# Patient Record
Sex: MALE | Race: BLACK OR AFRICAN AMERICAN | Employment: OTHER | URBAN - METROPOLITAN AREA
[De-identification: names, ages, dates, MRNs, and addresses within clinical notes are randomized per-mention and may not be internally consistent; named-entity substitution may affect disease eponyms.]

---

## 2018-11-08 ENCOUNTER — HOSPITAL ENCOUNTER (EMERGENCY)
Age: 48
Discharge: HOME | End: 2018-11-09
Attending: EMERGENCY MEDICINE

## 2018-11-08 DIAGNOSIS — R05.9 COUGH: Primary | ICD-10-CM

## 2018-11-08 DIAGNOSIS — R52 BODY ACHES: ICD-10-CM

## 2018-11-08 PROCEDURE — 99284 EMERGENCY DEPT VISIT MOD MDM: CPT

## 2018-11-08 ASSESSMENT — PAIN DESCRIPTION - PAIN TYPE: TYPE: ACUTE PAIN

## 2018-11-08 ASSESSMENT — PAIN SCALES - GENERAL: PAINLEVEL_OUTOF10: 10

## 2018-11-08 ASSESSMENT — PAIN DESCRIPTION - ORIENTATION: ORIENTATION: LOWER

## 2018-11-08 ASSESSMENT — PAIN DESCRIPTION - LOCATION: LOCATION: BACK

## 2018-11-09 ENCOUNTER — APPOINTMENT (OUTPATIENT)
Dept: GENERAL RADIOLOGY | Age: 48
End: 2018-11-09

## 2018-11-09 VITALS
HEIGHT: 73 IN | BODY MASS INDEX: 27.83 KG/M2 | WEIGHT: 210 LBS | RESPIRATION RATE: 18 BRPM | TEMPERATURE: 99.8 F | OXYGEN SATURATION: 99 % | SYSTOLIC BLOOD PRESSURE: 118 MMHG | HEART RATE: 79 BPM | DIASTOLIC BLOOD PRESSURE: 97 MMHG

## 2018-11-09 LAB
ALBUMIN SERPL-MCNC: 3.5 GM/DL (ref 3.4–5)
ALP BLD-CCNC: 128 IU/L (ref 40–129)
ALT SERPL-CCNC: 73 U/L (ref 10–40)
ANION GAP SERPL CALCULATED.3IONS-SCNC: 8 MMOL/L (ref 4–16)
AST SERPL-CCNC: 115 IU/L (ref 15–37)
BACTERIA: NEGATIVE /HPF
BASOPHILS ABSOLUTE: 0.1 K/CU MM
BASOPHILS RELATIVE PERCENT: 0.9 % (ref 0–1)
BILIRUB SERPL-MCNC: 0.2 MG/DL (ref 0–1)
BILIRUBIN URINE: NEGATIVE MG/DL
BLOOD, URINE: NEGATIVE
BUN BLDV-MCNC: 13 MG/DL (ref 6–23)
CALCIUM SERPL-MCNC: 8.5 MG/DL (ref 8.3–10.6)
CHLORIDE BLD-SCNC: 106 MMOL/L (ref 99–110)
CLARITY: CLEAR
CO2: 25 MMOL/L (ref 21–32)
COLOR: ABNORMAL
CREAT SERPL-MCNC: 1.3 MG/DL (ref 0.9–1.3)
DIFFERENTIAL TYPE: ABNORMAL
EOSINOPHILS ABSOLUTE: 0.3 K/CU MM
EOSINOPHILS RELATIVE PERCENT: 5.9 % (ref 0–3)
GFR AFRICAN AMERICAN: >60 ML/MIN/1.73M2
GFR NON-AFRICAN AMERICAN: 59 ML/MIN/1.73M2
GLUCOSE BLD-MCNC: 110 MG/DL (ref 70–99)
GLUCOSE, URINE: NEGATIVE MG/DL
HCT VFR BLD CALC: 42.4 % (ref 42–52)
HEMOGLOBIN: 14.4 GM/DL (ref 13.5–18)
IMMATURE NEUTROPHIL %: 0.2 % (ref 0–0.43)
KETONES, URINE: NEGATIVE MG/DL
LEUKOCYTE ESTERASE, URINE: NEGATIVE
LYMPHOCYTES ABSOLUTE: 1.1 K/CU MM
LYMPHOCYTES RELATIVE PERCENT: 21.4 % (ref 24–44)
MCH RBC QN AUTO: 30.3 PG (ref 27–31)
MCHC RBC AUTO-ENTMCNC: 34 % (ref 32–36)
MCV RBC AUTO: 89.3 FL (ref 78–100)
MONOCYTES ABSOLUTE: 0.5 K/CU MM
MONOCYTES RELATIVE PERCENT: 10 % (ref 0–4)
NITRITE URINE, QUANTITATIVE: NEGATIVE
NUCLEATED RBC %: 0 %
PDW BLD-RTO: 14.2 % (ref 11.7–14.9)
PH, URINE: 6 (ref 5–8)
PLATELET # BLD: 170 K/CU MM (ref 140–440)
PMV BLD AUTO: 9.6 FL (ref 7.5–11.1)
POTASSIUM SERPL-SCNC: 4 MMOL/L (ref 3.5–5.1)
PROTEIN UA: NEGATIVE MG/DL
RAPID INFLUENZA  B AGN: NEGATIVE
RAPID INFLUENZA A AGN: NEGATIVE
RBC # BLD: 4.75 M/CU MM (ref 4.6–6.2)
RBC URINE: ABNORMAL /HPF (ref 0–3)
SEGMENTED NEUTROPHILS ABSOLUTE COUNT: 3.3 K/CU MM
SEGMENTED NEUTROPHILS RELATIVE PERCENT: 61.6 % (ref 36–66)
SODIUM BLD-SCNC: 139 MMOL/L (ref 135–145)
SPECIFIC GRAVITY UA: 1.01 (ref 1–1.03)
SQUAMOUS EPITHELIAL: <1 /HPF
TOTAL CK: 92 IU/L (ref 38–174)
TOTAL IMMATURE NEUTOROPHIL: 0.01 K/CU MM
TOTAL NUCLEATED RBC: 0 K/CU MM
TOTAL PROTEIN: 5.9 GM/DL (ref 6.4–8.2)
TRICHOMONAS: ABNORMAL /HPF
UROBILINOGEN, URINE: NORMAL MG/DL (ref 0.2–1)
WBC # BLD: 5.3 K/CU MM (ref 4–10.5)
WBC UA: <1 /HPF (ref 0–2)

## 2018-11-09 PROCEDURE — 80053 COMPREHEN METABOLIC PANEL: CPT

## 2018-11-09 PROCEDURE — 85025 COMPLETE CBC W/AUTO DIFF WBC: CPT

## 2018-11-09 PROCEDURE — 87804 INFLUENZA ASSAY W/OPTIC: CPT

## 2018-11-09 PROCEDURE — 71046 X-RAY EXAM CHEST 2 VIEWS: CPT

## 2018-11-09 PROCEDURE — 6370000000 HC RX 637 (ALT 250 FOR IP): Performed by: EMERGENCY MEDICINE

## 2018-11-09 PROCEDURE — 82550 ASSAY OF CK (CPK): CPT

## 2018-11-09 PROCEDURE — 81001 URINALYSIS AUTO W/SCOPE: CPT

## 2018-11-09 RX ORDER — LIDOCAINE 4 G/G
1 PATCH TOPICAL DAILY
Status: DISCONTINUED | OUTPATIENT
Start: 2018-11-09 | End: 2018-11-09 | Stop reason: HOSPADM

## 2018-11-09 RX ORDER — GUAIFENESIN 100 MG/5ML
200 SOLUTION ORAL ONCE
Status: COMPLETED | OUTPATIENT
Start: 2018-11-09 | End: 2018-11-09

## 2018-11-09 RX ORDER — NAPROXEN 250 MG/1
500 TABLET ORAL ONCE
Status: COMPLETED | OUTPATIENT
Start: 2018-11-09 | End: 2018-11-09

## 2018-11-09 RX ORDER — LIDOCAINE 4 G/G
1 PATCH TOPICAL DAILY
Status: DISCONTINUED | OUTPATIENT
Start: 2018-11-09 | End: 2018-11-09

## 2018-11-09 RX ORDER — NAPROXEN 500 MG/1
500 TABLET ORAL 2 TIMES DAILY PRN
Qty: 20 TABLET | Refills: 0 | Status: SHIPPED | OUTPATIENT
Start: 2018-11-09 | End: 2019-07-03

## 2018-11-09 RX ORDER — GUAIFENESIN/DEXTROMETHORPHAN 100-10MG/5
5 SYRUP ORAL 3 TIMES DAILY PRN
Qty: 120 ML | Refills: 0 | Status: SHIPPED | OUTPATIENT
Start: 2018-11-09 | End: 2018-11-19

## 2018-11-09 RX ADMIN — GUAIFENESIN 200 MG: 200 SOLUTION ORAL at 00:49

## 2018-11-09 RX ADMIN — NAPROXEN 500 MG: 250 TABLET ORAL at 00:49

## 2018-11-09 ASSESSMENT — PAIN SCALES - GENERAL: PAINLEVEL_OUTOF10: 10

## 2018-11-09 NOTE — ED PROVIDER NOTES
Dose    lidocaine 4 % external patch 1 patch  1 patch Transdermal Daily Jossue Pollock MD         Current Outpatient Prescriptions   Medication Sig Dispense Refill    naproxen (NAPROSYN) 500 MG tablet Take 1 tablet by mouth 2 times daily as needed for Pain 20 tablet 0    guaiFENesin-dextromethorphan (ROBITUSSIN DM) 100-10 MG/5ML syrup Take 5 mLs by mouth 3 times daily as needed for Cough 120 mL 0     Allergies   Allergen Reactions    Tramadol Other (See Comments)     shaking    Vicodin [Hydrocodone-Acetaminophen] Hives     Nursing Notes Reviewed    ROS:  At least 10 systems reviewed and otherwise negative except as in the [place]. Physical Exam:  ED Triage Vitals [11/08/18 8440]   Enc Vitals Group      BP (!) 136/90      Pulse 79      Resp 18      Temp 99.8 °F (37.7 °C)      Temp Source Oral      SpO2 100 %      Weight 210 lb (95.3 kg)      Height 6' 1\" (1.854 m)      Head Circumference       Peak Flow       Pain Score       Pain Loc       Pain Edu? Excl. in [address]? My pulse oximetry interpretation is which is within the normal range    GENERAL APPEARANCE: Awake and alert. Cooperative. No acute distress. HEAD: Normocephalic. Atraumatic. EYES: EOM's grossly intact. Sclera anicteric. ENT: Mucous membranes are moist. Tolerates saliva. No trismus. NECK: Supple. No meningismus. Trachea midline. HEART: RRR. Radial pulses 2+. LUNGS: Respirations unlabored. CTAB. Full sentences. No respiratory distress. ABDOMEN: Soft. Non-tender. No guarding or rebound. BACK: no midline back pain, no CVA tenderness. Mild paralumbar muscle tenderness bilaterally. No bruising. EXTREMITIES: No acute deformities. No pitting edema. SKIN: Warm and dry. NEUROLOGICAL: No gross facial drooping. Moves all 4 extremities spontaneously. Able to plantarflex and dorsiflex both feet and great toes. Strong pulses. Normal reflexes. PSYCHIATRIC: Normal mood.     I have reviewed and interpreted all of the currently available Specific Gravity, UA 1.010 1.001 - 1.035    Blood, Urine NEGATIVE NEG    pH, Urine 6.0 5.0 - 8.0    Protein, UA NEGATIVE NEG MG/DL    Urobilinogen, Urine NORMAL 0.2 - 1.0 MG/DL    Nitrite Urine, Quantitative NEGATIVE NEG    Leukocyte Esterase, Urine NEGATIVE NEG    RBC, UA NONE SEEN 0 - 3 /HPF    WBC, UA <1 0 - 2 /HPF    Bacteria, UA NEGATIVE NEG /HPF    Squam Epithel, UA <1 /HPF    Trichomonas, UA NONE SEEN NOSEE /HPF        Radiographs:  [] Radiologist's Wet Read Report Reviewed:     XR CHEST STANDARD (2 VW) (Final result)   Result time 11/09/18 00:39:04   Final result by Mauri Lynne MD (11/09/18 00:39:04)                Impression:    No acute process. Narrative:    EXAMINATION:  TWO VIEWS OF THE CHEST    11/9/2018 12:24 am    COMPARISON:  02/05/2018    HISTORY:  ORDERING SYSTEM PROVIDED HISTORY: SOB  TECHNOLOGIST PROVIDED HISTORY:  Reason for exam:->SOB  Ordering Physician Provided Reason for Exam: SOB  Acuity: Acute  Type of Exam: Initial  Mechanism of Injury: SOB  Relevant Medical/Surgical History: SOB    FINDINGS:  The lungs and pleural spaces are without acute focal process. The cardiomediastinal silhouette is without acute process. There is no evidence of pneumothorax. The osseous structures are without acute process. [] Discussed with Radiologist:     [] The following radiograph was interpreted by myself in the absence of a radiologist:     EKG: (All EKG's are interpreted by myself in the absence of a cardiologist)      MDM:  Patient's vital signs are stable. Given guaifenesin, Naprosyn, Lidoderm patch. Patient's CBC, CMP, flu, urine, chest x-ray all negative. Do feel patient likely has an upper respiratory, cough, body aches, viral syndrome. Did discuss results with patient and will discharge him with Naprosyn, cough syrup, follow up PCP. Clinical Impression:  1. Cough    2.  Body aches        Disposition Vitals:  [unfilled], [unfilled], [unfilled],

## 2018-11-13 ENCOUNTER — APPOINTMENT (OUTPATIENT)
Dept: ULTRASOUND IMAGING | Age: 48
End: 2018-11-13

## 2018-11-13 ENCOUNTER — APPOINTMENT (OUTPATIENT)
Dept: GENERAL RADIOLOGY | Age: 48
End: 2018-11-13

## 2018-11-13 ENCOUNTER — APPOINTMENT (OUTPATIENT)
Dept: CT IMAGING | Age: 48
End: 2018-11-13

## 2018-11-13 ENCOUNTER — HOSPITAL ENCOUNTER (EMERGENCY)
Age: 48
Discharge: HOME | End: 2018-11-13
Attending: EMERGENCY MEDICINE

## 2018-11-13 VITALS
BODY MASS INDEX: 27.83 KG/M2 | DIASTOLIC BLOOD PRESSURE: 84 MMHG | HEART RATE: 75 BPM | WEIGHT: 210 LBS | RESPIRATION RATE: 16 BRPM | SYSTOLIC BLOOD PRESSURE: 122 MMHG | TEMPERATURE: 98.2 F | HEIGHT: 73 IN | OXYGEN SATURATION: 100 %

## 2018-11-13 DIAGNOSIS — B15.9 VIRAL HEPATITIS A WITHOUT HEPATIC COMA: Primary | ICD-10-CM

## 2018-11-13 LAB
ACETAMINOPHEN LEVEL: <5 UG/ML (ref 15–30)
ALBUMIN SERPL-MCNC: 3.4 GM/DL (ref 3.4–5)
ALP BLD-CCNC: 349 IU/L (ref 40–129)
ALT SERPL-CCNC: 3897 U/L (ref 10–40)
ANION GAP SERPL CALCULATED.3IONS-SCNC: 8 MMOL/L (ref 4–16)
APTT: 31.9 SECONDS (ref 21.2–33)
AST SERPL-CCNC: 4365 IU/L (ref 15–37)
ATYPICAL LYMPHOCYTE ABSOLUTE COUNT: ABNORMAL
BACTERIA: ABNORMAL /HPF
BANDED NEUTROPHILS ABSOLUTE COUNT: 0.66 K/CU MM
BANDED NEUTROPHILS RELATIVE PERCENT: 9 % (ref 5–11)
BASOPHILS ABSOLUTE: 0.1 K/CU MM
BASOPHILS RELATIVE PERCENT: 1 % (ref 0–1)
BILIRUB SERPL-MCNC: 3.6 MG/DL (ref 0–1)
BILIRUBIN URINE: ABNORMAL MG/DL
BLOOD, URINE: ABNORMAL
BUN BLDV-MCNC: 8 MG/DL (ref 6–23)
CALCIUM SERPL-MCNC: 8.6 MG/DL (ref 8.3–10.6)
CHLORIDE BLD-SCNC: 97 MMOL/L (ref 99–110)
CLARITY: CLEAR
CO2: 28 MMOL/L (ref 21–32)
COLOR: ABNORMAL
CREAT SERPL-MCNC: 1.2 MG/DL (ref 0.9–1.3)
DIFFERENTIAL TYPE: ABNORMAL
EOSINOPHILS ABSOLUTE: 0.2 K/CU MM
EOSINOPHILS RELATIVE PERCENT: 3 % (ref 0–3)
GFR AFRICAN AMERICAN: >60 ML/MIN/1.73M2
GFR NON-AFRICAN AMERICAN: >60 ML/MIN/1.73M2
GLUCOSE BLD-MCNC: 120 MG/DL (ref 70–99)
GLUCOSE, URINE: NEGATIVE MG/DL
HAV IGM SER IA-ACNC: ABNORMAL
HCT VFR BLD CALC: 46 % (ref 42–52)
HEMOGLOBIN: 15.9 GM/DL (ref 13.5–18)
HEPATITIS B CORE IGM ANTIBODY: NON REACTIVE
HEPATITIS B SURFACE ANTIGEN: NON REACTIVE
HEPATITIS C ANTIBODY: NON REACTIVE
HYALINE CASTS: 2 /LPF
ICTOTEST: POSITIVE
INR BLD: 1.26 INDEX
KETONES, URINE: NEGATIVE MG/DL
LEUKOCYTE ESTERASE, URINE: ABNORMAL
LYMPHOCYTES ABSOLUTE: 3.4 K/CU MM
LYMPHOCYTES RELATIVE PERCENT: 47 % (ref 24–44)
MCH RBC QN AUTO: 30.1 PG (ref 27–31)
MCHC RBC AUTO-ENTMCNC: 34.6 % (ref 32–36)
MCV RBC AUTO: 87.1 FL (ref 78–100)
MONOCYTES ABSOLUTE: 0.1 K/CU MM
MONOCYTES RELATIVE PERCENT: 2 % (ref 0–4)
MUCUS: ABNORMAL HPF
NITRITE URINE, QUANTITATIVE: NEGATIVE
PDW BLD-RTO: 13.8 % (ref 11.7–14.9)
PH, URINE: 5 (ref 5–8)
PLATELET # BLD: 158 K/CU MM (ref 140–440)
PLT MORPHOLOGY: ABNORMAL
PMV BLD AUTO: 10.2 FL (ref 7.5–11.1)
POLYCHROMASIA: ABNORMAL
POTASSIUM SERPL-SCNC: 4 MMOL/L (ref 3.5–5.1)
PROTEIN UA: 30 MG/DL
PROTHROMBIN TIME: 14.3 SECONDS (ref 9.12–12.5)
RBC # BLD: 5.28 M/CU MM (ref 4.6–6.2)
RBC URINE: <1 /HPF (ref 0–3)
SEGMENTED NEUTROPHILS ABSOLUTE COUNT: 2.8 K/CU MM
SEGMENTED NEUTROPHILS RELATIVE PERCENT: 38 % (ref 36–66)
SODIUM BLD-SCNC: 133 MMOL/L (ref 135–145)
SPECIFIC GRAVITY UA: 1.01 (ref 1–1.03)
SQUAMOUS EPITHELIAL: 1 /HPF
TOTAL CK: 115 IU/L (ref 38–174)
TOTAL PROTEIN: 7 GM/DL (ref 6.4–8.2)
TRICHOMONAS: ABNORMAL /HPF
UROBILINOGEN, URINE: 1 MG/DL (ref 0.2–1)
WBC # BLD: 7.3 K/CU MM (ref 4–10.5)
WBC UA: 7 /HPF (ref 0–2)

## 2018-11-13 PROCEDURE — 85027 COMPLETE CBC AUTOMATED: CPT

## 2018-11-13 PROCEDURE — 71046 X-RAY EXAM CHEST 2 VIEWS: CPT

## 2018-11-13 PROCEDURE — 2580000003 HC RX 258: Performed by: EMERGENCY MEDICINE

## 2018-11-13 PROCEDURE — 6360000004 HC RX CONTRAST MEDICATION: Performed by: EMERGENCY MEDICINE

## 2018-11-13 PROCEDURE — 96361 HYDRATE IV INFUSION ADD-ON: CPT

## 2018-11-13 PROCEDURE — G0480 DRUG TEST DEF 1-7 CLASSES: HCPCS

## 2018-11-13 PROCEDURE — 87086 URINE CULTURE/COLONY COUNT: CPT

## 2018-11-13 PROCEDURE — 80074 ACUTE HEPATITIS PANEL: CPT

## 2018-11-13 PROCEDURE — 82550 ASSAY OF CK (CPK): CPT

## 2018-11-13 PROCEDURE — 80053 COMPREHEN METABOLIC PANEL: CPT

## 2018-11-13 PROCEDURE — 6370000000 HC RX 637 (ALT 250 FOR IP): Performed by: EMERGENCY MEDICINE

## 2018-11-13 PROCEDURE — 96360 HYDRATION IV INFUSION INIT: CPT

## 2018-11-13 PROCEDURE — 85007 BL SMEAR W/DIFF WBC COUNT: CPT

## 2018-11-13 PROCEDURE — 85610 PROTHROMBIN TIME: CPT

## 2018-11-13 PROCEDURE — 36415 COLL VENOUS BLD VENIPUNCTURE: CPT

## 2018-11-13 PROCEDURE — 81001 URINALYSIS AUTO W/SCOPE: CPT

## 2018-11-13 PROCEDURE — 74177 CT ABD & PELVIS W/CONTRAST: CPT

## 2018-11-13 PROCEDURE — 85730 THROMBOPLASTIN TIME PARTIAL: CPT

## 2018-11-13 PROCEDURE — 99284 EMERGENCY DEPT VISIT MOD MDM: CPT

## 2018-11-13 PROCEDURE — 76705 ECHO EXAM OF ABDOMEN: CPT

## 2018-11-13 RX ORDER — SODIUM CHLORIDE 0.9 % (FLUSH) 0.9 %
10 SYRINGE (ML) INJECTION 2 TIMES DAILY
Status: DISCONTINUED | OUTPATIENT
Start: 2018-11-13 | End: 2018-11-14 | Stop reason: HOSPADM

## 2018-11-13 RX ORDER — IBUPROFEN 600 MG/1
600 TABLET ORAL ONCE
Status: COMPLETED | OUTPATIENT
Start: 2018-11-13 | End: 2018-11-13

## 2018-11-13 RX ORDER — 0.9 % SODIUM CHLORIDE 0.9 %
1000 INTRAVENOUS SOLUTION INTRAVENOUS ONCE
Status: COMPLETED | OUTPATIENT
Start: 2018-11-13 | End: 2018-11-13

## 2018-11-13 RX ADMIN — IBUPROFEN 600 MG: 600 TABLET ORAL at 18:02

## 2018-11-13 RX ADMIN — SODIUM CHLORIDE 1000 ML: 9 INJECTION, SOLUTION INTRAVENOUS at 18:02

## 2018-11-13 RX ADMIN — IOPAMIDOL 80 ML: 755 INJECTION, SOLUTION INTRAVENOUS at 18:14

## 2018-11-13 RX ADMIN — SODIUM CHLORIDE, PRESERVATIVE FREE 10 ML: 5 INJECTION INTRAVENOUS at 18:15

## 2018-11-13 ASSESSMENT — PAIN SCALES - GENERAL
PAINLEVEL_OUTOF10: 8
PAINLEVEL_OUTOF10: 8

## 2018-11-13 ASSESSMENT — PAIN DESCRIPTION - PAIN TYPE: TYPE: ACUTE PAIN

## 2018-11-13 ASSESSMENT — PAIN DESCRIPTION - LOCATION: LOCATION: BACK

## 2018-11-13 ASSESSMENT — PAIN DESCRIPTION - ORIENTATION: ORIENTATION: LOWER

## 2018-11-15 LAB
CULTURE: NORMAL
Lab: NORMAL
REPORT STATUS: NORMAL
SPECIMEN: NORMAL

## 2018-11-20 NOTE — ED PROVIDER NOTES
After Visit Summary   11/20/2018    Supriya Dennis    MRN: [id number]           Patient Information     Date Of Birth          1975        Visit Information        Provider Department      11/20/2018 1:00 PM Corie Guardado APRN [facility]     Encounter for initial prescription of contraceptive pills    -  1      Care Instructions    Birth control pills mimic a regular 28-day monthly cycle. For the first 21 days, you take  pills containing hormones. For the last seven days, you take pills without hormones. While you're taking the hormone free pills, you bleed vaginally, as if you were having a regular menstrual period.   Take your oral contraceptive at the same time every day.  Oral contraceptives will work only as long as they are taken regularly. Continue to take a pill every day even if you are spotting or bleeding, have an upset stomach, or do not think that you are likely to become pregnant. Do not stop taking oral contraceptives without talking to a health care provider.  Side Effects of Oral Contraceptives:   Some side effects can be serious. The following symptoms are uncommon, but if you experience any of them, call the clinic immediately or go to the Emergency Room  severe headache  speech problems  dizziness or faintness  weakness or numbness of an arm or leg  crushing chest pain or chest heaviness  coughing up blood  shortness of breath  pain, warmth, or heaviness in the back of the lower leg  partial or complete loss of vision  double vision  severe stomach pain  yellowing of the skin or eyes  dark-colored urine  light-colored stool  swelling in one foot or lower leg with a warm red tender area  menstrual bleeding that is unusually heavy or that lasts for longer than 7 days in a row  Other side effects that are not serious include: weight gain (up to 5 lbs), breast tenderness, skin changes, mild nausea, changes in libido and mood  Alcohol use 7.2 oz/week     12 Cans of beer per week      Comment: 12 pack in a week     Drug use: Yes     Types: Marijuana, Cocaine      Comment: cocaine yesterday     Sexual activity: Yes     Partners: Female     Other Topics Concern    Not on file     Social History Narrative    No narrative on file     Current Facility-Administered Medications   Medication Dose Route Frequency Provider Last Rate Last Dose    sodium chloride flush 0.9 % injection 10 mL  10 mL Intravenous BID Radha Carter MD   10 mL at 11/13/18 1815     Current Outpatient Prescriptions   Medication Sig Dispense Refill    naproxen (NAPROSYN) 500 MG tablet Take 1 tablet by mouth 2 times daily as needed for Pain 20 tablet 0    guaiFENesin-dextromethorphan (ROBITUSSIN DM) 100-10 MG/5ML syrup Take 5 mLs by mouth 3 times daily as needed for Cough 120 mL 0     Allergies   Allergen Reactions    Tramadol Other (See Comments)     shaking    Vicodin [Hydrocodone-Acetaminophen] Hives       Nursing Notes Reviewed    Physical Exam:  ED Triage Vitals   Enc Vitals Group      BP 11/13/18 1643 122/84      Pulse 11/13/18 1641 75      Resp 11/13/18 1641 16      Temp 11/13/18 1641 98.2 °F (36.8 °C)      Temp Source 11/13/18 1641 Oral      SpO2 11/13/18 1641 100 %      Weight 11/13/18 1641 210 lb (95.3 kg)      Height 11/13/18 1641 6' 1\" (1.854 m)      Head Circumference --       Peak Flow --       Pain Score --       Pain Loc --       Pain Edu? --       Excl. in [address]? --        My pulse ox interpretation is - normal    General appearance:  No acute distress. Skin:  Warm. Dry. Eye:  Extraocular movements intact. Ears, nose, mouth and throat:  Oral mucosa moist   Neck:  Trachea midline. Extremity:  No swelling. Normal ROM     Heart:  Regular rate and rhythm, normal S1 & S2, no extra heart sounds. Perfusion:  intact  Respiratory:  Lungs clear to auscultation bilaterally. Respirations nonlabored. Abdominal:  Normal bowel sounds. Soft. changes.  In the first three months of pill use you may not always bleed when you are taking the hormone free pills.  This is normal unless it continues into the 4th month.  If you are still bleeding in the 4th month while taking the pills containing hormones please call to talk to your provider.  You may need to use a backup method of birth control such as condoms if you vomit or have diarrhea while you are taking an oral contraceptive.              Follow-ups after your visit        Who to contact     If you have questions or need follow up information about today's clinic visit or your schedule please contact [facility] directly at [phone].  Normal or non-critical lab and imaging results will be communicated to you by MyChart, letter or phone within 4 business days after the clinic has received the results. If you do not hear from us within 7 days, please contact the clinic through [facility] or phone. If you have a critical or abnormal lab result, we will notify you by phone as soon as possible.  Submit refill requests through [facility] or call your pharmacy and they will forward the refill request to us. Please allow 3 business days for your refill to be completed.          Additional Information About Your Visit        [facility] Information     [employer] gives you secure access to your electronic health record. If you see a primary care provider, you can also send messages to your care team and make appointments. If you have questions, please call your primary care clinic.  If you do not have a primary care provider, please call [phone] and they will assist you.        Care EveryWhere ID     This is your Care EveryWhere ID. This could be used by other organizations to access your [facility] medical records  [id number]        Your Vitals Were     Pulse BMI (Body Mass Index)                88 31.19 kg/m2           Blood Pressure from Last 3 Encounters:   11/20/18 140/88   02/22/18 110/70    11/13/17 120/78    Weight from Last 3 Encounters:   11/20/18 193 lb 4 oz (87.7 kg)   02/22/18 189 lb (85.7 kg)   11/13/17 192 lb (87.1 kg)              Today, you had the following     No orders found for display         Today's Medication Changes          These changes are accurate as of 11/20/18  1:11 PM.  If you have any questions, ask your nurse or doctor.               Start taking these medicines.        Dose/Directions    norethindrone-ethinyl estradiol 1-20 MG-MCG per tablet   Commonly known as:  JUNEL FE 1/20   Used for:  Encounter for initial prescription of contraceptive pills   Started by:  Corie Guardado APRN CNM        Dose:  1 tablet   Take 1 tablet by mouth daily   Quantity:  84 tablet   Refills:  3            Where to get your medicines      These medications were sent to [address] [address]     Phone:  [phone]     norethindrone-ethinyl estradiol 1-20 MG-MCG per tablet                Primary Care Provider Office Phone # Fax #    Margaret Boss [phone]       [address] [place] [id number]        Equal Access to Services     HARRISON CURRY AH: Ifeoma Gagnon, waaxda luoxana, qaybta kaalmada adejosephda, frida galvan. So [facility] [phone].    ATENCIÓN: Si habla español, tiene a pizarro disposición servicios gratuitos de asistencia lingüística. Llame al [phone].    We comply with applicable federal civil rights laws and [place] laws. We do not discriminate on the basis of race, color, national origin, age, disability, sex, sexual orientation, or gender identity.            Thank you!     Thank you for choosing [facility]  for your care. Our goal is always to provide you with excellent care. Hearing back from our patients is one way we can continue to improve our services. Please take a few minutes to complete the written survey that  you may receive in the mail after your visit with us. Thank you!             Your Updated Medication List - Protect others around you: Learn how to safely use, store and throw away your medicines at www.disposemymeds.org.          This list is accurate as of 11/20/18  1:11 PM.  Always use your most recent med list.                   Brand Name Dispense Instructions for use Diagnosis    docusate sodium 50 MG capsule    COLACE    60 capsule    Take 1 capsule (50 mg) by mouth 2 times daily as needed for constipation    Constipation, unspecified constipation type       FLUoxetine 40 MG capsule    PROzac    90 capsule    TAKE 1 CAPSULE (40 MG) BY MOUTH DAILY    Depressive disorder, Anxiety       GLUCOSAMINE CHONDR 500 COMPLEX PO      Take 1 Cap by mouth daily.        levonorgestrel 20 MCG/24HR IUD    MIRENA     by Intrauterine route.        LORazepam 0.5 MG tablet    ATIVAN    20 tablet    Take 1 tablet (0.5 mg) by mouth every 8 hours as needed for anxiety    Anxiety       metoprolol succinate 50 MG 24 hr tablet    TOPROL-XL     TAKE 1 (ONE) TABLET BY MOUTH ONCE DAILY        norethindrone-ethinyl estradiol 1-20 MG-MCG per tablet    JUNEL FE 1/20    84 tablet    Take 1 tablet by mouth daily    Encounter for initial prescription of contraceptive pills

## 2018-12-14 ENCOUNTER — APPOINTMENT (OUTPATIENT)
Dept: ULTRASOUND IMAGING | Age: 48
End: 2018-12-14

## 2018-12-14 ENCOUNTER — APPOINTMENT (OUTPATIENT)
Dept: CT IMAGING | Age: 48
End: 2018-12-14

## 2018-12-14 ENCOUNTER — HOSPITAL ENCOUNTER (EMERGENCY)
Age: 48
Discharge: HOME | End: 2018-12-14
Attending: EMERGENCY MEDICINE

## 2018-12-14 ENCOUNTER — APPOINTMENT (OUTPATIENT)
Dept: GENERAL RADIOLOGY | Age: 48
End: 2018-12-14

## 2018-12-14 VITALS
SYSTOLIC BLOOD PRESSURE: 113 MMHG | DIASTOLIC BLOOD PRESSURE: 73 MMHG | TEMPERATURE: 98.2 F | HEIGHT: 73 IN | RESPIRATION RATE: 18 BRPM | HEART RATE: 69 BPM | BODY MASS INDEX: 27.83 KG/M2 | OXYGEN SATURATION: 98 % | WEIGHT: 210 LBS

## 2018-12-14 DIAGNOSIS — F14.90 COCAINE USE: ICD-10-CM

## 2018-12-14 DIAGNOSIS — F12.90 MARIJUANA USE: ICD-10-CM

## 2018-12-14 DIAGNOSIS — B15.9 VIRAL HEPATITIS A WITHOUT HEPATIC COMA: ICD-10-CM

## 2018-12-14 DIAGNOSIS — R07.9 CHEST PAIN, UNSPECIFIED TYPE: Primary | ICD-10-CM

## 2018-12-14 LAB
ACETAMINOPHEN LEVEL: <5 UG/ML (ref 15–30)
ALBUMIN SERPL-MCNC: 4.1 GM/DL (ref 3.4–5)
ALP BLD-CCNC: 178 IU/L (ref 40–129)
ALT SERPL-CCNC: 104 U/L (ref 10–40)
AMPHETAMINES: NEGATIVE
ANION GAP SERPL CALCULATED.3IONS-SCNC: 13 MMOL/L (ref 4–16)
AST SERPL-CCNC: 74 IU/L (ref 15–37)
BACTERIA: NEGATIVE /HPF
BARBITURATE SCREEN URINE: NEGATIVE
BASE EXCESS MIXED: 3.2 (ref 0–1.2)
BASOPHILS ABSOLUTE: 0.1 K/CU MM
BASOPHILS RELATIVE PERCENT: 0.6 % (ref 0–1)
BENZODIAZEPINE SCREEN, URINE: NEGATIVE
BILIRUB SERPL-MCNC: 1.1 MG/DL (ref 0–1)
BILIRUBIN URINE: NEGATIVE MG/DL
BLOOD, URINE: NEGATIVE
BUN BLDV-MCNC: 12 MG/DL (ref 6–23)
CALCIUM SERPL-MCNC: 8.9 MG/DL (ref 8.3–10.6)
CANNABINOID SCREEN URINE: ABNORMAL
CHLORIDE BLD-SCNC: 101 MMOL/L (ref 99–110)
CLARITY: CLEAR
CO2: 26 MMOL/L (ref 21–32)
COCAINE METABOLITE: ABNORMAL
COLOR: YELLOW
CREAT SERPL-MCNC: 1.2 MG/DL (ref 0.9–1.3)
DIFFERENTIAL TYPE: ABNORMAL
EOSINOPHILS ABSOLUTE: 0.4 K/CU MM
EOSINOPHILS RELATIVE PERCENT: 3.9 % (ref 0–3)
GFR AFRICAN AMERICAN: >60 ML/MIN/1.73M2
GFR NON-AFRICAN AMERICAN: >60 ML/MIN/1.73M2
GLUCOSE BLD-MCNC: 155 MG/DL (ref 70–99)
GLUCOSE, URINE: NEGATIVE MG/DL
HAV IGM SER IA-ACNC: ABNORMAL
HCO3 VENOUS: 27.9 MMOL/L (ref 19–25)
HCT VFR BLD CALC: 43.2 % (ref 42–52)
HEMOGLOBIN: 14.1 GM/DL (ref 13.5–18)
HEPATITIS B CORE IGM ANTIBODY: NON REACTIVE
HEPATITIS B SURFACE ANTIGEN: NON REACTIVE
HEPATITIS C ANTIBODY: NON REACTIVE
HYALINE CASTS: 2 /LPF
IMMATURE NEUTROPHIL %: 0.3 % (ref 0–0.43)
KETONES, URINE: NEGATIVE MG/DL
LEUKOCYTE ESTERASE, URINE: ABNORMAL
LIPASE: 41 IU/L (ref 13–60)
LYMPHOCYTES ABSOLUTE: 3.4 K/CU MM
LYMPHOCYTES RELATIVE PERCENT: 35.9 % (ref 24–44)
MCH RBC QN AUTO: 29.2 PG (ref 27–31)
MCHC RBC AUTO-ENTMCNC: 32.6 % (ref 32–36)
MCV RBC AUTO: 89.4 FL (ref 78–100)
MONOCYTES ABSOLUTE: 0.9 K/CU MM
MONOCYTES RELATIVE PERCENT: 9.2 % (ref 0–4)
MUCUS: ABNORMAL HPF
NITRITE URINE, QUANTITATIVE: NEGATIVE
NUCLEATED RBC %: 0 %
O2 SAT, VEN: 92.5 % (ref 50–70)
OPIATES, URINE: NEGATIVE
OXYCODONE: NEGATIVE
PCO2, VEN: 42 MMHG (ref 38–52)
PDW BLD-RTO: 15.9 % (ref 11.7–14.9)
PH VENOUS: 7.43 (ref 7.32–7.42)
PH, URINE: 5 (ref 5–8)
PHENCYCLIDINE, URINE: NEGATIVE
PLATELET # BLD: 264 K/CU MM (ref 140–440)
PMV BLD AUTO: 9.3 FL (ref 7.5–11.1)
PO2, VEN: 218 MMHG (ref 28–48)
POTASSIUM SERPL-SCNC: 3.7 MMOL/L (ref 3.5–5.1)
PRO-BNP: 38.56 PG/ML
PROTEIN UA: NEGATIVE MG/DL
RBC # BLD: 4.83 M/CU MM (ref 4.6–6.2)
RBC URINE: ABNORMAL /HPF (ref 0–3)
SEGMENTED NEUTROPHILS ABSOLUTE COUNT: 4.8 K/CU MM
SEGMENTED NEUTROPHILS RELATIVE PERCENT: 50.1 % (ref 36–66)
SODIUM BLD-SCNC: 140 MMOL/L (ref 135–145)
SPECIFIC GRAVITY UA: 1.01 (ref 1–1.03)
SQUAMOUS EPITHELIAL: 1 /HPF
TOTAL CK: 224 IU/L (ref 38–174)
TOTAL IMMATURE NEUTOROPHIL: 0.03 K/CU MM
TOTAL NUCLEATED RBC: 0 K/CU MM
TOTAL PROTEIN: 7.4 GM/DL (ref 6.4–8.2)
TRICHOMONAS: ABNORMAL /HPF
TROPONIN T: <0.01 NG/ML
TROPONIN T: <0.01 NG/ML
UROBILINOGEN, URINE: NORMAL MG/DL (ref 0.2–1)
WBC # BLD: 9.5 K/CU MM (ref 4–10.5)
WBC UA: 3 /HPF (ref 0–2)

## 2018-12-14 PROCEDURE — 93005 ELECTROCARDIOGRAM TRACING: CPT | Performed by: EMERGENCY MEDICINE

## 2018-12-14 PROCEDURE — 85025 COMPLETE CBC W/AUTO DIFF WBC: CPT

## 2018-12-14 PROCEDURE — 81001 URINALYSIS AUTO W/SCOPE: CPT

## 2018-12-14 PROCEDURE — 83690 ASSAY OF LIPASE: CPT

## 2018-12-14 PROCEDURE — 82805 BLOOD GASES W/O2 SATURATION: CPT

## 2018-12-14 PROCEDURE — 99285 EMERGENCY DEPT VISIT HI MDM: CPT

## 2018-12-14 PROCEDURE — 80074 ACUTE HEPATITIS PANEL: CPT

## 2018-12-14 PROCEDURE — 71045 X-RAY EXAM CHEST 1 VIEW: CPT

## 2018-12-14 PROCEDURE — 2500000003 HC RX 250 WO HCPCS: Performed by: EMERGENCY MEDICINE

## 2018-12-14 PROCEDURE — 70450 CT HEAD/BRAIN W/O DYE: CPT

## 2018-12-14 PROCEDURE — S0028 INJECTION, FAMOTIDINE, 20 MG: HCPCS | Performed by: EMERGENCY MEDICINE

## 2018-12-14 PROCEDURE — 93010 ELECTROCARDIOGRAM REPORT: CPT | Performed by: INTERNAL MEDICINE

## 2018-12-14 PROCEDURE — 96361 HYDRATE IV INFUSION ADD-ON: CPT

## 2018-12-14 PROCEDURE — 80053 COMPREHEN METABOLIC PANEL: CPT

## 2018-12-14 PROCEDURE — 2580000003 HC RX 258: Performed by: EMERGENCY MEDICINE

## 2018-12-14 PROCEDURE — 80307 DRUG TEST PRSMV CHEM ANLYZR: CPT

## 2018-12-14 PROCEDURE — 76705 ECHO EXAM OF ABDOMEN: CPT

## 2018-12-14 PROCEDURE — 83880 ASSAY OF NATRIURETIC PEPTIDE: CPT

## 2018-12-14 PROCEDURE — 96374 THER/PROPH/DIAG INJ IV PUSH: CPT

## 2018-12-14 PROCEDURE — 84484 ASSAY OF TROPONIN QUANT: CPT

## 2018-12-14 PROCEDURE — 6370000000 HC RX 637 (ALT 250 FOR IP): Performed by: EMERGENCY MEDICINE

## 2018-12-14 PROCEDURE — G0480 DRUG TEST DEF 1-7 CLASSES: HCPCS

## 2018-12-14 PROCEDURE — 82550 ASSAY OF CK (CPK): CPT

## 2018-12-14 PROCEDURE — 36415 COLL VENOUS BLD VENIPUNCTURE: CPT

## 2018-12-14 RX ORDER — ASPIRIN 81 MG/1
324 TABLET, CHEWABLE ORAL ONCE
Status: COMPLETED | OUTPATIENT
Start: 2018-12-14 | End: 2018-12-14

## 2018-12-14 RX ORDER — 0.9 % SODIUM CHLORIDE 0.9 %
1000 INTRAVENOUS SOLUTION INTRAVENOUS ONCE
Status: COMPLETED | OUTPATIENT
Start: 2018-12-14 | End: 2018-12-14

## 2018-12-14 RX ADMIN — SODIUM CHLORIDE 1000 ML: 9 INJECTION, SOLUTION INTRAVENOUS at 15:52

## 2018-12-14 RX ADMIN — ASPIRIN 81 MG 324 MG: 81 TABLET ORAL at 15:51

## 2018-12-14 RX ADMIN — FAMOTIDINE 20 MG: 10 INJECTION INTRAVENOUS at 15:52

## 2018-12-14 ASSESSMENT — ENCOUNTER SYMPTOMS
EYES NEGATIVE: 1
SHORTNESS OF BREATH: 1
GASTROINTESTINAL NEGATIVE: 1
ALLERGIC/IMMUNOLOGIC NEGATIVE: 1

## 2018-12-14 ASSESSMENT — PAIN DESCRIPTION - PAIN TYPE: TYPE: ACUTE PAIN

## 2018-12-14 ASSESSMENT — PAIN DESCRIPTION - ORIENTATION: ORIENTATION: MID

## 2018-12-14 ASSESSMENT — PAIN DESCRIPTION - LOCATION: LOCATION: CHEST

## 2018-12-14 ASSESSMENT — PAIN SCALES - GENERAL: PAINLEVEL_OUTOF10: 8

## 2018-12-14 NOTE — ED PROVIDER NOTES
[address] [facility]      TRIAGE CHIEF COMPLAINT:   Chest Pain (for 2 months)      [place]:  Althea Rutledge is a 51 y.o. male that presents complaining of chest pain shortness of breath. Patient's had chest pain for 2 months. He did admit to using cocaine recently about a day or so ago. Patient states he's been out for 2 days he is currently sleeping in no distress he has no other complaints. Denies any fevers nausea vomiting shortness of breath cough headache abdominal pain history of DVT or PE swelling. No other questions or concerns. REVIEW OF SYSTEMS:  At least 10 systems reviewed and otherwise acutely negative except as in the [address] Review of Systems   Constitutional: Negative. HENT: Negative. Eyes: Negative. Respiratory: Positive for shortness of breath. Cardiovascular: Positive for chest pain. Gastrointestinal: Negative. Endocrine: Negative. Genitourinary: Negative. Musculoskeletal: Negative. Skin: Negative. Allergic/Immunologic: Negative. Neurological: Negative. Hematological: Negative. Psychiatric/Behavioral: Negative. All other systems reviewed and are negative. Past Medical History:   Diagnosis Date    CAD (coronary artery disease) 12/23/2013    History of TMJ disorder     Hypertension     Trigeminal neuralgia      Past Surgical History:   Procedure Laterality Date    ABDOMEN SURGERY      CARDIAC CATHETERIZATION  08/03/2016     Family History   Problem Relation Age of Onset    High Blood Pressure Mother     High Blood Pressure Sister     Cancer Sister      Social History     Social History    Marital status:      Spouse name: N/A    Number of children: 11    Years of education: N/A     Occupational History    Not on file.      Social History Main Topics    Smoking status: Current Every Day Smoker     Packs/day: 1.00     Types: Cigarettes    Smokeless tobacco: Never Used    Alcohol use 7.2 oz/week     12 Cans of beer per week      Comment: 12 pack in a week     Drug use: Yes     Types: Marijuana, Cocaine      Comment: cocaine yesterday     Sexual activity: Yes     Partners: Female     Other Topics Concern    Not on file     Social History Narrative    No narrative on file     Current Facility-Administered Medications   Medication Dose Route Frequency Provider Last Rate Last Dose    0.9 % sodium chloride bolus  1,000 mL Intravenous Once [place], DO 1,000 mL/hr at 12/14/18 1552 1,000 mL at 12/14/18 1552     Current Outpatient Prescriptions   Medication Sig Dispense Refill    naproxen (NAPROSYN) 500 MG tablet Take 1 tablet by mouth 2 times daily as needed for Pain 20 tablet 0      Allergies   Allergen Reactions    Tramadol Other (See Comments)     shaking    Vicodin [Hydrocodone-Acetaminophen] Hives     Current Facility-Administered Medications   Medication Dose Route Frequency Provider Last Rate Last Dose    0.9 % sodium chloride bolus  1,000 mL Intravenous Once [place], DO 1,000 mL/hr at 12/14/18 1552 1,000 mL at 12/14/18 1552     Current Outpatient Prescriptions   Medication Sig Dispense Refill    naproxen (NAPROSYN) 500 MG tablet Take 1 tablet by mouth 2 times daily as needed for Pain 20 tablet 0       Nursing Notes Reviewed    VITAL SIGNS:  ED Triage Vitals [12/14/18 1336]   Enc Vitals Group      /63      Pulse 90      Resp 16      Temp 98.2 °F (36.8 °C)      Temp Source Oral      SpO2 96 %      Weight 210 lb (95.3 kg)      Height 6' 1\" (1.854 m)      Head Circumference       Peak Flow       Pain Score       Pain Loc       Pain Edu? Excl. in [address]? PHYSICAL EXAM:  Physical Exam   Constitutional: He is oriented to person, place, and time. He appears well-developed and well-nourished. He is cooperative. Non-toxic appearance. He does not have a sickly appearance. He does not appear ill. No distress. HENT:   Head: Normocephalic and atraumatic.    Right Ear: External ear normal.   Left Value Ref Range    Hepatitis B Surface Ag NON REACTIVE NR    Hep A IgM Pending NR    Hep B Core Ab, IgM NON REACTIVE NR    Hepatitis C Ab NON REACTIVE NR   Lipase   Result Value Ref Range    Lipase 41 13 - 60 IU/L   EKG 12 Lead   Result Value Ref Range    Ventricular Rate 75 BPM    Atrial Rate 75 BPM    P-R Interval 170 ms    QRS Duration 100 ms    Q-T Interval 398 ms    QTc Calculation (Bazett) 444 ms    P Axis 73 degrees    R Axis 50 degrees    T Axis 29 degrees    Diagnosis       Normal sinus rhythm  Incomplete right bundle branch block  Nonspecific T wave abnormality  Abnormal ECG  When compared with ECG of 05-FEB-2018 05:45,  Criteria for Septal infarct are no longer present          Radiographs (if obtained):  [] The following radiograph was interpreted by myself in the absence of a radiologist:  [x] Radiologist's Report Reviewed:    CXR, RUQ US    EKG (if obtained): (All EKG's are interpreted by myself in the absence of a cardiologist)    12 lead EKG per my interpretation:  Normal Sinus Rhythm 75  Axis is   Normal  QTc is  444  There is no specific T wave changes appreciated. There is no specific ST wave changes appreciated. Prior EKG to compare with was available and similar to previous      MDM:    Patient here chest pain for 2 months. History of cocaine abuse. Patient states he's been out for 2 days likely from cocaine binge. On arrival patient is sleeping is in no distress and do not see signs of trauma he is alert and oriented ×3 his vital signs are stable. Patient has a complaint currently EKG similar to previous and negative. Initial troponin is negative he has no DVT or PE risk factors. Chest x-rays negative. I did order a second troponin. His LT is her elevated they're chronically elevated at over a ultrasound of his gallbladder. I will send the patient to Dr. Peg Jerome will follow up on labs and imaging of his delta troponin is negative I think he go home.     CLINICAL IMPRESSION:  Final

## 2018-12-17 LAB
EKG ATRIAL RATE: 75 BPM
EKG DIAGNOSIS: NORMAL
EKG P AXIS: 73 DEGREES
EKG P-R INTERVAL: 170 MS
EKG Q-T INTERVAL: 398 MS
EKG QRS DURATION: 100 MS
EKG QTC CALCULATION (BAZETT): 444 MS
EKG R AXIS: 50 DEGREES
EKG T AXIS: 29 DEGREES
EKG VENTRICULAR RATE: 75 BPM

## 2019-06-29 ENCOUNTER — APPOINTMENT (OUTPATIENT)
Dept: GENERAL RADIOLOGY | Age: 49
End: 2019-06-29
Payer: MEDICARE

## 2019-06-29 ENCOUNTER — HOSPITAL ENCOUNTER (EMERGENCY)
Age: 49
Discharge: HOME | End: 2019-06-29
Payer: MEDICARE

## 2019-06-29 VITALS
HEART RATE: 79 BPM | TEMPERATURE: 98 F | HEIGHT: 73 IN | RESPIRATION RATE: 18 BRPM | BODY MASS INDEX: 31.81 KG/M2 | DIASTOLIC BLOOD PRESSURE: 95 MMHG | SYSTOLIC BLOOD PRESSURE: 133 MMHG | OXYGEN SATURATION: 98 % | WEIGHT: 240 LBS

## 2019-06-29 DIAGNOSIS — S62.647A CLOSED NONDISPLACED FRACTURE OF PROXIMAL PHALANX OF LEFT LITTLE FINGER, INITIAL ENCOUNTER: Primary | ICD-10-CM

## 2019-06-29 PROCEDURE — 73130 X-RAY EXAM OF HAND: CPT

## 2019-06-29 PROCEDURE — 99283 EMERGENCY DEPT VISIT LOW MDM: CPT

## 2019-06-29 RX ORDER — OXYCODONE HYDROCHLORIDE AND ACETAMINOPHEN 5; 325 MG/1; MG/1
1 TABLET ORAL EVERY 6 HOURS PRN
Qty: 10 TABLET | Refills: 0 | Status: SHIPPED | OUTPATIENT
Start: 2019-06-29 | End: 2019-07-04

## 2019-06-29 ASSESSMENT — PAIN SCALES - GENERAL: PAINLEVEL_OUTOF10: 10

## 2019-06-29 ASSESSMENT — PAIN DESCRIPTION - ORIENTATION: ORIENTATION: LEFT

## 2019-06-29 ASSESSMENT — PAIN DESCRIPTION - LOCATION: LOCATION: HAND

## 2019-06-30 NOTE — ED PROVIDER NOTES
and finger splint applied. Patient has Vicodin allergy which he develops hives, states he has had Percocet without reaction. Patient is provided a short course of Percocet for pain. Recommend over-the-counter ibuprofen. I recommend rest, ice, elevation. Recommend follow-up with orthopedist by calling to schedule appointment for evaluation. Clinical  IMPRESSION    1. Closed nondisplaced fracture of proximal phalanx of left little finger, initial encounter        Diagnosis and plan discussed in detail with patient who understands and agrees. Patient agrees to return emergency department if symptoms worsen or any new symptoms develop. Comment: Please note this report has been produced using speech recognition software and may contain errors related to that system including errors in grammar, punctuation, and spelling, as well as words and phrases that may be inappropriate. If there are any questions or concerns please feel free to contact the dictating provider for clarification.         Jeffrey Oliveira PA-C  06/29/19 0351

## 2019-07-03 ENCOUNTER — HOSPITAL ENCOUNTER (EMERGENCY)
Age: 49
Discharge: HOME | End: 2019-07-03
Payer: MEDICARE

## 2019-07-03 VITALS
SYSTOLIC BLOOD PRESSURE: 162 MMHG | RESPIRATION RATE: 16 BRPM | HEIGHT: 73 IN | HEART RATE: 66 BPM | WEIGHT: 234 LBS | OXYGEN SATURATION: 99 % | TEMPERATURE: 97.8 F | DIASTOLIC BLOOD PRESSURE: 101 MMHG | BODY MASS INDEX: 31.01 KG/M2

## 2019-07-03 DIAGNOSIS — S62.647D CLOSED NONDISPLACED FRACTURE OF PROXIMAL PHALANX OF LEFT LITTLE FINGER WITH ROUTINE HEALING, SUBSEQUENT ENCOUNTER: Primary | ICD-10-CM

## 2019-07-03 PROCEDURE — 99282 EMERGENCY DEPT VISIT SF MDM: CPT

## 2019-07-03 RX ORDER — NAPROXEN 500 MG/1
500 TABLET ORAL 2 TIMES DAILY
Qty: 20 TABLET | Refills: 0 | Status: SHIPPED | OUTPATIENT
Start: 2019-07-03 | End: 2020-10-12 | Stop reason: SDUPTHER

## 2019-07-03 ASSESSMENT — PAIN DESCRIPTION - PAIN TYPE: TYPE: ACUTE PAIN

## 2019-07-03 ASSESSMENT — PAIN DESCRIPTION - ORIENTATION: ORIENTATION: LEFT

## 2019-07-03 ASSESSMENT — PAIN DESCRIPTION - LOCATION: LOCATION: FINGER (COMMENT WHICH ONE)

## 2019-07-03 ASSESSMENT — PAIN DESCRIPTION - DESCRIPTORS: DESCRIPTORS: ACHING;DISCOMFORT

## 2019-07-03 ASSESSMENT — PAIN SCALES - GENERAL: PAINLEVEL_OUTOF10: 5

## 2019-07-03 NOTE — ED PROVIDER NOTES
History     Socioeconomic History    Marital status:      Spouse name: None    Number of children: 5    Years of education: None    Highest education level: None   Occupational History    None   Social Needs    Financial resource strain: None    Food insecurity:     Worry: None     Inability: None    Transportation needs:     Medical: None     Non-medical: None   Tobacco Use    Smoking status: Current Every Day Smoker     Packs/day: 1.00     Types: Cigarettes    Smokeless tobacco: Never Used   Substance and Sexual Activity    Alcohol use: Yes     Alcohol/week: 7.2 oz     Types: 12 Cans of beer per week     Comment: 12 pack in a week     Drug use: Yes     Types: Marijuana, Cocaine     Comment: cocaine yesterday     Sexual activity: Yes     Partners: Female   Lifestyle    Physical activity:     Days per week: None     Minutes per session: None    Stress: None   Relationships    Social connections:     Talks on phone: None     Gets together: None     Attends Taoist service: None     Active member of club or organization: None     Attends meetings of clubs or organizations: None     Relationship status: None    Intimate partner violence:     Fear of current or ex partner: None     Emotionally abused: None     Physically abused: None     Forced sexual activity: None   Other Topics Concern    None   Social History Narrative    None     Family History   Problem Relation Age of Onset    High Blood Pressure Mother     High Blood Pressure Sister     Cancer Sister            PHYSICAL EXAM    VITAL SIGNS: BP (!) 162/101   Pulse 66   Temp 97.8 °F (36.6 °C) (Oral)   Resp 16   Ht 6' 1\" (1.854 m)   Wt 234 lb (106.1 kg)   SpO2 99%   BMI 30.87 kg/m²   Constitutional:  Well developed, well nourished, no acute distress, non-toxic appearance   HENT:  Atraumatic    Musculoskeletal:    Left  Hand:  Mild to moderate swelling to the proximal portion of the left fifth finger.   This region is tender to

## 2019-07-10 NOTE — PROGRESS NOTES
ORTHOPEDIC CONSULT      2019    Patient name: Lyle Conner  : 1970    CHIEF COMPLAINT  Chief Complaint   Patient presents with    Fracture     5th proximal phalangeal       HPI  The patient was seen and examined. Lyle Conner is a 47 y.o. male who presents as a new patient with complaints of left hand pain after an injury on 19. He has a proximal phalanx 5th digit left hand fracture. Injured while playing basketball. Pain 8/10, throbbing. PAST MEDICAL HISTORY  Past Medical History:   Diagnosis Date    CAD (coronary artery disease) 2013    History of TMJ disorder     Hypertension     Trigeminal neuralgia        CURRENT MEDICATIONS  Prior to Admission medications    Medication Sig Start Date End Date Taking? Authorizing Provider   naproxen (NAPROSYN) 500 MG tablet Take 1 tablet by mouth 2 times daily 7/3/19   Sergio Prakash PA-C       ALLERGIES  Allergies   Allergen Reactions    Tramadol Other (See Comments)     shaking    Vicodin [Hydrocodone-Acetaminophen] Hives       SURGICAL HISTORY  Past Surgical History:   Procedure Laterality Date    ABDOMEN SURGERY      CARDIAC CATHETERIZATION  2016       FAMILY HISTORY  Family History   Problem Relation Age of Onset    High Blood Pressure Mother     High Blood Pressure Sister     Cancer Sister        SOCIAL HISTORY  Social History     Socioeconomic History    Marital status:      Spouse name: None    Number of children: 5    Years of education: None    Highest education level: None   Occupational History    None   Social Needs    Financial resource strain: None    Food insecurity:     Worry: None     Inability: None    Transportation needs:     Medical: None     Non-medical: None   Tobacco Use    Smoking status: Current Every Day Smoker     Packs/day: 1.00     Types: Cigarettes    Smokeless tobacco: Never Used   Substance and Sexual Activity    Alcohol use:  Yes     Alcohol/week: 7.2 oz     Types: 12 Cans of beer per week     Comment: 12 pack in a week     Drug use: Yes     Types: Marijuana, Cocaine     Comment: cocaine yesterday     Sexual activity: Yes     Partners: Female   Lifestyle    Physical activity:     Days per week: None     Minutes per session: None    Stress: None   Relationships    Social connections:     Talks on phone: None     Gets together: None     Attends Cheondoism service: None     Active member of club or organization: None     Attends meetings of clubs or organizations: None     Relationship status: None    Intimate partner violence:     Fear of current or ex partner: None     Emotionally abused: None     Physically abused: None     Forced sexual activity: None   Other Topics Concern    None   Social History Narrative    None       REVIEW OF SYSTEMS   Review of Systems - History obtained from chart review  General ROS: negative  Psychological ROS: negative  Ophthalmic ROS: negative  ENT ROS: negative  Allergy and Immunology ROS: negative  Hematological and Lymphatic ROS: negative  Endocrine ROS: negative  Respiratory ROS: negative  Cardiovascular ROS: negative  Gastrointestinal ROS: negative  Genito-Urinary ROS: negative  Musculoskeletal ROS: left hand pain  Neurological ROS: negative  Dermatological ROS: negative    PHYSICAL EXAM  VITAL SIGNS: Pulse 78   SpO2 99%   General Appearance Alert, well appearing, No acute distress   Eyes clear   Ears, Nose, Throat clear    Neck Supple, non tender   Respiratory Lungs: clear breath sounds bilateral   Cardiovascular Heart regular rate and rythm   Gastrointestinal Abdomen: soft, non-tender, non-distended   Lymphatics No adenopathy   Musculoskeletal LUE- pain and swelling 5th proximal phalanx; no open wounds; NV intact; able to make fist and extend all fingers/thumb; no crossover of fingers when making fist.    Skin Normal. No rash or lesions   Neurological Awake, alert and oriented. No focal deficits.  Motor and Sensory intact   Psychiatric

## 2019-07-11 ENCOUNTER — OFFICE VISIT (OUTPATIENT)
Dept: ORTHOPEDIC SURGERY | Age: 49
End: 2019-07-11
Payer: MEDICARE

## 2019-07-11 VITALS — OXYGEN SATURATION: 99 % | HEART RATE: 78 BPM

## 2019-07-11 DIAGNOSIS — S62.647D CLOSED NONDISPLACED FRACTURE OF PROXIMAL PHALANX OF LEFT LITTLE FINGER WITH ROUTINE HEALING, SUBSEQUENT ENCOUNTER: Primary | ICD-10-CM

## 2019-07-11 PROCEDURE — 26750 TREAT FINGER FRACTURE EACH: CPT | Performed by: ORTHOPAEDIC SURGERY

## 2019-07-11 PROCEDURE — 99203 OFFICE O/P NEW LOW 30 MIN: CPT | Performed by: ORTHOPAEDIC SURGERY

## 2019-08-21 ENCOUNTER — HOSPITAL ENCOUNTER (EMERGENCY)
Age: 49
Discharge: HOME | End: 2019-08-21
Payer: MEDICARE

## 2019-08-21 ENCOUNTER — APPOINTMENT (OUTPATIENT)
Dept: GENERAL RADIOLOGY | Age: 49
End: 2019-08-21
Payer: MEDICARE

## 2019-08-21 VITALS
TEMPERATURE: 98.2 F | SYSTOLIC BLOOD PRESSURE: 156 MMHG | HEIGHT: 73 IN | HEART RATE: 72 BPM | WEIGHT: 225 LBS | BODY MASS INDEX: 29.82 KG/M2 | DIASTOLIC BLOOD PRESSURE: 86 MMHG | OXYGEN SATURATION: 100 % | RESPIRATION RATE: 16 BRPM

## 2019-08-21 DIAGNOSIS — S61.210A LACERATION OF RIGHT INDEX FINGER WITHOUT FOREIGN BODY WITHOUT DAMAGE TO NAIL, INITIAL ENCOUNTER: Primary | ICD-10-CM

## 2019-08-21 PROCEDURE — 99283 EMERGENCY DEPT VISIT LOW MDM: CPT

## 2019-08-21 PROCEDURE — 73130 X-RAY EXAM OF HAND: CPT

## 2019-08-21 ASSESSMENT — PAIN DESCRIPTION - PAIN TYPE: TYPE: ACUTE PAIN

## 2019-08-21 ASSESSMENT — PAIN SCALES - GENERAL: PAINLEVEL_OUTOF10: 8

## 2019-08-21 ASSESSMENT — PAIN DESCRIPTION - LOCATION: LOCATION: FINGER (COMMENT WHICH ONE)

## 2019-08-21 ASSESSMENT — PAIN DESCRIPTION - ORIENTATION: ORIENTATION: RIGHT

## 2019-08-21 NOTE — ED PROVIDER NOTES
[place]      PCP: Corby Hartman MD    [address] [place] [address]    Chief Complaint   Patient presents with    Hand Injury     Pt cut his R index finger saturday night, reports he put a dressing on it, and now states that his R thumb, and index finger are numb. Pt concerned that he might have a piece of metal in his index finger         This patient was not evaluated by the attending physician. I have independently evaluated this patient . HPI    Deangelo Sharpe is a 47 y.o. male who presents with concern for foreign body in right index finger. Onset 5 days. Context is patient cut index finger, middle phalanx 5 days ago and notes pain and discomfort since. He feels numbness to the middle of the index finger. No redness or swelling. Discomfort is achy and does not radiate. No known aggravating alleviating factors. No distal numbness, tingling, weakness, or functional deficit. No other pain. REVIEW OF SYSTEMS    General: Denies constitutional symptoms. Cardiac: Denies chest wall injury or chest pain  Pulmonary: Denies chest wall injury or shortness of breath  GI: Denies abdomen injury or abdominal pain  Musculoskeletal:  Denies any other musculoskeletal pain or injuries, including chest wall and back. Skin:  See hpi. Lymphatics:  No nodules or streaks.       See HPI and nursing notes for additional information     PAST MEDICAL & SURGICAL HISTORY    Past Medical History:   Diagnosis Date    CAD (coronary artery disease) 12/23/2013    History of TMJ disorder     Hypertension     Trigeminal neuralgia      Past Surgical History:   Procedure Laterality Date    ABDOMEN SURGERY      CARDIAC CATHETERIZATION  08/03/2016       CURRENT MEDICATIONS    Current Outpatient Rx   Medication Sig Dispense Refill    naproxen (NAPROSYN) 500 MG tablet Take 1 tablet by mouth 2 times daily 20 tablet 0       ALLERGIES    Allergies   Allergen Reactions    Tramadol Other (See Comments)

## 2019-12-21 ENCOUNTER — APPOINTMENT (OUTPATIENT)
Dept: GENERAL RADIOLOGY | Age: 49
End: 2019-12-21
Payer: MEDICARE

## 2019-12-21 ENCOUNTER — HOSPITAL ENCOUNTER (EMERGENCY)
Age: 49
Discharge: HOME | End: 2019-12-21
Payer: MEDICARE

## 2019-12-21 VITALS
SYSTOLIC BLOOD PRESSURE: 130 MMHG | BODY MASS INDEX: 29.82 KG/M2 | TEMPERATURE: 102 F | RESPIRATION RATE: 18 BRPM | WEIGHT: 225 LBS | OXYGEN SATURATION: 95 % | HEART RATE: 83 BPM | DIASTOLIC BLOOD PRESSURE: 81 MMHG | HEIGHT: 73 IN

## 2019-12-21 DIAGNOSIS — J10.1 INFLUENZA A: Primary | ICD-10-CM

## 2019-12-21 LAB
RAPID INFLUENZA  B AGN: NEGATIVE
RAPID INFLUENZA A AGN: POSITIVE

## 2019-12-21 PROCEDURE — 99284 EMERGENCY DEPT VISIT MOD MDM: CPT

## 2019-12-21 PROCEDURE — 6370000000 HC RX 637 (ALT 250 FOR IP): Performed by: PHYSICIAN ASSISTANT

## 2019-12-21 PROCEDURE — 71045 X-RAY EXAM CHEST 1 VIEW: CPT

## 2019-12-21 PROCEDURE — 87804 INFLUENZA ASSAY W/OPTIC: CPT

## 2019-12-21 RX ORDER — IBUPROFEN 400 MG/1
400 TABLET ORAL EVERY 6 HOURS PRN
Qty: 30 TABLET | Refills: 0 | Status: SHIPPED | OUTPATIENT
Start: 2019-12-21 | End: 2020-10-12

## 2019-12-21 RX ORDER — ACETAMINOPHEN 500 MG
500 TABLET ORAL ONCE
Status: COMPLETED | OUTPATIENT
Start: 2019-12-21 | End: 2019-12-21

## 2019-12-21 RX ORDER — GUAIFENESIN/DEXTROMETHORPHAN 100-10MG/5
5 SYRUP ORAL 3 TIMES DAILY PRN
Qty: 120 ML | Refills: 0 | Status: SHIPPED | OUTPATIENT
Start: 2019-12-21 | End: 2019-12-31

## 2019-12-21 RX ORDER — OSELTAMIVIR PHOSPHATE 75 MG/1
75 CAPSULE ORAL 2 TIMES DAILY
Qty: 10 CAPSULE | Refills: 0 | Status: SHIPPED | OUTPATIENT
Start: 2019-12-21 | End: 2019-12-26

## 2019-12-21 RX ORDER — IBUPROFEN 800 MG/1
800 TABLET ORAL ONCE
Status: COMPLETED | OUTPATIENT
Start: 2019-12-21 | End: 2019-12-21

## 2019-12-21 RX ADMIN — ACETAMINOPHEN 500 MG: 500 TABLET ORAL at 15:21

## 2019-12-21 RX ADMIN — IBUPROFEN 800 MG: 800 TABLET, FILM COATED ORAL at 15:21

## 2019-12-21 ASSESSMENT — PAIN DESCRIPTION - LOCATION: LOCATION: HEAD

## 2019-12-21 ASSESSMENT — PAIN SCALES - GENERAL
PAINLEVEL_OUTOF10: 10
PAINLEVEL_OUTOF10: 10

## 2019-12-21 ASSESSMENT — PAIN DESCRIPTION - FREQUENCY: FREQUENCY: CONTINUOUS

## 2019-12-21 ASSESSMENT — PAIN DESCRIPTION - DESCRIPTORS: DESCRIPTORS: ACHING

## 2019-12-21 ASSESSMENT — PAIN DESCRIPTION - PAIN TYPE: TYPE: ACUTE PAIN

## 2020-04-01 ENCOUNTER — HOSPITAL ENCOUNTER (EMERGENCY)
Age: 50
Discharge: HOME | End: 2020-04-01
Attending: EMERGENCY MEDICINE
Payer: MEDICARE

## 2020-04-01 VITALS
OXYGEN SATURATION: 98 % | HEIGHT: 73 IN | WEIGHT: 225 LBS | SYSTOLIC BLOOD PRESSURE: 123 MMHG | BODY MASS INDEX: 29.82 KG/M2 | DIASTOLIC BLOOD PRESSURE: 92 MMHG | RESPIRATION RATE: 16 BRPM | TEMPERATURE: 98.1 F | HEART RATE: 67 BPM

## 2020-04-01 PROCEDURE — 99282 EMERGENCY DEPT VISIT SF MDM: CPT

## 2020-04-01 RX ORDER — ERYTHROMYCIN 5 MG/G
OINTMENT OPHTHALMIC
Qty: 1 G | Refills: 0 | Status: SHIPPED | OUTPATIENT
Start: 2020-04-01 | End: 2020-04-11

## 2020-04-01 ASSESSMENT — PAIN SCALES - GENERAL: PAINLEVEL_OUTOF10: 10

## 2020-04-01 ASSESSMENT — PAIN DESCRIPTION - ORIENTATION: ORIENTATION: LEFT

## 2020-04-01 ASSESSMENT — PAIN DESCRIPTION - PAIN TYPE: TYPE: ACUTE PAIN

## 2020-04-01 ASSESSMENT — PAIN DESCRIPTION - LOCATION: LOCATION: EYE

## 2020-04-01 NOTE — ED PROVIDER NOTES
Triage Chief Complaint:   No chief complaint on file. [place]:  Haylee Veronica is a 45 y.o. male that presents to the emergency department with left red eye. No trauma. No foreign body sensation. No blurry vision or double vision. States his eyelashes got stuck in the morning from drainage. Mild burning when he rubs his eye but no sharp shooting pains. Past Medical History:   Diagnosis Date    CAD (coronary artery disease) 12/23/2013    cath negative per pt    History of TMJ disorder     Hypertension     Trigeminal neuralgia      Past Surgical History:   Procedure Laterality Date    ABDOMEN SURGERY      CARDIAC CATHETERIZATION  08/03/2016     Family History   Problem Relation Age of Onset    High Blood Pressure Mother     High Blood Pressure Sister     Cancer Sister      Social History     Socioeconomic History    Marital status:      Spouse name: Not on file    Number of children: 11    Years of education: Not on file    Highest education level: Not on file   Occupational History    Not on file   Social Needs    Financial resource strain: Not on file    Food insecurity     Worry: Not on file     Inability: Not on file   [employer] needs     Medical: Not on file     Non-medical: Not on file   Tobacco Use    Smoking status: Current Every Day Smoker     Packs/day: 1.00     Types: Cigarettes    Smokeless tobacco: Never Used   Substance and Sexual Activity    Alcohol use:  Yes     Alcohol/week: 12.0 standard drinks     Types: 12 Cans of beer per week     Comment: 12 pack in a week     Drug use: Not Currently     Types: Marijuana, Cocaine    Sexual activity: Yes     Partners: Female   Lifestyle    Physical activity     Days per week: Not on file     Minutes per session: Not on file    Stress: Not on file   Relationships    Social connections     Talks on phone: Not on file     Gets together: Not on file     Attends Holiness service: Not on file     Active member of club or organization: Not on file     Attends meetings of clubs or organizations: Not on file     Relationship status: Not on file    Intimate partner violence     Fear of current or ex partner: Not on file     Emotionally abused: Not on file     Physically abused: Not on file     Forced sexual activity: Not on file   Other Topics Concern    Not on file   Social History Narrative    Not on file     No current facility-administered medications for this encounter. Current Outpatient Medications   Medication Sig Dispense Refill    erythromycin (ROMYCIN) 5 MG/GM ophthalmic ointment TID to left eye 1 g 0    ibuprofen (ADVIL;MOTRIN) 400 MG tablet Take 1 tablet by mouth every 6 hours as needed for Pain or Fever 30 tablet 0    naproxen (NAPROSYN) 500 MG tablet Take 1 tablet by mouth 2 times daily 20 tablet 0     Allergies   Allergen Reactions    Tramadol Other (See Comments)     shaking    Vicodin [Hydrocodone-Acetaminophen] Hives     Nursing Notes Reviewed    ROS:  At least 10 systems reviewed and otherwise negative except as in the [place]. Physical Exam:  ED Triage Vitals   Enc Vitals Group      BP       Pulse       Resp       Temp       Temp src       SpO2       Weight       Height       Head Circumference       Peak Flow       Pain Score       Pain Loc       Pain Edu? Excl. in [address]? My pulse oximetry interpretation is which is within the normal range    GENERAL APPEARANCE: Awake and alert. Cooperative. No acute distress. HEAD: Normocephalic. Atraumatic. EYES: EOM's grossly intact. Sclera anicteric. Left conjunctive a injected. Slight amount of drainage. Pupils equal round reactive to light. Not painful. Visual fields intact. ENT: Mucous membranes are moist. Tolerates saliva. No trismus. NECK: Supple. No meningismus. Trachea midline. HEART: RRR. Radial pulses 2+. LUNGS: Respirations unlabored. CTAB  ABDOMEN: Soft. Non-tender. No guarding or rebound. EXTREMITIES: No acute deformities.   SKIN:

## 2020-09-22 ENCOUNTER — APPOINTMENT (OUTPATIENT)
Dept: CT IMAGING | Age: 50
End: 2020-09-22
Payer: MEDICARE

## 2020-09-22 ENCOUNTER — HOSPITAL ENCOUNTER (EMERGENCY)
Age: 50
Discharge: ANOTHER HOSPITAL | End: 2020-09-22
Attending: FAMILY MEDICINE
Payer: MEDICARE

## 2020-09-22 VITALS
OXYGEN SATURATION: 96 % | DIASTOLIC BLOOD PRESSURE: 89 MMHG | BODY MASS INDEX: 29.82 KG/M2 | HEART RATE: 77 BPM | TEMPERATURE: 97.6 F | HEIGHT: 73 IN | RESPIRATION RATE: 28 BRPM | SYSTOLIC BLOOD PRESSURE: 135 MMHG | WEIGHT: 225 LBS

## 2020-09-22 LAB
ALBUMIN SERPL-MCNC: 4 GM/DL (ref 3.4–5)
ALCOHOL SCREEN SERUM: 0.02 %WT/VOL
ALP BLD-CCNC: 148 IU/L (ref 40–128)
ALT SERPL-CCNC: 14 U/L (ref 10–40)
AMPHETAMINES: NEGATIVE
ANION GAP SERPL CALCULATED.3IONS-SCNC: 12 MMOL/L (ref 4–16)
AST SERPL-CCNC: 27 IU/L (ref 15–37)
BACTERIA: NEGATIVE /HPF
BARBITURATE SCREEN URINE: NEGATIVE
BASOPHILS ABSOLUTE: 0.1 K/CU MM
BASOPHILS RELATIVE PERCENT: 0.6 % (ref 0–1)
BENZODIAZEPINE SCREEN, URINE: NEGATIVE
BILIRUB SERPL-MCNC: 0.4 MG/DL (ref 0–1)
BILIRUBIN URINE: NEGATIVE MG/DL
BLOOD, URINE: ABNORMAL
BUN BLDV-MCNC: 9 MG/DL (ref 6–23)
CALCIUM SERPL-MCNC: 8.8 MG/DL (ref 8.3–10.6)
CANNABINOID SCREEN URINE: ABNORMAL
CHLORIDE BLD-SCNC: 101 MMOL/L (ref 99–110)
CLARITY: CLEAR
CO2: 25 MMOL/L (ref 21–32)
COCAINE METABOLITE: ABNORMAL
COLOR: ABNORMAL
CREAT SERPL-MCNC: 1.2 MG/DL (ref 0.9–1.3)
DIFFERENTIAL TYPE: ABNORMAL
EOSINOPHILS ABSOLUTE: 0.3 K/CU MM
EOSINOPHILS RELATIVE PERCENT: 2.2 % (ref 0–3)
GFR AFRICAN AMERICAN: >60 ML/MIN/1.73M2
GFR NON-AFRICAN AMERICAN: >60 ML/MIN/1.73M2
GLUCOSE BLD-MCNC: 124 MG/DL (ref 70–99)
GLUCOSE BLD-MCNC: 127 MG/DL (ref 70–99)
GLUCOSE, URINE: NEGATIVE MG/DL
HCT VFR BLD CALC: 41.8 % (ref 42–52)
HEMOGLOBIN: 14.4 GM/DL (ref 13.5–18)
IMMATURE NEUTROPHIL %: 0.7 % (ref 0–0.43)
KETONES, URINE: NEGATIVE MG/DL
LEUKOCYTE ESTERASE, URINE: NEGATIVE
LIPASE: 29 IU/L (ref 13–60)
LYMPHOCYTES ABSOLUTE: 3.4 K/CU MM
LYMPHOCYTES RELATIVE PERCENT: 26.6 % (ref 24–44)
MCH RBC QN AUTO: 30.2 PG (ref 27–31)
MCHC RBC AUTO-ENTMCNC: 34.4 % (ref 32–36)
MCV RBC AUTO: 87.6 FL (ref 78–100)
MONOCYTES ABSOLUTE: 0.8 K/CU MM
MONOCYTES RELATIVE PERCENT: 6.3 % (ref 0–4)
NITRITE URINE, QUANTITATIVE: NEGATIVE
NUCLEATED RBC %: 0 %
OPIATES, URINE: NEGATIVE
OXYCODONE: NEGATIVE
PDW BLD-RTO: 14.1 % (ref 11.7–14.9)
PH, URINE: 6 (ref 5–8)
PHENCYCLIDINE, URINE: NEGATIVE
PLATELET # BLD: 238 K/CU MM (ref 140–440)
PMV BLD AUTO: 9.1 FL (ref 7.5–11.1)
POTASSIUM SERPL-SCNC: 3.1 MMOL/L (ref 3.5–5.1)
PROTEIN UA: NEGATIVE MG/DL
RBC # BLD: 4.77 M/CU MM (ref 4.6–6.2)
RBC URINE: 2 /HPF (ref 0–3)
SEGMENTED NEUTROPHILS ABSOLUTE COUNT: 8.2 K/CU MM
SEGMENTED NEUTROPHILS RELATIVE PERCENT: 63.6 % (ref 36–66)
SODIUM BLD-SCNC: 138 MMOL/L (ref 135–145)
SPECIFIC GRAVITY UA: 1.03 (ref 1–1.03)
TOTAL IMMATURE NEUTOROPHIL: 0.09 K/CU MM
TOTAL NUCLEATED RBC: 0 K/CU MM
TOTAL PROTEIN: 7.4 GM/DL (ref 6.4–8.2)
TRICHOMONAS: ABNORMAL /HPF
UROBILINOGEN, URINE: NORMAL MG/DL (ref 0.2–1)
WBC # BLD: 12.9 K/CU MM (ref 4–10.5)
WBC UA: <1 /HPF (ref 0–2)

## 2020-09-22 PROCEDURE — 93005 ELECTROCARDIOGRAM TRACING: CPT | Performed by: FAMILY MEDICINE

## 2020-09-22 PROCEDURE — G0480 DRUG TEST DEF 1-7 CLASSES: HCPCS

## 2020-09-22 PROCEDURE — 80307 DRUG TEST PRSMV CHEM ANLYZR: CPT

## 2020-09-22 PROCEDURE — 70486 CT MAXILLOFACIAL W/O DYE: CPT

## 2020-09-22 PROCEDURE — 72125 CT NECK SPINE W/O DYE: CPT

## 2020-09-22 PROCEDURE — 85025 COMPLETE CBC W/AUTO DIFF WBC: CPT

## 2020-09-22 PROCEDURE — 81001 URINALYSIS AUTO W/SCOPE: CPT

## 2020-09-22 PROCEDURE — 4500000027

## 2020-09-22 PROCEDURE — 70450 CT HEAD/BRAIN W/O DYE: CPT

## 2020-09-22 PROCEDURE — 6360000004 HC RX CONTRAST MEDICATION: Performed by: FAMILY MEDICINE

## 2020-09-22 PROCEDURE — 71260 CT THORAX DX C+: CPT

## 2020-09-22 PROCEDURE — 83690 ASSAY OF LIPASE: CPT

## 2020-09-22 PROCEDURE — 96374 THER/PROPH/DIAG INJ IV PUSH: CPT

## 2020-09-22 PROCEDURE — 80053 COMPREHEN METABOLIC PANEL: CPT

## 2020-09-22 PROCEDURE — 82962 GLUCOSE BLOOD TEST: CPT

## 2020-09-22 PROCEDURE — 99291 CRITICAL CARE FIRST HOUR: CPT

## 2020-09-22 PROCEDURE — 74177 CT ABD & PELVIS W/CONTRAST: CPT

## 2020-09-22 PROCEDURE — 6360000002 HC RX W HCPCS: Performed by: FAMILY MEDICINE

## 2020-09-22 RX ORDER — NALOXONE HYDROCHLORIDE 1 MG/ML
6 INJECTION INTRAMUSCULAR; INTRAVENOUS; SUBCUTANEOUS ONCE
Status: COMPLETED | OUTPATIENT
Start: 2020-09-22 | End: 2020-09-22

## 2020-09-22 RX ORDER — NALOXONE HYDROCHLORIDE 0.4 MG/ML
0.4 INJECTION, SOLUTION INTRAMUSCULAR; INTRAVENOUS; SUBCUTANEOUS ONCE
Status: DISCONTINUED | OUTPATIENT
Start: 2020-09-22 | End: 2020-09-22 | Stop reason: HOSPADM

## 2020-09-22 RX ADMIN — NALOXONE HYDROCHLORIDE 6 MG: 1 INJECTION PARENTERAL at 02:15

## 2020-09-22 RX ADMIN — IOPAMIDOL 80 ML: 755 INJECTION, SOLUTION INTRAVENOUS at 02:35

## 2020-09-22 NOTE — ED NOTES
from [facility] called  back at 2:32 AM     Lina Contreras  09/22/20 0230

## 2020-09-22 NOTE — ED NOTES
30 etomidate and 70 jose given and pt intubated by [facility] nurses, 25 at the [address], RN  09/22/20 [address], [place]  09/22/20 [id number]

## 2020-09-22 NOTE — ED NOTES
Dr. Jl Ortiz is accepting doctor at [facility] ORTH[facility]IC [place]  09/22/20 [address] [place] [address]

## 2020-09-22 NOTE — ED PROVIDER NOTES
Triage Chief Complaint:   Drug Overdose    [place]:  Mark Gonzalez is a 45 y.o. male that presents unresponsive by EMS. Patient apparently found outside in the backyard area of a known drug house. 2 mg Narcan given with no change. Patient arrives responsive to sternal rub and will localize both with right arm and left arm to sternal rub not otherwise responsive. He is protecting his airway. No additional history is available. Past Medical History:   Diagnosis Date    CAD (coronary artery disease) 12/23/2013    cath negative per pt    History of TMJ disorder     Hypertension     Trigeminal neuralgia      Past Surgical History:   Procedure Laterality Date    ABDOMEN SURGERY      CARDIAC CATHETERIZATION  08/03/2016     Family History   Problem Relation Age of Onset    High Blood Pressure Mother     High Blood Pressure Sister     Cancer Sister      Social History     Socioeconomic History    Marital status:      Spouse name: Not on file    Number of children: 11    Years of education: Not on file    Highest education level: Not on file   Occupational History    Not on file   Social Needs    Financial resource strain: Not on file    Food insecurity     Worry: Not on file     Inability: Not on file   [employer] needs     Medical: Not on file     Non-medical: Not on file   Tobacco Use    Smoking status: Current Every Day Smoker     Packs/day: 1.00     Types: Cigarettes    Smokeless tobacco: Never Used   Substance and Sexual Activity    Alcohol use:  Yes     Alcohol/week: 12.0 standard drinks     Types: 12 Cans of beer per week     Comment: 12 pack in a week     Drug use: Not Currently     Types: Marijuana, Cocaine    Sexual activity: Yes     Partners: Female   Lifestyle    Physical activity     Days per week: Not on file     Minutes per session: Not on file    Stress: Not on file   Relationships    Social connections     Talks on phone: Not on file     Gets together: Not on file Attends Zoroastrianism service: Not on file     Active member of club or organization: Not on file     Attends meetings of clubs or organizations: Not on file     Relationship status: Not on file    Intimate partner violence     Fear of current or ex partner: Not on file     Emotionally abused: Not on file     Physically abused: Not on file     Forced sexual activity: Not on file   Other Topics Concern    Not on file   Social History Narrative    Not on file     Current Facility-Administered Medications   Medication Dose Route Frequency Provider Last Rate Last Dose    naloxone (NARCAN) injection 0.4 mg  0.4 mg Intravenous Once Stewart Chavira MD         Current Outpatient Medications   Medication Sig Dispense Refill    ibuprofen (ADVIL;MOTRIN) 400 MG tablet Take 1 tablet by mouth every 6 hours as needed for Pain or Fever 30 tablet 0    naproxen (NAPROSYN) 500 MG tablet Take 1 tablet by mouth 2 times daily 20 tablet 0     Allergies   Allergen Reactions    Tramadol Other (See Comments)     shaking    Vicodin [Hydrocodone-Acetaminophen] Hives       Nursing Notes Reviewed    Physical Exam:  ED Triage Vitals   Enc Vitals Group      BP 09/22/20 0155 134/85      Pulse 09/22/20 0155 83      Resp 09/22/20 0158 17      Temp --       Temp src --       SpO2 09/22/20 0155 90 %      Weight --       Height --       Head Circumference --       Peak Flow --       Pain Score --       Pain Loc --       Pain Edu? --       Excl. in [address]? --         My pulse ox interpretation is - normal    General appearance: Obtunded  Head: Normocephalic, swelling of left-sided head around left eye  Face: No bony deformity  Skin:  Warm. Dry. Eye: Pinpoint pupils bilaterally  Ears, nose, mouth and throat:  Oral mucosa moist, no [place] sign or raccoon sign. Tympanic membranes bilaterally normal with no blood behind them or drainage. Nares with blood bilaterally  Neck:  Trachea midline. Extremity:  No swelling.   Moves all 4 to pain  Back: No step-off or bruising  Heart:  Regular rate and rhythm  Perfusion:  intact  Respiratory:  Lungs clear to auscultation bilaterally. Respirations nonlabored. Abdominal:  Normal bowel sounds. Soft. Nontender. Non distended. Neurological: As above.  GCS 8      I have reviewed and interpreted all of the currently available lab results from this visit (if applicable):  Results for orders placed or performed during the hospital encounter of 09/22/20   CBC Auto Differential   Result Value Ref Range    WBC 12.9 (H) 4.0 - 10.5 K/CU MM    RBC 4.77 4.6 - 6.2 M/CU MM    Hemoglobin 14.4 13.5 - 18.0 GM/DL    Hematocrit 41.8 (L) 42 - 52 %    MCV 87.6 78 - 100 FL    MCH 30.2 27 - 31 PG    MCHC 34.4 32.0 - 36.0 %    RDW 14.1 11.7 - 14.9 %    Platelets 000 194 - 964 K/CU MM    MPV 9.1 7.5 - 11.1 FL    Differential Type AUTOMATED DIFFERENTIAL     Segs Relative 63.6 36 - 66 %    Lymphocytes % 26.6 24 - 44 %    Monocytes % 6.3 (H) 0 - 4 %    Eosinophils % 2.2 0 - 3 %    Basophils % 0.6 0 - 1 %    Segs Absolute 8.2 K/CU MM    Lymphocytes Absolute 3.4 K/CU MM    Monocytes Absolute 0.8 K/CU MM    Eosinophils Absolute 0.3 K/CU MM    Basophils Absolute 0.1 K/CU MM    Nucleated RBC % 0.0 %    Total Nucleated RBC 0.0 K/CU MM    Total Immature Neutrophil 0.09 K/CU MM    Immature Neutrophil % 0.7 (H) 0 - 0.43 %   Comprehensive Metabolic Panel   Result Value Ref Range    Sodium 138 135 - 145 MMOL/L    Potassium 3.1 (L) 3.5 - 5.1 MMOL/L    Chloride 101 99 - 110 mMol/L    CO2 25 21 - 32 MMOL/L    BUN 9 6 - 23 MG/DL    CREATININE 1.2 0.9 - 1.3 MG/DL    Glucose 127 (H) 70 - 99 MG/DL    Calcium 8.8 8.3 - 10.6 MG/DL    Alb 4.0 3.4 - 5.0 GM/DL    Total Protein 7.4 6.4 - 8.2 GM/DL    Total Bilirubin 0.4 0.0 - 1.0 MG/DL    ALT 14 10 - 40 U/L    AST 27 15 - 37 IU/L    Alkaline Phosphatase 148 (H) 40 - 128 IU/L    GFR Non-African American >60 >60 mL/min/1.73m2    GFR African American >60 >60 mL/min/1.73m2    Anion Gap 12 4 - 16   Lipase   Result be   less likely. 3. Circumferential thickening of the distal esophagus may be related to   reflux esophagitis. This can be correlated with outpatient endoscopy as   clinically appropriate. 4. Circumferential bladder wall thickening. This may be related to   nondistention or cystitis. Suggest correlation with urinalysis. CT CERVICAL SPINE WO CONTRAST   Final Result   No acute abnormality of the cervical spine. CT FACIAL BONES WO CONTRAST   Final Result   No acute intracranial abnormality. Left maxilla fracture anterior and lateral walls. Left orbital fracture inferior orbital rim, floor and lateral wall. CT HEAD WO CONTRAST   Final Result   No acute intracranial abnormality. Left maxilla fracture anterior and lateral walls. Left orbital fracture inferior orbital rim, floor and lateral wall. EKG (if obtained): (All EKG's are interpreted by myself in the absence of a cardiologist) normal sinus rhythm with a rate of 73. Normal axis. . . QTc 471. Normal R wave progression. No ST elevation or depression. Q life. No evidence of STEMI or A. fib or arrhythmia. Chart review shows recent radiographs:  No results found. MDM:  70-year-old male with history and physical as above. Patient seen immediately upon arrival.  Placed on the monitor and in a cervical collar. Blood sugar normal.  Patient given an additional 6 of IV Narcan with no change in mental status. As he is protecting his airway and maintaining oxygen saturations on 2 to 4 L I will hold on intubation pending CT. Trauma labs sent. Concern for head injury versus other trauma versus polysubstance abuse. Patient may need intubation for airway protection as the night goes on. 0230: Blood sugar normal.  Alcohol only 0.02. Discussion with the wife and she requests transfer to trauma center and this will be facilitated at request of family. Patient currently in scanner. We will continue to carefully monitor. Concern for closed head injury increases with alcohol 0.02.    0240: Patient accepted at [facility].  We will transfer the patient by air secondary to GCS hovering between 8 and 9. Will discuss with air crew need for intubation prior to transfer. 0320: Critical care team here. After discussion with them they would prefer the patient is intubated and are going to proceed with that. Patient's care handed off to the critical care team at this time. Critical care time of 35 minutes with continual care at the bedside, discussion of patient status with wife, arrangement for transfer with [facility] transfer center. Systems at risk include HEENT, CNS, pulmonary, cardiac. Toxicology. This is exclusionary of any billable procedures. Clinical Impression:  1. Closed head injury, initial encounter    2. Obtunded      Disposition referral (if applicable):  No follow-up provider specified. Disposition medications (if applicable):  New Prescriptions    No medications on file       Comment: Please note this report has been produced using speech recognition software and may contain errors related to that system including errors in grammar, punctuation, and spelling, as well as words and phrases that may be inappropriate. If there are any questions or concerns please feel free to contact the dictating provider for clarification.       Elver Enriquez MD  09/22/20 Jesus Meek [address] Beryl Kilpatrick MD  09/22/20 [id number]

## 2020-09-23 PROCEDURE — 93010 ELECTROCARDIOGRAM REPORT: CPT | Performed by: INTERNAL MEDICINE

## 2020-09-29 ENCOUNTER — OFFICE VISIT (OUTPATIENT)
Dept: FAMILY MEDICINE CLINIC | Age: 50
End: 2020-09-29
Payer: MEDICARE

## 2020-09-29 VITALS
DIASTOLIC BLOOD PRESSURE: 70 MMHG | WEIGHT: 216.4 LBS | SYSTOLIC BLOOD PRESSURE: 112 MMHG | HEIGHT: 73 IN | BODY MASS INDEX: 28.68 KG/M2 | RESPIRATION RATE: 16 BRPM | HEART RATE: 72 BPM

## 2020-09-29 PROBLEM — Z87.81 H/O: FACIAL FRACTURES: Status: ACTIVE | Noted: 2020-09-29

## 2020-09-29 PROBLEM — F17.210 CIGARETTE NICOTINE DEPENDENCE WITHOUT COMPLICATION: Status: ACTIVE | Noted: 2020-09-29

## 2020-09-29 PROCEDURE — G8427 DOCREV CUR MEDS BY ELIG CLIN: HCPCS | Performed by: NURSE PRACTITIONER

## 2020-09-29 PROCEDURE — 99203 OFFICE O/P NEW LOW 30 MIN: CPT | Performed by: NURSE PRACTITIONER

## 2020-09-29 PROCEDURE — G8419 CALC BMI OUT NRM PARAM NOF/U: HCPCS | Performed by: NURSE PRACTITIONER

## 2020-09-29 PROCEDURE — 3017F COLORECTAL CA SCREEN DOC REV: CPT | Performed by: NURSE PRACTITIONER

## 2020-09-29 PROCEDURE — 4004F PT TOBACCO SCREEN RCVD TLK: CPT | Performed by: NURSE PRACTITIONER

## 2020-09-29 RX ORDER — LIDOCAINE 4 G/G
1 PATCH TOPICAL DAILY
Qty: 30 PATCH | Refills: 0 | Status: SHIPPED | OUTPATIENT
Start: 2020-09-29 | End: 2020-10-12

## 2020-09-29 RX ORDER — CYCLOBENZAPRINE HCL 10 MG
10 TABLET ORAL 3 TIMES DAILY PRN
Qty: 30 TABLET | Refills: 0 | Status: SHIPPED | OUTPATIENT
Start: 2020-09-29 | End: 2021-07-21

## 2020-09-29 ASSESSMENT — ENCOUNTER SYMPTOMS
TROUBLE SWALLOWING: 0
SINUS PRESSURE: 0
SORE THROAT: 0
WHEEZING: 0
VOMITING: 0
CONSTIPATION: 0
DIARRHEA: 0
ABDOMINAL DISTENTION: 0
FACIAL SWELLING: 1
ABDOMINAL PAIN: 1
BLOOD IN STOOL: 0
RHINORRHEA: 0
CHEST TIGHTNESS: 0
COUGH: 0
SINUS PAIN: 0
ANAL BLEEDING: 0
SHORTNESS OF BREATH: 0
EYES NEGATIVE: 1
NAUSEA: 0

## 2020-09-29 ASSESSMENT — PATIENT HEALTH QUESTIONNAIRE - PHQ9
1. LITTLE INTEREST OR PLEASURE IN DOING THINGS: 0
SUM OF ALL RESPONSES TO PHQ QUESTIONS 1-9: 0
SUM OF ALL RESPONSES TO PHQ9 QUESTIONS 1 & 2: 0
SUM OF ALL RESPONSES TO PHQ QUESTIONS 1-9: 0
2. FEELING DOWN, DEPRESSED OR HOPELESS: 0
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
## 2020-09-29 NOTE — PATIENT INSTRUCTIONS
Fluids, rest  Take prescribed medication as directed  Apply lidocaine patch to left side once a day  Referred to Dr. Sherryle Penton for pain management  Return in 6 weeks - will schedule lab work at that time

## 2020-10-01 LAB
EKG ATRIAL RATE: 73 BPM
EKG DIAGNOSIS: NORMAL
EKG P AXIS: 65 DEGREES
EKG P-R INTERVAL: 154 MS
EKG Q-T INTERVAL: 428 MS
EKG QRS DURATION: 106 MS
EKG QTC CALCULATION (BAZETT): 471 MS
EKG R AXIS: 51 DEGREES
EKG T AXIS: 54 DEGREES
EKG VENTRICULAR RATE: 73 BPM

## 2020-10-12 ENCOUNTER — APPOINTMENT (OUTPATIENT)
Dept: GENERAL RADIOLOGY | Age: 50
End: 2020-10-12
Payer: COMMERCIAL

## 2020-10-12 ENCOUNTER — HOSPITAL ENCOUNTER (EMERGENCY)
Age: 50
Discharge: HOME | End: 2020-10-12
Payer: COMMERCIAL

## 2020-10-12 VITALS
BODY MASS INDEX: 28.89 KG/M2 | HEART RATE: 69 BPM | HEIGHT: 73 IN | DIASTOLIC BLOOD PRESSURE: 90 MMHG | OXYGEN SATURATION: 97 % | RESPIRATION RATE: 16 BRPM | SYSTOLIC BLOOD PRESSURE: 138 MMHG | TEMPERATURE: 97.9 F | WEIGHT: 218 LBS

## 2020-10-12 PROCEDURE — 6370000000 HC RX 637 (ALT 250 FOR IP): Performed by: PHYSICIAN ASSISTANT

## 2020-10-12 PROCEDURE — 71045 X-RAY EXAM CHEST 1 VIEW: CPT

## 2020-10-12 PROCEDURE — 6360000002 HC RX W HCPCS: Performed by: PHYSICIAN ASSISTANT

## 2020-10-12 PROCEDURE — 96372 THER/PROPH/DIAG INJ SC/IM: CPT

## 2020-10-12 PROCEDURE — 99283 EMERGENCY DEPT VISIT LOW MDM: CPT

## 2020-10-12 RX ORDER — KETOROLAC TROMETHAMINE 30 MG/ML
30 INJECTION, SOLUTION INTRAMUSCULAR; INTRAVENOUS ONCE
Status: COMPLETED | OUTPATIENT
Start: 2020-10-12 | End: 2020-10-12

## 2020-10-12 RX ORDER — LIDOCAINE 4 G/G
1 PATCH TOPICAL ONCE
Status: DISCONTINUED | OUTPATIENT
Start: 2020-10-12 | End: 2020-10-12 | Stop reason: HOSPADM

## 2020-10-12 RX ORDER — NAPROXEN 500 MG/1
500 TABLET ORAL 2 TIMES DAILY
Qty: 20 TABLET | Refills: 0 | Status: SHIPPED | OUTPATIENT
Start: 2020-10-12 | End: 2021-05-04 | Stop reason: SDUPTHER

## 2020-10-12 RX ORDER — LIDOCAINE 4 G/G
1 PATCH TOPICAL DAILY PRN
Qty: 15 PATCH | Refills: 0 | Status: SHIPPED | OUTPATIENT
Start: 2020-10-12 | End: 2020-11-11

## 2020-10-12 RX ADMIN — KETOROLAC TROMETHAMINE 30 MG: 30 INJECTION, SOLUTION INTRAMUSCULAR; INTRAVENOUS at 13:56

## 2020-10-12 ASSESSMENT — PAIN SCALES - GENERAL: PAINLEVEL_OUTOF10: 6
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
## 2020-11-28 ENCOUNTER — HOSPITAL ENCOUNTER (EMERGENCY)
Age: 50
Discharge: HOME | End: 2020-11-28
Attending: EMERGENCY MEDICINE
Payer: COMMERCIAL

## 2020-11-28 ENCOUNTER — APPOINTMENT (OUTPATIENT)
Dept: CT IMAGING | Age: 50
End: 2020-11-28
Payer: COMMERCIAL

## 2020-11-28 VITALS
WEIGHT: 220 LBS | HEART RATE: 84 BPM | BODY MASS INDEX: 29.16 KG/M2 | OXYGEN SATURATION: 96 % | RESPIRATION RATE: 17 BRPM | SYSTOLIC BLOOD PRESSURE: 127 MMHG | HEIGHT: 73 IN | DIASTOLIC BLOOD PRESSURE: 82 MMHG | TEMPERATURE: 98.6 F

## 2020-11-28 LAB
ANION GAP SERPL CALCULATED.3IONS-SCNC: 10 MMOL/L (ref 4–16)
BASOPHILS ABSOLUTE: 0.1 K/CU MM
BASOPHILS RELATIVE PERCENT: 0.7 % (ref 0–1)
BUN BLDV-MCNC: 10 MG/DL (ref 6–23)
CALCIUM SERPL-MCNC: 9.2 MG/DL (ref 8.3–10.6)
CHLORIDE BLD-SCNC: 104 MMOL/L (ref 99–110)
CO2: 24 MMOL/L (ref 21–32)
CREAT SERPL-MCNC: 1.1 MG/DL (ref 0.9–1.3)
DIFFERENTIAL TYPE: ABNORMAL
EOSINOPHILS ABSOLUTE: 0.3 K/CU MM
EOSINOPHILS RELATIVE PERCENT: 3.2 % (ref 0–3)
GFR AFRICAN AMERICAN: >60 ML/MIN/1.73M2
GFR NON-AFRICAN AMERICAN: >60 ML/MIN/1.73M2
GLUCOSE BLD-MCNC: 98 MG/DL (ref 70–99)
HCT VFR BLD CALC: 43.1 % (ref 42–52)
HEMOGLOBIN: 14.3 GM/DL (ref 13.5–18)
IMMATURE NEUTROPHIL %: 0.2 % (ref 0–0.43)
LYMPHOCYTES ABSOLUTE: 2.8 K/CU MM
LYMPHOCYTES RELATIVE PERCENT: 31.5 % (ref 24–44)
MCH RBC QN AUTO: 28.5 PG (ref 27–31)
MCHC RBC AUTO-ENTMCNC: 33.2 % (ref 32–36)
MCV RBC AUTO: 85.9 FL (ref 78–100)
MONOCYTES ABSOLUTE: 0.7 K/CU MM
MONOCYTES RELATIVE PERCENT: 7.5 % (ref 0–4)
NUCLEATED RBC %: 0 %
PDW BLD-RTO: 14.3 % (ref 11.7–14.9)
PLATELET # BLD: 277 K/CU MM (ref 140–440)
PMV BLD AUTO: 8.8 FL (ref 7.5–11.1)
POTASSIUM SERPL-SCNC: 3.6 MMOL/L (ref 3.5–5.1)
RBC # BLD: 5.02 M/CU MM (ref 4.6–6.2)
SEGMENTED NEUTROPHILS ABSOLUTE COUNT: 5.1 K/CU MM
SEGMENTED NEUTROPHILS RELATIVE PERCENT: 56.9 % (ref 36–66)
SODIUM BLD-SCNC: 138 MMOL/L (ref 135–145)
TOTAL IMMATURE NEUTOROPHIL: 0.02 K/CU MM
TOTAL NUCLEATED RBC: 0 K/CU MM
WBC # BLD: 8.9 K/CU MM (ref 4–10.5)

## 2020-11-28 PROCEDURE — 99285 EMERGENCY DEPT VISIT HI MDM: CPT

## 2020-11-28 PROCEDURE — 74176 CT ABD & PELVIS W/O CONTRAST: CPT

## 2020-11-28 PROCEDURE — 80048 BASIC METABOLIC PNL TOTAL CA: CPT

## 2020-11-28 PROCEDURE — 85025 COMPLETE CBC W/AUTO DIFF WBC: CPT

## 2020-11-28 PROCEDURE — 6370000000 HC RX 637 (ALT 250 FOR IP): Performed by: EMERGENCY MEDICINE

## 2020-11-28 RX ORDER — METHOCARBAMOL 500 MG/1
500 TABLET, FILM COATED ORAL 3 TIMES DAILY
Qty: 21 TABLET | Refills: 0 | Status: SHIPPED | OUTPATIENT
Start: 2020-11-28 | End: 2020-12-05

## 2020-11-28 RX ORDER — ACETAMINOPHEN 500 MG
1000 TABLET ORAL ONCE
Status: COMPLETED | OUTPATIENT
Start: 2020-11-28 | End: 2020-11-28

## 2020-11-28 RX ORDER — LIDOCAINE 4 G/G
1 PATCH TOPICAL ONCE
Status: DISCONTINUED | OUTPATIENT
Start: 2020-11-28 | End: 2020-11-28 | Stop reason: HOSPADM

## 2020-11-28 RX ORDER — NAPROXEN 500 MG/1
500 TABLET ORAL 2 TIMES DAILY PRN
Qty: 14 TABLET | Refills: 0 | Status: SHIPPED | OUTPATIENT
Start: 2020-11-28 | End: 2021-05-04 | Stop reason: ALTCHOICE

## 2020-11-28 RX ORDER — METHOCARBAMOL 500 MG/1
1000 TABLET, FILM COATED ORAL ONCE
Status: COMPLETED | OUTPATIENT
Start: 2020-11-28 | End: 2020-11-28

## 2020-11-28 RX ORDER — IBUPROFEN 600 MG/1
600 TABLET ORAL ONCE
Status: COMPLETED | OUTPATIENT
Start: 2020-11-28 | End: 2020-11-28

## 2020-11-28 RX ADMIN — IBUPROFEN 600 MG: 600 TABLET, FILM COATED ORAL at 03:57

## 2020-11-28 RX ADMIN — METHOCARBAMOL TABLETS 1000 MG: 500 TABLET, COATED ORAL at 03:57

## 2020-11-28 RX ADMIN — ACETAMINOPHEN 1000 MG: 500 TABLET ORAL at 03:57

## 2020-11-28 ASSESSMENT — PAIN DESCRIPTION - PAIN TYPE: TYPE: ACUTE PAIN

## 2020-11-28 ASSESSMENT — PAIN SCALES - GENERAL
PAINLEVEL_OUTOF10: 4
PAINLEVEL_OUTOF10: 8
PAINLEVEL_OUTOF10: 8

## 2020-11-28 ASSESSMENT — PAIN DESCRIPTION - ORIENTATION: ORIENTATION: LOWER

## 2020-11-28 ASSESSMENT — PAIN DESCRIPTION - DESCRIPTORS: DESCRIPTORS: OTHER (COMMENT)

## 2020-11-28 ASSESSMENT — PAIN DESCRIPTION - LOCATION: LOCATION: BACK
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
## 2020-11-28 NOTE — ED PROVIDER NOTES
As physician-in-triage, I performed a virtual medical screening history and physical exam on this patient. HISTORY OF PRESENT ILLNESS  Jaclyn Fischer is a 45 y.o. male with complaints of left low back pain that started this evening. Pain described as sharp and stabbing pain that does not radiate. Pain is constant exacerbated with certain movements and positions. Has not using medication over-the-counter for the pain itself. No new injury that he can recall. Denies loss of bowel control, loss of bladder control, saddle anesthesia, abdominal pain, nausea, or vomiting     PHYSICAL EXAM  /82   Pulse 84   Temp 98.6 °F (37 °C) (Oral)   Resp 17   Ht 6' 1\" (1.854 m)   Wt 220 lb (99.8 kg)   SpO2 96%   BMI 29.03 kg/m²     On exam, the patient appears in no acute distress. Breathing is unlabored.   Moves all extremities    Orders: motrin, tylenol, robaxin, lidoderm patch        Willy Anthony DO  11/28/20 5434

## 2020-11-28 NOTE — ED NOTES
Security sent in room to remove patient. Patient was not leaving after 3 attempts to ask patient to leave. Patient escorted out of ED with security.       Landry Mcclure RN  11/28/20 6770

## 2021-01-03 PROCEDURE — 77427 RADIATION TX MANAGEMENT X5: CPT | Performed by: RADIOLOGY

## 2021-01-18 ENCOUNTER — HOSPITAL ENCOUNTER (EMERGENCY)
Age: 51
Discharge: HOME | End: 2021-01-18
Attending: EMERGENCY MEDICINE
Payer: COMMERCIAL

## 2021-01-18 ENCOUNTER — APPOINTMENT (OUTPATIENT)
Dept: GENERAL RADIOLOGY | Age: 51
End: 2021-01-18
Payer: COMMERCIAL

## 2021-01-18 ENCOUNTER — APPOINTMENT (OUTPATIENT)
Dept: CT IMAGING | Age: 51
End: 2021-01-18
Payer: COMMERCIAL

## 2021-01-18 VITALS
RESPIRATION RATE: 21 BRPM | WEIGHT: 220 LBS | HEIGHT: 73 IN | DIASTOLIC BLOOD PRESSURE: 97 MMHG | BODY MASS INDEX: 29.16 KG/M2 | OXYGEN SATURATION: 99 % | TEMPERATURE: 98.6 F | SYSTOLIC BLOOD PRESSURE: 143 MMHG | HEART RATE: 80 BPM

## 2021-01-18 DIAGNOSIS — S22.32XA CLOSED FRACTURE OF ONE RIB OF LEFT SIDE, INITIAL ENCOUNTER: ICD-10-CM

## 2021-01-18 DIAGNOSIS — R06.89 DYSPNEA AND RESPIRATORY ABNORMALITIES: ICD-10-CM

## 2021-01-18 DIAGNOSIS — R68.89 FLU-LIKE SYMPTOMS: ICD-10-CM

## 2021-01-18 DIAGNOSIS — J40 BRONCHITIS: Primary | ICD-10-CM

## 2021-01-18 DIAGNOSIS — J06.9 UPPER RESPIRATORY TRACT INFECTION, UNSPECIFIED TYPE: ICD-10-CM

## 2021-01-18 DIAGNOSIS — J02.9 SORE THROAT: ICD-10-CM

## 2021-01-18 DIAGNOSIS — R09.81 NASAL CONGESTION: ICD-10-CM

## 2021-01-18 DIAGNOSIS — R06.00 DYSPNEA AND RESPIRATORY ABNORMALITIES: ICD-10-CM

## 2021-01-18 DIAGNOSIS — R07.89 CHEST WALL PAIN: ICD-10-CM

## 2021-01-18 DIAGNOSIS — R07.9 CHEST PAIN, UNSPECIFIED TYPE: ICD-10-CM

## 2021-01-18 LAB
ALBUMIN SERPL-MCNC: 4 GM/DL (ref 3.4–5)
ALP BLD-CCNC: 171 IU/L (ref 40–128)
ALT SERPL-CCNC: 8 U/L (ref 10–40)
ANION GAP SERPL CALCULATED.3IONS-SCNC: 11 MMOL/L (ref 4–16)
AST SERPL-CCNC: 19 IU/L (ref 15–37)
BASOPHILS ABSOLUTE: 0.1 K/CU MM
BASOPHILS RELATIVE PERCENT: 0.6 % (ref 0–1)
BILIRUB SERPL-MCNC: 0.3 MG/DL (ref 0–1)
BUN BLDV-MCNC: 10 MG/DL (ref 6–23)
CALCIUM SERPL-MCNC: 9.2 MG/DL (ref 8.3–10.6)
CHLORIDE BLD-SCNC: 100 MMOL/L (ref 99–110)
CO2: 20 MMOL/L (ref 21–32)
CREAT SERPL-MCNC: 1.1 MG/DL (ref 0.9–1.3)
DIFFERENTIAL TYPE: ABNORMAL
EOSINOPHILS ABSOLUTE: 0.3 K/CU MM
EOSINOPHILS RELATIVE PERCENT: 2.8 % (ref 0–3)
GFR AFRICAN AMERICAN: >60 ML/MIN/1.73M2
GFR NON-AFRICAN AMERICAN: >60 ML/MIN/1.73M2
GLUCOSE BLD-MCNC: 88 MG/DL (ref 70–99)
HCT VFR BLD CALC: 48.6 % (ref 42–52)
HEMOGLOBIN: 15.3 GM/DL (ref 13.5–18)
HETEROPHILE ANTIBODIES: NEGATIVE
IMMATURE NEUTROPHIL %: 0.3 % (ref 0–0.43)
LIPASE: 43 IU/L (ref 13–60)
LYMPHOCYTES ABSOLUTE: 2.4 K/CU MM
LYMPHOCYTES RELATIVE PERCENT: 22.2 % (ref 24–44)
MCH RBC QN AUTO: 28.8 PG (ref 27–31)
MCHC RBC AUTO-ENTMCNC: 31.5 % (ref 32–36)
MCV RBC AUTO: 91.5 FL (ref 78–100)
MONOCYTES ABSOLUTE: 0.9 K/CU MM
MONOCYTES RELATIVE PERCENT: 7.9 % (ref 0–4)
NUCLEATED RBC %: 0 %
PDW BLD-RTO: 14.2 % (ref 11.7–14.9)
PLATELET # BLD: 142 K/CU MM (ref 140–440)
PMV BLD AUTO: 10.6 FL (ref 7.5–11.1)
POTASSIUM SERPL-SCNC: 3.9 MMOL/L (ref 3.5–5.1)
PRO-BNP: 25.67 PG/ML
RAPID INFLUENZA  B AGN: NEGATIVE
RAPID INFLUENZA A AGN: NEGATIVE
RBC # BLD: 5.31 M/CU MM (ref 4.6–6.2)
SARS-COV-2, NAAT: NOT DETECTED
SEGMENTED NEUTROPHILS ABSOLUTE COUNT: 7.2 K/CU MM
SEGMENTED NEUTROPHILS RELATIVE PERCENT: 66.2 % (ref 36–66)
SODIUM BLD-SCNC: 131 MMOL/L (ref 135–145)
SOURCE: NORMAL
TOTAL IMMATURE NEUTOROPHIL: 0.03 K/CU MM
TOTAL NUCLEATED RBC: 0 K/CU MM
TOTAL PROTEIN: 8.2 GM/DL (ref 6.4–8.2)
TROPONIN T: <0.01 NG/ML
WBC # BLD: 10.9 K/CU MM (ref 4–10.5)

## 2021-01-18 PROCEDURE — 96374 THER/PROPH/DIAG INJ IV PUSH: CPT

## 2021-01-18 PROCEDURE — 71045 X-RAY EXAM CHEST 1 VIEW: CPT

## 2021-01-18 PROCEDURE — 96375 TX/PRO/DX INJ NEW DRUG ADDON: CPT

## 2021-01-18 PROCEDURE — 80053 COMPREHEN METABOLIC PANEL: CPT

## 2021-01-18 PROCEDURE — 36415 COLL VENOUS BLD VENIPUNCTURE: CPT

## 2021-01-18 PROCEDURE — 87430 STREP A AG IA: CPT

## 2021-01-18 PROCEDURE — 6370000000 HC RX 637 (ALT 250 FOR IP): Performed by: EMERGENCY MEDICINE

## 2021-01-18 PROCEDURE — 6360000002 HC RX W HCPCS: Performed by: EMERGENCY MEDICINE

## 2021-01-18 PROCEDURE — 84484 ASSAY OF TROPONIN QUANT: CPT

## 2021-01-18 PROCEDURE — 86318 IA INFECTIOUS AGENT ANTIBODY: CPT

## 2021-01-18 PROCEDURE — 93005 ELECTROCARDIOGRAM TRACING: CPT | Performed by: EMERGENCY MEDICINE

## 2021-01-18 PROCEDURE — 83880 ASSAY OF NATRIURETIC PEPTIDE: CPT

## 2021-01-18 PROCEDURE — 87081 CULTURE SCREEN ONLY: CPT

## 2021-01-18 PROCEDURE — 71275 CT ANGIOGRAPHY CHEST: CPT

## 2021-01-18 PROCEDURE — 2580000003 HC RX 258: Performed by: EMERGENCY MEDICINE

## 2021-01-18 PROCEDURE — 99284 EMERGENCY DEPT VISIT MOD MDM: CPT

## 2021-01-18 PROCEDURE — 94640 AIRWAY INHALATION TREATMENT: CPT

## 2021-01-18 PROCEDURE — 87804 INFLUENZA ASSAY W/OPTIC: CPT

## 2021-01-18 PROCEDURE — 6360000004 HC RX CONTRAST MEDICATION: Performed by: EMERGENCY MEDICINE

## 2021-01-18 PROCEDURE — 85025 COMPLETE CBC W/AUTO DIFF WBC: CPT

## 2021-01-18 PROCEDURE — U0002 COVID-19 LAB TEST NON-CDC: HCPCS

## 2021-01-18 PROCEDURE — 83690 ASSAY OF LIPASE: CPT

## 2021-01-18 RX ORDER — GUAIFENESIN/DEXTROMETHORPHAN 100-10MG/5
5 SYRUP ORAL 4 TIMES DAILY PRN
Qty: 120 ML | Refills: 0 | Status: SHIPPED | OUTPATIENT
Start: 2021-01-18 | End: 2021-01-28

## 2021-01-18 RX ORDER — LIDOCAINE 50 MG/G
1 PATCH TOPICAL DAILY
Qty: 30 PATCH | Refills: 0 | Status: SHIPPED | OUTPATIENT
Start: 2021-01-18 | End: 2021-07-21

## 2021-01-18 RX ORDER — NAPROXEN 500 MG/1
500 TABLET ORAL 2 TIMES DAILY
Qty: 60 TABLET | Refills: 0 | Status: SHIPPED | OUTPATIENT
Start: 2021-01-18 | End: 2021-05-04 | Stop reason: ALTCHOICE

## 2021-01-18 RX ORDER — GUAIFENESIN 100 MG/5ML
200 SOLUTION ORAL ONCE
Status: COMPLETED | OUTPATIENT
Start: 2021-01-18 | End: 2021-01-18

## 2021-01-18 RX ORDER — SODIUM CHLORIDE 0.9 % (FLUSH) 0.9 %
10 SYRINGE (ML) INJECTION 2 TIMES DAILY
Status: DISCONTINUED | OUTPATIENT
Start: 2021-01-18 | End: 2021-01-18 | Stop reason: HOSPADM

## 2021-01-18 RX ORDER — ALBUTEROL SULFATE 90 UG/1
2 AEROSOL, METERED RESPIRATORY (INHALATION) EVERY 4 HOURS PRN
Qty: 1 INHALER | Refills: 1 | Status: SHIPPED | OUTPATIENT
Start: 2021-01-18 | End: 2021-07-21

## 2021-01-18 RX ORDER — BENZONATATE 100 MG/1
100 CAPSULE ORAL ONCE
Status: COMPLETED | OUTPATIENT
Start: 2021-01-18 | End: 2021-01-18

## 2021-01-18 RX ORDER — ONDANSETRON 2 MG/ML
4 INJECTION INTRAMUSCULAR; INTRAVENOUS EVERY 30 MIN PRN
Status: DISCONTINUED | OUTPATIENT
Start: 2021-01-18 | End: 2021-01-18 | Stop reason: HOSPADM

## 2021-01-18 RX ORDER — ALBUTEROL SULFATE 90 UG/1
2 AEROSOL, METERED RESPIRATORY (INHALATION) ONCE
Status: COMPLETED | OUTPATIENT
Start: 2021-01-18 | End: 2021-01-18

## 2021-01-18 RX ORDER — LIDOCAINE 4 G/G
1 PATCH TOPICAL DAILY
Status: DISCONTINUED | OUTPATIENT
Start: 2021-01-18 | End: 2021-01-18 | Stop reason: HOSPADM

## 2021-01-18 RX ORDER — AZITHROMYCIN 250 MG/1
TABLET, FILM COATED ORAL
Qty: 1 PACKET | Refills: 0 | Status: SHIPPED | OUTPATIENT
Start: 2021-01-18 | End: 2021-01-28

## 2021-01-18 RX ORDER — KETOROLAC TROMETHAMINE 30 MG/ML
30 INJECTION, SOLUTION INTRAMUSCULAR; INTRAVENOUS ONCE
Status: COMPLETED | OUTPATIENT
Start: 2021-01-18 | End: 2021-01-18

## 2021-01-18 RX ORDER — BENZONATATE 100 MG/1
100 CAPSULE ORAL 3 TIMES DAILY PRN
Qty: 10 CAPSULE | Refills: 0 | Status: SHIPPED | OUTPATIENT
Start: 2021-01-18 | End: 2021-01-25

## 2021-01-18 RX ADMIN — BENZONATATE 100 MG: 100 CAPSULE ORAL at 20:15

## 2021-01-18 RX ADMIN — ALBUTEROL SULFATE 2 PUFF: 90 AEROSOL, METERED RESPIRATORY (INHALATION) at 19:19

## 2021-01-18 RX ADMIN — KETOROLAC TROMETHAMINE 30 MG: 30 INJECTION, SOLUTION INTRAMUSCULAR; INTRAVENOUS at 20:14

## 2021-01-18 RX ADMIN — IOPAMIDOL 90 ML: 755 INJECTION, SOLUTION INTRAVENOUS at 21:09

## 2021-01-18 RX ADMIN — GUAIFENESIN 200 MG: 200 SOLUTION ORAL at 20:15

## 2021-01-18 RX ADMIN — SODIUM CHLORIDE, PRESERVATIVE FREE 10 ML: 5 INJECTION INTRAVENOUS at 21:09

## 2021-01-18 RX ADMIN — ONDANSETRON 4 MG: 2 INJECTION INTRAMUSCULAR; INTRAVENOUS at 20:14

## 2021-01-18 ASSESSMENT — ENCOUNTER SYMPTOMS
SORE THROAT: 1
EYES NEGATIVE: 1
GASTROINTESTINAL NEGATIVE: 1
COUGH: 1
ALLERGIC/IMMUNOLOGIC NEGATIVE: 1
RHINORRHEA: 1
SHORTNESS OF BREATH: 1

## 2021-01-18 ASSESSMENT — PAIN SCALES - GENERAL: PAINLEVEL_OUTOF10: 10

## 2021-01-19 PROCEDURE — 93010 ELECTROCARDIOGRAM REPORT: CPT | Performed by: INTERNAL MEDICINE
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
## 2021-01-20 LAB
CULTURE: NORMAL
Lab: NORMAL
SPECIMEN: NORMAL
STREP A DIRECT SCREEN: NEGATIVE

## 2021-01-26 LAB
EKG ATRIAL RATE: 93 BPM
EKG DIAGNOSIS: NORMAL
EKG P AXIS: 77 DEGREES
EKG P-R INTERVAL: 154 MS
EKG Q-T INTERVAL: 356 MS
EKG QRS DURATION: 96 MS
EKG QTC CALCULATION (BAZETT): 442 MS
EKG R AXIS: 52 DEGREES
EKG T AXIS: 40 DEGREES
EKG VENTRICULAR RATE: 93 BPM

## 2021-05-04 ENCOUNTER — HOSPITAL ENCOUNTER (EMERGENCY)
Age: 51
Discharge: HOME | End: 2021-05-04
Attending: EMERGENCY MEDICINE
Payer: COMMERCIAL

## 2021-05-04 VITALS
TEMPERATURE: 98 F | BODY MASS INDEX: 30.8 KG/M2 | DIASTOLIC BLOOD PRESSURE: 62 MMHG | HEIGHT: 74 IN | RESPIRATION RATE: 16 BRPM | SYSTOLIC BLOOD PRESSURE: 133 MMHG | HEART RATE: 77 BPM | OXYGEN SATURATION: 97 % | WEIGHT: 240 LBS

## 2021-05-04 DIAGNOSIS — M79.671 BILATERAL FOOT PAIN: ICD-10-CM

## 2021-05-04 DIAGNOSIS — M79.672 BILATERAL FOOT PAIN: ICD-10-CM

## 2021-05-04 DIAGNOSIS — G89.29 CHRONIC RIGHT SHOULDER PAIN: Primary | ICD-10-CM

## 2021-05-04 DIAGNOSIS — M25.511 CHRONIC RIGHT SHOULDER PAIN: Primary | ICD-10-CM

## 2021-05-04 PROCEDURE — 6370000000 HC RX 637 (ALT 250 FOR IP): Performed by: EMERGENCY MEDICINE

## 2021-05-04 PROCEDURE — 99284 EMERGENCY DEPT VISIT MOD MDM: CPT

## 2021-05-04 RX ORDER — NAPROXEN 250 MG/1
500 TABLET ORAL ONCE
Status: COMPLETED | OUTPATIENT
Start: 2021-05-04 | End: 2021-05-04

## 2021-05-04 RX ORDER — NAPROXEN 500 MG/1
500 TABLET ORAL 2 TIMES DAILY PRN
Qty: 20 TABLET | Refills: 0 | Status: SHIPPED | OUTPATIENT
Start: 2021-05-04 | End: 2021-07-21

## 2021-05-04 RX ADMIN — NAPROXEN 500 MG: 250 TABLET ORAL at 04:08

## 2021-05-04 ASSESSMENT — PAIN DESCRIPTION - DESCRIPTORS: DESCRIPTORS: ACHING

## 2021-05-04 ASSESSMENT — PAIN DESCRIPTION - LOCATION: LOCATION: LEG

## 2021-05-04 ASSESSMENT — PAIN SCALES - GENERAL: PAINLEVEL_OUTOF10: 10

## 2021-05-04 NOTE — ED PROVIDER NOTES
Financial resource strain: Not on file    Food insecurity     Worry: Not on file     Inability: Not on file    Transportation needs     Medical: Not on file     Non-medical: Not on file   Tobacco Use    Smoking status: Current Every Day Smoker     Packs/day: 1.00     Types: Cigarettes    Smokeless tobacco: Never Used   Substance and Sexual Activity    Alcohol use: Yes     Alcohol/week: 2.0 standard drinks     Types: 2 Cans of beer per week     Comment: per day     Drug use: Yes     Types: Cocaine, Marijuana    Sexual activity: Yes     Partners: Female   Lifestyle    Physical activity     Days per week: Not on file     Minutes per session: Not on file    Stress: Not on file   Relationships    Social connections     Talks on phone: Not on file     Gets together: Not on file     Attends Christianity service: Not on file     Active member of club or organization: Not on file     Attends meetings of clubs or organizations: Not on file     Relationship status: Not on file    Intimate partner violence     Fear of current or ex partner: Not on file     Emotionally abused: Not on file     Physically abused: Not on file     Forced sexual activity: Not on file   Other Topics Concern    Not on file   Social History Narrative    Not on file     No current facility-administered medications for this encounter. Current Outpatient Medications   Medication Sig Dispense Refill    naproxen (NAPROSYN) 500 MG tablet Take 1 tablet by mouth 2 times daily as needed for Pain 20 tablet 0    lidocaine (LIDODERM) 5 % Place 1 patch onto the skin daily 12 hours on, 12 hours off. 30 patch 0    albuterol sulfate HFA (PROVENTIL HFA) 108 (90 Base) MCG/ACT inhaler Inhale 2 puffs into the lungs every 4 hours as needed for Wheezing or Shortness of Breath With spacer (and mask if indicated). Thanks.  1 Inhaler 1    cyclobenzaprine (FLEXERIL) 10 MG tablet Take 1 tablet by mouth 3 times daily as needed for Muscle spasms 30 tablet 0 occlusion, phlegmasia, septic arthritis, soft tissue infection, or sepsis among other considerations. Presentation may be in part due to alcohol consumption, but patient has voiced no suicidal homicidal ideation. There does not appear to be indication for emergent laboratory testing either. Naproxen 500 mg is offered to the emergency department. The patient understands that at this time there is no evidence for a more significant underlying process, and that early in a process of such an injury and initial workup can be falsely negative. Patient's symptoms will be treated symptomatically, prescriptions will be provided, they will be discharged to follow-up as an outpatient, they understand and agree with the plan, return warnings given. Clinical Impression:  1. Chronic right shoulder pain    2. Bilateral foot pain      Disposition referral (if applicable):  GERDA Wolf CNP 7342  [phone]    Schedule an appointment as soon as possible for a visit       [facility] Emergency Department  [address] [id number] [phone]  Go to   As needed, If symptoms worsen    Disposition medications (if applicable):  Current Discharge Medication List        ED Provider Disposition Time  DISPOSITION Decision To Discharge 05/04/2021 04:11:24 AM      Comment: Please note this report has been produced using speech recognition software and may contain errors related to that system including errors in grammar, punctuation, and spelling, as well as words and phrases that may be inappropriate. If there are any questions or concerns please feel free to contact the dictating provider for clarification.         Lizette Rahman MD  05/04/21 9717

## 2021-07-20 PROCEDURE — 99284 EMERGENCY DEPT VISIT MOD MDM: CPT

## 2021-07-20 PROCEDURE — 96375 TX/PRO/DX INJ NEW DRUG ADDON: CPT

## 2021-07-20 PROCEDURE — 96365 THER/PROPH/DIAG IV INF INIT: CPT

## 2021-07-20 ASSESSMENT — PAIN SCALES - GENERAL: PAINLEVEL_OUTOF10: 8

## 2021-07-21 ENCOUNTER — APPOINTMENT (OUTPATIENT)
Dept: CT IMAGING | Age: 51
DRG: 463 | End: 2021-07-21
Payer: COMMERCIAL

## 2021-07-21 ENCOUNTER — APPOINTMENT (OUTPATIENT)
Dept: GENERAL RADIOLOGY | Age: 51
DRG: 463 | End: 2021-07-21
Payer: COMMERCIAL

## 2021-07-21 ENCOUNTER — HOSPITAL ENCOUNTER (INPATIENT)
Age: 51
LOS: 2 days | Discharge: HOME | DRG: 463 | End: 2021-07-23
Attending: EMERGENCY MEDICINE | Admitting: INTERNAL MEDICINE
Payer: COMMERCIAL

## 2021-07-21 DIAGNOSIS — R10.9 FLANK PAIN: ICD-10-CM

## 2021-07-21 DIAGNOSIS — G95.9 SPINAL CORD LESION (HCC): ICD-10-CM

## 2021-07-21 DIAGNOSIS — R91.8 MASS OF LEFT LUNG: ICD-10-CM

## 2021-07-21 DIAGNOSIS — R30.0 DYSURIA: ICD-10-CM

## 2021-07-21 DIAGNOSIS — R59.1 LYMPHADENOPATHY: ICD-10-CM

## 2021-07-21 DIAGNOSIS — N12 PYELONEPHRITIS: Primary | ICD-10-CM

## 2021-07-21 DIAGNOSIS — N42.9 DISEASE OF PROSTATE: ICD-10-CM

## 2021-07-21 PROBLEM — N39.0 UTI (URINARY TRACT INFECTION): Status: ACTIVE | Noted: 2021-07-21

## 2021-07-21 LAB
ALBUMIN SERPL-MCNC: 3.9 GM/DL (ref 3.4–5)
ALP BLD-CCNC: 176 IU/L (ref 40–129)
ALT SERPL-CCNC: 11 U/L (ref 10–40)
AMORPHOUS: ABNORMAL /HPF
ANION GAP SERPL CALCULATED.3IONS-SCNC: 9 MMOL/L (ref 4–16)
AST SERPL-CCNC: 24 IU/L (ref 15–37)
BACTERIA: ABNORMAL /HPF
BASOPHILS ABSOLUTE: 0.1 K/CU MM
BASOPHILS RELATIVE PERCENT: 0.5 % (ref 0–1)
BILIRUB SERPL-MCNC: 0.2 MG/DL (ref 0–1)
BILIRUBIN URINE: NEGATIVE MG/DL
BLOOD, URINE: NEGATIVE
BUN BLDV-MCNC: 8 MG/DL (ref 6–23)
CALCIUM SERPL-MCNC: 8.8 MG/DL (ref 8.3–10.6)
CHLORIDE BLD-SCNC: 100 MMOL/L (ref 99–110)
CLARITY: ABNORMAL
CO2: 28 MMOL/L (ref 21–32)
COLOR: YELLOW
CREAT SERPL-MCNC: 1 MG/DL (ref 0.9–1.3)
DIFFERENTIAL TYPE: ABNORMAL
EOSINOPHILS ABSOLUTE: 0.4 K/CU MM
EOSINOPHILS RELATIVE PERCENT: 3.5 % (ref 0–3)
GFR AFRICAN AMERICAN: >60 ML/MIN/1.73M2
GFR NON-AFRICAN AMERICAN: >60 ML/MIN/1.73M2
GLUCOSE BLD-MCNC: 94 MG/DL (ref 70–99)
GLUCOSE, URINE: NEGATIVE MG/DL
HCT VFR BLD CALC: 42.8 % (ref 42–52)
HEMOGLOBIN: 14 GM/DL (ref 13.5–18)
IMMATURE NEUTROPHIL %: 0.2 % (ref 0–0.43)
KETONES, URINE: NEGATIVE MG/DL
LEUKOCYTE ESTERASE, URINE: ABNORMAL
LYMPHOCYTES ABSOLUTE: 3.3 K/CU MM
LYMPHOCYTES RELATIVE PERCENT: 32.7 % (ref 24–44)
MCH RBC QN AUTO: 28.1 PG (ref 27–31)
MCHC RBC AUTO-ENTMCNC: 32.7 % (ref 32–36)
MCV RBC AUTO: 85.9 FL (ref 78–100)
MONOCYTES ABSOLUTE: 0.6 K/CU MM
MONOCYTES RELATIVE PERCENT: 5.9 % (ref 0–4)
MUCUS: ABNORMAL HPF
NITRITE URINE, QUANTITATIVE: NEGATIVE
NUCLEATED RBC %: 0 %
PDW BLD-RTO: 14.3 % (ref 11.7–14.9)
PH, URINE: 6 (ref 5–8)
PLATELET # BLD: 270 K/CU MM (ref 140–440)
PMV BLD AUTO: 9.3 FL (ref 7.5–11.1)
POTASSIUM SERPL-SCNC: 3.9 MMOL/L (ref 3.5–5.1)
PROSTATE SPECIFIC ANTIGEN: 147.3 NG/ML (ref 0–4)
PROTEIN UA: NEGATIVE MG/DL
RBC # BLD: 4.98 M/CU MM (ref 4.6–6.2)
RBC URINE: 2 /HPF (ref 0–3)
RENAL EPITHELIAL, UA: <1 /HPF
SEGMENTED NEUTROPHILS ABSOLUTE COUNT: 5.7 K/CU MM
SEGMENTED NEUTROPHILS RELATIVE PERCENT: 57.2 % (ref 36–66)
SODIUM BLD-SCNC: 137 MMOL/L (ref 135–145)
SPECIFIC GRAVITY UA: 1.01 (ref 1–1.03)
SQUAMOUS EPITHELIAL: <1 /HPF
TOTAL IMMATURE NEUTOROPHIL: 0.02 K/CU MM
TOTAL NUCLEATED RBC: 0 K/CU MM
TOTAL PROTEIN: 7 GM/DL (ref 6.4–8.2)
TRANSITIONAL EPITHELIAL: <1 /HPF
TRICHOMONAS: ABNORMAL /HPF
UROBILINOGEN, URINE: NEGATIVE MG/DL (ref 0.2–1)
WBC # BLD: 10 K/CU MM (ref 4–10.5)
WBC CLUMP: ABNORMAL /HPF
WBC UA: 44 /HPF (ref 0–2)

## 2021-07-21 PROCEDURE — 6360000002 HC RX W HCPCS: Performed by: EMERGENCY MEDICINE

## 2021-07-21 PROCEDURE — 6370000000 HC RX 637 (ALT 250 FOR IP): Performed by: NURSE PRACTITIONER

## 2021-07-21 PROCEDURE — G0103 PSA SCREENING: HCPCS

## 2021-07-21 PROCEDURE — 94640 AIRWAY INHALATION TREATMENT: CPT

## 2021-07-21 PROCEDURE — 6360000002 HC RX W HCPCS: Performed by: INTERNAL MEDICINE

## 2021-07-21 PROCEDURE — 2580000003 HC RX 258: Performed by: EMERGENCY MEDICINE

## 2021-07-21 PROCEDURE — 74176 CT ABD & PELVIS W/O CONTRAST: CPT

## 2021-07-21 PROCEDURE — 6360000004 HC RX CONTRAST MEDICATION: Performed by: INTERNAL MEDICINE

## 2021-07-21 PROCEDURE — 81001 URINALYSIS AUTO W/SCOPE: CPT

## 2021-07-21 PROCEDURE — 71045 X-RAY EXAM CHEST 1 VIEW: CPT

## 2021-07-21 PROCEDURE — 85025 COMPLETE CBC W/AUTO DIFF WBC: CPT

## 2021-07-21 PROCEDURE — 1200000000 HC SEMI PRIVATE

## 2021-07-21 PROCEDURE — 71260 CT THORAX DX C+: CPT

## 2021-07-21 PROCEDURE — 87086 URINE CULTURE/COLONY COUNT: CPT

## 2021-07-21 PROCEDURE — 80053 COMPREHEN METABOLIC PANEL: CPT

## 2021-07-21 PROCEDURE — 6370000000 HC RX 637 (ALT 250 FOR IP): Performed by: INTERNAL MEDICINE

## 2021-07-21 RX ORDER — TAMSULOSIN HYDROCHLORIDE 0.4 MG/1
0.4 CAPSULE ORAL DAILY
Status: DISCONTINUED | OUTPATIENT
Start: 2021-07-21 | End: 2021-07-23 | Stop reason: HOSPADM

## 2021-07-21 RX ORDER — OXYCODONE HYDROCHLORIDE AND ACETAMINOPHEN 5; 325 MG/1; MG/1
1 TABLET ORAL EVERY 4 HOURS PRN
Status: DISCONTINUED | OUTPATIENT
Start: 2021-07-21 | End: 2021-07-23 | Stop reason: HOSPADM

## 2021-07-21 RX ORDER — ALBUTEROL SULFATE 90 UG/1
2 AEROSOL, METERED RESPIRATORY (INHALATION) EVERY 6 HOURS PRN
Status: DISCONTINUED | OUTPATIENT
Start: 2021-07-21 | End: 2021-07-23 | Stop reason: HOSPADM

## 2021-07-21 RX ORDER — ONDANSETRON 2 MG/ML
4 INJECTION INTRAMUSCULAR; INTRAVENOUS EVERY 6 HOURS PRN
Status: DISCONTINUED | OUTPATIENT
Start: 2021-07-21 | End: 2021-07-23 | Stop reason: HOSPADM

## 2021-07-21 RX ORDER — IPRATROPIUM BROMIDE AND ALBUTEROL SULFATE 2.5; .5 MG/3ML; MG/3ML
1 SOLUTION RESPIRATORY (INHALATION)
Status: DISCONTINUED | OUTPATIENT
Start: 2021-07-21 | End: 2021-07-22

## 2021-07-21 RX ORDER — SODIUM CHLORIDE 9 MG/ML
25 INJECTION, SOLUTION INTRAVENOUS PRN
Status: DISCONTINUED | OUTPATIENT
Start: 2021-07-21 | End: 2021-07-23 | Stop reason: HOSPADM

## 2021-07-21 RX ORDER — SODIUM CHLORIDE 0.9 % (FLUSH) 0.9 %
10 SYRINGE (ML) INJECTION EVERY 12 HOURS SCHEDULED
Status: DISCONTINUED | OUTPATIENT
Start: 2021-07-21 | End: 2021-07-23 | Stop reason: HOSPADM

## 2021-07-21 RX ORDER — NICOTINE 21 MG/24HR
1 PATCH, TRANSDERMAL 24 HOURS TRANSDERMAL DAILY
Status: DISCONTINUED | OUTPATIENT
Start: 2021-07-21 | End: 2021-07-23 | Stop reason: HOSPADM

## 2021-07-21 RX ORDER — SODIUM CHLORIDE 0.9 % (FLUSH) 0.9 %
5-40 SYRINGE (ML) INJECTION 2 TIMES DAILY
Status: DISCONTINUED | OUTPATIENT
Start: 2021-07-21 | End: 2021-07-23 | Stop reason: HOSPADM

## 2021-07-21 RX ORDER — ONDANSETRON 4 MG/1
4 TABLET, ORALLY DISINTEGRATING ORAL EVERY 8 HOURS PRN
Status: DISCONTINUED | OUTPATIENT
Start: 2021-07-21 | End: 2021-07-23 | Stop reason: HOSPADM

## 2021-07-21 RX ORDER — LANOLIN ALCOHOL/MO/W.PET/CERES
9 CREAM (GRAM) TOPICAL NIGHTLY PRN
Status: DISCONTINUED | OUTPATIENT
Start: 2021-07-21 | End: 2021-07-23 | Stop reason: HOSPADM

## 2021-07-21 RX ORDER — ONDANSETRON 2 MG/ML
4 INJECTION INTRAMUSCULAR; INTRAVENOUS ONCE
Status: COMPLETED | OUTPATIENT
Start: 2021-07-21 | End: 2021-07-21

## 2021-07-21 RX ORDER — SODIUM CHLORIDE 0.9 % (FLUSH) 0.9 %
10 SYRINGE (ML) INJECTION PRN
Status: DISCONTINUED | OUTPATIENT
Start: 2021-07-21 | End: 2021-07-23 | Stop reason: HOSPADM

## 2021-07-21 RX ORDER — 0.9 % SODIUM CHLORIDE 0.9 %
1000 INTRAVENOUS SOLUTION INTRAVENOUS ONCE
Status: COMPLETED | OUTPATIENT
Start: 2021-07-21 | End: 2021-07-21

## 2021-07-21 RX ORDER — KETOROLAC TROMETHAMINE 30 MG/ML
30 INJECTION, SOLUTION INTRAMUSCULAR; INTRAVENOUS ONCE
Status: COMPLETED | OUTPATIENT
Start: 2021-07-21 | End: 2021-07-21

## 2021-07-21 RX ORDER — ACETAMINOPHEN 650 MG/1
650 SUPPOSITORY RECTAL EVERY 6 HOURS PRN
Status: DISCONTINUED | OUTPATIENT
Start: 2021-07-21 | End: 2021-07-23 | Stop reason: HOSPADM

## 2021-07-21 RX ORDER — ACETAMINOPHEN 325 MG/1
650 TABLET ORAL EVERY 6 HOURS PRN
Status: DISCONTINUED | OUTPATIENT
Start: 2021-07-21 | End: 2021-07-23 | Stop reason: HOSPADM

## 2021-07-21 RX ADMIN — ONDANSETRON 4 MG: 2 INJECTION INTRAMUSCULAR; INTRAVENOUS at 05:41

## 2021-07-21 RX ADMIN — CEFTRIAXONE SODIUM 1000 MG: 1 INJECTION, POWDER, FOR SOLUTION INTRAMUSCULAR; INTRAVENOUS at 06:13

## 2021-07-21 RX ADMIN — SODIUM CHLORIDE 1000 ML: 9 INJECTION, SOLUTION INTRAVENOUS at 05:40

## 2021-07-21 RX ADMIN — Medication 2 PUFF: at 20:44

## 2021-07-21 RX ADMIN — ALBUTEROL SULFATE 2 PUFF: 90 AEROSOL, METERED RESPIRATORY (INHALATION) at 20:44

## 2021-07-21 RX ADMIN — TAMSULOSIN HYDROCHLORIDE 0.4 MG: 0.4 CAPSULE ORAL at 10:52

## 2021-07-21 RX ADMIN — Medication 9 MG: at 23:19

## 2021-07-21 RX ADMIN — IOPAMIDOL 75 ML: 755 INJECTION, SOLUTION INTRAVENOUS at 20:08

## 2021-07-21 RX ADMIN — OXYCODONE HYDROCHLORIDE AND ACETAMINOPHEN 1 TABLET: 5; 325 TABLET ORAL at 16:56

## 2021-07-21 RX ADMIN — ENOXAPARIN SODIUM 40 MG: 40 INJECTION SUBCUTANEOUS at 10:51

## 2021-07-21 RX ADMIN — KETOROLAC TROMETHAMINE 30 MG: 30 INJECTION, SOLUTION INTRAMUSCULAR; INTRAVENOUS at 05:41

## 2021-07-21 ASSESSMENT — PAIN SCALES - GENERAL
PAINLEVEL_OUTOF10: 2
PAINLEVEL_OUTOF10: 8
PAINLEVEL_OUTOF10: 0

## 2021-07-21 NOTE — ED NOTES
1011 notified Dr Dayami Graff on in patient consult. [address] [place] with Dr Dayami Graff with pt info.  Added to treatment team.      Pili Escobar  07/21/21 1013

## 2021-07-21 NOTE — ED PROVIDER NOTES
CHIEF COMPLAINT  Chief Complaint   Patient presents with    Dysuria    Flank Pain    Rash     lower legs. NIKKI Coyle is a 45 y.o. male with history of CAD, hypertension who presents with 3 to 4 weeks of left flank pain that is continuous, aching, throbbing and persistent until time of evaluation here please also had a week of intermittent difficulty with urination, dysuria and frequency. Overnight he started having increased difficulty urinating and as the main reason he presented here. Denies any hematuria, he wonders if he has renal calculi. Denies any fevers but has had nausea and chills. Symptoms are moderate and persistent. No interventions prior to arrival for improvement. He also feels like he has some dry skin and skin rashes bilateral lower extremities which he relates to starting after receiving his Covid vaccine several months ago.       REVIEW OF SYSTEMS  Review of Systems   History obtained from chart review and the patient  General ROS: negative for - fever  Ophthalmic ROS: negative for - decreased vision or double vision  ENT ROS: negative for - headaches  Hematological and Lymphatic ROS: negative for - weight loss  Endocrine ROS: negative for - unexpected weight changes  Respiratory ROS: no cough, shortness of breath, or wheezing  Cardiovascular ROS: no chest pain or dyspnea on exertion  Gastrointestinal ROS: no abdominal pain, change in bowel habits, or black or bloody stools  Genito-Urinary ROS: positive for -difficulty with urination  Musculoskeletal ROS: negative for - joint stiffness or joint swelling  Neurological ROS: no TIA or stroke symptoms      PAST MEDICAL HISTORY  Past Medical History:   Diagnosis Date    CAD (coronary artery disease) 12/23/2013    cath negative per pt    History of TMJ disorder     Hypertension     Trigeminal neuralgia        FAMILY HISTORY  Family History   Problem Relation Age of Onset    High Blood Pressure Mother     High Blood Pressure Sister     Cancer Sister        SOCIAL HISTORY  Social History     Socioeconomic History    Marital status:      Spouse name: Not on file    Number of children: 11    Years of education: Not on file    Highest education level: Not on file   Occupational History    Not on file   Tobacco Use    Smoking status: Current Every Day Smoker     Packs/day: 1.00     Types: Cigarettes    Smokeless tobacco: Never Used   Substance and Sexual Activity    Alcohol use: Yes     Alcohol/week: 2.0 standard drinks     Types: 2 Cans of beer per week     Comment: per day     Drug use: Yes     Types: Cocaine, Marijuana    Sexual activity: Yes     Partners: Female   Other Topics Concern    Not on file   Social History Narrative    Not on file     Social Determinants of Health     Financial Resource Strain:     Difficulty of Paying Living Expenses:    Food Insecurity:     Worried About Running Out of Food in the Last Year:     [address] in the Last Year:    Transportation Needs:     Lack of Transportation (Medical):  Lack of Transportation (Non-Medical):    Physical Activity:     Days of Exercise per Week:     Minutes of Exercise per Session:    Stress:     Feeling of Stress :    Social Connections:     Frequency of Communication with Friends and Family:     Frequency of Social Gatherings with Friends and Family:     Attends Spiritism Services:     Active Member of Clubs or Organizations:     Attends Club or Organization Meetings:     Marital Status:    Intimate Partner Violence:     Fear of Current or Ex-Partner:     Emotionally Abused:     Physically Abused:     Sexually Abused:        SURGICAL HISTORY  Past Surgical History:   Procedure Laterality Date    [address]  08/03/2016       CURRENT MEDICATIONS  No current facility-administered medications on file prior to encounter.      Current Outpatient Medications on File Prior to Encounter   Medication Sig Circumferential thickening of the bladder wall is noted which could be   related to cystitis. Correlate with urinalysis. 2. There is left retroperitoneal lymphadenopathy. This could be reactive or   neoplastic. This can be further evaluated with PET-CT. 3. There is minimal stranding within the retroperitoneal on the left. This   is uncertain etiology however could be related to acute pyelonephritis. 4. Interval development of multiple sclerotic lesions within the spine and   pelvis, concerning for metastatic osseous disease. 5. State gland is enlarged. Correlate with PSA. Imaging reviewed by myself, discussed with patient, he was made aware of possible prostate cancer with metastasis, discussed with his wife on the phone    Labs Reviewed   CBC WITH AUTO DIFFERENTIAL - Abnormal; Notable for the following components:       Result Value    Monocytes % 5.9 (*)     Eosinophils % 3.5 (*)     All other components within normal limits   COMPREHENSIVE METABOLIC PANEL   URINALYSIS WITH MICROSCOPIC         Medications   0.9 % sodium chloride bolus (1,000 mLs Intravenous New Bag 7/21/21 0540)   cefTRIAXone (ROCEPHIN) 1000 mg IVPB in 50 mL D5W minibag (has no administration in time range)   ketorolac (TORADOL) injection 30 mg (30 mg Intravenous Given 7/21/21 0541)   ondansetron (ZOFRAN) injection 4 mg (4 mg Intravenous Given 7/21/21 0541)       COURSE & MEDICAL DECISION MAKING  Pertinent Labs & Imaging studies reviewed. (See chart for details)    48-year-old male presents with left flank pain, dysuria and frequency. It is concerning that he has possible cystitis versus urinary hesitancy or bladder outlet obstruction secondary to prostatomegaly. He may have prostate cancer with metastatic disease as there is abnormalities found in the spine as well as lymphadenopathy on the left flank.   The lymphadenopathy may be causing the pain in the left flank but he will be started on Rocephin for treatment of

## 2021-07-21 NOTE — CONSULTS
[facility] [address]   Consult Note  [facility] 1 2 3 4 5    Date: 2021   Patient: Kimberlyn Paulino   : 1970   DOA: 2021   MRN: [id number]   ROOM#: [id number]     Reason for Consult: Complicated UTI  Requesting Physician:  Dr. Erma Pinto  Collaborating Urologist on Call at time of admission: Dr. Severa Halo: Left flank pain, dysuria    History Obtained From:  patient, electronic medical record    HISTORY OF PRESENT ILLNESS:                The patient is a 45 y.o. male with significant past medical history of CAD and HTN who presented with left flank pain and dysuria x3-4 weeks. Pt states he has had increased difficulty voiding, which is the main reason he presented to the ED. Work-up in the ED revealed likely left pyelonephritis as well as multiple sclerotic bone lesions with an enlarged, irregular prostate. PSA of 147.30. Pt denies personal/family h/o renal/bladder/prostate cancer. Pt was admitted for further evaluation. ED Provider's HPI 21: Kimberlyn Paulino is a 45 y.o. male with history of CAD, hypertension who presents with 3 to 4 weeks of left flank pain that is continuous, aching, throbbing and persistent until time of evaluation here please also had a week of intermittent difficulty with urination, dysuria and frequency. Overnight he started having increased difficulty urinating and as the main reason he presented here. Denies any hematuria, he wonders if he has renal calculi. Denies any fevers but has had nausea and chills. Symptoms are moderate and persistent. No interventions prior to arrival for improvement. He also feels like he has some dry skin and skin rashes bilateral lower extremities which he relates to starting after receiving his Covid vaccine several months ago.     Past Medical History:        Diagnosis Date    CAD (coronary artery disease) 2013    cath negative per pt    History of TMJ disorder     Hypertension     Trigeminal neuralgia      Past Surgical History:        Procedure Laterality Date    ABDOMEN SURGERY      CARDIAC CATHETERIZATION  08/03/2016     Current Medications:   Current Facility-Administered Medications: sodium chloride flush 0.9 % injection 10 mL, 10 mL, Intravenous, 2 times per day  sodium chloride flush 0.9 % injection 10 mL, 10 mL, Intravenous, PRN  0.9 % sodium chloride infusion, 25 mL, Intravenous, PRN  ondansetron (ZOFRAN-ODT) disintegrating tablet 4 mg, 4 mg, Oral, Q8H PRN **OR** ondansetron (ZOFRAN) injection 4 mg, 4 mg, Intravenous, Q6H PRN  bisacodyl (DULCOLAX) EC tablet 5 mg, 5 mg, Oral, Daily PRN  acetaminophen (TYLENOL) tablet 650 mg, 650 mg, Oral, Q6H PRN **OR** acetaminophen (TYLENOL) suppository 650 mg, 650 mg, Rectal, Q6H PRN  enoxaparin (LOVENOX) injection 40 mg, 40 mg, Subcutaneous, Daily  [START ON 7/22/2021] cefTRIAXone (ROCEPHIN) 1000 mg IVPB in 50 mL D5W minibag, 1,000 mg, Intravenous, Q24H  hydrALAZINE (APRESOLINE) 10 mg in sodium chloride 0.9 % 50 mL ivpb, 10 mg, Intravenous, Q6H PRN  tamsulosin (FLOMAX) capsule 0.4 mg, 0.4 mg, Oral, Daily    Allergies:  Tramadol and Vicodin [hydrocodone-acetaminophen]    Social History:   TOBACCO:   reports that he has been smoking cigarettes. He has been smoking about 1.00 pack per day. He has never used smokeless tobacco.  ETOH:   reports current alcohol use of about 2.0 standard drinks of alcohol per week. DRUGS:   reports current drug use. Drugs: Cocaine and Marijuana.     Family History:       Problem Relation Age of Onset    High Blood Pressure Mother     High Blood Pressure Sister     Cancer Sister        REVIEW OF SYSTEMS:     CONSTITUTIONAL:  negative  RESPIRATORY:  negative  CARDIOVASCULAR:  negative  GASTROINTESTINAL:  negative  GENITOURINARY:  positive for frequency, dysuria and decreased stream    PHYSICAL EXAM:      VITALS:  BP (!) 145/88   Pulse 59   Temp 98 °F (36.7 °C) (Oral)   Resp 16   Ht 6' 1\" (1.854 m)   Wt 222 lb (100.7 kg) SpO2 99%   BMI 29.29 kg/m²      TEMPERATURE:  Current - Temp: 98 °F (36.7 °C); Max - Temp  Av.2 °F (36.8 °C)  Min: 98 °F (36.7 °C)  Max: 98.4 °F (36.9 °C)  24HR BLOOD PRESSURE RANGE:  Systolic (07JIQ), ILF:075 , Min:123 , KGD:303   ; Diastolic (81JNX), TXO:54, Min:88, Max:117    General appearance: alert, appears stated age, cooperative, no distress and mildly obese  Head: Normocephalic, without obvious abnormality, atraumatic  Back: Left CVA tenderness  Abdomen: Soft, non-tender, non-distended   Rectal: Normal tone; enlarged, firm prostate. No TTP or nodules noted. DATA:    WBC:    Lab Results   Component Value Date    WBC 10.0 2021     Hemoglobin/Hematocrit:    Lab Results   Component Value Date    HGB 14.0 2021    HCT 42.8 2021     BMP:    Lab Results   Component Value Date     2021    K 3.9 2021     2021    CO2 28 2021    BUN 8 2021    LABALBU 3.9 2021    CREATININE 1.0 2021    CALCIUM 8.8 2021    GFRAA >60 2021    LABGLOM >60 2021     PT/INR:    Lab Results   Component Value Date    PROTIME 14.3 2018    INR 1.26 2018     Urine Culture: pending    Imaging:  CT ABDOMEN PELVIS WO CONTRAST Additional Contrast? None    Result Date: 2021  EXAMINATION: CT OF THE ABDOMEN AND PELVIS WITHOUT CONTRAST 2021 3:22 am TECHNIQUE: CT of the abdomen and pelvis was performed without the administration of intravenous contrast. Multiplanar reformatted images are provided for review. Dose modulation, iterative reconstruction, and/or weight based adjustment of the mA/kV was utilized to reduce the radiation dose to as low as reasonably achievable. COMPARISON: None.  HISTORY: ORDERING SYSTEM PROVIDED HISTORY: left flank pain TECHNOLOGIST PROVIDED HISTORY: Reason for exam:->left flank pain Additional Contrast?->None Decision Support Exception - unselect if not a suspected or confirmed emergency medical condition->Emergency Medical Condition (MA) Reason for Exam: Dysuria; Flank Pain; Rash Acuity: Acute Type of Exam: Initial FINDINGS: Lower Chest: The visualized lungs are clear. Organs: The liver, gallbladder, spleen, adrenal glands, pancreas, and kidneys are grossly unremarkable. GI/Bowel: There is no evidence of bowel obstruction. The appendix is normal. Pelvis: Mild circumferential thickening of the bladder wall is noted. There is no free fluid. The prostate gland is enlarged. Peritoneum/Retroperitoneum: Retroperitoneal lymphadenopathy is noted. There is a left retroperitoneal lymph node which measures 1.5 cm. There is stranding along the left psoas muscle extending into the pelvis. Bones/Soft Tissues: There is a sclerotic appearance of the left hemipelvis. Multiple sclerotic lesions are seen within the visualized osseous structures. These are new since the prior exam dated November 28, 2020. Findings are concerning for metastatic osseous disease. 1. Circumferential thickening of the bladder wall is noted which could be related to cystitis. Correlate with urinalysis. 2. There is left retroperitoneal lymphadenopathy. This could be reactive or neoplastic. This can be further evaluated with PET-CT. 3. There is minimal stranding within the retroperitoneal on the left. This is uncertain etiology however could be related to acute pyelonephritis. 4. Interval development of multiple sclerotic lesions within the spine and pelvis, concerning for metastatic osseous disease. 5. Prostate gland is enlarged. Correlate with PSA. XR CHEST PORTABLE    Result Date: 7/21/2021  EXAMINATION: ONE XRAY VIEW OF THE CHEST 7/21/2021 8:23 am COMPARISON: 01/18/2021 radiograph HISTORY: ORDERING SYSTEM PROVIDED HISTORY: SOB TECHNOLOGIST PROVIDED HISTORY: Reason for exam:->SOB Reason for Exam: SOB Acuity: Acute Type of Exam: Initial FINDINGS: The heart is normal.  Asymmetric enlargement of the left pulmonary hilum.   In comparison to a prior CT on 01/18/2021, this has been described as lymphadenopathy. Streaky ground-glass opacities extend from the hilum in the upper and lower lobes. The right lung is clear. There are no significant skeletal findings. Asymmetric enlargement of the left pulmonary hilum has increased from prior imaging. This likely represents underlying lymphadenopathy based upon results of prior CT. Recommend a follow-up CT of the chest with contrast for direct comparison. Associated ground-glass opacities extending from the hilum may represent atelectasis or underlying pneumonitis. Assessment & Plan:      Kenny Oquendo is a 45y.o. year old male admitted 7/21/2021 for     1) Sclerotic Bone Lesions with Elevated PSA: highly concerning for metastatic prostate cancer   CT a/p wo 7/21/21: Interval development of multiple sclerotic lesions within the spine and pelvis, concerning for metastatic osseous disease. Prostate gland is enlarged. There is left retroperitoneal lymphadenopathy. .30 7/21   CARL today w enlarged, firm prostate   Consult interventional radiology for possible lymph node biopsy. Pt may require outpatient prostate biopsy; we will arrange this if necessary. CT chest w contrast pending   Consult Hem/onc  2) Pyelonephritis   CT a/p wo 7/21/21:  Circumferential thickening of the bladder wall is noted which could be related to cystitis. There is minimal stranding within the retroperitoneal on the left. UA w large leuks, rare bacteria; urine cx pending   WBC 10.0, afebrile   PVR 267cc this am. Repeat PVR. On IV Rocephin    Patient seen and examined, chart reviewed.      Electronically signed by John Munoz PA-C on 7/21/2021 at 4:06 PM

## 2021-07-21 NOTE — PROGRESS NOTES
Medication History  [facility]    Patient Name: Angel Carrero 1970     Medication history has been completed by: Nadia Gambino CPhT    Source(s) of information: patient      Primary Care Physician: GERDA Wang - CNP     Pharmacy: [facility]    Allergies as of 07/20/2021 - Fully Reviewed 07/20/2021   Allergen Reaction Noted    Tramadol Other (See Comments) 07/11/2014    Vicodin [hydrocodone-acetaminophen] Hives 07/11/2014        Prior to Admission medications    Medication Sig Start Date End Date Taking?  Authorizing Provider     Medications removed from list (include reason, ex. noncompliance, medication cost, therapy complete etc.):   Albuterol inhaler no longer using  Cyclobenzaprine no longer taking  Lidocaine not using  Naproxen not taking    Comments:  Patient reports he takes no medications    To my knowledge the above medication history is accurate as of 7/21/2021 8:37 AM.   Nadia Gambino CPhT   7/21/2021 8:37 AM

## 2021-07-21 NOTE — H&P
History and Physical      Name:  Shannon Hagen /Age/Sex: 1970  (45 y.o. male)   MRN & CSN:  [id number] & [id number] Admission Date/Time: 2021  2:22 AM   Location:  ED31/ED-31 PCP: Virginia Pop, [address] [facility] Day: 1        Admitting Physician: Dr. Amirah Jeffrey. [facility]      Assessment and Plan:   Shannon Hagen is a 45 y.o. male who presents with Dysuria, Flank Pain, and Rash (lower legs. )    Acute Pyelonephritis   Admit to Med/Surg   Pending cultures   IV Rocephin     Enlarged prostate- concern for prostate cancer with spine mets. CT: Left retroperitoneal lymphadenopathy, multiple sclerotic lesions within the spine and pelvis, concerning for metastatic osseous disease. Urology evaluation     Hypertension- uncontrolled trends in ED. No current medications but per chart review previously on Lisinopril    Monitor trends for now    Added PRN      Patient case discussed with ED provider    Diet No diet orders on file   DVT Prophylaxis [] Lovenox, []  Heparin, [] SCDs, [] Ambulation  [] Long term AC   GI Prophylaxis [x] PPI,  [] H2 Blocker,  [] Carafate,  [] Diet/Tube Feeds   Code Status Full     Disposition Admit to inpatient. Patient plans to return home upon discharge   MDM [] Low, [] Moderate,[]  High     -Patient assessment and plan discussed and reviewed with admitting physician:  Dr Aaron Motta . History of Present Illness:     Chief Complaint: Dysuria, Flank Pain, and Rash (lower legs. )    Shannon Hagen is a 45 y.o. male who presents with concerns of dysuria left flank pain and lower back pain. Reports onset of symptoms approximate 3 weeks ago. Is increased lower back pain prompted ED evaluation. States constantly has to urinate with some incontinence. States he is to strain to initiate a urinary stream with increased dysuria. States pain is improved with pain medication provided emergency room. He does report some associated shortness of breath with a nonproductive cough.   Reports worse with exertion but may occur at rest too     Reports her sister (now ) had unknown cancer and was diagnosed at age 27    Ten point ROS: reviewed negative, unless as noted in above HPI. Objective:   No intake or output data in the 24 hours ending 21 0712     Vitals:   Vitals:    21 2242 21 0601 21 0602   BP: (!) 152/93  (!) 156/117   Pulse: 70 86 75   Resp: 16 30 20   Temp: 98.4 °F (36.9 °C)     TempSrc: Oral     SpO2: 100% 96%        Physical Exam: 21     GEN -Awake  appearing male, sitting upright in bed , NAD. normal body habitus. Appears given age. EYES -No scleral erythema, discharge, or conjunctivitis. HENT -MM are moist. Oral pharynx without exudates, no evidence of thrush. NECK -Supple, no apparent thyromegaly or masses. RESP -CTA, no wheezes, rales or rhonchi. Symmetric chest movement while on RA.   C/V -S1/S2 auscultated. RRR without appreciable M/R/G. No JVD or carotid bruits. Peripheral pulses equal bilaterally and palpable. Cap refill <3 sec. No peripheral edema. GI -Abdomen is soft non distended and without significant TTP. + BS. No masses or guarding. Rectal exam deferred. No HSM   -No CVA/ flank tenderness. Doherty catheter is not present. LYMPH-No palpable cervical lymphadenopathy and no hepatosplenomegaly. No petechiae or ecchymoses. MS -No gross joint deformities. SKIN -Normal coloration, warm, dry. NEURO-Cranial nerves appear grossly intact, normal speech, no lateralizing weakness. PSYC-Awake, alert, oriented x 4- person, place, time, situation,  Appropriate affect. Past Medical History:      Past Medical History:   Diagnosis Date    CAD (coronary artery disease) 2013    cath negative per pt    History of TMJ disorder     Hypertension     Trigeminal neuralgia        Past Surgical  History:    has a past surgical history that includes Abdomen surgery and Cardiac catheterization (2016).     Social History:    FAM HX: Reviewed  family history includes Cancer in his sister; High Blood Pressure in his mother and sister. Soc HX:   Social History     Socioeconomic History    Marital status:      Spouse name: Not on file    Number of children: 11    Years of education: Not on file    Highest education level: Not on file   Occupational History    Not on file   Tobacco Use    Smoking status: Current Every Day Smoker     Packs/day: 1.00     Types: Cigarettes    Smokeless tobacco: Never Used   Substance and Sexual Activity    Alcohol use: Yes     Alcohol/week: 2.0 standard drinks     Types: 2 Cans of beer per week     Comment: per day     Drug use: Yes     Types: Cocaine, Marijuana    Sexual activity: Yes     Partners: Female   Other Topics Concern    Not on file   Social History Narrative    Not on file     Social Determinants of Health     Financial Resource Strain:     Difficulty of Paying Living Expenses:    Food Insecurity:     Worried About Running Out of Food in the Last Year:     [address] in the Last Year:    Transportation Needs:     Lack of Transportation (Medical):  Lack of Transportation (Non-Medical):    Physical Activity:     Days of Exercise per Week:     Minutes of Exercise per Session:    Stress:     Feeling of Stress :    Social Connections:     Frequency of Communication with Friends and Family:     Frequency of Social Gatherings with Friends and Family:     Attends Jainism Services:     Active Member of Clubs or Organizations:     Attends Club or Organization Meetings:     Marital Status:    Intimate Partner Violence:     Fear of Current or Ex-Partner:     Emotionally Abused:     Physically Abused:     Sexually Abused:      TOBACCO:   reports that he has been smoking cigarettes. He has been smoking about 1.00 pack per day. He has never used smokeless tobacco.  ETOH:   reports current alcohol use of about 2.0 standard drinks of alcohol per week.   Drugs:  reports current drug use. Drugs: Cocaine and Marijuana. Allergies: Allergies   Allergen Reactions    Tramadol Other (See Comments)     shaking    Vicodin [Hydrocodone-Acetaminophen] Hives       Home Medications:     Prior to Admission medications    Medication Sig Start Date End Date Taking? Authorizing Provider   naproxen (NAPROSYN) 500 MG tablet Take 1 tablet by mouth 2 times daily as needed for Pain 5/4/21   Aliya Artis MD   lidocaine (LIDODERM) 5 % Place 1 patch onto the skin daily 12 hours on, 12 hours off. 1/18/21   Elfida Webster, DO   albuterol sulfate HFA (PROVENTIL HFA) 108 (90 Base) MCG/ACT inhaler Inhale 2 puffs into the lungs every 4 hours as needed for Wheezing or Shortness of Breath With spacer (and mask if indicated). Thanks. 1/18/21 2/17/21  Elfiduy PerryWebster, DO   cyclobenzaprine (FLEXERIL) 10 MG tablet Take 1 tablet by mouth 3 times daily as needed for Muscle spasms 9/29/20   GERDA Adam - CNP         Medications:   Medications:    [START ON 7/22/2021] cefTRIAXone (ROCEPHIN) IV  1,000 mg Intravenous Q24H      Infusions:   PRN Meds:      Data:     Laboratory this visit:  Reviewed  Recent Labs     07/21/21  0536   WBC 10.0   HGB 14.0   HCT 42.8         Recent Labs     07/21/21  0536      K 3.9      CO2 28   BUN 8   CREATININE 1.0     Recent Labs     07/21/21  0536   AST 24   ALT 11   BILITOT 0.2   ALKPHOS 176*     No results for input(s): INR in the last 72 hours. Radiology this visit:  Reviewed. CT ABDOMEN PELVIS WO CONTRAST Additional Contrast? None    Result Date: 7/21/2021  EXAMINATION: CT OF THE ABDOMEN AND PELVIS WITHOUT CONTRAST 7/21/2021 3:22 am TECHNIQUE: CT of the abdomen and pelvis was performed without the administration of intravenous contrast. Multiplanar reformatted images are provided for review. Dose modulation, iterative reconstruction, and/or weight based adjustment of the mA/kV was utilized to reduce the radiation dose to as low as reasonably achievable. COMPARISON: None. HISTORY: ORDERING SYSTEM PROVIDED HISTORY: left flank pain TECHNOLOGIST PROVIDED HISTORY: Reason for exam:->left flank pain Additional Contrast?->None Decision Support Exception - unselect if not a suspected or confirmed emergency medical condition->Emergency Medical Condition (MA) Reason for Exam: Dysuria; Flank Pain; Rash Acuity: Acute Type of Exam: Initial FINDINGS: Lower Chest: The visualized lungs are clear. Organs: The liver, gallbladder, spleen, adrenal glands, pancreas, and kidneys are grossly unremarkable. GI/Bowel: There is no evidence of bowel obstruction. The appendix is normal. Pelvis: Mild circumferential thickening of the bladder wall is noted. There is no free fluid. The prostate gland is enlarged. Peritoneum/Retroperitoneum: Retroperitoneal lymphadenopathy is noted. There is a left retroperitoneal lymph node which measures 1.5 cm. There is stranding along the left psoas muscle extending into the pelvis. Bones/Soft Tissues: There is a sclerotic appearance of the left hemipelvis. Multiple sclerotic lesions are seen within the visualized osseous structures. These are new since the prior exam dated November 28, 2020. Findings are concerning for metastatic osseous disease. 1. Circumferential thickening of the bladder wall is noted which could be related to cystitis. Correlate with urinalysis. 2. There is left retroperitoneal lymphadenopathy. This could be reactive or neoplastic. This can be further evaluated with PET-CT. 3. There is minimal stranding within the retroperitoneal on the left. This is uncertain etiology however could be related to acute pyelonephritis. 4. Interval development of multiple sclerotic lesions within the spine and pelvis, concerning for metastatic osseous disease. 5. Prostate gland is enlarged. Correlate with PSA.        EKG this visit:  Reviewed         Electronically signed by GERDA Medina CNP on 7/21/2021 at 7:12 AM

## 2021-07-21 NOTE — ED PROVIDER NOTES
07/21/21ceferino Mendez was checked out to me by Dr. Emilia Dietz. Please see his/her initial documentation for details of the patient's ED presentation, physical exam and completed studies.     In brief, Barbara Mendez is a 45 y.o. male that presents with complaint of flank pain abdominal pain dysuria awaiting labs imaging and likely needs admission treated for UTI    I have reviewed and interpreted all of the currently available lab results from this visit (if applicable):  Results for orders placed or performed during the hospital encounter of 07/21/21   CBC with Auto Diff   Result Value Ref Range    WBC 10.0 4.0 - 10.5 K/CU MM    RBC 4.98 4.6 - 6.2 M/CU MM    Hemoglobin 14.0 13.5 - 18.0 GM/DL    Hematocrit 42.8 42 - 52 %    MCV 85.9 78 - 100 FL    MCH 28.1 27 - 31 PG    MCHC 32.7 32.0 - 36.0 %    RDW 14.3 11.7 - 14.9 %    Platelets 059 712 - 642 K/CU MM    MPV 9.3 7.5 - 11.1 FL    Differential Type AUTOMATED DIFFERENTIAL     Segs Relative 57.2 36 - 66 %    Lymphocytes % 32.7 24 - 44 %    Monocytes % 5.9 (H) 0 - 4 %    Eosinophils % 3.5 (H) 0 - 3 %    Basophils % 0.5 0 - 1 %    Segs Absolute 5.7 K/CU MM    Lymphocytes Absolute 3.3 K/CU MM    Monocytes Absolute 0.6 K/CU MM    Eosinophils Absolute 0.4 K/CU MM    Basophils Absolute 0.1 K/CU MM    Nucleated RBC % 0.0 %    Total Nucleated RBC 0.0 K/CU MM    Total Immature Neutrophil 0.02 K/CU MM    Immature Neutrophil % 0.2 0 - 0.43 %   CMP   Result Value Ref Range    Sodium 137 135 - 145 MMOL/L    Potassium 3.9 3.5 - 5.1 MMOL/L    Chloride 100 99 - 110 mMol/L    CO2 28 21 - 32 MMOL/L    BUN 8 6 - 23 MG/DL    CREATININE 1.0 0.9 - 1.3 MG/DL    Glucose 94 70 - 99 MG/DL    Calcium 8.8 8.3 - 10.6 MG/DL    Albumin 3.9 3.4 - 5.0 GM/DL    Total Protein 7.0 6.4 - 8.2 GM/DL    Total Bilirubin 0.2 0.0 - 1.0 MG/DL    ALT 11 10 - 40 U/L    AST 24 15 - 37 IU/L    Alkaline Phosphatase 176 (H) 40 - 129 IU/L    GFR Non-African American >60 >60 mL/min/1.73m2    GFR African American >60 >60 mL/min/1.73m2    Anion Gap 9 4 - 16   Urinalysis with microscopic   Result Value Ref Range    Color, UA YELLOW YELLOW    Clarity, UA HAZY (A) CLEAR    Glucose, Urine NEGATIVE NEGATIVE MG/DL    Bilirubin Urine NEGATIVE NEGATIVE MG/DL    Ketones, Urine NEGATIVE NEGATIVE MG/DL    Specific Gravity, UA 1.012 1.001 - 1.035    Blood, Urine NEGATIVE NEGATIVE    pH, Urine 6.0 5.0 - 8.0    Protein, UA NEGATIVE NEGATIVE MG/DL    Urobilinogen, Urine NEGATIVE 0.2 - 1.0 MG/DL    Nitrite Urine, Quantitative NEGATIVE NEGATIVE    Leukocyte Esterase, Urine LARGE (A) NEGATIVE    RBC, UA 2 0 - 3 /HPF    WBC, UA 44 (H) 0 - 2 /HPF    Bacteria, UA RARE (A) NEGATIVE /HPF    WBC Clumps, UA FEW /HPF    Squam Epithel, UA <1 /HPF    Trans Epithel, UA <1 /HPF    Renal Epithelial, UA <1 /HPF    Mucus, UA RARE (A) NEGATIVE HPF    Trichomonas, UA NONE SEEN NONE SEEN /HPF    Amorphous, UA RARE /HPF       MDM:  Patient with flank pain dysuria found to have UTI, pyelonephritis awaiting labs imaging will need admission for possible metastatic cancer work-up got Rocephin. Patient again has pyelonephritis does have lymphadenopathy with mets to the spine will admit to hospital medicine who I talked to patient is admission for metastatic work-up, treatment of pyelonephritis otherwise stable admitted. Final Impression:  1. Pyelonephritis    2. Flank pain    3. Dysuria    4. Lymphadenopathy    5.  Spinal cord lesion [facility])        (Please note that portions of this note may have been completed with a voice recognition program. Efforts were made to edit the dictations but occasionally words are mis-transcribed.)    Seth Hanson 113, DO  07/21/21 0755

## 2021-07-22 LAB
ANION GAP SERPL CALCULATED.3IONS-SCNC: 9 MMOL/L (ref 4–16)
BASOPHILS ABSOLUTE: 0 K/CU MM
BASOPHILS RELATIVE PERCENT: 0.4 % (ref 0–1)
BUN BLDV-MCNC: 13 MG/DL (ref 6–23)
CALCIUM SERPL-MCNC: 8.8 MG/DL (ref 8.3–10.6)
CHLORIDE BLD-SCNC: 104 MMOL/L (ref 99–110)
CO2: 27 MMOL/L (ref 21–32)
CREAT SERPL-MCNC: 1.1 MG/DL (ref 0.9–1.3)
CULTURE: NORMAL
DIFFERENTIAL TYPE: ABNORMAL
EOSINOPHILS ABSOLUTE: 0.3 K/CU MM
EOSINOPHILS RELATIVE PERCENT: 3.2 % (ref 0–3)
GFR AFRICAN AMERICAN: >60 ML/MIN/1.73M2
GFR NON-AFRICAN AMERICAN: >60 ML/MIN/1.73M2
GLUCOSE BLD-MCNC: 127 MG/DL (ref 70–99)
HCT VFR BLD CALC: 40.3 % (ref 42–52)
HEMOGLOBIN: 13.4 GM/DL (ref 13.5–18)
IMMATURE NEUTROPHIL %: 0.4 % (ref 0–0.43)
LYMPHOCYTES ABSOLUTE: 3.1 K/CU MM
LYMPHOCYTES RELATIVE PERCENT: 34.4 % (ref 24–44)
Lab: NORMAL
MCH RBC QN AUTO: 28.2 PG (ref 27–31)
MCHC RBC AUTO-ENTMCNC: 33.3 % (ref 32–36)
MCV RBC AUTO: 84.7 FL (ref 78–100)
MONOCYTES ABSOLUTE: 0.6 K/CU MM
MONOCYTES RELATIVE PERCENT: 6.5 % (ref 0–4)
NUCLEATED RBC %: 0 %
PDW BLD-RTO: 13.7 % (ref 11.7–14.9)
PLATELET # BLD: 242 K/CU MM (ref 140–440)
PMV BLD AUTO: 9.5 FL (ref 7.5–11.1)
POTASSIUM SERPL-SCNC: 3.9 MMOL/L (ref 3.5–5.1)
RBC # BLD: 4.76 M/CU MM (ref 4.6–6.2)
SEGMENTED NEUTROPHILS ABSOLUTE COUNT: 4.9 K/CU MM
SEGMENTED NEUTROPHILS RELATIVE PERCENT: 55.1 % (ref 36–66)
SODIUM BLD-SCNC: 140 MMOL/L (ref 135–145)
SPECIMEN: NORMAL
TOTAL IMMATURE NEUTOROPHIL: 0.04 K/CU MM
TOTAL NUCLEATED RBC: 0 K/CU MM
WBC # BLD: 9 K/CU MM (ref 4–10.5)

## 2021-07-22 PROCEDURE — 85025 COMPLETE CBC W/AUTO DIFF WBC: CPT

## 2021-07-22 PROCEDURE — 6370000000 HC RX 637 (ALT 250 FOR IP): Performed by: NURSE PRACTITIONER

## 2021-07-22 PROCEDURE — 6370000000 HC RX 637 (ALT 250 FOR IP): Performed by: INTERNAL MEDICINE

## 2021-07-22 PROCEDURE — 36415 COLL VENOUS BLD VENIPUNCTURE: CPT

## 2021-07-22 PROCEDURE — 1200000000 HC SEMI PRIVATE

## 2021-07-22 PROCEDURE — 2580000003 HC RX 258: Performed by: INTERNAL MEDICINE

## 2021-07-22 PROCEDURE — 6360000002 HC RX W HCPCS: Performed by: INTERNAL MEDICINE

## 2021-07-22 PROCEDURE — 94761 N-INVAS EAR/PLS OXIMETRY MLT: CPT

## 2021-07-22 PROCEDURE — 80048 BASIC METABOLIC PNL TOTAL CA: CPT

## 2021-07-22 RX ORDER — AMMONIUM LACTATE 12 G/100G
LOTION TOPICAL DAILY
Status: DISCONTINUED | OUTPATIENT
Start: 2021-07-22 | End: 2021-07-23 | Stop reason: HOSPADM

## 2021-07-22 RX ORDER — IPRATROPIUM BROMIDE AND ALBUTEROL SULFATE 2.5; .5 MG/3ML; MG/3ML
1 SOLUTION RESPIRATORY (INHALATION) EVERY 4 HOURS PRN
Status: DISCONTINUED | OUTPATIENT
Start: 2021-07-22 | End: 2021-07-23 | Stop reason: HOSPADM

## 2021-07-22 RX ORDER — LISINOPRIL 10 MG/1
10 TABLET ORAL DAILY
Status: DISCONTINUED | OUTPATIENT
Start: 2021-07-22 | End: 2021-07-23 | Stop reason: HOSPADM

## 2021-07-22 RX ADMIN — CEFTRIAXONE SODIUM 1000 MG: 1 INJECTION, POWDER, FOR SOLUTION INTRAMUSCULAR; INTRAVENOUS at 06:19

## 2021-07-22 RX ADMIN — LISINOPRIL 10 MG: 10 TABLET ORAL at 15:58

## 2021-07-22 RX ADMIN — TAMSULOSIN HYDROCHLORIDE 0.4 MG: 0.4 CAPSULE ORAL at 09:19

## 2021-07-22 RX ADMIN — ENOXAPARIN SODIUM 40 MG: 40 INJECTION SUBCUTANEOUS at 09:20

## 2021-07-22 RX ADMIN — Medication 10 ML: at 09:21

## 2021-07-22 RX ADMIN — Medication 10 ML: at 09:20

## 2021-07-22 RX ADMIN — OXYCODONE HYDROCHLORIDE AND ACETAMINOPHEN 1 TABLET: 5; 325 TABLET ORAL at 12:33

## 2021-07-22 RX ADMIN — Medication: at 17:29

## 2021-07-22 RX ADMIN — OXYCODONE HYDROCHLORIDE AND ACETAMINOPHEN 1 TABLET: 5; 325 TABLET ORAL at 19:55

## 2021-07-22 ASSESSMENT — PAIN SCALES - GENERAL
PAINLEVEL_OUTOF10: 7
PAINLEVEL_OUTOF10: 0
PAINLEVEL_OUTOF10: 7
PAINLEVEL_OUTOF10: 8
PAINLEVEL_OUTOF10: 0

## 2021-07-22 ASSESSMENT — PAIN DESCRIPTION - PAIN TYPE: TYPE: ACUTE PAIN

## 2021-07-22 ASSESSMENT — PAIN DESCRIPTION - ORIENTATION: ORIENTATION: LOWER

## 2021-07-22 ASSESSMENT — PAIN DESCRIPTION - LOCATION: LOCATION: BACK

## 2021-07-22 ASSESSMENT — PAIN DESCRIPTION - DESCRIPTORS: DESCRIPTORS: ACHING;CONSTANT

## 2021-07-22 NOTE — CARE COORDINATION
Reviewed chart, pt admitted from home, lives with children, has pcp/ active insurance/ coverage for meds. Pt up walking the unit ind'ly, doing well. No needs id'd at this time however CM will cont to follow as pt progresses for possible needs.

## 2021-07-22 NOTE — PROGRESS NOTES
Progress Note      Subjective:   Chief complaint: Dysuria and flank pain    Interval History:   Patient states is significantly improved, walking up and down the hallways. Review of systems:      Past medical history, surgical history, family history and social history reviewed and unchanged compared to H&P earlier this admission. Medications:   Scheduled Meds:   ammonium lactate   Topical Daily    sodium chloride flush  10 mL Intravenous 2 times per day    enoxaparin  40 mg Subcutaneous Daily    cefTRIAXone (ROCEPHIN) IV  1,000 mg Intravenous Q24H    tamsulosin  0.4 mg Oral Daily    sodium chloride flush  5-40 mL Intravenous BID    nicotine  1 patch Transdermal Daily     Continuous Infusions:   sodium chloride         Objective:     Vital Signs  Temp: 97.9 °F (36.6 °C)  Pulse: 81  Resp: 18  BP: (!) 132/91  SpO2: 98 %  O2 Device: None (Room air)       Vital signs reviewed in electronic charts. Physical exam     Constitutional:  Well developed, well nourished, no acute distress. Eyes:  PERRL, conjunctiva normal, EOMI. HENT:  Atraumatic, external ears normal, external nose/nares normal, oropharynx moist, no pharyngeal exudates. Neck:  Supple. No JVD or thyromegaly. Respiratory:  No respiratory distress, normal breath sounds, no rales, no wheezing. Cardiovascular:  Normal rate, normal rhythm, no murmurs, no gallops, no rubs. GI:  Soft, nondistended, normal bowel sounds, nontender, no organomegaly, no mass. :  No costovertebral angle tenderness. Musculoskeletal:  No edema, no tenderness, no obvious deformities. Patient is moving all extremities. Integument:  Well hydrated, no rash. Lymphatic:  No cervical or axillary lymphadenopathy noted. Neurologic:  Alert & oriented x 3,  no focal deficits noted. Strength is equal throughout. Psychiatric:  Speech and behavior appropriate.     Results:     Lab Results   Component Value Date    WBC 9.0 07/22/2021    HGB 13.4 (L) 07/22/2021    HCT 40.3

## 2021-07-22 NOTE — PROGRESS NOTES
[facility], [address]   Progress Note  [address] 0 1 2      Date: 2021   Patient: Sigrid Jeffries   : 1970   DOA: 2021   MRN: [id number]   ROOM#: [id number]     Admit Date: 2021     Collaborating Urologist on Call at time of admission: Dr. Barbosa Grandchild   CC: Left flank pain, dysuria  Reason for Consult: Complicated UTI    Subjective:     Pain: mild, no nausea and no vomiting,   Bowel Movement/Flatus:   Yes  Voiding:  Easily    Pt resting in bed, states his flank pain is improving and voiding more easily. Objective:    Vitals:    BP (!) 140/95   Pulse 70   Temp 97.9 °F (36.6 °C) (Oral)   Resp 16   Ht 6' 1\" (1.854 m)   Wt 222 lb (100.7 kg)   SpO2 99%   BMI 29.29 kg/m²    Temp  Av °F (36.7 °C)  Min: 97.9 °F (36.6 °C)  Max: 98.1 °F (36.7 °C)       Intake/Output Summary (Last 24 hours) at 2021 7805  Last data filed at 2021 1804  Gross per 24 hour   Intake 360 ml   Output --   Net 360 ml       Physical Exam:   General appearance: alert, appears stated age, cooperative, no distress and mildly obese  Head: Normocephalic, without obvious abnormality, atraumatic  Back: Left CVA tenderness  Abdomen: Soft, non-tender, non-distended   Rectal: Normal tone; enlarged, firm prostate. No TTP or nodules noted.     Labs:   WBC:    Lab Results   Component Value Date    WBC 9.0 2021      Hemoglobin/Hematocrit:    Lab Results   Component Value Date    HGB 13.4 2021    HCT 40.3 2021      BMP:   Lab Results   Component Value Date     2021    K 3.9 2021     2021    CO2 27 2021    BUN 13 2021    LABALBU 3.9 2021    CREATININE 1.1 2021    CALCIUM 8.8 2021    GFRAA >60 2021    LABGLOM >60 2021      PT/INR:    Lab Results   Component Value Date    PROTIME 14.3 2018    INR 1.26 2018      PTT:    Lab Results   Component Value Date    APTT 31.9 2018     Urine Culture: pending    Imaging:  CT ABDOMEN PELVIS WO CONTRAST Additional Contrast? None    Result Date: 7/21/2021  EXAMINATION: CT OF THE ABDOMEN AND PELVIS WITHOUT CONTRAST 7/21/2021 3:22 am TECHNIQUE: CT of the abdomen and pelvis was performed without the administration of intravenous contrast. Multiplanar reformatted images are provided for review. Dose modulation, iterative reconstruction, and/or weight based adjustment of the mA/kV was utilized to reduce the radiation dose to as low as reasonably achievable. COMPARISON: None. HISTORY: ORDERING SYSTEM PROVIDED HISTORY: left flank pain TECHNOLOGIST PROVIDED HISTORY: Reason for exam:->left flank pain Additional Contrast?->None Decision Support Exception - unselect if not a suspected or confirmed emergency medical condition->Emergency Medical Condition (MA) Reason for Exam: Dysuria; Flank Pain; Rash Acuity: Acute Type of Exam: Initial Left renal colic FINDINGS: Lower Chest: The visualized lungs are clear. Organs: The liver, gallbladder, spleen, adrenal glands, pancreas, and kidneys are grossly unremarkable. GI/Bowel: There is no evidence of bowel obstruction. The appendix is normal. Pelvis: Mild circumferential thickening of the bladder wall is noted. There is no free fluid. The prostate gland is enlarged. Peritoneum/Retroperitoneum: Retroperitoneal lymphadenopathy is noted. There is a left retroperitoneal lymph node which measures 1.5 cm. There is stranding along the left psoas muscle extending into the pelvis. Bones/Soft Tissues: There is a sclerotic appearance of the left hemipelvis. Multiple sclerotic lesions are seen within the visualized osseous structures. These are new since the prior exam dated November 28, 2020. Findings are concerning for metastatic osseous disease. 1. Circumferential thickening of the bladder wall is noted which could be related to cystitis. Correlate with urinalysis. 2. There is left retroperitoneal lymphadenopathy.   This could be with probable minimal adjacent infiltrates versus lymphangitic spread of tumor. PET-CT/biopsy is recommended. 2. Mediastinal lymphadenopathy. 3. Prominent left supraclavicular lymph nodes. 4. No osseous metastatic disease. XR CHEST PORTABLE    Result Date: 7/21/2021  EXAMINATION: ONE XRAY VIEW OF THE CHEST 7/21/2021 8:23 am COMPARISON: 01/18/2021 radiograph HISTORY: ORDERING SYSTEM PROVIDED HISTORY: SOB TECHNOLOGIST PROVIDED HISTORY: Reason for exam:->SOB Reason for Exam: SOB Acuity: Acute Type of Exam: Initial FINDINGS: The heart is normal.  Asymmetric enlargement of the left pulmonary hilum. In comparison to a prior CT on 01/18/2021, this has been described as lymphadenopathy. Streaky ground-glass opacities extend from the hilum in the upper and lower lobes. The right lung is clear. There are no significant skeletal findings. Asymmetric enlargement of the left pulmonary hilum has increased from prior imaging. This likely represents underlying lymphadenopathy based upon results of prior CT. Recommend a follow-up CT of the chest with contrast for direct comparison. Associated ground-glass opacities extending from the hilum may represent atelectasis or underlying pneumonitis. Assessment & Plan:      Kim Doherty is a 45y.o. year old male admitted 7/21/2021 for UTI and sclerotic bone lesions. 1) Sclerotic Bone Lesions with Elevated PSA: highly concerning for metastatic prostate cancer              CT chest w contrast 7/21/21: Left hilar neoplasm extending into the left upper lobe measuring 3.2 x 5.9 x 5.3 cm obstructing the left upper lobe bronchus with probable minimal adjacent infiltrates versus lymphangitic spread of tumor. Mediastinal lymphadenopathy. Prominent left supraclavicular lymph nodes. CT a/p wo 7/21/21: Interval development of multiple sclerotic lesions within the spine and pelvis, concerning for metastatic osseous disease. Prostate gland is enlarged.  There is left retroperitoneal lymphadenopathy. .30 7/21              CARL today w enlarged, firm prostate   Recommend PET-CT scan and lymph node biopsy with interventional radiology. Pt may require outpatient prostate biopsy; we will arrange this if necessary. Consult Hem/onc  2) Pyelonephritis              CT a/p wo 7/21/21:  Circumferential thickening of the bladder wall is noted which could be related to cystitis. There is minimal stranding within the retroperitoneal on the left. UA w large leuks, rare bacteria; urine cx pending              WBC 9.0, afebrile              PVR 267cc yesterday. Repeat PVR. On IV Rocephin    Patient seen and examined, chart reviewed.      Electronically signed by Wenceslao Darnell PA-C on 7/22/2021 at 9:22 AM

## 2021-07-23 VITALS
DIASTOLIC BLOOD PRESSURE: 74 MMHG | SYSTOLIC BLOOD PRESSURE: 102 MMHG | RESPIRATION RATE: 16 BRPM | BODY MASS INDEX: 28.24 KG/M2 | HEIGHT: 73 IN | TEMPERATURE: 97.8 F | WEIGHT: 213.1 LBS | OXYGEN SATURATION: 97 % | HEART RATE: 66 BPM

## 2021-07-23 DIAGNOSIS — R91.1 LUNG NODULE: Primary | ICD-10-CM

## 2021-07-23 DIAGNOSIS — R91.8 MASS OF LEFT LUNG: ICD-10-CM

## 2021-07-23 DIAGNOSIS — N42.9 DISEASE OF PROSTATE: ICD-10-CM

## 2021-07-23 DIAGNOSIS — F17.210 CIGARETTE NICOTINE DEPENDENCE WITHOUT COMPLICATION: ICD-10-CM

## 2021-07-23 LAB
ANION GAP SERPL CALCULATED.3IONS-SCNC: 6 MMOL/L (ref 4–16)
BASOPHILS ABSOLUTE: 0.1 K/CU MM
BASOPHILS RELATIVE PERCENT: 0.5 % (ref 0–1)
BUN BLDV-MCNC: 10 MG/DL (ref 6–23)
CALCIUM SERPL-MCNC: 9.5 MG/DL (ref 8.3–10.6)
CEA: 7.7 NG/ML
CHLORIDE BLD-SCNC: 101 MMOL/L (ref 99–110)
CO2: 31 MMOL/L (ref 21–32)
CREAT SERPL-MCNC: 1 MG/DL (ref 0.9–1.3)
DIFFERENTIAL TYPE: ABNORMAL
EOSINOPHILS ABSOLUTE: 0.3 K/CU MM
EOSINOPHILS RELATIVE PERCENT: 3.4 % (ref 0–3)
GFR AFRICAN AMERICAN: >60 ML/MIN/1.73M2
GFR NON-AFRICAN AMERICAN: >60 ML/MIN/1.73M2
GLUCOSE BLD-MCNC: 93 MG/DL (ref 70–99)
HCT VFR BLD CALC: 41.3 % (ref 42–52)
HEMOGLOBIN: 13.8 GM/DL (ref 13.5–18)
IMMATURE NEUTROPHIL %: 0.3 % (ref 0–0.43)
LYMPHOCYTES ABSOLUTE: 3 K/CU MM
LYMPHOCYTES RELATIVE PERCENT: 31.1 % (ref 24–44)
MCH RBC QN AUTO: 28.4 PG (ref 27–31)
MCHC RBC AUTO-ENTMCNC: 33.4 % (ref 32–36)
MCV RBC AUTO: 85 FL (ref 78–100)
MONOCYTES ABSOLUTE: 0.7 K/CU MM
MONOCYTES RELATIVE PERCENT: 6.9 % (ref 0–4)
NUCLEATED RBC %: 0 %
PDW BLD-RTO: 13.8 % (ref 11.7–14.9)
PLATELET # BLD: 263 K/CU MM (ref 140–440)
PMV BLD AUTO: 9.3 FL (ref 7.5–11.1)
POTASSIUM SERPL-SCNC: 3.8 MMOL/L (ref 3.5–5.1)
RBC # BLD: 4.86 M/CU MM (ref 4.6–6.2)
SEGMENTED NEUTROPHILS ABSOLUTE COUNT: 5.6 K/CU MM
SEGMENTED NEUTROPHILS RELATIVE PERCENT: 57.8 % (ref 36–66)
SODIUM BLD-SCNC: 138 MMOL/L (ref 135–145)
TOTAL IMMATURE NEUTOROPHIL: 0.03 K/CU MM
TOTAL NUCLEATED RBC: 0 K/CU MM
WBC # BLD: 9.7 K/CU MM (ref 4–10.5)

## 2021-07-23 PROCEDURE — 85025 COMPLETE CBC W/AUTO DIFF WBC: CPT

## 2021-07-23 PROCEDURE — 82378 CARCINOEMBRYONIC ANTIGEN: CPT

## 2021-07-23 PROCEDURE — 94761 N-INVAS EAR/PLS OXIMETRY MLT: CPT

## 2021-07-23 PROCEDURE — 99253 IP/OBS CNSLTJ NEW/EST LOW 45: CPT | Performed by: SURGERY

## 2021-07-23 PROCEDURE — 6370000000 HC RX 637 (ALT 250 FOR IP): Performed by: NURSE PRACTITIONER

## 2021-07-23 PROCEDURE — 2580000003 HC RX 258: Performed by: INTERNAL MEDICINE

## 2021-07-23 PROCEDURE — 6360000002 HC RX W HCPCS: Performed by: INTERNAL MEDICINE

## 2021-07-23 PROCEDURE — 99254 IP/OBS CNSLTJ NEW/EST MOD 60: CPT | Performed by: INTERNAL MEDICINE

## 2021-07-23 PROCEDURE — 80048 BASIC METABOLIC PNL TOTAL CA: CPT

## 2021-07-23 PROCEDURE — 36415 COLL VENOUS BLD VENIPUNCTURE: CPT

## 2021-07-23 RX ORDER — TAMSULOSIN HYDROCHLORIDE 0.4 MG/1
0.4 CAPSULE ORAL DAILY
Qty: 30 CAPSULE | Refills: 3 | Status: SHIPPED | OUTPATIENT
Start: 2021-07-23 | End: 2021-10-06

## 2021-07-23 RX ORDER — AMMONIUM LACTATE 12 G/100G
LOTION TOPICAL
Qty: 1 BOTTLE | Refills: 0 | Status: SHIPPED | OUTPATIENT
Start: 2021-07-23 | End: 2022-04-18

## 2021-07-23 RX ORDER — LISINOPRIL 10 MG/1
10 TABLET ORAL DAILY
Qty: 30 TABLET | Refills: 3 | Status: ON HOLD | OUTPATIENT
Start: 2021-07-23 | End: 2022-04-11

## 2021-07-23 RX ORDER — NICOTINE 21 MG/24HR
1 PATCH, TRANSDERMAL 24 HOURS TRANSDERMAL DAILY
Qty: 30 PATCH | Refills: 3 | Status: SHIPPED | OUTPATIENT
Start: 2021-07-24 | End: 2022-04-01 | Stop reason: SDUPTHER

## 2021-07-23 RX ORDER — OXYCODONE HYDROCHLORIDE AND ACETAMINOPHEN 5; 325 MG/1; MG/1
1 TABLET ORAL EVERY 6 HOURS PRN
Qty: 12 TABLET | Refills: 0 | Status: SHIPPED | OUTPATIENT
Start: 2021-07-23 | End: 2021-07-26

## 2021-07-23 RX ORDER — CEFDINIR 300 MG/1
300 CAPSULE ORAL 2 TIMES DAILY
Qty: 14 CAPSULE | Refills: 0 | Status: SHIPPED | OUTPATIENT
Start: 2021-07-23 | End: 2021-07-30

## 2021-07-23 RX ADMIN — OXYCODONE HYDROCHLORIDE AND ACETAMINOPHEN 1 TABLET: 5; 325 TABLET ORAL at 09:37

## 2021-07-23 RX ADMIN — CEFTRIAXONE SODIUM 1000 MG: 1 INJECTION, POWDER, FOR SOLUTION INTRAMUSCULAR; INTRAVENOUS at 06:01

## 2021-07-23 ASSESSMENT — PAIN SCALES - GENERAL
PAINLEVEL_OUTOF10: 0
PAINLEVEL_OUTOF10: 2

## 2021-07-23 NOTE — CONSULTS
 ondansetron (ZOFRAN-ODT) disintegrating tablet 4 mg  4 mg Oral Q8H PRN Joo Sen MD        Or    ondansetron (ZOFRAN) injection 4 mg  4 mg Intravenous Q6H PRN Joo Sen MD        bisacodyl (DULCOLAX) EC tablet 5 mg  5 mg Oral Daily PRN Joo Sen MD        acetaminophen (TYLENOL) tablet 650 mg  650 mg Oral Q6H PRN Joo Sen MD        Or    acetaminophen (TYLENOL) suppository 650 mg  650 mg Rectal Q6H PRN oJo Sen MD        enoxaparin (LOVENOX) injection 40 mg  40 mg Subcutaneous Daily Joo Sen MD   40 mg at 07/22/21 0920    cefTRIAXone (ROCEPHIN) 1000 mg IVPB in 50 mL D5W minibag  1,000 mg Intravenous Q24H Joo Sen MD   Stopped at 07/23/21 0631    hydrALAZINE (APRESOLINE) 10 mg in sodium chloride 0.9 % 50 mL ivpb  10 mg Intravenous Q6H PRN Hollace Guy, APRN - CNP        tamsulosin (FLOMAX) capsule 0.4 mg  0.4 mg Oral Daily Amanda Guy APRN - CNP   0.4 mg at 07/22/21 0919    oxyCODONE-acetaminophen (PERCOCET) 5-325 MG per tablet 1 tablet  1 tablet Oral Q4H PRN Amanda Guy, APRN - CNP   1 tablet at 07/23/21 2501    sodium chloride flush 0.9 % injection 5-40 mL  5-40 mL Intravenous BID Joo Sen MD   10 mL at 07/22/21 0921    albuterol sulfate  (90 Base) MCG/ACT inhaler 2 puff  2 puff Inhalation Q6H PRN Joo Sen MD   2 puff at 07/21/21 2044    ipratropium (ATROVENT HFA) 17 MCG/ACT inhaler 2 puff  2 puff Inhalation Q6H PRN Joo Sen MD   2 puff at 07/21/21 2044    nicotine (NICODERM CQ) 21 MG/24HR 1 patch  1 patch Transdermal Daily GERDA Lopez NP   1 patch at 07/23/21 9595    melatonin tablet 9 mg  9 mg Oral Nightly PRN GERDA Loepz NP   9 mg at 07/21/21 6045       Allergies:  Tramadol and Vicodin [hydrocodone-acetaminophen]    Social History:   Social History     Socioeconomic History    Marital status:      Spouse name: Not on file    Number of children: 5    Years of education: Not on file   Bozena See Highest education level: Not on file   Occupational History    Not on file   Tobacco Use    Smoking status: Current Every Day Smoker     Packs/day: 1.00     Types: Cigarettes    Smokeless tobacco: Never Used   Substance and Sexual Activity    Alcohol use: Yes     Alcohol/week: 2.0 standard drinks     Types: 2 Cans of beer per week     Comment: per day     Drug use: Yes     Types: Cocaine, Marijuana    Sexual activity: Yes     Partners: Female   Other Topics Concern    Not on file   Social History Narrative    Not on file     Social Determinants of Health     Financial Resource Strain:     Difficulty of Paying Living Expenses:    Food Insecurity:     Worried About Running Out of Food in the Last Year:     [address] in the Last Year:    Transportation Needs:     Lack of Transportation (Medical):  Lack of Transportation (Non-Medical):    Physical Activity:     Days of Exercise per Week:     Minutes of Exercise per Session:    Stress:     Feeling of Stress :    Social Connections:     Frequency of Communication with Friends and Family:     Frequency of Social Gatherings with Friends and Family:     Attends Congregation Services:     Active Member of Clubs or Organizations:     Attends Club or Organization Meetings:     Marital Status:    Intimate Partner Violence:     Fear of Current or Ex-Partner:     Emotionally Abused:     Physically Abused:     Sexually Abused:        Family History:   Family History   Problem Relation Age of Onset    High Blood Pressure Mother     High Blood Pressure Sister     Cancer Sister        REVIEW OFSYSTEMS:    Review of Systems   Constitutional: Negative for chills. Negative for fever. HENT: Negative for congestion. Negative for rhinorrhea. Respiratory: cough, mild SOB  Cardiovascular: Negative for chest pain. Gastrointestinal: Negative for constipation. Negative for diarrhea. Negative for nausea and vomiting.   Genitourinary: as per HPI  Neurological: Negative for dizziness, syncope and numbness. Hematological: Does not bruise/bleed easily. PHYSICAL EXAM:  Vitals:    07/22/21 0728 07/22/21 1410 07/22/21 2000 07/23/21 0408   BP: (!) 140/95 (!) 132/91 (!) 141/99 102/74   Pulse: 70 81 75 66   Resp: 16 18 20 16   Temp: 97.9 °F (36.6 °C) 97.9 °F (36.6 °C) 97.8 °F (36.6 °C) 97.8 °F (36.6 °C)   TempSrc: Oral Oral Oral Oral   SpO2: 99% 98% 97% 97%   Weight:    213 lb 1.6 oz (96.7 kg)   Height:           Physical Exam  General: awake, alert, in no acute distress  HEENT: mucous membranes moist, no palpable cervical or supraclavicular lymph nodes. Respiratory: normal effort  CV: appears well perfused, regular rate and rhythm  Abdomen: Soft, non-tender, non-distended. No guarding or rebound tenderness. Skin: warm and dry  Extremities: negative for axillary or inguinal lymphadenopathy  Neuro: no focal deficits noted  Psych: mood normal        DATA:    Lab Results   Component Value Date    WBC 9.7 07/23/2021    HGB 13.8 07/23/2021    HCT 41.3 (L) 07/23/2021    MCV 85.0 07/23/2021     07/23/2021     Lab Results   Component Value Date     07/23/2021    K 3.8 07/23/2021     07/23/2021    CO2 31 07/23/2021    BUN 10 07/23/2021    CREATININE 1.0 07/23/2021    GLUCOSE 93 07/23/2021    CALCIUM 9.5 07/23/2021        IMPRESSION:    45 y.o. male with suspected metastatic cancer. Left supraclavicular node enlargement on CT but I cannot palpate these nodes on exam.  On CT they look rather deep at the base of the neck.     Patient Active Problem List:     Trigeminal neuralgia     History of cocaine use     Chest pain     Cocaine abuse (HCC)     Cardiomyopathy ([address]     CAD (coronary artery disease)     LVH (left ventricular hypertrophy)     Burn of left hand including fingers     Cigarette nicotine dependence without complication     H/O: facial fractures     UTI (urinary tract infection)     Mass of left lung     Disease of prostate        PLAN:  - I feel IR biopsy would likely be a safer option for obtaining a tissue diagnosis  - happy to re-evaluate if surgery can be of further assistance        Electronically signed by Kyle Sales MD on 7/23/2021 at 1:15 PM

## 2021-07-23 NOTE — DISCHARGE INSTR - COC
Continuity of Care Form    Patient Name: Kimberlyn Paulino   :  1970  MRN:  [id number]    Admit date:  2021  Discharge date:  ***    Code Status Order: Full Code   Advance Directives:     Admitting Physician:  Gama Yeager MD  PCP: GERDA Oquendo CNP    Discharging Nurse: [facility] Unit/Room#: 4116/4116-A  Discharging Unit Phone Number: ***    Emergency Contact:   Extended Emergency Contact Information  Primary Emergency Contact: Indy Colon  Address: [address] [place] [address] Phone: [phone]  Mobile Phone: [phone]  Relation: Child    Past Surgical History:  Past Surgical History:   Procedure Laterality Date    ABDOMEN SURGERY      CARDIAC CATHETERIZATION  2016       Immunization History: There is no immunization history on file for this patient.     Active Problems:  Patient Active Problem List   Diagnosis Code    Trigeminal neuralgia G50.0    History of cocaine use Z87.898    Chest pain R07.9    Cocaine abuse ([address] F14.10    Cardiomyopathy ([address] I42.9    CAD (coronary artery disease) I25.10    LVH (left ventricular hypertrophy) I51.7    Burn of left hand including fingers T23.002A, T23.032A    Cigarette nicotine dependence without complication R77.364    H/O: facial fractures Z87.81    UTI (urinary tract infection) N39.0    Mass of left lung R91.8    Disease of prostate N42.9       Isolation/Infection:   Isolation          No Isolation        Patient Infection Status     Infection Onset Added Last Indicated Last Indicated By Review Planned Expiration Resolved Resolved By    None active    Resolved    COVID-19 Rule Out 21 COVID-19 (Ordered)   21 Rule-Out Test Resulted    INFLUENZA 19 Rapid Flu Swab   20           Nurse Assessment:  Last Vital Signs: /74   Pulse 66   Temp 97.8 °F (36.6 °C) (Oral)   Resp 16   Ht 6' 1\" (1.854 m)   Wt 213 lb 1.6 oz (96.7 kg)   SpO2 97%   BMI 28.12 kg/m²     Last documented pain score (0-10 scale): Pain Level: 2  Last Weight:   Wt Readings from Last 1 Encounters:   21 213 lb 1.6 oz (96.7 kg)     Mental Status:  {IP PT MENTAL STATUS:03701}    IV Access:  { LULU IV ACCESS:933899157}    Nursing Mobility/ADLs:  Walking   {CHP DME RLKE:739938247}  Transfer  {CHP DME EXEA:425614065}  Bathing  {CHP DME LURB:022438653}  Dressing  {CHP DME YHFT:843846313}  Toileting  {CHP DME DPOM:586388719}  Feeding  {CHP DME TEJK:021875843}  Med Admin  {CHP DME QZLR:300501761}  Med Delivery   { LULU MED Delivery:088146605}    Wound Care Documentation and Therapy:        Elimination:  Continence:   · Bowel: {YES / CN:74226}  · Bladder: {YES / UX:52150}  Urinary Catheter: {Urinary Catheter:159444412}   Colostomy/Ileostomy/Ileal Conduit: {YES / QF:42366}       Date of Last BM: ***    Intake/Output Summary (Last 24 hours) at 2021 1250  Last data filed at 2021 2350  Gross per 24 hour   Intake 360 ml   Output --   Net 360 ml     I/O last 3 completed shifts:   In: 360 [P.O.:360]  Out: -     Safety Concerns:     [address] Concerns:966433322}    Impairments/Disabilities:      [employer] Impairments/Disabilities:514794357}    Nutrition Therapy:  Current Nutrition Therapy:   [employer] Diet List:830309126}    Routes of Feeding: {P DME Other Feedings:034942529}  Liquids: {Slp liquid thickness:79689}  Daily Fluid Restriction: {CHP DME Yes amt example:897307703}  Last Modified Barium Swallow with Video (Video Swallowing Test): {Done Not Done QFFM:784975560}    Treatments at the Time of Hospital Discharge:   Respiratory Treatments: ***  Oxygen Therapy:  {Therapy; copd oxygen:23264}  Ventilator:    { CC Vent LKQH:798082368}    Rehab Therapies: {THERAPEUTIC INTERVENTION:8002961292}  Weight Bearing Status/Restrictions: [address] Weight Bearin}  Other Medical Equipment (for information only, NOT a DME order): {EQUIPMENT:038595791}  Other Treatments: ***    Patient's personal belongings (please select all that are sent with patient):  {CHP DME Belongings:024581067}    RN SIGNATURE:  {Esignature:770126120}    CASE MANAGEMENT/SOCIAL WORK SECTION    Inpatient Status Date: ***    Readmission Risk Assessment Score:  Readmission Risk              Risk of Unplanned Readmission:  12           Discharging to Facility/ Agency   · Name:   · Address:  · Phone:  · Fax:    Dialysis Facility (if applicable)   · Name:  · Address:  · Dialysis Schedule:  · Phone:  · Fax:    / signature: {Esignature:142393061}    PHYSICIAN SECTION    Prognosis: {Prognosis:9425809083}    Condition at Discharge: [address] [place] [address] Patient Condition:549261225}    Rehab Potential (if transferring to Rehab): {Prognosis:4989541707}    Recommended Labs or Other Treatments After Discharge: ***    Physician Certification: I certify the above information and transfer of Randolph Bianchi  is necessary for the continuing treatment of the diagnosis listed and that he requires {Admit to Appropriate Level of Care:80683} for {GREATER/LESS:305897574} 30 days.      Update Admission H&P: {CHP DME Changes in DBZSR:970929825}    PHYSICIAN SIGNATURE:  {Esignature:189524662}

## 2021-07-23 NOTE — DISCHARGE SUMMARY
Discharge Summary    Name:  Luzmaria Daley /Age/Sex: 1970  (45 y.o. male)   MRN & CSN:  [id number] & [id number] Admission Date/Time: 2021  2:22 AM   Attending:  No att. providers found Discharging Physician: Allison Interiano DO     [facility] Course:   Luzmaria Daley is a 45 y.o.  male  who presents with UTI (urinary tract infection)     Amarilis Covarrubias is a 45 y. o. male who presents with Dysuria, Flank Pain, and Rash (lower legs. )     Acute Pyelonephritis  -Patient improved with Rocephin, urine cultures negative however will have patient continue antibiotics.  -Follow-up with urology     Likely metastatic prostate cancer  -PSA is 147  -CT abdomen pelvis shows \"retroperitoneal lymphadenopathy, multiple sclerotic lesions within the spine pelvis which are concerning for metastatic disease. Prostate is also enlarged. -CT chest shows left hilar neoplasm extending into the left upper lobe bronchus with some obstruction of the bronchus.  -Prominent left supraclavicular lymph nodes  -Patient will likely need prostate biopsy  -Continue Flomax  -Follow-up with urology    Lung mass  -Patient will have CT-guided biopsy of left lung next week      The patient expressed appropriate understanding of and agreement with the discharge recommendations, medications, and plan.      Consults this admission:  IP CONSULT TO HOSPITALIST  IP CONSULT TO UROLOGY  IP CONSULT TO ONCOLOGY  IP CONSULT TO ONCOLOGY  IP CONSULT TO GENERAL SURGERY    Discharge Instruction:   Follow up appointments: Primary care provider, urology, oncology  Primary care physician:  within 2 weeks    Diet:  regular diet   Activity: activity as tolerated  Disposition: Discharged to:   [x]Home, [][facility], []SNF, []Acute Rehab, []Hospice    Condition on discharge: Stable    Discharge Medications:      [place] Smoker, [address] [facility] [address] Medication Instructions [facility]:[id number]    Printed on:21 6561   Medication Information                      ammonium lactate (LAC-HYDRIN) 12 % lotion  Apply topically as needed. cefdinir (OMNICEF) 300 MG capsule  Take 1 capsule by mouth 2 times daily for 7 days             lisinopril (PRINIVIL;ZESTRIL) 10 MG tablet  Take 1 tablet by mouth daily             nicotine (NICODERM CQ) 21 MG/24HR  Place 1 patch onto the skin daily             oxyCODONE-acetaminophen (ENDOCET) 5-325 MG per tablet  Take 1 tablet by mouth every 6 hours as needed for Pain for up to 3 days. Intended supply: 3 days. Take lowest dose possible to manage pain             tamsulosin (FLOMAX) 0.4 MG capsule  Take 1 capsule by mouth daily                 Objective Findings at Discharge:   /74   Pulse 66   Temp 97.8 °F (36.6 °C) (Oral)   Resp 16   Ht 6' 1\" (1.854 m)   Wt 213 lb 1.6 oz (96.7 kg)   SpO2 97%   BMI 28.12 kg/m²            PHYSICAL EXAM    GEN Awake male, sitting upright in bed in no apparent distress. Appears given age. EYES Pupils are equally round. No scleral erythema, discharge, or conjunctivitis. HENT Mucous membranes are moist. Oral pharynx without exudates, no evidence of thrush. NECK Supple, no apparent thyromegaly or masses. RESP Clear to auscultation, no wheezes, rales or rhonchi. Symmetric chest movement while on room air. CARDIO/VASC S1/S2 auscultated. Regular rate without appreciable murmurs, rubs, or gallops. No JVD or carotid bruits. Peripheral pulses equal bilaterally and palpable. No peripheral edema. GI Abdomen is soft without significant tenderness, masses, or guarding. Bowel sounds are normoactive. Rectal exam deferred.  No costovertebral angle tenderness. Normal appearing external genitalia. Doherty catheter is not present. HEME/LYMPH No palpable cervical lymphadenopathy and no hepatosplenomegaly. No petechiae or ecchymoses. MSK No gross joint deformities. SKIN Normal coloration, warm, dry. NEURO Cranial nerves appear grossly intact, normal speech, no lateralizing weakness. PSYCH Awake, alert, oriented x 4.   Affect appropriate.     BMP/CBC  Recent Labs     07/21/21  0536 07/22/21  0134 07/23/21  0640    140 138   K 3.9 3.9 3.8    104 101   CO2 28 27 31   BUN 8 13 10   CREATININE 1.0 1.1 1.0   WBC 10.0 9.0 9.7   HCT 42.8 40.3* 41.3*    242 263       IMAGING:       Discharge Time of 35  minutes    Electronically signed by Galilea Oneil DO on 7/23/2021 at 4:44 PM

## 2021-07-23 NOTE — CONSULTS
ONCOLOGY HEMATOLOGY CARE ([facility])  CONSULTATION REPORT    REASON FOR CONSULT    ? Prostate cancer/lung mass    CHIEF COMPLAINT    Chief Complaint   Patient presents with    Dysuria    Flank Pain    Rash     lower legs. HISTORY OF PRESENT ILLNESS   Shukri La is a 45 y.o. male who presents to [facility] with report of dysuria and flank pain. Reports these symptoms started a few weeks ago. He ultimately came to the ED due to worsening back pain. He reports ongoing issues with frequent urge to urinate and notes some incontinence. He denies hematuria. He was noted to have acute pyelonephritis and was admitted and started on IV Rocephin.     21 CT chest    Impression   1. Left hilar neoplasm extending into the left upper lobe measuring 3.2 x 5.9   x 5.3 cm obstructing the left upper lobe bronchus with probable minimal   adjacent infiltrates versus lymphangitic spread of tumor. PET-CT/biopsy is   recommended. 2. Mediastinal lymphadenopathy. 3. Prominent left supraclavicular lymph nodes. 4. No osseous metastatic disease. 21  Impression   1. Circumferential thickening of the bladder wall is noted which could be   related to cystitis. Correlate with urinalysis. 2. There is left retroperitoneal lymphadenopathy. This could be reactive or   neoplastic. This can be further evaluated with PET-CT. 3. There is minimal stranding within the retroperitoneal on the left. This   is uncertain etiology however could be related to acute pyelonephritis. 4. Interval development of multiple sclerotic lesions within the spine and   pelvis, concerning for metastatic osseous disease. 5. Prostate gland is enlarged. Correlate with PSA. .30     He denies past history of cancer. He smokes 2-3 ppd and drinks 10-12 beers daily. He reports unknown malignancy in his sister who  at 32. No other known family history of cancer.      Due to lung mass and concern for metastatic prostate cancer we were called to evaluate. PAST MEDICAL HISTORY    Past Medical History:   Diagnosis Date    CAD (coronary artery disease) 12/23/2013    cath negative per pt    History of TMJ disorder     Hypertension     Trigeminal neuralgia        SURGICAL HISTORY    Past Surgical History:   Procedure Laterality Date    ABDOMEN SURGERY      CARDIAC CATHETERIZATION  08/03/2016       FAMILY HISTORY    Family History   Problem Relation Age of Onset    High Blood Pressure Mother     High Blood Pressure Sister     Cancer Sister        SOCIAL HISTORY    Social History     Socioeconomic History    Marital status:      Spouse name: Not on file    Number of children: 11    Years of education: Not on file    Highest education level: Not on file   Occupational History    Not on file   Tobacco Use    Smoking status: Current Every Day Smoker     Packs/day: 1.00     Types: Cigarettes    Smokeless tobacco: Never Used   Substance and Sexual Activity    Alcohol use: Yes     Alcohol/week: 2.0 standard drinks     Types: 2 Cans of beer per week     Comment: per day     Drug use: Yes     Types: Cocaine, Marijuana    Sexual activity: Yes     Partners: Female   Other Topics Concern    Not on file   Social History Narrative    Not on file     Social Determinants of Health     Financial Resource Strain:     Difficulty of Paying Living Expenses:    Food Insecurity:     Worried About Running Out of Food in the Last Year:     [address] in the Last Year:    Transportation Needs:     Lack of Transportation (Medical):      Lack of Transportation (Non-Medical):    Physical Activity:     Days of Exercise per Week:     Minutes of Exercise per Session:    Stress:     Feeling of Stress :    Social Connections:     Frequency of Communication with Friends and Family:     Frequency of Social Gatherings with Friends and Family:     Attends [employer] Services:     Active Member of Clubs or Organizations:     Attends [employer] or Organization Meetings:     Marital Status:    Intimate Partner Violence:     Fear of Current or Ex-Partner:     Emotionally Abused:     Physically Abused:     Sexually Abused:        REVIEW OF SYSTEMS    Denies fever, chills, night sweats  Denies chest pain, shortness of breath  Has left flank pain  Denies hematuria  Denies back pain    PHYSICAL EXAM    Vitals: /74   Pulse 66   Temp 97.8 °F (36.6 °C) (Oral)   Resp 16   Ht 6' 1\" (1.854 m)   Wt 213 lb 1.6 oz (96.7 kg)   SpO2 97%   BMI 28.12 kg/m²   CONSTITUTIONAL: awake, alert, cooperative, no apparent distress   EYES: sclera clear and conjunctiva normal  ENT: Normocephalic, without obvious abnormality, atraumatic  NECK: supple, symmetrical   HEMATOLOGIC/LYMPHATIC: no cervical, supraclavicular or axillary lymphadenopathy   LUNGS:  no increased work of breathing and clear to auscultation   CARDIOVASCULAR: regular rate and rhythm, normal S1 and S2, no murmur noted  ABDOMEN: normal bowel sounds x 4, soft, non-distended, non-tender, no masses palpated, no hepatosplenomgaly   MUSCULOSKELETAL: full range of motion noted, tone is normal  NEUROLOGIC: awake, alert, oriented to name, place and time. Motor skills grossly intact. SKIN: Normal skin color, texture, turgor and no jaundice. Skin appears intact   EXTREMITIES: no LE edema  LABORATORY RESULTS  CBC:   Recent Labs     07/21/21  0536 07/22/21  0134   WBC 10.0 9.0   HGB 14.0 13.4*    242     BMP:    Recent Labs     07/21/21  0536 07/22/21  0134    140   K 3.9 3.9    104   CO2 28 27   BUN 8 13   CREATININE 1.0 1.1   GLUCOSE 94 127*     Hepatic:   Recent Labs     07/21/21  0536   AST 24   ALT 11   BILITOT 0.2   ALKPHOS 176*     ASSESSMENT/RECOMMENDATION    Lung mass- concerning for lung primary.  Left hilar neoplasm extending into the left upper lobe measuring 3.2 x 5.9 x 5.3 cm obstructing the left upper lobe bronchus with probable minimal adjacent infiltrates versus lymphangitic spread of tumor. Mediastinal lymphadenopathy. Prominent left supraclavicular lymph nodes. We recommend for lung biopsy. Possible prostate cancer- CT A/P with multiple sclerotic lesions within spine/pelvis. .30. Recommend prostate biopsy. Recommend PET CT scan as an outpatient. We will like to follow up with this patient on August 3, 2021. We will follow the patient. Thank you for allowing me to participate in the care of this very pleasant patient. I have independently evaluated and examined this patient today. I have reviewed radiologic and biochemical tests on this patient. Management Plan is developed mutually with Fili Gonzales, JESSIE. I have reviewed above note and agree with assessment and plan  Lung biopsy, pet as OP. Possible prostate biopsy as OP, note elevated psa  Further plan pending above.   [address]

## 2021-07-23 NOTE — DISCHARGE INSTR - DIET

## 2021-07-23 NOTE — PROGRESS NOTES
[facility] [address]   Progress Note  [facility] 0 1 2      Date: 2021   Patient: Cyndi Otero   : 1970   DOA: 2021   MRN: [id number]   ROOM#: [id number]     Admit Date: 2021     Collaborating Urologist on Call at time of admission: Dr. Lisa Dan  CC: Left flank pain, dysuria  Reason for Consult: Complicated UTI    Subjective:     Pain: mild, no nausea and no vomiting,   Bowel Movement/Flatus:   Yes  Voiding:  Easily    Pt resting in bed, states his flank pain is improving and voiding more easily. Objective:    Vitals:    /74   Pulse 66   Temp 97.8 °F (36.6 °C) (Oral)   Resp 16   Ht 6' 1\" (1.854 m)   Wt 213 lb 1.6 oz (96.7 kg)   SpO2 97%   BMI 28.12 kg/m²    Temp  Av.8 °F (36.6 °C)  Min: 97.8 °F (36.6 °C)  Max: 97.9 °F (36.6 °C)       Intake/Output Summary (Last 24 hours) at 2021 0850  Last data filed at 2021 2350  Gross per 24 hour   Intake 360 ml   Output --   Net 360 ml       Physical Exam:   General appearance: alert, appears stated age, cooperative, no distress and mildly obese  Head: Normocephalic, without obvious abnormality, atraumatic  Back: Left CVA tenderness  Abdomen: Soft, non-tender, non-distended   Rectal: Normal tone; enlarged, firm prostate. No TTP or nodules noted. Labs:   WBC:    Lab Results   Component Value Date    WBC 9.7 2021      Hemoglobin/Hematocrit:    Lab Results   Component Value Date    HGB 13.8 2021    HCT 41.3 2021      BMP:   Lab Results   Component Value Date     2021    K 3.8 2021     2021    CO2 31 2021    BUN 10 2021    LABALBU 3.9 2021    CREATININE 1.0 2021    CALCIUM 9.5 2021    GFRAA >60 2021    LABGLOM >60 2021      PT/INR:    Lab Results   Component Value Date    PROTIME 14.3 2018    INR 1.26 2018      PTT:    Lab Results   Component Value Date    APTT 31.9 2018     Urine Culture:  No This could be reactive or neoplastic. This can be further evaluated with PET-CT. 3. There is minimal stranding within the retroperitoneal on the left. This is uncertain etiology however could be related to acute pyelonephritis. 4. Interval development of multiple sclerotic lesions within the spine and pelvis, concerning for metastatic osseous disease. 5. Prostate gland is enlarged. Correlate with PSA. CT CHEST W CONTRAST    Result Date: 7/22/2021  EXAMINATION: CT OF THE CHEST WITH CONTRAST 7/21/2021 8:08 pm TECHNIQUE: CT of the chest was performed with the administration of intravenous contrast. Multiplanar reformatted images are provided for review. Dose modulation, iterative reconstruction, and/or weight based adjustment of the mA/kV was utilized to reduce the radiation dose to as low as reasonably achievable. COMPARISON: CT of the chest dated September 22, 2020. HISTORY: ORDERING SYSTEM PROVIDED HISTORY: abnormal chest x ray TECHNOLOGIST PROVIDED HISTORY: Reason for exam:->abnormal chest x ray Reason for Exam: abnormal cxr FINDINGS: Mediastinum: The heart is not enlarged. There is no pericardial effusion. There is a left hilar mass which measures 3.2 x 5.9 x 5.3 cm, concerning for neoplasm. This mass obstructs the left upper lobe bronchus and significantly narrows the lingular bronchus. There is an enlarged prevascular lymph node which measures 1.5 cm. Lungs/pleura: There is minimal consolidation adjacent to the left hilar mass which could represent lymphangitic spread of tumor or infiltrates. There is no evidence of a pleural effusion or pneumothorax. Upper Abdomen: The visualized upper abdomen is unremarkable. Soft Tissues/Bones: There is no evidence of axillary lymphadenopathy. There is a 1.2 cm right supraclavicular lymph node. There is no evidence of metastatic osseous disease.      1. Left hilar neoplasm extending into the left upper lobe measuring 3.2 x 5.9 x 5.3 cm obstructing the left upper lobe bronchus with probable minimal adjacent infiltrates versus lymphangitic spread of tumor. PET-CT/biopsy is recommended. 2. Mediastinal lymphadenopathy. 3. Prominent left supraclavicular lymph nodes. 4. No osseous metastatic disease. XR CHEST PORTABLE    Result Date: 7/21/2021  EXAMINATION: ONE XRAY VIEW OF THE CHEST 7/21/2021 8:23 am COMPARISON: 01/18/2021 radiograph HISTORY: ORDERING SYSTEM PROVIDED HISTORY: SOB TECHNOLOGIST PROVIDED HISTORY: Reason for exam:->SOB Reason for Exam: SOB Acuity: Acute Type of Exam: Initial FINDINGS: The heart is normal.  Asymmetric enlargement of the left pulmonary hilum. In comparison to a prior CT on 01/18/2021, this has been described as lymphadenopathy. Streaky ground-glass opacities extend from the hilum in the upper and lower lobes. The right lung is clear. There are no significant skeletal findings. Asymmetric enlargement of the left pulmonary hilum has increased from prior imaging. This likely represents underlying lymphadenopathy based upon results of prior CT. Recommend a follow-up CT of the chest with contrast for direct comparison. Associated ground-glass opacities extending from the hilum may represent atelectasis or underlying pneumonitis. Assessment & Plan:      Connie Villasenor is a 45y.o. year old male admitted 7/21/2021 for UTI and sclerotic bone lesions. 1) Sclerotic Bone Lesions with Elevated PSA: highly concerning for metastatic prostate cancer              CT chest w contrast 7/21/21: Left hilar neoplasm extending into the left upper lobe measuring 3.2 x 5.9 x 5.3 cm obstructing the left upper lobe bronchus with probable minimal adjacent infiltrates versus lymphangitic spread of tumor. Mediastinal lymphadenopathy. Prominent left supraclavicular lymph nodes. CT a/p wo 7/21/21: Interval development of multiple sclerotic lesions within the spine and pelvis, concerning for metastatic osseous disease. Prostate gland is enlarged.  There is

## 2021-07-27 RX ORDER — SODIUM CHLORIDE 0.9 % (FLUSH) 0.9 %
10 SYRINGE (ML) INJECTION PRN
Status: CANCELLED | OUTPATIENT
Start: 2021-07-27

## 2021-07-28 ENCOUNTER — HOSPITAL ENCOUNTER (OUTPATIENT)
Dept: GENERAL RADIOLOGY | Age: 51
Discharge: HOME | End: 2021-07-28
Payer: COMMERCIAL

## 2021-07-28 ENCOUNTER — HOSPITAL ENCOUNTER (OUTPATIENT)
Dept: CT IMAGING | Age: 51
Discharge: HOME | End: 2021-07-28
Payer: COMMERCIAL

## 2021-07-28 VITALS
SYSTOLIC BLOOD PRESSURE: 125 MMHG | TEMPERATURE: 97.3 F | RESPIRATION RATE: 18 BRPM | DIASTOLIC BLOOD PRESSURE: 73 MMHG | OXYGEN SATURATION: 99 % | HEART RATE: 69 BPM

## 2021-07-28 DIAGNOSIS — R91.8 LUNG MASS: ICD-10-CM

## 2021-07-28 LAB
APTT: 25.8 SECONDS (ref 25.1–37.1)
HCT VFR BLD CALC: 43 % (ref 42–52)
HEMOGLOBIN: 14.5 GM/DL (ref 13.5–18)
INR BLD: 0.78 INDEX
MCH RBC QN AUTO: 29.4 PG (ref 27–31)
MCHC RBC AUTO-ENTMCNC: 33.7 % (ref 32–36)
MCV RBC AUTO: 87.2 FL (ref 78–100)
PDW BLD-RTO: 14.7 % (ref 11.7–14.9)
PLATELET # BLD: 256 K/CU MM (ref 140–440)
PMV BLD AUTO: 9.4 FL (ref 7.5–11.1)
PROTHROMBIN TIME: 10 SECONDS (ref 11.7–14.5)
RBC # BLD: 4.93 M/CU MM (ref 4.6–6.2)
WBC # BLD: 10.8 K/CU MM (ref 4–10.5)

## 2021-07-28 PROCEDURE — 85730 THROMBOPLASTIN TIME PARTIAL: CPT

## 2021-07-28 PROCEDURE — 71045 X-RAY EXAM CHEST 1 VIEW: CPT

## 2021-07-28 PROCEDURE — 85027 COMPLETE CBC AUTOMATED: CPT

## 2021-07-28 PROCEDURE — 88342 IMHCHEM/IMCYTCHM 1ST ANTB: CPT

## 2021-07-28 PROCEDURE — 7100000011 HC PHASE II RECOVERY - ADDTL 15 MIN

## 2021-07-28 PROCEDURE — 2709999900 HC NON-CHARGEABLE SUPPLY

## 2021-07-28 PROCEDURE — 7100000010 HC PHASE II RECOVERY - FIRST 15 MIN

## 2021-07-28 PROCEDURE — 6360000002 HC RX W HCPCS: Performed by: RADIOLOGY

## 2021-07-28 PROCEDURE — 88305 TISSUE EXAM BY PATHOLOGIST: CPT

## 2021-07-28 PROCEDURE — 88333 PATH CONSLTJ SURG CYTO XM 1: CPT

## 2021-07-28 PROCEDURE — 2709999900 CT NEEDLE BIOPSY LUNG PERCUTANEOUS W IMAGING GUIDANCE

## 2021-07-28 PROCEDURE — 85610 PROTHROMBIN TIME: CPT

## 2021-07-28 PROCEDURE — 88334 PATH CONSLTJ SURG CYTO XM EA: CPT

## 2021-07-28 PROCEDURE — 2580000003 HC RX 258: Performed by: RADIOLOGY

## 2021-07-28 RX ORDER — SODIUM CHLORIDE 0.9 % (FLUSH) 0.9 %
10 SYRINGE (ML) INJECTION PRN
Status: DISCONTINUED | OUTPATIENT
Start: 2021-07-28 | End: 2021-07-29 | Stop reason: HOSPADM

## 2021-07-28 RX ORDER — 0.9 % SODIUM CHLORIDE 0.9 %
500 INTRAVENOUS SOLUTION INTRAVENOUS ONCE
Status: COMPLETED | OUTPATIENT
Start: 2021-07-28 | End: 2021-07-28

## 2021-07-28 RX ORDER — FENTANYL CITRATE 50 UG/ML
50 INJECTION, SOLUTION INTRAMUSCULAR; INTRAVENOUS ONCE
Status: COMPLETED | OUTPATIENT
Start: 2021-07-28 | End: 2021-07-28

## 2021-07-28 RX ORDER — MIDAZOLAM HYDROCHLORIDE 2 MG/2ML
1 INJECTION, SOLUTION INTRAMUSCULAR; INTRAVENOUS ONCE
Status: COMPLETED | OUTPATIENT
Start: 2021-07-28 | End: 2021-07-28

## 2021-07-28 RX ADMIN — Medication 10 ML: at 09:40

## 2021-07-28 RX ADMIN — FENTANYL CITRATE 50 MCG: 50 INJECTION, SOLUTION INTRAMUSCULAR; INTRAVENOUS at 10:58

## 2021-07-28 RX ADMIN — SODIUM CHLORIDE 500 ML: 9 INJECTION, SOLUTION INTRAVENOUS at 10:45

## 2021-07-28 RX ADMIN — MIDAZOLAM HYDROCHLORIDE 1 MG: 1 INJECTION, SOLUTION INTRAMUSCULAR; INTRAVENOUS at 10:57

## 2021-07-28 ASSESSMENT — PAIN SCALES - GENERAL
PAINLEVEL_OUTOF10: 0

## 2021-07-28 ASSESSMENT — PAIN - FUNCTIONAL ASSESSMENT
PAIN_FUNCTIONAL_ASSESSMENT: 0-10
PAIN_FUNCTIONAL_ASSESSMENT: 0-10

## 2021-07-28 NOTE — H&P
Date:2021  Name:Hima Steinbergland   :1970   SP#:[id number]    SEX:male   Referring Physician:  Audrie Bernheim  Primary Physician:  Татьяна Trammell  Chief Complaint:  Left lung upper lobe hilar mass  History of Present Illness:   Left lung upper lobe hilar mass    HISTORY AND PHYSICAL  No admission diagnoses are documented for this encounter. Past Medical History:  Past Medical History:   Diagnosis Date    CAD (coronary artery disease) 2013    cath negative per pt    History of TMJ disorder     Hypertension     Trigeminal neuralgia        Past Surgical History:  Past Surgical History:   Procedure Laterality Date    ABDOMEN SURGERY      CARDIAC CATHETERIZATION  2016       Social History:  Social History     Socioeconomic History    Marital status:      Spouse name: Not on file    Number of children: 11    Years of education: Not on file    Highest education level: Not on file   Occupational History    Not on file   Tobacco Use    Smoking status: Current Every Day Smoker     Packs/day: 1.50     Types: Cigarettes    Smokeless tobacco: Never Used   Substance and Sexual Activity    Alcohol use: Yes     Alcohol/week: 6.0 standard drinks     Types: 6 Cans of beer per week     Comment: per week (24 oz beers)     Drug use: Yes     Types: Marijuana    Sexual activity: Yes     Partners: Female   Other Topics Concern    Not on file   Social History Narrative    Not on file     Social Determinants of Health     Financial Resource Strain:     Difficulty of Paying Living Expenses:    Food Insecurity:     Worried About Running Out of Food in the Last Year:     Ran Out of Food in the Last Year:    Transportation Needs:     Lack of Transportation (Medical):      Lack of Transportation (Non-Medical):    Physical Activity:     Days of Exercise per Week:     Minutes of Exercise per Session:    Stress:     Feeling of Stress :    Social Connections:     Frequency of Communication with Friends and Family:     Frequency of Social Gatherings with Friends and Family:     Attends Confucianism Services:     Active Member of Clubs or Organizations:     Attends Club or Organization Meetings:     Marital Status:    Intimate Partner Violence:     Fear of Current or Ex-Partner:     Emotionally Abused:     Physically Abused:     Sexually Abused:        Family History:  Family History   Problem Relation Age of Onset    High Blood Pressure Mother     High Blood Pressure Sister     Cancer Sister        Allergies: Allergies   Allergen Reactions    Tramadol Other (See Comments)     shaking    Vicodin [Hydrocodone-Acetaminophen] Hives       Medications:  Current Outpatient Medications on File Prior to Encounter   Medication Sig Dispense Refill    lisinopril (PRINIVIL;ZESTRIL) 10 MG tablet Take 1 tablet by mouth daily 30 tablet 3    ammonium lactate (LAC-HYDRIN) 12 % lotion Apply topically as needed. 1 Bottle 0    tamsulosin (FLOMAX) 0.4 MG capsule Take 1 capsule by mouth daily 30 capsule 3    cefdinir (OMNICEF) 300 MG capsule Take 1 capsule by mouth 2 times daily for 7 days 14 capsule 0    nicotine (NICODERM CQ) 21 MG/24HR Place 1 patch onto the skin daily 30 patch 3     Current Facility-Administered Medications on File Prior to Encounter   Medication Dose Route Frequency Provider Last Rate Last Admin    sodium chloride flush 0.9 % injection 10 mL  10 mL Intravenous PRN Eliezer Rush MD   10 mL at 07/28/21 0940       ROS: No fevers or chills    Vital Signs: There is no height or weight on file to calculate BMI. Laboratory:  Recent Labs     07/28/21  0938   WBC 10.8*   INR 0.78     INR @LABR24(INR)@    Physical Exam:  GENERAL:Well developed, well nourished in NAD  NECK: Neck exam - No JVD,HJR or carotid bruit, no thyromegaly   RESPIRATORY:Clear to auscultation  HEART:RRR,no murmer, gallop or friction rub  ABDOMEN: Bowel sounds present, no tenderness to palpation.  No organomegaly  EXTREMITIES: no edema or cyanosis  VASCULAR:  no ischemic changes  SKIN: no erythema, rubor or lesions noted  NEURO/PSYCH:Alert and oriented    EKG:    Imaging:    Chest: CTA    Heart: S1, S1    Impression:  Active Problems:    * No active hospital problems. *  Resolved Problems:    * No resolved hospital problems.  *      Left lung upper lobe hilar mass    Mallampati Score 2  ASA class 2    PLAN OF CARE/PLANNED PROCEDURE    CT lung biopsy    XR CHEST PORTABLE [[id number]]

## 2021-07-28 NOTE — PROGRESS NOTES
1311- BX site C/D/I no c/o pain at this time. 1333 bx site C/D/I no c/o pain patient upset that his tray has not arrived multiple calls to Dietary made. 355 [facility] site C/D/I patients food has arrived no c/o pain. 1420 Discharge instructions given to patient and his son both verbalized understanding. 1426 patient escorted to car via w/c where son will transport home.

## 2021-07-28 NOTE — PROGRESS NOTES
1147 pt. Arrived back to unit from IR. Report called via phone from [facility]. Vitals obtained, WNL. Biopsy site is C/D/I. Pt. Requested a tray, this nurse ordered pt. Tray. Wife at bedside. Education provided on use of call light, call light In reach. 1245 Pt. Provided with cranberry juice, biopsy site remains C/D/I. Vitals obtained, WNL. Call light in reach.

## 2021-07-28 NOTE — PROGRESS NOTES
PROCEDURE PERFORMED: Left Lung biopsy    PRIMARY INDICATION FOR PROCEDURE:  Lung Mass    PT TRANSPORTED FROM: [facility]14                             TO THE IR ROOM: CT room #2       PT IN THE ROOM AT WHAT TIME: 1030                            INFORMED CONSENT:  PT A&O signed consent with Dr. Papa Brandon. Consent placed in chart. PHOEBE SCORE PRE PROCEDURE: 10    NURSING ASSESSMENT: Pt A&O, verbalizes understanding of the procedure. Pt ambulates independently. TIME OUT COMPLETE: 1057    BARRIER PRECAUTIONS & STERILE TECHNIQUE  Pt transferred to the table and positioned for comfort. Warm blankets offered. Pt placed on Cardiac Monitor. Pt in the supine position. Chlorhexadine and draped in a sterile fashion. PAIN/LOCAL ANESTHESIA/SEDATION MANAGEMENT:  Lidocaine 1% without Epinephrine, Fentanyl, Versed    1058 Sedation medication given by Josr Eubanks RN    INTRAOPERATIVE:    ACCESS TIME: 1100   US/FLUORO: Dr Jaclyn Barnes needle slowly with CT guidance. Multiple CT scans taken.    WIRE USED:  SHEATH USED:   CATHETER USED:  FINAL IMAGE TAKEN TO CONFIRM PLACEMENT OF:   CONTRAST/CC:   FLUID RETURN:    M-Biopsy semi automatic biopsy set 20 g x 16 cm   9 Core tissue samples collected    STERILE DRESSINGS:  bandaid    SPECIMENS: 9 cores collected and sent with cytology    EBL: Less than 1 ml    FOLLOW- UP X-RAY: ordered immediately post procedure, 1 after 1 hour, last xray in 1 hour after second chest xray    COMPLICATIONS: None    STAFF PRESENT DURING PROCEDURE:  Dr Citlalli Caldwell CT tech, Josr Eubanks RN, Victor M Ware RN, Ernestina DODSON    PHOEBE SCORE POST PROCEDURE: 10    REPORT CALLED TO: Arnulfo Nation RN [facility]    PT LEFT ROOM AT WHAT TIME: 0500    Pt transported back to [facility]

## 2021-07-29 ENCOUNTER — HOSPITAL ENCOUNTER (EMERGENCY)
Age: 51
Discharge: HOME | End: 2021-07-30
Attending: EMERGENCY MEDICINE
Payer: COMMERCIAL

## 2021-07-29 DIAGNOSIS — N30.00 ACUTE CYSTITIS WITHOUT HEMATURIA: ICD-10-CM

## 2021-07-29 DIAGNOSIS — J18.9 PNEUMONIA DUE TO INFECTIOUS ORGANISM, UNSPECIFIED LATERALITY, UNSPECIFIED PART OF LUNG: ICD-10-CM

## 2021-07-29 DIAGNOSIS — R10.9 FLANK PAIN: Primary | ICD-10-CM

## 2021-07-29 LAB
ANION GAP SERPL CALCULATED.3IONS-SCNC: 10 MMOL/L (ref 4–16)
BASOPHILS ABSOLUTE: 0.1 K/CU MM
BASOPHILS RELATIVE PERCENT: 0.6 % (ref 0–1)
BUN BLDV-MCNC: 11 MG/DL (ref 6–23)
CALCIUM SERPL-MCNC: 9 MG/DL (ref 8.3–10.6)
CHLORIDE BLD-SCNC: 104 MMOL/L (ref 99–110)
CO2: 25 MMOL/L (ref 21–32)
CREAT SERPL-MCNC: 1 MG/DL (ref 0.9–1.3)
DIFFERENTIAL TYPE: ABNORMAL
EOSINOPHILS ABSOLUTE: 0.4 K/CU MM
EOSINOPHILS RELATIVE PERCENT: 3.8 % (ref 0–3)
GFR AFRICAN AMERICAN: >60 ML/MIN/1.73M2
GFR NON-AFRICAN AMERICAN: >60 ML/MIN/1.73M2
GLUCOSE BLD-MCNC: 149 MG/DL (ref 70–99)
HCT VFR BLD CALC: 42.5 % (ref 42–52)
HEMOGLOBIN: 14.2 GM/DL (ref 13.5–18)
IMMATURE NEUTROPHIL %: 0.3 % (ref 0–0.43)
LYMPHOCYTES ABSOLUTE: 3.2 K/CU MM
LYMPHOCYTES RELATIVE PERCENT: 30.6 % (ref 24–44)
MAGNESIUM: 1.6 MG/DL (ref 1.8–2.4)
MCH RBC QN AUTO: 28.9 PG (ref 27–31)
MCHC RBC AUTO-ENTMCNC: 33.4 % (ref 32–36)
MCV RBC AUTO: 86.6 FL (ref 78–100)
MONOCYTES ABSOLUTE: 0.6 K/CU MM
MONOCYTES RELATIVE PERCENT: 5.4 % (ref 0–4)
NUCLEATED RBC %: 0 %
PDW BLD-RTO: 14.2 % (ref 11.7–14.9)
PLATELET # BLD: 276 K/CU MM (ref 140–440)
PMV BLD AUTO: 9.7 FL (ref 7.5–11.1)
POTASSIUM SERPL-SCNC: 3.4 MMOL/L (ref 3.5–5.1)
RBC # BLD: 4.91 M/CU MM (ref 4.6–6.2)
SEGMENTED NEUTROPHILS ABSOLUTE COUNT: 6.2 K/CU MM
SEGMENTED NEUTROPHILS RELATIVE PERCENT: 59.3 % (ref 36–66)
SODIUM BLD-SCNC: 139 MMOL/L (ref 135–145)
TOTAL IMMATURE NEUTOROPHIL: 0.03 K/CU MM
TOTAL NUCLEATED RBC: 0 K/CU MM
TROPONIN T: <0.01 NG/ML
WBC # BLD: 10.4 K/CU MM (ref 4–10.5)

## 2021-07-29 PROCEDURE — 6370000000 HC RX 637 (ALT 250 FOR IP): Performed by: EMERGENCY MEDICINE

## 2021-07-29 PROCEDURE — 83735 ASSAY OF MAGNESIUM: CPT

## 2021-07-29 PROCEDURE — 84484 ASSAY OF TROPONIN QUANT: CPT

## 2021-07-29 PROCEDURE — 85025 COMPLETE CBC W/AUTO DIFF WBC: CPT

## 2021-07-29 PROCEDURE — 80048 BASIC METABOLIC PNL TOTAL CA: CPT

## 2021-07-29 PROCEDURE — 99284 EMERGENCY DEPT VISIT MOD MDM: CPT

## 2021-07-29 RX ORDER — OXYCODONE HYDROCHLORIDE AND ACETAMINOPHEN 5; 325 MG/1; MG/1
2 TABLET ORAL ONCE
Status: COMPLETED | OUTPATIENT
Start: 2021-07-29 | End: 2021-07-29

## 2021-07-29 RX ADMIN — OXYCODONE HYDROCHLORIDE AND ACETAMINOPHEN 2 TABLET: 5; 325 TABLET ORAL at 23:57

## 2021-07-29 ASSESSMENT — PAIN SCALES - GENERAL
PAINLEVEL_OUTOF10: 9
PAINLEVEL_OUTOF10: 9

## 2021-07-29 ASSESSMENT — PAIN DESCRIPTION - PAIN TYPE: TYPE: ACUTE PAIN

## 2021-07-29 ASSESSMENT — PAIN DESCRIPTION - ORIENTATION: ORIENTATION: LEFT

## 2021-07-29 ASSESSMENT — PAIN DESCRIPTION - LOCATION: LOCATION: FLANK

## 2021-07-30 ENCOUNTER — APPOINTMENT (OUTPATIENT)
Dept: CT IMAGING | Age: 51
End: 2021-07-30
Payer: COMMERCIAL

## 2021-07-30 VITALS
DIASTOLIC BLOOD PRESSURE: 81 MMHG | BODY MASS INDEX: 28.49 KG/M2 | WEIGHT: 215 LBS | SYSTOLIC BLOOD PRESSURE: 118 MMHG | OXYGEN SATURATION: 100 % | TEMPERATURE: 98.4 F | HEIGHT: 73 IN | RESPIRATION RATE: 18 BRPM | HEART RATE: 62 BPM

## 2021-07-30 LAB
AMPHETAMINES: NEGATIVE
BACTERIA: ABNORMAL /HPF
BARBITURATE SCREEN URINE: NEGATIVE
BENZODIAZEPINE SCREEN, URINE: NEGATIVE
BILIRUBIN URINE: NEGATIVE MG/DL
BLOOD, URINE: NEGATIVE
CANNABINOID SCREEN URINE: NEGATIVE
CLARITY: CLEAR
COCAINE METABOLITE: ABNORMAL
COLOR: YELLOW
GLUCOSE, URINE: NEGATIVE MG/DL
KETONES, URINE: NEGATIVE MG/DL
LEUKOCYTE ESTERASE, URINE: ABNORMAL
MUCUS: ABNORMAL HPF
NITRITE URINE, QUANTITATIVE: NEGATIVE
OPIATES, URINE: NEGATIVE
OXYCODONE: NEGATIVE
PH, URINE: 5 (ref 5–8)
PHENCYCLIDINE, URINE: NEGATIVE
PROTEIN UA: NEGATIVE MG/DL
RBC URINE: 2 /HPF (ref 0–3)
SPECIFIC GRAVITY UA: 1.01 (ref 1–1.03)
SQUAMOUS EPITHELIAL: <1 /HPF
TRICHOMONAS: ABNORMAL /HPF
UROBILINOGEN, URINE: NEGATIVE MG/DL (ref 0.2–1)
WBC CLUMP: ABNORMAL /HPF
WBC UA: 41 /HPF (ref 0–2)

## 2021-07-30 PROCEDURE — 74176 CT ABD & PELVIS W/O CONTRAST: CPT

## 2021-07-30 PROCEDURE — 80307 DRUG TEST PRSMV CHEM ANLYZR: CPT

## 2021-07-30 PROCEDURE — 71275 CT ANGIOGRAPHY CHEST: CPT

## 2021-07-30 PROCEDURE — 81001 URINALYSIS AUTO W/SCOPE: CPT

## 2021-07-30 PROCEDURE — 87086 URINE CULTURE/COLONY COUNT: CPT

## 2021-07-30 PROCEDURE — 6360000004 HC RX CONTRAST MEDICATION: Performed by: EMERGENCY MEDICINE

## 2021-07-30 RX ORDER — SODIUM CHLORIDE 0.9 % (FLUSH) 0.9 %
5-40 SYRINGE (ML) INJECTION 2 TIMES DAILY
Status: DISCONTINUED | OUTPATIENT
Start: 2021-07-30 | End: 2021-07-30 | Stop reason: HOSPADM

## 2021-07-30 RX ORDER — LEVOFLOXACIN 500 MG/1
500 TABLET, FILM COATED ORAL DAILY
Qty: 10 TABLET | Refills: 0 | Status: SHIPPED | OUTPATIENT
Start: 2021-07-30 | End: 2021-08-09

## 2021-07-30 RX ORDER — OXYCODONE HYDROCHLORIDE AND ACETAMINOPHEN 5; 325 MG/1; MG/1
1 TABLET ORAL EVERY 6 HOURS PRN
Qty: 12 TABLET | Refills: 0 | Status: SHIPPED | OUTPATIENT
Start: 2021-07-30 | End: 2021-08-02

## 2021-07-30 RX ORDER — ALBUTEROL SULFATE 90 UG/1
2 AEROSOL, METERED RESPIRATORY (INHALATION) EVERY 4 HOURS PRN
Qty: 1 INHALER | Refills: 1 | Status: SHIPPED | OUTPATIENT
Start: 2021-07-30 | End: 2022-11-04

## 2021-07-30 RX ADMIN — IOPAMIDOL 75 ML: 755 INJECTION, SOLUTION INTRAVENOUS at 00:54

## 2021-07-30 ASSESSMENT — ENCOUNTER SYMPTOMS
GASTROINTESTINAL NEGATIVE: 1
SINUS PRESSURE: 0
DIARRHEA: 0
RHINORRHEA: 0
NAUSEA: 0
CHEST TIGHTNESS: 1
SHORTNESS OF BREATH: 1
VOMITING: 0
SORE THROAT: 0
WHEEZING: 0
COUGH: 0
SINUS PAIN: 0
ABDOMINAL PAIN: 0
FACIAL SWELLING: 0
CONSTIPATION: 0

## 2021-07-30 NOTE — ED NOTES
This nurse attempted to get a urine sample. Pt states he is unable to go and is unwilling to try to provide urine sample. Pt educated on need for urine sample r/t flank pain. Pt wife on phone and states pt has known UTI and was seen at urologist. Pt and wife yelling at this nurse regarding urine sample. Pt refusing to try. Pt refusing catheter. Will inform physician.       Maegan Red RN  07/29/21 6340 Wenceslao Joseph RN  07/29/21 8555
ambulatory
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
## 2021-07-30 NOTE — PRE SEDATION
Sedation Pre-Procedure Note    Patient Name: Luzmaria Daley   YOB: 1970  Room/Bed: Room/bed info not found  Medical Record Number: [id number]  Date: 7/28/21   Time: 2:04 PM       Indication:  CT lung biopsy    Consent: I have discussed with the patient and/or the patient representative the indication, alternatives, and the possible risks and/or complications of the planned procedure and the anesthesia methods. The patient and/or patient representative appear to understand and agree to proceed. Vital Signs: There were no vitals filed for this visit. Past Medical History:   has a past medical history of CAD (coronary artery disease), History of TMJ disorder, Hypertension, and Trigeminal neuralgia. Past Surgical History:   has a past surgical history that includes Abdomen surgery; Cardiac catheterization (08/03/2016); and CT NEEDLE BIOPSY LUNG PERCUTANEOUS (7/28/2021). Medications:   Scheduled Meds:   Continuous Infusions:   PRN Meds:   Home Meds:   Prior to Admission medications    Medication Sig Start Date End Date Taking? Authorizing Provider   levoFLOXacin (LEVAQUIN) 500 MG tablet Take 1 tablet by mouth daily for 10 days 7/30/21 8/9/21  Bandar Fisher DO   oxyCODONE-acetaminophen (PERCOCET) 5-325 MG per tablet Take 1 tablet by mouth every 6 hours as needed for Pain for up to 3 days. Intended supply: 3 days. Take lowest dose possible to manage pain 7/30/21 8/2/21  Bandar Fisher DO   albuterol sulfate HFA (PROVENTIL HFA) 108 (90 Base) MCG/ACT inhaler Inhale 2 puffs into the lungs every 4 hours as needed for Wheezing or Shortness of Breath With spacer (and mask if indicated). Thanks. 7/30/21 8/29/21  Bandar Fisher DO   lisinopril (PRINIVIL;ZESTRIL) 10 MG tablet Take 1 tablet by mouth daily 7/23/21   Josep Barger,    ammonium lactate (LAC-HYDRIN) 12 % lotion Apply topically as needed.  7/23/21   Josep Barger DO   tamsulosin (FLOMAX) 0.4 MG capsule Take 1 capsule by mouth daily 7/23/21 11/20/21  Bilal Charbel, DO   cefdinir (OMNICEF) 300 MG capsule Take 1 capsule by mouth 2 times daily for 7 days 7/23/21 7/30/21  Bilcory Mckayy, DO   nicotine (NICODERM CQ) 21 MG/24HR Place 1 patch onto the skin daily 7/24/21   Bilal Alaamritay, DO     Coumadin Use Last 7 Days:  no  Antiplatelet drug therapy use last 7 days: no  Other anticoagulant use last 7 days: no  Additional Medication Information:  none      Pre-Sedation Documentation and Exam:   I have personally completed a history, physical exam & review of systems for this patient (see notes). Vital signs have been reviewed (see flow sheet for vitals).     Mallampati Airway Assessment:  Mallampati Class II - (soft palate, fauces & uvula are visible)    Prior History of Anesthesia Complications:   none    ASA Classification:  Class 3 - A patient with severe systemic disease that limits activity but is not incapacitating    Sedation/ Anesthesia Plan:   intravenous sedation    Medications Planned:   midazolam (Versed)  intravenously and fentanyl intravenously    Patient is an appropriate candidate for plan of sedation: yes    Electronically signed by Ishan Perla MD on 7/30/2021 at 2:04 PM

## 2021-07-31 LAB
CULTURE: NORMAL
Lab: NORMAL
SPECIMEN: NORMAL

## 2021-08-05 ENCOUNTER — TELEPHONE (OUTPATIENT)
Dept: ONCOLOGY | Age: 51
End: 2021-08-05

## 2021-08-05 NOTE — TELEPHONE ENCOUNTER
Patient called about his PET appt today that was missed. He got a phone call from someone yesterday regarding the appt but wasn't sure who called, wanted to speak with Dr. Marj Arellano nurse. Transferred to [facility].

## 2021-08-06 ENCOUNTER — HOSPITAL ENCOUNTER (OUTPATIENT)
Dept: INFUSION THERAPY | Age: 51
Discharge: HOME | End: 2021-08-06
Payer: COMMERCIAL

## 2021-08-06 ENCOUNTER — OFFICE VISIT (OUTPATIENT)
Dept: ONCOLOGY | Age: 51
End: 2021-08-06
Payer: COMMERCIAL

## 2021-08-06 ENCOUNTER — TELEPHONE (OUTPATIENT)
Dept: MRI IMAGING | Age: 51
End: 2021-08-06

## 2021-08-06 ENCOUNTER — TELEPHONE (OUTPATIENT)
Dept: SURGERY | Age: 51
End: 2021-08-06

## 2021-08-06 VITALS
WEIGHT: 218 LBS | BODY MASS INDEX: 28.89 KG/M2 | OXYGEN SATURATION: 94 % | SYSTOLIC BLOOD PRESSURE: 141 MMHG | DIASTOLIC BLOOD PRESSURE: 91 MMHG | TEMPERATURE: 96.1 F | HEIGHT: 73 IN | HEART RATE: 80 BPM

## 2021-08-06 DIAGNOSIS — C34.82 MALIGNANT NEOPLASM OF OVERLAPPING SITES OF LEFT LUNG (HCC): ICD-10-CM

## 2021-08-06 PROCEDURE — 4004F PT TOBACCO SCREEN RCVD TLK: CPT | Performed by: INTERNAL MEDICINE

## 2021-08-06 PROCEDURE — 99202 OFFICE O/P NEW SF 15 MIN: CPT

## 2021-08-06 PROCEDURE — G8427 DOCREV CUR MEDS BY ELIG CLIN: HCPCS | Performed by: INTERNAL MEDICINE

## 2021-08-06 PROCEDURE — 1111F DSCHRG MED/CURRENT MED MERGE: CPT | Performed by: INTERNAL MEDICINE

## 2021-08-06 PROCEDURE — 99211 OFF/OP EST MAY X REQ PHY/QHP: CPT

## 2021-08-06 PROCEDURE — 3017F COLORECTAL CA SCREEN DOC REV: CPT | Performed by: INTERNAL MEDICINE

## 2021-08-06 PROCEDURE — 99205 OFFICE O/P NEW HI 60 MIN: CPT | Performed by: INTERNAL MEDICINE

## 2021-08-06 PROCEDURE — G8419 CALC BMI OUT NRM PARAM NOF/U: HCPCS | Performed by: INTERNAL MEDICINE

## 2021-08-06 RX ORDER — OXYCODONE HYDROCHLORIDE AND ACETAMINOPHEN 5; 325 MG/1; MG/1
1 TABLET ORAL EVERY 8 HOURS PRN
Qty: 90 TABLET | Refills: 0 | Status: SHIPPED | OUTPATIENT
Start: 2021-08-06 | End: 2021-09-05

## 2021-08-06 RX ORDER — OXYCODONE HCL 10 MG/1
10 TABLET, FILM COATED, EXTENDED RELEASE ORAL 2 TIMES DAILY
Qty: 60 TABLET | Refills: 0 | Status: SHIPPED | OUTPATIENT
Start: 2021-08-06 | End: 2021-09-05

## 2021-08-06 RX ORDER — CEFDINIR 300 MG/1
300 CAPSULE ORAL 2 TIMES DAILY
COMMUNITY
End: 2021-10-06

## 2021-08-06 RX ORDER — OXYCODONE HYDROCHLORIDE AND ACETAMINOPHEN 5; 325 MG/1; MG/1
1 TABLET ORAL EVERY 6 HOURS PRN
COMMUNITY
End: 2021-08-10

## 2021-08-06 ASSESSMENT — PATIENT HEALTH QUESTIONNAIRE - PHQ9
SUM OF ALL RESPONSES TO PHQ QUESTIONS 1-9: 1
SUM OF ALL RESPONSES TO PHQ QUESTIONS 1-9: 1
SUM OF ALL RESPONSES TO PHQ9 QUESTIONS 1 & 2: 1
2. FEELING DOWN, DEPRESSED OR HOPELESS: 1
SUM OF ALL RESPONSES TO PHQ QUESTIONS 1-9: 1
1. LITTLE INTEREST OR PLEASURE IN DOING THINGS: 0

## 2021-08-06 NOTE — PROGRESS NOTES
Patient Name:  Rhianna Bartholomew  Patient :  1970  Patient MRN:  [id number]     Primary Oncologist: Sommer Saleem MD  Referring Provider: Nick Gan MD     Date of Service: 2021      Reason for Consult:  Lung mass, lad and bone lesions     Chief Complaint:    Chief Complaint   Patient presents with   Jenelle [place] Patient       Encounter Diagnosis   Name Primary?  Malignant neoplasm of overlapping sites of left lung [facility])         HPI:   21: Initial [facility] visit:Hima Parikh is a 45 y.o. male who presents to [facility] with report of dysuria and flank pain. Reports these symptoms started a few weeks ago. He ultimately came to the ED due to worsening back pain.      He reports ongoing issues with frequent urge to urinate and notes some incontinence. He denies hematuria. He was noted to have acute pyelonephritis and was admitted and started on IV Rocephin.      21 CT chest     Impression   1. Left hilar neoplasm extending into the left upper lobe measuring 3.2 x 5.9   x 5.3 cm obstructing the left upper lobe bronchus with probable minimal   adjacent infiltrates versus lymphangitic spread of tumor.  PET-CT/biopsy is   recommended. 2. Mediastinal lymphadenopathy. 3. Prominent left supraclavicular lymph nodes. 4. No osseous metastatic disease.      21 Ct abdomen and pelvis  Impression   1. Circumferential thickening of the bladder wall is noted which could be   related to cystitis.  Correlate with urinalysis. 2. There is left retroperitoneal lymphadenopathy.  This could be reactive or   neoplastic.  This can be further evaluated with PET-CT. 3. There is minimal stranding within the retroperitoneal on the left.  This   is uncertain etiology however could be related to acute pyelonephritis. 4. Interval development of multiple sclerotic lesions within the spine and   pelvis, concerning for metastatic osseous disease.    5. Prostate gland is enlarged.  Correlate with PSA.      .30      He denies past history of cancer. He smokes 2-3 ppd and drinks 10-12 beers daily. He reports unknown malignancy in his sister who  at 39. No other known family history of cancer.      Due to lung mass and concern for metastatic prostate cancer we were called to evaluate. 21:  Final Pathologic Diagnosis:   Needle biopsy of lung, clinically left lung mass:        SQUAMOUS CELL CARCINOMA IN SITU. Past Medical History:     BPH, HTN, COPD                                                            Past Surgery History:    None per his wife                                                                        Social History:   Lives with wife. Cut down smoking to 2-4 cigarettes per day prior to which he was smoking 2 to 3 packs/day for the past 40 years. Also reported drinking alcohol 10-12 beers per day. Denied any other illicit drug abuse. Family History:    He reported that his sister  at the age of 39 with  metastatic cancer, he was not sure what the primary was                                                                                              Allergies   Allergen Reactions    Tramadol Other (See Comments)     seizures    Vicodin [Hydrocodone-Acetaminophen] Hives       Current Outpatient Medications on File Prior to Visit   Medication Sig Dispense Refill    oxyCODONE-acetaminophen (PERCOCET) 5-325 MG per tablet Take 1 tablet by mouth every 6 hours as needed for Pain.  cefdinir (OMNICEF) 300 MG capsule Take 300 mg by mouth 2 times daily      levoFLOXacin (LEVAQUIN) 500 MG tablet Take 1 tablet by mouth daily for 10 days 10 tablet 0    albuterol sulfate HFA (PROVENTIL HFA) 108 (90 Base) MCG/ACT inhaler Inhale 2 puffs into the lungs every 4 hours as needed for Wheezing or Shortness of Breath With spacer (and mask if indicated). Thanks.  1 Inhaler 1    lisinopril (PRINIVIL;ZESTRIL) 10 MG tablet Take 1 tablet by mouth daily 30 tablet 3    ammonium lactate (LAC-HYDRIN) 12 % lotion Apply topically as needed. 1 Bottle 0    tamsulosin (FLOMAX) 0.4 MG capsule Take 1 capsule by mouth daily 30 capsule 3    nicotine (NICODERM CQ) 21 MG/24HR Place 1 patch onto the skin daily 30 patch 3     No current facility-administered medications on file prior to visit. Interval History: 8/6/21: He arrived with his wife to the clinic today. Reported abdominal fullness and firmness. Denied any nausea vomiting. Continues to have pain in the pelvic area and to the shoulder. Denied any chest pain. Reported intermittent cough productive clear sputum. Denied any fever. Appetite is preserved. Denied any weight loss. Denied any  symptoms.      Review of Systems:    As per the interval history, rest of the review of system negative     Vital Signs: BP (!) 141/91 (Site: Left Upper Arm, Position: Sitting, Cuff Size: Medium Adult)   Pulse 80   Temp 96.1 °F (35.6 °C) (Temporal)   Ht 6' 1\" (1.854 m)   Wt 218 lb (98.9 kg)   SpO2 94%   BMI 28.76 kg/m²      CONSTITUTIONAL: awake, alert, cooperative, no apparent distress   EYES: RAISSA, No pallor or any icterus  ENT: ATNC  NECK: No JVD  HEMATOLOGIC/LYMPHATIC: no cervical, supraclavicular or axillary lymphadenopathy   LUNGS: CTAB  CARDIOVASCULAR: s1s2 rrr no murmurs  ABDOMEN: soft ntnd bs pos  MUSCULOSKELETAL: full range of motion noted, tone is normal  NEUROLOGIC: GI  SKIN: No rash  EXTREMITIES: no LE edema bilaterally     Labs:  Hematology:  Lab Results   Component Value Date    WBC 10.4 07/29/2021    RBC 4.91 07/29/2021    HGB 14.2 07/29/2021    HCT 42.5 07/29/2021    MCV 86.6 07/29/2021    MCH 28.9 07/29/2021    MCHC 33.4 07/29/2021    RDW 14.2 07/29/2021     07/29/2021    MPV 9.7 07/29/2021    BANDSPCT 9 11/13/2018    SEGSPCT 59.3 07/29/2021    EOSRELPCT 3.8 (H) 07/29/2021    BASOPCT 0.6 07/29/2021    LYMPHOPCT 30.6 07/29/2021 MONOPCT 5.4 (H) 07/29/2021    BANDABS 0.66 11/13/2018    SEGSABS 6.2 07/29/2021    EOSABS 0.4 07/29/2021    BASOSABS 0.1 07/29/2021    LYMPHSABS 3.2 07/29/2021    MONOSABS 0.6 07/29/2021    DIFFTYPE AUTOMATED DIFFERENTIAL 07/29/2021    POLYCHROM 1+ 11/13/2018    PLTM FEW 11/13/2018     No results found for: ESR  Chemistry:  Lab Results   Component Value Date     07/29/2021    K 3.4 (L) 07/29/2021     07/29/2021    CO2 25 07/29/2021    BUN 11 07/29/2021    CREATININE 1.0 07/29/2021    GLUCOSE 149 (H) 07/29/2021    CALCIUM 9.0 07/29/2021    PROT 7.0 07/21/2021    LABALBU 3.9 07/21/2021    BILITOT 0.2 07/21/2021    ALKPHOS 176 (H) 07/21/2021    AST 24 07/21/2021    ALT 11 07/21/2021    LABGLOM >60 07/29/2021    GFRAA >60 07/29/2021    MG 1.6 (L) 07/29/2021    POCGLU 124 (H) 09/22/2020     No results found for: MMA, LDH, HOMOCYSTEINE  No components found for: LD  Lab Results   Component Value Date    TSHHS 2.162 07/27/2013     Immunology:  Lab Results   Component Value Date    PROT 7.0 07/21/2021     No results found for: Floyce Milder, KLFLCR  No results found for: B2M  Coagulation Panel:  Lab Results   Component Value Date    PROTIME 10.0 (L) 07/28/2021    INR 0.78 07/28/2021    APTT 25.8 07/28/2021    DDIMER <200 12/19/2013     Anemia Panel:  No results found for: Jose Lagos  Tumor Markers:  Lab Results   Component Value Date    CEA 7.7 07/23/2021    .30 (H) 07/21/2021        Observations:  ECOG:  PHQ-9 Total Score: 1 (8/6/2021 11:38 AM)       Assessment & Plan:                                                          Left hilar mass with mediastinal hilar lymphadenopathy. Note biopsy consistent with squamous cell carcinoma, most probably lung primary. Recommend PET scan and MRI of the brain for further evaluation. Sclerotic bone lesions possibly secondary to metastatic lung cancer. May consider biopsy of the prostate/bladder and bone for further staging pending PET results. Discussed the findings, diagnosis, possible staging, poor prognosis and discussed further systemic treatment with them carbo/taxol and pembrolizumab. Discussed adverse effects. OCM and port requested. Also requested Caris studies. Discussed regarding genetic testing and counseling. Will contact urology for possible cystoscopy and biopsy. Adequate analgesic and bowel regimen. Continue other medical care. Thank you for letting us be part of the care and will follow along. Discussed above findings and plan with him and he voiced understanding. Answered all his questions. Discussed healthy lifestyle including healthy diet, regular exercise as tolerated. ,  Smoking and alcohol cessation    Recommend follow-up with primary care physician and other specialists. Please do not hesitate to contact us if you need further information. Return to clinic after PET or earlier if new Sx    I have recommended that the patient follow CDC guidelines for prevention of COVID-19 infection.   Received Covid vaccine    TOMI    Electronically signed by Elisabeth Britt MD on 8/6/2021 at 12:23 PM

## 2021-08-06 NOTE — PROGRESS NOTES
MA Rooming Questions  Patient: Austen Mary  MRN: [id number]    Date: 8/6/2021      NEW PATIENT   HAD LUNG BIOPSY IN HOSPITAL 07/30  5. Did the patient have a depression screening completed today?  Yes    PHQ-9 Total Score: 1 (8/6/2021 11:38 AM)       PHQ-9 Given to (if applicable):               PHQ-9 Score (if applicable):                     [] Positive     []  Negative              Does question #9 need addressed (if applicable)                     [] Yes    []  No               [place] CMA

## 2021-08-06 NOTE — TELEPHONE ENCOUNTER
Spoke with patient's wife regarding his MRI, PET and OV. PET is [phone] at [address]. MRI is 08.20.2021 at 0830. Prep instruction given along with OV 08.20.2021 at  1530.

## 2021-08-09 ENCOUNTER — TELEPHONE (OUTPATIENT)
Dept: ONCOLOGY | Age: 51
End: 2021-08-09

## 2021-08-09 ENCOUNTER — CLINICAL DOCUMENTATION (OUTPATIENT)
Dept: ONCOLOGY | Age: 51
End: 2021-08-09

## 2021-08-09 ENCOUNTER — HOSPITAL ENCOUNTER (OUTPATIENT)
Age: 51
Setting detail: SPECIMEN
Discharge: HOME | End: 2021-08-09
Payer: COMMERCIAL

## 2021-08-09 ENCOUNTER — OFFICE VISIT (OUTPATIENT)
Dept: SURGERY | Age: 51
End: 2021-08-09
Payer: COMMERCIAL

## 2021-08-09 DIAGNOSIS — Z01.818 PRE-OP TESTING: Primary | ICD-10-CM

## 2021-08-09 LAB
SARS-COV-2: NOT DETECTED
SOURCE: NORMAL

## 2021-08-09 PROCEDURE — U0005 INFEC AGEN DETEC AMPLI PROBE: HCPCS

## 2021-08-09 PROCEDURE — 99211 OFF/OP EST MAY X REQ PHY/QHP: CPT | Performed by: SURGERY

## 2021-08-09 PROCEDURE — U0003 INFECTIOUS AGENT DETECTION BY NUCLEIC ACID (DNA OR RNA); SEVERE ACUTE RESPIRATORY SYNDROME CORONAVIRUS 2 (SARS-COV-2) (CORONAVIRUS DISEASE [COVID-19]), AMPLIFIED PROBE TECHNIQUE, MAKING USE OF HIGH THROUGHPUT TECHNOLOGIES AS DESCRIBED BY CMS-2020-01-R: HCPCS

## 2021-08-09 PROCEDURE — G8419 CALC BMI OUT NRM PARAM NOF/U: HCPCS | Performed by: SURGERY

## 2021-08-09 PROCEDURE — G8427 DOCREV CUR MEDS BY ELIG CLIN: HCPCS | Performed by: SURGERY

## 2021-08-09 NOTE — PATIENT INSTRUCTIONS
Pre-Procedure COVID-19 Self Testing  Quarantine Instructions  Day of Surgery Instructions         What to do before my surgery:    All patients scheduled for elective surgery must test for COVID19 72-96 hours prior to the surgery date.  Pre-Procedure COVID-19 Self-Test will be scheduled for you by your provider.  You can receive your Pre-Procedure COVID-19 Self-Test at:  [facility]. [address] [place] [address] [place]  [phone]   If you do not have the COVID-19 test we will cancel or reschedule your procedure   Once you test you must quarantine at home until after your procedure with only your immediate family members or whoever lives with you.  If you must work during your quarantine period, we ask that you continue to practice social distancing, wear a mask that covers your mouth and nose and perform all hand hygiene as recommended by the CDC.  If you must go to the grocery, etc. and cannot get someone to do this for you please wear a mask that covers your mouth and nose and perform all hand hygiene as recommended by the CDC.  Your surgeon's office will notify you with any concerns about your test result. What can I expect on the day of surgery?  Arrive at the time the office or hospital staff tell you on the day of your procedure.  Wear a mask when entering the hospital.     A member of the hospital staff will take your temperature and ask you a few questions as you enter the building.  In abundance of caution for the safety of all our patients and staff, please follow all hospital visitor guidelines in place at the time of your procedure. The staff caring for you will stay in close communication with your loved one and keep them updated on progress.  Please provide a phone number for us to use when communicating with your family or ride home.    When you are ready to discharge, we will notify your family/person with you to bring the car to the front entrance. We will take you to them after you receive all of your discharge instructions.

## 2021-08-09 NOTE — PROGRESS NOTES
SPOKE TO  [address] () SCHEDULED @ [facility].  NOTIFIED OF DATES, TIMES AND LOCATION    PHONE ASSESSMENT /PAT -  COVID - 8/9/21  SURGERY - 8/13/21  @ 2:00pm  P/O - 8/23/21 @ 11:15am    NPO AFTER MIDNIGHT    HOLD BLOOD THINNERS - NA   SENT

## 2021-08-09 NOTE — PROGRESS NOTES
Patient collected pre-op COVID-19 screening instruction and collection supplies given to patient accordingly. Patient denies fever/cough/sob or recent travels. Patient voiced understanding of collection/quarantine instructions. COVID screening lab ordered, collection completed without difficulty, identifiers placed on specimen. AVS given at discharge. Results will be given via mychart or telephone call [facility] Dept will manage any positive test results, the procedure will be rescheduled at a later date). None

## 2021-08-10 ENCOUNTER — CLINICAL DOCUMENTATION (OUTPATIENT)
Dept: ONCOLOGY | Age: 51
End: 2021-08-10

## 2021-08-10 NOTE — PROGRESS NOTES
Surgery 8/13/21    you will be called   8/12/21   with times               1. Do not eat or drink anything after midnight - unless instructed by your doctor prior to surgery. This includes                   no water, chewing gum or mints. 2. Follow your directions as prescribed by the doctor for your procedure and medications. 3. Check with your Doctor regarding stopping Plavix, Coumadin, Lovenox,Effient,Pradaxa,Xarelto, Fragmin or other blood thinners and                   follow their instructions. Pt. May take albuterol inhaler morning of procedure if needed. 4. Do not smoke, and do not drink any alcoholic beverages 24 hours prior to surgery. This includes NA Beer. 5. You may brush your teeth and gargle the morning of surgery. DO NOT SWALLOW WATER   6. You MUST make arrangements for a responsible adult to take you home after your surgery and be able to check on you every couple                   hours for the day. You will not be allowed to leave alone or drive yourself home. It is strongly suggested someone stay with you the first 24                   hrs. Your surgery will be cancelled if you do not have a ride home. 7. Please wear simple, loose fitting clothing to the hospital.  Alex Ko not bring valuables (money, credit cards, checkbooks, etc.) Do not wear any                   makeup (including no eye makeup) or nail polish on your fingers or toes. 8. DO NOT wear any jewelry or piercings on day of surgery. All body piercing jewelry must be removed. 9. If you have dentures, they will be removed before going to the OR; we will provide you a container. If you wear contact lenses or glasses,                  they will be removed; please bring a case for them. 10. If you  have a Living Will and Durable Power of  for Healthcare, please bring in a copy.            11. Please bring picture ID,  insurance card, paperwork from the doctors office    (H & P, Consent, & card for implantable devices). 12. Take a shower the night before or morning of your procedure, do not apply any lotion, oil or powder. 13. Wear a mask covering your nose & mouth when entering the hospital. Have your covid-19 test performed within 10 days of your                  surgery. Quarantine yourself after the test until after your surgery. Pt. Had covid test 8/9/21 results are negative.

## 2021-08-12 ENCOUNTER — ANESTHESIA EVENT (OUTPATIENT)
Dept: OPERATING ROOM | Age: 51
End: 2021-08-12
Payer: COMMERCIAL

## 2021-08-12 ASSESSMENT — ENCOUNTER SYMPTOMS
COUGH: 1
BACK PAIN: 0
RECTAL PAIN: 0
CHOKING: 0
COLOR CHANGE: 0
PHOTOPHOBIA: 0
APNEA: 0
STRIDOR: 0
EYE REDNESS: 0
ANAL BLEEDING: 0
CONSTIPATION: 0
EYE ITCHING: 0
SORE THROAT: 0

## 2021-08-12 ASSESSMENT — LIFESTYLE VARIABLES: SMOKING_STATUS: 1
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
## 2021-08-13 ENCOUNTER — APPOINTMENT (OUTPATIENT)
Dept: GENERAL RADIOLOGY | Age: 51
End: 2021-08-13
Attending: SURGERY
Payer: COMMERCIAL

## 2021-08-13 ENCOUNTER — HOSPITAL ENCOUNTER (OUTPATIENT)
Age: 51
Setting detail: OUTPATIENT SURGERY
Discharge: HOME | End: 2021-08-13
Attending: SURGERY | Admitting: SURGERY
Payer: COMMERCIAL

## 2021-08-13 ENCOUNTER — ANESTHESIA (OUTPATIENT)
Dept: OPERATING ROOM | Age: 51
End: 2021-08-13
Payer: COMMERCIAL

## 2021-08-13 ENCOUNTER — TELEPHONE (OUTPATIENT)
Dept: ONCOLOGY | Age: 51
End: 2021-08-13

## 2021-08-13 VITALS
RESPIRATION RATE: 16 BRPM | SYSTOLIC BLOOD PRESSURE: 106 MMHG | OXYGEN SATURATION: 93 % | DIASTOLIC BLOOD PRESSURE: 68 MMHG | TEMPERATURE: 98.6 F

## 2021-08-13 VITALS
HEIGHT: 73 IN | BODY MASS INDEX: 28.89 KG/M2 | OXYGEN SATURATION: 94 % | DIASTOLIC BLOOD PRESSURE: 84 MMHG | SYSTOLIC BLOOD PRESSURE: 122 MMHG | TEMPERATURE: 97 F | RESPIRATION RATE: 16 BRPM | WEIGHT: 218 LBS | HEART RATE: 82 BPM

## 2021-08-13 DIAGNOSIS — F14.10 COCAINE ABUSE (HCC): Primary | ICD-10-CM

## 2021-08-13 LAB
ANION GAP SERPL CALCULATED.3IONS-SCNC: 13 MMOL/L (ref 4–16)
BUN BLDV-MCNC: 10 MG/DL (ref 6–23)
CALCIUM SERPL-MCNC: 9.2 MG/DL (ref 8.3–10.6)
CHLORIDE BLD-SCNC: 105 MMOL/L (ref 99–110)
CO2: 23 MMOL/L (ref 21–32)
CREAT SERPL-MCNC: 1.1 MG/DL (ref 0.9–1.3)
GFR AFRICAN AMERICAN: >60 ML/MIN/1.73M2
GFR NON-AFRICAN AMERICAN: >60 ML/MIN/1.73M2
GLUCOSE BLD-MCNC: 94 MG/DL (ref 70–99)
POTASSIUM SERPL-SCNC: 3.5 MMOL/L (ref 3.5–5.1)
SODIUM BLD-SCNC: 141 MMOL/L (ref 135–145)

## 2021-08-13 PROCEDURE — 6360000002 HC RX W HCPCS: Performed by: NURSE ANESTHETIST, CERTIFIED REGISTERED

## 2021-08-13 PROCEDURE — 6360000002 HC RX W HCPCS: Performed by: PHYSICIAN ASSISTANT

## 2021-08-13 PROCEDURE — APPNB45 APP NON BILLABLE 31-45 MINUTES: Performed by: PHYSICIAN ASSISTANT

## 2021-08-13 PROCEDURE — 7100000011 HC PHASE II RECOVERY - ADDTL 15 MIN: Performed by: SURGERY

## 2021-08-13 PROCEDURE — 2709999900 HC NON-CHARGEABLE SUPPLY: Performed by: SURGERY

## 2021-08-13 PROCEDURE — 2580000003 HC RX 258: Performed by: ANESTHESIOLOGY

## 2021-08-13 PROCEDURE — C1788 PORT, INDWELLING, IMP: HCPCS | Performed by: SURGERY

## 2021-08-13 PROCEDURE — 3700000001 HC ADD 15 MINUTES (ANESTHESIA): Performed by: SURGERY

## 2021-08-13 PROCEDURE — 3700000000 HC ANESTHESIA ATTENDED CARE: Performed by: SURGERY

## 2021-08-13 PROCEDURE — 80048 BASIC METABOLIC PNL TOTAL CA: CPT

## 2021-08-13 PROCEDURE — 3600000013 HC SURGERY LEVEL 3 ADDTL 15MIN: Performed by: SURGERY

## 2021-08-13 PROCEDURE — 2500000003 HC RX 250 WO HCPCS: Performed by: SURGERY

## 2021-08-13 PROCEDURE — 36561 INSERT TUNNELED CV CATH: CPT | Performed by: PHYSICIAN ASSISTANT

## 2021-08-13 PROCEDURE — 36561 INSERT TUNNELED CV CATH: CPT | Performed by: SURGERY

## 2021-08-13 PROCEDURE — 76000 FLUOROSCOPY <1 HR PHYS/QHP: CPT

## 2021-08-13 PROCEDURE — 77001 FLUOROGUIDE FOR VEIN DEVICE: CPT | Performed by: SURGERY

## 2021-08-13 PROCEDURE — 7100000010 HC PHASE II RECOVERY - FIRST 15 MIN: Performed by: SURGERY

## 2021-08-13 PROCEDURE — 3600000003 HC SURGERY LEVEL 3 BASE: Performed by: SURGERY

## 2021-08-13 DEVICE — PORT INFUS L55CM 0.016ML 0.4ML CATH OD2.2MM ID1.4MM INTRO: Type: IMPLANTABLE DEVICE | Site: SUBCLAVIAN | Status: FUNCTIONAL

## 2021-08-13 RX ORDER — FENTANYL CITRATE 50 UG/ML
INJECTION, SOLUTION INTRAMUSCULAR; INTRAVENOUS PRN
Status: DISCONTINUED | OUTPATIENT
Start: 2021-08-13 | End: 2021-08-13 | Stop reason: SDUPTHER

## 2021-08-13 RX ORDER — SODIUM CHLORIDE 0.9 % (FLUSH) 0.9 %
5-40 SYRINGE (ML) INJECTION PRN
Status: DISCONTINUED | OUTPATIENT
Start: 2021-08-13 | End: 2021-08-13 | Stop reason: HOSPADM

## 2021-08-13 RX ORDER — LIDOCAINE HYDROCHLORIDE 10 MG/ML
INJECTION, SOLUTION INFILTRATION; PERINEURAL
Status: COMPLETED | OUTPATIENT
Start: 2021-08-13 | End: 2021-08-13

## 2021-08-13 RX ORDER — DEXAMETHASONE SODIUM PHOSPHATE 4 MG/ML
INJECTION, SOLUTION INTRA-ARTICULAR; INTRALESIONAL; INTRAMUSCULAR; INTRAVENOUS; SOFT TISSUE PRN
Status: DISCONTINUED | OUTPATIENT
Start: 2021-08-13 | End: 2021-08-13 | Stop reason: SDUPTHER

## 2021-08-13 RX ORDER — CEFAZOLIN SODIUM 2 G/100ML
2000 INJECTION, SOLUTION INTRAVENOUS
Status: COMPLETED | OUTPATIENT
Start: 2021-08-13 | End: 2021-08-13

## 2021-08-13 RX ORDER — SODIUM CHLORIDE, SODIUM LACTATE, POTASSIUM CHLORIDE, CALCIUM CHLORIDE 600; 310; 30; 20 MG/100ML; MG/100ML; MG/100ML; MG/100ML
INJECTION, SOLUTION INTRAVENOUS CONTINUOUS
Status: DISCONTINUED | OUTPATIENT
Start: 2021-08-13 | End: 2021-08-13 | Stop reason: HOSPADM

## 2021-08-13 RX ORDER — ONDANSETRON 2 MG/ML
INJECTION INTRAMUSCULAR; INTRAVENOUS PRN
Status: DISCONTINUED | OUTPATIENT
Start: 2021-08-13 | End: 2021-08-13 | Stop reason: SDUPTHER

## 2021-08-13 RX ORDER — PROPOFOL 10 MG/ML
INJECTION, EMULSION INTRAVENOUS PRN
Status: DISCONTINUED | OUTPATIENT
Start: 2021-08-13 | End: 2021-08-13 | Stop reason: SDUPTHER

## 2021-08-13 RX ORDER — MIDAZOLAM HYDROCHLORIDE 1 MG/ML
INJECTION INTRAMUSCULAR; INTRAVENOUS PRN
Status: DISCONTINUED | OUTPATIENT
Start: 2021-08-13 | End: 2021-08-13 | Stop reason: SDUPTHER

## 2021-08-13 RX ORDER — HEPARIN SODIUM 5000 [USP'U]/ML
INJECTION, SOLUTION INTRAVENOUS; SUBCUTANEOUS
Status: DISCONTINUED | OUTPATIENT
Start: 2021-08-13 | End: 2021-08-13 | Stop reason: ALTCHOICE

## 2021-08-13 RX ORDER — SODIUM CHLORIDE 9 MG/ML
25 INJECTION, SOLUTION INTRAVENOUS PRN
Status: DISCONTINUED | OUTPATIENT
Start: 2021-08-13 | End: 2021-08-13 | Stop reason: HOSPADM

## 2021-08-13 RX ORDER — SODIUM CHLORIDE 0.9 % (FLUSH) 0.9 %
5-40 SYRINGE (ML) INJECTION EVERY 12 HOURS SCHEDULED
Status: DISCONTINUED | OUTPATIENT
Start: 2021-08-13 | End: 2021-08-13 | Stop reason: HOSPADM

## 2021-08-13 RX ADMIN — ONDANSETRON 4 MG: 2 INJECTION INTRAMUSCULAR; INTRAVENOUS at 12:36

## 2021-08-13 RX ADMIN — MIDAZOLAM 2 MG: 1 INJECTION INTRAMUSCULAR; INTRAVENOUS at 12:04

## 2021-08-13 RX ADMIN — PROPOFOL 200 MG: 10 INJECTION, EMULSION INTRAVENOUS at 12:14

## 2021-08-13 RX ADMIN — SODIUM CHLORIDE, POTASSIUM CHLORIDE, SODIUM LACTATE AND CALCIUM CHLORIDE: 600; 310; 30; 20 INJECTION, SOLUTION INTRAVENOUS at 12:57

## 2021-08-13 RX ADMIN — CEFAZOLIN SODIUM 2000 MG: 2 INJECTION, SOLUTION INTRAVENOUS at 12:20

## 2021-08-13 RX ADMIN — FENTANYL CITRATE 50 MCG: 50 INJECTION, SOLUTION INTRAMUSCULAR; INTRAVENOUS at 12:18

## 2021-08-13 RX ADMIN — FENTANYL CITRATE 50 MCG: 50 INJECTION, SOLUTION INTRAMUSCULAR; INTRAVENOUS at 12:12

## 2021-08-13 RX ADMIN — SODIUM CHLORIDE, POTASSIUM CHLORIDE, SODIUM LACTATE AND CALCIUM CHLORIDE: 600; 310; 30; 20 INJECTION, SOLUTION INTRAVENOUS at 10:02

## 2021-08-13 RX ADMIN — DEXAMETHASONE SODIUM PHOSPHATE 8 MG: 4 INJECTION, SOLUTION INTRAMUSCULAR; INTRAVENOUS at 12:16

## 2021-08-13 RX ADMIN — PROPOFOL 80 MG: 10 INJECTION, EMULSION INTRAVENOUS at 12:12

## 2021-08-13 ASSESSMENT — PULMONARY FUNCTION TESTS
PIF_VALUE: 0
PIF_VALUE: 1
PIF_VALUE: 0
PIF_VALUE: 0
PIF_VALUE: 1
PIF_VALUE: 1
PIF_VALUE: 0
PIF_VALUE: 1
PIF_VALUE: 1
PIF_VALUE: 0
PIF_VALUE: 1
PIF_VALUE: 0

## 2021-08-13 ASSESSMENT — PAIN SCALES - GENERAL: PAINLEVEL_OUTOF10: 0

## 2021-08-13 ASSESSMENT — PAIN - FUNCTIONAL ASSESSMENT: PAIN_FUNCTIONAL_ASSESSMENT: 0-10

## 2021-08-13 NOTE — ANESTHESIA POSTPROCEDURE EVALUATION
Department of Anesthesiology  Postprocedure Note    Patient: Kim Winkler  MRN: [id number]  YOB: 1970  Date of evaluation: 8/13/2021  Time:  1:04 PM     Procedure Summary     Date: 08/13/21 Room / Location: [address]    Anesthesia Start: 1205 Anesthesia Stop: 5430    Procedure: MEDIPORT INSERTION (Right Chest) Diagnosis:       Malignant neoplasm of overlapping sites of left lung ([address]      (Malignant neoplasm of overlapping sites of left lung ([address] [C34.82])    Surgeons: Guille Villalobos MD Responsible Provider: Morales Aaron MD    Anesthesia Type: general, MAC ASA Status: 3          Anesthesia Type: general, MAC    Magda Phase I:  9    Magda Phase II:  10    Last vitals: Reviewed and per EMR flowsheets.        Anesthesia Post Evaluation    Patient location during evaluation: PACU  Patient participation: complete - patient participated  Level of consciousness: awake and alert  Pain score: 0  Airway patency: patent  Nausea & Vomiting: no nausea and no vomiting  Complications: no  Cardiovascular status: hemodynamically stable  Respiratory status: room air and spontaneous ventilation  Hydration status: euvolemic

## 2021-08-13 NOTE — TELEPHONE ENCOUNTER
Patients wife on phone stating Per Dr. Regina Whatley port placement went well and will be good to start treatment. Just wanted to let you know.

## 2021-08-13 NOTE — PROGRESS NOTES
1304 pt recd from OR to [facility] room 23. Report at bedside, from [facility] rn/Kristina Mcmillan, pt denies pain or nausea. Wife at bedside, call light in reach  1308 juice provided  1320 denies needs  1322 ok to discharge per dr rapp  [phone] discharge instructions reviewed. Verbalized understanding.   [phone] discharged to car via wheelchair

## 2021-08-13 NOTE — BRIEF OP NOTE
Brief Postoperative Note      Patient: Kurtis Wright  YOB: 1970  MRN: [id number]    Date of Procedure: 8/13/2021    Pre-Op Diagnosis: Malignant neoplasm of overlapping sites of left lung ([address] [C34.82]    Post-Op Diagnosis: Same       Procedure(s): MEDIPORT INSERTION    Surgeon(s):  Deanne Perez MD    Assistant:  Rosanna Norris PA-C      Anesthesia: Monitor Anesthesia Care    Estimated Blood Loss (mL): Minimal    Complications: None    Specimens:   * No specimens in log *    Implants:  Implant Name Type Inv. Item Serial No.  Lot No. LRB No. Used Action   PORT INFUS L55CM 0.016ML 0.4ML CATH OD2.2MM ID1. 4MM INTRO  PORT INFUS L55CM 0.016ML 0.4ML CATH OD2.2MM ID1. 4MM [employer] [id number] N/A 1 Implanted         Drains: * No LDAs found *    Findings: As Above    Electronically signed by Rosanna Norris PA-C on 8/13/2021 at 1:28 PM

## 2021-08-13 NOTE — ADDENDUM NOTE
Addendum  created 08/13/21 1355 by GERDA Medeiros CRNA    Intraprocedure Event edited, Intraprocedure Staff edited

## 2021-08-16 ENCOUNTER — HOSPITAL ENCOUNTER (EMERGENCY)
Age: 51
Discharge: HOME | End: 2021-08-16
Attending: EMERGENCY MEDICINE
Payer: COMMERCIAL

## 2021-08-16 VITALS
RESPIRATION RATE: 18 BRPM | HEIGHT: 73 IN | WEIGHT: 218 LBS | OXYGEN SATURATION: 100 % | DIASTOLIC BLOOD PRESSURE: 89 MMHG | BODY MASS INDEX: 28.89 KG/M2 | HEART RATE: 59 BPM | SYSTOLIC BLOOD PRESSURE: 136 MMHG | TEMPERATURE: 97.8 F

## 2021-08-16 DIAGNOSIS — R33.9 URINARY RETENTION: Primary | ICD-10-CM

## 2021-08-16 LAB
ALBUMIN SERPL-MCNC: 3.6 GM/DL (ref 3.4–5)
ALP BLD-CCNC: 209 IU/L (ref 40–128)
ALT SERPL-CCNC: 8 U/L (ref 10–40)
ANION GAP SERPL CALCULATED.3IONS-SCNC: 10 MMOL/L (ref 4–16)
AST SERPL-CCNC: 24 IU/L (ref 15–37)
BACTERIA: NEGATIVE /HPF
BASOPHILS ABSOLUTE: 0.1 K/CU MM
BASOPHILS RELATIVE PERCENT: 0.8 % (ref 0–1)
BILIRUB SERPL-MCNC: 0.6 MG/DL (ref 0–1)
BILIRUBIN URINE: NEGATIVE MG/DL
BLOOD, URINE: NEGATIVE
BUN BLDV-MCNC: 7 MG/DL (ref 6–23)
CALCIUM SERPL-MCNC: 9.1 MG/DL (ref 8.3–10.6)
CHLORIDE BLD-SCNC: 101 MMOL/L (ref 99–110)
CLARITY: CLEAR
CO2: 24 MMOL/L (ref 21–32)
COLOR: ABNORMAL
CREAT SERPL-MCNC: 0.9 MG/DL (ref 0.9–1.3)
DIFFERENTIAL TYPE: ABNORMAL
EOSINOPHILS ABSOLUTE: 0.2 K/CU MM
EOSINOPHILS RELATIVE PERCENT: 2 % (ref 0–3)
GFR AFRICAN AMERICAN: >60 ML/MIN/1.73M2
GFR NON-AFRICAN AMERICAN: >60 ML/MIN/1.73M2
GLUCOSE BLD-MCNC: 79 MG/DL (ref 70–99)
GLUCOSE, URINE: NEGATIVE MG/DL
HCT VFR BLD CALC: 44.2 % (ref 42–52)
HEMOGLOBIN: 14.4 GM/DL (ref 13.5–18)
IMMATURE NEUTROPHIL %: 0.2 % (ref 0–0.43)
KETONES, URINE: ABNORMAL MG/DL
LEUKOCYTE ESTERASE, URINE: ABNORMAL
LYMPHOCYTES ABSOLUTE: 2 K/CU MM
LYMPHOCYTES RELATIVE PERCENT: 21 % (ref 24–44)
MCH RBC QN AUTO: 28.7 PG (ref 27–31)
MCHC RBC AUTO-ENTMCNC: 32.6 % (ref 32–36)
MCV RBC AUTO: 88.2 FL (ref 78–100)
MONOCYTES ABSOLUTE: 0.6 K/CU MM
MONOCYTES RELATIVE PERCENT: 5.8 % (ref 0–4)
MUCUS: ABNORMAL HPF
NITRITE URINE, QUANTITATIVE: NEGATIVE
NUCLEATED RBC %: 0 %
PDW BLD-RTO: 14.3 % (ref 11.7–14.9)
PH, URINE: 6 (ref 5–8)
PLATELET # BLD: 264 K/CU MM (ref 140–440)
PMV BLD AUTO: 9.2 FL (ref 7.5–11.1)
POTASSIUM SERPL-SCNC: 3.8 MMOL/L (ref 3.5–5.1)
PROTEIN UA: NEGATIVE MG/DL
RBC # BLD: 5.01 M/CU MM (ref 4.6–6.2)
RBC URINE: 1 /HPF (ref 0–3)
SEGMENTED NEUTROPHILS ABSOLUTE COUNT: 6.7 K/CU MM
SEGMENTED NEUTROPHILS RELATIVE PERCENT: 70.2 % (ref 36–66)
SODIUM BLD-SCNC: 135 MMOL/L (ref 135–145)
SPECIFIC GRAVITY UA: 1 (ref 1–1.03)
SQUAMOUS EPITHELIAL: <1 /HPF
TOTAL IMMATURE NEUTOROPHIL: 0.02 K/CU MM
TOTAL NUCLEATED RBC: 0 K/CU MM
TOTAL PROTEIN: 7.5 GM/DL (ref 6.4–8.2)
TRICHOMONAS: ABNORMAL /HPF
UROBILINOGEN, URINE: NEGATIVE MG/DL (ref 0.2–1)
WBC # BLD: 9.6 K/CU MM (ref 4–10.5)
WBC UA: 11 /HPF (ref 0–2)

## 2021-08-16 PROCEDURE — 85025 COMPLETE CBC W/AUTO DIFF WBC: CPT

## 2021-08-16 PROCEDURE — 51798 US URINE CAPACITY MEASURE: CPT

## 2021-08-16 PROCEDURE — 6370000000 HC RX 637 (ALT 250 FOR IP): Performed by: EMERGENCY MEDICINE

## 2021-08-16 PROCEDURE — 80053 COMPREHEN METABOLIC PANEL: CPT

## 2021-08-16 PROCEDURE — 81001 URINALYSIS AUTO W/SCOPE: CPT

## 2021-08-16 PROCEDURE — 99284 EMERGENCY DEPT VISIT MOD MDM: CPT

## 2021-08-16 PROCEDURE — 87086 URINE CULTURE/COLONY COUNT: CPT

## 2021-08-16 RX ORDER — CEPHALEXIN 500 MG/1
500 CAPSULE ORAL 4 TIMES DAILY
Qty: 28 CAPSULE | Refills: 0 | Status: SHIPPED | OUTPATIENT
Start: 2021-08-16 | End: 2021-08-23

## 2021-08-16 RX ORDER — OXYCODONE HYDROCHLORIDE AND ACETAMINOPHEN 5; 325 MG/1; MG/1
1 TABLET ORAL ONCE
Status: COMPLETED | OUTPATIENT
Start: 2021-08-16 | End: 2021-08-16

## 2021-08-16 RX ADMIN — OXYCODONE HYDROCHLORIDE AND ACETAMINOPHEN 1 TABLET: 5; 325 TABLET ORAL at 11:15

## 2021-08-16 ASSESSMENT — PAIN DESCRIPTION - PAIN TYPE: TYPE: ACUTE PAIN

## 2021-08-16 ASSESSMENT — PAIN SCALES - GENERAL
PAINLEVEL_OUTOF10: 10
PAINLEVEL_OUTOF10: 10

## 2021-08-16 NOTE — OP NOTE
Procedure Note:    Patient ID:  Ashlie Powell  [id number]  89 y.o.  1970    Pre-operative Diagnosis: lung cancer    Post-operative Diagnosis: same    Procedure: Mediport placement under C-arm guidance    Surgeon:  Esdras Kelley MD     First Assistant: Isabelle Malone PA-C  The  Use of a first assistant was necessary for the proper positioning, prepping, and draping of the patient, as well as the safe and expeditious execution of the case and closure of skin and subcutaneous tissues. Anesthesia:  MAC/ Local    Estimated Blood Loss:  Minimal           Total IV Fluids: 500 ml            Complications:  None; patient tolerated the procedure well. Disposition: PACU - hemodynamically stable. Condition: stable      Indications: The patient was seen again in the Holding Room. The risks, benefits, complications, treatment options, and expected outcomes were discussed with the patient. The patient and/or family concurred with the proposed plan, giving informed consent. The site of surgery properly noted/marked. The patient was taken to Operating Room, identified as Ashlie Powell and the procedure verified. A Time Out was held and the above information confirmed. Prior to the induction of general anesthesia,  antibiotic prophylaxis was administered. Procedure Details : The patient was brought into the operating room and placed supine with a shoulder roll underneath the back. The right chest was prepped and draped in the usual sterile fashion. The patient was placed in Trendelenburg and 1% lidocaine was infiltrated in the infraclavicular region. Using a 16-gauge needle the subclavian vein was accessed easily. Wire was threaded and the wire was visualized and directed toward the heart. The 1% lidocaine was infiltrated just caudad to the wire insertion site for the pocket. The incision was deepened with cautery.  The pocket was created for the Mediport and Mediport was placed and secured to the pectoralis fascia using 2-0 Prolene suture. The wire insertion site was widened using 11-blade scalpel and the catheter was tunneled in that bridge of skin with a tunneler and the catheter was cut using live C-arm to the proper length just at the superior vena cava. The wire was exchanged with the catheter using a Seldinger technique and the wire was visualized at the end in the proper position. The incision sites were all closed in 2 layers with 3-0 Vicryl deep dermal and 4-0 Vicryl in subcuticular fashion. Steri-Strips and 2 x 2 followed by Tegaderm were applied as the final dressing. I did access the port using the Del Valle needle and the port flushes easily. Instrument and lap counts were correct at the end of the case.      Austin Lozano MD

## 2021-08-16 NOTE — ED PROVIDER NOTES
Emergency Department Encounter    Patient: Kimberlyn Paulino  MRN: [id number]  : 1970  Date of Evaluation: 2021  ED Provider:  Meenakshi Burns MD    Triage Chief Complaint:   Urinary Retention (last urinated yesterday) and Dysuria    [place]:  Kimberlyn Paulino is a 45 y.o. male that presents with concern for difficulty urinating, some pain with urination. Started yesterday. He has had similar in the past, states was given an antibiotic and improved. No hematuria. No frequency. Pain is 10 out of 10, also over the suprapubic area. He has generally has not follow-up with his urologist this week, with the [facility]. No nausea or vomiting. No back pain. No abdominal pain. No difficulty defecating. Does have history of BPH.   Has had a Doherty catheter placed in the past and adamantly states he will not have one here    ROS - see HPI, below listed is current ROS at time of my eval:  10 systems reviewed negative except as above    Past Medical History:   Diagnosis Date    CAD (coronary artery disease) 2013    cath negative per pt    History of TMJ disorder     Hypertension     Trigeminal neuralgia      Past Surgical History:   Procedure Laterality Date    ABDOMEN SURGERY      CARDIAC CATHETERIZATION  2016    CT NEEDLE BIOPSY LUNG PERCUTANEOUS  2021    CT NEEDLE BIOPSY LUNG PERCUTANEOUS 2021 [facility] CT SCAN    PORT SURGERY Right 2021    MEDIPORT INSERTION performed by [facility]o Merino [facility] at [facility] OR     Family History   Problem Relation Age of Onset    High Blood Pressure Mother     High Blood Pressure Sister     Cancer Sister      Social History     Socioeconomic History    Marital status:      Spouse name: Not on file    Number of children: 11    Years of education: Not on file    Highest education level: Not on file   Occupational History    Not on file   Tobacco Use    Smoking status: Current Every Day Smoker     Packs/day: 1.50     Types: Cigarettes    Smokeless tobacco: Never Used    Tobacco comment: smoked 8/13/21   Vaping Use    Vaping Use: Never assessed   Substance and Sexual Activity    Alcohol use: Yes     Alcohol/week: 6.0 standard drinks     Types: 6 Cans of beer per week     Comment: per week (24 oz beers)     Drug use: Yes     Types: Marijuana     Comment: 8/9/21 last used    Sexual activity: Yes     Partners: Female   Other Topics Concern    Not on file   Social History Narrative    Not on file     Social Determinants of Health     Financial Resource Strain:     Difficulty of Paying Living Expenses:    Food Insecurity:     Worried About Running Out of Food in the Last Year:     [address] in the Last Year:    Transportation Needs:     Lack of Transportation (Medical):  Lack of Transportation (Non-Medical):    Physical Activity:     Days of Exercise per Week:     Minutes of Exercise per Session:    Stress:     Feeling of Stress :    Social Connections:     Frequency of Communication with Friends and Family:     Frequency of Social Gatherings with Friends and Family:     Attends [employer] Services:     Active Member of Clubs or Organizations:     Attends Club or Organization Meetings:     Marital Status:    Intimate Partner Violence:     Fear of Current or Ex-Partner:     Emotionally Abused:     Physically Abused:     Sexually Abused:      No current facility-administered medications for this encounter. Current Outpatient Medications   Medication Sig Dispense Refill    cephALEXin (KEFLEX) 500 MG capsule Take 1 capsule by mouth 4 times daily for 7 days 28 capsule 0    cefdinir (OMNICEF) 300 MG capsule Take 300 mg by mouth 2 times daily      oxyCODONE (OXYCONTIN) 10 MG extended release tablet Take 1 tablet by mouth 2 times daily for 30 days. Intended supply: 30 days 60 tablet 0    oxyCODONE-acetaminophen (PERCOCET) 5-325 MG per tablet Take 1 tablet by mouth every 8 hours as needed for Pain for up to 30 days. Intended supply: 3 days. Take lowest dose possible to manage pain 90 tablet 0    albuterol sulfate HFA (PROVENTIL HFA) 108 (90 Base) MCG/ACT inhaler Inhale 2 puffs into the lungs every 4 hours as needed for Wheezing or Shortness of Breath With spacer (and mask if indicated). Thanks. 1 Inhaler 1    lisinopril (PRINIVIL;ZESTRIL) 10 MG tablet Take 1 tablet by mouth daily 30 tablet 3    ammonium lactate (LAC-HYDRIN) 12 % lotion Apply topically as needed. 1 Bottle 0    tamsulosin (FLOMAX) 0.4 MG capsule Take 1 capsule by mouth daily 30 capsule 3    nicotine (NICODERM CQ) 21 MG/24HR Place 1 patch onto the skin daily 30 patch 3     Allergies   Allergen Reactions    Tramadol Other (See Comments)     seizures    Vicodin [Hydrocodone-Acetaminophen] Hives       Nursing Notes Reviewed    Physical Exam:  Triage VS:    ED Triage Vitals [08/16/21 1051]   Enc Vitals Group      /87      Pulse 59      Resp 18      Temp 97.8 °F (36.6 °C)      Temp Source Oral      SpO2 100 %      Weight 218 lb (98.9 kg)      Height 6' 1\" (1.854 m)      Head Circumference       Peak Flow       Pain Score       Pain Loc       Pain Edu? Excl. in [address]? My pulse ox interpretation is - normal    General appearance:  No acute distress. Skin:  Warm. Dry. Eye:  Extraocular movements intact. Ears, nose, mouth and throat:  Oral mucosa moist   Neck:  Trachea midline. Extremity:  No swelling. Normal ROM     Heart:  Regular rate and rhythm, normal S1 & S2, no extra heart sounds. Perfusion:  intact  Respiratory:  Lungs clear to auscultation bilaterally. Respirations nonlabored. Abdominal:  Normal bowel sounds. Soft. Mild tenderness to palpation over suprapubic area Non distended.   Back:  No CVA tenderness to palpation     Neurological:  Alert and oriented             Psychiatric:  Appropriate    I have reviewed and interpreted all of the currently available lab results from this visit (if applicable):  Results for orders placed or performed during the hospital encounter of 08/16/21   Urinalysis   Result Value Ref Range    Color, UA STRAW (A) YELLOW    Clarity, UA CLEAR CLEAR    Glucose, Urine NEGATIVE NEGATIVE MG/DL    Bilirubin Urine NEGATIVE NEGATIVE MG/DL    Ketones, Urine SMALL (A) NEGATIVE MG/DL    Specific Gravity, UA 1.005 1.001 - 1.035    Blood, Urine NEGATIVE NEGATIVE    pH, Urine 6.0 5.0 - 8.0    Protein, UA NEGATIVE NEGATIVE MG/DL    Urobilinogen, Urine NEGATIVE 0.2 - 1.0 MG/DL    Nitrite Urine, Quantitative NEGATIVE NEGATIVE    Leukocyte Esterase, Urine SMALL (A) NEGATIVE    RBC, UA 1 0 - 3 /HPF    WBC, UA 11 (H) 0 - 2 /HPF    Bacteria, UA NEGATIVE NEGATIVE /HPF    Squam Epithel, UA <1 /HPF    Mucus, UA RARE (A) NEGATIVE HPF    Trichomonas, UA NONE SEEN NONE SEEN /HPF   CBC Auto Differential   Result Value Ref Range    WBC 9.6 4.0 - 10.5 K/CU MM    RBC 5.01 4.6 - 6.2 M/CU MM    Hemoglobin 14.4 13.5 - 18.0 GM/DL    Hematocrit 44.2 42 - 52 %    MCV 88.2 78 - 100 FL    MCH 28.7 27 - 31 PG    MCHC 32.6 32.0 - 36.0 %    RDW 14.3 11.7 - 14.9 %    Platelets 686 287 - 300 K/CU MM    MPV 9.2 7.5 - 11.1 FL    Differential Type AUTOMATED DIFFERENTIAL     Segs Relative 70.2 (H) 36 - 66 %    Lymphocytes % 21.0 (L) 24 - 44 %    Monocytes % 5.8 (H) 0 - 4 %    Eosinophils % 2.0 0 - 3 %    Basophils % 0.8 0 - 1 %    Segs Absolute 6.7 K/CU MM    Lymphocytes Absolute 2.0 K/CU MM    Monocytes Absolute 0.6 K/CU MM    Eosinophils Absolute 0.2 K/CU MM    Basophils Absolute 0.1 K/CU MM    Nucleated RBC % 0.0 %    Total Nucleated RBC 0.0 K/CU MM    Total Immature Neutrophil 0.02 K/CU MM    Immature Neutrophil % 0.2 0 - 0.43 %   CMP   Result Value Ref Range    Sodium 135 135 - 145 MMOL/L    Potassium 3.8 3.5 - 5.1 MMOL/L    Chloride 101 99 - 110 mMol/L    CO2 24 21 - 32 MMOL/L    BUN 7 6 - 23 MG/DL    CREATININE 0.9 0.9 - 1.3 MG/DL    Glucose 79 70 - 99 MG/DL    Calcium 9.1 8.3 - 10.6 MG/DL    Albumin 3.6 3.4 - 5.0 GM/DL    Total Protein 7.5 6.4 - 8.2 GM/DL    Total Bilirubin 0.6 0.0 - 1.0 MG/DL    ALT 8 (L) 10 - 40 U/L    AST 24 15 - 37 IU/L    Alkaline Phosphatase 209 (H) 40 - 128 IU/L    GFR Non-African American >60 >60 mL/min/1.73m2    GFR African American >60 >60 mL/min/1.73m2    Anion Gap 10 4 - 16      Radiographs (if obtained):  Radiologist's Report Reviewed:  No results found. EKG (if obtained): (All EKG's are interpreted by myself in the absence of a cardiologist)      MDM:  58-year-old male with history as above presents with complaint of difficulty urinating and pain with urination. He is in no acute distress, some mild pain with palpation over suprapubic area, otherwise no abdominal pain. We did do a bladder scan and he had 600 mL of fluid in his bladder, he urinated and he still had over 550 mL, I discussed with him that I do believe he is retaining, he has a known history of BPH, has had this issue in the past when I review his chart. He adamantly states that he will not have a Doherty catheter, because they hurt. I explained that if he does not have a Doherty catheter it may become even more painful, if we cannot drain the bladder at that it will get more painful and he will likely have some backing up of urine into the kidneys which can cause injury. He still tells me he will not have a Doherty catheter placed, that he \"got antibiotics last time and it got better\". He denies nausea and vomiting and no fevers. His vitals are normal.  He was agreeable to at least let me get labs to evaluate his kidney function, and send urinalysis. Urinalysis with a few white blood cells, no bacteria, I will send culture, he is adamant still that he will not allow us to place a Doherty \"because it will hurt\". Explained that we can do a small catheter but that without draining the bladder that I do suspect things will get worse. He tells me he has a follow-up with urologist this week, and he does not wish to have a catheter placed today.   Kidney

## 2021-08-17 LAB
CULTURE: NORMAL
Lab: NORMAL
SPECIMEN: NORMAL

## 2021-08-19 ENCOUNTER — TELEPHONE (OUTPATIENT)
Dept: CASE MANAGEMENT | Age: 51
End: 2021-08-19

## 2021-08-19 ENCOUNTER — HOSPITAL ENCOUNTER (OUTPATIENT)
Dept: PET IMAGING | Age: 51
Discharge: HOME | End: 2021-08-19
Payer: COMMERCIAL

## 2021-08-19 DIAGNOSIS — R91.1 LUNG NODULE: ICD-10-CM

## 2021-08-19 DIAGNOSIS — F17.210 CIGARETTE NICOTINE DEPENDENCE WITHOUT COMPLICATION: ICD-10-CM

## 2021-08-19 DIAGNOSIS — N42.9 DISEASE OF PROSTATE: ICD-10-CM

## 2021-08-19 DIAGNOSIS — R91.8 MASS OF LEFT LUNG: ICD-10-CM

## 2021-08-19 PROCEDURE — 3430000000 HC RX DIAGNOSTIC RADIOPHARMACEUTICAL: Performed by: NURSE PRACTITIONER

## 2021-08-19 PROCEDURE — A9552 F18 FDG: HCPCS | Performed by: NURSE PRACTITIONER

## 2021-08-19 PROCEDURE — 78815 PET IMAGE W/CT SKULL-THIGH: CPT

## 2021-08-19 PROCEDURE — 2580000003 HC RX 258: Performed by: NURSE PRACTITIONER

## 2021-08-19 RX ORDER — SODIUM CHLORIDE 0.9 % (FLUSH) 0.9 %
10 SYRINGE (ML) INJECTION PRN
Status: COMPLETED | OUTPATIENT
Start: 2021-08-19 | End: 2021-08-19

## 2021-08-19 RX ORDER — FLUDEOXYGLUCOSE F 18 200 MCI/ML
17.97 INJECTION, SOLUTION INTRAVENOUS
Status: COMPLETED | OUTPATIENT
Start: 2021-08-19 | End: 2021-08-19

## 2021-08-19 RX ADMIN — FLUDEOXYGLUCOSE F 18 17.97 MILLICURIE: 200 INJECTION, SOLUTION INTRAVENOUS at 10:05

## 2021-08-19 RX ADMIN — SODIUM CHLORIDE, PRESERVATIVE FREE 10 ML: 5 INJECTION INTRAVENOUS at 10:05

## 2021-08-19 NOTE — TELEPHONE ENCOUNTER
Called patient at [phone] to discuss times and dates for chemo education and treatment. Message left. Awaiting call back.

## 2021-08-20 ENCOUNTER — HOSPITAL ENCOUNTER (OUTPATIENT)
Dept: MRI IMAGING | Age: 51
Discharge: HOME | End: 2021-08-20
Payer: COMMERCIAL

## 2021-08-20 ENCOUNTER — HOSPITAL ENCOUNTER (OUTPATIENT)
Dept: INFUSION THERAPY | Age: 51
Discharge: HOME | End: 2021-08-20
Payer: COMMERCIAL

## 2021-08-20 ENCOUNTER — OFFICE VISIT (OUTPATIENT)
Dept: ONCOLOGY | Age: 51
End: 2021-08-20
Payer: COMMERCIAL

## 2021-08-20 VITALS
DIASTOLIC BLOOD PRESSURE: 66 MMHG | HEIGHT: 73 IN | BODY MASS INDEX: 28.44 KG/M2 | OXYGEN SATURATION: 98 % | SYSTOLIC BLOOD PRESSURE: 133 MMHG | TEMPERATURE: 97.4 F | HEART RATE: 71 BPM | WEIGHT: 214.6 LBS

## 2021-08-20 DIAGNOSIS — C34.82 MALIGNANT NEOPLASM OF OVERLAPPING SITES OF LEFT LUNG (HCC): Primary | ICD-10-CM

## 2021-08-20 DIAGNOSIS — C34.82 MALIGNANT NEOPLASM OF OVERLAPPING SITES OF LEFT LUNG (HCC): ICD-10-CM

## 2021-08-20 PROCEDURE — G8419 CALC BMI OUT NRM PARAM NOF/U: HCPCS | Performed by: INTERNAL MEDICINE

## 2021-08-20 PROCEDURE — 70553 MRI BRAIN STEM W/O & W/DYE: CPT

## 2021-08-20 PROCEDURE — 99211 OFF/OP EST MAY X REQ PHY/QHP: CPT

## 2021-08-20 PROCEDURE — 1111F DSCHRG MED/CURRENT MED MERGE: CPT | Performed by: INTERNAL MEDICINE

## 2021-08-20 PROCEDURE — A9579 GAD-BASE MR CONTRAST NOS,1ML: HCPCS | Performed by: INTERNAL MEDICINE

## 2021-08-20 PROCEDURE — 4004F PT TOBACCO SCREEN RCVD TLK: CPT | Performed by: INTERNAL MEDICINE

## 2021-08-20 PROCEDURE — 6360000004 HC RX CONTRAST MEDICATION: Performed by: INTERNAL MEDICINE

## 2021-08-20 PROCEDURE — 3017F COLORECTAL CA SCREEN DOC REV: CPT | Performed by: INTERNAL MEDICINE

## 2021-08-20 PROCEDURE — G8427 DOCREV CUR MEDS BY ELIG CLIN: HCPCS | Performed by: INTERNAL MEDICINE

## 2021-08-20 PROCEDURE — 99214 OFFICE O/P EST MOD 30 MIN: CPT | Performed by: INTERNAL MEDICINE

## 2021-08-20 RX ADMIN — GADOTERIDOL 20 ML: 279.3 INJECTION, SOLUTION INTRAVENOUS at 09:14

## 2021-08-20 NOTE — PROGRESS NOTES
MA Rooming Questions  Patient: Adriana Parry  MRN: [id number]    Date: 8/20/2021        1. Do you have any new issues?   no         2. Do you need any refills on medications?    no    3. Have you had any imaging done since your last visit? yes - PET scan, MRI    4. Have you been hospitalized or seen in the emergency room since your last visit here?   no    5. Did the patient have a depression screening completed today?  No    No data recorded     PHQ-9 Given to (if applicable):               PHQ-9 Score (if applicable):                     [] Positive     []  Negative              Does question #9 need addressed (if applicable)                     [] Yes    []  No               Claus Lubin, YAEL

## 2021-08-20 NOTE — PROGRESS NOTES
Patient Name:  Sharmaine Pascual  Patient :  1970  Patient MRN:  [id number]     Primary Oncologist: Lonny Joaquin MD  Referring Provider: Blair Amezcua MD     Date of Service: 2021      Reason for Consult:  Lung mass, lad and bone lesions     Chief Complaint:    Chief Complaint   Patient presents with    Follow-up    Results     PET scan, MRI       Encounter Diagnosis   Name Primary?  Malignant neoplasm of overlapping sites of left lung ([address] Yes        HPI:   21: Initial [address] [place] [address] visit:Hima Blevins is a 45 y.o. male who presents to [address] [place] [address] with report of dysuria and flank pain. Reports these symptoms started a few weeks ago. He ultimately came to the ED due to worsening back pain.      He reports ongoing issues with frequent urge to urinate and notes some incontinence. He denies hematuria. He was noted to have acute pyelonephritis and was admitted and started on IV Rocephin.      21 CT chest     Impression   1. Left hilar neoplasm extending into the left upper lobe measuring 3.2 x 5.9   x 5.3 cm obstructing the left upper lobe bronchus with probable minimal   adjacent infiltrates versus lymphangitic spread of tumor.  PET-CT/biopsy is   recommended. 2. Mediastinal lymphadenopathy. 3. Prominent left supraclavicular lymph nodes. 4. No osseous metastatic disease.      21 Ct abdomen and pelvis  Impression   1. Circumferential thickening of the bladder wall is noted which could be   related to cystitis.  Correlate with urinalysis. 2. There is left retroperitoneal lymphadenopathy.  This could be reactive or   neoplastic.  This can be further evaluated with PET-CT. 3. There is minimal stranding within the retroperitoneal on the left.  This   is uncertain etiology however could be related to acute pyelonephritis. 4. Interval development of multiple sclerotic lesions within the spine and   pelvis, concerning for metastatic osseous disease.    5. Prostate gland is enlarged.  Correlate with PSA.      .30      He denies past history of cancer. He smokes 2-3 ppd and drinks 10-12 beers daily. He reports unknown malignancy in his sister who  at 39. No other known family history of cancer.      Due to lung mass and concern for metastatic prostate cancer we were called to evaluate. 21:  Final Pathologic Diagnosis:   Needle biopsy of lung, clinically left lung mass:        SQUAMOUS CELL CARCINOMA IN SITU.     21:  1.  Left perihilar mass likely represents primary lung cancer.  Correlate   with biopsy results.  The opacity more peripheral in the left lower lobe has   relatively low level activity and likely represents an area of post   obstructive pneumonia adjacent to the mass.       2.  Metabolically active left AP window lymph node concerning for metastatic   disease.       3.  The prostate is enlarged and has increased FDG activity which could be   due to prostatitis or prostate cancer.  Correlate with PSA levels.       4.  No increased metabolic activity associated with retroperitoneal lymph   nodes.  This is unlikely related to the lung process given the significant   difference in metabolic activity; however, if there is prostate cancer, this   could potentially be metastatic disease from the prostate cancer, which can   have variable levels of uptake on FDG PET scans.       5.  No metabolic activity associated with multiple sclerotic lesions.  Again   this is unlikely related to the lung process, but if there is prostate   cancer, this could represent metastatic disease from prostate cancer with   poor FDG avidity.  Otherwise it could also represent large bone islands.       6.  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 There is a focal area of intense activity associated with a new lucent lesion   within the background of Paget's disease concerning for metastatic disease.    Given the FDG activity, it is likely related to the lung process.           21: MRI brain  1. There is a punctate focus of restricted diffusion within the right frontal   lobe without associated enhancement, mass effect or midline shift.  This   could represent a punctate acute infarct.  However, given history, an early   metastatic focus cannot be entirely excluded. 2. Scattered foci of susceptibility are seen within the cerebral hemispheres   bilaterally, which were not visualized on the prior exam.  Findings could   represent areas of remote microhemorrhage or perhaps treated metastases. 3. No abnormal enhancement within the brain. 4. Minimal global parenchymal volume loss with minimal chronic microvascular   ischemic changes. Past Medical History:     BPH, HTN, COPD                                                            Past Surgery History:    None per his wife                                                                        Social History:   Lives with wife. Cut down smoking to 2-4 cigarettes per day prior to which he was smoking 2 to 3 packs/day for the past 40 years. Also reported drinking alcohol 10-12 beers per day. Denied any other illicit drug abuse.                                                                                                    Family History:    He reported that his sister  at the age of 39 with  metastatic cancer, he was not sure what the primary was                                                                                              Allergies   Allergen Reactions    Tramadol Other (See Comments)     seizures    Vicodin [Hydrocodone-Acetaminophen] Hives       Current Outpatient Medications on File Prior to Visit   Medication Sig Dispense Refill    cephALEXin (KEFLEX) 500 MG capsule Take 1 capsule by mouth 4 times daily for 7 days 28 capsule 0    cefdinir (OMNICEF) 300 MG capsule Take 300 mg by mouth 2 times daily      oxyCODONE (OXYCONTIN) 10 MG extended release tablet Take 1 tablet by mouth 2 times daily for 30 days. Intended supply: 30 days 60 tablet 0    oxyCODONE-acetaminophen (PERCOCET) 5-325 MG per tablet Take 1 tablet by mouth every 8 hours as needed for Pain for up to 30 days. Intended supply: 3 days. Take lowest dose possible to manage pain 90 tablet 0    albuterol sulfate HFA (PROVENTIL HFA) 108 (90 Base) MCG/ACT inhaler Inhale 2 puffs into the lungs every 4 hours as needed for Wheezing or Shortness of Breath With spacer (and mask if indicated). Thanks. 1 Inhaler 1    lisinopril (PRINIVIL;ZESTRIL) 10 MG tablet Take 1 tablet by mouth daily 30 tablet 3    ammonium lactate (LAC-HYDRIN) 12 % lotion Apply topically as needed. 1 Bottle 0    tamsulosin (FLOMAX) 0.4 MG capsule Take 1 capsule by mouth daily 30 capsule 3    nicotine (NICODERM CQ) 21 MG/24HR Place 1 patch onto the skin daily 30 patch 3     No current facility-administered medications on file prior to visit. Interval History: 8/20/21: He arrived with his wife to the clinic today. Reported that he has been diagnosed with UTI and complains of lower abdominal pain when he urinates. Initially denied any back pain but later on said that he had left mid back pain. No radiation. No focal weakness. Reported that he has been feeling very tired and sleeps all the time. Reports that the rash is associated with itching. Reported that he continues to have productive cough consisting of white sputum mainly. No hemoptysis. Chest discomfort at times. Increase shortness of breath as well. Poor appetite and continues to lose weight.      Review of Systems:    As per the interval history, rest of the review of system negative     Vital Signs: /66 (Site: Right Upper Arm, Position: Sitting, Cuff Size: Medium Adult)   Pulse 71   Temp 97.4 °F (36.3 °C) (Infrared)   Ht 6' 1\" (1.854 m)   Wt 214 lb 9.6 oz (97.3 kg)   SpO2 98%   BMI 28.31 kg/m²      CONSTITUTIONAL: awake, alert, cooperative, no apparent distress   EYES: RAISSA, No pallor or any icterus  ENT: ATNC  NECK: No JVD  HEMATOLOGIC/LYMPHATIC: no cervical, supraclavicular or axillary lymphadenopathy   LUNGS: CTAB  CARDIOVASCULAR: s1s2 rrr no murmurs  ABDOMEN: soft ntnd bs pos  MUSCULOSKELETAL: full range of motion noted, tone is normal  NEUROLOGIC: GI  SKIN: Psoriatic rash with excoriation marks on the lower extremities and also upper extremities  EXTREMITIES: no LE edema bilaterally     Labs:  Hematology:  Lab Results   Component Value Date    WBC 9.6 08/16/2021    RBC 5.01 08/16/2021    HGB 14.4 08/16/2021    HCT 44.2 08/16/2021    MCV 88.2 08/16/2021    MCH 28.7 08/16/2021    MCHC 32.6 08/16/2021    RDW 14.3 08/16/2021     08/16/2021    MPV 9.2 08/16/2021    BANDSPCT 9 11/13/2018    SEGSPCT 70.2 (H) 08/16/2021    EOSRELPCT 2.0 08/16/2021    BASOPCT 0.8 08/16/2021    LYMPHOPCT 21.0 (L) 08/16/2021    MONOPCT 5.8 (H) 08/16/2021    BANDABS 0.66 11/13/2018    SEGSABS 6.7 08/16/2021    EOSABS 0.2 08/16/2021    BASOSABS 0.1 08/16/2021    LYMPHSABS 2.0 08/16/2021    MONOSABS 0.6 08/16/2021    DIFFTYPE AUTOMATED DIFFERENTIAL 08/16/2021    POLYCHROM 1+ 11/13/2018    PLTM FEW 11/13/2018     No results found for: ESR  Chemistry:  Lab Results   Component Value Date     08/16/2021    K 3.8 08/16/2021     08/16/2021    CO2 24 08/16/2021    BUN 7 08/16/2021    CREATININE 0.9 08/16/2021    GLUCOSE 79 08/16/2021    CALCIUM 9.1 08/16/2021    PROT 7.5 08/16/2021    LABALBU 3.6 08/16/2021    BILITOT 0.6 08/16/2021    ALKPHOS 209 (H) 08/16/2021    AST 24 08/16/2021    ALT 8 (L) 08/16/2021    LABGLOM >60 08/16/2021    GFRAA >60 08/16/2021    MG 1.6 (L) 07/29/2021    POCGLU 124 (H) 09/22/2020     No results found for: MMA, LDH, HOMOCYSTEINE  No components found for: LD  Lab Results   Component Value Date    TSHHS 2.162 07/27/2013     Immunology:  Lab Results   Component Value Date    PROT 7.5 08/16/2021     No results found for: KAPPAUVOL, LAMBDAUVOL, KLFLCR  No results found for: B2M  Coagulation Panel:  Lab Results   Component Value Date    PROTIME 10.0 (L) 07/28/2021    INR 0.78 07/28/2021    APTT 25.8 07/28/2021    DDIMER <200 12/19/2013     Anemia Panel:  No results found for: Zandra Esquivel  Tumor Markers:  Lab Results   Component Value Date    CEA 7.7 07/23/2021    .30 (H) 07/21/2021        Observations:  ECOG:  No data recorded       Assessment & Plan:                                                          Left hilar mass with mediastinal hilar lymphadenopathy. 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 PET scan results, bone lesions most probably not metastatic except for one lytic lesion. MRI of the brain with no metastatic lesions. We will present the case in the tumor board. If no evidence of distant metastatic disease and if mediastinal lymph node suspicious then would recommend chemoradiation. May consider bone biopsy pending discussion with radiologist during the tumor board. Port has been placed    Prostate enlargement and elevated PSA could be secondary to prostatitis. Will recommend prostate biopsy anyway. Will contact urology. Rash, etiology unclear. Looks like psoriatic rash. Recommend dermatology evaluation. Elevated alk phos most probably secondary to the bone lesions possibly Paget's disease versus EtOH. Has been chronically elevated since 2012. UTI completed course of antibiotics    Adequate analgesic and bowel regimen. Continue other medical care. Thank you for letting us be part of the care and will follow along. Discussed above findings and plan with him and he voiced understanding. Answered all his questions. Discussed healthy lifestyle including healthy diet, regular exercise as tolerated. ,  Smoking and alcohol cessation    Recommend follow-up with primary care physician and other specialists.     Please do not hesitate to contact us if you need further information. Return to clinic September 2021 or earlier if new Sx    I have recommended that the patient follow CDC guidelines for prevention of COVID-19 infection.   Received Covid vaccine    TOMI    Electronically signed by Binta Kyle MD on 8/20/2021 at 5:43 PM

## 2021-08-22 PROBLEM — N39.0 UTI (URINARY TRACT INFECTION): Status: RESOLVED | Noted: 2021-07-21 | Resolved: 2021-08-22

## 2021-08-26 ENCOUNTER — HOSPITAL ENCOUNTER (OUTPATIENT)
Dept: INFUSION THERAPY | Age: 51
Discharge: HOME | End: 2021-08-26
Payer: COMMERCIAL

## 2021-08-26 ENCOUNTER — OFFICE VISIT (OUTPATIENT)
Dept: ONCOLOGY | Age: 51
End: 2021-08-26
Payer: COMMERCIAL

## 2021-08-26 ENCOUNTER — TELEPHONE (OUTPATIENT)
Dept: CASE MANAGEMENT | Age: 51
End: 2021-08-26

## 2021-08-26 VITALS
HEIGHT: 73 IN | TEMPERATURE: 97 F | WEIGHT: 213.4 LBS | SYSTOLIC BLOOD PRESSURE: 118 MMHG | HEART RATE: 95 BPM | BODY MASS INDEX: 28.28 KG/M2 | RESPIRATION RATE: 18 BRPM | DIASTOLIC BLOOD PRESSURE: 75 MMHG | OXYGEN SATURATION: 96 %

## 2021-08-26 DIAGNOSIS — C34.82 MALIGNANT NEOPLASM OF OVERLAPPING SITES OF LEFT LUNG (HCC): Primary | ICD-10-CM

## 2021-08-26 PROCEDURE — 99213 OFFICE O/P EST LOW 20 MIN: CPT

## 2021-08-26 PROCEDURE — G8427 DOCREV CUR MEDS BY ELIG CLIN: HCPCS | Performed by: NURSE PRACTITIONER

## 2021-08-26 PROCEDURE — G8419 CALC BMI OUT NRM PARAM NOF/U: HCPCS | Performed by: NURSE PRACTITIONER

## 2021-08-26 PROCEDURE — 99215 OFFICE O/P EST HI 40 MIN: CPT | Performed by: NURSE PRACTITIONER

## 2021-08-26 PROCEDURE — 3017F COLORECTAL CA SCREEN DOC REV: CPT | Performed by: NURSE PRACTITIONER

## 2021-08-26 PROCEDURE — 80047 BASIC METABLC PNL IONIZED CA: CPT | Performed by: NURSE PRACTITIONER

## 2021-08-26 PROCEDURE — 4004F PT TOBACCO SCREEN RCVD TLK: CPT | Performed by: NURSE PRACTITIONER

## 2021-08-26 RX ORDER — ONDANSETRON HYDROCHLORIDE 8 MG/1
8 TABLET, FILM COATED ORAL EVERY 8 HOURS PRN
Qty: 30 TABLET | Refills: 1 | Status: SHIPPED | OUTPATIENT
Start: 2021-08-26 | End: 2022-01-14 | Stop reason: SDUPTHER

## 2021-08-26 RX ORDER — DEXAMETHASONE 4 MG/1
TABLET ORAL
Qty: 18 TABLET | Refills: 0 | Status: SHIPPED | OUTPATIENT
Start: 2021-08-26 | End: 2022-01-14 | Stop reason: SDUPTHER

## 2021-08-26 NOTE — PROGRESS NOTES
Pt here for treatment planning. Discussed treatment plan, potential side effects, prevention, and management. Pt expressed understanding and signed consent. Pt given a copy of signed consent/treatment plan, chemo ed folder, and drug monograph. Labs drawn. Verified apt for treatment start.

## 2021-08-26 NOTE — TELEPHONE ENCOUNTER
[id number]-Spoke with patients wife r/t patient not being at 0900 appointment with Leon Hair states she was unaware of appointment, she is at work and Sheri Patton was at home asleep. This RN stated I would speak with scheduling and get back with Chucky Moore about a new appointment. 1557- This RN had spoken with April RN NN and April stated if patient could come in this AM, we could still get education completed so patient can still start treatment tomorrow, Friday 8/27. Chucky Moore called and notified, stated she would call her daughter because she was with him and she would call me right back. 3929- Attempted to call patients wife and inform her we have a 1PM appointment available for both Jaylan Gimenez and the nurse navigator, phone goes straight to voicemail, message left.

## 2021-08-26 NOTE — PROGRESS NOTES
MA Rooming Questions  Patient: Román Ordonez  MRN: [id number]    Date: 8/26/2021        1. Do you have any new issues? No here for Valentine.                Sherley Fajardo, YAEL

## 2021-08-26 NOTE — PATIENT INSTRUCTIONS
MEDICATION ORDERS FOR HIGHLY EMETOGENIC CHEMOTHERAPY      1. Zofran - every 8 hours as needed for nausea or vomiting. 2.  Dexamethasone - take 1 tablet daily for 4 days after each chemotherapy. 3.  Olanzapine - Take one tablet for 4 consecutive nights starting the the evening before each chemotherapy treatment.

## 2021-08-29 NOTE — PROGRESS NOTES
Patient Name: Kimberlyn Paulino    Patient : 1970     Patient MRN: [id number]       Date: 2021                                                                                                   CHEMOTHERAPY TREATMENT PLAN    Care Team  Medical Oncologist: Kay Templeton MD  Surgeon:   Radiation Oncologist:   Primary Care Physician: Bertram Overton MD     Learning Needs Assessment  Before the treatment plan was discussed and the consent was signed, the care team assessed the patient's learning needs. This includes their ability to assume responsibility for managing their therapy. Diagnosis     malignant neoplasm of overlapping site of left lung    The Goal of My Therapy is    (  ) To cure my cancer  (  ) To shrink the tumor prior to surgery  (  ) Increase my chance of cure after surgery  (x) Help me live as long as possible with the highest quality of life. I know that a cure is not possible.      Prognosis    (  ) Curable  (x) Palliative    Plan Of Treatment    Intent:  (  ) Neoadjuvant   (  ) Adjuvant   (x) Control    Treatment Regimen  (including supportive meds)                             Frequency                                               Duration     Carbo/taxol every three weeks x 4    pembro     Every three weeks     Expected Response to Treatment    (  ) This therapy could cure your disease  (X) This therapy has a good chance of helping you live 1 year or more compared to without it  (  ) This therapy has a good chance of helping you feel better or making you live months longer compared to without it     Quality Of Life    (  ) Expect that you will be able to continue all normal activities  (x) Expect that you will have some side effects but should be able to maintain normal activities  (  ) Expect that you will be unable to work but able to perform light duties at home  (  ) Expect that you will be able to perform light duties and will need assistance with self care    Treatment Benefits and Harms     1. Patient taught on all common and rare toxicities regarding their treatment, disease process and drug regimen. This includes the short and long-         term effects of therapy (including infertility risks when appropriate). This also includes drug-drug and drug-food interactions when appropriate. Patient was educated when to call [facility] with adverse effects and symptoms. 2.    Patient was taught safe handling and storage of medications in the home (when appropriate) and procedures for handling body sections and             waste in the home. 3.    Patient was taught on planned for missed doses and follow-up plans during treatment, including rubina of provider visits and labs. 4.    Patient signed consent on treatment and drug information sheets were given. Alternative To Recommended Treatment    (  ) Palliative or Hospice  (  ) Second Opinion  (x) Other    Patient Care Management     Advance Care Planning completed: paperwork provided   (x) Yes   (  ) No   Pain Care Plan entered (as applicable): n/a   (  ) Yes   (  ) No   Comments:  PHQ-9 completed (Follow-up plan as applicable):   (X) Yes   (  ) No   Comments:  Distress needs addressed with the patient:    (x) Yes   (  ) No   Distress areas needing addressed further:    Patient was educated on the expectations and their responsibility to schedule their follow-up appointments. The patient was also educated that if they refuse to show-up to their appointment or refuse to schedule a follow-up appointment that it is their responsibility to call [facility] in a timely manner to schedule their next appointment. The patient was educated on [facility] policy and that the patient is ultimately responsible for monitoring their appointments and adhering to the schedule. (   ) Yes   (   ) No    Estimated Out of Pocket Costs discussed per the patient per financial navigator. Patient Consented and taught per Nurse Navigator.      .  Today, time spent with the patient discussing the intent and schedule of their treatment. The patient was given a copy of their treatment summary. Questions and concerns were addressed with the patient. Time spent with the patient face to face today was 60 minutes, counseling and coordination of care dominated more than 50% of this patient encounter.

## 2021-08-30 DIAGNOSIS — C34.82 MALIGNANT NEOPLASM OF OVERLAPPING SITES OF LEFT LUNG (HCC): Primary | ICD-10-CM

## 2021-09-02 ENCOUNTER — TELEPHONE (OUTPATIENT)
Dept: CASE MANAGEMENT | Age: 51
End: 2021-09-02

## 2021-09-02 NOTE — TELEPHONE ENCOUNTER
This RN Called and spoke withJosh, patients wife to give her information on upcoming appointments. Julia Thornton states she is driving and will call this RN back \"in 10 minutes\",  I told Julia Thornton I could text her the appointment information and she said to do that. Julia Thornton given the following information via text to [phone] from my work cell phone of [phone]. The following information sent via text  to Julia Thornton-  Dr. Torres Stands appointment Wednesday 9/8/21 at 2:15 PM, the office will mail paperwork that will need filled out and brought back to appointment. Dr. Jose Roberto Lobo (urologist) Thursday 9/16/21 at 8:00 AM.   I asked Julia Thornton to let me know she received the message.

## 2021-09-08 DIAGNOSIS — C34.82 MALIGNANT NEOPLASM OF OVERLAPPING SITES OF LEFT LUNG (HCC): Primary | ICD-10-CM

## 2021-09-08 RX ORDER — OXYCODONE HYDROCHLORIDE AND ACETAMINOPHEN 5; 325 MG/1; MG/1
1 TABLET ORAL EVERY 8 HOURS PRN
Qty: 90 TABLET | Refills: 0 | Status: SHIPPED | OUTPATIENT
Start: 2021-09-08 | End: 2021-10-06 | Stop reason: SDUPTHER

## 2021-09-08 RX ORDER — OXYCODONE HCL 10 MG/1
10 TABLET, FILM COATED, EXTENDED RELEASE ORAL 2 TIMES DAILY
Qty: 60 TABLET | Refills: 0 | Status: SHIPPED | OUTPATIENT
Start: 2021-09-08 | End: 2021-10-06 | Stop reason: SDUPTHER

## 2021-09-10 ENCOUNTER — HOSPITAL ENCOUNTER (OUTPATIENT)
Dept: INFUSION THERAPY | Age: 51
Discharge: HOME | End: 2021-09-10
Payer: COMMERCIAL

## 2021-09-10 ENCOUNTER — OFFICE VISIT (OUTPATIENT)
Dept: ONCOLOGY | Age: 51
End: 2021-09-10
Payer: COMMERCIAL

## 2021-09-10 VITALS
HEIGHT: 73 IN | DIASTOLIC BLOOD PRESSURE: 84 MMHG | OXYGEN SATURATION: 98 % | SYSTOLIC BLOOD PRESSURE: 134 MMHG | HEART RATE: 78 BPM | BODY MASS INDEX: 27.25 KG/M2 | WEIGHT: 205.6 LBS | TEMPERATURE: 97.7 F

## 2021-09-10 DIAGNOSIS — C34.82 MALIGNANT NEOPLASM OF OVERLAPPING SITES OF LEFT LUNG (HCC): Primary | ICD-10-CM

## 2021-09-10 DIAGNOSIS — R97.20 ELEVATED PSA: ICD-10-CM

## 2021-09-10 DIAGNOSIS — M89.9 BONE LESION: ICD-10-CM

## 2021-09-10 DIAGNOSIS — N40.0 ENLARGED PROSTATE: ICD-10-CM

## 2021-09-10 PROCEDURE — 99211 OFF/OP EST MAY X REQ PHY/QHP: CPT

## 2021-09-10 PROCEDURE — 3017F COLORECTAL CA SCREEN DOC REV: CPT | Performed by: INTERNAL MEDICINE

## 2021-09-10 PROCEDURE — G8427 DOCREV CUR MEDS BY ELIG CLIN: HCPCS | Performed by: INTERNAL MEDICINE

## 2021-09-10 PROCEDURE — 4004F PT TOBACCO SCREEN RCVD TLK: CPT | Performed by: INTERNAL MEDICINE

## 2021-09-10 PROCEDURE — 99214 OFFICE O/P EST MOD 30 MIN: CPT | Performed by: INTERNAL MEDICINE

## 2021-09-10 PROCEDURE — G8419 CALC BMI OUT NRM PARAM NOF/U: HCPCS | Performed by: INTERNAL MEDICINE

## 2021-09-10 NOTE — PROGRESS NOTES
MA Rooming Questions  Patient: Román Ordonez  MRN: [id number]    Date: 9/10/2021        1. Do you have any new issues?   no         2. Do you need any refills on medications? yes - Oxycontin, Percocet    3. Have you had any imaging done since your last visit?   no    4. Have you been hospitalized or seen in the emergency room since your last visit here?   no    5. Did the patient have a depression screening completed today?  No    No data recorded     PHQ-9 Given to (if applicable):               PHQ-9 Score (if applicable):                     [] Positive     []  Negative              Does question #9 need addressed (if applicable)                     [] Yes    []  No               Soraida Nelson CMA

## 2021-09-10 NOTE — PROGRESS NOTES
Patient Name:  Kenny Bettencourt  Patient :  1970  Patient MRN:  [id number]     Primary Oncologist: Sarah Doshi MD  Referring Provider: Ana Araiza MD     Date of Service: 9/10/2021      Reason for Consult:  Lung mass, lad and bone lesions     Chief Complaint:    Chief Complaint   Patient presents with    Follow-up       Encounter Diagnoses   Name Primary?  Malignant neoplasm of overlapping sites of left lung (HCC) Yes    Elevated PSA     Enlarged prostate     Bone lesion         HPI:   21: Initial [facility] visit:Hima Beach is a 45 y.o. male who presents to [facility] with report of dysuria and flank pain. Reports these symptoms started a few weeks ago. He ultimately came to the ED due to worsening back pain.      He reports ongoing issues with frequent urge to urinate and notes some incontinence. He denies hematuria. He was noted to have acute pyelonephritis and was admitted and started on IV Rocephin.      21 CT chest     Impression   1. Left hilar neoplasm extending into the left upper lobe measuring 3.2 x 5.9   x 5.3 cm obstructing the left upper lobe bronchus with probable minimal   adjacent infiltrates versus lymphangitic spread of tumor.  PET-CT/biopsy is   recommended. 2. Mediastinal lymphadenopathy. 3. Prominent left supraclavicular lymph nodes. 4. No osseous metastatic disease.      21 Ct abdomen and pelvis  Impression   1. Circumferential thickening of the bladder wall is noted which could be   related to cystitis.  Correlate with urinalysis. 2. There is left retroperitoneal lymphadenopathy.  This could be reactive or   neoplastic.  This can be further evaluated with PET-CT. 3. There is minimal stranding within the retroperitoneal on the left.  This   is uncertain etiology however could be related to acute pyelonephritis. 4. Interval development of multiple sclerotic lesions within the spine and   pelvis, concerning for metastatic osseous disease.    5. Prostate gland is enlarged.  Correlate with PSA.      .30      He denies past history of cancer. He smokes 2-3 ppd and drinks 10-12 beers daily. He reports unknown malignancy in his sister who  at 39. No other known family history of cancer.      Due to lung mass and concern for metastatic prostate cancer we were called to evaluate. 21:  Final Pathologic Diagnosis:   Needle biopsy of lung, clinically left lung mass:        SQUAMOUS CELL CARCINOMA IN SITU.     21:  1.  Left perihilar mass likely represents primary lung cancer.  Correlate   with biopsy results.  The opacity more peripheral in the left lower lobe has   relatively low level activity and likely represents an area of post   obstructive pneumonia adjacent to the mass.       2.  Metabolically active left AP window lymph node concerning for metastatic   disease.       3.  The prostate is enlarged and has increased FDG activity which could be   due to prostatitis or prostate cancer.  Correlate with PSA levels.       4.  No increased metabolic activity associated with retroperitoneal lymph   nodes.  This is unlikely related to the lung process given the significant   difference in metabolic activity; however, if there is prostate cancer, this   could potentially be metastatic disease from the prostate cancer, which can   have variable levels of uptake on FDG PET scans.       5.  No metabolic activity associated with multiple sclerotic lesions.  Again   this is unlikely related to the lung process, but if there is prostate   cancer, this could represent metastatic disease from prostate cancer with   poor FDG avidity.  Otherwise it could also represent large bone islands.       6.  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 There is a focal area of intense activity associated with a new lucent lesion   within the background of Paget's disease concerning for metastatic disease.    Given the FDG activity, it is likely related to the lung process.           21: MRI brain  1. There is a punctate focus of restricted diffusion within the right frontal   lobe without associated enhancement, mass effect or midline shift.  This   could represent a punctate acute infarct.  However, given history, an early   metastatic focus cannot be entirely excluded. 2. Scattered foci of susceptibility are seen within the cerebral hemispheres   bilaterally, which were not visualized on the prior exam.  Findings could   represent areas of remote microhemorrhage or perhaps treated metastases. 3. No abnormal enhancement within the brain. 4. Minimal global parenchymal volume loss with minimal chronic microvascular   ischemic changes. Past Medical History:     BPH, HTN, COPD                                                            Past Surgery History:    None per his wife                                                                        Social History:   Lives with wife. Cut down smoking to 2-4 cigarettes per day prior to which he was smoking 2 to 3 packs/day for the past 40 years. Also reported drinking alcohol 10-12 beers per day. Denied any other illicit drug abuse. Family History:    He reported that his sister  at the age of 39 with  metastatic cancer, he was not sure what the primary was                                                                                              Allergies   Allergen Reactions    Tramadol Other (See Comments)     seizures    Vicodin [Hydrocodone-Acetaminophen] Hives       Current Outpatient Medications on File Prior to Visit   Medication Sig Dispense Refill    oxyCODONE (OXYCONTIN) 10 MG extended release tablet Take 1 tablet by mouth 2 times daily for 30 days.  Intended supply: 30 days 60 tablet 0    oxyCODONE-acetaminophen (PERCOCET) 5-325 MG per tablet Take 1 tablet by mouth every 8 hours as needed for Pain for up to 30 days. Intended supply: 3 days. Take lowest dose possible to manage pain 90 tablet 0    dexamethasone (DECADRON) 4 MG tablet Two tablets the day before treatment, one table daily for four days starting the day after chemotherapy 18 tablet 0    ondansetron (ZOFRAN) 8 MG tablet Take 1 tablet by mouth every 8 hours as needed for Nausea or Vomiting 30 tablet 1    cefdinir (OMNICEF) 300 MG capsule Take 300 mg by mouth 2 times daily      lisinopril (PRINIVIL;ZESTRIL) 10 MG tablet Take 1 tablet by mouth daily 30 tablet 3    ammonium lactate (LAC-HYDRIN) 12 % lotion Apply topically as needed. 1 Bottle 0    tamsulosin (FLOMAX) 0.4 MG capsule Take 1 capsule by mouth daily 30 capsule 3    nicotine (NICODERM CQ) 21 MG/24HR Place 1 patch onto the skin daily 30 patch 3    albuterol sulfate HFA (PROVENTIL HFA) 108 (90 Base) MCG/ACT inhaler Inhale 2 puffs into the lungs every 4 hours as needed for Wheezing or Shortness of Breath With spacer (and mask if indicated). Thanks. 1 Inhaler 1     No current facility-administered medications on file prior to visit. Interval History: 9/14/21: He arrived with his wife to the clinic today. Reported that he continues to feel very tired and has been sleeping a lot. Reported left chest and left hip pain. Shortness with this the same. Cough clear sputum but no hemoptysis. Poor appetite and lost 9 pounds. Denied any bleeding. Denied any abdominal pain,  symptoms, diarrhea or constipation.       Review of Systems:    As per the interval history, rest of the review of system negative     Vital Signs: /84 (Site: Right Upper Arm, Position: Sitting, Cuff Size: Medium Adult)   Pulse 78   Temp 97.7 °F (36.5 °C) (Infrared)   Ht 6' 1\" (1.854 m)   Wt 205 lb 9.6 oz (93.3 kg)   SpO2 98%   BMI 27.13 kg/m²      CONSTITUTIONAL: awake, alert, cooperative, no apparent distress   EYES: RAISSA, No pallor or any icterus  ENT: ATNC  NECK: No JVD  HEMATOLOGIC/LYMPHATIC: no cervical, supraclavicular or axillary lymphadenopathy   LUNGS: CTAB  CARDIOVASCULAR: s1s2 rrr no murmurs  ABDOMEN: soft ntnd bs pos  MUSCULOSKELETAL: full range of motion noted, tone is normal  NEUROLOGIC: GI  SKIN: Psoriatic rash with excoriation marks on the lower extremities and also upper extremities  EXTREMITIES: no LE edema bilaterally     Labs:  Hematology:  Lab Results   Component Value Date    WBC 9.6 08/16/2021    RBC 5.01 08/16/2021    HGB 14.4 08/16/2021    HCT 44.2 08/16/2021    MCV 88.2 08/16/2021    MCH 28.7 08/16/2021    MCHC 32.6 08/16/2021    RDW 14.3 08/16/2021     08/16/2021    MPV 9.2 08/16/2021    BANDSPCT 9 11/13/2018    SEGSPCT 70.2 (H) 08/16/2021    EOSRELPCT 2.0 08/16/2021    BASOPCT 0.8 08/16/2021    LYMPHOPCT 21.0 (L) 08/16/2021    MONOPCT 5.8 (H) 08/16/2021    BANDABS 0.66 11/13/2018    SEGSABS 6.7 08/16/2021    EOSABS 0.2 08/16/2021    BASOSABS 0.1 08/16/2021    LYMPHSABS 2.0 08/16/2021    MONOSABS 0.6 08/16/2021    DIFFTYPE AUTOMATED DIFFERENTIAL 08/16/2021    POLYCHROM 1+ 11/13/2018    PLTM FEW 11/13/2018     No results found for: ESR  Chemistry:  Lab Results   Component Value Date     08/16/2021    K 3.8 08/16/2021     08/16/2021    CO2 24 08/16/2021    BUN 7 08/16/2021    CREATININE 0.9 08/16/2021    GLUCOSE 79 08/16/2021    CALCIUM 9.1 08/16/2021    PROT 7.5 08/16/2021    LABALBU 3.6 08/16/2021    BILITOT 0.6 08/16/2021    ALKPHOS 209 (H) 08/16/2021    AST 24 08/16/2021    ALT 8 (L) 08/16/2021    LABGLOM >60 08/16/2021    GFRAA >60 08/16/2021    MG 1.6 (L) 07/29/2021    POCGLU 124 (H) 09/22/2020     No results found for: MMA, LDH, HOMOCYSTEINE  No components found for: LD  Lab Results   Component Value Date    TSHHS 2.162 07/27/2013     Immunology:  Lab Results   Component Value Date    PROT 7.5 08/16/2021     No results found for: KAPPAUVOL, LAMBDAUVOL, KLFLCR  No results found for: B2M  Coagulation Panel:  Lab Results   Component Value Date    PROTIME 10.0 (L) 07/28/2021    INR 0.78 07/28/2021    APTT 25.8 07/28/2021    DDIMER <200 12/19/2013     Anemia Panel:  No results found for: Natalya Sands  Tumor Markers:  Lab Results   Component Value Date    CEA 7.7 07/23/2021    .30 (H) 07/21/2021        Observations:  ECOG:  No data recorded       Assessment & Plan:                                                          Left hilar mass with mediastinal hilar lymphadenopathy. 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 PET scan results, bone lesions most probably not metastatic except for one lytic lesion. MRI of the brain with no metastatic lesions. Present the case in the tumor board. Decision made to proceed with a prostate biopsy and treat with ADT and in the meantime proceed with staging mediastinoscopy for further treatment plan. Prostate enlargement and elevated PSA could be secondary to prostatitis. As mentioned above recommend prostate biopsy. Rash, etiology unclear. Looks like psoriatic rash. Has been applying some topical antibiotic cream with some relief and healing    Elevated alk phos most probably secondary to the bone lesions possibly Paget's disease versus EtOH. Has been chronically elevated since 2012. Discussed alcohol and smoking cessation. UTI completed course of antibiotics    Adequate analgesic and bowel regimen. Continue other medical care. Thank you for letting us be part of the care and will follow along. Discussed above findings and plan with him and he voiced understanding. Answered all his questions. Discussed healthy lifestyle including healthy diet, regular exercise as tolerated. ,  Smoking and alcohol cessation    Recommend follow-up with primary care physician and other specialists. Note he has he has been very noncompliant with appointments. Please do not hesitate to contact us if you need further information.     Return to clinic September 29 2021 or earlier if new Sx    I have recommended that the patient follow CDC guidelines for prevention of COVID-19 infection.   Received Covid vaccine    TOMI

## 2021-09-14 PROBLEM — R97.20 ELEVATED PSA: Status: ACTIVE | Noted: 2021-09-14

## 2021-09-17 ENCOUNTER — TELEPHONE (OUTPATIENT)
Dept: CASE MANAGEMENT | Age: 51
End: 2021-09-17

## 2021-09-28 ENCOUNTER — HOSPITAL ENCOUNTER (OUTPATIENT)
Dept: INFUSION THERAPY | Age: 51
Discharge: HOME | End: 2021-09-28
Payer: COMMERCIAL

## 2021-09-28 ENCOUNTER — OFFICE VISIT (OUTPATIENT)
Dept: ONCOLOGY | Age: 51
End: 2021-09-28
Payer: COMMERCIAL

## 2021-09-28 VITALS
OXYGEN SATURATION: 94 % | HEART RATE: 93 BPM | WEIGHT: 204 LBS | DIASTOLIC BLOOD PRESSURE: 81 MMHG | TEMPERATURE: 96.6 F | HEIGHT: 73 IN | BODY MASS INDEX: 27.04 KG/M2 | SYSTOLIC BLOOD PRESSURE: 127 MMHG

## 2021-09-28 DIAGNOSIS — C34.82 MALIGNANT NEOPLASM OF OVERLAPPING SITES OF LEFT LUNG (HCC): Primary | ICD-10-CM

## 2021-09-28 DIAGNOSIS — N40.0 ENLARGED PROSTATE: ICD-10-CM

## 2021-09-28 DIAGNOSIS — R91.1 LUNG NODULE: ICD-10-CM

## 2021-09-28 DIAGNOSIS — R97.20 ELEVATED PSA: ICD-10-CM

## 2021-09-28 DIAGNOSIS — R91.8 MASS OF LEFT LUNG: ICD-10-CM

## 2021-09-28 DIAGNOSIS — F17.210 CIGARETTE NICOTINE DEPENDENCE WITHOUT COMPLICATION: ICD-10-CM

## 2021-09-28 DIAGNOSIS — N42.9 DISEASE OF PROSTATE: ICD-10-CM

## 2021-09-28 PROCEDURE — G8419 CALC BMI OUT NRM PARAM NOF/U: HCPCS | Performed by: NURSE PRACTITIONER

## 2021-09-28 PROCEDURE — 99214 OFFICE O/P EST MOD 30 MIN: CPT | Performed by: NURSE PRACTITIONER

## 2021-09-28 PROCEDURE — G8427 DOCREV CUR MEDS BY ELIG CLIN: HCPCS | Performed by: NURSE PRACTITIONER

## 2021-09-28 PROCEDURE — 99211 OFF/OP EST MAY X REQ PHY/QHP: CPT

## 2021-09-28 PROCEDURE — 3017F COLORECTAL CA SCREEN DOC REV: CPT | Performed by: NURSE PRACTITIONER

## 2021-09-28 PROCEDURE — 4004F PT TOBACCO SCREEN RCVD TLK: CPT | Performed by: NURSE PRACTITIONER

## 2021-09-28 RX ORDER — NALOXONE HYDROCHLORIDE 4 MG/.1ML
SPRAY NASAL
Status: ON HOLD | COMMUNITY
Start: 2021-09-08 | End: 2022-10-13 | Stop reason: HOSPADM

## 2021-09-28 NOTE — PROGRESS NOTES
Patient Name:  Christy Cottrell  Patient :  1970  Patient MRN:  [id number]     Primary Oncologist: Tawanna Mast MD  Referring Provider: Lucy Mendez MD     Date of Service: 2021      Reason for Consult:  Lung mass, lad and bone lesions     Chief Complaint:    Chief Complaint   Patient presents with    Follow-up       No diagnosis found. HPI:   21: Initial [facility] visit:Hima Gordillo is a 45 y.o. male who presents to [facility] with report of dysuria and flank pain. Reports these symptoms started a few weeks ago. He ultimately came to the ED due to worsening back pain.      He reports ongoing issues with frequent urge to urinate and notes some incontinence. He denies hematuria. He was noted to have acute pyelonephritis and was admitted and started on IV Rocephin.      21 CT chest     Impression   1. Left hilar neoplasm extending into the left upper lobe measuring 3.2 x 5.9   x 5.3 cm obstructing the left upper lobe bronchus with probable minimal   adjacent infiltrates versus lymphangitic spread of tumor.  PET-CT/biopsy is   recommended. 2. Mediastinal lymphadenopathy. 3. Prominent left supraclavicular lymph nodes. 4. No osseous metastatic disease.      21 Ct abdomen and pelvis  Impression   1. Circumferential thickening of the bladder wall is noted which could be   related to cystitis.  Correlate with urinalysis. 2. There is left retroperitoneal lymphadenopathy.  This could be reactive or   neoplastic.  This can be further evaluated with PET-CT. 3. There is minimal stranding within the retroperitoneal on the left.  This   is uncertain etiology however could be related to acute pyelonephritis. 4. Interval development of multiple sclerotic lesions within the spine and   pelvis, concerning for metastatic osseous disease. 5. Prostate gland is enlarged.  Correlate with PSA.      .30      He denies past history of cancer. He smokes 2-3 ppd and drinks 10-12 beers daily. He reports unknown malignancy in his sister who  at 39. No other known family history of cancer.      Due to lung mass and concern for metastatic prostate cancer we were called to evaluate. 21:  Final Pathologic Diagnosis:   Needle biopsy of lung, clinically left lung mass:        SQUAMOUS CELL CARCINOMA IN SITU.     21:  1.  Left perihilar mass likely represents primary lung cancer.  Correlate   with biopsy results.  The opacity more peripheral in the left lower lobe has   relatively low level activity and likely represents an area of post   obstructive pneumonia adjacent to the mass.       2.  Metabolically active left AP window lymph node concerning for metastatic   disease.       3.  The prostate is enlarged and has increased FDG activity which could be   due to prostatitis or prostate cancer.  Correlate with PSA levels.       4.  No increased metabolic activity associated with retroperitoneal lymph   nodes.  This is unlikely related to the lung process given the significant   difference in metabolic activity; however, if there is prostate cancer, this   could potentially be metastatic disease from the prostate cancer, which can   have variable levels of uptake on FDG PET scans.       5.  No metabolic activity associated with multiple sclerotic lesions.  Again   this is unlikely related to the lung process, but if there is prostate   cancer, this could represent metastatic disease from prostate cancer with   poor FDG avidity.  Otherwise it could also represent large bone islands.       6.  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 There is a focal area of intense activity associated with a new lucent lesion   within the background of Paget's disease concerning for metastatic disease. Given the FDG activity, it is likely related to the lung process.           21: MRI brain  1.  There is a punctate focus of restricted diffusion within the right frontal   lobe without associated enhancement, mass effect or midline shift.  This   could represent a punctate acute infarct.  However, given history, an early   metastatic focus cannot be entirely excluded. 2. Scattered foci of susceptibility are seen within the cerebral hemispheres   bilaterally, which were not visualized on the prior exam.  Findings could   represent areas of remote microhemorrhage or perhaps treated metastases. 3. No abnormal enhancement within the brain. 4. Minimal global parenchymal volume loss with minimal chronic microvascular   ischemic changes. Past Medical History:     BPH, HTN, COPD                                                            Past Surgery History:    None per his wife                                                                        Social History:   Lives with wife. Cut down smoking to 2-4 cigarettes per day prior to which he was smoking 2 to 3 packs/day for the past 40 years. Also reported drinking alcohol 10-12 beers per day. Denied any other illicit drug abuse. Family History:    He reported that his sister  at the age of 39 with  metastatic cancer, he was not sure what the primary was                                                                                              Allergies   Allergen Reactions    Tramadol Other (See Comments)     seizures    Vicodin [Hydrocodone-Acetaminophen] Hives       Current Outpatient Medications on File Prior to Visit   Medication Sig Dispense Refill    NARCAN 4 MG/0.1ML LIQD nasal spray DISPENSED PER STANDING ORDER USE AS DIRECTED PATIENT IS TRAINED OPIOID OVERDOSE RESPONDER      oxyCODONE (OXYCONTIN) 10 MG extended release tablet Take 1 tablet by mouth 2 times daily for 30 days.  Intended supply: 30 days 60 tablet 0    oxyCODONE-acetaminophen (PERCOCET) 5-325 MG per tablet Take 1 tablet by mouth every 8 hours as needed for Pain for up to 30 days. Intended supply: 3 days. Take lowest dose possible to manage pain 90 tablet 0    dexamethasone (DECADRON) 4 MG tablet Two tablets the day before treatment, one table daily for four days starting the day after chemotherapy 18 tablet 0    ondansetron (ZOFRAN) 8 MG tablet Take 1 tablet by mouth every 8 hours as needed for Nausea or Vomiting 30 tablet 1    cefdinir (OMNICEF) 300 MG capsule Take 300 mg by mouth 2 times daily      albuterol sulfate HFA (PROVENTIL HFA) 108 (90 Base) MCG/ACT inhaler Inhale 2 puffs into the lungs every 4 hours as needed for Wheezing or Shortness of Breath With spacer (and mask if indicated). Thanks. 1 Inhaler 1    lisinopril (PRINIVIL;ZESTRIL) 10 MG tablet Take 1 tablet by mouth daily 30 tablet 3    ammonium lactate (LAC-HYDRIN) 12 % lotion Apply topically as needed. 1 Bottle 0    tamsulosin (FLOMAX) 0.4 MG capsule Take 1 capsule by mouth daily 30 capsule 3    nicotine (NICODERM CQ) 21 MG/24HR Place 1 patch onto the skin daily 30 patch 3     No current facility-administered medications on file prior to visit. Interval History: 9/28/21: Arrived alone to clinic today. Reports he has a lot of family issues happening. Reports his wife has been cancelling appointments or not conveying information to him in a timely fashion. He expressed that he wishes to pursue all evaluations/biopsies for treatment planning. He has asked that we directly communicate with him instead of his wife. He has requested her number be removed from the chart. He is otherwise reporting pain to left flank. He denies urinary changes, no dysuria, hematuria. He denies new bone pain. One pound weight loss since last visit. Denies confusion, headaches, dizziness, visual changes, no hemoptysis, no bleeding, bruising, no abdominal pain, no changes to bowels. No lower extremity edema or calf pain.    Review of Systems:    As per the interval history, rest of the review of system negative     Vital Signs: /81 (Site: Right Upper Arm, Position: Sitting, Cuff Size: Medium Adult)   Pulse 93   Temp 96.6 °F (35.9 °C) (Temporal)   Ht 6' 1\" (1.854 m)   Wt 204 lb (92.5 kg)   SpO2 94%   BMI 26.91 kg/m²      CONSTITUTIONAL: awake, alert, cooperative, no apparent distress   EYES: RAISSA, No pallor or any icterus  ENT: ATNC  NECK: No JVD  HEMATOLOGIC/LYMPHATIC: no cervical, supraclavicular or axillary lymphadenopathy   LUNGS: CTAB  CARDIOVASCULAR: s1s2 rrr no murmurs  ABDOMEN: soft ntnd bs pos  MUSCULOSKELETAL: full range of motion noted, tone is normal  NEUROLOGIC: GI  SKIN: Psoriatic rash  EXTREMITIES: no LE edema bilaterally     Labs:  Hematology:  Lab Results   Component Value Date    WBC 9.6 08/16/2021    RBC 5.01 08/16/2021    HGB 14.4 08/16/2021    HCT 44.2 08/16/2021    MCV 88.2 08/16/2021    MCH 28.7 08/16/2021    MCHC 32.6 08/16/2021    RDW 14.3 08/16/2021     08/16/2021    MPV 9.2 08/16/2021    BANDSPCT 9 11/13/2018    SEGSPCT 70.2 (H) 08/16/2021    EOSRELPCT 2.0 08/16/2021    BASOPCT 0.8 08/16/2021    LYMPHOPCT 21.0 (L) 08/16/2021    MONOPCT 5.8 (H) 08/16/2021    BANDABS 0.66 11/13/2018    SEGSABS 6.7 08/16/2021    EOSABS 0.2 08/16/2021    BASOSABS 0.1 08/16/2021    LYMPHSABS 2.0 08/16/2021    MONOSABS 0.6 08/16/2021    DIFFTYPE AUTOMATED DIFFERENTIAL 08/16/2021    POLYCHROM 1+ 11/13/2018    PLTM FEW 11/13/2018     No results found for: ESR  Chemistry:  Lab Results   Component Value Date     08/16/2021    K 3.8 08/16/2021     08/16/2021    CO2 24 08/16/2021    BUN 7 08/16/2021    CREATININE 0.9 08/16/2021    GLUCOSE 79 08/16/2021    CALCIUM 9.1 08/16/2021    PROT 7.5 08/16/2021    LABALBU 3.6 08/16/2021    BILITOT 0.6 08/16/2021    ALKPHOS 209 (H) 08/16/2021    AST 24 08/16/2021    ALT 8 (L) 08/16/2021    LABGLOM >60 08/16/2021    GFRAA >60 08/16/2021    MG 1.6 (L) 07/29/2021    POCGLU 124 (H) 09/22/2020     No results found for: MMA, LDH, HOMOCYSTEINE  No components found for: LD  Lab Results   Component Value Date    TSHHS 2.162 07/27/2013     Immunology:  Lab Results   Component Value Date    PROT 7.5 08/16/2021     No results found for: Audrey Steinberg, KLFLCR  No results found for: B2M  Coagulation Panel:  Lab Results   Component Value Date    PROTIME 10.0 (L) 07/28/2021    INR 0.78 07/28/2021    APTT 25.8 07/28/2021    DDIMER <200 12/19/2013     Anemia Panel:  No results found for: Sumaya Cha  Tumor Markers:  Lab Results   Component Value Date    CEA 7.7 07/23/2021    .30 (H) 07/21/2021        Observations:  ECOG:  No data recorded       Assessment & Plan:                                                          Left hilar mass with mediastinal hilar lymphadenopathy. 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 PET scan results, bone lesions most probably not metastatic except for one lytic lesion. MRI of the brain with no metastatic lesions. Present the case in the tumor board. Decision made to proceed with a prostate biopsy and treat with ADT and in the meantime proceed with staging mediastinoscopy for further treatment plan. Scheduled appointment with Dr. Brian Medina 9/29/21. Prostate enlargement and elevated PSA could be secondary to prostatitis. As mentioned above recommend prostate biopsy. Has scheduled appointment with urology 10/5/21    Rash, etiology unclear. Looks like psoriatic rash. Has been applying some topical antibiotic cream with some relief and healing    Elevated alk phos most probably secondary to the bone lesions possibly Paget's disease versus EtOH. Has been chronically elevated since 2012. Discussed alcohol and smoking cessation. UTI completed course of antibiotics    Adequate analgesic and bowel regimen. Encouraged to stop smoking    Continue other medical care. Thank you for letting us be part of the care and will follow along.     Discussed above findings and plan with him and he voiced understanding. Answered all his questions. Discussed healthy lifestyle including healthy diet, regular exercise as tolerated. ,  Smoking and alcohol cessation    Recommend follow-up with primary care physician and other specialists. Note he has he has been very noncompliant with appointments. Please do not hesitate to contact us if you need further information. Return to clinic one week or earlier if new Sx    I have recommended that the patient follow CDC guidelines for prevention of COVID-19 infection.   Received Covid vaccine    Greater than 30 minutes spent with patient with greater than 50%

## 2021-09-28 NOTE — PROGRESS NOTES
MA Rooming Questions  Patient: Maribel Castillo  MRN: [id number]    Date: 9/28/2021        1. Do you have any new issues?   no         2. Do you need any refills on medications?    no    3. Have you had any imaging done since your last visit?   no    4. Have you been hospitalized or seen in the emergency room since your last visit here?   no    5. Did the patient have a depression screening completed today?  No    No data recorded     PHQ-9 Given to (if applicable):               PHQ-9 Score (if applicable):                     [] Positive     []  Negative              Does question #9 need addressed (if applicable)                     [] Yes    []  No               Samantha Valentin CMA

## 2021-09-29 ENCOUNTER — TELEPHONE (OUTPATIENT)
Dept: CASE MANAGEMENT | Age: 51
End: 2021-09-29

## 2021-09-29 NOTE — TELEPHONE ENCOUNTER
Ashley from Dr. Anirudh Pritchard office informed this RN patient did not show for his appointment with Dr. Brett Sandoval. This RN called patients daughter, patients daughter stated she had not seen him since yesterday but would \"drive around to places I know he stays\" Patients daughter informed Dr. Brett Sandoval would still see patient if he could get to the office by 3:30 PM. Patients daughter voices understanding. Dr. Soraida Angulo notified of above.

## 2021-10-05 ENCOUNTER — HOSPITAL ENCOUNTER (EMERGENCY)
Age: 51
Discharge: HOME | End: 2021-10-05
Payer: COMMERCIAL

## 2021-10-05 VITALS
HEIGHT: 73 IN | BODY MASS INDEX: 28.49 KG/M2 | OXYGEN SATURATION: 97 % | TEMPERATURE: 98.5 F | SYSTOLIC BLOOD PRESSURE: 123 MMHG | WEIGHT: 215 LBS | HEART RATE: 78 BPM | RESPIRATION RATE: 16 BRPM | DIASTOLIC BLOOD PRESSURE: 89 MMHG

## 2021-10-05 DIAGNOSIS — M54.50 ACUTE LEFT-SIDED LOW BACK PAIN WITHOUT SCIATICA: Primary | ICD-10-CM

## 2021-10-05 DIAGNOSIS — R19.7 DIARRHEA, UNSPECIFIED TYPE: ICD-10-CM

## 2021-10-05 DIAGNOSIS — C34.92 MALIGNANT NEOPLASM OF LEFT LUNG, UNSPECIFIED PART OF LUNG (HCC): ICD-10-CM

## 2021-10-05 LAB
ALBUMIN SERPL-MCNC: 4.2 GM/DL (ref 3.4–5)
ALP BLD-CCNC: 212 IU/L (ref 40–129)
ALT SERPL-CCNC: 8 U/L (ref 10–40)
ANION GAP SERPL CALCULATED.3IONS-SCNC: 12 MMOL/L (ref 4–16)
AST SERPL-CCNC: 16 IU/L (ref 15–37)
BACTERIA: NEGATIVE /HPF
BASOPHILS ABSOLUTE: 0.1 K/CU MM
BASOPHILS RELATIVE PERCENT: 0.6 % (ref 0–1)
BILIRUB SERPL-MCNC: 0.3 MG/DL (ref 0–1)
BILIRUBIN URINE: NEGATIVE MG/DL
BLOOD, URINE: NEGATIVE
BUN BLDV-MCNC: 10 MG/DL (ref 6–23)
CALCIUM SERPL-MCNC: 9.4 MG/DL (ref 8.3–10.6)
CHLORIDE BLD-SCNC: 102 MMOL/L (ref 99–110)
CLARITY: CLEAR
CO2: 22 MMOL/L (ref 21–32)
COLOR: YELLOW
CREAT SERPL-MCNC: 0.7 MG/DL (ref 0.9–1.3)
DIFFERENTIAL TYPE: ABNORMAL
EOSINOPHILS ABSOLUTE: 0.4 K/CU MM
EOSINOPHILS RELATIVE PERCENT: 3.4 % (ref 0–3)
GFR AFRICAN AMERICAN: >60 ML/MIN/1.73M2
GFR NON-AFRICAN AMERICAN: >60 ML/MIN/1.73M2
GLUCOSE BLD-MCNC: 105 MG/DL (ref 70–99)
GLUCOSE, URINE: NEGATIVE MG/DL
HCT VFR BLD CALC: 42.9 % (ref 42–52)
HEMOGLOBIN: 14.3 GM/DL (ref 13.5–18)
IMMATURE NEUTROPHIL %: 0.3 % (ref 0–0.43)
KETONES, URINE: NEGATIVE MG/DL
LEUKOCYTE ESTERASE, URINE: NEGATIVE
LYMPHOCYTES ABSOLUTE: 2.7 K/CU MM
LYMPHOCYTES RELATIVE PERCENT: 23.2 % (ref 24–44)
MCH RBC QN AUTO: 28.8 PG (ref 27–31)
MCHC RBC AUTO-ENTMCNC: 33.3 % (ref 32–36)
MCV RBC AUTO: 86.5 FL (ref 78–100)
MONOCYTES ABSOLUTE: 0.8 K/CU MM
MONOCYTES RELATIVE PERCENT: 6.5 % (ref 0–4)
MUCUS: ABNORMAL HPF
NITRITE URINE, QUANTITATIVE: NEGATIVE
NUCLEATED RBC %: 0 %
PDW BLD-RTO: 14.6 % (ref 11.7–14.9)
PH, URINE: 5 (ref 5–8)
PLATELET # BLD: 302 K/CU MM (ref 140–440)
PMV BLD AUTO: 9 FL (ref 7.5–11.1)
POTASSIUM SERPL-SCNC: 3.5 MMOL/L (ref 3.5–5.1)
PROTEIN UA: NEGATIVE MG/DL
RBC # BLD: 4.96 M/CU MM (ref 4.6–6.2)
RBC URINE: <1 /HPF (ref 0–3)
SEGMENTED NEUTROPHILS ABSOLUTE COUNT: 7.6 K/CU MM
SEGMENTED NEUTROPHILS RELATIVE PERCENT: 66 % (ref 36–66)
SODIUM BLD-SCNC: 136 MMOL/L (ref 135–145)
SPECIFIC GRAVITY UA: 1.01 (ref 1–1.03)
SPERM: ABNORMAL /HFP
TOTAL IMMATURE NEUTOROPHIL: 0.04 K/CU MM
TOTAL NUCLEATED RBC: 0 K/CU MM
TOTAL PROTEIN: 8.2 GM/DL (ref 6.4–8.2)
TRICHOMONAS: ABNORMAL /HPF
UROBILINOGEN, URINE: NEGATIVE MG/DL (ref 0.2–1)
WBC # BLD: 11.5 K/CU MM (ref 4–10.5)
WBC UA: 1 /HPF (ref 0–2)

## 2021-10-05 PROCEDURE — 36415 COLL VENOUS BLD VENIPUNCTURE: CPT

## 2021-10-05 PROCEDURE — 85025 COMPLETE CBC W/AUTO DIFF WBC: CPT

## 2021-10-05 PROCEDURE — 81001 URINALYSIS AUTO W/SCOPE: CPT

## 2021-10-05 PROCEDURE — 99283 EMERGENCY DEPT VISIT LOW MDM: CPT

## 2021-10-05 PROCEDURE — 80053 COMPREHEN METABOLIC PANEL: CPT

## 2021-10-05 RX ORDER — LOPERAMIDE HYDROCHLORIDE 2 MG/1
2 CAPSULE ORAL 4 TIMES DAILY PRN
Qty: 20 CAPSULE | Refills: 0 | Status: SHIPPED | OUTPATIENT
Start: 2021-10-05 | End: 2021-10-15

## 2021-10-05 ASSESSMENT — PAIN SCALES - GENERAL: PAINLEVEL_OUTOF10: 10

## 2021-10-05 NOTE — ED PROVIDER NOTES
Patient Identification  Rik Sheppard is a 45 y.o. male    Chief Complaint  Back Pain (tightness in left lower back, lung cancer hx) and Diarrhea      HPI  (History provided by patient)  This is a 45 y.o. male who was brought in by self for chief complaint of back pain, diarrhea. Back pain is been ongoing for several months. States has been steadily worsening during that time. Located in the left lower back. Notes that he was diagnosed with lung cancer and possibly prostate cancer. Missed recent appointment with cardiothoracic surgery because he did not have a ride. Has follow-up appointment with oncology today. States for last month he is also been having loose stools. Denies blood in stool. Thinks it may be related to marijuana and alcohol use. Denies any vomiting or abdominal pain. No hemoptysis, chest pain, shortness of breath. No fevers. Currently on oxycodone for pain. Patient reports primary reason he came in today so he can be admitted to continue his cancer work-up, has been having difficulty with ride to appointments. REVIEW OF SYSTEMS    Constitutional:  Denies fever, chills  HENT:  Denies sore throat or ear pain   Eyes: Denies vision changes, eye pain  Cardiovascular:  Denies chest pain, syncope  Respiratory:  Denies shortness of breath, cough   GI:  Denies abdominal pain, nausea, vomiting  :  Denies dysuria, discharge  Musculoskeletal:  Denies joint pain.  + back pain  Skin:  Denies rash, pruritis  Neurologic:  Denies headache, focal weakness, or sensory changes     See HPI and nursing notes for additional information     I have reviewed the following nursing documentation:  Allergies:    Allergies   Allergen Reactions    Tramadol Other (See Comments)     seizures    Vicodin [Hydrocodone-Acetaminophen] Hives       Past medical history:  has a past medical history of CAD (coronary artery disease) (12/23/2013), History of TMJ disorder, Hypertension, and Trigeminal neuralgia. Past surgical history:  has a past surgical history that includes Abdomen surgery; Cardiac catheterization (08/03/2016); CT NEEDLE BIOPSY LUNG PERCUTANEOUS (7/28/2021); and Port Surgery (Right, 8/13/2021). Home medications:   Prior to Admission medications    Medication Sig Start Date End Date Taking? Authorizing Provider   loperamide (RA ANTI-DIARRHEAL) 2 MG capsule Take 1 capsule by mouth 4 times daily as needed for Diarrhea 10/5/21 10/15/21 Yes Deshaun Coffman PA-C   NARCAN 4 MG/0.1ML LIQD nasal spray DISPENSED PER STANDING ORDER USE AS DIRECTED PATIENT IS TRAINED OPIOID OVERDOSE RESPONDER 9/8/21   Historical Provider, MD   oxyCODONE (OXYCONTIN) 10 MG extended release tablet Take 1 tablet by mouth 2 times daily for 30 days. Intended supply: 30 days 9/8/21 10/8/21  Azalia Campbell MD   oxyCODONE-acetaminophen (PERCOCET) 5-325 MG per tablet Take 1 tablet by mouth every 8 hours as needed for Pain for up to 30 days. Intended supply: 3 days. Take lowest dose possible to manage pain 9/8/21 10/8/21  Azalia Campbell MD   dexamethasone (DECADRON) 4 MG tablet Two tablets the day before treatment, one table daily for four days starting the day after chemotherapy 8/26/21   GERDA Aly CNP   ondansetron [facility]) 8 MG tablet Take 1 tablet by mouth every 8 hours as needed for Nausea or Vomiting 8/26/21   GERDA Aly CNP   cefdinir (OMNICEF) 300 MG capsule Take 300 mg by mouth 2 times daily    Historical Provider, MD   albuterol sulfate HFA (PROVENTIL HFA) 108 (90 Base) MCG/ACT inhaler Inhale 2 puffs into the lungs every 4 hours as needed for Wheezing or Shortness of Breath With spacer (and mask if indicated). Thanks. 7/30/21 8/29/21  Levi Severino DO   lisinopril (PRINIVIL;ZESTRIL) 10 MG tablet Take 1 tablet by mouth daily 7/23/21   Josep Barger DO   ammonium lactate (LAC-HYDRIN) 12 % lotion Apply topically as needed.  7/23/21   Josep Barger DO   tamsulosin (FLOMAX) 0.4 MG capsule Take 1 capsule by mouth daily 7/23/21 11/20/21  Josep Barger DO   nicotine (NICODERM CQ) 21 MG/24HR Place 1 patch onto the skin daily 7/24/21   Josep Barger DO       Social history:  reports that he has been smoking cigarettes. He has been smoking about 1.50 packs per day. He has never used smokeless tobacco. He reports current alcohol use of about 6.0 standard drinks of alcohol per week. He reports current drug use. Drug: Marijuana. Family history:    Family History   Problem Relation Age of Onset    High Blood Pressure Mother     High Blood Pressure Sister     Cancer Sister          Exam  /89   Pulse 78   Temp 98.5 °F (36.9 °C) (Oral)   Resp 16   Ht 6' 1\" (1.854 m)   Wt 215 lb (97.5 kg)   SpO2 97%   BMI 28.37 kg/m²   Nursing note and vitals reviewed. Constitutional: Well developed, well nourished. No acute distress. HENT:      Head: Normocephalic and atraumatic. Ears: External ears normal.      Nose: Nose normal.     Mouth: Membrane mucosa moist and pink. No posterior oropharynx erythema or tonsillar edema  Eyes: Anicteric sclera. No discharge, PERRL  Neck: Supple. Trachea midline. Cardiovascular: RRR, no murmurs, rubs, or gallops, radial pulses 2+ bilaterally. Pulmonary/Chest: Effort normal. No respiratory distress. CTAB. No stridor. No wheezes. No rales. Abdominal: Soft. Nontender to palpation. No distension. No guarding, rebound tenderness, or evidence of ascites. : No CVA tenderness. Musculoskeletal: Moves all extremities. No gross deformity. No tenderness palpation of the cervical, thoracic or lumbar spine. Mild tenderness over the left lumbar paraspinal muscles. Neurological: Alert and oriented to person, place, and time. Normal muscle tone.       -  High Sensitivity Neuro Exam Lumbar Spine   L1-L2 - Inner thigh sensation - Intact Bilaterally   L2 - Adduct Thigh - 5/5 Bilaterally   L3 - Extend knee - 5/5 Bilaterally   L4 - Dorsiflex ankle - 5/5 Bilaterally   L5 - Dorsiflex great toe at 1st MCP - 5/5 Bilaterally   L2-L4 - Patellar reflex - 2+ bilaterally.  S1 - Knee flexion - 5/5 Bilaterally   S1 - Achilles reflex - 2+ bilaterally.  S2 - Plantar flexion of toes - 5/5 Bilaterally   S3-S5 - groin, perineal sensation (per patient) - Intact Bilaterally  Skin: Warm and dry. No rash. Psychiatric: Normal mood and affect.  Behavior is normal.      Radiographs (if obtained):  [] The following radiograph was interpreted by myself in the absence of a radiologist:   [x] Radiologist's Report Reviewed:  No orders to display          Labs  Results for orders placed or performed during the hospital encounter of 10/05/21   CBC auto diff   Result Value Ref Range    WBC 11.5 (H) 4.0 - 10.5 K/CU MM    RBC 4.96 4.6 - 6.2 M/CU MM    Hemoglobin 14.3 13.5 - 18.0 GM/DL    Hematocrit 42.9 42 - 52 %    MCV 86.5 78 - 100 FL    MCH 28.8 27 - 31 PG    MCHC 33.3 32.0 - 36.0 %    RDW 14.6 11.7 - 14.9 %    Platelets 105 763 - 415 K/CU MM    MPV 9.0 7.5 - 11.1 FL    Differential Type AUTOMATED DIFFERENTIAL     Segs Relative 66.0 36 - 66 %    Lymphocytes % 23.2 (L) 24 - 44 %    Monocytes % 6.5 (H) 0 - 4 %    Eosinophils % 3.4 (H) 0 - 3 %    Basophils % 0.6 0 - 1 %    Segs Absolute 7.6 K/CU MM    Lymphocytes Absolute 2.7 K/CU MM    Monocytes Absolute 0.8 K/CU MM    Eosinophils Absolute 0.4 K/CU MM    Basophils Absolute 0.1 K/CU MM    Nucleated RBC % 0.0 %    Total Nucleated RBC 0.0 K/CU MM    Total Immature Neutrophil 0.04 K/CU MM    Immature Neutrophil % 0.3 0 - 0.43 %   CMP   Result Value Ref Range    Sodium 136 135 - 145 MMOL/L    Potassium 3.5 3.5 - 5.1 MMOL/L    Chloride 102 99 - 110 mMol/L    CO2 22 21 - 32 MMOL/L    BUN 10 6 - 23 MG/DL    CREATININE 0.7 (L) 0.9 - 1.3 MG/DL    Glucose 105 (H) 70 - 99 MG/DL    Calcium 9.4 8.3 - 10.6 MG/DL    Albumin 4.2 3.4 - 5.0 GM/DL    Total Protein 8.2 6.4 - 8.2 GM/DL    Total Bilirubin 0.3 0.0 - 1.0 MG/DL    ALT 8 (L) 10 - 40 U/L    AST 16 15 - 37 IU/L Alkaline Phosphatase 212 (H) 40 - 129 IU/L    GFR Non-African American >60 >60 mL/min/1.73m2    GFR African American >60 >60 mL/min/1.73m2    Anion Gap 12 4 - 16   Urinalysis   Result Value Ref Range    Color, UA YELLOW YELLOW    Clarity, UA CLEAR CLEAR    Glucose, Urine NEGATIVE NEGATIVE MG/DL    Bilirubin Urine NEGATIVE NEGATIVE MG/DL    Ketones, Urine NEGATIVE NEGATIVE MG/DL    Specific Gravity, UA 1.012 1.001 - 1.035    Blood, Urine NEGATIVE NEGATIVE    pH, Urine 5.0 5.0 - 8.0    Protein, UA NEGATIVE NEGATIVE MG/DL    Urobilinogen, Urine NEGATIVE 0.2 - 1.0 MG/DL    Nitrite Urine, Quantitative NEGATIVE NEGATIVE    Leukocyte Esterase, Urine NEGATIVE NEGATIVE    RBC, UA <1 0 - 3 /HPF    WBC, UA 1 0 - 2 /HPF    Bacteria, UA NEGATIVE NEGATIVE /HPF    Mucus, UA RARE (A) NEGATIVE HPF    Sperm, UA RARE /HFP    Trichomonas, UA NONE SEEN NONE SEEN /HPF         MDM  Patient presents for back pain that has been ongoing for months as well as diarrhea for over a month. He is currently being worked up for lung cancer and possible prostate cancer. Review of records shows that he had a left perihilar mass as well as enlarged prostate and lymphadenopathy in the retroperitoneal region. Laboratory work appears unremarkable. Patient reports he has been missing appointments secondary to difficulty with a ride. He reports having an appointment today with oncology. Will start on Imodium for diarrhea. Encourage discontinuing drug and alcohol use. Stressed the importance of follow-up with both cardiothoracic surgery and oncology as his cancer is still not fully identified and he has not yet started chemotherapy or radiation despite masses first being identified in July. Discussed this with patient who is in agreement. Back pain likely secondary to these retroperitoneal lymphadenopathy and sclerotic lesions of the spine and pelvis. He is currently on oxycodone for this.   Discussed with him that he is able to call his insurance to obtain rides to his appointments. I estimate there is LOW risk for ABDOMINAL AORTIC ANEURYSM, CAUDA EQUINA SYNDROME, SPINAL STENOSIS, OR HERNIATED DISK CAUSING SEVERE STENOSIS, thus I consider the discharge disposition reasonable. Hans Coronel and I have discussed the diagnosis and risks, and we agree with discharging home to follow-up with their primary doctor. We also discussed returning to the Emergency Department immediately if new or worsening symptoms occur. We have discussed the symptoms which are most concerning (e.g., saddle anesthesia, urinary or bowel incontinence or retention, changing or worsening pain, weakness) that necessitate immediate return. I have independently evaluated this patient. Final Impression  1. Acute left-sided low back pain without sciatica    2. Diarrhea, unspecified type    3. Malignant neoplasm of left lung, unspecified part of lung (HCC)        Blood pressure 123/89, pulse 78, temperature 98.5 °F (36.9 °C), temperature source Oral, resp. rate 16, height 6' 1\" (1.854 m), weight 215 lb (97.5 kg), SpO2 97 %. Disposition:  Discharge to home in stable condition. Patient was given scripts for the following medications. I counseled patient how to take these medications. Discharge Medication List as of 10/5/2021  9:14 AM      START taking these medications    Details   loperamide (RA ANTI-DIARRHEAL) 2 MG capsule Take 1 capsule by mouth 4 times daily as needed for Diarrhea, Disp-20 capsule, R-0Normal             This chart was generated using the Dragon dictation system. I created this record but it may contain dictation errors given the limitations of this technology.        Claire Mejia PA-C  10/05/21 1118       Claire Mejia PA-C  10/05/21 1120

## 2021-10-05 NOTE — ED TRIAGE NOTES
Pt presents to the ED c/o back lower left pain and tightness. Pt has a hx of lung cancer. Pt is alert and oriented, rates pain 10/10. States he was smoking marijuana and drinking and that caused him to have watery diarrhea.

## 2021-10-06 ENCOUNTER — OFFICE VISIT (OUTPATIENT)
Dept: ONCOLOGY | Age: 51
End: 2021-10-06
Payer: COMMERCIAL

## 2021-10-06 ENCOUNTER — HOSPITAL ENCOUNTER (OUTPATIENT)
Dept: INFUSION THERAPY | Age: 51
Discharge: HOME | End: 2021-10-06
Payer: COMMERCIAL

## 2021-10-06 VITALS
HEIGHT: 73 IN | RESPIRATION RATE: 16 BRPM | WEIGHT: 205 LBS | BODY MASS INDEX: 27.17 KG/M2 | HEART RATE: 110 BPM | SYSTOLIC BLOOD PRESSURE: 128 MMHG | TEMPERATURE: 96.7 F | OXYGEN SATURATION: 98 % | DIASTOLIC BLOOD PRESSURE: 79 MMHG

## 2021-10-06 DIAGNOSIS — C34.82 MALIGNANT NEOPLASM OF OVERLAPPING SITES OF LEFT LUNG (HCC): ICD-10-CM

## 2021-10-06 DIAGNOSIS — R19.09 LEFT ILIAC FOSSA MASS: ICD-10-CM

## 2021-10-06 DIAGNOSIS — R19.09 LEFT ILIAC FOSSA MASS: Primary | ICD-10-CM

## 2021-10-06 LAB
ALBUMIN SERPL-MCNC: 4.2 GM/DL (ref 3.4–5)
ALP BLD-CCNC: 206 IU/L (ref 40–129)
ALT SERPL-CCNC: 9 U/L (ref 10–40)
ANION GAP SERPL CALCULATED.3IONS-SCNC: 14 MMOL/L (ref 4–16)
AST SERPL-CCNC: 15 IU/L (ref 15–37)
BASOPHILS ABSOLUTE: 0.1 K/CU MM
BASOPHILS RELATIVE PERCENT: 0.5 % (ref 0–1)
BILIRUB SERPL-MCNC: 0.1 MG/DL (ref 0–1)
BUN BLDV-MCNC: 9 MG/DL (ref 6–23)
CALCIUM SERPL-MCNC: 10 MG/DL (ref 8.3–10.6)
CHLORIDE BLD-SCNC: 103 MMOL/L (ref 99–110)
CO2: 20 MMOL/L (ref 21–32)
CREAT SERPL-MCNC: 0.7 MG/DL (ref 0.9–1.3)
DIFFERENTIAL TYPE: ABNORMAL
EOSINOPHILS ABSOLUTE: 0.4 K/CU MM
EOSINOPHILS RELATIVE PERCENT: 4.6 % (ref 0–3)
GFR AFRICAN AMERICAN: >60 ML/MIN/1.73M2
GFR NON-AFRICAN AMERICAN: >60 ML/MIN/1.73M2
GLUCOSE BLD-MCNC: 84 MG/DL (ref 70–99)
HCT VFR BLD CALC: 40.5 % (ref 42–52)
HEMOGLOBIN: 14.4 GM/DL (ref 13.5–18)
LYMPHOCYTES ABSOLUTE: 2.3 K/CU MM
LYMPHOCYTES RELATIVE PERCENT: 24.1 % (ref 24–44)
MCH RBC QN AUTO: 29.1 PG (ref 27–31)
MCHC RBC AUTO-ENTMCNC: 35.6 % (ref 32–36)
MCV RBC AUTO: 81.8 FL (ref 78–100)
MONOCYTES ABSOLUTE: 0.6 K/CU MM
MONOCYTES RELATIVE PERCENT: 6 % (ref 0–4)
PDW BLD-RTO: 14.7 % (ref 11.7–14.9)
PLATELET # BLD: 271 K/CU MM (ref 140–440)
PMV BLD AUTO: 9.1 FL (ref 7.5–11.1)
POTASSIUM SERPL-SCNC: 3.7 MMOL/L (ref 3.5–5.1)
RBC # BLD: 4.95 M/CU MM (ref 4.6–6.2)
SEGMENTED NEUTROPHILS ABSOLUTE COUNT: 6.1 K/CU MM
SEGMENTED NEUTROPHILS RELATIVE PERCENT: 64.8 % (ref 36–66)
SODIUM BLD-SCNC: 137 MMOL/L (ref 135–145)
TOTAL PROTEIN: 7.6 GM/DL (ref 6.4–8.2)
WBC # BLD: 9.4 K/CU MM (ref 4–10.5)

## 2021-10-06 PROCEDURE — 85025 COMPLETE CBC W/AUTO DIFF WBC: CPT

## 2021-10-06 PROCEDURE — 99211 OFF/OP EST MAY X REQ PHY/QHP: CPT

## 2021-10-06 PROCEDURE — 80053 COMPREHEN METABOLIC PANEL: CPT

## 2021-10-06 PROCEDURE — 36415 COLL VENOUS BLD VENIPUNCTURE: CPT

## 2021-10-06 PROCEDURE — G8419 CALC BMI OUT NRM PARAM NOF/U: HCPCS | Performed by: INTERNAL MEDICINE

## 2021-10-06 PROCEDURE — G8427 DOCREV CUR MEDS BY ELIG CLIN: HCPCS | Performed by: INTERNAL MEDICINE

## 2021-10-06 PROCEDURE — G8484 FLU IMMUNIZE NO ADMIN: HCPCS | Performed by: INTERNAL MEDICINE

## 2021-10-06 PROCEDURE — 84154 ASSAY OF PSA FREE: CPT

## 2021-10-06 PROCEDURE — 84153 ASSAY OF PSA TOTAL: CPT

## 2021-10-06 PROCEDURE — 3017F COLORECTAL CA SCREEN DOC REV: CPT | Performed by: INTERNAL MEDICINE

## 2021-10-06 PROCEDURE — 4004F PT TOBACCO SCREEN RCVD TLK: CPT | Performed by: INTERNAL MEDICINE

## 2021-10-06 PROCEDURE — 99214 OFFICE O/P EST MOD 30 MIN: CPT | Performed by: INTERNAL MEDICINE

## 2021-10-06 RX ORDER — OXYCODONE HYDROCHLORIDE AND ACETAMINOPHEN 5; 325 MG/1; MG/1
1 TABLET ORAL EVERY 8 HOURS PRN
Qty: 90 TABLET | Refills: 0 | Status: SHIPPED | OUTPATIENT
Start: 2021-10-06 | End: 2021-11-05

## 2021-10-06 RX ORDER — OXYCODONE HCL 10 MG/1
10 TABLET, FILM COATED, EXTENDED RELEASE ORAL 2 TIMES DAILY
Qty: 60 TABLET | Refills: 0 | Status: SHIPPED | OUTPATIENT
Start: 2021-10-06 | End: 2021-11-05

## 2021-10-06 NOTE — PROGRESS NOTES
Patient Name:  Licha Dyson  Patient :  1970  Patient MRN:  [id number]     Primary Oncologist: Irina Guillen MD  Referring Provider: Zaid Hernandez MD     Date of Service: 10/6/2021        Chief Complaint:    Chief Complaint   Patient presents with    Follow-up    Pain     Pt c/o left side lower back pain. States it feels \"tight\"    Other     Pt states that for the last couple of weeks every time he urinates he also has clear liquid come from his rectum       Encounter Diagnosis   Name Primary?  Malignant neoplasm of overlapping sites of left lung [facility])         HPI:   21: Initial [facility] visit:Hima Linn is a 45 y.o. male who presents to [facility] with report of dysuria and flank pain. Reports these symptoms started a few weeks ago. He ultimately came to the ED due to worsening back pain.      He reports ongoing issues with frequent urge to urinate and notes some incontinence. He denies hematuria. He was noted to have acute pyelonephritis and was admitted and started on IV Rocephin.      21 CT chest     Impression   1. Left hilar neoplasm extending into the left upper lobe measuring 3.2 x 5.9   x 5.3 cm obstructing the left upper lobe bronchus with probable minimal   adjacent infiltrates versus lymphangitic spread of tumor.  PET-CT/biopsy is   recommended. 2. Mediastinal lymphadenopathy. 3. Prominent left supraclavicular lymph nodes. 4. No osseous metastatic disease.      21 Ct abdomen and pelvis  Impression   1. Circumferential thickening of the bladder wall is noted which could be   related to cystitis.  Correlate with urinalysis. 2. There is left retroperitoneal lymphadenopathy.  This could be reactive or   neoplastic.  This can be further evaluated with PET-CT. 3. There is minimal stranding within the retroperitoneal on the left.  This   is uncertain etiology however could be related to acute pyelonephritis.    4. Interval development of multiple sclerotic lesions within the spine and   pelvis, concerning for metastatic osseous disease. 5. Prostate gland is enlarged.  Correlate with PSA.      .30      He denies past history of cancer. He smokes 2-3 ppd and drinks 10-12 beers daily. He reports unknown malignancy in his sister who  at 39. No other known family history of cancer.      Due to lung mass and concern for metastatic prostate cancer we were called to evaluate. 21:  Final Pathologic Diagnosis:   Needle biopsy of lung, clinically left lung mass:        SQUAMOUS CELL CARCINOMA IN SITU.     21:  1.  Left perihilar mass likely represents primary lung cancer.  Correlate   with biopsy results.  The opacity more peripheral in the left lower lobe has   relatively low level activity and likely represents an area of post   obstructive pneumonia adjacent to the mass.       2.  Metabolically active left AP window lymph node concerning for metastatic   disease.       3.  The prostate is enlarged and has increased FDG activity which could be   due to prostatitis or prostate cancer.  Correlate with PSA levels.       4.  No increased metabolic activity associated with retroperitoneal lymph   nodes.  This is unlikely related to the lung process given the significant   difference in metabolic activity; however, if there is prostate cancer, this   could potentially be metastatic disease from the prostate cancer, which can   have variable levels of uptake on FDG PET scans.       5.  No metabolic activity associated with multiple sclerotic lesions.  Again   this is unlikely related to the lung process, but if there is prostate   cancer, this could represent metastatic disease from prostate cancer with   poor FDG avidity.  Otherwise it could also represent large bone islands.       6.  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    There is a focal area of intense activity associated with a new lucent lesion   within the background of Paget's disease concerning for metastatic disease. Given the FDG activity, it is likely related to the lung process.           21: MRI brain  1. There is a punctate focus of restricted diffusion within the right frontal   lobe without associated enhancement, mass effect or midline shift.  This   could represent a punctate acute infarct.  However, given history, an early   metastatic focus cannot be entirely excluded. 2. Scattered foci of susceptibility are seen within the cerebral hemispheres   bilaterally, which were not visualized on the prior exam.  Findings could   represent areas of remote microhemorrhage or perhaps treated metastases. 3. No abnormal enhancement within the brain. 4. Minimal global parenchymal volume loss with minimal chronic microvascular   ischemic changes. Past Medical History:     BPH, HTN, COPD                                                            Past Surgery History:    None per his wife                                                                        Social History:   Lives with wife. Cut down smoking to 2-4 cigarettes per day prior to which he was smoking 2 to 3 packs/day for the past 40 years. Also reported drinking alcohol 10-12 beers per day. Denied any other illicit drug abuse.                                                                                                    Family History:    He reported that his sister  at the age of 39 with  metastatic cancer, he was not sure what the primary was                                                                                              Allergies   Allergen Reactions    Tramadol Other (See Comments)     seizures    Vicodin [Hydrocodone-Acetaminophen] Hives       Current Outpatient Medications on File Prior to Visit   Medication Sig Dispense Refill    loperamide (RA ANTI-DIARRHEAL) 2 MG capsule Take 1 capsule by mouth 4 times daily as needed for Diarrhea 20 capsule 0    NARCAN 4 MG/0.1ML LIQD nasal spray DISPENSED PER STANDING ORDER USE AS DIRECTED PATIENT IS TRAINED OPIOID OVERDOSE RESPONDER      oxyCODONE (OXYCONTIN) 10 MG extended release tablet Take 1 tablet by mouth 2 times daily for 30 days. Intended supply: 30 days 60 tablet 0    oxyCODONE-acetaminophen (PERCOCET) 5-325 MG per tablet Take 1 tablet by mouth every 8 hours as needed for Pain for up to 30 days. Intended supply: 3 days. Take lowest dose possible to manage pain 90 tablet 0    ondansetron (ZOFRAN) 8 MG tablet Take 1 tablet by mouth every 8 hours as needed for Nausea or Vomiting 30 tablet 1    albuterol sulfate HFA (PROVENTIL HFA) 108 (90 Base) MCG/ACT inhaler Inhale 2 puffs into the lungs every 4 hours as needed for Wheezing or Shortness of Breath With spacer (and mask if indicated). Thanks. 1 Inhaler 1    lisinopril (PRINIVIL;ZESTRIL) 10 MG tablet Take 1 tablet by mouth daily 30 tablet 3    ammonium lactate (LAC-HYDRIN) 12 % lotion Apply topically as needed. 1 Bottle 0    nicotine (NICODERM CQ) 21 MG/24HR Place 1 patch onto the skin daily 30 patch 3    dexamethasone (DECADRON) 4 MG tablet Two tablets the day before treatment, one table daily for four days starting the day after chemotherapy (Patient not taking: Reported on 10/6/2021) 18 tablet 0     No current facility-administered medications on file prior to visit. Interval History: 10/6/21: He arrived alone to the clinic today. Pain in the left iliac area and also left gluteal area. Weakness in the LLE at times, feels like its going to sleep. No chest pain,. Increased sob on exertion. Wheezing. Cough, productive of clear sputum. No hemoptysis. When he urinates, he also has bowel movements. WAs in skilled nursing sec to legal issues and hence could not go to Dr Celestine Dominique office. Continues to feel tired. Appetite is ok and no weight loss.        Review of Systems:    As per the interval history, rest of the review of system negative     Vital Signs: /79 (Site: Right Upper Arm, Position: Sitting, Cuff Size: Medium Adult)   Pulse 110   Temp 96.7 °F (35.9 °C) (Infrared)   Resp 16   Ht 6' 1\" (1.854 m)   Wt 205 lb (93 kg)   SpO2 98%   BMI 27.05 kg/m²      CONSTITUTIONAL: awake, alert, cooperative, no apparent distress   EYES: RAISSA, No pallor or any icterus  ENT: ATNC  NECK: No JVD  HEMATOLOGIC/LYMPHATIC: no cervical, supraclavicular or axillary lymphadenopathy   LUNGS: CTAB  CARDIOVASCULAR: s1s2 rrr no murmurs  ABDOMEN: soft ntnd bs pos  NEUROLOGIC: GI  SKIN: Psoriatic rash with excoriation marks on the lower extremities and also upper extremities  EXTREMITIES: no LE edema bilaterally     Labs:  Hematology:  Lab Results   Component Value Date    WBC 11.5 (H) 10/05/2021    RBC 4.96 10/05/2021    HGB 14.3 10/05/2021    HCT 42.9 10/05/2021    MCV 86.5 10/05/2021    MCH 28.8 10/05/2021    MCHC 33.3 10/05/2021    RDW 14.6 10/05/2021     10/05/2021    MPV 9.0 10/05/2021    BANDSPCT 9 11/13/2018    SEGSPCT 66.0 10/05/2021    EOSRELPCT 3.4 (H) 10/05/2021    BASOPCT 0.6 10/05/2021    LYMPHOPCT 23.2 (L) 10/05/2021    MONOPCT 6.5 (H) 10/05/2021    BANDABS 0.66 11/13/2018    SEGSABS 7.6 10/05/2021    EOSABS 0.4 10/05/2021    BASOSABS 0.1 10/05/2021    LYMPHSABS 2.7 10/05/2021    MONOSABS 0.8 10/05/2021    DIFFTYPE AUTOMATED DIFFERENTIAL 10/05/2021    POLYCHROM 1+ 11/13/2018    PLTM FEW 11/13/2018     No results found for: ESR  Chemistry:  Lab Results   Component Value Date     10/05/2021    K 3.5 10/05/2021     10/05/2021    CO2 22 10/05/2021    BUN 10 10/05/2021    CREATININE 0.7 (L) 10/05/2021    GLUCOSE 105 (H) 10/05/2021    CALCIUM 9.4 10/05/2021    PROT 8.2 10/05/2021    LABALBU 4.2 10/05/2021    BILITOT 0.3 10/05/2021    ALKPHOS 212 (H) 10/05/2021    AST 16 10/05/2021    ALT 8 (L) 10/05/2021    LABGLOM >60 10/05/2021    GFRAA >60 10/05/2021    MG 1.6 (L) 07/29/2021    POCGLU 124 (H) 09/22/2020     No results found for: MMA, LDH, HOMOCYSTEINE  No components found for: LD  Lab Results   Component Value Date    TSHHS 2.162 07/27/2013     Immunology:  Lab Results   Component Value Date    PROT 8.2 10/05/2021     No results found for: Moustapha Marie, KLFLCR  No results found for: B2M  Coagulation Panel:  Lab Results   Component Value Date    PROTIME 10.0 (L) 07/28/2021    INR 0.78 07/28/2021    APTT 25.8 07/28/2021    DDIMER <200 12/19/2013     Anemia Panel:  No results found for: Reed Brewton  Tumor Markers:  Lab Results   Component Value Date    CEA 7.7 07/23/2021    .30 (H) 07/21/2021        Observations:  ECOG:  No data recorded       Assessment & Plan:                                                          Left hilar mass with mediastinal hilar lymphadenopathy. 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 PET scan results, bone lesions most probably not metastatic except for one lytic lesion. MRI of the brain with no convincing metastatic lesions. Presented  the case in the tumor board. Decision made to proceed with a prostate biopsy and treat with ADT if malignancy  and in the meantime proceed with staging mediastinoscopy for further treatment plan. But as pt very very very noncompliant, did not follow up with urology or surgeon. Has another appt with Dr Yann Esquivel on October 13 2021  May have to consider CXRT if he does not follow with CTS again    Prostate enlargement and elevated  could be secondary to prostatitis vs prostate cancer. As mentioned above recommend prostate biopsy, but pt very noncompliant and did not follow through  Bones lesions most probably pagets but one lytic area concerning for bone mets, discussed bone biopsy and he agreed but not sure he will follow through. Repeat PSA pending    Rash, etiology unclear. Looks like psoriatic rash. Has been applying some topical antibiotic cream with some relief and healing.  Recommend derm evaluation    Elevated alk phos most probably

## 2021-10-06 NOTE — PROGRESS NOTES
MA Rooming Questions  Patient: Shane Macedo  MRN: [id number]    Date: 10/6/2021        1. Do you have any new issues? yes - Pt c/o left side lower back pain. States it feels \"tight\"     Pt states that for the last couple of weeks every time he urinates he also has clear liquid come from his rectum       2. Do you need any refills on medications? yes - Oxycodone and Percocet    3. Have you had any imaging done since your last visit?   no    4. Have you been hospitalized or seen in the emergency room since your last visit here?   yes - Left side low back pain at [facility]    5. Did the patient have a depression screening completed today?  No    No data recorded     PHQ-9 Given to (if applicable):               PHQ-9 Score (if applicable):                     [] Positive     []  Negative              Does question #9 need addressed (if applicable)                     [] Yes    []  No               Pepito Newsome MA

## 2021-10-08 LAB
PROSTATE SPECIFIC ANTIGEN FREE: 28.6 NG/ML
PROSTATE SPECIFIC ANTIGEN PERCENT FREE: 16 %
PROSTATE SPECIFIC ANTIGEN: 181.8 NG/ML (ref 0–4)

## 2021-10-14 ENCOUNTER — TELEPHONE (OUTPATIENT)
Dept: ONCOLOGY | Age: 51
End: 2021-10-14

## 2021-10-14 ENCOUNTER — TELEPHONE (OUTPATIENT)
Dept: CASE MANAGEMENT | Age: 51
End: 2021-10-14

## 2021-10-14 DIAGNOSIS — C34.82 MALIGNANT NEOPLASM OF OVERLAPPING SITES OF LEFT LUNG (HCC): Primary | ICD-10-CM

## 2021-10-14 NOTE — TELEPHONE ENCOUNTER
Called patient's wife regarding his BX patient. Wife stated that [facility] gave her with a call with all of these appt. I stated understanding.

## 2021-10-18 RX ORDER — SODIUM CHLORIDE 0.9 % (FLUSH) 0.9 %
10 SYRINGE (ML) INJECTION PRN
Status: CANCELLED | OUTPATIENT
Start: 2021-10-18

## 2021-10-20 ENCOUNTER — CLINICAL DOCUMENTATION (OUTPATIENT)
Dept: ONCOLOGY | Age: 51
End: 2021-10-20

## 2021-10-20 NOTE — PROGRESS NOTES
Please only call preferred number in patients chart not his wife, who is the [facility].

## 2021-10-28 RX ORDER — SODIUM CHLORIDE 0.9 % (FLUSH) 0.9 %
10 SYRINGE (ML) INJECTION PRN
Status: CANCELLED | OUTPATIENT
Start: 2021-10-28

## 2021-11-16 ENCOUNTER — HOSPITAL ENCOUNTER (OUTPATIENT)
Dept: INFUSION THERAPY | Age: 51
Discharge: HOME | End: 2021-11-16
Payer: COMMERCIAL

## 2021-11-16 ENCOUNTER — OFFICE VISIT (OUTPATIENT)
Dept: ONCOLOGY | Age: 51
End: 2021-11-16
Payer: COMMERCIAL

## 2021-11-16 VITALS
OXYGEN SATURATION: 99 % | HEIGHT: 73 IN | RESPIRATION RATE: 16 BRPM | WEIGHT: 192.6 LBS | TEMPERATURE: 96.5 F | HEART RATE: 104 BPM | SYSTOLIC BLOOD PRESSURE: 116 MMHG | BODY MASS INDEX: 25.53 KG/M2 | DIASTOLIC BLOOD PRESSURE: 79 MMHG

## 2021-11-16 DIAGNOSIS — M89.9 BONE LESION: ICD-10-CM

## 2021-11-16 DIAGNOSIS — C34.82 MALIGNANT NEOPLASM OF OVERLAPPING SITES OF LEFT LUNG (HCC): Primary | ICD-10-CM

## 2021-11-16 DIAGNOSIS — R97.20 ELEVATED PSA: ICD-10-CM

## 2021-11-16 DIAGNOSIS — C34.82 MALIGNANT NEOPLASM OF OVERLAPPING SITES OF LEFT LUNG (HCC): ICD-10-CM

## 2021-11-16 LAB
BASOPHILS ABSOLUTE: 0 K/CU MM
BASOPHILS RELATIVE PERCENT: 0.2 % (ref 0–1)
DIFFERENTIAL TYPE: ABNORMAL
EOSINOPHILS ABSOLUTE: 0.2 K/CU MM
EOSINOPHILS RELATIVE PERCENT: 1.9 % (ref 0–3)
HCT VFR BLD CALC: 44.9 % (ref 42–52)
HEMOGLOBIN: 15.9 GM/DL (ref 13.5–18)
LYMPHOCYTES ABSOLUTE: 2.9 K/CU MM
LYMPHOCYTES RELATIVE PERCENT: 22.1 % (ref 24–44)
MCH RBC QN AUTO: 28 PG (ref 27–31)
MCHC RBC AUTO-ENTMCNC: 35.4 % (ref 32–36)
MCV RBC AUTO: 79.2 FL (ref 78–100)
MONOCYTES ABSOLUTE: 0.7 K/CU MM
MONOCYTES RELATIVE PERCENT: 5.7 % (ref 0–4)
PDW BLD-RTO: 15.5 % (ref 11.7–14.9)
PLATELET # BLD: 269 K/CU MM (ref 140–440)
PMV BLD AUTO: 9.6 FL (ref 7.5–11.1)
RBC # BLD: 5.67 M/CU MM (ref 4.6–6.2)
REASON FOR REJECTION: NORMAL
REJECTED TEST: NORMAL
SEGMENTED NEUTROPHILS ABSOLUTE COUNT: 9.1 K/CU MM
SEGMENTED NEUTROPHILS RELATIVE PERCENT: 70.1 % (ref 36–66)
WBC # BLD: 12.9 K/CU MM (ref 4–10.5)

## 2021-11-16 PROCEDURE — 84153 ASSAY OF PSA TOTAL: CPT

## 2021-11-16 PROCEDURE — 85025 COMPLETE CBC W/AUTO DIFF WBC: CPT

## 2021-11-16 PROCEDURE — G8427 DOCREV CUR MEDS BY ELIG CLIN: HCPCS | Performed by: INTERNAL MEDICINE

## 2021-11-16 PROCEDURE — 3017F COLORECTAL CA SCREEN DOC REV: CPT | Performed by: INTERNAL MEDICINE

## 2021-11-16 PROCEDURE — 80053 COMPREHEN METABOLIC PANEL: CPT

## 2021-11-16 PROCEDURE — 4004F PT TOBACCO SCREEN RCVD TLK: CPT | Performed by: INTERNAL MEDICINE

## 2021-11-16 PROCEDURE — G8484 FLU IMMUNIZE NO ADMIN: HCPCS | Performed by: INTERNAL MEDICINE

## 2021-11-16 PROCEDURE — 99211 OFF/OP EST MAY X REQ PHY/QHP: CPT

## 2021-11-16 PROCEDURE — 36415 COLL VENOUS BLD VENIPUNCTURE: CPT

## 2021-11-16 PROCEDURE — 84154 ASSAY OF PSA FREE: CPT

## 2021-11-16 PROCEDURE — 99214 OFFICE O/P EST MOD 30 MIN: CPT | Performed by: INTERNAL MEDICINE

## 2021-11-16 PROCEDURE — G8419 CALC BMI OUT NRM PARAM NOF/U: HCPCS | Performed by: INTERNAL MEDICINE

## 2021-11-16 RX ORDER — OXYCODONE HYDROCHLORIDE AND ACETAMINOPHEN 5; 325 MG/1; MG/1
1 TABLET ORAL EVERY 8 HOURS PRN
Qty: 45 TABLET | Refills: 0 | Status: SHIPPED | OUTPATIENT
Start: 2021-11-16 | End: 2021-12-01

## 2021-11-16 ASSESSMENT — PATIENT HEALTH QUESTIONNAIRE - PHQ9
SUM OF ALL RESPONSES TO PHQ QUESTIONS 1-9: 0
1. LITTLE INTEREST OR PLEASURE IN DOING THINGS: 0
2. FEELING DOWN, DEPRESSED OR HOPELESS: 0
SUM OF ALL RESPONSES TO PHQ QUESTIONS 1-9: 0
SUM OF ALL RESPONSES TO PHQ QUESTIONS 1-9: 0
SUM OF ALL RESPONSES TO PHQ9 QUESTIONS 1 & 2: 0

## 2021-11-16 NOTE — PROGRESS NOTES
MA Rooming Questions  Patient: Rik Sheppard  MRN: [id number]    Date: 11/16/2021        1. Do you have any new issues? yes - Lower back pain, painful urination         2. Do you need any refills on medications? yes - Pain medication refill    3. Have you had any imaging done since your last visit?   no    4. Have you been hospitalized or seen in the emergency room since your last visit here?   no    5. Did the patient have a depression screening completed today?  Yes    PHQ-9 Total Score: 0 (11/16/2021 11:59 AM)       PHQ-9 Given to (if applicable):               PHQ-9 Score (if applicable):                     [] Positive     []  Negative              Does question #9 need addressed (if applicable)                     [] Yes    []  No               Maria Esther Bruno CMA

## 2021-11-16 NOTE — PROGRESS NOTES
Patient Name:  Samuel Marie  Patient :  1970  Patient MRN:  [id number]     Primary Oncologist: Jonathan Schwartz MD  Referring Provider: Charlene Gonzalez MD     Date of Service: 2021        Chief Complaint:    Chief Complaint   Patient presents with   [address]       Encounter Diagnoses   Name Primary?  Malignant neoplasm of overlapping sites of left lung ([address] Yes    Bone lesion         HPI:   21: Initial [facility] visit:Hima Naranjo is a 45 y.o. male who presents to [facility] with report of dysuria and flank pain. Reports these symptoms started a few weeks ago. He ultimately came to the ED due to worsening back pain.      He reports ongoing issues with frequent urge to urinate and notes some incontinence. He denies hematuria. He was noted to have acute pyelonephritis and was admitted and started on IV Rocephin.      21 CT chest     Impression   1. Left hilar neoplasm extending into the left upper lobe measuring 3.2 x 5.9   x 5.3 cm obstructing the left upper lobe bronchus with probable minimal   adjacent infiltrates versus lymphangitic spread of tumor.  PET-CT/biopsy is   recommended. 2. Mediastinal lymphadenopathy. 3. Prominent left supraclavicular lymph nodes. 4. No osseous metastatic disease.      21 Ct abdomen and pelvis  Impression   1. Circumferential thickening of the bladder wall is noted which could be   related to cystitis.  Correlate with urinalysis. 2. There is left retroperitoneal lymphadenopathy.  This could be reactive or   neoplastic.  This can be further evaluated with PET-CT. 3. There is minimal stranding within the retroperitoneal on the left.  This   is uncertain etiology however could be related to acute pyelonephritis. 4. Interval development of multiple sclerotic lesions within the spine and   pelvis, concerning for metastatic osseous disease.    5. Prostate gland is enlarged.  Correlate with PSA.      .30      He denies past history of cancer. He smokes 2-3 ppd and drinks 10-12 beers daily. He reports unknown malignancy in his sister who  at 39. No other known family history of cancer.      Due to lung mass and concern for metastatic prostate cancer we were called to evaluate. 21:  Final Pathologic Diagnosis:   Needle biopsy of lung, clinically left lung mass:        SQUAMOUS CELL CARCINOMA IN SITU.     21:PET  1.  Left perihilar mass likely represents primary lung cancer.  Correlate   with biopsy results.  The opacity more peripheral in the left lower lobe has   relatively low level activity and likely represents an area of post   obstructive pneumonia adjacent to the mass.       2.  Metabolically active left AP window lymph node concerning for metastatic   disease.       3.  The prostate is enlarged and has increased FDG activity which could be   due to prostatitis or prostate cancer.  Correlate with PSA levels.       4.  No increased metabolic activity associated with retroperitoneal lymph   nodes.  This is unlikely related to the lung process given the significant   difference in metabolic activity; however, if there is prostate cancer, this   could potentially be metastatic disease from the prostate cancer, which can   have variable levels of uptake on FDG PET scans.       5.  No metabolic activity associated with multiple sclerotic lesions.  Again   this is unlikely related to the lung process, but if there is prostate   cancer, this could represent metastatic disease from prostate cancer with   poor FDG avidity.  Otherwise it could also represent large bone islands.       6.  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 There is a focal area of intense activity associated with a new lucent lesion   within the background of Paget's disease concerning for metastatic disease. Given the FDG activity, it is likely related to the lung process.           21: MRI brain  1.  There is a punctate focus of restricted diffusion within the right frontal   lobe without associated enhancement, mass effect or midline shift.  This   could represent a punctate acute infarct.  However, given history, an early   metastatic focus cannot be entirely excluded. 2. Scattered foci of susceptibility are seen within the cerebral hemispheres   bilaterally, which were not visualized on the prior exam.  Findings could   represent areas of remote microhemorrhage or perhaps treated metastases. 3. No abnormal enhancement within the brain. 4. Minimal global parenchymal volume loss with minimal chronic microvascular   ischemic changes. Past Medical History:     BPH, HTN, COPD                                                            Past Surgery History:    None per his wife                                                                        Social History:   Lives with wife. Cut down smoking to 2-4 cigarettes per day prior to which he was smoking 2 to 3 packs/day for the past 40 years. Also reported drinking alcohol 10-12 beers per day. Denied any other illicit drug abuse.                                                                                                    Family History:    He reported that his sister  at the age of 39 with  metastatic cancer, he was not sure what the primary was                                                                                              Allergies   Allergen Reactions    Tramadol Other (See Comments)     seizures    Vicodin [Hydrocodone-Acetaminophen] Hives       Current Outpatient Medications on File Prior to Visit   Medication Sig Dispense Refill    NARCAN 4 MG/0.1ML LIQD nasal spray DISPENSED PER STANDING ORDER USE AS DIRECTED PATIENT IS TRAINED OPIOID OVERDOSE RESPONDER      dexamethasone (DECADRON) 4 MG tablet Two tablets the day before treatment, one table daily for four days starting the day after chemotherapy 18 tablet 0    ondansetron (ZOFRAN) 8 MG tablet Take 1 tablet by mouth every 8 hours as needed for Nausea or Vomiting 30 tablet 1    lisinopril (PRINIVIL;ZESTRIL) 10 MG tablet Take 1 tablet by mouth daily 30 tablet 3    ammonium lactate (LAC-HYDRIN) 12 % lotion Apply topically as needed. 1 Bottle 0    nicotine (NICODERM CQ) 21 MG/24HR Place 1 patch onto the skin daily 30 patch 3    albuterol sulfate HFA (PROVENTIL HFA) 108 (90 Base) MCG/ACT inhaler Inhale 2 puffs into the lungs every 4 hours as needed for Wheezing or Shortness of Breath With spacer (and mask if indicated). Thanks. 1 Inhaler 1     No current facility-administered medications on file prior to visit. Interval History: 11/16/21: He arrived with his wife to the clinic today. Has missed appt for EBUS, urology appt, bone biopsy. Wife reported that he has been having great difficulty urinating. Crying a lot. Lower abdominal pain when he tries to urinate. Pain in the left hip area. Sleeps a lot. Not been able to ambulate. No chest pain. Coughs with clear sputum. No hemoptysis. Appetite very poor.  Lost 15 lbs since last visit    Review of Systems:  As per the interval history, rest of the review of system negative     Vital Signs: /79 (Site: Right Upper Arm, Position: Sitting, Cuff Size: Large Adult)   Pulse 104   Temp 96.5 °F (35.8 °C) (Infrared)   Resp 16   Ht 6' 1\" (1.854 m)   Wt 192 lb 9.6 oz (87.4 kg)   SpO2 99%   BMI 25.41 kg/m²      CONSTITUTIONAL: awake, alert, sleepy  EYES: RAISSA, No pallor or any icterus  ENT: ATNC  NECK: No JVD  HEMATOLOGIC/LYMPHATIC: no cervical, supraclavicular or axillary lymphadenopathy   LUNGS: CTAB  CARDIOVASCULAR: s1s2 rrr no murmurs  ABDOMEN: soft ntnd bs pos  NEUROLOGIC: GI  SKIN: Psoriatic rash with excoriation marks on the lower extremities and also upper extremities  EXTREMITIES: no LE edema bilaterally     Labs:  Hematology:  Lab Results   Component Value Date    WBC 9.4 10/06/2021    RBC 4.95 10/06/2021    HGB 14.4 10/06/2021 HCT 40.5 (L) 10/06/2021    MCV 81.8 10/06/2021    MCH 29.1 10/06/2021    MCHC 35.6 10/06/2021    RDW 14.7 10/06/2021     10/06/2021    MPV 9.1 10/06/2021    BANDSPCT 9 11/13/2018    SEGSPCT 64.8 10/06/2021    EOSRELPCT 4.6 (H) 10/06/2021    BASOPCT 0.5 10/06/2021    LYMPHOPCT 24.1 10/06/2021    MONOPCT 6.0 (H) 10/06/2021    BANDABS 0.66 11/13/2018    SEGSABS 6.1 10/06/2021    EOSABS 0.4 10/06/2021    BASOSABS 0.1 10/06/2021    LYMPHSABS 2.3 10/06/2021    MONOSABS 0.6 10/06/2021    DIFFTYPE AUTOMATED DIFFERENTIAL 10/06/2021    POLYCHROM 1+ 11/13/2018    PLTM FEW 11/13/2018     No results found for: ESR  Chemistry:  Lab Results   Component Value Date     10/06/2021    K 3.7 10/06/2021     10/06/2021    CO2 20 (L) 10/06/2021    BUN 9 10/06/2021    CREATININE 0.7 (L) 10/06/2021    GLUCOSE 84 10/06/2021    CALCIUM 10.0 10/06/2021    PROT 7.6 10/06/2021    LABALBU 4.2 10/06/2021    BILITOT 0.1 10/06/2021    ALKPHOS 206 (H) 10/06/2021    AST 15 10/06/2021    ALT 9 (L) 10/06/2021    LABGLOM >60 10/06/2021    GFRAA >60 10/06/2021    MG 1.6 (L) 07/29/2021    POCGLU 124 (H) 09/22/2020     No results found for: MMA, LDH, HOMOCYSTEINE  No components found for: LD  Lab Results   Component Value Date    TSHHS 2.162 07/27/2013     Immunology:  Lab Results   Component Value Date    PROT 7.6 10/06/2021     No results found for: Jesus Flatter, LAMBDAUVOL, KLFLCR  No results found for: B2M  Coagulation Panel:  Lab Results   Component Value Date    PROTIME 10.0 (L) 07/28/2021    INR 0.78 07/28/2021    APTT 25.8 07/28/2021    DDIMER <200 12/19/2013     Anemia Panel:  No results found for: Bernarda Saas  Tumor Markers:  Lab Results   Component Value Date    CEA 7.7 07/23/2021    .8 (H) 10/06/2021        Observations:  ECOG:  PHQ-9 Total Score: 0 (11/16/2021 11:59 AM)       Assessment & Plan:                                                          Left hilar mass with mediastinal hilar lymphadenopathy.   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 PET scan results from august 2021 noted, bone lesions most probably not metastatic except for one lytic lesion. MRI of the brain with no convincing metastatic lesions. Presented  the case in the tumor board. Decision made to proceed with a prostate biopsy and treat with ADT if malignancy  and in the meantime proceed with staging mediastinoscopy for further treatment plan. But as pt very very very noncompliant, did not follow up with urology, CTS or for bone biopsy  Had another appt with Dr Leopold Lambert on October 13 2021 but looks like did not follow for EBUS on 10/25/21  Will repeat PET/PSA and discussed further treatment with chemoradiation and then assess for surgery. Prostate enlargement and elevated  could be secondary to prostatitis vs prostate cancer. As mentioned above recommend prostate biopsy, but pt very noncompliant and did not follow through  Bones lesions most probably pagets but one lytic area concerning for bone mets, discussed bone biopsy and he agreed but did not follow through  Repeat PSA  10/6/21was 181    Rash,   Looks like psoriatic rash. Has been applying topical cream    Elevated alk phos most probably secondary to the bone lesions possibly Paget's disease versus EtOH. Has been chronically elevated since 2012. Discussed alcohol and smoking cessation. Adequate analgesic and bowel regimen. Continue other medical care. Thank you for letting us be part of the care and will follow along. Discussed above findings and plan with him and he voiced understanding. Answered all his questions. Discussed healthy lifestyle including healthy diet, regular exercise as tolerated. ,  Smoking and alcohol cessation    Recommend follow-up with primary care physician and other specialists. Note he has he has been very noncompliant with appointments.     Please do not hesitate to contact us if you need

## 2021-11-18 LAB
PROSTATE SPECIFIC ANTIGEN FREE: 35.5 NG/ML
PROSTATE SPECIFIC ANTIGEN PERCENT FREE: 14 %
PROSTATE SPECIFIC ANTIGEN: 250.6 NG/ML (ref 0–4)

## 2021-11-19 ENCOUNTER — TELEPHONE (OUTPATIENT)
Dept: ONCOLOGY | Age: 51
End: 2021-11-19

## 2021-11-19 NOTE — TELEPHONE ENCOUNTER
Pt is going to [facility] on 11/26 910 arrival for 945 appt-- gave pet prep-- pt wife is aware of appt and stated understanding

## 2021-11-23 ENCOUNTER — TELEPHONE (OUTPATIENT)
Dept: CT IMAGING | Age: 51
End: 2021-11-23

## 2021-11-23 DIAGNOSIS — C34.82 MALIGNANT NEOPLASM OF OVERLAPPING SITES OF LEFT LUNG (HCC): Primary | ICD-10-CM

## 2021-11-23 NOTE — TELEPHONE ENCOUNTER
Called patients daughter Stewart Bowles about his scan change from PET to CT. Left her a voicemail about this scan change. Called patient wife Cheri Piedra about this also 12.02.2021 at [facility] arr 1000. Went over prep and she stated understanding.

## 2021-12-02 ENCOUNTER — HOSPITAL ENCOUNTER (OUTPATIENT)
Dept: CT IMAGING | Age: 51
Discharge: HOME | End: 2021-12-02
Payer: COMMERCIAL

## 2021-12-02 DIAGNOSIS — C34.82 MALIGNANT NEOPLASM OF OVERLAPPING SITES OF LEFT LUNG (HCC): ICD-10-CM

## 2021-12-02 LAB
GFR AFRICAN AMERICAN: >60 ML/MIN/1.73M2
GFR NON-AFRICAN AMERICAN: >60 ML/MIN/1.73M2
POC CREATININE: 0.7 MG/DL (ref 0.9–1.3)

## 2021-12-02 PROCEDURE — 74177 CT ABD & PELVIS W/CONTRAST: CPT

## 2021-12-02 PROCEDURE — 2580000003 HC RX 258: Performed by: INTERNAL MEDICINE

## 2021-12-02 PROCEDURE — 6360000004 HC RX CONTRAST MEDICATION: Performed by: INTERNAL MEDICINE

## 2021-12-02 PROCEDURE — 71260 CT THORAX DX C+: CPT

## 2021-12-02 RX ORDER — SODIUM CHLORIDE 0.9 % (FLUSH) 0.9 %
5-40 SYRINGE (ML) INJECTION PRN
Status: DISCONTINUED | OUTPATIENT
Start: 2021-12-02 | End: 2021-12-03 | Stop reason: HOSPADM

## 2021-12-02 RX ADMIN — SODIUM CHLORIDE, PRESERVATIVE FREE 10 ML: 5 INJECTION INTRAVENOUS at 11:25

## 2021-12-02 RX ADMIN — IOHEXOL 50 ML: 240 INJECTION, SOLUTION INTRATHECAL; INTRAVASCULAR; INTRAVENOUS; ORAL at 10:15

## 2021-12-02 RX ADMIN — IOPAMIDOL 75 ML: 755 INJECTION, SOLUTION INTRAVENOUS at 11:25

## 2021-12-03 ENCOUNTER — HOSPITAL ENCOUNTER (OUTPATIENT)
Dept: RADIATION ONCOLOGY | Age: 51
Discharge: HOME | End: 2021-12-03
Payer: COMMERCIAL

## 2021-12-03 VITALS
HEART RATE: 95 BPM | BODY MASS INDEX: 27.05 KG/M2 | WEIGHT: 205 LBS | DIASTOLIC BLOOD PRESSURE: 76 MMHG | SYSTOLIC BLOOD PRESSURE: 125 MMHG | TEMPERATURE: 96.4 F | RESPIRATION RATE: 16 BRPM | OXYGEN SATURATION: 98 %

## 2021-12-03 DIAGNOSIS — M89.9 LYTIC LESION OF BONE ON X-RAY: Primary | ICD-10-CM

## 2021-12-03 PROCEDURE — 99202 OFFICE O/P NEW SF 15 MIN: CPT

## 2021-12-03 PROCEDURE — 99205 OFFICE O/P NEW HI 60 MIN: CPT | Performed by: RADIOLOGY

## 2021-12-03 RX ORDER — OXYCODONE HCL 10 MG/1
10 TABLET, FILM COATED, EXTENDED RELEASE ORAL EVERY 12 HOURS
COMMUNITY
End: 2021-12-08

## 2021-12-03 RX ORDER — OXYCODONE HYDROCHLORIDE AND ACETAMINOPHEN 5; 325 MG/1; MG/1
1 TABLET ORAL EVERY 8 HOURS PRN
COMMUNITY
End: 2021-12-29

## 2021-12-03 ASSESSMENT — PAIN DESCRIPTION - DESCRIPTORS: DESCRIPTORS: ACHING

## 2021-12-03 ASSESSMENT — PAIN DESCRIPTION - ONSET: ONSET: ON-GOING

## 2021-12-03 ASSESSMENT — PAIN DESCRIPTION - PROGRESSION: CLINICAL_PROGRESSION: GRADUALLY WORSENING

## 2021-12-03 ASSESSMENT — PAIN - FUNCTIONAL ASSESSMENT: PAIN_FUNCTIONAL_ASSESSMENT: PREVENTS OR INTERFERES SOME ACTIVE ACTIVITIES AND ADLS

## 2021-12-03 ASSESSMENT — PAIN SCALES - GENERAL: PAINLEVEL_OUTOF10: 10

## 2021-12-03 ASSESSMENT — PAIN DESCRIPTION - FREQUENCY: FREQUENCY: CONTINUOUS

## 2021-12-03 ASSESSMENT — PAIN DESCRIPTION - LOCATION: LOCATION: BACK;HIP

## 2021-12-03 ASSESSMENT — PAIN DESCRIPTION - ORIENTATION: ORIENTATION: LEFT;LOWER

## 2021-12-03 ASSESSMENT — PAIN DESCRIPTION - PAIN TYPE: TYPE: ACUTE PAIN

## 2021-12-03 NOTE — PROGRESS NOTES
Delilah Mccarthyzac  12/3/2021    CONSULT / Lung ca    Vitals:    12/03/21 1319   BP: 125/76   Pulse: 95   Resp: 16   Temp: 96.4 °F (35.8 °C)   SpO2: 98%        Oxygen Therapy  SpO2: 98 %  Pulse Oximeter Device Mode: Intermittent  Pulse Oximeter Device Location: Right, Finger  O2 Device: None (Room air)    Wt Readings from Last 3 Encounters:   12/03/21 205 lb (93 kg)   11/16/21 192 lb 9.6 oz (87.4 kg)   10/13/21 203 lb (92.1 kg)        Pain Assessment  Pain Assessment: 0-10  Pain Level: 10  Patient's Stated Pain Goal: No pain  Pain Type: Acute pain  Pain Location: Back, Hip  Pain Orientation: Left, Lower  Pain Descriptors: Aching  Pain Frequency: Continuous  Pain Onset: On-going  Clinical Progression: Gradually worsening  Functional Pain Assessment: Prevents or interferes some active activities and ADLs  Non-Pharmaceutical Pain Intervention(s): Repositioned, Rest  Multiple Pain Sites: No  Prescribed Narcotics- Percocet/ Oxycontin    Fall Risk:    Not currently a fall risk  Shortness of Breath and Re-Evaluate Weekly    Nutritional Alteration:  None  No Difficulties Swallowing  Voices Sufficient Oral Intake    Mediport: yes, Right chest    Pacemaker/ICD: no    Implants: no    Previous XRT: no    Assessment: Pt reports went to ER for evaluation in August. Pt reports work showed he had lung ca. Pt with shortness of breath with exertion and improves with rest, harsh moist sounding cough with greenish yellow sputum. C/o pain in left lower back and hip, worsening past few days, rates \"10\" at this time, pain meds per Dr. Gigi Sotelo.        Past Medical History:   Diagnosis Date    CAD (coronary artery disease) 12/23/2013    cath negative per pt    History of TMJ disorder     Hypertension     Trigeminal neuralgia        Past Surgical History:   Procedure Laterality Date    ABDOMEN SURGERY      CARDIAC CATHETERIZATION  08/03/2016    CT NEEDLE BIOPSY LUNG PERCUTANEOUS  7/28/2021    CT NEEDLE BIOPSY LUNG PERCUTANEOUS 7/28/2021 [facility] CT SCAN    PORT SURGERY Right 8/13/2021    MEDIPORT INSERTION performed by Shari Mejia MD at [facility] OR       Allergies   Allergen Reactions    Tramadol Other (See Comments)     seizures    Vicodin [Hydrocodone-Acetaminophen] Hives          Current Outpatient Medications:     oxyCODONE-acetaminophen (PERCOCET) 5-325 MG per tablet, Take 1 tablet by mouth every 8 hours as needed for Pain., Disp: , Rfl:     oxyCODONE (OXYCONTIN) 10 MG extended release tablet, Take 10 mg by mouth every 12 hours. , Disp: , Rfl:     NARCAN 4 MG/0.1ML LIQD nasal spray, DISPENSED PER STANDING ORDER USE AS DIRECTED PATIENT IS TRAINED OPIOID OVERDOSE RESPONDER, Disp: , Rfl:     dexamethasone (DECADRON) 4 MG tablet, Two tablets the day before treatment, one table daily for four days starting the day after chemotherapy, Disp: 18 tablet, Rfl: 0    ondansetron (ZOFRAN) 8 MG tablet, Take 1 tablet by mouth every 8 hours as needed for Nausea or Vomiting, Disp: 30 tablet, Rfl: 1    albuterol sulfate HFA (PROVENTIL HFA) 108 (90 Base) MCG/ACT inhaler, Inhale 2 puffs into the lungs every 4 hours as needed for Wheezing or Shortness of Breath With spacer (and mask if indicated). Thanks. , Disp: 1 Inhaler, Rfl: 1    lisinopril (PRINIVIL;ZESTRIL) 10 MG tablet, Take 1 tablet by mouth daily, Disp: 30 tablet, Rfl: 3    ammonium lactate (LAC-HYDRIN) 12 % lotion, Apply topically as needed. , Disp: 1 Bottle, Rfl: 0    nicotine (NICODERM CQ) 21 MG/24HR, Place 1 patch onto the skin daily (Patient not taking: Reported on 12/3/2021), Disp: 30 patch, Rfl: 3        Electronically signed by Juancho Renner RN on 12/3/2021 at 1:27 PM

## 2021-12-03 NOTE — CONSULTS
Radiation Oncology Consultation  Encounter Date: 12/3/2021 2:31 PM    Mr. Pat James is a 45 y.o. male  : 1970  MRN: [id number]  Acct Number: [de-identified]  Requesting Provider: No att. providers found        CONSULTANT: Jaswant Garcia MD    PHYSICIANS:   Primary Care: MD Savage Briscoe MD        DIAGNOSIS:  Visit Diagnoses       Codes    Lytic lesion of bone on x-ray    -  Primary M89.9          Cancer Staging  Malignant neoplasm of overlapping sites of left lung [facility])  Staging form: Lung, AJCC 8th Edition  - Clinical: Stage IV (pM1) - Signed by Savage Ching MD on 2021  - Pathologic: No stage assigned - Unsigned         TREATMENT COURSE:  Oncology History   Malignant neoplasm of overlapping sites of left lung ([facility] [address]   2021 Initial Diagnosis    Malignant neoplasm of overlapping sites of left lung [facility])           HPI:   Today I had the privilege of seeing Pat James in consultation. As you recall, Pat James is a 45 y.o. male who was recently found to have an increasing lung mass and left pelvic lytic lesion. He initially presented with the complaint of left pelvic pain. Currently, he reports the pain is a 10 on a scale of 1-10 with 10 being the worst.  He points to an area at the bottom of the left SI joint. He reports the pain does radiate down the left leg all the way to his foot. He also reports a 20 pound weight loss in the past 3 months unintentionally. He denies any fevers, chills, or drenching night sweats. He does have some intermittent shortness of breath and green phlegm production, but denies any hemoptysis. He has occasional chest pain. He denies any dysphagia or odynophagia. He has not been experiencing any diarrhea, melena, or hematochezia. He reports his cough has been fairly stable overall. He did have a negative Covid test back in August.    Due to his symptoms, he presented for medical evaluation.   Imaging is as below showing a increasing left upper lobe mass going from 7.4 x 3.4 cm to 7.5 x 5.6 cm on the recent CT, which is hypermetabolic on PET. Infiltration into the left hilum was noted with partial invasion of the left pulmonary artery. A AP window lymph node was also noted on PET with an SUV of 4.7. The left iliac wing lytic lesion was noted to increase in size from 2.1 cm to 4.2 cm with an SUV of 13.6. Underlying Paget's disease was noted. Stable retroperitoneal and pelvic lymphadenopathy was seen. 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 He presents today for consideration of his treatment options. He was scheduled for multiple appointments with Dr. Uziel Peters, an appointment with urology, and bone biopsy, none of which he followed through with. His most recent PSA was elevated to 250.6 ng/mL on 11/16/2021, steadily rising from his prior levels of 181.8 ng/mL on 10/6/2021; 147.30 ng/mL on 7/21/2021. On PET/CT, increased activity within the prostate gland was noted with a max SUV of 3.7.       Past Medical History:   Diagnosis Date    CAD (coronary artery disease) 12/23/2013    cath negative per pt    History of TMJ disorder     Hypertension     Trigeminal neuralgia         Past Surgical History:   Procedure Laterality Date    ABDOMEN SURGERY      CARDIAC CATHETERIZATION  08/03/2016    CT NEEDLE BIOPSY LUNG PERCUTANEOUS  7/28/2021    CT NEEDLE BIOPSY LUNG PERCUTANEOUS 7/28/2021 [facility] CT SCAN    PORT SURGERY Right 8/13/2021    MEDIPORT INSERTION performed by Alvin Lee MD at [facility] OR       Family History   Problem Relation Age of Onset    High Blood Pressure Mother     High Blood Pressure Sister     Cancer Sister        Social History     Socioeconomic History    Marital status:      Spouse name: Not on file    Number of children: 11    Years of education: Not on file    Highest education level: Not on file   Occupational History    Not on file   Tobacco Use    Smoking status: Current Every Day Smoker     Packs/day: 2.00     Years: 39.00     Pack years: 78.00     Types: Cigarettes    Smokeless tobacco: Never Used    Tobacco comment: smokes 1-2 a day   Vaping Use    Vaping Use: Never used   Substance and Sexual Activity    Alcohol use: Yes     Alcohol/week: 6.0 standard drinks     Types: 6 Cans of beer per week     Comment: per week (24 oz beers)     Drug use: Yes     Types: Marijuana Berneta Geoff)     Comment: last 12/1    Sexual activity: Yes     Partners: Female   Other Topics Concern    Not on file   Social History Narrative    Not on file     Social Determinants of Health     Financial Resource Strain:     Difficulty of Paying Living Expenses: Not on file   Food Insecurity:     Worried About Running Out of Food in the Last Year: Not on file    [place] of Food in the Last Year: Not on file   Transportation Needs:     Lack of Transportation (Medical): Not on file    Lack of Transportation (Non-Medical):  Not on file   Physical Activity:     Days of Exercise per Week: Not on file    Minutes of Exercise per Session: Not on file   Stress:     Feeling of Stress : Not on file   Social Connections:     Frequency of Communication with Friends and Family: Not on file    Frequency of Social Gatherings with Friends and Family: Not on file    Attends Gnosticist Services: Not on file    Active Member of [address] [place] [address] or Organizations: Not on file    Attends Club or Organization Meetings: Not on file    Marital Status: Not on file   Intimate Partner Violence:     Fear of Current or Ex-Partner: Not on file    Emotionally Abused: Not on file    Physically Abused: Not on file    Sexually Abused: Not on file   Housing Stability:     Unable to Pay for Housing in the Last Year: Not on file    Number of [place] in the Last Year: Not on file    Unstable Housing in the Last Year: Not on file           ALLERGIES:   Allergies   Allergen Reactions    Tramadol Other (See Comments)     seizures    Vicodin [Hydrocodone-Acetaminophen] Hives        Current Outpatient Medications   Medication Sig Dispense Refill    oxyCODONE-acetaminophen (PERCOCET) 5-325 MG per tablet Take 1 tablet by mouth every 8 hours as needed for Pain.  oxyCODONE (OXYCONTIN) 10 MG extended release tablet Take 10 mg by mouth every 12 hours.  NARCAN 4 MG/0.1ML LIQD nasal spray DISPENSED PER STANDING ORDER USE AS DIRECTED PATIENT IS TRAINED OPIOID OVERDOSE RESPONDER      dexamethasone (DECADRON) 4 MG tablet Two tablets the day before treatment, one table daily for four days starting the day after chemotherapy 18 tablet 0    ondansetron (ZOFRAN) 8 MG tablet Take 1 tablet by mouth every 8 hours as needed for Nausea or Vomiting 30 tablet 1    albuterol sulfate HFA (PROVENTIL HFA) 108 (90 Base) MCG/ACT inhaler Inhale 2 puffs into the lungs every 4 hours as needed for Wheezing or Shortness of Breath With spacer (and mask if indicated). Thanks. 1 Inhaler 1    lisinopril (PRINIVIL;ZESTRIL) 10 MG tablet Take 1 tablet by mouth daily 30 tablet 3    ammonium lactate (LAC-HYDRIN) 12 % lotion Apply topically as needed. 1 Bottle 0    nicotine (NICODERM CQ) 21 MG/24HR Place 1 patch onto the skin daily (Patient not taking: Reported on 12/3/2021) 30 patch 3     No current facility-administered medications for this encounter. Outpatient Medications Marked as Taking for the 12/3/21 encounter [facility] Encounter) with Aliya Castellanos MD   Medication Sig Dispense Refill    oxyCODONE-acetaminophen (PERCOCET) 5-325 MG per tablet Take 1 tablet by mouth every 8 hours as needed for Pain.  oxyCODONE (OXYCONTIN) 10 MG extended release tablet Take 10 mg by mouth every 12 hours.       NARCAN 4 MG/0.1ML LIQD nasal spray DISPENSED PER STANDING ORDER USE AS DIRECTED PATIENT IS TRAINED OPIOID OVERDOSE RESPONDER      dexamethasone (DECADRON) 4 MG tablet Two tablets the day before treatment, one table daily for four days starting the day after chemotherapy 18 tablet 0    ondansetron (ZOFRAN) 8 MG tablet Take 1 tablet by mouth every 8 hours as needed for Nausea or Vomiting 30 tablet 1    albuterol sulfate HFA (PROVENTIL HFA) 108 (90 Base) MCG/ACT inhaler Inhale 2 puffs into the lungs every 4 hours as needed for Wheezing or Shortness of Breath With spacer (and mask if indicated). Thanks. 1 Inhaler 1    lisinopril (PRINIVIL;ZESTRIL) 10 MG tablet Take 1 tablet by mouth daily 30 tablet 3    ammonium lactate (LAC-HYDRIN) 12 % lotion Apply topically as needed. 1 Bottle 0          LABORATORY STUDIES:   CBC:   Lab Results   Component Value Date    WBC 12.9 11/16/2021    RBC 5.67 11/16/2021    HGB 15.9 11/16/2021    HCT 44.9 11/16/2021    MCV 79.2 11/16/2021    MCH 28.0 11/16/2021    MCHC 35.4 11/16/2021    RDW 15.5 11/16/2021     11/16/2021    MPV 9.6 11/16/2021     CMP:  Lab Results   Component Value Date     10/06/2021    K 3.7 10/06/2021     10/06/2021    CO2 20 10/06/2021    BUN 9 10/06/2021    CREATININE 0.7 12/02/2021    CREATININE 0.7 10/06/2021    GFRAA >60 12/02/2021    LABGLOM >60 12/02/2021    GLUCOSE 84 10/06/2021    PROT 7.6 10/06/2021    PROT 7.6 12/29/2012    LABALBU 4.2 10/06/2021    CALCIUM 10.0 10/06/2021    BILITOT 0.1 10/06/2021    ALKPHOS 206 10/06/2021    AST 15 10/06/2021    ALT 9 10/06/2021     Onc labs:   Lab Results   Component Value Date    .6 11/16/2021    CEA 7.7 07/23/2021       PATHOLOGY:   7/28/21 Bx  Final Pathologic Diagnosis:   Needle biopsy of lung, clinically left lung mass:        SQUAMOUS CELL CARCINOMA IN SITU.        Chronic interstitial pneumonitis with mature and active   fibrosis. (See Comment.)       Electronically Signed Out By Themekhi Brown MD   Comment:   There is just a single microscopic focus of epithelium   having non-keratinizing squamous cell carcinoma in situ   features without a definitive invasive component.  The vast   majority of the specimen is just chronic inflammation and   fibrosis. If prognostic/therapeutic testing is needed,   another specimen is recommended. The radiologic features and history are reviewed, and the   slides are consulted locally.       RADIOLOGIC STUDIES:       PET CT SKULL BASE TO MID THIGH    Narrative  EXAMINATION:  WHOLE BODY PET/CT    8/19/2021    TECHNIQUE:  Following IV injection of 18 mCi of F 18 -FDG, PET  tumor imaging was  acquired from the base of the skull to the mid thighs. Computed tomography  was used for purposes of attenuation correction and anatomic localization. Fusion imaging was utilized for interpretation. Uptake time 53 mins. Glucose level 108 mg/dl. COMPARISON:  CT scan of the chest, abdomen, and pelvis 07/30/2021 and CT abdomen and  pelvis 9/22/2020    HISTORY:  ORDERING SYSTEM PROVIDED HISTORY: Lung nodule  TECHNOLOGIST PROVIDED HISTORY:  Reason for Exam: Lung mass and lung nodule, smoker    FINDINGS:  HEAD/NECK: There is activity along the course of the right subclavian  approach MediPort. No metabolically active cervical lymphadenopathy. CHEST: Increased FDG activity associated with the mass in the left perihilar  region seen the prior CT scans, SUV max of 18.7. A more peripheral component  of the opacification has only mild associated activity, SUV max of 2.8. Focal FDG activity localizes to an AP window lymph node with SUV max of 4.7. No other metabolically active mediastinal lymphadenopathy. ABDOMEN/PELVIS: No metabolically active adrenal mass. There is increased  activity within the prostate gland along the central posterior aspect, SUV  max of 3.7. No focally increased activity associated with the enlarged  retroperitoneal lymph nodes. Physiologic activity in the gastrointestinal  and genitourinary systems.     BONES/SOFT TISSUE: No focal FDG activity associated with the multiple  sclerotic lesions seen throughout the skeleton, largest in the sacrum and  right iliac below:    Superior mediastinum adjacent to the great vessels on the left, 1.1 cm in  short axis (series 3, image 34), previously 13 mm    Prevascular, 1.2 cm in short axis (series 3, image 47), previously 1.3 cm. AP window, 1.3 cm (series 3, image 50), unchanged. Cardiac chambers unremarkable. Thoracic aorta unremarkable. There is  invasion of the left main pulmonary artery by the lung mass, which appears  similar when compared to the previous exam.  Pulmonary arteries of the left  upper lobe are attenuated by the mass, and that appears increased when  compared to the previous exam.    Lungs/pleura: Lobulated mass adjacent to the left hilum has increased  considerably in size when compared to the previous exam, now measuring 7.5 x  5.6 cm (series 5, image 56), previously 7.4 x 3.4 cm. There is progressive  airspace disease adjacent to this, with progressive atelectasis of the left  upper lobe. Mass is again noted to be infiltrating into the left hilum, with partial  invasion of the left pulmonary artery, similar when compared to the previous  exam.    The right lung is unremarkable in appearance, with no evidence of metastatic  disease. Soft Tissues/Bones: Multifocal sclerotic metastatic lesions are seen  throughout the thorax, increased in size when compared to the previous exam.  For example, at T10, the largest lesion measures 13 mm, previously 3-4 mm. No pathologic fractures are identified. Mild bilateral gynecomastia is noted. Extra thoracic soft tissues are  otherwise unremarkable. Abdomen/Pelvis:    Organs: The liver enhances normally. The gallbladder is unremarkable in  appearance. The spleen, adrenals, and pancreas are unremarkable in  appearance. No acute or suspicious renal abnormality is identified. GI/Bowel: No large bowel abnormalities are identified. The appendix is not  well visualized.   No asymmetric pericecal inflammation is seen to suggest  acute appendicitis. The stomach, duodenal sweep, and the remainder of the small bowel are  unremarkable in appearance. Oral contrast has been administered, reaching  the distal ileum, without evidence of obstruction. Pelvis: Multiple enlarged pelvic lymph nodes are seen, similar when compared  to the previous exam.  For example, left iliac chain lymph node measures 3.1  x 1.4 cm, series 4, image 131, not substantially changed. Urinary bladder mural thickening is noted, but the urinary bladder is  incompletely distended. No free pelvic fluid is found. Peritoneum/retroperitoneum: Multiple enlarged retroperitoneal lymph nodes are  identified. Examples below:    Left periaortic chest below the level of the left renal vein, 0.0 x 1.3 cm,  series 4, image 73, previously 2.6 x 1.3 cm. Left para-aortic, just inferior to the lymph node above, 2.3 x 1.1 cm, series  4, image 92, previously 2.1 x 0.8 cm. The abdominal aorta is normal in caliber. The superior mesenteric artery is  enhancing. Bones/Soft Tissues: Multifocal sclerotic metastatic disease is seen  throughout the abdomen and pelvis, increased when compared to the previous  exam. For example, sclerotic lesion within the posterior right ilium measures  2.5 x 1.4 cm, previously measuring at most 3-4 mm maximally. There is diffuse sclerosis with trabecular thickening throughout the left  iliac wing, suggesting underlying Paget's disease. Nonetheless, there is a  lytic lesion posteriorly which is increased in size, measuring approximately  4.2 cm maximally (previously 2.1 cm). The extra-abdominal and extra pelvic soft tissues are unremarkable. Impression  Chest CT: Interval increased size of the left perihilar mass, with increased  adjacent obstructive pneumonitis and atelectasis. Stable to minimally to decreased mediastinal lymphadenopathy. Interval increased bony metastatic disease.     Abdomen and pelvis CT: Stable retroperitoneal and pelvic lymphadenopathy. Interval increased bony metastatic disease. MRI BRAIN W WO CONTRAST    Narrative  EXAMINATION:  MRI OF THE BRAIN WITHOUT AND WITH CONTRAST  8/20/2021 9:11 am    TECHNIQUE:  Multiplanar multisequence MRI of the head/brain was performed without and  with the administration of intravenous contrast.    COMPARISON:  07/22/2016. HISTORY:  ORDERING SYSTEM PROVIDED HISTORY: Malignant neoplasm of overlapping sites of  left lung [facility])  TECHNOLOGIST PROVIDED HISTORY:  Reason for Exam: Malignant neoplasm of overlapping sites of left lung  Acuity: Unknown  Type of Exam: Initial  Relevant Medical/Surgical History: 20mL prohance; GFR >60    FINDINGS:  INTRACRANIAL STRUCTURES/VENTRICLES:  There is a punctate focus restricted  diffusion within the right frontal lobe (series 5, image 19). .  No associated  contrast enhancement. No mass effect or midline shift. No evidence of an  acute intracranial hemorrhage. Small scattered foci of susceptibility are  seen within the cerebral hemispheres bilaterally. These were not visualized  on the prior exam.  A few scattered foci of T2 FLAIR hyperintensity are seen  in the periventricular and subcortical white matter, which are nonspecific. There is minimal global parenchymal volume loss. The sellar/suprasellar  regions appear unremarkable. The normal signal voids within the major  intracranial vessels appear maintained. No abnormal focus of enhancement is  seen within the brain. ORBITS: The visualized portion of the orbits demonstrate no acute abnormality. SINUSES: Minimal scattered mucosal thickening of the paranasal sinuses. The  mastoid air cells demonstrate no acute abnormality. BONES/SOFT TISSUES: The bone marrow signal intensity demonstrates no acute  abnormality. The soft tissues demonstrate no acute abnormality. Impression  1.  There is a punctate focus of restricted diffusion within the right frontal  lobe without associated enhancement, mass effect or midline shift. This  could represent a punctate acute infarct. However, given history, an early  metastatic focus cannot be entirely excluded. 2. Scattered foci of susceptibility are seen within the cerebral hemispheres  bilaterally, which were not visualized on the prior exam.  Findings could  represent areas of remote microhemorrhage or perhaps treated metastases. 3. No abnormal enhancement within the brain. 4. Minimal global parenchymal volume loss with minimal chronic microvascular  ischemic changes. These results were sent to the [facility] [address] ([address] [facility]) on  8/20/2021 at 10:32 am to be communicated to the referring/covering health  care provider/office. REVIEW OF SYSTEMS:   Constitutional:  Patient denies fevers, rigors, or drenching night sweats. Eyes:  The patient denies any recent visual changes, diplopia, or cataracts  ENMT:  The patient denies any ear pain, hearing changes, or nosebleeds  Respiratory:  The patient denies any SOB, hemoptysis, or green sputum production  Cardiovascular: The patient denies any leg edema, or fainting spells  GI:  Patient denies nausea, vomiting, diarrhea, melena, or hematochezia  : Patient denies dysuria, hematuria, or urinary incontinence. Integumentary: patient denies skin rashes, pruritis, or arm edema  Endocrine:  Patient denies any diabetic or thyroid problems  Neurologic: Patient denies headaches, focal weakness, or disorientation  Psychiatric: The patient denies any depression, anxiety, or rapid mood swings. PHYSICAL EXAMINATION:   VITAL SIGNS: /76   Pulse 95   Temp 96.4 °F (35.8 °C) (Temporal)   Resp 16   Wt 205 lb (93 kg)   SpO2 98%   BMI 27.05 kg/m²   ECOG PERFORMANCE STATUS: 0  PAIN RATING:  10    GENERAL: Pleasant well-developed adult in no acute distress. Alert and oriented ×3  SKIN: Warm and dry, without jaundice, ecchymoses, or petechiae. HEENT: Normocephalic, atraumatic.   Pupils are round and symmetric. Sclerae anicteric  NECK:  No JVD; Supple. No cervical, supraclavicular, or infraclavicular lymphadenopathy is palpable. LUNGS: Bilaterally clear to auscultation. No rales, rhonci, or wheezing are present. Good inspiratory effort, no accessory muscle use. CARDIAC: Regular rate and rhythm, without murmurs, clicks, or gallops   ABDOMEN: Normoactive bowels sounds are present. Soft. Non-tender and non-distended. No hepatosplenomegaly or masses are present. EXTREMITIES: No cyanosis, clubbing or edema is present. FROM  NEUROLOGIC: Gait unremarkable. The patient is alert and oriented. CN II-XII are grossly intact. Sensation to light touch is intact and symmetric in the fingers and feet. Muscle strength is 5/5 in both the upper and lower extremities. IMPRESSION/PLAN:  Priyanka Capone is a 45 y.o. male who was recently found to have an enlarging left pulmonary mass and left pelvic lytic lesion with a steadily rising PSA up to 250 and a lung biopsy showing squamous cell carcinoma in situ. I have reviewed the patient's radiographic images with the patient in the room. I have also discussed the situation at length with Dr. Erich Valdez. The treatment options were discussed in detail with the patient. I do recommend needle biopsy of the left pelvic lytic mass, which clinically would be most consistent with a primary lung malignancy based on SUV values. Depending on its results, he may also need prostate biopsy and/or lung biopsy. The role of both palliative and definitive chest RT was discussed in detail with the patient along with the role of palliative left pelvic RT. The potential acute side effects and chronic complications of radiation therapy to the chest and pelvis were discussed in detail with the patient. The patient voiced understanding, and the patient's questions were answered. The patient has decided to proceed with pelvic mass biopsy and likely subsequent radiation therapy.   I will be watching for the above final pathologic results. Should radiation therapy to the chest be required, additional physician time required for IMRT planning for a detailed contour and DVH evaluation to minimize acute and chronic toxicity of the lung, heart, spinal cord, and esophagus. Thank-you for allowing me to participate in the care of this very pleasant patient.           Electronically signed by Susaan Monk MD on 12/3/2021 at 2:31 PM

## 2021-12-08 ENCOUNTER — OFFICE VISIT (OUTPATIENT)
Dept: ONCOLOGY | Age: 51
End: 2021-12-08
Payer: COMMERCIAL

## 2021-12-08 ENCOUNTER — TELEPHONE (OUTPATIENT)
Dept: ONCOLOGY | Age: 51
End: 2021-12-08

## 2021-12-08 ENCOUNTER — TELEPHONE (OUTPATIENT)
Dept: RADIATION ONCOLOGY | Age: 51
End: 2021-12-08

## 2021-12-08 ENCOUNTER — HOSPITAL ENCOUNTER (OUTPATIENT)
Dept: INFUSION THERAPY | Age: 51
Discharge: HOME | End: 2021-12-08
Payer: COMMERCIAL

## 2021-12-08 VITALS
OXYGEN SATURATION: 93 % | SYSTOLIC BLOOD PRESSURE: 150 MMHG | WEIGHT: 197 LBS | BODY MASS INDEX: 26.11 KG/M2 | TEMPERATURE: 97 F | HEART RATE: 93 BPM | HEIGHT: 73 IN | DIASTOLIC BLOOD PRESSURE: 84 MMHG

## 2021-12-08 DIAGNOSIS — C79.51 SECONDARY MALIGNANT NEOPLASM OF BONE (HCC): ICD-10-CM

## 2021-12-08 DIAGNOSIS — C34.82 MALIGNANT NEOPLASM OF OVERLAPPING SITES OF LEFT LUNG (HCC): Primary | ICD-10-CM

## 2021-12-08 DIAGNOSIS — R97.20 ELEVATED PSA: ICD-10-CM

## 2021-12-08 PROCEDURE — 99214 OFFICE O/P EST MOD 30 MIN: CPT | Performed by: INTERNAL MEDICINE

## 2021-12-08 PROCEDURE — G8419 CALC BMI OUT NRM PARAM NOF/U: HCPCS | Performed by: INTERNAL MEDICINE

## 2021-12-08 PROCEDURE — G8427 DOCREV CUR MEDS BY ELIG CLIN: HCPCS | Performed by: INTERNAL MEDICINE

## 2021-12-08 PROCEDURE — G8484 FLU IMMUNIZE NO ADMIN: HCPCS | Performed by: INTERNAL MEDICINE

## 2021-12-08 PROCEDURE — 4004F PT TOBACCO SCREEN RCVD TLK: CPT | Performed by: INTERNAL MEDICINE

## 2021-12-08 PROCEDURE — 3017F COLORECTAL CA SCREEN DOC REV: CPT | Performed by: INTERNAL MEDICINE

## 2021-12-08 PROCEDURE — 99211 OFF/OP EST MAY X REQ PHY/QHP: CPT

## 2021-12-08 RX ORDER — OXYCODONE HYDROCHLORIDE AND ACETAMINOPHEN 5; 325 MG/1; MG/1
1 TABLET ORAL EVERY 8 HOURS PRN
Qty: 90 TABLET | Refills: 0 | Status: SHIPPED | OUTPATIENT
Start: 2021-12-08 | End: 2021-12-29 | Stop reason: SDUPTHER

## 2021-12-08 RX ORDER — OXYCODONE HCL 20 MG/1
20 TABLET, FILM COATED, EXTENDED RELEASE ORAL 2 TIMES DAILY
Qty: 60 TABLET | Refills: 0 | Status: SHIPPED | OUTPATIENT
Start: 2021-12-08 | End: 2022-01-07

## 2021-12-08 NOTE — PROGRESS NOTES
Patient Name:  Agapito Witt  Patient :  1970  Patient MRN:  [id number]     Primary Oncologist: Emilia Killian MD  Referring Provider: Becki Ogden MD     Date of Service: 2021        Chief Complaint:    Chief Complaint   Patient presents with    Follow-up       Encounter Diagnoses   Name Primary?  Malignant neoplasm of overlapping sites of left lung (HCC) Yes    Elevated PSA     Secondary malignant neoplasm of bone (HCC)         HPI:   21: Initial [facility] visit:Hima Castillo is a 45 y.o. male who presents to [facility] with report of dysuria and flank pain. Reports these symptoms started a few weeks ago. He ultimately came to the ED due to worsening back pain.      He reports ongoing issues with frequent urge to urinate and notes some incontinence. He denies hematuria. He was noted to have acute pyelonephritis and was admitted and started on IV Rocephin.      21 CT chest     Impression   1. Left hilar neoplasm extending into the left upper lobe measuring 3.2 x 5.9   x 5.3 cm obstructing the left upper lobe bronchus with probable minimal   adjacent infiltrates versus lymphangitic spread of tumor.  PET-CT/biopsy is   recommended. 2. Mediastinal lymphadenopathy. 3. Prominent left supraclavicular lymph nodes. 4. No osseous metastatic disease.      21 Ct abdomen and pelvis  Impression   1. Circumferential thickening of the bladder wall is noted which could be   related to cystitis.  Correlate with urinalysis. 2. There is left retroperitoneal lymphadenopathy.  This could be reactive or   neoplastic.  This can be further evaluated with PET-CT. 3. There is minimal stranding within the retroperitoneal on the left.  This   is uncertain etiology however could be related to acute pyelonephritis. 4. Interval development of multiple sclerotic lesions within the spine and   pelvis, concerning for metastatic osseous disease.    5. Prostate gland is enlarged.  Correlate with PSA.      PSA 147.30      He denies past history of cancer. He smokes 2-3 ppd and drinks 10-12 beers daily. He reports unknown malignancy in his sister who  at 39. No other known family history of cancer.      Due to lung mass and concern for metastatic prostate cancer we were called to evaluate. 21:  Final Pathologic Diagnosis:   Needle biopsy of lung, clinically left lung mass:        SQUAMOUS CELL CARCINOMA IN SITU.     21:PET  1.  Left perihilar mass likely represents primary lung cancer.  Correlate   with biopsy results.  The opacity more peripheral in the left lower lobe has   relatively low level activity and likely represents an area of post   obstructive pneumonia adjacent to the mass.       2.  Metabolically active left AP window lymph node concerning for metastatic   disease.       3.  The prostate is enlarged and has increased FDG activity which could be   due to prostatitis or prostate cancer.  Correlate with PSA levels.       4.  No increased metabolic activity associated with retroperitoneal lymph   nodes.  This is unlikely related to the lung process given the significant   difference in metabolic activity; however, if there is prostate cancer, this   could potentially be metastatic disease from the prostate cancer, which can   have variable levels of uptake on FDG PET scans.       5.  No metabolic activity associated with multiple sclerotic lesions.  Again   this is unlikely related to the lung process, but if there is prostate   cancer, this could represent metastatic disease from prostate cancer with   poor FDG avidity.  Otherwise it could also represent large bone islands.       6.  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 There is a focal area of intense activity associated with a new lucent lesion   within the background of Paget's disease concerning for metastatic disease.    Given the FDG activity, it is likely related to the lung process.     21: MRI brain  1. There is a punctate focus of restricted diffusion within the right frontal   lobe without associated enhancement, mass effect or midline shift.  This   could represent a punctate acute infarct.  However, given history, an early   metastatic focus cannot be entirely excluded. 2. Scattered foci of susceptibility are seen within the cerebral hemispheres   bilaterally, which were not visualized on the prior exam.  Findings could   represent areas of remote microhemorrhage or perhaps treated metastases. 3. No abnormal enhancement within the brain. 4. Minimal global parenchymal volume loss with minimal chronic microvascular   ischemic changes. 21: CT chest, abdomen and pelvis:  Chest CT: Interval increased size of the left perihilar mass, with increased   adjacent obstructive pneumonitis and atelectasis.       Stable to minimally to decreased mediastinal lymphadenopathy.       Interval increased bony metastatic disease.       Abdomen and pelvis CT: Stable retroperitoneal and pelvic lymphadenopathy.       Interval increased bony metastatic disease. Past Medical History:     BPH, HTN, COPD                                                            Past Surgery History:    None per his wife                                                                        Social History:   Lives with wife. Cut down smoking to 2-4 cigarettes per day prior to which he was smoking 2 to 3 packs/day for the past 40 years. Also reported drinking alcohol 10-12 beers per day. Denied any other illicit drug abuse.                                                                                                    Family History:    He reported that his sister  at the age of 39 with  metastatic cancer, he was not sure what the primary was                                                                                              Allergies   Allergen Reactions    Tramadol Other (See Comments)     seizures    Vicodin [Hydrocodone-Acetaminophen] Hives       Current Outpatient Medications on File Prior to Visit   Medication Sig Dispense Refill    oxyCODONE-acetaminophen (PERCOCET) 5-325 MG per tablet Take 1 tablet by mouth every 8 hours as needed for Pain.  NARCAN 4 MG/0.1ML LIQD nasal spray DISPENSED PER STANDING ORDER USE AS DIRECTED PATIENT IS TRAINED OPIOID OVERDOSE RESPONDER      dexamethasone (DECADRON) 4 MG tablet Two tablets the day before treatment, one table daily for four days starting the day after chemotherapy 18 tablet 0    ondansetron (ZOFRAN) 8 MG tablet Take 1 tablet by mouth every 8 hours as needed for Nausea or Vomiting 30 tablet 1    lisinopril (PRINIVIL;ZESTRIL) 10 MG tablet Take 1 tablet by mouth daily 30 tablet 3    ammonium lactate (LAC-HYDRIN) 12 % lotion Apply topically as needed. 1 Bottle 0    nicotine (NICODERM CQ) 21 MG/24HR Place 1 patch onto the skin daily 30 patch 3    albuterol sulfate HFA (PROVENTIL HFA) 108 (90 Base) MCG/ACT inhaler Inhale 2 puffs into the lungs every 4 hours as needed for Wheezing or Shortness of Breath With spacer (and mask if indicated). Thanks. 1 Inhaler 1     No current facility-administered medications on file prior to visit. Interval History: 12/8/21: He arrived with his wife to the clinic today. Continues to have pain in the left lower back, radiating to the LLE and also reported numbness but no weakness. Walking helps with the pain. Reported that he has to bend over to urinate and it hurts to urinate. No hematuria. Has bowel movement when he has a bowel movement.      Review of Systems:  As per the interval history, rest of the review of system negative     Vital Signs: BP (!) 150/84 (Site: Left Upper Arm, Position: Sitting, Cuff Size: Medium Adult)   Pulse 93   Temp 97 °F (36.1 °C) (Infrared)   Ht 6' 1\" (1.854 m)   Wt 197 lb (89.4 kg)   SpO2 93%   BMI 25.99 kg/m²      CONSTITUTIONAL: awake, alert, sleepy  EYES: Component Value Date    PROTIME 10.0 (L) 07/28/2021    INR 0.78 07/28/2021    APTT 25.8 07/28/2021    DDIMER <200 12/19/2013     Anemia Panel:  No results found for: Donah Peabody  Tumor Markers:  Lab Results   Component Value Date    CEA 7.7 07/23/2021    .6 (H) 11/16/2021        Observations:  ECOG:  No data recorded       Assessment & Plan:                                                          Left hilar mass with mediastinal hilar lymphadenopathy. 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 PET scan results from august 2021 noted, bone lesions most probably not metastatic except for one lytic lesion. MRI of the brain with no convincing metastatic lesions. Presented  the case in the tumor board. Decision made to proceed with a prostate biopsy and treat with ADT if malignancy  and in the meantime proceed with staging mediastinoscopy/rebiopsy for further treatment plan. But as pt very very very noncompliant, did not follow up with urology, CTS or for bone biopsy multiple times. Note PSA rising and is 250 on November 16 2021   CT imaging with progressive met disease  Bone biopsy on dec 13 scheduled  Further plan pending above. Looks like he will need palliative radiation. Discussed with urology again for possible prostate biopsy    Prostate enlargement and elevated  could be secondary to prostatitis vs prostate cancer. 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 Has been applying topical cream    Elevated alk phos most probably secondary to the bone lesions possibly Paget's disease versus EtOH.   Has been chronically elevated since 2012. Discussed alcohol and smoking cessation. Adequate analgesic and bowel regimen. Continue other medical care. Thank you for letting us be part of the care and will follow along. Discussed above findings and plan with him and he voiced understanding. Answered all his questions. Discussed healthy lifestyle including healthy diet, regular exercise as tolerated. ,  Smoking and alcohol cessation    Recommend follow-up with primary care physician and other specialists. Note he has he has been very noncompliant with appointments. Please do not hesitate to contact us if you need further information. Return to clinic  Dec 20 2021 or earlier if new Sx    I have recommended that the patient follow CDC guidelines for prevention of COVID-19 infection.   Received Covid vaccine    TOMI

## 2021-12-08 NOTE — TELEPHONE ENCOUNTER
12/8/21 - Dr. Ricardo Leal has me to take Amintaey off of the schedule 12/8/21 2:00. Lorena Sena has not had the biopsy ordered and she stated she did not want to see him until after the biopsy. I tried to call the paitent to let him know but his wife answered the phone and said she was not with him at this time.  DF

## 2021-12-08 NOTE — PROGRESS NOTES
MA Rooming Questions  Patient: Becka Sierra  MRN: [id number]    Date: 12/8/2021        1. Do you have any new issues?   no         2. Do you need any refills on medications? yes - percocet    3. Have you had any imaging done since your last visit?   no    4. Have you been hospitalized or seen in the emergency room since your last visit here?   no    5. Did the patient have a depression screening completed today?  No    No data recorded     PHQ-9 Given to (if applicable):               PHQ-9 Score (if applicable):                     [] Positive     []  Negative              Does question #9 need addressed (if applicable)                     [] Yes    []  No               Sharath Mims CMA

## 2021-12-08 NOTE — TELEPHONE ENCOUNTER
Order for biopsy left pelvic bone lesion faxed to IR scheduling 12-3-2021, confirmation received. Msg left at [phone] to follow up on scheduling.

## 2021-12-10 ENCOUNTER — TELEPHONE (OUTPATIENT)
Dept: ONCOLOGY | Age: 51
End: 2021-12-10

## 2021-12-10 NOTE — TELEPHONE ENCOUNTER
Patient wife called and indicated oxycodone 20 mg needed PA. I called insurance [id number] and submitted PA reference number  [facility].

## 2021-12-13 ENCOUNTER — HOSPITAL ENCOUNTER (OUTPATIENT)
Dept: LAB | Age: 51
Discharge: HOME | End: 2021-12-13
Payer: COMMERCIAL

## 2021-12-13 LAB
SARS-COV-2, NAAT: NOT DETECTED
SOURCE: NORMAL

## 2021-12-13 PROCEDURE — 87635 SARS-COV-2 COVID-19 AMP PRB: CPT

## 2021-12-14 RX ORDER — SODIUM CHLORIDE 0.9 % (FLUSH) 0.9 %
10 SYRINGE (ML) INJECTION PRN
Status: CANCELLED | OUTPATIENT
Start: 2021-12-14

## 2021-12-17 ENCOUNTER — HOSPITAL ENCOUNTER (OUTPATIENT)
Dept: CT IMAGING | Age: 51
Discharge: HOME | End: 2021-12-17
Payer: COMMERCIAL

## 2021-12-17 ENCOUNTER — HOSPITAL ENCOUNTER (OUTPATIENT)
Dept: INTERVENTIONAL RADIOLOGY/VASCULAR | Age: 51
Discharge: HOME | End: 2021-12-17
Payer: COMMERCIAL

## 2021-12-17 VITALS
RESPIRATION RATE: 16 BRPM | HEIGHT: 73 IN | SYSTOLIC BLOOD PRESSURE: 131 MMHG | WEIGHT: 210 LBS | BODY MASS INDEX: 27.83 KG/M2 | TEMPERATURE: 97.1 F | HEART RATE: 79 BPM | OXYGEN SATURATION: 96 % | DIASTOLIC BLOOD PRESSURE: 84 MMHG

## 2021-12-17 VITALS
RESPIRATION RATE: 16 BRPM | DIASTOLIC BLOOD PRESSURE: 72 MMHG | TEMPERATURE: 97.2 F | OXYGEN SATURATION: 97 % | SYSTOLIC BLOOD PRESSURE: 133 MMHG | HEART RATE: 82 BPM

## 2021-12-17 DIAGNOSIS — M89.9 LYTIC LESION OF BONE ON X-RAY: ICD-10-CM

## 2021-12-17 LAB
APTT: 35.9 SECONDS (ref 25.1–37.1)
HCT VFR BLD CALC: 35.3 % (ref 42–52)
HEMOGLOBIN: 12 GM/DL (ref 13.5–18)
INR BLD: 1 INDEX
MCH RBC QN AUTO: 28.2 PG (ref 27–31)
MCHC RBC AUTO-ENTMCNC: 34 % (ref 32–36)
MCV RBC AUTO: 82.9 FL (ref 78–100)
PDW BLD-RTO: 15 % (ref 11.7–14.9)
PLATELET # BLD: 247 K/CU MM (ref 140–440)
PMV BLD AUTO: 8.6 FL (ref 7.5–11.1)
PROTHROMBIN TIME: 12.9 SECONDS (ref 11.7–14.5)
RBC # BLD: 4.26 M/CU MM (ref 4.6–6.2)
WBC # BLD: 9.6 K/CU MM (ref 4–10.5)

## 2021-12-17 PROCEDURE — 85610 PROTHROMBIN TIME: CPT

## 2021-12-17 PROCEDURE — 88334 PATH CONSLTJ SURG CYTO XM EA: CPT | Performed by: PATHOLOGY

## 2021-12-17 PROCEDURE — 7100000011 HC PHASE II RECOVERY - ADDTL 15 MIN

## 2021-12-17 PROCEDURE — 6360000002 HC RX W HCPCS: Performed by: RADIOLOGY

## 2021-12-17 PROCEDURE — 7100000010 HC PHASE II RECOVERY - FIRST 15 MIN

## 2021-12-17 PROCEDURE — 2580000003 HC RX 258: Performed by: RADIOLOGY

## 2021-12-17 PROCEDURE — 88334 PATH CONSLTJ SURG CYTO XM EA: CPT

## 2021-12-17 PROCEDURE — 20220 BONE BIOPSY TROCAR/NDL SUPFC: CPT

## 2021-12-17 PROCEDURE — 88342 IMHCHEM/IMCYTCHM 1ST ANTB: CPT | Performed by: PATHOLOGY

## 2021-12-17 PROCEDURE — 88333 PATH CONSLTJ SURG CYTO XM 1: CPT | Performed by: PATHOLOGY

## 2021-12-17 PROCEDURE — 85027 COMPLETE CBC AUTOMATED: CPT

## 2021-12-17 PROCEDURE — 85730 THROMBOPLASTIN TIME PARTIAL: CPT

## 2021-12-17 PROCEDURE — 88307 TISSUE EXAM BY PATHOLOGIST: CPT | Performed by: PATHOLOGY

## 2021-12-17 PROCEDURE — 88341 IMHCHEM/IMCYTCHM EA ADD ANTB: CPT | Performed by: PATHOLOGY

## 2021-12-17 RX ORDER — HEPARIN SODIUM (PORCINE) LOCK FLUSH IV SOLN 100 UNIT/ML 100 UNIT/ML
100 SOLUTION INTRAVENOUS PRN
Status: DISCONTINUED | OUTPATIENT
Start: 2021-12-17 | End: 2021-12-18 | Stop reason: HOSPADM

## 2021-12-17 RX ORDER — FENTANYL CITRATE 50 UG/ML
25 INJECTION, SOLUTION INTRAMUSCULAR; INTRAVENOUS ONCE
Status: COMPLETED | OUTPATIENT
Start: 2021-12-17 | End: 2021-12-17

## 2021-12-17 RX ORDER — SODIUM CHLORIDE 0.9 % (FLUSH) 0.9 %
10 SYRINGE (ML) INJECTION PRN
Status: DISCONTINUED | OUTPATIENT
Start: 2021-12-17 | End: 2021-12-18 | Stop reason: HOSPADM

## 2021-12-17 RX ORDER — MIDAZOLAM HYDROCHLORIDE 2 MG/2ML
0.5 INJECTION, SOLUTION INTRAMUSCULAR; INTRAVENOUS ONCE
Status: COMPLETED | OUTPATIENT
Start: 2021-12-17 | End: 2021-12-17

## 2021-12-17 RX ADMIN — FENTANYL CITRATE 25 MCG: 50 INJECTION, SOLUTION INTRAMUSCULAR; INTRAVENOUS at 09:37

## 2021-12-17 RX ADMIN — Medication 500 UNITS: at 12:04

## 2021-12-17 RX ADMIN — MIDAZOLAM HYDROCHLORIDE 0.5 MG: 1 INJECTION, SOLUTION INTRAMUSCULAR; INTRAVENOUS at 09:38

## 2021-12-17 RX ADMIN — Medication 10 ML: at 12:05

## 2021-12-17 ASSESSMENT — PAIN - FUNCTIONAL ASSESSMENT: PAIN_FUNCTIONAL_ASSESSMENT: 0-10

## 2021-12-17 ASSESSMENT — PAIN SCALES - GENERAL
PAINLEVEL_OUTOF10: 0

## 2021-12-17 NOTE — PROGRESS NOTES
Complains of chest pain with coughing. States does not think it is cardiac related. IR notified per RUTHANN Magallanes RN. No new orders received.

## 2021-12-17 NOTE — PROGRESS NOTES
PROCEDURE PERFORMED: Bone Biopsy    INFORMED CONSENT:  Obtained prior to procedure. Consent placed in chart. PHOEBE SCORE PRE PROCEDURE:    10    PT TRANSPORTED FROM:  [facility]             TO THE CT ROOM:  2    PT IN THE ROOM AT WHAT TIME:    0900    ASSESSMENT: WNL. Pt verbalizes understanding of procedure. TIME OUT COMPLETE: 0924    BARRIER PRECAUTIONS & STERILE TECHNIQUE:               Pt transferred to the table and positioned prone for procedure. Warm blankets given. Pt placed on Cardiac Monitor. Pt prepped and draped in a sterile fashion with chlorhexadine. PAIN/LOCAL ANESTHESIA/SEDATION MANAGEMENT:           Local: Lidocaine given by Dr Megan Fried          Sedation: by Nayla Goff RN             Fentanyl: 25 mcg             Versed: 0.5 mg    INTRAOPERATIVE:           ACCESS TIME: 0932          CT GUIDED: yes          WIRE USED:           SHEATH USED:           CATHETER USED:           FINAL IMAGE TAKEN TO CONFIRM PLACEMENT OF:           CONTRAST/CC:     18g x 10cm M-Biospy set    STERILE DRESSINGS: 4x4 gauze and tegaderm    SPECIMENS: 3 cores given to Cytology.     EBL:  Less than 1 cc    FOLLOW- UP X-RAY: no orders    COMPLICATIONS: None    STAFF PRESENT DURING PROCEDURE: Dr Otis Gonzalez RT, Nayla Goff RN, Lissette Hogan RN, Cytology    PHOEBE SCORE POST PROCEDURE:  10     REPORT CALLED TO: Audra Wellington RN [facility]    PT LEFT ROOM AT WHAT TIME:  0829

## 2021-12-17 NOTE — PROGRESS NOTES
Pt returned to room from IR      Phone Report received from [facility]  Pt awake and alert, dressing, 4x4/tegaderm, to lt hip dry and intact. Pt asking for his clothes, pre op his wife stares she would keep his clothes in room with her, called wife stated she locked his clothes in a locker and she has the larson with her, in form pt she will be back in approx 15-20 min. Pt became slightly irritated, handed phone to pt to speak with wife, appeared more calm, but stated he did not want her to have his belongings due to the fact that he has $100 in the pocket of his pants. 1115 sitting up in bed eating    1025 ordered breakfast tray for pt    1200 port flushed with 500units hep flush and 10 ml saline flush, MAR stated read only    1215 Discharge instructions given to pt and wife both voiced understanding    1225 Pt escorted to main entrance via wheelchair for discharge.

## 2021-12-20 ENCOUNTER — TELEPHONE (OUTPATIENT)
Dept: CASE MANAGEMENT | Age: 51
End: 2021-12-20

## 2021-12-22 ENCOUNTER — HOSPITAL ENCOUNTER (OUTPATIENT)
Dept: RADIATION ONCOLOGY | Age: 51
Discharge: HOME | End: 2021-12-22
Payer: COMMERCIAL

## 2021-12-22 ENCOUNTER — HOSPITAL ENCOUNTER (OUTPATIENT)
Dept: RADIATION ONCOLOGY | Age: 51
Discharge: HOME | End: 2021-12-22
Attending: RADIOLOGY
Payer: COMMERCIAL

## 2021-12-22 PROCEDURE — 77334 RADIATION TREATMENT AID(S): CPT | Performed by: RADIOLOGY

## 2021-12-22 PROCEDURE — 77290 THER RAD SIMULAJ FIELD CPLX: CPT | Performed by: RADIOLOGY

## 2021-12-22 NOTE — PROGRESS NOTES
[id number] [id number] [facility], [address] [facility]  Phone: [phone]  Fax: [phone]    RADIATION ONCOLOGY FOLLOW UP REPORT    PATIENT NAME:  Pat James              : 1970  MEDICAL RECORD NO: [id number]    [facility] NO: [id number]        PROVIDER: Jaswant Garcia MD      DATE OF SERVICE: 2021       DIAGNOSIS: Cancer Staging  Malignant neoplasm of overlapping sites of left lung [facility])  Staging form: Lung, AJCC 8th Edition  - Clinical: Stage IV (pM1) - Signed by Jorden Mcnamara MD on 2021  - Pathologic: No stage assigned - Unsigned       TREATMENT COURSE:   Oncology History   Malignant neoplasm of overlapping sites of left lung ([address]   2021 Initial Diagnosis    Malignant neoplasm of overlapping sites of left lung [facility])             HPI:   Pat James is a 45 y.o. male who has a history as above who returns today for results from his recent left pelvic biopsy, which revealed squamous cell carcinoma. He continues to have severe pain pointing to the left upper pelvis. He also complains of urinary obstructive symptoms including urinary frequency, dysuria, and straining. He has not yet made it to see the urologist.    RADIOLOGIC STUDIES:  CT CHEST W CONTRAST    Result Date: 2021  EXAMINATION: CT OF THE ABDOMEN AND PELVIS WITH CONTRAST; CT OF THE CHEST WITH CONTRAST 2021 7:21 am TECHNIQUE: CT of the abdomen and pelvis was performed with the administration of intravenous contrast. Multiplanar reformatted images are provided for review. Dose modulation, iterative reconstruction, and/or weight based adjustment of the mA/kV was utilized to reduce the radiation dose to as low as reasonably achievable.; CT of the chest was performed with the administration of intravenous contrast. Multiplanar reformatted images are provided for review.  Dose modulation, iterative reconstruction, and/or weight based adjustment of the mA/kV was utilized to reduce the radiation dose to as low as reasonably achievable. COMPARISON: CTPA and abdomen pelvis CT, 07/30/2021 PET-CT, 08/19/2021 HISTORY: ORDERING SYSTEM PROVIDED HISTORY: Malignant neoplasm of overlapping sites of left lung [facility]) TECHNOLOGIST PROVIDED HISTORY: Additional Contrast?->Radiologist Recommendation Reason for Exam: Lung cancer Acuity: Chronic Type of Exam: Ongoing Additional signs and symptoms: patient states lung cnacer check;  cough, weight loss, constipation, and difficulty with urination Relevant Medical/Surgical History: patient states lung cnacer check;  cough, weight loss, constipation, and difficulty with urination; ORDERING SYSTEM PROVIDED HISTORY: Malignant neoplasm of overlapping sites of left lung [facility]) TECHNOLOGIST PROVIDED HISTORY: Reason for Exam: Malignant neoplasm of lung Acuity: Chronic Type of Exam: Ongoing Additional signs and symptoms: patient states lung cnacer check;  cough, weight loss, constipation, and difficulty with urination Relevant Medical/Surgical History: patient states lung cnacer check;  cough, weight loss, constipation, and difficulty with urination FINDINGS: Chest: Mediastinum: Visualized thyroid is unremarkable. Esophagus is unremarkable. Mediastinal lymphadenopathy is stable to slightly decreased when compared to the previous exam.  Examples below: Superior mediastinum adjacent to the great vessels on the left, 1.1 cm in short axis (series 3, image 34), previously 13 mm Prevascular, 1.2 cm in short axis (series 3, image 47), previously 1.3 cm. AP window, 1.3 cm (series 3, image 50), unchanged. Cardiac chambers unremarkable. Thoracic aorta unremarkable.   There is invasion of the left main pulmonary artery by the lung mass, which appears similar when compared to the previous exam.  Pulmonary arteries of the left upper lobe are attenuated by the mass, and that appears increased when compared to the previous exam. Lungs/pleura: Lobulated mass adjacent to the left hilum has increased considerably in size when compared to the previous exam, now measuring 7.5 x 5.6 cm (series 5, image 56), previously 7.4 x 3.4 cm. There is progressive airspace disease adjacent to this, with progressive atelectasis of the left upper lobe. Mass is again noted to be infiltrating into the left hilum, with partial invasion of the left pulmonary artery, similar when compared to the previous exam. The right lung is unremarkable in appearance, with no evidence of metastatic disease. Soft Tissues/Bones: Multifocal sclerotic metastatic lesions are seen throughout the thorax, increased in size when compared to the previous exam. For example, at T10, the largest lesion measures 13 mm, previously 3-4 mm. No pathologic fractures are identified. Mild bilateral gynecomastia is noted. Extra thoracic soft tissues are otherwise unremarkable. Abdomen/Pelvis: Organs: The liver enhances normally. The gallbladder is unremarkable in appearance. The spleen, adrenals, and pancreas are unremarkable in appearance. No acute or suspicious renal abnormality is identified. GI/Bowel: No large bowel abnormalities are identified. The appendix is not well visualized. No asymmetric pericecal inflammation is seen to suggest acute appendicitis. The stomach, duodenal sweep, and the remainder of the small bowel are unremarkable in appearance. Oral contrast has been administered, reaching the distal ileum, without evidence of obstruction. Pelvis: Multiple enlarged pelvic lymph nodes are seen, similar when compared to the previous exam.  For example, left iliac chain lymph node measures 3.1 x 1.4 cm, series 4, image 131, not substantially changed. Urinary bladder mural thickening is noted, but the urinary bladder is incompletely distended. No free pelvic fluid is found. Peritoneum/retroperitoneum: Multiple enlarged retroperitoneal lymph nodes are identified.   Examples below: Left periaortic chest below the level of the left renal vein, 0.0 x 1.3 cm, series 4, image 73, previously 2.6 x 1.3 cm. Left para-aortic, just inferior to the lymph node above, 2.3 x 1.1 cm, series 4, image 92, previously 2.1 x 0.8 cm. The abdominal aorta is normal in caliber. The superior mesenteric artery is enhancing. Bones/Soft Tissues: Multifocal sclerotic metastatic disease is seen throughout the abdomen and pelvis, increased when compared to the previous exam. For example, sclerotic lesion within the posterior right ilium measures 2.5 x 1.4 cm, previously measuring at most 3-4 mm maximally. There is diffuse sclerosis with trabecular thickening throughout the left iliac wing, suggesting underlying Paget's disease. Nonetheless, there is a lytic lesion posteriorly which is increased in size, measuring approximately 4.2 cm maximally (previously 2.1 cm). The extra-abdominal and extra pelvic soft tissues are unremarkable. Chest CT: Interval increased size of the left perihilar mass, with increased adjacent obstructive pneumonitis and atelectasis. Stable to minimally to decreased mediastinal lymphadenopathy. Interval increased bony metastatic disease. Abdomen and pelvis CT: Stable retroperitoneal and pelvic lymphadenopathy. Interval increased bony metastatic disease. CT ABDOMEN PELVIS W IV CONTRAST Additional Contrast? Radiologist Recommendation    Result Date: 12/2/2021  EXAMINATION: CT OF THE ABDOMEN AND PELVIS WITH CONTRAST; CT OF THE CHEST WITH CONTRAST 12/2/2021 7:21 am TECHNIQUE: CT of the abdomen and pelvis was performed with the administration of intravenous contrast. Multiplanar reformatted images are provided for review. Dose modulation, iterative reconstruction, and/or weight based adjustment of the mA/kV was utilized to reduce the radiation dose to as low as reasonably achievable.; CT of the chest was performed with the administration of intravenous contrast. Multiplanar reformatted images are provided for review.  Dose modulation, iterative reconstruction, and/or weight based adjustment of the mA/kV was utilized to reduce the radiation dose to as low as reasonably achievable. COMPARISON: CTPA and abdomen pelvis CT, 07/30/2021 PET-CT, 08/19/2021 HISTORY: ORDERING SYSTEM PROVIDED HISTORY: Malignant neoplasm of overlapping sites of left lung [facility]) TECHNOLOGIST PROVIDED HISTORY: Additional Contrast?->Radiologist Recommendation Reason for Exam: Lung cancer Acuity: Chronic Type of Exam: Ongoing Additional signs and symptoms: patient states lung cnacer check;  cough, weight loss, constipation, and difficulty with urination Relevant Medical/Surgical History: patient states lung cnacer check;  cough, weight loss, constipation, and difficulty with urination; ORDERING SYSTEM PROVIDED HISTORY: Malignant neoplasm of overlapping sites of left lung [facility]) TECHNOLOGIST PROVIDED HISTORY: Reason for Exam: Malignant neoplasm of lung Acuity: Chronic Type of Exam: Ongoing Additional signs and symptoms: patient states lung cnacer check;  cough, weight loss, constipation, and difficulty with urination Relevant Medical/Surgical History: patient states lung cnacer check;  cough, weight loss, constipation, and difficulty with urination FINDINGS: Chest: Mediastinum: Visualized thyroid is unremarkable. Esophagus is unremarkable. Mediastinal lymphadenopathy is stable to slightly decreased when compared to the previous exam.  Examples below: Superior mediastinum adjacent to the great vessels on the left, 1.1 cm in short axis (series 3, image 34), previously 13 mm Prevascular, 1.2 cm in short axis (series 3, image 47), previously 1.3 cm. AP window, 1.3 cm (series 3, image 50), unchanged. Cardiac chambers unremarkable. Thoracic aorta unremarkable.   There is invasion of the left main pulmonary artery by the lung mass, which appears similar when compared to the previous exam.  Pulmonary arteries of the left upper lobe are attenuated by the mass, and that appears increased when compared to the previous exam. Lungs/pleura: Lobulated mass adjacent to the left hilum has increased considerably in size when compared to the previous exam, now measuring 7.5 x 5.6 cm (series 5, image 56), previously 7.4 x 3.4 cm. There is progressive airspace disease adjacent to this, with progressive atelectasis of the left upper lobe. Mass is again noted to be infiltrating into the left hilum, with partial invasion of the left pulmonary artery, similar when compared to the previous exam. The right lung is unremarkable in appearance, with no evidence of metastatic disease. Soft Tissues/Bones: Multifocal sclerotic metastatic lesions are seen throughout the thorax, increased in size when compared to the previous exam. For example, at T10, the largest lesion measures 13 mm, previously 3-4 mm. No pathologic fractures are identified. Mild bilateral gynecomastia is noted. Extra thoracic soft tissues are otherwise unremarkable. Abdomen/Pelvis: Organs: The liver enhances normally. The gallbladder is unremarkable in appearance. The spleen, adrenals, and pancreas are unremarkable in appearance. No acute or suspicious renal abnormality is identified. GI/Bowel: No large bowel abnormalities are identified. The appendix is not well visualized. No asymmetric pericecal inflammation is seen to suggest acute appendicitis. The stomach, duodenal sweep, and the remainder of the small bowel are unremarkable in appearance. Oral contrast has been administered, reaching the distal ileum, without evidence of obstruction. Pelvis: Multiple enlarged pelvic lymph nodes are seen, similar when compared to the previous exam.  For example, left iliac chain lymph node measures 3.1 x 1.4 cm, series 4, image 131, not substantially changed. Urinary bladder mural thickening is noted, but the urinary bladder is incompletely distended. No free pelvic fluid is found.  Peritoneum/retroperitoneum: Multiple enlarged retroperitoneal lymph nodes are identified. Examples below: Left periaortic chest below the level of the left renal vein, 0.0 x 1.3 cm, series 4, image 73, previously 2.6 x 1.3 cm. Left para-aortic, just inferior to the lymph node above, 2.3 x 1.1 cm, series 4, image 92, previously 2.1 x 0.8 cm. The abdominal aorta is normal in caliber. The superior mesenteric artery is enhancing. Bones/Soft Tissues: Multifocal sclerotic metastatic disease is seen throughout the abdomen and pelvis, increased when compared to the previous exam. For example, sclerotic lesion within the posterior right ilium measures 2.5 x 1.4 cm, previously measuring at most 3-4 mm maximally. There is diffuse sclerosis with trabecular thickening throughout the left iliac wing, suggesting underlying Paget's disease. Nonetheless, there is a lytic lesion posteriorly which is increased in size, measuring approximately 4.2 cm maximally (previously 2.1 cm). The extra-abdominal and extra pelvic soft tissues are unremarkable. Chest CT: Interval increased size of the left perihilar mass, with increased adjacent obstructive pneumonitis and atelectasis. Stable to minimally to decreased mediastinal lymphadenopathy. Interval increased bony metastatic disease. Abdomen and pelvis CT: Stable retroperitoneal and pelvic lymphadenopathy. Interval increased bony metastatic disease. CT BIOPSY SUPERFICIAL BONE PERCUTANEOUS    Result Date: 12/17/2021  PROCEDURE: CT BX BONE NEEDLE SUPERFCL MODERATE CONSCIOUS SEDATION 12/17/2021 HISTORY: ORDERING SYSTEM PROVIDED HISTORY: Lytic lesion of bone on x-ray TECHNOLOGIST PROVIDED HISTORY: Bx left pelvic lytic bone lesion. Reason for Exam: bone lesion Additional signs and symptoms: none Relevant Medical/Surgical History: **ATTN: DR. ELLISON** TECHNIQUE: Following order clarification/discussion with Dr. Will Collins and following informed consent, pause a confirm/time-out, patient is placed in prone position on CT table.   Following CT-guided puncture site selection, skin was prepped and draped in sterile fashion. 15 mL 1% lidocaine without epinephrine for local anesthesia. 25 gauge core biopsy specimens of bone destroying mass posterior left ilium were collected and sent for laboratory evaluation. Access needle removed. Dressing applied. Postprocedure scan performed. Patient tolerated procedure well. Dose modulation, iterative reconstruction, and/or weight based adjustment of the mA/kV was utilized to reduce the radiation dose to as low as reasonably achievable. CONTRAST: None SEDATION: 25 mcg intravenous fentanyl. 0.5 mg intravenous Versed FLUOROSCOPY DOSE AND TYPE OR TIME AND EXPOSURES: CT guidance DESCRIPTION OF PROCEDURE: Informed consent was obtained after a detailed explanation of the procedure including risks, benefits, and alternatives. Universal protocol was observed. As above in technique section FINDINGS: Pre and intraprocedural images demonstrate bone destroying mass posterior left ilium with biopsy needle seen within it. Postprocedure images demonstrate no complications. 18 gauge core biopsy specimens bone destroying mass posterior left ilium collected using CT guidance, local sterile field, local anesthesia, moderate IV sedation. Specimens were sent for pathologic evaluation in formalin solution. No complication suggested. PET CT SKULL BASE TO MID THIGH    Narrative  EXAMINATION:  WHOLE BODY PET/CT    8/19/2021    TECHNIQUE:  Following IV injection of 18 mCi of F 18 -FDG, PET  tumor imaging was  acquired from the base of the skull to the mid thighs. Computed tomography  was used for purposes of attenuation correction and anatomic localization. Fusion imaging was utilized for interpretation. Uptake time 53 mins. Glucose level 108 mg/dl.     COMPARISON:  CT scan of the chest, abdomen, and pelvis 07/30/2021 and CT abdomen and  pelvis 9/22/2020    HISTORY:  ORDERING SYSTEM PROVIDED HISTORY: Lung nodule  TECHNOLOGIST PROVIDED HISTORY:  Reason for Exam: Lung mass and lung nodule, smoker    FINDINGS:  HEAD/NECK: There is activity along the course of the right subclavian  approach MediPort. No metabolically active cervical lymphadenopathy. CHEST: Increased FDG activity associated with the mass in the left perihilar  region seen the prior CT scans, SUV max of 18.7. A more peripheral component  of the opacification has only mild associated activity, SUV max of 2.8. Focal FDG activity localizes to an AP window lymph node with SUV max of 4.7. No other metabolically active mediastinal lymphadenopathy. ABDOMEN/PELVIS: No metabolically active adrenal mass. There is increased  activity within the prostate gland along the central posterior aspect, SUV  max of 3.7. No focally increased activity associated with the enlarged  retroperitoneal lymph nodes. Physiologic activity in the gastrointestinal  and genitourinary systems. BONES/SOFT TISSUE: No focal FDG activity associated with the multiple  sclerotic lesions seen throughout the skeleton, largest in the sacrum and  right iliac bone. There is an area of focally increased activity in the left iliac bone which  has diffuse enlargement, cortical thickening, and coarsening trabeculation  with SUV max of 13.6 localizing to a new lytic lesion that has developed  since 9/22/20. Most of the left iliac bone has only mild activity, SUV max  of 2.3. Impression  1. Left perihilar mass likely represents primary lung cancer. Correlate  with biopsy results. The opacity more peripheral in the left lower lobe has  relatively low level activity and likely represents an area of post  obstructive pneumonia adjacent to the mass. 2.  Metabolically active left AP window lymph node concerning for metastatic  disease. 3.  The prostate is enlarged and has increased FDG activity which could be  due to prostatitis or prostate cancer. Correlate with PSA levels.     4.  No increased metabolic activity associated with retroperitoneal lymph  nodes. This is unlikely related to the lung process given the significant  difference in metabolic activity; however, if there is prostate cancer, this  could potentially be metastatic disease from the prostate cancer, which can  have variable levels of uptake on FDG PET scans. 5.  No metabolic activity associated with multiple sclerotic lesions. Again  this is unlikely related to the lung process, but if there is prostate  cancer, this could represent metastatic disease from prostate cancer with  poor FDG avidity. Otherwise it could also represent large bone islands. 6.  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 There is a focal area of intense activity associated with a new lucent lesion  within the background of Paget's disease concerning for metastatic disease. Given the FDG activity, it is likely related to the lung process. CT CHEST W CONTRAST    Narrative  EXAMINATION:  CT OF THE ABDOMEN AND PELVIS WITH CONTRAST; CT OF THE CHEST WITH CONTRAST  12/2/2021 7:21 am    TECHNIQUE:  CT of the abdomen and pelvis was performed with the administration of  intravenous contrast. Multiplanar reformatted images are provided for review. Dose modulation, iterative reconstruction, and/or weight based adjustment of  the mA/kV was utilized to reduce the radiation dose to as low as reasonably  achievable.; CT of the chest was performed with the administration of  intravenous contrast. Multiplanar reformatted images are provided for review. Dose modulation, iterative reconstruction, and/or weight based adjustment of  the mA/kV was utilized to reduce the radiation dose to as low as reasonably  achievable.     COMPARISON:  CTPA and abdomen pelvis CT, 07/30/2021    PET-CT, 08/19/2021    HISTORY:  ORDERING SYSTEM PROVIDED HISTORY: Malignant neoplasm of overlapping sites of  left lung [facility])  TECHNOLOGIST PROVIDED HISTORY:  Additional Contrast?->Radiologist Recommendation  Reason for Exam: Lung cancer  Acuity: Chronic  Type of Exam: Ongoing  Additional signs and symptoms: patient states lung cnacer check;  cough,  weight loss, constipation, and difficulty with urination  Relevant Medical/Surgical History: patient states lung cnacer check;  cough,  weight loss, constipation, and difficulty with urination; ORDERING SYSTEM  PROVIDED HISTORY: Malignant neoplasm of overlapping sites of left lung [facility])  TECHNOLOGIST PROVIDED HISTORY:  Reason for Exam: Malignant neoplasm of lung  Acuity: Chronic  Type of Exam: Ongoing  Additional signs and symptoms: patient states lung cnacer check;  cough,  weight loss, constipation, and difficulty with urination  Relevant Medical/Surgical History: patient states lung cnacer check;  cough,  weight loss, constipation, and difficulty with urination    FINDINGS:    Chest:    Mediastinum: Visualized thyroid is unremarkable. Esophagus is unremarkable. Mediastinal lymphadenopathy is stable to slightly decreased when compared to  the previous exam.  Examples below:    Superior mediastinum adjacent to the great vessels on the left, 1.1 cm in  short axis (series 3, image 34), previously 13 mm    Prevascular, 1.2 cm in short axis (series 3, image 47), previously 1.3 cm. AP window, 1.3 cm (series 3, image 50), unchanged. Cardiac chambers unremarkable. Thoracic aorta unremarkable. There is  invasion of the left main pulmonary artery by the lung mass, which appears  similar when compared to the previous exam.  Pulmonary arteries of the left  upper lobe are attenuated by the mass, and that appears increased when  compared to the previous exam.    Lungs/pleura: Lobulated mass adjacent to the left hilum has increased  considerably in size when compared to the previous exam, now measuring 7.5 x  5.6 cm (series 5, image 56), previously 7.4 x 3.4 cm.   There is progressive  airspace disease adjacent to this, with progressive atelectasis of the left  upper lobe. Mass is again noted to be infiltrating into the left hilum, with partial  invasion of the left pulmonary artery, similar when compared to the previous  exam.    The right lung is unremarkable in appearance, with no evidence of metastatic  disease. Soft Tissues/Bones: Multifocal sclerotic metastatic lesions are seen  throughout the thorax, increased in size when compared to the previous exam.  For example, at T10, the largest lesion measures 13 mm, previously 3-4 mm. No pathologic fractures are identified. Mild bilateral gynecomastia is noted. Extra thoracic soft tissues are  otherwise unremarkable. Abdomen/Pelvis:    Organs: The liver enhances normally. The gallbladder is unremarkable in  appearance. The spleen, adrenals, and pancreas are unremarkable in  appearance. No acute or suspicious renal abnormality is identified. GI/Bowel: No large bowel abnormalities are identified. The appendix is not  well visualized. No asymmetric pericecal inflammation is seen to suggest  acute appendicitis. The stomach, duodenal sweep, and the remainder of the small bowel are  unremarkable in appearance. Oral contrast has been administered, reaching  the distal ileum, without evidence of obstruction. Pelvis: Multiple enlarged pelvic lymph nodes are seen, similar when compared  to the previous exam.  For example, left iliac chain lymph node measures 3.1  x 1.4 cm, series 4, image 131, not substantially changed. Urinary bladder mural thickening is noted, but the urinary bladder is  incompletely distended. No free pelvic fluid is found. Peritoneum/retroperitoneum: Multiple enlarged retroperitoneal lymph nodes are  identified. Examples below:    Left periaortic chest below the level of the left renal vein, 0.0 x 1.3 cm,  series 4, image 73, previously 2.6 x 1.3 cm.     Left para-aortic, just inferior to the lymph node above, 2.3 x 1.1 cm, series  4, image 92, evidence of an  acute intracranial hemorrhage. Small scattered foci of susceptibility are  seen within the cerebral hemispheres bilaterally. These were not visualized  on the prior exam.  A few scattered foci of T2 FLAIR hyperintensity are seen  in the periventricular and subcortical white matter, which are nonspecific. There is minimal global parenchymal volume loss. The sellar/suprasellar  regions appear unremarkable. The normal signal voids within the major  intracranial vessels appear maintained. No abnormal focus of enhancement is  seen within the brain. ORBITS: The visualized portion of the orbits demonstrate no acute abnormality. SINUSES: Minimal scattered mucosal thickening of the paranasal sinuses. The  mastoid air cells demonstrate no acute abnormality. BONES/SOFT TISSUES: The bone marrow signal intensity demonstrates no acute  abnormality. The soft tissues demonstrate no acute abnormality. Impression  1. There is a punctate focus of restricted diffusion within the right frontal  lobe without associated enhancement, mass effect or midline shift. This  could represent a punctate acute infarct. However, given history, an early  metastatic focus cannot be entirely excluded. 2. Scattered foci of susceptibility are seen within the cerebral hemispheres  bilaterally, which were not visualized on the prior exam.  Findings could  represent areas of remote microhemorrhage or perhaps treated metastases. 3. No abnormal enhancement within the brain. 4. Minimal global parenchymal volume loss with minimal chronic microvascular  ischemic changes. These results were sent to the [employer] [address] ([facility]) on  8/20/2021 at 10:32 am to be communicated to the referring/covering health  care provider/office.       LABORATORY STUDIES:   CBC:   Lab Results   Component Value Date    WBC 9.6 12/17/2021    RBC 4.26 12/17/2021    HGB 12.0 12/17/2021    HCT 35.3 12/17/2021    MCV 82.9 12/17/2021    MCH 28.2 12/17/2021    MCHC 34.0 12/17/2021    RDW 15.0 12/17/2021     12/17/2021    MPV 8.6 12/17/2021     CMP:  Lab Results   Component Value Date     10/06/2021    K 3.7 10/06/2021     10/06/2021    CO2 20 10/06/2021    BUN 9 10/06/2021    CREATININE 0.7 12/02/2021    CREATININE 0.7 10/06/2021    GFRAA >60 12/02/2021    LABGLOM >60 12/02/2021    GLUCOSE 84 10/06/2021    PROT 7.6 10/06/2021    PROT 7.6 12/29/2012    LABALBU 4.2 10/06/2021    CALCIUM 10.0 10/06/2021    BILITOT 0.1 10/06/2021    ALKPHOS 206 10/06/2021    AST 15 10/06/2021    ALT 9 10/06/2021     Onc labs:   Lab Results   Component Value Date    .6 11/16/2021    CEA 7.7 07/23/2021          MEDICATIONS:   Current Outpatient Medications   Medication Sig Dispense Refill    oxyCODONE (OXYCONTIN) 20 MG extended release tablet Take 1 tablet by mouth 2 times daily for 30 days. Intended supply: 30 days 60 tablet 0    oxyCODONE-acetaminophen (PERCOCET) 5-325 MG per tablet Take 1 tablet by mouth every 8 hours as needed for Pain for up to 30 days. Intended supply: 3 days. Take lowest dose possible to manage pain 90 tablet 0    oxyCODONE-acetaminophen (PERCOCET) 5-325 MG per tablet Take 1 tablet by mouth every 8 hours as needed for Pain.  NARCAN 4 MG/0.1ML LIQD nasal spray DISPENSED PER STANDING ORDER USE AS DIRECTED PATIENT IS TRAINED OPIOID OVERDOSE RESPONDER      dexamethasone (DECADRON) 4 MG tablet Two tablets the day before treatment, one table daily for four days starting the day after chemotherapy 18 tablet 0    ondansetron (ZOFRAN) 8 MG tablet Take 1 tablet by mouth every 8 hours as needed for Nausea or Vomiting 30 tablet 1    albuterol sulfate HFA (PROVENTIL HFA) 108 (90 Base) MCG/ACT inhaler Inhale 2 puffs into the lungs every 4 hours as needed for Wheezing or Shortness of Breath With spacer (and mask if indicated). Thanks.  1 Inhaler 1    lisinopril (PRINIVIL;ZESTRIL) 10 MG tablet Take 1 tablet by mouth daily 30 tablet 3    ammonium lactate (LAC-HYDRIN) 12 % lotion Apply topically as needed. 1 Bottle 0    nicotine (NICODERM CQ) 21 MG/24HR Place 1 patch onto the skin daily 30 patch 3     No current facility-administered medications for this encounter. EXAMINATION:   There were no vitals filed for this visit. The patient is in no acute distress. Neck: Supple no lymphadenopathy is present. Abdomen: Soft, nontender and nondistended. No hepatosplenomegaly or masses are appreciated. Extremities: No cyanosis, clubbing, or edema is present. ASSESSMENT AND PLAN:     Kendra Marti is a 45 y.o. 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 I do recommend proceeding with palliative radiation therapy to the left pelvic metastasis at this time with initiation as quickly as possible to avoid fracture. I will arrange for him to see urology for prostate biopsy, which if positive, accompanied by hormonal therapy. The potential acute side effects and chronic complications of radiation therapy to the pelvis were discussed in detail with the patient. The patient voiced understanding and his questions were answered. He has decided to proceed in this fashion. I will perform a simulation today and proceed accordingly. Thank you for allowing me to participate in the care of this very pleasant gentleman. The patient is to continue to follow CDC's Covid 19 precautionary measures.       Electronically signed by Aliya Castellanos MD on 12/22/2021 at 3:59 PM

## 2021-12-23 ENCOUNTER — HOSPITAL ENCOUNTER (OUTPATIENT)
Dept: RADIATION ONCOLOGY | Age: 51
Discharge: HOME | End: 2021-12-23
Attending: RADIOLOGY
Payer: COMMERCIAL

## 2021-12-23 PROCEDURE — 77295 3-D RADIOTHERAPY PLAN: CPT | Performed by: RADIOLOGY

## 2021-12-23 PROCEDURE — 77300 RADIATION THERAPY DOSE PLAN: CPT | Performed by: RADIOLOGY

## 2021-12-23 PROCEDURE — 77334 RADIATION TREATMENT AID(S): CPT | Performed by: RADIOLOGY

## 2021-12-27 ENCOUNTER — HOSPITAL ENCOUNTER (OUTPATIENT)
Age: 51
Setting detail: SPECIMEN
Discharge: HOME | End: 2021-12-27
Payer: COMMERCIAL

## 2021-12-27 ENCOUNTER — HOSPITAL ENCOUNTER (OUTPATIENT)
Dept: RADIATION ONCOLOGY | Age: 51
Discharge: HOME | End: 2021-12-27
Payer: COMMERCIAL

## 2021-12-27 ENCOUNTER — HOSPITAL ENCOUNTER (OUTPATIENT)
Dept: RADIATION ONCOLOGY | Age: 51
Discharge: HOME | End: 2021-12-27
Attending: RADIOLOGY
Payer: COMMERCIAL

## 2021-12-27 DIAGNOSIS — R05.9 COUGH: Primary | ICD-10-CM

## 2021-12-27 PROCEDURE — 77334 RADIATION TREATMENT AID(S): CPT | Performed by: RADIOLOGY

## 2021-12-27 PROCEDURE — 77280 THER RAD SIMULAJ FIELD SMPL: CPT | Performed by: RADIOLOGY

## 2021-12-27 PROCEDURE — U0005 INFEC AGEN DETEC AMPLI PROBE: HCPCS

## 2021-12-27 PROCEDURE — U0003 INFECTIOUS AGENT DETECTION BY NUCLEIC ACID (DNA OR RNA); SEVERE ACUTE RESPIRATORY SYNDROME CORONAVIRUS 2 (SARS-COV-2) (CORONAVIRUS DISEASE [COVID-19]), AMPLIFIED PROBE TECHNIQUE, MAKING USE OF HIGH THROUGHPUT TECHNOLOGIES AS DESCRIBED BY CMS-2020-01-R: HCPCS

## 2021-12-27 PROCEDURE — 77412 RADIATION TX DELIVERY LVL 3: CPT | Performed by: RADIOLOGY

## 2021-12-27 PROCEDURE — 77300 RADIATION THERAPY DOSE PLAN: CPT | Performed by: RADIOLOGY

## 2021-12-27 NOTE — PROGRESS NOTES
Weekly Radiation Treatment Progress Note    DATE OF SERVICE: 12/27/2021     DIAGNOSIS:   Visit Diagnoses       Codes    Cough    -  Primary R05.9       Cancer Staging  Malignant neoplasm of overlapping sites of left lung ([facility] [address]  Staging form: Lung, AJCC 8th Edition  - Clinical: Stage IV (pM1) - Signed by Cathy Reyes MD on 8/6/2021  - Pathologic: No stage assigned - Unsigned       TREATMENT COURSE:   Oncology History   Malignant neoplasm of overlapping sites of left lung ([facility] [address]   8/6/2021 Initial Diagnosis    Malignant neoplasm of overlapping sites of left lung ([facility] [address]         Site: Left Pelvis    Current Radiation Dose: 300 cGy    Subjective:    Continued left pelvic pain    Pt states he developed new cough yesterday with some hemoptysis      EXAM  There were no vitals filed for this visit.   NAD  Unable to assess patient due to COVID restricitions    Setup images, chart, plan reviewed      A/P:   Tolerating tx well  Continue RT as planned    Ambulatory rule-out COVID test today  If negative will discuss adding radiation to chest due to likelihood of left lung mass causing hemoptysis      Electronically signed by Chinmay Goldman MD on 12/27/2021 at 10:42 AM

## 2021-12-28 ENCOUNTER — HOSPITAL ENCOUNTER (OUTPATIENT)
Dept: RADIATION ONCOLOGY | Age: 51
Discharge: HOME | End: 2021-12-28
Attending: RADIOLOGY
Payer: COMMERCIAL

## 2021-12-28 LAB
SARS-COV-2: NOT DETECTED
SOURCE: NORMAL

## 2021-12-28 PROCEDURE — 77334 RADIATION TREATMENT AID(S): CPT | Performed by: RADIOLOGY

## 2021-12-28 PROCEDURE — 77263 THER RADIOLOGY TX PLNG CPLX: CPT | Performed by: RADIOLOGY

## 2021-12-28 PROCEDURE — 77290 THER RAD SIMULAJ FIELD CPLX: CPT | Performed by: RADIOLOGY

## 2021-12-28 PROCEDURE — 77332 RADIATION TREATMENT AID(S): CPT | Performed by: RADIOLOGY

## 2021-12-28 PROCEDURE — 77412 RADIATION TX DELIVERY LVL 3: CPT | Performed by: RADIOLOGY

## 2021-12-28 NOTE — PROGRESS NOTES
Weekly Radiation Treatment Progress Note    DATE OF SERVICE: 12/28/2021     DIAGNOSIS:    Cancer Staging  Malignant neoplasm of overlapping sites of left lung ([facility] [address]  Staging form: Lung, AJCC 8th Edition  - Clinical: Stage IV (pM1) - Signed by Amelia Nelson MD on 8/6/2021  - Pathologic: No stage assigned - Unsigned       TREATMENT COURSE:   Oncology History   Malignant neoplasm of overlapping sites of left lung ([facility] [address]   8/6/2021 Initial Diagnosis    Malignant neoplasm of overlapping sites of left lung ([facility])         Site: Left Pelvis    Current Radiation Dose: 600 cGy    Subjective:    Pt seen today to discuss hemoptysis  COVID (-)    Endorses hemoptysis x1 week, daily  Consists of dark red clotted blood  No bright red blood      A/P:   Reviewed imaging with left lung mass eroding into left PA  Recommend palliative RT to this mass as well given new onset of symptoms  CT simulation today  Will plan 400 cGy x 5 = 2000 cGy to finish with his previously scheduled palliative hip RT and not delay systemic therapy      Electronically signed by Kassie Luz MD on 12/28/2021 at 1:57 PM

## 2021-12-29 ENCOUNTER — HOSPITAL ENCOUNTER (OUTPATIENT)
Dept: RADIATION ONCOLOGY | Age: 51
Discharge: HOME | End: 2021-12-29
Attending: RADIOLOGY
Payer: COMMERCIAL

## 2021-12-29 ENCOUNTER — OFFICE VISIT (OUTPATIENT)
Dept: ONCOLOGY | Age: 51
End: 2021-12-29
Payer: COMMERCIAL

## 2021-12-29 VITALS
BODY MASS INDEX: 26.37 KG/M2 | WEIGHT: 199 LBS | TEMPERATURE: 97.1 F | SYSTOLIC BLOOD PRESSURE: 147 MMHG | HEART RATE: 80 BPM | OXYGEN SATURATION: 99 % | HEIGHT: 73 IN | DIASTOLIC BLOOD PRESSURE: 94 MMHG | RESPIRATION RATE: 16 BRPM

## 2021-12-29 DIAGNOSIS — R97.20 ELEVATED PSA: ICD-10-CM

## 2021-12-29 DIAGNOSIS — C34.82 MALIGNANT NEOPLASM OF OVERLAPPING SITES OF LEFT LUNG (HCC): Primary | ICD-10-CM

## 2021-12-29 DIAGNOSIS — C79.51 SECONDARY MALIGNANT NEOPLASM OF BONE (HCC): ICD-10-CM

## 2021-12-29 PROCEDURE — G8484 FLU IMMUNIZE NO ADMIN: HCPCS | Performed by: INTERNAL MEDICINE

## 2021-12-29 PROCEDURE — 77412 RADIATION TX DELIVERY LVL 3: CPT | Performed by: RADIOLOGY

## 2021-12-29 PROCEDURE — 77334 RADIATION TREATMENT AID(S): CPT | Performed by: RADIOLOGY

## 2021-12-29 PROCEDURE — 77307 TELETHX ISODOSE PLAN CPLX: CPT | Performed by: RADIOLOGY

## 2021-12-29 PROCEDURE — 3017F COLORECTAL CA SCREEN DOC REV: CPT | Performed by: INTERNAL MEDICINE

## 2021-12-29 PROCEDURE — 99214 OFFICE O/P EST MOD 30 MIN: CPT | Performed by: INTERNAL MEDICINE

## 2021-12-29 PROCEDURE — 4004F PT TOBACCO SCREEN RCVD TLK: CPT | Performed by: INTERNAL MEDICINE

## 2021-12-29 PROCEDURE — G8427 DOCREV CUR MEDS BY ELIG CLIN: HCPCS | Performed by: INTERNAL MEDICINE

## 2021-12-29 PROCEDURE — G8419 CALC BMI OUT NRM PARAM NOF/U: HCPCS | Performed by: INTERNAL MEDICINE

## 2021-12-29 RX ORDER — OXYCODONE HYDROCHLORIDE AND ACETAMINOPHEN 5; 325 MG/1; MG/1
1 TABLET ORAL EVERY 8 HOURS PRN
Qty: 90 TABLET | Refills: 0 | Status: SHIPPED | OUTPATIENT
Start: 2021-12-29 | End: 2021-12-29

## 2021-12-29 RX ORDER — OXYCODONE AND ACETAMINOPHEN 7.5; 325 MG/1; MG/1
1 TABLET ORAL EVERY 8 HOURS PRN
Qty: 90 TABLET | Refills: 0 | Status: SHIPPED | OUTPATIENT
Start: 2021-12-29 | End: 2022-01-27 | Stop reason: SDUPTHER

## 2021-12-29 NOTE — PROGRESS NOTES
denies past history of cancer. He smokes 2-3 ppd and drinks 10-12 beers daily. He reports unknown malignancy in his sister who  at 39. No other known family history of cancer.      Due to lung mass and concern for metastatic prostate cancer we were called to evaluate. 21:  Final Pathologic Diagnosis:   Needle biopsy of lung, clinically left lung mass:        SQUAMOUS CELL CARCINOMA IN SITU.     21:PET  1.  Left perihilar mass likely represents primary lung cancer.  Correlate   with biopsy results.  The opacity more peripheral in the left lower lobe has   relatively low level activity and likely represents an area of post   obstructive pneumonia adjacent to the mass.       2.  Metabolically active left AP window lymph node concerning for metastatic   disease.       3.  The prostate is enlarged and has increased FDG activity which could be   due to prostatitis or prostate cancer.  Correlate with PSA levels.       4.  No increased metabolic activity associated with retroperitoneal lymph   nodes.  This is unlikely related to the lung process given the significant   difference in metabolic activity; however, if there is prostate cancer, this   could potentially be metastatic disease from the prostate cancer, which can   have variable levels of uptake on FDG PET scans.       5.  No metabolic activity associated with multiple sclerotic lesions.  Again   this is unlikely related to the lung process, but if there is prostate   cancer, this could represent metastatic disease from prostate cancer with   poor FDG avidity.  Otherwise it could also represent large bone islands.       6.  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 There is a focal area of intense activity associated with a new lucent lesion   within the background of Paget's disease concerning for metastatic disease. Given the FDG activity, it is likely related to the lung process.           21: MRI brain  1. There is a punctate focus of restricted diffusion within the right frontal   lobe without associated enhancement, mass effect or midline shift.  This   could represent a punctate acute infarct.  However, given history, an early   metastatic focus cannot be entirely excluded. 2. Scattered foci of susceptibility are seen within the cerebral hemispheres   bilaterally, which were not visualized on the prior exam.  Findings could   represent areas of remote microhemorrhage or perhaps treated metastases. 3. No abnormal enhancement within the brain. 4. Minimal global parenchymal volume loss with minimal chronic microvascular   ischemic changes. 21: CT chest, abdomen and pelvis:  Chest CT: Interval increased size of the left perihilar mass, with increased   adjacent obstructive pneumonitis and atelectasis.       Stable to minimally to decreased mediastinal lymphadenopathy.       Interval increased bony metastatic disease.       Abdomen and pelvis CT: Stable retroperitoneal and pelvic lymphadenopathy.       Interval increased bony metastatic disease. 21:Final Pathologic Diagnosis:   Bone, posterior ileum, needle core biopsy:   -     METASTATIC SQUAMOUS CELL CARCINOMA. 21: Started XRT to the left pelvis  Plan to add chest radiation dia 3    Past Medical History:     BPH, HTN, COPD                                                            Past Surgery History:    None per his wife                                                                        Social History:   Lives with wife. Cut down smoking to 2-4 cigarettes per day prior to which he was smoking 2 to 3 packs/day for the past 40 years. Also reported drinking alcohol 10-12 beers per day. Denied any other illicit drug abuse.                                                                                                    Family History:    He reported that his sister  at the age of 39 with  metastatic cancer, he was not sure what the primary was                                                                                              Allergies   Allergen Reactions    Tramadol Other (See Comments)     seizures    Vicodin [Hydrocodone-Acetaminophen] Hives       Current Outpatient Medications on File Prior to Visit   Medication Sig Dispense Refill    oxyCODONE (OXYCONTIN) 20 MG extended release tablet Take 1 tablet by mouth 2 times daily for 30 days. Intended supply: 30 days 60 tablet 0    oxyCODONE-acetaminophen (PERCOCET) 5-325 MG per tablet Take 1 tablet by mouth every 8 hours as needed for Pain for up to 30 days. Intended supply: 3 days. Take lowest dose possible to manage pain 90 tablet 0    oxyCODONE-acetaminophen (PERCOCET) 5-325 MG per tablet Take 1 tablet by mouth every 8 hours as needed for Pain.  NARCAN 4 MG/0.1ML LIQD nasal spray DISPENSED PER STANDING ORDER USE AS DIRECTED PATIENT IS TRAINED OPIOID OVERDOSE RESPONDER      dexamethasone (DECADRON) 4 MG tablet Two tablets the day before treatment, one table daily for four days starting the day after chemotherapy 18 tablet 0    ondansetron (ZOFRAN) 8 MG tablet Take 1 tablet by mouth every 8 hours as needed for Nausea or Vomiting 30 tablet 1    lisinopril (PRINIVIL;ZESTRIL) 10 MG tablet Take 1 tablet by mouth daily 30 tablet 3    ammonium lactate (LAC-HYDRIN) 12 % lotion Apply topically as needed. 1 Bottle 0    nicotine (NICODERM CQ) 21 MG/24HR Place 1 patch onto the skin daily 30 patch 3    albuterol sulfate HFA (PROVENTIL HFA) 108 (90 Base) MCG/ACT inhaler Inhale 2 puffs into the lungs every 4 hours as needed for Wheezing or Shortness of Breath With spacer (and mask if indicated). Thanks. 1 Inhaler 1     No current facility-administered medications on file prior to visit. Interval History: 12/29/21: He arrived with his wife to the clinic today.  Continues to have pain in the left lower back, radiating to the LLE and also reported numbness but no weakness. Pain in the left chest area, also reported hemoptysis. Continues to have difficulty urinating. Gained few lbs.       Review of Systems:  As per the interval history, rest of the review of system negative     Vital Signs: BP (!) 147/94 (Site: Right Upper Arm, Position: Sitting, Cuff Size: Medium Adult)   Pulse 80   Temp 97.1 °F (36.2 °C) (Infrared)   Resp 16   Ht 6' 1\" (1.854 m)   Wt 199 lb (90.3 kg)   SpO2 99%   BMI 26.25 kg/m²      CONSTITUTIONAL: awake, alert  EYES: RAISSA, No pallor or any icterus  ENT: ATNC  NECK: No JVD  HEMATOLOGIC/LYMPHATIC: no cervical, supraclavicular or axillary lymphadenopathy   LUNGS: CTAB  CARDIOVASCULAR: s1s2 rrr no murmurs  ABDOMEN: soft ntnd bs pos  NEUROLOGIC: GI  SKIN: Psoriatic rash with excoriation marks on the lower extremities and also upper extremities  EXTREMITIES: no LE edema bilaterally     Labs:  Hematology:  Lab Results   Component Value Date    WBC 9.6 12/17/2021    RBC 4.26 (L) 12/17/2021    HGB 12.0 (L) 12/17/2021    HCT 35.3 (L) 12/17/2021    MCV 82.9 12/17/2021    MCH 28.2 12/17/2021    MCHC 34.0 12/17/2021    RDW 15.0 (H) 12/17/2021     12/17/2021    MPV 8.6 12/17/2021    BANDSPCT 9 11/13/2018    SEGSPCT 70.1 (H) 11/16/2021    EOSRELPCT 1.9 11/16/2021    BASOPCT 0.2 11/16/2021    LYMPHOPCT 22.1 (L) 11/16/2021    MONOPCT 5.7 (H) 11/16/2021    BANDABS 0.66 11/13/2018    SEGSABS 9.1 11/16/2021    EOSABS 0.2 11/16/2021    BASOSABS 0.0 11/16/2021    LYMPHSABS 2.9 11/16/2021    MONOSABS 0.7 11/16/2021    DIFFTYPE AUTOMATED DIFFERENTIAL 11/16/2021    POLYCHROM 1+ 11/13/2018    PLTM FEW 11/13/2018     No results found for: ESR  Chemistry:  Lab Results   Component Value Date     10/06/2021    K 3.7 10/06/2021     10/06/2021    CO2 20 (L) 10/06/2021    BUN 9 10/06/2021    CREATININE 0.7 (L) 12/02/2021    GLUCOSE 84 10/06/2021    CALCIUM 10.0 10/06/2021    PROT 7.6 10/06/2021    LABALBU 4.2 10/06/2021    BILITOT 0.1 10/06/2021    ALKPHOS 206 (H) 10/06/2021    AST 15 10/06/2021    ALT 9 (L) 10/06/2021    LABGLOM >60 12/02/2021    GFRAA >60 12/02/2021    MG 1.6 (L) 07/29/2021    POCGLU 124 (H) 09/22/2020     No results found for: MMA, LDH, HOMOCYSTEINE  No components found for: LD  Lab Results   Component Value Date    TSHHS 2.162 07/27/2013     Immunology:  Lab Results   Component Value Date    PROT 7.6 10/06/2021     No results found for: Katrina [place], KLFLCR  No results found for: B2M  Coagulation Panel:  Lab Results   Component Value Date    PROTIME 12.9 12/17/2021    INR 1.00 12/17/2021    APTT 35.9 12/17/2021    DDIMER <200 12/19/2013     Anemia Panel:  No results found for: [facility]  Tumor Markers:  Lab Results   Component Value Date    CEA 7.7 07/23/2021    .6 (H) 11/16/2021        Observations:  ECOG:  No data recorded       Assessment & Plan:                                                          Left hilar mass with mediastinal hilar lymphadenopathy. 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 PET scan results from august 2021 noted, bone lesions most probably not metastatic except for one lytic lesion. MRI of the brain with no convincing metastatic lesions. Presented  the case in the tumor board. Decision made to proceed with a prostate biopsy and treat with ADT if malignancy  and in the meantime proceed with staging mediastinoscopy/rebiopsy for further treatment plan. But as pt very very very noncompliant, did not follow up with urology, CTS or for bone biopsy multiple times. Note PSA rising and is 250 on November 16 2021   CT imaging with progressive met disease  Bone biopsy on dec 13 2021 with met squamous cell cancer. Palliative radiation to the pelvic area and also plan to add radiation to the left lung   Discussed the findings, diagnosis and poor prognosis and treatment with carbo/taxol/pembro after completion of radiation. Discussed ae.  Need port placement. Prostate enlargement and elevated  could be secondary to prostatitis vs prostate cancer. 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 Has been applying topical cream    Elevated alk phos most probably secondary to the bone lesions possibly Paget's disease versus EtOH. Has been chronically elevated since 2012. Discussed alcohol and smoking cessation. Adequate analgesic and bowel regimen. Continue other medical care. Thank you for letting us be part of the care and will follow along. Discussed above findings and plan with him and he voiced understanding. Answered all his questions. Discussed healthy lifestyle including healthy diet, regular exercise as tolerated. ,  Smoking and alcohol cessation    Recommend follow-up with primary care physician and other specialists. Note he has he has been very noncompliant with appointments. Please do not hesitate to contact us if you need further information. Return to clinic  Jan 2022 or earlier if new Sx    I have recommended that the patient follow CDC guidelines for prevention of COVID-19 infection.   Received Covid vaccine    BERTO requested    TOMI

## 2021-12-29 NOTE — PROGRESS NOTES
MA Rooming Questions  Patient: Shane Macedo  MRN: [id number]    Date: 12/29/2021        1. Do you have any new issues?   no         2. Do you need any refills on medications?    no    3. Have you had any imaging done since your last visit?   no    4. Have you been hospitalized or seen in the emergency room since your last visit here?   no    5. Did the patient have a depression screening completed today?  No    No data recorded     PHQ-9 Given to (if applicable):               PHQ-9 Score (if applicable):                     [] Positive     []  Negative              Does question #9 need addressed (if applicable)                     [] Yes    []  No               Pepito Newsome MA

## 2021-12-30 ENCOUNTER — HOSPITAL ENCOUNTER (OUTPATIENT)
Dept: RADIATION ONCOLOGY | Age: 51
Discharge: HOME | End: 2021-12-30
Attending: RADIOLOGY
Payer: COMMERCIAL

## 2021-12-30 ENCOUNTER — CLINICAL DOCUMENTATION (OUTPATIENT)
Dept: CASE MANAGEMENT | Age: 51
End: 2021-12-30

## 2021-12-30 PROCEDURE — 77412 RADIATION TX DELIVERY LVL 3: CPT | Performed by: RADIOLOGY

## 2022-01-03 ENCOUNTER — HOSPITAL ENCOUNTER (OUTPATIENT)
Dept: RADIATION ONCOLOGY | Age: 52
Discharge: HOME | End: 2022-01-03
Attending: RADIOLOGY
Payer: COMMERCIAL

## 2022-01-03 PROCEDURE — 77280 THER RAD SIMULAJ FIELD SMPL: CPT | Performed by: RADIOLOGY

## 2022-01-03 PROCEDURE — 77412 RADIATION TX DELIVERY LVL 3: CPT | Performed by: RADIOLOGY

## 2022-01-03 PROCEDURE — 77336 RADIATION PHYSICS CONSULT: CPT | Performed by: RADIOLOGY

## 2022-01-04 ENCOUNTER — HOSPITAL ENCOUNTER (OUTPATIENT)
Dept: RADIATION ONCOLOGY | Age: 52
Discharge: HOME | End: 2022-01-04
Attending: RADIOLOGY
Payer: COMMERCIAL

## 2022-01-04 ENCOUNTER — HOSPITAL ENCOUNTER (EMERGENCY)
Age: 52
Discharge: HOME | End: 2022-01-04
Attending: EMERGENCY MEDICINE
Payer: COMMERCIAL

## 2022-01-04 ENCOUNTER — APPOINTMENT (OUTPATIENT)
Dept: CT IMAGING | Age: 52
End: 2022-01-04
Payer: COMMERCIAL

## 2022-01-04 ENCOUNTER — APPOINTMENT (OUTPATIENT)
Dept: GENERAL RADIOLOGY | Age: 52
End: 2022-01-04
Payer: COMMERCIAL

## 2022-01-04 VITALS
OXYGEN SATURATION: 97 % | SYSTOLIC BLOOD PRESSURE: 134 MMHG | HEIGHT: 73 IN | TEMPERATURE: 98.6 F | BODY MASS INDEX: 27.83 KG/M2 | DIASTOLIC BLOOD PRESSURE: 86 MMHG | WEIGHT: 210 LBS | RESPIRATION RATE: 14 BRPM | HEART RATE: 87 BPM

## 2022-01-04 DIAGNOSIS — R07.89 CHEST TIGHTNESS: ICD-10-CM

## 2022-01-04 DIAGNOSIS — R06.02 SHORTNESS OF BREATH: Primary | ICD-10-CM

## 2022-01-04 LAB
ALBUMIN SERPL-MCNC: 4.5 GM/DL (ref 3.4–5)
ALP BLD-CCNC: 150 IU/L (ref 40–129)
ALT SERPL-CCNC: 53 U/L (ref 10–40)
ANION GAP SERPL CALCULATED.3IONS-SCNC: 13 MMOL/L (ref 4–16)
AST SERPL-CCNC: 33 IU/L (ref 15–37)
BASOPHILS ABSOLUTE: 0 K/CU MM
BASOPHILS RELATIVE PERCENT: 0.5 % (ref 0–1)
BILIRUB SERPL-MCNC: 0.8 MG/DL (ref 0–1)
BUN BLDV-MCNC: 5 MG/DL (ref 6–23)
CALCIUM SERPL-MCNC: 9 MG/DL (ref 8.3–10.6)
CHLORIDE BLD-SCNC: 102 MMOL/L (ref 99–110)
CO2: 25 MMOL/L (ref 21–32)
CREAT SERPL-MCNC: 0.7 MG/DL (ref 0.9–1.3)
DIFFERENTIAL TYPE: ABNORMAL
EKG ATRIAL RATE: 83 BPM
EKG DIAGNOSIS: NORMAL
EKG P AXIS: 80 DEGREES
EKG P-R INTERVAL: 156 MS
EKG Q-T INTERVAL: 364 MS
EKG QRS DURATION: 100 MS
EKG QTC CALCULATION (BAZETT): 427 MS
EKG R AXIS: 42 DEGREES
EKG T AXIS: 1 DEGREES
EKG VENTRICULAR RATE: 83 BPM
EOSINOPHILS ABSOLUTE: 0.5 K/CU MM
EOSINOPHILS RELATIVE PERCENT: 6.6 % (ref 0–3)
GFR AFRICAN AMERICAN: >60 ML/MIN/1.73M2
GFR NON-AFRICAN AMERICAN: >60 ML/MIN/1.73M2
GLUCOSE BLD-MCNC: 87 MG/DL (ref 70–99)
HCT VFR BLD CALC: 37.2 % (ref 42–52)
HEMOGLOBIN: 12.4 GM/DL (ref 13.5–18)
IMMATURE NEUTROPHIL %: 0.6 % (ref 0–0.43)
LYMPHOCYTES ABSOLUTE: 1.1 K/CU MM
LYMPHOCYTES RELATIVE PERCENT: 13.4 % (ref 24–44)
MCH RBC QN AUTO: 27.3 PG (ref 27–31)
MCHC RBC AUTO-ENTMCNC: 33.3 % (ref 32–36)
MCV RBC AUTO: 81.8 FL (ref 78–100)
MONOCYTES ABSOLUTE: 0.6 K/CU MM
MONOCYTES RELATIVE PERCENT: 7.5 % (ref 0–4)
NUCLEATED RBC %: 0 %
PDW BLD-RTO: 15.1 % (ref 11.7–14.9)
PLATELET # BLD: 290 K/CU MM (ref 140–440)
PMV BLD AUTO: 8.9 FL (ref 7.5–11.1)
POTASSIUM SERPL-SCNC: 3.8 MMOL/L (ref 3.5–5.1)
PRO-BNP: 25.31 PG/ML
RAPID INFLUENZA  B AGN: NEGATIVE
RAPID INFLUENZA A AGN: NEGATIVE
RBC # BLD: 4.55 M/CU MM (ref 4.6–6.2)
SARS-COV-2, NAAT: NOT DETECTED
SEGMENTED NEUTROPHILS ABSOLUTE COUNT: 5.8 K/CU MM
SEGMENTED NEUTROPHILS RELATIVE PERCENT: 71.4 % (ref 36–66)
SODIUM BLD-SCNC: 140 MMOL/L (ref 135–145)
TOTAL IMMATURE NEUTOROPHIL: 0.05 K/CU MM
TOTAL NUCLEATED RBC: 0 K/CU MM
TOTAL PROTEIN: 7.5 GM/DL (ref 6.4–8.2)
TROPONIN T: <0.01 NG/ML
WBC # BLD: 8.2 K/CU MM (ref 4–10.5)

## 2022-01-04 PROCEDURE — 87804 INFLUENZA ASSAY W/OPTIC: CPT

## 2022-01-04 PROCEDURE — 77412 RADIATION TX DELIVERY LVL 3: CPT | Performed by: RADIOLOGY

## 2022-01-04 PROCEDURE — 93010 ELECTROCARDIOGRAM REPORT: CPT | Performed by: INTERNAL MEDICINE

## 2022-01-04 PROCEDURE — 94640 AIRWAY INHALATION TREATMENT: CPT

## 2022-01-04 PROCEDURE — 87635 SARS-COV-2 COVID-19 AMP PRB: CPT

## 2022-01-04 PROCEDURE — 84484 ASSAY OF TROPONIN QUANT: CPT

## 2022-01-04 PROCEDURE — 6360000004 HC RX CONTRAST MEDICATION: Performed by: EMERGENCY MEDICINE

## 2022-01-04 PROCEDURE — 71046 X-RAY EXAM CHEST 2 VIEWS: CPT

## 2022-01-04 PROCEDURE — 71275 CT ANGIOGRAPHY CHEST: CPT

## 2022-01-04 PROCEDURE — 6370000000 HC RX 637 (ALT 250 FOR IP): Performed by: PHYSICIAN ASSISTANT

## 2022-01-04 PROCEDURE — 93005 ELECTROCARDIOGRAM TRACING: CPT | Performed by: PHYSICIAN ASSISTANT

## 2022-01-04 PROCEDURE — 80053 COMPREHEN METABOLIC PANEL: CPT

## 2022-01-04 PROCEDURE — 85025 COMPLETE CBC W/AUTO DIFF WBC: CPT

## 2022-01-04 PROCEDURE — 99283 EMERGENCY DEPT VISIT LOW MDM: CPT

## 2022-01-04 PROCEDURE — 77417 THER RADIOLOGY PORT IMAGE(S): CPT | Performed by: RADIOLOGY

## 2022-01-04 PROCEDURE — 83880 ASSAY OF NATRIURETIC PEPTIDE: CPT

## 2022-01-04 RX ORDER — GUAIFENESIN/DEXTROMETHORPHAN 100-10MG/5
5 SYRUP ORAL 4 TIMES DAILY PRN
Qty: 120 ML | Refills: 0 | Status: SHIPPED | OUTPATIENT
Start: 2022-01-04 | End: 2022-01-14

## 2022-01-04 RX ORDER — GUAIFENESIN/DEXTROMETHORPHAN 100-10MG/5
5 SYRUP ORAL ONCE
Status: COMPLETED | OUTPATIENT
Start: 2022-01-04 | End: 2022-01-04

## 2022-01-04 RX ORDER — ALBUTEROL SULFATE 90 UG/1
2 AEROSOL, METERED RESPIRATORY (INHALATION) ONCE
Status: COMPLETED | OUTPATIENT
Start: 2022-01-04 | End: 2022-01-04

## 2022-01-04 RX ADMIN — GUAIFENESIN AND DEXTROMETHORPHAN 5 ML: 100; 10 SYRUP ORAL at 13:37

## 2022-01-04 RX ADMIN — Medication 2 PUFF: at 13:10

## 2022-01-04 RX ADMIN — IOPAMIDOL 75 ML: 755 INJECTION, SOLUTION INTRAVENOUS at 15:08

## 2022-01-04 RX ADMIN — ALBUTEROL SULFATE 2 PUFF: 90 AEROSOL, METERED RESPIRATORY (INHALATION) at 13:10

## 2022-01-04 ASSESSMENT — ENCOUNTER SYMPTOMS
EYE REDNESS: 0
DIARRHEA: 0
COUGH: 1
SHORTNESS OF BREATH: 1
ABDOMINAL PAIN: 0
CHEST TIGHTNESS: 1
VOMITING: 0
NAUSEA: 1
BACK PAIN: 0
SORE THROAT: 0
RHINORRHEA: 0

## 2022-01-04 ASSESSMENT — PAIN SCALES - GENERAL: PAINLEVEL_OUTOF10: 10

## 2022-01-04 ASSESSMENT — PAIN DESCRIPTION - LOCATION: LOCATION: CHEST

## 2022-01-04 ASSESSMENT — PAIN DESCRIPTION - PAIN TYPE: TYPE: ACUTE PAIN

## 2022-01-04 NOTE — ED NOTES
Dr. Harsh Chi at bedside assessing patient. Patient states increased shortness of breath.      Ene Dorman RN  01/04/22 [id number]

## 2022-01-04 NOTE — ED PROVIDER NOTES
Triage Chief Complaint:   Chest Pain    Lummi:  Leonila Nguyen is a 45 y.o. male with a history of lung cancer who his last radiation treatment was this morning that presents with complaint of status and is she has an shortness of breath tired upon exertion as well as a cough. He states the tightness in his chest has been going on for a while but it also feels different now. He states that he stretches he feels it tight on the left side of his chest or if he takes a deep breath. He also states the he occasionally has a sharp pain on the left lower side of his chest.  The shortness of breath on exertion has only been going on for last few days. His cough is productive of sputum has been going on for a while. He denies any fevers. He occasionally has nausea but no vomiting. He received the [address] vaccine but has not been boosted. He denies any lower extremity swelling. No history of blood clots. ROS:   Review of Systems   Constitutional: Negative for chills and fever. HENT: Negative for congestion, rhinorrhea and sore throat. Eyes: Negative for redness and visual disturbance. Respiratory: Positive for cough, chest tightness and shortness of breath. Cardiovascular: Positive for chest pain (left lower side). Negative for leg swelling. Gastrointestinal: Positive for nausea. Negative for abdominal pain, diarrhea and vomiting. Genitourinary: Negative for dysuria and frequency. Musculoskeletal: Negative for arthralgias and back pain. Skin: Negative for rash and wound. Neurological: Negative for syncope and headaches. Psychiatric/Behavioral: Negative for hallucinations and suicidal ideas.        Past Medical History:   Diagnosis Date    CAD (coronary artery disease) 12/23/2013    cath negative per pt    History of TMJ disorder     Hypertension     Trigeminal neuralgia      Past Surgical History:   Procedure Laterality Date    ABDOMEN SURGERY      CARDIAC CATHETERIZATION 08/03/2016    CT BIOPSY PERCUTANEOUS SUPERFICIAL BONE  12/17/2021    CT BIOPSY PERCUTANEOUS SUPERFICIAL BONE 12/17/2021 [facility] CT SCAN    CT NEEDLE BIOPSY LUNG PERCUTANEOUS  7/28/2021    CT NEEDLE BIOPSY LUNG PERCUTANEOUS 7/28/2021 [facility] CT SCAN    PORT SURGERY Right 8/13/2021    MEDIPORT INSERTION performed by Yared Wilson MD at [facility] OR     Family History   Problem Relation Age of Onset    High Blood Pressure Mother     High Blood Pressure Sister     Cancer Sister      Social History     Socioeconomic History    Marital status:      Spouse name: Not on file    Number of children: 11    Years of education: Not on file    Highest education level: Not on file   Occupational History    Not on file   Tobacco Use    Smoking status: Current Every Day Smoker     Packs/day: 2.00     Years: 39.00     Pack years: 78.00     Types: Cigarettes    Smokeless tobacco: Never Used    Tobacco comment: smokes 1-2 a day   Vaping Use    Vaping Use: Never used   Substance and Sexual Activity    Alcohol use: Yes     Alcohol/week: 6.0 standard drinks     Types: 6 Cans of beer per week     Comment: per week (24 oz beers)     Drug use: Yes     Types: Marijuana [place]o)     Comment: last 12/1    Sexual activity: Yes     Partners: Female   Other Topics Concern    Not on file   Social History Narrative    Not on file     Social Determinants of Health     Financial Resource Strain:     Difficulty of Paying Living Expenses: Not on file   Food Insecurity:     Worried About Running Out of Food in the Last Year: Not on file    [place] of Food in the Last Year: Not on file   Transportation Needs:     Lack of Transportation (Medical): Not on file    Lack of Transportation (Non-Medical):  Not on file   Physical Activity:     Days of Exercise per Week: Not on file    Minutes of Exercise per Session: Not on file   Stress:     Feeling of Stress : Not on file   Social Connections:     Frequency of Communication with Friends and Family: Not on file    Frequency of Social Gatherings with Friends and Family: Not on file    Attends [employer] Services: Not on file    Active Member of Clubs or Organizations: Not on file    Attends Club or Organization Meetings: Not on file    Marital Status: Not on file   Intimate Partner Violence:     Fear of Current or Ex-Partner: Not on file    Emotionally Abused: Not on file    Physically Abused: Not on file    Sexually Abused: Not on file   Housing Stability:     Unable to Pay for Housing in the Last Year: Not on file    Number of [place] in the Last Year: Not on file    Unstable Housing in the Last Year: Not on file     No current facility-administered medications for this encounter. Current Outpatient Medications   Medication Sig Dispense Refill    guaiFENesin-dextromethorphan (ROBITUSSIN DM) 100-10 MG/5ML syrup Take 5 mLs by mouth 4 times daily as needed for Cough 120 mL 0    oxyCODONE-acetaminophen (PERCOCET) 7.5-325 MG per tablet Take 1 tablet by mouth every 8 hours as needed for Pain for up to 30 days. Intended supply: 30 days 90 tablet 0    oxyCODONE (OXYCONTIN) 20 MG extended release tablet Take 1 tablet by mouth 2 times daily for 30 days. Intended supply: 30 days 60 tablet 0    NARCAN 4 MG/0.1ML LIQD nasal spray DISPENSED PER STANDING ORDER USE AS DIRECTED PATIENT IS TRAINED OPIOID OVERDOSE RESPONDER      dexamethasone (DECADRON) 4 MG tablet Two tablets the day before treatment, one table daily for four days starting the day after chemotherapy 18 tablet 0    ondansetron (ZOFRAN) 8 MG tablet Take 1 tablet by mouth every 8 hours as needed for Nausea or Vomiting 30 tablet 1    albuterol sulfate HFA (PROVENTIL HFA) 108 (90 Base) MCG/ACT inhaler Inhale 2 puffs into the lungs every 4 hours as needed for Wheezing or Shortness of Breath With spacer (and mask if indicated). Thanks.  1 Inhaler 1    lisinopril (PRINIVIL;ZESTRIL) 10 MG tablet Take 1 tablet by mouth daily 30 tablet 3  ammonium lactate (LAC-HYDRIN) 12 % lotion Apply topically as needed. 1 Bottle 0    nicotine (NICODERM CQ) 21 MG/24HR Place 1 patch onto the skin daily 30 patch 3     Allergies   Allergen Reactions    Tramadol Other (See Comments)     seizures    Vicodin [Hydrocodone-Acetaminophen] Hives       Nursing Notes Reviewed     Physical Exam:   ED Triage Vitals [01/04/22 1138]   Enc Vitals Group      BP (!) 134/95      Pulse 88      Resp 22      Temp 98.6 °F (37 °C)      Temp Source Oral      SpO2 99 %      Weight 210 lb (95.3 kg)      Height 6' 1\" (1.854 m)      Head Circumference       Peak Flow       Pain Score       Pain Loc       Pain Edu? Excl. in [address]? /86   Pulse 87   Temp 98.6 °F (37 °C) (Oral)   Resp 14   Ht 6' 1\" (1.854 m)   Wt 210 lb (95.3 kg)   SpO2 97%   BMI 27.71 kg/m²   My pulse ox interpretation is - normal  Physical Exam  Constitutional:       General: He is not in acute distress. Appearance: Normal appearance. He is not diaphoretic. HENT:      Head: Normocephalic and atraumatic. Eyes:      General:         Right eye: No discharge. Left eye: No discharge. Conjunctiva/sclera: Conjunctivae normal.   Cardiovascular:      Rate and Rhythm: Normal rate and regular rhythm. Pulses: Normal pulses. Radial pulses are 2+ on the right side and 2+ on the left side. Pulmonary:      Effort: Pulmonary effort is normal. No respiratory distress. Breath sounds: Normal breath sounds. No wheezing or rales. Chest:      Chest wall: No tenderness. Abdominal:      General: There is no distension. Tenderness: There is no abdominal tenderness. Musculoskeletal:         General: No swelling or tenderness. Normal range of motion. Right lower leg: No edema. Left lower leg: No edema. Skin:     General: Skin is warm and dry. Neurological:      General: No focal deficit present. Mental Status: He is alert.       Cranial Nerves: No cranial nerve deficit.    Psychiatric:         Mood and Affect: Mood normal.         Behavior: Behavior normal.         I have reviewed and interpreted all of the currently available lab results from this visit (if applicable):  Results for orders placed or performed during the hospital encounter of 01/04/22   COVID-19, Rapid    Specimen: Nasopharyngeal   Result Value Ref Range    SARS-CoV-2, NAAT NOT DETECTED NOT DETECTED   Rapid Flu Swab    Specimen: Nasopharyngeal   Result Value Ref Range    Rapid Influenza A Ag NEGATIVE NEGATIVE    Rapid Influenza B Ag NEGATIVE NEGATIVE   CBC auto diff   Result Value Ref Range    WBC 8.2 4.0 - 10.5 K/CU MM    RBC 4.55 (L) 4.6 - 6.2 M/CU MM    Hemoglobin 12.4 (L) 13.5 - 18.0 GM/DL    Hematocrit 37.2 (L) 42 - 52 %    MCV 81.8 78 - 100 FL    MCH 27.3 27 - 31 PG    MCHC 33.3 32.0 - 36.0 %    RDW 15.1 (H) 11.7 - 14.9 %    Platelets 014 236 - 825 K/CU MM    MPV 8.9 7.5 - 11.1 FL    Differential Type AUTOMATED DIFFERENTIAL     Segs Relative 71.4 (H) 36 - 66 %    Lymphocytes % 13.4 (L) 24 - 44 %    Monocytes % 7.5 (H) 0 - 4 %    Eosinophils % 6.6 (H) 0 - 3 %    Basophils % 0.5 0 - 1 %    Segs Absolute 5.8 K/CU MM    Lymphocytes Absolute 1.1 K/CU MM    Monocytes Absolute 0.6 K/CU MM    Eosinophils Absolute 0.5 K/CU MM    Basophils Absolute 0.0 K/CU MM    Nucleated RBC % 0.0 %    Total Nucleated RBC 0.0 K/CU MM    Total Immature Neutrophil 0.05 K/CU MM    Immature Neutrophil % 0.6 (H) 0 - 0.43 %   CMP   Result Value Ref Range    Sodium 140 135 - 145 MMOL/L    Potassium 3.8 3.5 - 5.1 MMOL/L    Chloride 102 99 - 110 mMol/L    CO2 25 21 - 32 MMOL/L    BUN 5 (L) 6 - 23 MG/DL    CREATININE 0.7 (L) 0.9 - 1.3 MG/DL    Glucose 87 70 - 99 MG/DL    Calcium 9.0 8.3 - 10.6 MG/DL    Albumin 4.5 3.4 - 5.0 GM/DL    Total Protein 7.5 6.4 - 8.2 GM/DL    Total Bilirubin 0.8 0.0 - 1.0 MG/DL    ALT 53 (H) 10 - 40 U/L    AST 33 15 - 37 IU/L    Alkaline Phosphatase 150 (H) 40 - 129 IU/L    GFR Non- >60 >60 mL/min/1.73m2    GFR African American >60 >60 mL/min/1.73m2    Anion Gap 13 4 - 16   Troponin   Result Value Ref Range    Troponin T <0.010 <0.01 NG/ML   Brain Natriuretic Peptide   Result Value Ref Range    Pro-BNP 25.31 <300 PG/ML   EKG 12 Lead   Result Value Ref Range    Ventricular Rate 83 BPM    Atrial Rate 83 BPM    P-R Interval 156 ms    QRS Duration 100 ms    Q-T Interval 364 ms    QTc Calculation (Bazett) 427 ms    P Axis 80 degrees    R Axis 42 degrees    T Axis 1 degrees    Diagnosis       Normal sinus rhythm  RSR' or QR pattern in V1 suggests right ventricular conduction delay  Voltage criteria for left ventricular hypertrophy  Cannot rule out Septal infarct , age undetermined  Abnormal ECG  When compared with ECG of 18-JAN-2021 16:56,  Minimal criteria for Septal infarct are now present  Nonspecific T wave abnormality, worse in Inferior leads  Confirmed by RADHAMES Almazan ([id number]) on 1/4/2022 3:25:33 PM        Radiographs (if obtained):  [] The following radiograph was interpreted by myself in the absence of a radiologist:  [x]Radiologist's Report Reviewed:  CTA PULMONARY W CONTRAST   Preliminary Result   1. No significant interval change of a 7.8 x 5.8 cm left upper   lobe/suprahilar mass with slight interval improvement in postobstructive   changes in the left apex/upper lobe. There is persistent infiltration into   the left hilum with obstruction of the left upper lobe bronchus and   extension/invasion into the left pulmonary artery similar to the prior exam.   The mass also encases multiple pulmonary arteries in the left upper lobe. No   definite pulmonary embolus is identified. 2. Stable mediastinal metastatic adenopathy. Upper abdominal lymph nodes are   partially visualized as well and not significantly changed. 3. Partially visualized is mild left hydronephrosis. 4. Multifocal sclerotic lesions within the osseous structures are otherwise   stable.    5. Ectatic 4.0 cm ascending aorta.         XR CHEST (2 VW)   Final Result   Focal masslike opacity in left suprahilar region overall, not significantly   changed since 12/02/2021 given difference in imaging technique. No other   acute cardiopulmonary findings. Multiple sclerotic osseous lesions are again identified. EKG (if obtained): (All EKG's are interpreted by myself in the absence of a cardiologist)  Normal sinus rhythm with a rate of 83. CA interval 156, , QTc 427. No ST elevations or depressions. Nonspecific T waves. Questionable Q waves in the septal leads. Left ventricular hypertrophy. Impression: Abnormal EKG. When compared to previous EKG from 1/18/2021, there are additional nonspecific T waves otherwise no significant changes. MDM:  Differential diagnoses considered include but are not limited to pneumonia, pneumothorax, acute coronary syndrome, pulmonary embolism, COVID-19, viral URI, pain from tumor, side effects of radiation. Upon arrival EKG was obtained which is nonconcerning for acute ischemia. Basic labs are obtained and are unremarkable. Troponin is negative. Do not suspect acute coronary syndrome. Rapid flu and Covid are negative. Given patient's active cancer CT scan of his chest was obtained which again shows the mass lesion but does not show an acute pulmonary embolism or other intrathoracic abnormalities. Patient symptoms slightly improved after inhaler treatments. He has inhalers at home. I suspect the majority of his symptoms are due to radiation and his cancer. I do not suspect an emergent cause of his symptoms or need for admission for further evaluation. We will discharge him home in stable condition. Prescribed him cough suppressant and guaifenesin and recommended close follow-up with his oncologist.    Plan of care explained to patient. Concerning signs and symptoms warranting a return visit to the Emergency Department were explained in detail.  All questions and concerns were addressed to the patient's satisfaction. Patient understood and agreed with plan. I did don appropriate PPE (including face mask, protective eye ware/safety glasses and gloves), as recommended by the health facility/national standard best practice, during my bedside interactions with the patient. The likelihood of other entities in the differential is insufficient to justify any further testing for them. This was explained to the patient. The patient was advised that persistent or worsening symptoms would requirefurther evaluation. Clinical Impression:  1. Shortness of breath    2. Chest tightness          Carmen Bernal MD       Please note that portions of this note may have been complete with a voice recognition program.  Effortswere made to edit the dictations, but occasional words are mis-transcribed.           Carmen Bernal MD  01/05/22 0821

## 2022-01-04 NOTE — PROGRESS NOTES
Weekly Radiation Treatment Progress Note    DATE OF SERVICE: 1/4/2022     DIAGNOSIS:  Cancer Staging  Malignant neoplasm of overlapping sites of left lung [facility])  Staging form: Lung, AJCC 8th Edition  - Clinical: Stage IV (pM1) - Signed by Amirah Cervantes MD on 8/6/2021  - Pathologic: No stage assigned - Unsigned       TREATMENT COURSE:   Oncology History   Malignant neoplasm of overlapping sites of left lung ([facility] [address]   8/6/2021 Initial Diagnosis    Malignant neoplasm of overlapping sites of left lung ([facility] [address]           Site: L Pelvic Met/L Lung Mass Gy/  Current Total Radiation Dose: 18Gy/ 8Gy    Pt doing fairly. Complains of increasing shortness of breath, cough, and abdominal wall/anterior chest wall soreness. He denies any stabbing chest pain or chest pressure. He reports his symptoms have been going on since New Year's Merry. He did have a negative Covid test last week. He reports having significant congestion and states he feels he needs to go to the emergency room. EXAM  Wt Readings from Last 3 Encounters:   01/04/22 210 lb (95.3 kg)   12/29/21 199 lb (90.3 kg)   12/17/21 210 lb (95.3 kg)     NAD  Pulse ox 97% room air    Setup images, chart, plan reviewed    A/P:   Tolerating RT fairly well  Patient to proceed to emergency room for evaluation/Covid testing/chest imaging.       Electronically signed by Leah Alejandro MD on 1/4/2022 at 12:23 PM

## 2022-01-04 NOTE — ED TRIAGE NOTES
Pt was sent from cancer center for chest xray.  Pt c/o chest pain and flu like symptoms since the 1st

## 2022-01-04 NOTE — ED PROVIDER NOTES
As provider-in-triage, I performed a medical screening history and physical exam on this patient. HISTORY OF PRESENT ILLNESS  Tomas Whatley is a 45 y.o. male who presents to the emergency department today with cough, chest tightness and upper respiratory-like symptoms in the setting of lung cancer. Currently receiving radiation. Was actually seen today by oncology service was complaining of this cough and chest congestion so was sent for further evaluation. .  No hemoptysis. PHYSICAL EXAM  BP (!) 134/95   Pulse 87   Temp 98.6 °F (37 °C) (Oral)   Resp 18   Ht 6' 1\" (1.854 m)   Wt 210 lb (95.3 kg)   SpO2 98%   BMI 27.71 kg/m²     On exam, the patient appears in no acute distress. Speech is clear. Breathing is unlabored. Moves all extremities    Comment: Please note this report has been produced using speech recognition software and may contain errors related to that system including errors in grammar, punctuation, and spelling, as well as words and phrases that may be inappropriate. If there are any questions or concerns please feel free to contact the dictating provider for clarification.         Dyllan Gill 411, PA  01/04/22 0550

## 2022-01-05 ENCOUNTER — HOSPITAL ENCOUNTER (OUTPATIENT)
Dept: RADIATION ONCOLOGY | Age: 52
Discharge: HOME | End: 2022-01-05
Attending: RADIOLOGY
Payer: COMMERCIAL

## 2022-01-05 PROCEDURE — 77412 RADIATION TX DELIVERY LVL 3: CPT | Performed by: RADIOLOGY

## 2022-01-06 ENCOUNTER — HOSPITAL ENCOUNTER (OUTPATIENT)
Dept: RADIATION ONCOLOGY | Age: 52
Discharge: HOME | End: 2022-01-06
Attending: RADIOLOGY
Payer: COMMERCIAL

## 2022-01-06 PROCEDURE — 77412 RADIATION TX DELIVERY LVL 3: CPT | Performed by: RADIOLOGY

## 2022-01-07 ENCOUNTER — HOSPITAL ENCOUNTER (OUTPATIENT)
Dept: RADIATION ONCOLOGY | Age: 52
Discharge: HOME | End: 2022-01-07
Attending: RADIOLOGY
Payer: COMMERCIAL

## 2022-01-07 PROCEDURE — 77412 RADIATION TX DELIVERY LVL 3: CPT | Performed by: RADIOLOGY

## 2022-01-10 ENCOUNTER — HOSPITAL ENCOUNTER (OUTPATIENT)
Dept: RADIATION ONCOLOGY | Age: 52
Discharge: HOME | End: 2022-01-10
Attending: RADIOLOGY
Payer: COMMERCIAL

## 2022-01-10 VITALS — BODY MASS INDEX: 26.65 KG/M2 | WEIGHT: 202 LBS

## 2022-01-10 DIAGNOSIS — C34.82 MALIGNANT NEOPLASM OF OVERLAPPING SITES OF LEFT LUNG (HCC): Primary | ICD-10-CM

## 2022-01-10 PROCEDURE — 77427 RADIATION TX MANAGEMENT X5: CPT | Performed by: RADIOLOGY

## 2022-01-10 PROCEDURE — 77412 RADIATION TX DELIVERY LVL 3: CPT | Performed by: RADIOLOGY

## 2022-01-10 PROCEDURE — 77336 RADIATION PHYSICS CONSULT: CPT | Performed by: RADIOLOGY

## 2022-01-10 ASSESSMENT — PAIN SCALES - GENERAL: PAINLEVEL_OUTOF10: 0

## 2022-01-10 NOTE — PROGRESS NOTES
Weekly Radiation Treatment Progress Note    DATE OF SERVICE: 1/10/2022     DIAGNOSIS:  Cancer Staging  Malignant neoplasm of overlapping sites of left lung ([facility] [address]  Staging form: Lung, AJCC 8th Edition  - Clinical: Stage IV (pM1) - Signed by Winnie Robles MD on 8/6/2021  - Pathologic: No stage assigned - Unsigned       TREATMENT COURSE:   Oncology History   Malignant neoplasm of overlapping sites of left lung ([facility] [address]   8/6/2021 Initial Diagnosis    Malignant neoplasm of overlapping sites of left lung [facility])           Site: Left Pelvic Met   Current Total Radiation Dose: 30Gy    Pt doing well. Energy fairly good. His hip pain is now resolved. Breathing is stable. No additional hemoptysis.       EXAM  Wt Readings from Last 3 Encounters:   01/10/22 202 lb (91.6 kg)   01/04/22 210 lb (95.3 kg)   12/29/21 199 lb (90.3 kg)     NAD      Setup images, chart, plan reviewed    A/P:   Tolerating RT well  Return visit in 1 month with chest CT prior      Electronically signed by Jenny Krueger MD on 1/10/2022 at 11:34 AM

## 2022-01-14 ENCOUNTER — TELEPHONE (OUTPATIENT)
Dept: CASE MANAGEMENT | Age: 52
End: 2022-01-14

## 2022-01-14 ENCOUNTER — CLINICAL DOCUMENTATION (OUTPATIENT)
Dept: ONCOLOGY | Age: 52
End: 2022-01-14

## 2022-01-14 ENCOUNTER — CLINICAL DOCUMENTATION (OUTPATIENT)
Dept: RADIATION ONCOLOGY | Age: 52
End: 2022-01-14

## 2022-01-14 ENCOUNTER — HOSPITAL ENCOUNTER (OUTPATIENT)
Dept: INFUSION THERAPY | Age: 52
Discharge: HOME | End: 2022-01-14

## 2022-01-14 ENCOUNTER — OFFICE VISIT (OUTPATIENT)
Dept: ONCOLOGY | Age: 52
End: 2022-01-14
Payer: COMMERCIAL

## 2022-01-14 VITALS
WEIGHT: 198 LBS | BODY MASS INDEX: 26.24 KG/M2 | DIASTOLIC BLOOD PRESSURE: 85 MMHG | HEIGHT: 73 IN | HEART RATE: 95 BPM | RESPIRATION RATE: 18 BRPM | TEMPERATURE: 96.9 F | SYSTOLIC BLOOD PRESSURE: 138 MMHG | OXYGEN SATURATION: 99 %

## 2022-01-14 DIAGNOSIS — C79.51 SECONDARY MALIGNANT NEOPLASM OF BONE (HCC): ICD-10-CM

## 2022-01-14 DIAGNOSIS — C34.82 MALIGNANT NEOPLASM OF OVERLAPPING SITES OF LEFT LUNG (HCC): Primary | ICD-10-CM

## 2022-01-14 PROCEDURE — G8484 FLU IMMUNIZE NO ADMIN: HCPCS | Performed by: INTERNAL MEDICINE

## 2022-01-14 PROCEDURE — G8419 CALC BMI OUT NRM PARAM NOF/U: HCPCS | Performed by: INTERNAL MEDICINE

## 2022-01-14 PROCEDURE — 3017F COLORECTAL CA SCREEN DOC REV: CPT | Performed by: INTERNAL MEDICINE

## 2022-01-14 PROCEDURE — 99214 OFFICE O/P EST MOD 30 MIN: CPT | Performed by: INTERNAL MEDICINE

## 2022-01-14 PROCEDURE — 4004F PT TOBACCO SCREEN RCVD TLK: CPT | Performed by: INTERNAL MEDICINE

## 2022-01-14 PROCEDURE — G8427 DOCREV CUR MEDS BY ELIG CLIN: HCPCS | Performed by: INTERNAL MEDICINE

## 2022-01-14 RX ORDER — DOCUSATE SODIUM 100 MG/1
100 CAPSULE, LIQUID FILLED ORAL 2 TIMES DAILY
Qty: 60 CAPSULE | Refills: 0 | Status: SHIPPED | OUTPATIENT
Start: 2022-01-14 | End: 2022-02-13

## 2022-01-14 RX ORDER — ONDANSETRON HYDROCHLORIDE 8 MG/1
8 TABLET, FILM COATED ORAL EVERY 8 HOURS PRN
Qty: 45 TABLET | Refills: 1 | Status: SHIPPED | OUTPATIENT
Start: 2022-01-14 | End: 2022-01-29

## 2022-01-14 RX ORDER — OXYCODONE HCL 20 MG/1
20 TABLET, FILM COATED, EXTENDED RELEASE ORAL 2 TIMES DAILY
Qty: 60 TABLET | Refills: 0 | Status: SHIPPED | OUTPATIENT
Start: 2022-01-14 | End: 2022-02-07 | Stop reason: SDUPTHER

## 2022-01-14 RX ORDER — ONDANSETRON HYDROCHLORIDE 8 MG/1
8 TABLET, FILM COATED ORAL EVERY 8 HOURS PRN
Qty: 30 TABLET | Refills: 1 | Status: SHIPPED | OUTPATIENT
Start: 2022-01-14 | End: 2022-01-14 | Stop reason: SDUPTHER

## 2022-01-14 RX ORDER — LANOLIN ALCOHOL/MO/W.PET/CERES
3 CREAM (GRAM) TOPICAL NIGHTLY PRN
Qty: 30 TABLET | Refills: 3 | Status: SHIPPED | OUTPATIENT
Start: 2022-01-14 | End: 2022-04-18

## 2022-01-14 RX ORDER — DEXAMETHASONE 4 MG/1
TABLET ORAL
Qty: 18 TABLET | Refills: 0 | Status: ON HOLD | OUTPATIENT
Start: 2022-01-14 | End: 2022-10-20 | Stop reason: HOSPADM

## 2022-01-14 NOTE — PROGRESS NOTES
MA Rooming Questions  Patient: Xi Zheng  MRN: [id number]    Date: 1/14/2022        1. Do you have any new issues? yes - Pt c/o left side and lower back pain. 10/10         2. Do you need any refills on medications? yes - Decadron & Zofran    3. Have you had any imaging done since your last visit?   no    4. Have you been hospitalized or seen in the emergency room since your last visit here?   no    5. Did the patient have a depression screening completed today?  No    No data recorded     PHQ-9 Given to (if applicable):               PHQ-9 Score (if applicable):                     [] Positive     []  Negative              Does question #9 need addressed (if applicable)                     [] Yes    []  No               Mitesh Musa MA

## 2022-01-14 NOTE — CARE COORDINATION
LSW met with Pt and Spouse today before an OV with Pt's Medical Oncologist.   Spouse is feeling very overwhelmed. She shared that Pt and her have been together since they were teens and are  with adult children. They were living separately when Pt was diagnosed with cancer but have now returned to living together. Reportedly Pt has trouble remembering things since suffering a previous traumatic brain injury so he initially missed numerous medical appointments. Spouse states she's trying to ensure Pt follows through with his medical needs. Spouse works in home health care but has been missing much work. Spouse states Pt has not been sleeping well at night which keeps her awake and concerned. Pt reports being in pain with frequent nausea. LSW encouraged Pt and Spouse to tell Medical Oncologist about Pt's issues during his OV. Pt and Spouse advised they have just completed an application for SSD. LSW suggested contacting Pt's insurance in order to obtain a  to help with Pt's needs since [place] [address] and transportation may become an issue. LSW informed Pt and Spouse about Dial a Ride and  provided a gasoline card since they rely on others for transportation. They are behind on both their electricity and gas utilities. A request for emergency assistance through [employer] was completed.

## 2022-01-14 NOTE — PROGRESS NOTES
Patient and spouse meeting with Joanna Chairez. Spoke with patient's spouse and informed that patient can take Zofran for nausea every 8 hours as needed for N/V. Informed that patient should not take dexamethasone until the evening of treatment planning 01/19/22. Informed that his medication schedule will be written down and reviewed. Patient's  pain medication was sent to [facility] on [address] and not [facility] where his other medications were sent. Provided emotional support to spouse and to patient. Conformed treatment planning appointment.

## 2022-01-14 NOTE — PROGRESS NOTES
Patient Name:  Yakelin Lagos  Patient :  1970  Patient MRN:  [id number]     Primary Oncologist: Cira Mesa MD  Referring Provider: Marquetta Ganser, MD     Date of Service: 2022        Chief Complaint:    Chief Complaint   Patient presents with    Follow-up       Encounter Diagnoses   Name Primary?  Malignant neoplasm of overlapping sites of left lung ([address] Yes    Secondary malignant neoplasm of bone ([facility] [address]         HPI:   21: Initial [facility] visit:Hima Hernadez is a 45 y.o. male who presents to [facility] with report of dysuria and flank pain. Reports these symptoms started a few weeks ago. He ultimately came to the ED due to worsening back pain.      He reports ongoing issues with frequent urge to urinate and notes some incontinence. He denies hematuria. He was noted to have acute pyelonephritis and was admitted and started on IV Rocephin.      21 CT chest     Impression   1. Left hilar neoplasm extending into the left upper lobe measuring 3.2 x 5.9   x 5.3 cm obstructing the left upper lobe bronchus with probable minimal   adjacent infiltrates versus lymphangitic spread of tumor.  PET-CT/biopsy is   recommended. 2. Mediastinal lymphadenopathy. 3. Prominent left supraclavicular lymph nodes. 4. No osseous metastatic disease.      21 Ct abdomen and pelvis  Impression   1. Circumferential thickening of the bladder wall is noted which could be   related to cystitis.  Correlate with urinalysis. 2. There is left retroperitoneal lymphadenopathy.  This could be reactive or   neoplastic.  This can be further evaluated with PET-CT. 3. There is minimal stranding within the retroperitoneal on the left.  This   is uncertain etiology however could be related to acute pyelonephritis. 4. Interval development of multiple sclerotic lesions within the spine and   pelvis, concerning for metastatic osseous disease.    5. Prostate gland is enlarged.  Correlate with PSA.      .30      He denies past history of cancer. He smokes 2-3 ppd and drinks 10-12 beers daily. He reports unknown malignancy in his sister who  at 39. No other known family history of cancer.      Due to lung mass and concern for metastatic prostate cancer we were called to evaluate. 21:  Final Pathologic Diagnosis:   Needle biopsy of lung, clinically left lung mass:        SQUAMOUS CELL CARCINOMA IN SITU.     21:PET  1.  Left perihilar mass likely represents primary lung cancer.  Correlate   with biopsy results.  The opacity more peripheral in the left lower lobe has   relatively low level activity and likely represents an area of post   obstructive pneumonia adjacent to the mass.       2.  Metabolically active left AP window lymph node concerning for metastatic   disease.       3.  The prostate is enlarged and has increased FDG activity which could be   due to prostatitis or prostate cancer.  Correlate with PSA levels.       4.  No increased metabolic activity associated with retroperitoneal lymph   nodes.  This is unlikely related to the lung process given the significant   difference in metabolic activity; however, if there is prostate cancer, this   could potentially be metastatic disease from the prostate cancer, which can   have variable levels of uptake on FDG PET scans.       5.  No metabolic activity associated with multiple sclerotic lesions.  Again   this is unlikely related to the lung process, but if there is prostate   cancer, this could represent metastatic disease from prostate cancer with   poor FDG avidity.  Otherwise it could also represent large bone islands.       6.  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 There is a focal area of intense activity associated with a new lucent lesion   within the background of Paget's disease concerning for metastatic disease. Given the FDG activity, it is likely related to the lung process.           21: MRI brain  1. There is a punctate focus of restricted diffusion within the right frontal   lobe without associated enhancement, mass effect or midline shift.  This   could represent a punctate acute infarct.  However, given history, an early   metastatic focus cannot be entirely excluded. 2. Scattered foci of susceptibility are seen within the cerebral hemispheres   bilaterally, which were not visualized on the prior exam.  Findings could   represent areas of remote microhemorrhage or perhaps treated metastases. 3. No abnormal enhancement within the brain. 4. Minimal global parenchymal volume loss with minimal chronic microvascular   ischemic changes. 21: CT chest, abdomen and pelvis:  Chest CT: Interval increased size of the left perihilar mass, with increased   adjacent obstructive pneumonitis and atelectasis.       Stable to minimally to decreased mediastinal lymphadenopathy.       Interval increased bony metastatic disease.       Abdomen and pelvis CT: Stable retroperitoneal and pelvic lymphadenopathy.       Interval increased bony metastatic disease. 21:Final Pathologic Diagnosis:   Bone, posterior ileum, needle core biopsy:   -     METASTATIC SQUAMOUS CELL CARCINOMA. 21: Started XRT to the left pelvis  Added chest radiation dia 3  Completed dia 10 2022     Past Medical History:     BPH, HTN, COPD                                                            Past Surgery History:    None per his wife                                                                        Social History:   Lives with wife. Cut down smoking to 2-4 cigarettes per day prior to which he was smoking 2 to 3 packs/day for the past 40 years. Also reported drinking alcohol 10-12 beers per day. Denied any other illicit drug abuse.                                                                                                    Family History:    He reported that his sister  at the age of 39 with  metastatic cancer, he was not sure what the primary was                                                                                              Allergies   Allergen Reactions    Tramadol Other (See Comments)     seizures    Vicodin [Hydrocodone-Acetaminophen] Hives       Current Outpatient Medications on File Prior to Visit   Medication Sig Dispense Refill    guaiFENesin-dextromethorphan (ROBITUSSIN DM) 100-10 MG/5ML syrup Take 5 mLs by mouth 4 times daily as needed for Cough 120 mL 0    oxyCODONE-acetaminophen (PERCOCET) 7.5-325 MG per tablet Take 1 tablet by mouth every 8 hours as needed for Pain for up to 30 days. Intended supply: 30 days 90 tablet 0    NARCAN 4 MG/0.1ML LIQD nasal spray DISPENSED PER STANDING ORDER USE AS DIRECTED PATIENT IS TRAINED OPIOID OVERDOSE RESPONDER      albuterol sulfate HFA (PROVENTIL HFA) 108 (90 Base) MCG/ACT inhaler Inhale 2 puffs into the lungs every 4 hours as needed for Wheezing or Shortness of Breath With spacer (and mask if indicated). Thanks. 1 Inhaler 1    lisinopril (PRINIVIL;ZESTRIL) 10 MG tablet Take 1 tablet by mouth daily 30 tablet 3    ammonium lactate (LAC-HYDRIN) 12 % lotion Apply topically as needed. 1 Bottle 0    nicotine (NICODERM CQ) 21 MG/24HR Place 1 patch onto the skin daily 30 patch 3     No current facility-administered medications on file prior to visit. Interval History: 1/14/22: He arrived with his wife to the clinic today. Left lower back pain is better. Right sided chest pain is a lot better. Intermittent cough , productive of clear sputum. No hemoptysis. Cough syrup helped. Hesitancy and urgency to urinate. No hematuria. Appetite is fair but not weight loss. Constipation. Nausea and emesis.      Review of Systems:  As per the interval history, rest of the review of system negative     Vital Signs: /85 (Site: Right Upper Arm, Position: Sitting, Cuff Size: Medium Adult)   Pulse 95   Temp 96.9 °F (36.1 °C) (Infrared)   Resp 18   Ht 6' 1\" (1.854 m)   Wt 198 lb (89.8 kg)   SpO2 99%   BMI 26.12 kg/m²      CONSTITUTIONAL: awake, alert  EYES: RAISSA, No pallor or any icterus  ENT: ATNC  NECK: No JVD  HEMATOLOGIC/LYMPHATIC: no cervical, supraclavicular or axillary lymphadenopathy   LUNGS: CTAB  CARDIOVASCULAR: s1s2 rrr no murmurs  ABDOMEN: soft ntnd bs pos  NEUROLOGIC: GI  SKIN: Psoriatic rash with excoriation marks on the lower extremities and also upper extremities  EXTREMITIES: no LE edema bilaterally     Labs:  Hematology:  Lab Results   Component Value Date    WBC 8.2 01/04/2022    RBC 4.55 (L) 01/04/2022    HGB 12.4 (L) 01/04/2022    HCT 37.2 (L) 01/04/2022    MCV 81.8 01/04/2022    MCH 27.3 01/04/2022    MCHC 33.3 01/04/2022    RDW 15.1 (H) 01/04/2022     01/04/2022    MPV 8.9 01/04/2022    BANDSPCT 9 11/13/2018    SEGSPCT 71.4 (H) 01/04/2022    EOSRELPCT 6.6 (H) 01/04/2022    BASOPCT 0.5 01/04/2022    LYMPHOPCT 13.4 (L) 01/04/2022    MONOPCT 7.5 (H) 01/04/2022    BANDABS 0.66 11/13/2018    SEGSABS 5.8 01/04/2022    EOSABS 0.5 01/04/2022    BASOSABS 0.0 01/04/2022    LYMPHSABS 1.1 01/04/2022    MONOSABS 0.6 01/04/2022    DIFFTYPE AUTOMATED DIFFERENTIAL 01/04/2022    POLYCHROM 1+ 11/13/2018    PLTM FEW 11/13/2018     No results found for: ESR  Chemistry:  Lab Results   Component Value Date     01/04/2022    K 3.8 01/04/2022     01/04/2022    CO2 25 01/04/2022    BUN 5 (L) 01/04/2022    CREATININE 0.7 (L) 01/04/2022    GLUCOSE 87 01/04/2022    CALCIUM 9.0 01/04/2022    PROT 7.5 01/04/2022    LABALBU 4.5 01/04/2022    BILITOT 0.8 01/04/2022    ALKPHOS 150 (H) 01/04/2022    AST 33 01/04/2022    ALT 53 (H) 01/04/2022    LABGLOM >60 01/04/2022    GFRAA >60 01/04/2022    MG 1.6 (L) 07/29/2021    POCGLU 124 (H) 09/22/2020     No results found for: MMA, LDH, HOMOCYSTEINE  No components found for: LD  Lab Results   Component Value Date    TSHHS 2.162 07/27/2013     Immunology:  Lab Results   Component Value Date    PROT 7.5 01/04/2022 No results found for: Jayson Martínez, KLFLCR  No results found for: B2M  Coagulation Panel:  Lab Results   Component Value Date    PROTIME 12.9 12/17/2021    INR 1.00 12/17/2021    APTT 35.9 12/17/2021    DDIMER <200 12/19/2013     Anemia Panel:  No results found for: Sarahi Pandey  Tumor Markers:  Lab Results   Component Value Date    CEA 7.7 07/23/2021    .6 (H) 11/16/2021        Observations:  ECOG:  No data recorded       Assessment & Plan:                                                          Left hilar mass with mediastinal hilar lymphadenopathy. 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 PET scan results from august 2021 noted, bone lesions most probably not metastatic except for one lytic lesion. MRI of the brain with no convincing metastatic lesions. Presented  the case in the tumor board. Decision made to proceed with a prostate biopsy and treat with ADT if malignancy  and in the meantime proceed with staging mediastinoscopy/rebiopsy for further treatment plan. But as pt very very very noncompliant, did not follow up with urology, CTS or for bone biopsy multiple times. Note PSA rising and is 250 on November 16 2021   CT imaging with progressive met disease  Bone biopsy on dec 13 2021 with met squamous cell cancer, consistent with lung primary. PDL1 >50%Ирина Luna) . Palliative radiation to the pelvic area and also radiation to the left lung   Discussed the findings, diagnosis and poor prognosis and treatment with carbo/taxol/pembro after completion of radiation. Discussed ae. PORT placed. Completed ? OCM  Plan for repeat imaging after 2-3 C  Dose modifications as needed. Prostate enlargement and elevated  could be secondary to prostatitis vs prostate cancer.   As mentioned above recommend prostate biopsy, but pt very noncompliant and did not follow through  Bones lesions most probably pagets but one lytic area concerning for bone mets, discussed bone biopsy and he agreed but did not follow through  Repeat PSA  11/16/21 was 250  Discussed regarding biopsy, has an upcoming appt with urology jan 19 2022     Rash,   Looks like psoriatic rash. Has been applying topical cream    Elevated alk phos most probably secondary to the bone lesions possibly Paget's disease versus EtOH. Has been chronically elevated since 2012. Discussed alcohol and smoking cessation. Adequate analgesic and bowel regimen. Continue other medical care. Thank you for letting us be part of the care and will follow along. Discussed above findings and plan with him and he voiced understanding. Answered all his questions. Discussed healthy lifestyle including healthy diet, regular exercise as tolerated. ,  Smoking and alcohol cessation    Recommend follow-up with primary care physician and other specialists. Note he has he has been very noncompliant with appointments. Please do not hesitate to contact us if you need further information. Return to clinic  Jan 2022 or earlier if new Sx    I have recommended that the patient follow CDC guidelines for prevention of COVID-19 infection.   Received Covid vaccine/flu vaccine     BERTO requested and pending    TOMI

## 2022-01-14 NOTE — TELEPHONE ENCOUNTER
This RN called and spoke with patient and his [address] Upcoming appointments given  Chemo education Wednesday 1/19/2022 at 0900  Treatment Thursday 1/20/2022 at 0900. Josh baigs understanding. Patient has Urology appointment Wednesday 1/19/2022 at 1500, assured the appointment here at the center would be completed before that. RN to continue to follow.

## 2022-01-19 ENCOUNTER — TELEPHONE (OUTPATIENT)
Dept: CASE MANAGEMENT | Age: 52
End: 2022-01-19

## 2022-01-19 ENCOUNTER — CLINICAL DOCUMENTATION (OUTPATIENT)
Dept: RADIATION ONCOLOGY | Age: 52
End: 2022-01-19

## 2022-01-19 RX ORDER — EPINEPHRINE 1 MG/ML
0.3 INJECTION, SOLUTION, CONCENTRATE INTRAVENOUS PRN
Status: CANCELLED | OUTPATIENT
Start: 2022-01-20

## 2022-01-19 RX ORDER — MEPERIDINE HYDROCHLORIDE 25 MG/ML
12.5 INJECTION INTRAMUSCULAR; INTRAVENOUS; SUBCUTANEOUS PRN
Status: CANCELLED | OUTPATIENT
Start: 2022-01-27

## 2022-01-19 RX ORDER — SODIUM CHLORIDE 9 MG/ML
5-40 INJECTION INTRAVENOUS PRN
Status: CANCELLED | OUTPATIENT
Start: 2022-01-27

## 2022-01-19 RX ORDER — SODIUM CHLORIDE 9 MG/ML
INJECTION, SOLUTION INTRAVENOUS CONTINUOUS
Status: CANCELLED | OUTPATIENT
Start: 2022-01-20

## 2022-01-19 RX ORDER — ONDANSETRON 2 MG/ML
8 INJECTION INTRAMUSCULAR; INTRAVENOUS
Status: CANCELLED | OUTPATIENT
Start: 2022-01-20

## 2022-01-19 RX ORDER — ALBUTEROL SULFATE 90 UG/1
4 AEROSOL, METERED RESPIRATORY (INHALATION) PRN
Status: CANCELLED | OUTPATIENT
Start: 2022-01-27

## 2022-01-19 RX ORDER — DIPHENHYDRAMINE HYDROCHLORIDE 50 MG/ML
50 INJECTION INTRAMUSCULAR; INTRAVENOUS ONCE
Status: CANCELLED | OUTPATIENT
Start: 2022-01-27 | End: 2022-01-27

## 2022-01-19 RX ORDER — SODIUM CHLORIDE 9 MG/ML
25 INJECTION, SOLUTION INTRAVENOUS PRN
Status: CANCELLED | OUTPATIENT
Start: 2022-01-27

## 2022-01-19 RX ORDER — SODIUM CHLORIDE 0.9 % (FLUSH) 0.9 %
5-40 SYRINGE (ML) INJECTION PRN
Status: CANCELLED | OUTPATIENT
Start: 2022-01-27

## 2022-01-19 RX ORDER — DIPHENHYDRAMINE HYDROCHLORIDE 50 MG/ML
50 INJECTION INTRAMUSCULAR; INTRAVENOUS
Status: CANCELLED | OUTPATIENT
Start: 2022-01-27

## 2022-01-19 RX ORDER — PALONOSETRON HYDROCHLORIDE 0.05 MG/ML
0.25 INJECTION, SOLUTION INTRAVENOUS ONCE
Status: CANCELLED | OUTPATIENT
Start: 2022-01-27

## 2022-01-19 RX ORDER — ALBUTEROL SULFATE 90 UG/1
4 AEROSOL, METERED RESPIRATORY (INHALATION) PRN
Status: CANCELLED | OUTPATIENT
Start: 2022-01-20

## 2022-01-19 RX ORDER — EPINEPHRINE 1 MG/ML
0.3 INJECTION, SOLUTION, CONCENTRATE INTRAVENOUS PRN
Status: CANCELLED | OUTPATIENT
Start: 2022-01-27

## 2022-01-19 RX ORDER — MEPERIDINE HYDROCHLORIDE 25 MG/ML
12.5 INJECTION INTRAMUSCULAR; INTRAVENOUS; SUBCUTANEOUS PRN
Status: CANCELLED | OUTPATIENT
Start: 2022-01-20

## 2022-01-19 RX ORDER — SODIUM CHLORIDE 9 MG/ML
25 INJECTION, SOLUTION INTRAVENOUS PRN
Status: CANCELLED | OUTPATIENT
Start: 2022-01-20

## 2022-01-19 RX ORDER — ACETAMINOPHEN 325 MG/1
650 TABLET ORAL
Status: CANCELLED | OUTPATIENT
Start: 2022-01-20

## 2022-01-19 RX ORDER — HEPARIN SODIUM (PORCINE) LOCK FLUSH IV SOLN 100 UNIT/ML 100 UNIT/ML
500 SOLUTION INTRAVENOUS PRN
Status: CANCELLED | OUTPATIENT
Start: 2022-01-27

## 2022-01-19 RX ORDER — ACETAMINOPHEN 325 MG/1
650 TABLET ORAL
Status: CANCELLED | OUTPATIENT
Start: 2022-01-27

## 2022-01-19 RX ORDER — SODIUM CHLORIDE 9 MG/ML
INJECTION, SOLUTION INTRAVENOUS CONTINUOUS
Status: CANCELLED | OUTPATIENT
Start: 2022-01-27

## 2022-01-19 RX ORDER — SODIUM CHLORIDE 9 MG/ML
20 INJECTION, SOLUTION INTRAVENOUS ONCE
Status: CANCELLED | OUTPATIENT
Start: 2022-01-27 | End: 2022-01-27

## 2022-01-19 RX ORDER — DIPHENHYDRAMINE HYDROCHLORIDE 50 MG/ML
50 INJECTION INTRAMUSCULAR; INTRAVENOUS
Status: CANCELLED | OUTPATIENT
Start: 2022-01-20

## 2022-01-19 RX ORDER — SODIUM CHLORIDE 9 MG/ML
5-40 INJECTION INTRAVENOUS PRN
Status: CANCELLED | OUTPATIENT
Start: 2022-01-20

## 2022-01-19 RX ORDER — ONDANSETRON 2 MG/ML
8 INJECTION INTRAMUSCULAR; INTRAVENOUS
Status: CANCELLED | OUTPATIENT
Start: 2022-01-27

## 2022-01-19 NOTE — CARE COORDINATION
At the request of Pt's spouse, a referral was made to [facility] to receive supportive counseling services.

## 2022-01-19 NOTE — TELEPHONE ENCOUNTER
Patient did not show for chemo education at 0900. Call placed to cell phone [phone], no answer. Message left to call this RN left on .

## 2022-01-20 ENCOUNTER — HOSPITAL ENCOUNTER (OUTPATIENT)
Dept: INFUSION THERAPY | Age: 52
Discharge: HOME | End: 2022-01-20
Payer: COMMERCIAL

## 2022-01-20 ENCOUNTER — CLINICAL DOCUMENTATION (OUTPATIENT)
Dept: ONCOLOGY | Age: 52
End: 2022-01-20

## 2022-01-20 VITALS
OXYGEN SATURATION: 99 % | WEIGHT: 205 LBS | HEART RATE: 90 BPM | SYSTOLIC BLOOD PRESSURE: 142 MMHG | DIASTOLIC BLOOD PRESSURE: 85 MMHG | TEMPERATURE: 96 F | RESPIRATION RATE: 18 BRPM | HEIGHT: 73 IN | BODY MASS INDEX: 27.17 KG/M2

## 2022-01-20 DIAGNOSIS — C79.51 SECONDARY MALIGNANT NEOPLASM OF BONE (HCC): ICD-10-CM

## 2022-01-20 DIAGNOSIS — C34.82 MALIGNANT NEOPLASM OF OVERLAPPING SITES OF LEFT LUNG (HCC): Primary | ICD-10-CM

## 2022-01-20 LAB
ALBUMIN SERPL-MCNC: 4.3 GM/DL (ref 3.4–5)
ALP BLD-CCNC: 216 IU/L (ref 40–128)
ALT SERPL-CCNC: 16 U/L (ref 10–40)
ANION GAP SERPL CALCULATED.3IONS-SCNC: 11 MMOL/L (ref 4–16)
AST SERPL-CCNC: 33 IU/L (ref 15–37)
BASOPHILS ABSOLUTE: 0 K/CU MM
BASOPHILS RELATIVE PERCENT: 0.7 % (ref 0–1)
BILIRUB SERPL-MCNC: 0.2 MG/DL (ref 0–1)
BUN BLDV-MCNC: 12 MG/DL (ref 6–23)
CALCIUM SERPL-MCNC: 9.5 MG/DL (ref 8.3–10.6)
CHLORIDE BLD-SCNC: 103 MMOL/L (ref 99–110)
CO2: 27 MMOL/L (ref 21–32)
CREAT SERPL-MCNC: 1.1 MG/DL (ref 0.9–1.3)
DIFFERENTIAL TYPE: ABNORMAL
EOSINOPHILS ABSOLUTE: 0.3 K/CU MM
EOSINOPHILS RELATIVE PERCENT: 5.4 % (ref 0–3)
GFR AFRICAN AMERICAN: >60 ML/MIN/1.73M2
GFR NON-AFRICAN AMERICAN: >60 ML/MIN/1.73M2
GLUCOSE BLD-MCNC: 125 MG/DL (ref 70–99)
HCT VFR BLD CALC: 34.9 % (ref 42–52)
HEMOGLOBIN: 12.2 GM/DL (ref 13.5–18)
LYMPHOCYTES ABSOLUTE: 0.9 K/CU MM
LYMPHOCYTES RELATIVE PERCENT: 14.4 % (ref 24–44)
MCH RBC QN AUTO: 27.4 PG (ref 27–31)
MCHC RBC AUTO-ENTMCNC: 35 % (ref 32–36)
MCV RBC AUTO: 78.4 FL (ref 78–100)
MONOCYTES ABSOLUTE: 0.8 K/CU MM
MONOCYTES RELATIVE PERCENT: 12.9 % (ref 0–4)
PDW BLD-RTO: 16.1 % (ref 11.7–14.9)
PLATELET # BLD: 185 K/CU MM (ref 140–440)
PMV BLD AUTO: 9.5 FL (ref 7.5–11.1)
POTASSIUM SERPL-SCNC: 3.9 MMOL/L (ref 3.5–5.1)
RBC # BLD: 4.45 M/CU MM (ref 4.6–6.2)
SEGMENTED NEUTROPHILS ABSOLUTE COUNT: 4 K/CU MM
SEGMENTED NEUTROPHILS RELATIVE PERCENT: 66.6 % (ref 36–66)
SODIUM BLD-SCNC: 141 MMOL/L (ref 135–145)
TOTAL PROTEIN: 7.4 GM/DL (ref 6.4–8.2)
WBC # BLD: 6 K/CU MM (ref 4–10.5)

## 2022-01-20 PROCEDURE — 85025 COMPLETE CBC W/AUTO DIFF WBC: CPT

## 2022-01-20 PROCEDURE — 6360000002 HC RX W HCPCS: Performed by: INTERNAL MEDICINE

## 2022-01-20 PROCEDURE — 2580000003 HC RX 258: Performed by: INTERNAL MEDICINE

## 2022-01-20 PROCEDURE — 96417 CHEMO IV INFUS EACH ADDL SEQ: CPT

## 2022-01-20 PROCEDURE — 96367 TX/PROPH/DG ADDL SEQ IV INF: CPT

## 2022-01-20 PROCEDURE — 96375 TX/PRO/DX INJ NEW DRUG ADDON: CPT

## 2022-01-20 PROCEDURE — 80053 COMPREHEN METABOLIC PANEL: CPT

## 2022-01-20 PROCEDURE — 96413 CHEMO IV INFUSION 1 HR: CPT

## 2022-01-20 PROCEDURE — 2500000003 HC RX 250 WO HCPCS: Performed by: INTERNAL MEDICINE

## 2022-01-20 RX ORDER — PALONOSETRON 0.05 MG/ML
0.25 INJECTION, SOLUTION INTRAVENOUS ONCE
Status: COMPLETED | OUTPATIENT
Start: 2022-01-20 | End: 2022-01-20

## 2022-01-20 RX ORDER — SODIUM CHLORIDE 9 MG/ML
20 INJECTION, SOLUTION INTRAVENOUS ONCE
Status: DISCONTINUED | OUTPATIENT
Start: 2022-01-20 | End: 2022-01-21 | Stop reason: HOSPADM

## 2022-01-20 RX ORDER — DIPHENHYDRAMINE HYDROCHLORIDE 50 MG/ML
50 INJECTION INTRAMUSCULAR; INTRAVENOUS ONCE
Status: COMPLETED | OUTPATIENT
Start: 2022-01-20 | End: 2022-01-20

## 2022-01-20 RX ORDER — HEPARIN SODIUM (PORCINE) LOCK FLUSH IV SOLN 100 UNIT/ML 100 UNIT/ML
500 SOLUTION INTRAVENOUS PRN
Status: DISCONTINUED | OUTPATIENT
Start: 2022-01-20 | End: 2022-01-21 | Stop reason: HOSPADM

## 2022-01-20 RX ORDER — SODIUM CHLORIDE 0.9 % (FLUSH) 0.9 %
5-40 SYRINGE (ML) INJECTION PRN
Status: DISCONTINUED | OUTPATIENT
Start: 2022-01-20 | End: 2022-01-21 | Stop reason: HOSPADM

## 2022-01-20 RX ADMIN — PALONOSETRON 0.25 MG: 0.25 INJECTION, SOLUTION INTRAVENOUS at 10:01

## 2022-01-20 RX ADMIN — DIPHENHYDRAMINE HYDROCHLORIDE 50 MG: 50 INJECTION INTRAMUSCULAR; INTRAVENOUS at 10:01

## 2022-01-20 RX ADMIN — SODIUM CHLORIDE, PRESERVATIVE FREE 10 ML: 5 INJECTION INTRAVENOUS at 13:31

## 2022-01-20 RX ADMIN — DEXAMETHASONE SODIUM PHOSPHATE 20 MG: 10 INJECTION INTRAMUSCULAR; INTRAVENOUS at 10:20

## 2022-01-20 RX ADMIN — FAMOTIDINE 20 MG: 10 INJECTION, SOLUTION INTRAVENOUS at 10:01

## 2022-01-20 RX ADMIN — PACLITAXEL 174 MG: 6 INJECTION, SOLUTION INTRAVENOUS at 11:37

## 2022-01-20 RX ADMIN — CARBOPLATIN 375 MG: 10 INJECTION, SOLUTION INTRAVENOUS at 12:50

## 2022-01-20 RX ADMIN — SODIUM CHLORIDE 200 MG: 9 INJECTION, SOLUTION INTRAVENOUS at 10:53

## 2022-01-20 RX ADMIN — SODIUM CHLORIDE 20 ML/HR: 0.9 INJECTION, SOLUTION INTRAVENOUS at 10:01

## 2022-01-20 RX ADMIN — HEPARIN 500 UNITS: 100 SYRINGE at 13:31

## 2022-01-20 NOTE — PROGRESS NOTES
Pt. Here for new treatment. Pt. Missed treatment planning yesterday. Marce nurse navigator came to chairside and reviewed chemo medications and possible side effects and ways to manage, pt. Verbalized understanding, and consent signed. Right upper chest mediport accessed without difficulty, good blood return noted. Lab work drawn as ordered. Labs reviewed and treatment administered without incident. Discharge AVS given and reviewed Calendar that Leslyshyann Casper made for patient with him, pt. Verbalized understanding. Patient's status assessed and documented appropriately. All labs and required results were also reviewed today. Treatment parameters have been reviewed. Today's treatment has been approved by the provider. Treatment orders and medication sequencing (when applicable) was verified by 2 registered nurses. The treatment plan was confirmed with the patient prior to administration, and the patient understands the need to report any treatment-related symptoms. Prior to administration, when applicable, the following 8 elements of medication administration were reviewed with 2nd Registered Nurse prior to dosing: drug name, drug dose, infusion volume when prepared in a syringe, rate of administration, expiration dates and/or times, appearance and integrity of drug(s), and rate of pump for infusion. The 5 rights of medication administration have been verified.

## 2022-01-21 ENCOUNTER — CLINICAL DOCUMENTATION (OUTPATIENT)
Dept: RADIATION ONCOLOGY | Age: 52
End: 2022-01-21

## 2022-01-21 DIAGNOSIS — C34.82 MALIGNANT NEOPLASM OF OVERLAPPING SITES OF LEFT LUNG (HCC): Primary | ICD-10-CM

## 2022-01-21 NOTE — CARE COORDINATION
Pt has been approved for help with his household's utility bills through the [facility] emergency assistance fund. Pledges were made today for both electric and gas.

## 2022-01-24 NOTE — PROGRESS NOTES
Radiation Oncology  Treatment Completion Summary  Encounter Date: 2022 10:00 AM    Mr. David Connell is a 47 y.o. male  : 1970  MRN: [id number]  Acct Number: [de-identified]      FOLLOW UP PHYSICIANS:   Primary Care: Lawana Furry, MD Abelina Dubin, MD      DIAGNOSIS:  Cancer Staging  Malignant neoplasm of overlapping sites of left lung [facility])  Staging form: Lung, AJCC 8th Edition  - Clinical: Stage IV (pM1) - Signed by Abelina Dubin, MD on 2021  - Pathologic: No stage assigned - Unsigned         TREATMENT COURSE:   Oncology History   Malignant neoplasm of overlapping sites of left lung ([address]   2021 Initial Diagnosis    Malignant neoplasm of overlapping sites of left lung ([address]     1/3/2022 - 2022 Radiation    Left upper lobe mass: 2000 cGy in 5 fractions 3D conformal radiation     Secondary malignant neoplasm of bone ([address]   2021 Initial Diagnosis    Secondary malignant neoplasm of bone ([address]     2021 - 1/10/2022 Radiation    Left pelvic metastasis: 3000 cGy 3D conformal         HISTORY:  David Connell is a 47 y.o. male with the above referenced diagnosis. Complete details on history of present illness please see my initial consultation note. He has recently completed a course of palliative radiation therapy as follows:    ANATOMIC SITE: Pelvic Met  BEAM ORIENTATION: AP/PA   ENERGY: 18 MV photons  DOSE PER FRACTION: 300 cGy  TOTAL DOSE: 3000 cGy    ANATOMIC SITE: SHELLEY met  BEAM ORIENTATION: AP/PA   ENERGY: 10 MV photons  DOSE PER FRACTION: 400 cGy  TOTAL DOSE: 2000 cGy    ELAPSED DAYS: 14    TREATMENT TOLERANCE:   Overall, the patient tolerated his radiation therapy quite well. He did have complete resolution of his hip pain as well as his hemoptysis. He did not experience any nausea, vomiting, or significant diarrhea. FOLLOW-UP PLANS:   He is to return to see me in 1 month with a Chest CT prior, or to return sooner as needed.        Electronically signed by Marina Cardenas Bhavik Hernandez MD on 1/24/2022 at 10:00 AM

## 2022-01-27 ENCOUNTER — HOSPITAL ENCOUNTER (OUTPATIENT)
Dept: INFUSION THERAPY | Age: 52
Discharge: HOME | End: 2022-01-27
Payer: COMMERCIAL

## 2022-01-27 ENCOUNTER — OFFICE VISIT (OUTPATIENT)
Dept: ONCOLOGY | Age: 52
End: 2022-01-27
Payer: COMMERCIAL

## 2022-01-27 ENCOUNTER — CLINICAL DOCUMENTATION (OUTPATIENT)
Dept: CASE MANAGEMENT | Age: 52
End: 2022-01-27

## 2022-01-27 VITALS
BODY MASS INDEX: 26.9 KG/M2 | HEART RATE: 82 BPM | WEIGHT: 203 LBS | SYSTOLIC BLOOD PRESSURE: 158 MMHG | DIASTOLIC BLOOD PRESSURE: 95 MMHG | TEMPERATURE: 95.6 F | OXYGEN SATURATION: 100 % | HEIGHT: 73 IN | RESPIRATION RATE: 18 BRPM

## 2022-01-27 DIAGNOSIS — C34.82 MALIGNANT NEOPLASM OF OVERLAPPING SITES OF LEFT LUNG (HCC): Primary | ICD-10-CM

## 2022-01-27 DIAGNOSIS — M89.9 BONE LESION: ICD-10-CM

## 2022-01-27 DIAGNOSIS — R97.20 ELEVATED PSA: ICD-10-CM

## 2022-01-27 DIAGNOSIS — C79.51 SECONDARY MALIGNANT NEOPLASM OF BONE (HCC): Primary | ICD-10-CM

## 2022-01-27 DIAGNOSIS — C34.82 MALIGNANT NEOPLASM OF OVERLAPPING SITES OF LEFT LUNG (HCC): ICD-10-CM

## 2022-01-27 LAB
ALBUMIN SERPL-MCNC: 4.1 GM/DL (ref 3.4–5)
ALP BLD-CCNC: 221 IU/L (ref 40–129)
ALT SERPL-CCNC: 10 U/L (ref 10–40)
ANION GAP SERPL CALCULATED.3IONS-SCNC: 12 MMOL/L (ref 4–16)
AST SERPL-CCNC: 22 IU/L (ref 15–37)
BASOPHILS ABSOLUTE: 0 K/CU MM
BASOPHILS RELATIVE PERCENT: 0.1 % (ref 0–1)
BILIRUB SERPL-MCNC: 0.2 MG/DL (ref 0–1)
BUN BLDV-MCNC: 13 MG/DL (ref 6–23)
CALCIUM SERPL-MCNC: 9.7 MG/DL (ref 8.3–10.6)
CHLORIDE BLD-SCNC: 100 MMOL/L (ref 99–110)
CO2: 24 MMOL/L (ref 21–32)
CREAT SERPL-MCNC: 1.1 MG/DL (ref 0.9–1.3)
DIFFERENTIAL TYPE: ABNORMAL
EOSINOPHILS ABSOLUTE: 0 K/CU MM
EOSINOPHILS RELATIVE PERCENT: 0 % (ref 0–3)
GFR AFRICAN AMERICAN: >60 ML/MIN/1.73M2
GFR AFRICAN AMERICAN: >60 ML/MIN/1.73M2
GFR NON-AFRICAN AMERICAN: >60 ML/MIN/1.73M2
GFR NON-AFRICAN AMERICAN: >60 ML/MIN/1.73M2
GLUCOSE BLD-MCNC: 149 MG/DL (ref 70–99)
GLUCOSE BLD-MCNC: 159 MG/DL (ref 70–99)
HCT VFR BLD CALC: 34.1 % (ref 42–52)
HEMOGLOBIN: 12 GM/DL (ref 13.5–18)
LYMPHOCYTES ABSOLUTE: 0.5 K/CU MM
LYMPHOCYTES RELATIVE PERCENT: 6.6 % (ref 24–44)
MCH RBC QN AUTO: 27.5 PG (ref 27–31)
MCHC RBC AUTO-ENTMCNC: 35.2 % (ref 32–36)
MCV RBC AUTO: 78.2 FL (ref 78–100)
MONOCYTES ABSOLUTE: 0.2 K/CU MM
MONOCYTES RELATIVE PERCENT: 2.8 % (ref 0–4)
PDW BLD-RTO: 15.8 % (ref 11.7–14.9)
PLATELET # BLD: 193 K/CU MM (ref 140–440)
PMV BLD AUTO: 9.3 FL (ref 7.5–11.1)
POC BUN: 12 MG/DL (ref 8–26)
POC CALCIUM: 1.23 MMOL/L (ref 1.12–1.32)
POC CHLORIDE: 103 MMOL/L (ref 98–109)
POC CO2: 26 MMOL/L (ref 21–32)
POC CREATININE: 1.2 MG/DL (ref 0.9–1.3)
POTASSIUM SERPL-SCNC: 4.1 MMOL/L (ref 3.5–4.5)
POTASSIUM SERPL-SCNC: 4.2 MMOL/L (ref 3.5–5.1)
RBC # BLD: 4.36 M/CU MM (ref 4.6–6.2)
SEGMENTED NEUTROPHILS ABSOLUTE COUNT: 6.5 K/CU MM
SEGMENTED NEUTROPHILS RELATIVE PERCENT: 90.5 % (ref 36–66)
SODIUM BLD-SCNC: 136 MMOL/L (ref 135–145)
SODIUM BLD-SCNC: 140 MMOL/L (ref 138–146)
SOURCE, BLOOD GAS: ABNORMAL
TOTAL PROTEIN: 7.6 GM/DL (ref 6.4–8.2)
WBC # BLD: 7.2 K/CU MM (ref 4–10.5)

## 2022-01-27 PROCEDURE — G8419 CALC BMI OUT NRM PARAM NOF/U: HCPCS | Performed by: INTERNAL MEDICINE

## 2022-01-27 PROCEDURE — 96375 TX/PRO/DX INJ NEW DRUG ADDON: CPT

## 2022-01-27 PROCEDURE — 85025 COMPLETE CBC W/AUTO DIFF WBC: CPT

## 2022-01-27 PROCEDURE — 96413 CHEMO IV INFUSION 1 HR: CPT

## 2022-01-27 PROCEDURE — 2580000003 HC RX 258: Performed by: INTERNAL MEDICINE

## 2022-01-27 PROCEDURE — 36591 DRAW BLOOD OFF VENOUS DEVICE: CPT

## 2022-01-27 PROCEDURE — 99214 OFFICE O/P EST MOD 30 MIN: CPT | Performed by: INTERNAL MEDICINE

## 2022-01-27 PROCEDURE — 96417 CHEMO IV INFUS EACH ADDL SEQ: CPT

## 2022-01-27 PROCEDURE — G8484 FLU IMMUNIZE NO ADMIN: HCPCS | Performed by: INTERNAL MEDICINE

## 2022-01-27 PROCEDURE — G8427 DOCREV CUR MEDS BY ELIG CLIN: HCPCS | Performed by: INTERNAL MEDICINE

## 2022-01-27 PROCEDURE — 6360000002 HC RX W HCPCS: Performed by: INTERNAL MEDICINE

## 2022-01-27 PROCEDURE — 4004F PT TOBACCO SCREEN RCVD TLK: CPT | Performed by: INTERNAL MEDICINE

## 2022-01-27 PROCEDURE — 80053 COMPREHEN METABOLIC PANEL: CPT

## 2022-01-27 PROCEDURE — 3017F COLORECTAL CA SCREEN DOC REV: CPT | Performed by: INTERNAL MEDICINE

## 2022-01-27 PROCEDURE — 96367 TX/PROPH/DG ADDL SEQ IV INF: CPT

## 2022-01-27 PROCEDURE — 2500000003 HC RX 250 WO HCPCS: Performed by: INTERNAL MEDICINE

## 2022-01-27 RX ORDER — HEPARIN SODIUM (PORCINE) LOCK FLUSH IV SOLN 100 UNIT/ML 100 UNIT/ML
500 SOLUTION INTRAVENOUS PRN
Status: DISCONTINUED | OUTPATIENT
Start: 2022-01-27 | End: 2022-01-28 | Stop reason: HOSPADM

## 2022-01-27 RX ORDER — DIPHENHYDRAMINE HYDROCHLORIDE 50 MG/ML
50 INJECTION INTRAMUSCULAR; INTRAVENOUS ONCE
Status: COMPLETED | OUTPATIENT
Start: 2022-01-27 | End: 2022-01-27

## 2022-01-27 RX ORDER — PALONOSETRON 0.05 MG/ML
0.25 INJECTION, SOLUTION INTRAVENOUS ONCE
Status: COMPLETED | OUTPATIENT
Start: 2022-01-27 | End: 2022-01-27

## 2022-01-27 RX ORDER — SODIUM CHLORIDE 9 MG/ML
20 INJECTION, SOLUTION INTRAVENOUS ONCE
Status: DISCONTINUED | OUTPATIENT
Start: 2022-01-27 | End: 2022-01-28 | Stop reason: HOSPADM

## 2022-01-27 RX ORDER — SODIUM CHLORIDE 0.9 % (FLUSH) 0.9 %
5-40 SYRINGE (ML) INJECTION PRN
Status: DISCONTINUED | OUTPATIENT
Start: 2022-01-27 | End: 2022-01-28 | Stop reason: HOSPADM

## 2022-01-27 RX ORDER — OXYCODONE AND ACETAMINOPHEN 7.5; 325 MG/1; MG/1
1 TABLET ORAL EVERY 8 HOURS PRN
Qty: 90 TABLET | Refills: 0 | Status: SHIPPED | OUTPATIENT
Start: 2022-01-27 | End: 2022-02-21 | Stop reason: SDUPTHER

## 2022-01-27 RX ADMIN — SODIUM CHLORIDE, PRESERVATIVE FREE 10 ML: 5 INJECTION INTRAVENOUS at 15:18

## 2022-01-27 RX ADMIN — FAMOTIDINE 20 MG: 10 INJECTION, SOLUTION INTRAVENOUS at 12:50

## 2022-01-27 RX ADMIN — PALONOSETRON 0.25 MG: 0.25 INJECTION, SOLUTION INTRAVENOUS at 12:50

## 2022-01-27 RX ADMIN — SODIUM CHLORIDE 262 MG: 9 INJECTION, SOLUTION INTRAVENOUS at 14:43

## 2022-01-27 RX ADMIN — DIPHENHYDRAMINE HYDROCHLORIDE 50 MG: 50 INJECTION INTRAMUSCULAR; INTRAVENOUS at 12:50

## 2022-01-27 RX ADMIN — DEXAMETHASONE SODIUM PHOSPHATE 10 MG: 10 INJECTION INTRAMUSCULAR; INTRAVENOUS at 12:51

## 2022-01-27 RX ADMIN — SODIUM CHLORIDE 20 ML/HR: 9 INJECTION, SOLUTION INTRAVENOUS at 12:50

## 2022-01-27 RX ADMIN — PACLITAXEL 174 MG: 6 INJECTION, SOLUTION INTRAVENOUS at 13:31

## 2022-01-27 RX ADMIN — HEPARIN 500 UNITS: 100 SYRINGE at 15:18

## 2022-01-27 NOTE — PROGRESS NOTES
Patient arrived to treatment suite for blood draw, pre-medications and chemotherapy infusion. Right chest mediport accessed and blood drawn from site and sent to lab for processing. Patient stated has been having some hemoptysis, which he has informed Dr. Stiven Guido about. No other questions or concerns at this time. Treatment approved and given. Patient tolerated well. Left treatment suite ambulatory. Discharge instructions provided. Patient's status assessed and documented appropriately. All labs and required results were also reviewed today. Treatment parameters have been reviewed. Today's treatment has been approved by the provider. Treatment orders and medication sequencing (when applicable) was verified by 2 registered nurses. The treatment plan was confirmed with the patient prior to administration, and the patient understands the need to report any treatment-related symptoms. Prior to administration, when applicable, the following 8 elements of medication administration were reviewed with 2nd Registered Nurse prior to dosing: drug name, drug dose, infusion volume when prepared in a syringe, rate of administration, expiration dates and/or times, appearance and integrity of drug(s), and rate of pump for infusion. The 5 rights of medication administration have been verified.

## 2022-01-27 NOTE — PROGRESS NOTES
MA Rooming Questions  Patient: Stanislaw Holt  MRN: [id number]    Date: 1/27/2022        1. Do you have any new issues? yes - Pt c/o chest pain and states he has been coughing up blood         2. Do you need any refills on medications? yes - Percocet    3. Have you had any imaging done since your last visit?   no    4. Have you been hospitalized or seen in the emergency room since your last visit here?   no    5. Did the patient have a depression screening completed today?  No    No data recorded     PHQ-9 Given to (if applicable):               PHQ-9 Score (if applicable):                     [] Positive     []  Negative              Does question #9 need addressed (if applicable)                     [] Yes    []  No               Shantanu Reyes MA

## 2022-01-27 NOTE — PROGRESS NOTES
Guardant paperwork signed by patient, drawn by Vishal Avalos RN  and [facility].  To be sent out today 1/27/2022

## 2022-01-27 NOTE — PROGRESS NOTES
Patient Name:  Sindy Mills  Patient :  1970  Patient MRN:  [id number]     Primary Oncologist: Romana Storey MD  Referring Provider: Foreign [place] MD     Date of Service: 2022        Chief Complaint:    Chief Complaint   Patient presents with    Follow-up       Encounter Diagnoses   Name Primary?  Malignant neoplasm of overlapping sites of left lung ([address]     Secondary malignant neoplasm of bone ([address] Yes    Elevated PSA     Bone lesion         HPI:   21: Initial [facility] visit:Hima Dodd is a 45 y.o. male who presents to [facility] with report of dysuria and flank pain. Reports these symptoms started a few weeks ago. He ultimately came to the ED due to worsening back pain.      He reports ongoing issues with frequent urge to urinate and notes some incontinence. He denies hematuria. He was noted to have acute pyelonephritis and was admitted and started on IV Rocephin.      21 CT chest     Impression   1. Left hilar neoplasm extending into the left upper lobe measuring 3.2 x 5.9   x 5.3 cm obstructing the left upper lobe bronchus with probable minimal   adjacent infiltrates versus lymphangitic spread of tumor.  PET-CT/biopsy is   recommended. 2. Mediastinal lymphadenopathy. 3. Prominent left supraclavicular lymph nodes. 4. No osseous metastatic disease.      21 Ct abdomen and pelvis  Impression   1. Circumferential thickening of the bladder wall is noted which could be   related to cystitis.  Correlate with urinalysis. 2. There is left retroperitoneal lymphadenopathy.  This could be reactive or   neoplastic.  This can be further evaluated with PET-CT. 3. There is minimal stranding within the retroperitoneal on the left.  This   is uncertain etiology however could be related to acute pyelonephritis. 4. Interval development of multiple sclerotic lesions within the spine and   pelvis, concerning for metastatic osseous disease.    5. Prostate gland is enlarged.  Correlate with         8/20/21: MRI brain  1. There is a punctate focus of restricted diffusion within the right frontal   lobe without associated enhancement, mass effect or midline shift.  This   could represent a punctate acute infarct.  However, given history, an early   metastatic focus cannot be entirely excluded. 2. Scattered foci of susceptibility are seen within the cerebral hemispheres   bilaterally, which were not visualized on the prior exam.  Findings could   represent areas of remote microhemorrhage or perhaps treated metastases. 3. No abnormal enhancement within the brain. 4. Minimal global parenchymal volume loss with minimal chronic microvascular   ischemic changes. 12/2/21: CT chest, abdomen and pelvis:  Chest CT: Interval increased size of the left perihilar mass, with increased   adjacent obstructive pneumonitis and atelectasis.       Stable to minimally to decreased mediastinal lymphadenopathy.       Interval increased bony metastatic disease.       Abdomen and pelvis CT: Stable retroperitoneal and pelvic lymphadenopathy.       Interval increased bony metastatic disease. 12/17/21:Final Pathologic Diagnosis:   Bone, posterior ileum, needle core biopsy:   -     METASTATIC SQUAMOUS CELL CARCINOMA. PACO  PDL1 22c3 >50%(keytruda)  PTEN positive  Alk neg    12/27/21: Started XRT to the left pelvis  Added chest radiation dia 3  Completed dia 10 2022      Started carbo/taxol/keytruda jan 20 2022    Past Medical History:     BPH, HTN, COPD                                                            Past Surgery History:    None per his wife                                                                        Social History:   Lives with wife. Cut down smoking to 2-4 cigarettes per day prior to which he was smoking 2 to 3 packs/day for the past 40 years. Also reported drinking alcohol 10-12 beers per day. Denied any other illicit drug abuse. Family History:    He reported that his sister  at the age of 39 with  metastatic cancer, he was not sure what the primary was                                                                                              Allergies   Allergen Reactions    Tramadol Other (See Comments)     seizures    Vicodin [Hydrocodone-Acetaminophen] Hives       Current Outpatient Medications on File Prior to Visit   Medication Sig Dispense Refill    dexamethasone (DECADRON) 4 MG tablet Two tablets the day before treatment, one table daily for four days starting the day after chemotherapy 18 tablet 0    ondansetron (ZOFRAN) 8 MG tablet Take 1 tablet by mouth every 8 hours as needed for Nausea or Vomiting 45 tablet 1    docusate sodium (COLACE) 100 MG capsule Take 1 capsule by mouth 2 times daily 60 capsule 0    melatonin (RA MELATONIN) 3 MG TABS tablet Take 1 tablet by mouth nightly as needed (incsomnia) 30 tablet 3    oxyCODONE (OXYCONTIN) 20 MG extended release tablet Take 1 tablet by mouth 2 times daily for 30 days. Intended supply: 30 days 60 tablet 0    oxyCODONE-acetaminophen (PERCOCET) 7.5-325 MG per tablet Take 1 tablet by mouth every 8 hours as needed for Pain for up to 30 days. Intended supply: 30 days 90 tablet 0    NARCAN 4 MG/0.1ML LIQD nasal spray DISPENSED PER STANDING ORDER USE AS DIRECTED PATIENT IS TRAINED OPIOID OVERDOSE RESPONDER      albuterol sulfate HFA (PROVENTIL HFA) 108 (90 Base) MCG/ACT inhaler Inhale 2 puffs into the lungs every 4 hours as needed for Wheezing or Shortness of Breath With spacer (and mask if indicated). Thanks. 1 Inhaler 1    lisinopril (PRINIVIL;ZESTRIL) 10 MG tablet Take 1 tablet by mouth daily 30 tablet 3    ammonium lactate (LAC-HYDRIN) 12 % lotion Apply topically as needed.  1 Bottle 0    nicotine (NICODERM CQ) 21 MG/24HR Place 1 patch onto the skin daily 30 patch 3     No current facility-administered medications on file prior to visit. Interval History: 1/27/22: He arrived alone to the clinic today. Muscles are very tense and aching especially in the back. Applying cream, muscle relaxant. That helping with the pain. Radiation did not help much with pain in the hip, but chest pain has almost resolved. Has to sit quite a few times. Is a roberts by profession. Reported bloody sputum. Continues to have trouble urinating, bloody urine at times.      Review of Systems:  As per the interval history, rest of the review of system negative     Vital Signs: BP (!) 158/95 (Site: Left Upper Arm, Position: Sitting, Cuff Size: Medium Adult)   Pulse 82   Temp 95.6 °F (35.3 °C) (Infrared)   Resp 18   Ht 6' 1\" (1.854 m)   Wt 203 lb (92.1 kg)   SpO2 100%   BMI 26.78 kg/m²      CONSTITUTIONAL: awake, alert  EYES: RAISSA, No pallor or any icterus  ENT: ATNC  NECK: No JVD  HEMATOLOGIC/LYMPHATIC: no cervical, supraclavicular or axillary lymphadenopathy   LUNGS: CTAB  CARDIOVASCULAR: s1s2 rrr no murmurs  ABDOMEN: soft ntnd bs pos  NEUROLOGIC: GI  SKIN: Psoriatic rash with excoriation marks on the lower extremities and also upper extremities  EXTREMITIES: no LE edema bilaterally     Labs:  Hematology:  Lab Results   Component Value Date    WBC 6.0 01/20/2022    RBC 4.45 (L) 01/20/2022    HGB 12.2 (L) 01/20/2022    HCT 34.9 (L) 01/20/2022    MCV 78.4 01/20/2022    MCH 27.4 01/20/2022    MCHC 35.0 01/20/2022    RDW 16.1 (H) 01/20/2022     01/20/2022    MPV 9.5 01/20/2022    BANDSPCT 9 11/13/2018    SEGSPCT 66.6 (H) 01/20/2022    EOSRELPCT 5.4 (H) 01/20/2022    BASOPCT 0.7 01/20/2022    LYMPHOPCT 14.4 (L) 01/20/2022    MONOPCT 12.9 (H) 01/20/2022    BANDABS 0.66 11/13/2018    SEGSABS 4.0 01/20/2022    EOSABS 0.3 01/20/2022    BASOSABS 0.0 01/20/2022    LYMPHSABS 0.9 01/20/2022    MONOSABS 0.8 01/20/2022    DIFFTYPE AUTOMATED DIFFERENTIAL 01/20/2022    POLYCHROM 1+ 11/13/2018    PLTM FEW 11/13/2018     No results found for: ESR  Chemistry:  Lab Results   Component Value Date     01/20/2022    K 3.9 01/20/2022     01/20/2022    CO2 27 01/20/2022    BUN 12 01/20/2022    CREATININE 1.1 01/20/2022    GLUCOSE 125 (H) 01/20/2022    CALCIUM 9.5 01/20/2022    PROT 7.4 01/20/2022    LABALBU 4.3 01/20/2022    BILITOT 0.2 01/20/2022    ALKPHOS 216 (H) 01/20/2022    AST 33 01/20/2022    ALT 16 01/20/2022    LABGLOM >60 01/20/2022    GFRAA >60 01/20/2022    MG 1.6 (L) 07/29/2021    POCGLU 124 (H) 09/22/2020     No results found for: MMA, LDH, HOMOCYSTEINE  No components found for: LD  Lab Results   Component Value Date    TSHHS 2.162 07/27/2013     Immunology:  Lab Results   Component Value Date    PROT 7.4 01/20/2022     No results found for: [place], KLFLCR  No results found for: B2M  Coagulation Panel:  Lab Results   Component Value Date    PROTIME 12.9 12/17/2021    INR 1.00 12/17/2021    APTT 35.9 12/17/2021    DDIMER <200 12/19/2013     Anemia Panel:  No results found for: Benjamin Baldwin  Tumor Markers:  Lab Results   Component Value Date    CEA 7.7 07/23/2021    .6 (H) 11/16/2021        Observations:  ECOG:  No data recorded       Assessment & Plan:                                                          Left hilar mass with mediastinal hilar lymphadenopathy. 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 PET scan results from august 2021 noted, bone lesions most probably not metastatic except for one lytic lesion. MRI of the brain with no convincing metastatic lesions. Presented  the case in the tumor board. Decision made to proceed with a prostate biopsy and treat with ADT if malignancy  and in the meantime proceed with staging mediastinoscopy/rebiopsy for further treatment plan. But as pt very very very noncompliant, did not follow up with urology, CTS or for bone biopsy multiple times.    Note PSA rising and is 250 on November 16 2021   CT imaging with progressive met disease  Bone biopsy on dec 13 2021 with met squamous cell cancer, consistent with lung primary. PDL1 >50%Arden Maldonado) . Not enough tissue for rest of CARIS, guardant 360 ordered. Completed Palliative radiation to the pelvic area and also radiation to the left lung dia 10 2022  Discussed the findings, diagnosis and poor prognosis and treatment with carbo/taxol/pembro after completion of radiation. Discussed ae. PORT placed. Completed OCM  C1D1 carbo/taxol and keytruda started 1/20/22  Plan for repeat imaging after 2-3 C  Dose modifications as needed. Prostate enlargement and elevated PSA  (continues to rise from 147 in July 2021 to 250 in jan 2022), most probably  prostate cancer. As mentioned above recommend prostate biopsy, but pt very noncompliant and did not follow through  He surely needs biopsy of the prostate and recommend follow up with urology(looks like they saw me, will follow up on their recs)    Rash,   Looks like psoriatic rash. Has been applying topical cream    Elevated alk phos most probably secondary to the bone lesions possibly Paget's disease versus EtOH. Has been chronically elevated since 2012. Discussed complete alcohol and smoking cessation. Adequate analgesic and bowel regimen. Continue other medical care. Thank you for letting us be part of the care and will follow along. Discussed above findings and plan with him and he voiced understanding. Answered all his questions. Discussed healthy lifestyle including healthy diet, regular exercise as tolerated. ,  Smoking and alcohol cessation    Recommend follow-up with primary care physician and other specialists. Note he has he has been very noncompliant with appointments. Please do not hesitate to contact us if you need further information. Return to clinic feb 2022 or earlier if new Sx    I have recommended that the patient follow CDC guidelines for prevention of COVID-19 infection.   Received Covid vaccine/flu vaccine     [address] requsted    TOMI

## 2022-01-28 ENCOUNTER — TELEPHONE (OUTPATIENT)
Dept: ONCOLOGY | Age: 52
End: 2022-01-28

## 2022-01-28 NOTE — TELEPHONE ENCOUNTER
Attempted to contact patient's wife, Rebekah Navarro at [phone] per request; however, number no longer in services. Per demographics page, wife's number is [phone]. Called [facility] listed and spoke with Rebekah Navarro. She states that she received a call from someone at [phone] wanting to review lab results. Called r[facility]h wife had, but no answer, phone just kept ringing continuously and unable to leave . Called wife back and reviewed CBC and CMP from lab work yesterday.

## 2022-01-28 NOTE — TELEPHONE ENCOUNTER
Pt is going to [facility] on 2/8 900 arrival for a 930 appt-- npo 4 hours-- pt wife is aware of appt and stated understanding

## 2022-02-07 DIAGNOSIS — C79.51 SECONDARY MALIGNANT NEOPLASM OF BONE (HCC): ICD-10-CM

## 2022-02-07 DIAGNOSIS — C34.82 MALIGNANT NEOPLASM OF OVERLAPPING SITES OF LEFT LUNG (HCC): ICD-10-CM

## 2022-02-07 RX ORDER — OXYCODONE HCL 20 MG/1
20 TABLET, FILM COATED, EXTENDED RELEASE ORAL 2 TIMES DAILY
Qty: 60 TABLET | Refills: 0 | Status: SHIPPED | OUTPATIENT
Start: 2022-02-07 | End: 2022-03-09

## 2022-02-07 NOTE — TELEPHONE ENCOUNTER
Pt's wife called in requesting a refill for Oxycotin 20mg to be sent to [facility] on [address].  Sending a message to Dr. Barbara Patel to send in [address] [place] [address].

## 2022-02-08 ENCOUNTER — HOSPITAL ENCOUNTER (OUTPATIENT)
Dept: CT IMAGING | Age: 52
Discharge: HOME | End: 2022-02-08
Payer: COMMERCIAL

## 2022-02-08 DIAGNOSIS — C34.82 MALIGNANT NEOPLASM OF OVERLAPPING SITES OF LEFT LUNG (HCC): ICD-10-CM

## 2022-02-08 DIAGNOSIS — C34.82 MALIGNANT NEOPLASM OF OVERLAPPING SITES OF LEFT LUNG (HCC): Primary | ICD-10-CM

## 2022-02-08 DIAGNOSIS — R53.83 OTHER FATIGUE: ICD-10-CM

## 2022-02-08 PROCEDURE — 2580000003 HC RX 258: Performed by: RADIOLOGY

## 2022-02-08 PROCEDURE — 71260 CT THORAX DX C+: CPT

## 2022-02-08 PROCEDURE — 6360000004 HC RX CONTRAST MEDICATION: Performed by: RADIOLOGY

## 2022-02-08 RX ORDER — SODIUM CHLORIDE 0.9 % (FLUSH) 0.9 %
10 SYRINGE (ML) INJECTION PRN
Status: CANCELLED | OUTPATIENT
Start: 2022-02-08

## 2022-02-08 RX ORDER — SODIUM CHLORIDE 0.9 % (FLUSH) 0.9 %
10 SYRINGE (ML) INJECTION PRN
Status: DISCONTINUED | OUTPATIENT
Start: 2022-02-08 | End: 2022-02-09 | Stop reason: HOSPADM

## 2022-02-08 RX ADMIN — SODIUM CHLORIDE, PRESERVATIVE FREE 10 ML: 5 INJECTION INTRAVENOUS at 09:15

## 2022-02-08 RX ADMIN — IOPAMIDOL 75 ML: 755 INJECTION, SOLUTION INTRAVENOUS at 09:19

## 2022-02-10 ENCOUNTER — HOSPITAL ENCOUNTER (OUTPATIENT)
Dept: INFUSION THERAPY | Age: 52
Discharge: HOME | End: 2022-02-10
Payer: COMMERCIAL

## 2022-02-10 ENCOUNTER — OFFICE VISIT (OUTPATIENT)
Dept: ONCOLOGY | Age: 52
End: 2022-02-10
Payer: COMMERCIAL

## 2022-02-10 VITALS
DIASTOLIC BLOOD PRESSURE: 87 MMHG | TEMPERATURE: 98.1 F | OXYGEN SATURATION: 98 % | SYSTOLIC BLOOD PRESSURE: 143 MMHG | BODY MASS INDEX: 25.86 KG/M2 | WEIGHT: 196 LBS | HEART RATE: 96 BPM

## 2022-02-10 VITALS
HEIGHT: 73 IN | SYSTOLIC BLOOD PRESSURE: 143 MMHG | HEART RATE: 96 BPM | TEMPERATURE: 98.1 F | OXYGEN SATURATION: 98 % | BODY MASS INDEX: 26.78 KG/M2 | DIASTOLIC BLOOD PRESSURE: 87 MMHG

## 2022-02-10 DIAGNOSIS — C79.51 SECONDARY MALIGNANT NEOPLASM OF BONE (HCC): ICD-10-CM

## 2022-02-10 DIAGNOSIS — C34.82 MALIGNANT NEOPLASM OF OVERLAPPING SITES OF LEFT LUNG (HCC): Primary | ICD-10-CM

## 2022-02-10 DIAGNOSIS — R53.83 OTHER FATIGUE: ICD-10-CM

## 2022-02-10 DIAGNOSIS — R30.0 DYSURIA: ICD-10-CM

## 2022-02-10 DIAGNOSIS — N42.9 DISEASE OF PROSTATE: ICD-10-CM

## 2022-02-10 DIAGNOSIS — R97.20 ELEVATED PSA: ICD-10-CM

## 2022-02-10 LAB
ALBUMIN SERPL-MCNC: 3.7 GM/DL (ref 3.4–5)
ALP BLD-CCNC: 285 IU/L (ref 40–129)
ALT SERPL-CCNC: 19 U/L (ref 10–40)
ANION GAP SERPL CALCULATED.3IONS-SCNC: 13 MMOL/L (ref 4–16)
AST SERPL-CCNC: 38 IU/L (ref 15–37)
BASOPHILS ABSOLUTE: 0 K/CU MM
BASOPHILS RELATIVE PERCENT: 0.8 % (ref 0–1)
BILIRUB SERPL-MCNC: 0.2 MG/DL (ref 0–1)
BUN BLDV-MCNC: 13 MG/DL (ref 6–23)
CALCIUM SERPL-MCNC: 8.9 MG/DL (ref 8.3–10.6)
CHLORIDE BLD-SCNC: 104 MMOL/L (ref 99–110)
CO2: 22 MMOL/L (ref 21–32)
CREAT SERPL-MCNC: 1 MG/DL (ref 0.9–1.3)
DIFFERENTIAL TYPE: ABNORMAL
EOSINOPHILS ABSOLUTE: 0.1 K/CU MM
EOSINOPHILS RELATIVE PERCENT: 1.8 % (ref 0–3)
GFR AFRICAN AMERICAN: >60 ML/MIN/1.73M2
GFR AFRICAN AMERICAN: >60 ML/MIN/1.73M2
GFR NON-AFRICAN AMERICAN: >60 ML/MIN/1.73M2
GFR NON-AFRICAN AMERICAN: >60 ML/MIN/1.73M2
GLUCOSE BLD-MCNC: 100 MG/DL (ref 70–99)
GLUCOSE BLD-MCNC: 103 MG/DL (ref 70–99)
HCT VFR BLD CALC: 33.1 % (ref 42–52)
HEMOGLOBIN: 11.6 GM/DL (ref 13.5–18)
LYMPHOCYTES ABSOLUTE: 1.3 K/CU MM
LYMPHOCYTES RELATIVE PERCENT: 33.4 % (ref 24–44)
MCH RBC QN AUTO: 27.5 PG (ref 27–31)
MCHC RBC AUTO-ENTMCNC: 35 % (ref 32–36)
MCV RBC AUTO: 78.4 FL (ref 78–100)
MONOCYTES ABSOLUTE: 0.7 K/CU MM
MONOCYTES RELATIVE PERCENT: 17.1 % (ref 0–4)
PDW BLD-RTO: 17.2 % (ref 11.7–14.9)
PLATELET # BLD: 171 K/CU MM (ref 140–440)
PMV BLD AUTO: 9 FL (ref 7.5–11.1)
POC BUN: 14 MG/DL (ref 8–26)
POC CALCIUM: 1.21 MMOL/L (ref 1.12–1.32)
POC CHLORIDE: 108 MMOL/L (ref 98–109)
POC CO2: 27 MMOL/L (ref 21–32)
POC CREATININE: 1 MG/DL (ref 0.9–1.3)
POTASSIUM SERPL-SCNC: 3.8 MMOL/L (ref 3.5–4.5)
POTASSIUM SERPL-SCNC: 3.8 MMOL/L (ref 3.5–5.1)
RBC # BLD: 4.22 M/CU MM (ref 4.6–6.2)
SEGMENTED NEUTROPHILS ABSOLUTE COUNT: 1.9 K/CU MM
SEGMENTED NEUTROPHILS RELATIVE PERCENT: 46.9 % (ref 36–66)
SODIUM BLD-SCNC: 139 MMOL/L (ref 135–145)
SODIUM BLD-SCNC: 144 MMOL/L (ref 138–146)
SOURCE, BLOOD GAS: ABNORMAL
T4 FREE: 1.18 NG/DL (ref 0.9–1.8)
TOTAL PROTEIN: 6.9 GM/DL (ref 6.4–8.2)
TSH HIGH SENSITIVITY: 1.94 UIU/ML (ref 0.27–4.2)
WBC # BLD: 4 K/CU MM (ref 4–10.5)

## 2022-02-10 PROCEDURE — 99214 OFFICE O/P EST MOD 30 MIN: CPT | Performed by: INTERNAL MEDICINE

## 2022-02-10 PROCEDURE — 2580000003 HC RX 258: Performed by: INTERNAL MEDICINE

## 2022-02-10 PROCEDURE — 85025 COMPLETE CBC W/AUTO DIFF WBC: CPT

## 2022-02-10 PROCEDURE — 96417 CHEMO IV INFUS EACH ADDL SEQ: CPT

## 2022-02-10 PROCEDURE — 84443 ASSAY THYROID STIM HORMONE: CPT

## 2022-02-10 PROCEDURE — G8427 DOCREV CUR MEDS BY ELIG CLIN: HCPCS | Performed by: INTERNAL MEDICINE

## 2022-02-10 PROCEDURE — 4004F PT TOBACCO SCREEN RCVD TLK: CPT | Performed by: INTERNAL MEDICINE

## 2022-02-10 PROCEDURE — 6360000002 HC RX W HCPCS: Performed by: INTERNAL MEDICINE

## 2022-02-10 PROCEDURE — 84439 ASSAY OF FREE THYROXINE: CPT

## 2022-02-10 PROCEDURE — 96375 TX/PRO/DX INJ NEW DRUG ADDON: CPT

## 2022-02-10 PROCEDURE — 3017F COLORECTAL CA SCREEN DOC REV: CPT | Performed by: INTERNAL MEDICINE

## 2022-02-10 PROCEDURE — 2500000003 HC RX 250 WO HCPCS: Performed by: INTERNAL MEDICINE

## 2022-02-10 PROCEDURE — 80053 COMPREHEN METABOLIC PANEL: CPT

## 2022-02-10 PROCEDURE — G8419 CALC BMI OUT NRM PARAM NOF/U: HCPCS | Performed by: INTERNAL MEDICINE

## 2022-02-10 PROCEDURE — 96413 CHEMO IV INFUSION 1 HR: CPT

## 2022-02-10 PROCEDURE — G8484 FLU IMMUNIZE NO ADMIN: HCPCS | Performed by: INTERNAL MEDICINE

## 2022-02-10 RX ORDER — SODIUM CHLORIDE 9 MG/ML
5-40 INJECTION INTRAVENOUS PRN
Status: CANCELLED | OUTPATIENT
Start: 2022-02-17

## 2022-02-10 RX ORDER — SODIUM CHLORIDE 9 MG/ML
25 INJECTION, SOLUTION INTRAVENOUS PRN
Status: CANCELLED | OUTPATIENT
Start: 2022-02-10

## 2022-02-10 RX ORDER — ONDANSETRON 2 MG/ML
8 INJECTION INTRAMUSCULAR; INTRAVENOUS
Status: CANCELLED | OUTPATIENT
Start: 2022-02-10

## 2022-02-10 RX ORDER — SODIUM CHLORIDE 0.9 % (FLUSH) 0.9 %
5-40 SYRINGE (ML) INJECTION PRN
Status: DISCONTINUED | OUTPATIENT
Start: 2022-02-10 | End: 2022-02-11 | Stop reason: HOSPADM

## 2022-02-10 RX ORDER — SODIUM CHLORIDE 9 MG/ML
25 INJECTION, SOLUTION INTRAVENOUS PRN
Status: CANCELLED | OUTPATIENT
Start: 2022-02-17

## 2022-02-10 RX ORDER — MEPERIDINE HYDROCHLORIDE 25 MG/ML
12.5 INJECTION INTRAMUSCULAR; INTRAVENOUS; SUBCUTANEOUS PRN
Status: CANCELLED | OUTPATIENT
Start: 2022-02-17

## 2022-02-10 RX ORDER — MEPERIDINE HYDROCHLORIDE 25 MG/ML
12.5 INJECTION INTRAMUSCULAR; INTRAVENOUS; SUBCUTANEOUS PRN
Status: CANCELLED | OUTPATIENT
Start: 2022-02-10

## 2022-02-10 RX ORDER — ONDANSETRON 2 MG/ML
8 INJECTION INTRAMUSCULAR; INTRAVENOUS
Status: CANCELLED | OUTPATIENT
Start: 2022-02-17

## 2022-02-10 RX ORDER — SODIUM CHLORIDE 9 MG/ML
20 INJECTION, SOLUTION INTRAVENOUS ONCE
Status: CANCELLED | OUTPATIENT
Start: 2022-02-17 | End: 2022-02-17

## 2022-02-10 RX ORDER — DIPHENHYDRAMINE HYDROCHLORIDE 50 MG/ML
50 INJECTION INTRAMUSCULAR; INTRAVENOUS
Status: CANCELLED | OUTPATIENT
Start: 2022-02-10

## 2022-02-10 RX ORDER — HEPARIN SODIUM (PORCINE) LOCK FLUSH IV SOLN 100 UNIT/ML 100 UNIT/ML
500 SOLUTION INTRAVENOUS PRN
Status: CANCELLED | OUTPATIENT
Start: 2022-02-17

## 2022-02-10 RX ORDER — DIPHENHYDRAMINE HYDROCHLORIDE 50 MG/ML
50 INJECTION INTRAMUSCULAR; INTRAVENOUS ONCE
Status: COMPLETED | OUTPATIENT
Start: 2022-02-10 | End: 2022-02-10

## 2022-02-10 RX ORDER — SODIUM CHLORIDE 9 MG/ML
5-40 INJECTION INTRAVENOUS PRN
Status: CANCELLED | OUTPATIENT
Start: 2022-02-10

## 2022-02-10 RX ORDER — EPINEPHRINE 1 MG/ML
0.3 INJECTION, SOLUTION, CONCENTRATE INTRAVENOUS PRN
Status: CANCELLED | OUTPATIENT
Start: 2022-02-17

## 2022-02-10 RX ORDER — SODIUM CHLORIDE 9 MG/ML
20 INJECTION, SOLUTION INTRAVENOUS ONCE
Status: COMPLETED | OUTPATIENT
Start: 2022-02-10 | End: 2022-02-10

## 2022-02-10 RX ORDER — ACETAMINOPHEN 325 MG/1
650 TABLET ORAL
Status: CANCELLED | OUTPATIENT
Start: 2022-02-17

## 2022-02-10 RX ORDER — PALONOSETRON HYDROCHLORIDE 0.05 MG/ML
0.25 INJECTION, SOLUTION INTRAVENOUS ONCE
Status: CANCELLED | OUTPATIENT
Start: 2022-02-17

## 2022-02-10 RX ORDER — DIPHENHYDRAMINE HYDROCHLORIDE 50 MG/ML
50 INJECTION INTRAMUSCULAR; INTRAVENOUS ONCE
Status: CANCELLED | OUTPATIENT
Start: 2022-02-17 | End: 2022-02-17

## 2022-02-10 RX ORDER — ALBUTEROL SULFATE 90 UG/1
4 AEROSOL, METERED RESPIRATORY (INHALATION) PRN
Status: CANCELLED | OUTPATIENT
Start: 2022-02-17

## 2022-02-10 RX ORDER — SODIUM CHLORIDE 9 MG/ML
INJECTION, SOLUTION INTRAVENOUS CONTINUOUS
Status: CANCELLED | OUTPATIENT
Start: 2022-02-17

## 2022-02-10 RX ORDER — SODIUM CHLORIDE 9 MG/ML
INJECTION, SOLUTION INTRAVENOUS CONTINUOUS
Status: CANCELLED | OUTPATIENT
Start: 2022-02-10

## 2022-02-10 RX ORDER — HEPARIN SODIUM (PORCINE) LOCK FLUSH IV SOLN 100 UNIT/ML 100 UNIT/ML
500 SOLUTION INTRAVENOUS PRN
Status: DISCONTINUED | OUTPATIENT
Start: 2022-02-10 | End: 2022-02-11 | Stop reason: HOSPADM

## 2022-02-10 RX ORDER — EPINEPHRINE 1 MG/ML
0.3 INJECTION, SOLUTION, CONCENTRATE INTRAVENOUS PRN
Status: CANCELLED | OUTPATIENT
Start: 2022-02-10

## 2022-02-10 RX ORDER — CIPROFLOXACIN 250 MG/1
250 TABLET, FILM COATED ORAL 2 TIMES DAILY
Qty: 10 TABLET | Refills: 0 | Status: SHIPPED | OUTPATIENT
Start: 2022-02-10 | End: 2022-02-15

## 2022-02-10 RX ORDER — DIPHENHYDRAMINE HYDROCHLORIDE 50 MG/ML
50 INJECTION INTRAMUSCULAR; INTRAVENOUS
Status: CANCELLED | OUTPATIENT
Start: 2022-02-17

## 2022-02-10 RX ORDER — ALBUTEROL SULFATE 90 UG/1
4 AEROSOL, METERED RESPIRATORY (INHALATION) PRN
Status: CANCELLED | OUTPATIENT
Start: 2022-02-10

## 2022-02-10 RX ORDER — SODIUM CHLORIDE 0.9 % (FLUSH) 0.9 %
5-40 SYRINGE (ML) INJECTION PRN
Status: CANCELLED | OUTPATIENT
Start: 2022-02-17

## 2022-02-10 RX ORDER — PALONOSETRON 0.05 MG/ML
0.25 INJECTION, SOLUTION INTRAVENOUS ONCE
Status: COMPLETED | OUTPATIENT
Start: 2022-02-10 | End: 2022-02-10

## 2022-02-10 RX ORDER — ACETAMINOPHEN 325 MG/1
650 TABLET ORAL
Status: CANCELLED | OUTPATIENT
Start: 2022-02-10

## 2022-02-10 RX ADMIN — DIPHENHYDRAMINE HYDROCHLORIDE 50 MG: 50 INJECTION INTRAMUSCULAR; INTRAVENOUS at 11:40

## 2022-02-10 RX ADMIN — SODIUM CHLORIDE 174 MG: 9 INJECTION, SOLUTION INTRAVENOUS at 12:57

## 2022-02-10 RX ADMIN — HEPARIN 500 UNITS: 100 SYRINGE at 15:19

## 2022-02-10 RX ADMIN — PALONOSETRON 0.25 MG: 0.25 INJECTION, SOLUTION INTRAVENOUS at 11:40

## 2022-02-10 RX ADMIN — DEXAMETHASONE SODIUM PHOSPHATE 10 MG: 10 INJECTION INTRAMUSCULAR; INTRAVENOUS at 11:45

## 2022-02-10 RX ADMIN — CARBOPLATIN 332 MG: 10 INJECTION, SOLUTION INTRAVENOUS at 14:35

## 2022-02-10 RX ADMIN — FAMOTIDINE 20 MG: 10 INJECTION, SOLUTION INTRAVENOUS at 11:40

## 2022-02-10 RX ADMIN — SODIUM CHLORIDE, PRESERVATIVE FREE 10 ML: 5 INJECTION INTRAVENOUS at 15:19

## 2022-02-10 RX ADMIN — SODIUM CHLORIDE 200 MG: 9 INJECTION, SOLUTION INTRAVENOUS at 12:17

## 2022-02-10 RX ADMIN — SODIUM CHLORIDE 20 ML/HR: 9 INJECTION, SOLUTION INTRAVENOUS at 11:44

## 2022-02-10 NOTE — PROGRESS NOTES
Patient Name:  Corey Arriaza  Patient :  1970  Patient MRN:  [id number]     Primary Oncologist: Keturah Rincon MD  Referring Provider: Keven Liu MD     Date of Service: 2/10/2022        Chief Complaint:    Chief Complaint   Patient presents with    Follow-up    Chemotherapy       Encounter Diagnoses   Name Primary?  Malignant neoplasm of overlapping sites of left lung ([address] Yes    Secondary malignant neoplasm of bone [address] [address]     Elevated PSA     Disease of prostate         HPI:   21: Initial [facility] visit:Hima Crowell is a 45 y.o. male who presents to [facility] with report of dysuria and flank pain. Reports these symptoms started a few weeks ago. He ultimately came to the ED due to worsening back pain.      He reports ongoing issues with frequent urge to urinate and notes some incontinence. He denies hematuria. He was noted to have acute pyelonephritis and was admitted and started on IV Rocephin.      21 CT chest     Impression   1. Left hilar neoplasm extending into the left upper lobe measuring 3.2 x 5.9   x 5.3 cm obstructing the left upper lobe bronchus with probable minimal   adjacent infiltrates versus lymphangitic spread of tumor.  PET-CT/biopsy is   recommended. 2. Mediastinal lymphadenopathy. 3. Prominent left supraclavicular lymph nodes. 4. No osseous metastatic disease.      21 Ct abdomen and pelvis  Impression   1. Circumferential thickening of the bladder wall is noted which could be   related to cystitis.  Correlate with urinalysis. 2. There is left retroperitoneal lymphadenopathy.  This could be reactive or   neoplastic.  This can be further evaluated with PET-CT. 3. There is minimal stranding within the retroperitoneal on the left.  This   is uncertain etiology however could be related to acute pyelonephritis. 4. Interval development of multiple sclerotic lesions within the spine and   pelvis, concerning for metastatic osseous disease.    5. Prostate gland is enlarged.  Correlate with PSA.      .30      He denies past history of cancer. He smokes 2-3 ppd and drinks 10-12 beers daily. He reports unknown malignancy in his sister who  at 39. No other known family history of cancer.      Due to lung mass and concern for metastatic prostate cancer we were called to evaluate. 21:  Final Pathologic Diagnosis:   Needle biopsy of lung, clinically left lung mass:        SQUAMOUS CELL CARCINOMA IN SITU.     21:PET  1.  Left perihilar mass likely represents primary lung cancer.  Correlate   with biopsy results.  The opacity more peripheral in the left lower lobe has   relatively low level activity and likely represents an area of post   obstructive pneumonia adjacent to the mass.       2.  Metabolically active left AP window lymph node concerning for metastatic   disease.       3.  The prostate is enlarged and has increased FDG activity which could be   due to prostatitis or prostate cancer.  Correlate with PSA levels.       4.  No increased metabolic activity associated with retroperitoneal lymph   nodes.  This is unlikely related to the lung process given the significant   difference in metabolic activity; however, if there is prostate cancer, this   could potentially be metastatic disease from the prostate cancer, which can   have variable levels of uptake on FDG PET scans.       5.  No metabolic activity associated with multiple sclerotic lesions.  Again   this is unlikely related to the lung process, but if there is prostate   cancer, this could represent metastatic disease from prostate cancer with   poor FDG avidity.  Otherwise it could also represent large bone islands.       6.  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 There is a focal area of intense activity associated with a new lucent lesion   within the background of Paget's disease concerning for metastatic disease.    Given the FDG activity, it is likely osseous metastatic disease. Past Medical History:     BPH, HTN, COPD                                                            Past Surgery History:    None per his wife                                                                        Social History:   Lives with wife. Cut down smoking to 2-4 cigarettes per day prior to which he was smoking 2 to 3 packs/day for the past 40 years. Also reported drinking alcohol 10-12 beers per day. Denied any other illicit drug abuse. Family History:    He reported that his sister  at the age of 39 with  metastatic cancer, he was not sure what the primary was                                                                                              Allergies   Allergen Reactions    Tramadol Other (See Comments)     seizures    Vicodin [Hydrocodone-Acetaminophen] Hives       Current Outpatient Medications on File Prior to Visit   Medication Sig Dispense Refill    oxyCODONE (OXYCONTIN) 20 MG extended release tablet Take 1 tablet by mouth 2 times daily for 30 days. 60 tablet 0    oxyCODONE-acetaminophen (PERCOCET) 7.5-325 MG per tablet Take 1 tablet by mouth every 8 hours as needed for Pain for up to 30 days. Intended supply: 30 days 90 tablet 0    dexamethasone (DECADRON) 4 MG tablet Two tablets the day before treatment, one table daily for four days starting the day after chemotherapy 18 tablet 0    docusate sodium (COLACE) 100 MG capsule Take 1 capsule by mouth 2 times daily 60 capsule 0    melatonin (RA MELATONIN) 3 MG TABS tablet Take 1 tablet by mouth nightly as needed (incsomnia) 30 tablet 3    lisinopril (PRINIVIL;ZESTRIL) 10 MG tablet Take 1 tablet by mouth daily 30 tablet 3    ammonium lactate (LAC-HYDRIN) 12 % lotion Apply topically as needed.  1 Bottle 0    NARCAN 4 MG/0.1ML LIQD nasal spray DISPENSED PER STANDING ORDER USE AS DIRECTED PATIENT IS TRAINED OPIOID OVERDOSE RESPONDER      albuterol sulfate HFA (PROVENTIL HFA) 108 (90 Base) MCG/ACT inhaler Inhale 2 puffs into the lungs every 4 hours as needed for Wheezing or Shortness of Breath With spacer (and mask if indicated). Thanks. 1 Inhaler 1    nicotine (NICODERM CQ) 21 MG/24HR Place 1 patch onto the skin daily 30 patch 3     No current facility-administered medications on file prior to visit. Interval History: 2/10/22: He arrived with his wife to the clinic. Reported that he has been having difficulty urinating and has an odor and also cloudy. Reported mild hematuria. No Lower abdominal pain but left flank pain. Reported constipation. No fever.      Review of Systems:  As per the interval history, rest of the review of system negative     Vital Signs: BP (!) 143/87 (Position: Sitting)   Pulse 96   Temp 98.1 °F (36.7 °C) (Temporal)   Ht 6' 1\" (1.854 m)   SpO2 98%   BMI 26.78 kg/m²      CONSTITUTIONAL: awake, alert  EYES: RAISSA, No pallor or any icterus  ENT: ATNC  NECK: No JVD  HEMATOLOGIC/LYMPHATIC: no cervical, supraclavicular or axillary lymphadenopathy   LUNGS: CTAB  CARDIOVASCULAR: s1s2 rrr no murmurs  ABDOMEN: soft ntnd bs pos  NEUROLOGIC: GI  SKIN: Psoriatic rash with excoriation marks on the lower extremities and also upper extremities  EXTREMITIES: no LE edema bilaterally     Labs:  Hematology:  Lab Results   Component Value Date    WBC 4.0 02/10/2022    RBC 4.22 (L) 02/10/2022    HGB 11.6 (L) 02/10/2022    HCT 33.1 (L) 02/10/2022    MCV 78.4 02/10/2022    MCH 27.5 02/10/2022    MCHC 35.0 02/10/2022    RDW 17.2 (H) 02/10/2022     02/10/2022    MPV 9.0 02/10/2022    BANDSPCT 9 11/13/2018    SEGSPCT 46.9 02/10/2022    EOSRELPCT 1.8 02/10/2022    BASOPCT 0.8 02/10/2022    LYMPHOPCT 33.4 02/10/2022    MONOPCT 17.1 (H) 02/10/2022    BANDABS 0.66 11/13/2018    SEGSABS 1.9 02/10/2022    EOSABS 0.1 02/10/2022    BASOSABS 0.0 02/10/2022    LYMPHSABS 1.3 02/10/2022    MONOSABS 0.7 02/10/2022    DIFFTYPE AUTOMATED DIFFERENTIAL 02/10/2022    POLYCHROM 1+ 11/13/2018    PLTM FEW 11/13/2018     No results found for: ESR  Chemistry:  Lab Results   Component Value Date     02/10/2022    K 3.8 02/10/2022     01/27/2022    CO2 24 01/27/2022    BUN 13 01/27/2022    CREATININE 1.0 02/10/2022    GLUCOSE 149 (H) 01/27/2022    CALCIUM 9.7 01/27/2022    PROT 7.6 01/27/2022    LABALBU 4.1 01/27/2022    BILITOT 0.2 01/27/2022    ALKPHOS 221 (H) 01/27/2022    AST 22 01/27/2022    ALT 10 01/27/2022    LABGLOM >60 02/10/2022    GFRAA >60 02/10/2022    MG 1.6 (L) 07/29/2021    POCCA 1.21 02/10/2022    POCGLU 100 (H) 02/10/2022     No results found for: MMA, LDH, HOMOCYSTEINE  No components found for: LD  Lab Results   Component Value Date    TSHHS 2.162 07/27/2013     Immunology:  Lab Results   Component Value Date    PROT 7.6 01/27/2022     No results found for: Mabelene Pouch, KLFLCR  No results found for: B2M  Coagulation Panel:  Lab Results   Component Value Date    PROTIME 12.9 12/17/2021    INR 1.00 12/17/2021    APTT 35.9 12/17/2021    DDIMER <200 12/19/2013     Anemia Panel:  No results found for: Roddie Sic  Tumor Markers:  Lab Results   Component Value Date    CEA 7.7 07/23/2021    .6 (H) 11/16/2021        Observations:  ECOG:  No data recorded       Assessment & Plan:                                                          Left hilar mass with mediastinal hilar lymphadenopathy. 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 PET scan results from august 2021 noted, bone lesions most probably not metastatic except for one lytic lesion. MRI of the brain with no convincing metastatic lesions. Presented  the case in the tumor board. Decision made to proceed with a prostate biopsy and treat with ADT if malignancy  and in the meantime proceed with staging mediastinoscopy/rebiopsy for further treatment plan.  But as pt very very very noncompliant, did not follow up with urology, CTS or for bone biopsy multiple times. Note PSA rising and is 250 on November 16 2021   CT imaging with progressive met disease  Bone biopsy on dec 13 2021 with met squamous cell cancer, consistent with lung primary. PDL1 >50%Risa Toribio) . Not enough tissue for rest of CARIS, guardant 360 with no other actionable mutation  Completed Palliative radiation to the pelvic area and also radiation to the left lung dia 10 2022  Discussed the findings, diagnosis and poor prognosis and treatment with carbo/taxol/pembro after completion of radiation. Discussed ae. PORT placed. Completed OCM  C1D1 carbo/taxol and keytruda started 1/20/22  Plan for repeat imaging after 2-3 C  Dose modifications as needed. Prostate enlargement and elevated PSA  (continues to rise from 147 in July 2021 to 250 in jan 2022), most probably  prostate cancer. As mentioned above recommend prostate biopsy, but pt very noncompliant and did not follow through multiple times but finally followed through. Will obtain records. UTI??; UA and UC ordered. Empiric abx prescribed. Increased hydration    Constipation: stool softener and laxatives as needed    Rash:   Looks like psoriatic rash. Has been applying topical cream    Elevated alk phos most probably secondary to the bone lesions possibly Paget's disease versus EtOH. Has been chronically elevated since 2012. Discussed complete alcohol and smoking cessation. Adequate analgesic and bowel regimen. Continue other medical care. Thank you for letting us be part of the care and will follow along. Discussed above findings and plan with him and he voiced understanding. Answered all his questions. Discussed healthy lifestyle including healthy diet, regular exercise as tolerated. ,  Smoking and alcohol cessation    Recommend follow-up with primary care physician and other specialists.   Note he has he has been very noncompliant with appointments. Please do not hesitate to contact us if you need further information. Return to clinic march 3 2022 or earlier if new Sx    I have recommended that the patient follow CDC guidelines for prevention of COVID-19 infection.   Received Covid vaccine/flu vaccine     TOMI

## 2022-02-10 NOTE — PROGRESS NOTES
Patient arrived to treatment suite for blood draw, pre-medications and chemotherapy infusion. Right chest mediport accessed, by Carlitos Delacruz RN. Blood drawn from site and sent to lab for processing. No other questions or concerns at this time. Treatment approved and given. Patient tolerated well. Left treatment suite ambulatory. Discharge instructions provided.     Patient's status assessed and documented appropriately. All labs and required results were also reviewed today. Treatment parameters have been reviewed. Today's treatment has been approved by the provider. Treatment orders and medication sequencing (when applicable) was verified by 2 registered nurses. The treatment plan was confirmed with the patient prior to administration, and the patient understands the need to report any treatment-related symptoms.     Prior to administration, when applicable, the following 8 elements of medication administration were reviewed with 2nd Registered Nurse prior to dosing: drug name, drug dose, infusion volume when prepared in a syringe, rate of administration, expiration dates and/or times, appearance and integrity of drug(s), and rate of pump for infusion. The 5 rights of medication administration have been verified.

## 2022-02-10 NOTE — PROGRESS NOTES
MA Rooming Questions  Patient: Andrew Diallo  MRN: [id number]    Date: 2/10/2022        1. Do you have any new issues? yes - thinks he had a UTIpainful urinating, odor, cloudy         2. Do you need any refills on medications? yes - Zofran, DEX.    3. Have you had any imaging done since your last visit? yes - CT    4. Have you been hospitalized or seen in the emergency room since your last visit here?   no    5. Did the patient have a depression screening completed today?  No    No data recorded     PHQ-9 Given to (if applicable):               PHQ-9 Score (if applicable):                     [] Positive     []  Negative              Does question #9 need addressed (if applicable)                     [] Yes    []  No               Kaveh Gordon CMA

## 2022-02-17 ENCOUNTER — HOSPITAL ENCOUNTER (OUTPATIENT)
Dept: RADIATION ONCOLOGY | Age: 52
Discharge: HOME | End: 2022-02-17
Attending: RADIOLOGY

## 2022-02-17 ENCOUNTER — HOSPITAL ENCOUNTER (OUTPATIENT)
Dept: INFUSION THERAPY | Age: 52
Discharge: HOME | End: 2022-02-17
Payer: COMMERCIAL

## 2022-02-17 VITALS
RESPIRATION RATE: 16 BRPM | WEIGHT: 196 LBS | DIASTOLIC BLOOD PRESSURE: 81 MMHG | BODY MASS INDEX: 25.98 KG/M2 | OXYGEN SATURATION: 99 % | HEIGHT: 73 IN | SYSTOLIC BLOOD PRESSURE: 128 MMHG | TEMPERATURE: 97 F | HEART RATE: 94 BPM

## 2022-02-17 VITALS
BODY MASS INDEX: 25.98 KG/M2 | DIASTOLIC BLOOD PRESSURE: 81 MMHG | WEIGHT: 196 LBS | TEMPERATURE: 97 F | HEART RATE: 94 BPM | SYSTOLIC BLOOD PRESSURE: 128 MMHG | RESPIRATION RATE: 16 BRPM | OXYGEN SATURATION: 97 % | HEIGHT: 73 IN

## 2022-02-17 DIAGNOSIS — C34.82 MALIGNANT NEOPLASM OF OVERLAPPING SITES OF LEFT LUNG (HCC): Primary | ICD-10-CM

## 2022-02-17 LAB
ALBUMIN SERPL-MCNC: 4 GM/DL (ref 3.4–5)
ALP BLD-CCNC: 242 IU/L (ref 40–128)
ALT SERPL-CCNC: 17 U/L (ref 10–40)
ANION GAP SERPL CALCULATED.3IONS-SCNC: 13 MMOL/L (ref 4–16)
AST SERPL-CCNC: 29 IU/L (ref 15–37)
BASOPHILS ABSOLUTE: 0 K/CU MM
BASOPHILS RELATIVE PERCENT: 0.8 % (ref 0–1)
BILIRUB SERPL-MCNC: 0.3 MG/DL (ref 0–1)
BUN BLDV-MCNC: 14 MG/DL (ref 6–23)
CALCIUM SERPL-MCNC: 9.1 MG/DL (ref 8.3–10.6)
CHLORIDE BLD-SCNC: 105 MMOL/L (ref 99–110)
CO2: 22 MMOL/L (ref 21–32)
CREAT SERPL-MCNC: 1 MG/DL (ref 0.9–1.3)
DIFFERENTIAL TYPE: ABNORMAL
EOSINOPHILS ABSOLUTE: 0.1 K/CU MM
EOSINOPHILS RELATIVE PERCENT: 1.7 % (ref 0–3)
GFR AFRICAN AMERICAN: >60 ML/MIN/1.73M2
GFR AFRICAN AMERICAN: >60 ML/MIN/1.73M2
GFR NON-AFRICAN AMERICAN: >60 ML/MIN/1.73M2
GFR NON-AFRICAN AMERICAN: >60 ML/MIN/1.73M2
GLUCOSE BLD-MCNC: 126 MG/DL (ref 70–99)
GLUCOSE BLD-MCNC: 128 MG/DL (ref 70–99)
HCT VFR BLD CALC: 30.2 % (ref 42–52)
HEMOGLOBIN: 10.5 GM/DL (ref 13.5–18)
LYMPHOCYTES ABSOLUTE: 1 K/CU MM
LYMPHOCYTES RELATIVE PERCENT: 26.8 % (ref 24–44)
MCH RBC QN AUTO: 27 PG (ref 27–31)
MCHC RBC AUTO-ENTMCNC: 34.8 % (ref 32–36)
MCV RBC AUTO: 77.6 FL (ref 78–100)
MONOCYTES ABSOLUTE: 0.4 K/CU MM
MONOCYTES RELATIVE PERCENT: 10.2 % (ref 0–4)
PDW BLD-RTO: 16.8 % (ref 11.7–14.9)
PLATELET # BLD: 132 K/CU MM (ref 140–440)
PMV BLD AUTO: 8.9 FL (ref 7.5–11.1)
POC BUN: 14 MG/DL (ref 8–26)
POC CALCIUM: 1.22 MMOL/L (ref 1.12–1.32)
POC CHLORIDE: 109 MMOL/L (ref 98–109)
POC CO2: 26 MMOL/L (ref 21–32)
POC CREATININE: 1 MG/DL (ref 0.9–1.3)
POTASSIUM SERPL-SCNC: 3.6 MMOL/L (ref 3.5–4.5)
POTASSIUM SERPL-SCNC: 3.8 MMOL/L (ref 3.5–5.1)
RBC # BLD: 3.89 M/CU MM (ref 4.6–6.2)
SEGMENTED NEUTROPHILS ABSOLUTE COUNT: 2.2 K/CU MM
SEGMENTED NEUTROPHILS RELATIVE PERCENT: 60.5 % (ref 36–66)
SODIUM BLD-SCNC: 140 MMOL/L (ref 135–145)
SODIUM BLD-SCNC: 144 MMOL/L (ref 138–146)
SOURCE, BLOOD GAS: ABNORMAL
TOTAL PROTEIN: 7.1 GM/DL (ref 6.4–8.2)
WBC # BLD: 3.6 K/CU MM (ref 4–10.5)

## 2022-02-17 PROCEDURE — 96413 CHEMO IV INFUSION 1 HR: CPT

## 2022-02-17 PROCEDURE — 2500000003 HC RX 250 WO HCPCS: Performed by: INTERNAL MEDICINE

## 2022-02-17 PROCEDURE — 2580000003 HC RX 258: Performed by: INTERNAL MEDICINE

## 2022-02-17 PROCEDURE — 96375 TX/PRO/DX INJ NEW DRUG ADDON: CPT

## 2022-02-17 PROCEDURE — 80053 COMPREHEN METABOLIC PANEL: CPT

## 2022-02-17 PROCEDURE — 85025 COMPLETE CBC W/AUTO DIFF WBC: CPT

## 2022-02-17 PROCEDURE — 96367 TX/PROPH/DG ADDL SEQ IV INF: CPT

## 2022-02-17 PROCEDURE — 96417 CHEMO IV INFUS EACH ADDL SEQ: CPT

## 2022-02-17 PROCEDURE — 6360000002 HC RX W HCPCS: Performed by: INTERNAL MEDICINE

## 2022-02-17 RX ORDER — DIPHENHYDRAMINE HYDROCHLORIDE 50 MG/ML
50 INJECTION INTRAMUSCULAR; INTRAVENOUS ONCE
Status: COMPLETED | OUTPATIENT
Start: 2022-02-17 | End: 2022-02-17

## 2022-02-17 RX ORDER — HEPARIN SODIUM (PORCINE) LOCK FLUSH IV SOLN 100 UNIT/ML 100 UNIT/ML
500 SOLUTION INTRAVENOUS PRN
Status: DISCONTINUED | OUTPATIENT
Start: 2022-02-17 | End: 2022-02-18 | Stop reason: HOSPADM

## 2022-02-17 RX ORDER — SODIUM CHLORIDE 9 MG/ML
20 INJECTION, SOLUTION INTRAVENOUS ONCE
Status: DISCONTINUED | OUTPATIENT
Start: 2022-02-17 | End: 2022-02-18 | Stop reason: HOSPADM

## 2022-02-17 RX ORDER — SODIUM CHLORIDE 0.9 % (FLUSH) 0.9 %
5-40 SYRINGE (ML) INJECTION PRN
Status: DISCONTINUED | OUTPATIENT
Start: 2022-02-17 | End: 2022-02-18 | Stop reason: HOSPADM

## 2022-02-17 RX ORDER — PALONOSETRON 0.05 MG/ML
0.25 INJECTION, SOLUTION INTRAVENOUS ONCE
Status: COMPLETED | OUTPATIENT
Start: 2022-02-17 | End: 2022-02-17

## 2022-02-17 RX ADMIN — SODIUM CHLORIDE 20 ML/HR: 9 INJECTION, SOLUTION INTRAVENOUS at 09:18

## 2022-02-17 RX ADMIN — FAMOTIDINE 20 MG: 10 INJECTION, SOLUTION INTRAVENOUS at 09:19

## 2022-02-17 RX ADMIN — PACLITAXEL 172.8 MG: 6 INJECTION, SOLUTION, CONCENTRATE INTRAVENOUS at 10:05

## 2022-02-17 RX ADMIN — HEPARIN 500 UNITS: 100 SYRINGE at 12:06

## 2022-02-17 RX ADMIN — SODIUM CHLORIDE, PRESERVATIVE FREE 10 ML: 5 INJECTION INTRAVENOUS at 12:06

## 2022-02-17 RX ADMIN — DIPHENHYDRAMINE HYDROCHLORIDE 50 MG: 50 INJECTION INTRAMUSCULAR; INTRAVENOUS at 09:19

## 2022-02-17 RX ADMIN — CARBOPLATIN 317.5 MG: 10 INJECTION, SOLUTION INTRAVENOUS at 11:28

## 2022-02-17 RX ADMIN — PALONOSETRON 0.25 MG: 0.25 INJECTION, SOLUTION INTRAVENOUS at 09:19

## 2022-02-17 RX ADMIN — DEXAMETHASONE SODIUM PHOSPHATE 10 MG: 10 INJECTION INTRAMUSCULAR; INTRAVENOUS at 09:26

## 2022-02-17 ASSESSMENT — PAIN DESCRIPTION - ORIENTATION: ORIENTATION: LEFT;LOWER

## 2022-02-17 ASSESSMENT — PAIN SCALES - GENERAL: PAINLEVEL_OUTOF10: 10

## 2022-02-17 ASSESSMENT — PAIN DESCRIPTION - LOCATION: LOCATION: BACK

## 2022-02-17 NOTE — PROGRESS NOTES
Right upper chest mediport accessed without difficulty, good blood return noted. Lab work drawn as ordered. Pt. stated he was still on antibiotic for UTI. Stated he's had a rough week due to [id number] passed away this week. Labs reviewed and treatment administered without incident. Discharge AVS given. Patient's status assessed and documented appropriately. All labs and required results were also reviewed today. Treatment parameters have been reviewed. Today's treatment has been approved by the provider. Treatment orders and medication sequencing (when applicable) was verified by 2 registered nurses. The treatment plan was confirmed with the patient prior to administration, and the patient understands the need to report any treatment-related symptoms. Prior to administration, when applicable, the following 8 elements of medication administration were reviewed with 2nd Registered Nurse prior to dosing: drug name, drug dose, infusion volume when prepared in a syringe, rate of administration, expiration dates and/or times, appearance and integrity of drug(s), and rate of pump for infusion. The 5 rights of medication administration have been verified.

## 2022-02-17 NOTE — PROGRESS NOTES
Yakelin Yolis  2/17/2022    Patient is seen today for follow up. Vitals:    02/17/22 1243   BP: 128/81   Pulse: 94   Resp: 16   Temp: 97 °F (36.1 °C)   SpO2: 97%        Oxygen Therapy  SpO2: 97 %  Pulse Oximeter Device Mode: Intermittent  Pulse Oximeter Device Location: Finger  O2 Device: None (Room air)  Skin Assessment: Clean, dry, & intact    Wt Readings from Last 3 Encounters:   02/17/22 196 lb (88.9 kg)   02/17/22 196 lb (88.9 kg)   02/10/22 196 lb (88.9 kg)       Pain Assessment  Pain Assessment: 0-10  Pain Level: 10  Patient's Stated Pain Goal: No pain  Pain Location: Back  Pain Orientation: Left,Lower  Prescribed Narcotics       Allergies   Allergen Reactions    Tramadol Other (See Comments)     seizures    Vicodin [Hydrocodone-Acetaminophen] Hives        Current Outpatient Medications   Medication Sig Dispense Refill    oxyCODONE (OXYCONTIN) 20 MG extended release tablet Take 1 tablet by mouth 2 times daily for 30 days. 60 tablet 0    oxyCODONE-acetaminophen (PERCOCET) 7.5-325 MG per tablet Take 1 tablet by mouth every 8 hours as needed for Pain for up to 30 days. Intended supply: 30 days 90 tablet 0    dexamethasone (DECADRON) 4 MG tablet Two tablets the day before treatment, one table daily for four days starting the day after chemotherapy 18 tablet 0    NARCAN 4 MG/0.1ML LIQD nasal spray DISPENSED PER STANDING ORDER USE AS DIRECTED PATIENT IS TRAINED OPIOID OVERDOSE RESPONDER      lisinopril (PRINIVIL;ZESTRIL) 10 MG tablet Take 1 tablet by mouth daily 30 tablet 3    ammonium lactate (LAC-HYDRIN) 12 % lotion Apply topically as needed.  1 Bottle 0    nicotine (NICODERM CQ) 21 MG/24HR Place 1 patch onto the skin daily 30 patch 3    melatonin (RA MELATONIN) 3 MG TABS tablet Take 1 tablet by mouth nightly as needed (incsomnia) 30 tablet 3    albuterol sulfate HFA (PROVENTIL HFA) 108 (90 Base) MCG/ACT inhaler Inhale 2 puffs into the lungs every 4 hours as needed for Wheezing or Shortness of Breath With spacer (and mask if indicated). Thanks. 1 Inhaler 1     No current facility-administered medications for this encounter. Facility-Administered Medications Ordered in Other Encounters   Medication Dose Route Frequency Provider Last Rate Last Admin    0.9 % sodium chloride infusion  20 mL/hr IntraVENous Once Collin Cruz MD 20 mL/hr at 02/17/22 0918 20 mL/hr at 02/17/22 0918    sodium chloride flush 0.9 % injection 5-40 mL  5-40 mL IntraVENous PRN Collin Cruz MD   10 mL at 02/17/22 1206    heparin flush 100 UNIT/ML injection 500 Units  500 Units IntraCATHeter PRN Collin Cruz MD   500 Units at 02/17/22 1206        Additional Comments: Pt here for 1 month f/u appt and states he has some pain on left lower back.      Electronically signed by Deon Wallace CMA on 2/17/2022 at 12:45 PM

## 2022-02-17 NOTE — PROGRESS NOTES
[id number] [id number] [facility], [address] [facility]  Phone: [phone]  Fax: [phone]    RADIATION ONCOLOGY FOLLOW UP REPORT    PATIENT NAME:  Erling Fleischer              : 1970  MEDICAL RECORD NO: [id number]    [facility] NO: [id number]        PROVIDER: Mansoor Hunt MD      DATE OF SERVICE: 2022     Other Physicians:        DIAGNOSIS: Cancer Staging  Malignant neoplasm of overlapping sites of left lung [facility])  Staging form: Lung, AJCC 8th Edition  - Clinical: Stage IV (pM1) - Signed by Nadia Casey MD on 2021  - Pathologic: No stage assigned - Unsigned       TREATMENT COURSE:   Oncology History   Malignant neoplasm of overlapping sites of left lung ([facility] [address]   2021 Initial Diagnosis    Malignant neoplasm of overlapping sites of left lung ([facility] [address]     1/3/2022 - 2022 Radiation    Left upper lobe mass: 2000 cGy in 5 fractions 3D conformal radiation     Secondary malignant neoplasm of bone ([facility] [address]   2021 Initial Diagnosis    Secondary malignant neoplasm of bone ([facility] [address]     2021 - 1/10/2022 Radiation    Left pelvic metastasis: 3000 cGy 3D conformal             HPI:   Erling Fleischer is a 47 y.o. male who has a history as above who returns today for his first post RT follow-up visit. Currently, he reports his pain in the pelvis has completely resolved been a 0 out of 10. He reports his breathing has been fairly stable. He denies any dysphagia, odynophagia, or thorax skin itching. He does report having been coughing quite a bit, now with pain along the left lower lateral chest wall near the posterior axillary line rating up to a 10 when coughing or twisting, but rating is 0 without motion. He received a chemo infusion today. He was seen by urology and is in the process of being scheduled for MRI prostate and biopsies. He does complain of the sensation of incomplete bladder emptying.       RADIOLOGIC STUDIES:  CT CHEST W CONTRAST    Result Date: 2/8/2022  EXAMINATION: CT OF THE CHEST WITH CONTRAST 2/8/2022 8:47 am TECHNIQUE: CT of the chest was performed with the administration of intravenous contrast. Multiplanar reformatted images are provided for review. Dose modulation, iterative reconstruction, and/or weight based adjustment of the mA/kV was utilized to reduce the radiation dose to as low as reasonably achievable. COMPARISON: December 2, 2021, and January 4, 2022 HISTORY: ORDERING SYSTEM PROVIDED HISTORY: Malignant neoplasm of overlapping sites of left lung [facility]) TECHNOLOGIST PROVIDED HISTORY: Reason for exam:->post RT Reason for Exam: Recheck Lung Cancer, Inj 75cc Isovue 370, GFR >60 1/27/22 FINDINGS: Mediastinum: There is no axillary lymphadenopathy. There is no mediastinal lymphadenopathy. Interval decrease in size of a prevascular node measuring up to 0.7 cm, previously measuring up to 1.4 cm. There is no definite hilar lymphadenopathy. The heart is normal in size. There is no pericardial effusion. The aorta and main pulmonary artery are normal in caliber. Lungs/pleura: There has been interval decrease in size of left suprahilar mass measuring 3 x 5.2 cm, previously measuring 5.8 x 7.8 cm. The mass is again noted to extend along the left upper lobe bronchus with probable extension into the left main pulmonary artery. There has been interval decrease in ground-glass opacities in the left upper lobe since the prior examination. Mild bibasilar atelectasis is noted. Mild right apical pleuroparenchymal scarring is noted. Upper Abdomen: No acute upper abdominal pathology is noted. Questionable mildly prominent left renal collecting system, increased since the prior examination. Soft Tissues/Bones: Diffuse sclerotic lesions are noted throughout the osseous structures consistent with osseous metastases.      1.  Interval decrease in size of left suprahilar mass extending along the left upper lobe bronchi extension into the left main pulmonary artery. There is interval decrease in associated ground-glass opacity in the left upper lobe. Findings suggest response to therapy. 2.  Interval decrease in mediastinal lymphadenopathy. 3.  Diffuse osseous metastatic disease. PET CT SKULL BASE TO MID THIGH    Narrative  EXAMINATION:  WHOLE BODY PET/CT    8/19/2021    TECHNIQUE:  Following IV injection of 18 mCi of F 18 -FDG, PET  tumor imaging was  acquired from the base of the skull to the mid thighs. Computed tomography  was used for purposes of attenuation correction and anatomic localization. Fusion imaging was utilized for interpretation. Uptake time 53 mins. Glucose level 108 mg/dl. COMPARISON:  CT scan of the chest, abdomen, and pelvis 07/30/2021 and CT abdomen and  pelvis 9/22/2020    HISTORY:  ORDERING SYSTEM PROVIDED HISTORY: Lung nodule  TECHNOLOGIST PROVIDED HISTORY:  Reason for Exam: Lung mass and lung nodule, smoker    FINDINGS:  HEAD/NECK: There is activity along the course of the right subclavian  approach MediPort. No metabolically active cervical lymphadenopathy. CHEST: Increased FDG activity associated with the mass in the left perihilar  region seen the prior CT scans, SUV max of 18.7. A more peripheral component  of the opacification has only mild associated activity, SUV max of 2.8. Focal FDG activity localizes to an AP window lymph node with SUV max of 4.7. No other metabolically active mediastinal lymphadenopathy. ABDOMEN/PELVIS: No metabolically active adrenal mass. There is increased  activity within the prostate gland along the central posterior aspect, SUV  max of 3.7. No focally increased activity associated with the enlarged  retroperitoneal lymph nodes. Physiologic activity in the gastrointestinal  and genitourinary systems.     BONES/SOFT TISSUE: No focal FDG activity associated with the multiple  sclerotic lesions seen throughout the skeleton, largest in the sacrum and  right iliac bone. There is an area of focally increased activity in the left iliac bone which  has diffuse enlargement, cortical thickening, and coarsening trabeculation  with SUV max of 13.6 localizing to a new lytic lesion that has developed  since 9/22/20. Most of the left iliac bone has only mild activity, SUV max  of 2.3. Impression  1. Left perihilar mass likely represents primary lung cancer. Correlate  with biopsy results. The opacity more peripheral in the left lower lobe has  relatively low level activity and likely represents an area of post  obstructive pneumonia adjacent to the mass. 2.  Metabolically active left AP window lymph node concerning for metastatic  disease. 3.  The prostate is enlarged and has increased FDG activity which could be  due to prostatitis or prostate cancer. Correlate with PSA levels. 4.  No increased metabolic activity associated with retroperitoneal lymph  nodes. This is unlikely related to the lung process given the significant  difference in metabolic activity; however, if there is prostate cancer, this  could potentially be metastatic disease from the prostate cancer, which can  have variable levels of uptake on FDG PET scans. 5.  No metabolic activity associated with multiple sclerotic lesions. Again  this is unlikely related to the lung process, but if there is prostate  cancer, this could represent metastatic disease from prostate cancer with  poor FDG avidity. Otherwise it could also represent large bone islands. 6.  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 There is a focal area of intense activity associated with a new lucent lesion  within the background of Paget's disease concerning for metastatic disease. Given the FDG activity, it is likely related to the lung process.       CT CHEST W CONTRAST    Narrative  EXAMINATION:  CT OF THE CHEST WITH CONTRAST 2/8/2022 8:47 am    TECHNIQUE:  CT of the chest was performed with the administration of intravenous  contrast. Multiplanar reformatted images are provided for review. Dose  modulation, iterative reconstruction, and/or weight based adjustment of the  mA/kV was utilized to reduce the radiation dose to as low as reasonably  achievable. COMPARISON:  December 2, 2021, and January 4, 2022    HISTORY:  ORDERING SYSTEM PROVIDED HISTORY: Malignant neoplasm of overlapping sites of  left lung [facility])  TECHNOLOGIST PROVIDED HISTORY:  Reason for exam:->post RT  Reason for Exam: Recheck Lung Cancer, Inj 75cc Isovue 370, GFR >60 1/27/22    FINDINGS:  Mediastinum: There is no axillary lymphadenopathy. There is no mediastinal  lymphadenopathy. Interval decrease in size of a prevascular node measuring  up to 0.7 cm, previously measuring up to 1.4 cm. There is no definite hilar  lymphadenopathy. The heart is normal in size. There is no pericardial  effusion. The aorta and main pulmonary artery are normal in caliber. Lungs/pleura: There has been interval decrease in size of left suprahilar  mass measuring 3 x 5.2 cm, previously measuring 5.8 x 7.8 cm. The mass is  again noted to extend along the left upper lobe bronchus with probable  extension into the left main pulmonary artery. There has been interval  decrease in ground-glass opacities in the left upper lobe since the prior  examination. Mild bibasilar atelectasis is noted. Mild right apical  pleuroparenchymal scarring is noted. Upper Abdomen: No acute upper abdominal pathology is noted. Questionable  mildly prominent left renal collecting system, increased since the prior  examination. Soft Tissues/Bones: Diffuse sclerotic lesions are noted throughout the  osseous structures consistent with osseous metastases. Impression  1. Interval decrease in size of left suprahilar mass extending along the  left upper lobe bronchi extension into the left main pulmonary artery.   There  is interval decrease in associated ground-glass opacity in the left upper  lobe. Findings suggest response to therapy. 2.  Interval decrease in mediastinal lymphadenopathy. 3.  Diffuse osseous metastatic disease. CT Chest w/o contrast: No results found for this or any previous visit. MRI BRAIN W WO CONTRAST    Narrative  EXAMINATION:  MRI OF THE BRAIN WITHOUT AND WITH CONTRAST  8/20/2021 9:11 am    TECHNIQUE:  Multiplanar multisequence MRI of the head/brain was performed without and  with the administration of intravenous contrast.    COMPARISON:  07/22/2016. HISTORY:  ORDERING SYSTEM PROVIDED HISTORY: Malignant neoplasm of overlapping sites of  left lung [facility]  TECHNOLOGIST PROVIDED HISTORY:  Reason for Exam: Malignant neoplasm of overlapping sites of left lung  Acuity: Unknown  Type of Exam: Initial  Relevant Medical/Surgical History: 20mL prohance; GFR >60    FINDINGS:  INTRACRANIAL STRUCTURES/VENTRICLES:  There is a punctate focus restricted  diffusion within the right frontal lobe (series 5, image 19). .  No associated  contrast enhancement. No mass effect or midline shift. No evidence of an  acute intracranial hemorrhage. Small scattered foci of susceptibility are  seen within the cerebral hemispheres bilaterally. These were not visualized  on the prior exam.  A few scattered foci of T2 FLAIR hyperintensity are seen  in the periventricular and subcortical white matter, which are nonspecific. There is minimal global parenchymal volume loss. The sellar/suprasellar  regions appear unremarkable. The normal signal voids within the major  intracranial vessels appear maintained. No abnormal focus of enhancement is  seen within the brain. ORBITS: The visualized portion of the orbits demonstrate no acute abnormality. SINUSES: Minimal scattered mucosal thickening of the paranasal sinuses. The  mastoid air cells demonstrate no acute abnormality.     BONES/SOFT TISSUES: The bone marrow signal intensity demonstrates no acute  abnormality. The soft tissues demonstrate no acute abnormality. Impression  1. There is a punctate focus of restricted diffusion within the right frontal  lobe without associated enhancement, mass effect or midline shift. This  could represent a punctate acute infarct. However, given history, an early  metastatic focus cannot be entirely excluded. 2. Scattered foci of susceptibility are seen within the cerebral hemispheres  bilaterally, which were not visualized on the prior exam.  Findings could  represent areas of remote microhemorrhage or perhaps treated metastases. 3. No abnormal enhancement within the brain. 4. Minimal global parenchymal volume loss with minimal chronic microvascular  ischemic changes. These results were sent to the [facility] [id number] ([address] [place] [address]) on  8/20/2021 at 10:32 am to be communicated to the referring/covering health  care provider/office.           LABORATORY STUDIES:   CBC:   Lab Results   Component Value Date    WBC 3.6 02/17/2022    RBC 3.89 02/17/2022    HGB 10.5 02/17/2022    HCT 30.2 02/17/2022    MCV 77.6 02/17/2022    MCH 27.0 02/17/2022    MCHC 34.8 02/17/2022    RDW 16.8 02/17/2022     02/17/2022    MPV 8.9 02/17/2022     CMP:  Lab Results   Component Value Date     02/17/2022    K 3.6 02/17/2022     02/17/2022    CO2 22 02/17/2022    BUN 14 02/17/2022    CREATININE 1.0 02/17/2022    CREATININE 1.0 02/17/2022    GFRAA >60 02/17/2022    LABGLOM >60 02/17/2022    GLUCOSE 126 02/17/2022    PROT 7.1 02/17/2022    PROT 7.6 12/29/2012    LABALBU 4.0 02/17/2022    CALCIUM 9.1 02/17/2022    BILITOT 0.3 02/17/2022    ALKPHOS 242 02/17/2022    AST 29 02/17/2022    ALT 17 02/17/2022     Onc labs:   Lab Results   Component Value Date    .6 11/16/2021    CEA 7.7 07/23/2021          MEDICATIONS:   Current Outpatient Medications   Medication Sig Dispense Refill    oxyCODONE (OXYCONTIN) 20 MG extended release tablet Take 1 tablet by mouth 2 times daily for 30 days. 60 tablet 0    oxyCODONE-acetaminophen (PERCOCET) 7.5-325 MG per tablet Take 1 tablet by mouth every 8 hours as needed for Pain for up to 30 days. Intended supply: 30 days 90 tablet 0    dexamethasone (DECADRON) 4 MG tablet Two tablets the day before treatment, one table daily for four days starting the day after chemotherapy 18 tablet 0    NARCAN 4 MG/0.1ML LIQD nasal spray DISPENSED PER STANDING ORDER USE AS DIRECTED PATIENT IS TRAINED OPIOID OVERDOSE RESPONDER      lisinopril (PRINIVIL;ZESTRIL) 10 MG tablet Take 1 tablet by mouth daily 30 tablet 3    ammonium lactate (LAC-HYDRIN) 12 % lotion Apply topically as needed. 1 Bottle 0    nicotine (NICODERM CQ) 21 MG/24HR Place 1 patch onto the skin daily 30 patch 3    melatonin (RA MELATONIN) 3 MG TABS tablet Take 1 tablet by mouth nightly as needed (incsomnia) 30 tablet 3    albuterol sulfate HFA (PROVENTIL HFA) 108 (90 Base) MCG/ACT inhaler Inhale 2 puffs into the lungs every 4 hours as needed for Wheezing or Shortness of Breath With spacer (and mask if indicated). Thanks. 1 Inhaler 1     No current facility-administered medications for this encounter. Facility-Administered Medications Ordered in Other Encounters   Medication Dose Route Frequency Provider Last Rate Last Admin    0.9 % sodium chloride infusion  20 mL/hr IntraVENous Once Leland Howard MD 20 mL/hr at 02/17/22 0918 20 mL/hr at 02/17/22 0918    sodium chloride flush 0.9 % injection 5-40 mL  5-40 mL IntraVENous PRN Leland Howard MD   10 mL at 02/17/22 1206    heparin flush 100 UNIT/ML injection 500 Units  500 Units IntraCATHeter PRN Leland Howard MD   500 Units at 02/17/22 1206       EXAMINATION:   Vitals:    02/17/22 1243   BP: 128/81   Pulse: 94   Resp: 16   Temp: 97 °F (36.1 °C)   SpO2: 97%     The patient is in no acute distress. Neck: Supple   Thorax: Minimal tenderness is elicited on palpation of the left lateral posterior thorax.   No masses are palpable. Extremities: No cyanosis, clubbing, or edema is present. ASSESSMENT AND PLAN:     Tomas Whatley is a 47 y.o. male who has a history of metastatic non-small cell lung cancer who completed palliative radiation therapy to the left pelvic metastasis and left lung with a good clinical and radiographic response. I did review his prior PET/CT and recent CT images with him in the room. No malignant cause of his left thoracic wall pain was noted, so therefore, it is likely secondary to excessive coughing for which observation is currently recommended. He is to follow-up with urology regarding the bladder emptying and to call them today. He is to continue chemo per medical oncology's recommendations and to return to see me on an as-needed basis. The patient is to continue to follow CDC's Covid 19 precautionary measures.       Electronically signed by Meenakshi Stubbs MD on 2/17/2022 at 1:09 PM

## 2022-02-21 DIAGNOSIS — C34.82 MALIGNANT NEOPLASM OF OVERLAPPING SITES OF LEFT LUNG (HCC): ICD-10-CM

## 2022-02-21 RX ORDER — OXYCODONE AND ACETAMINOPHEN 7.5; 325 MG/1; MG/1
1 TABLET ORAL EVERY 6 HOURS PRN
Qty: 90 TABLET | Refills: 0 | Status: SHIPPED | OUTPATIENT
Start: 2022-02-21 | End: 2022-03-18 | Stop reason: SDUPTHER

## 2022-02-25 DIAGNOSIS — C34.82 MALIGNANT NEOPLASM OF OVERLAPPING SITES OF LEFT LUNG (HCC): Primary | ICD-10-CM

## 2022-03-02 DIAGNOSIS — R97.20 ELEVATED PSA: ICD-10-CM

## 2022-03-02 DIAGNOSIS — R91.8 MASS OF LEFT LUNG: ICD-10-CM

## 2022-03-02 DIAGNOSIS — I42.9 CARDIOMYOPATHY, UNSPECIFIED TYPE (HCC): ICD-10-CM

## 2022-03-02 DIAGNOSIS — G50.0 TRIGEMINAL NEURALGIA: ICD-10-CM

## 2022-03-02 DIAGNOSIS — C79.51 SECONDARY MALIGNANT NEOPLASM OF BONE (HCC): ICD-10-CM

## 2022-03-02 DIAGNOSIS — C34.82 MALIGNANT NEOPLASM OF OVERLAPPING SITES OF LEFT LUNG (HCC): Primary | ICD-10-CM

## 2022-03-02 DIAGNOSIS — N42.9 DISEASE OF PROSTATE: ICD-10-CM

## 2022-03-04 ENCOUNTER — TELEPHONE (OUTPATIENT)
Dept: INFUSION THERAPY | Age: 52
End: 2022-03-04

## 2022-03-04 NOTE — TELEPHONE ENCOUNTER
Spouse called to cancel tx appt today stating she has worked 4- 12 hr shifts & had her days mixed up therefore forgot pt was scheduled for today. Spouse states pt would prefer to wait until his next scheduled appt which is on 3/10 as I offered to have him come in today & just arrive late.

## 2022-03-07 ENCOUNTER — TELEPHONE (OUTPATIENT)
Dept: ONCOLOGY | Age: 52
End: 2022-03-07

## 2022-03-07 NOTE — TELEPHONE ENCOUNTER
[facility] @ [facility] called to reschedule appts for patient due to patient is currently incarcerated and he isn't able to come at the appt times that are already scheduled, appts have been r/s and called to [facility], she states understanding

## 2022-03-09 ENCOUNTER — TELEPHONE (OUTPATIENT)
Dept: CASE MANAGEMENT | Age: 52
End: 2022-03-09

## 2022-03-09 ENCOUNTER — HOSPITAL ENCOUNTER (OUTPATIENT)
Dept: INFUSION THERAPY | Age: 52
Discharge: HOME | End: 2022-03-09
Payer: COMMERCIAL

## 2022-03-09 ENCOUNTER — OFFICE VISIT (OUTPATIENT)
Dept: ONCOLOGY | Age: 52
End: 2022-03-09
Payer: COMMERCIAL

## 2022-03-09 VITALS
DIASTOLIC BLOOD PRESSURE: 83 MMHG | OXYGEN SATURATION: 99 % | SYSTOLIC BLOOD PRESSURE: 141 MMHG | HEIGHT: 73 IN | HEART RATE: 83 BPM | WEIGHT: 196 LBS | TEMPERATURE: 98 F | BODY MASS INDEX: 25.98 KG/M2

## 2022-03-09 DIAGNOSIS — C34.82 MALIGNANT NEOPLASM OF OVERLAPPING SITES OF LEFT LUNG (HCC): ICD-10-CM

## 2022-03-09 DIAGNOSIS — R97.20 ELEVATED PSA: ICD-10-CM

## 2022-03-09 DIAGNOSIS — C79.51 SECONDARY MALIGNANT NEOPLASM OF BONE (HCC): ICD-10-CM

## 2022-03-09 DIAGNOSIS — I42.9 CARDIOMYOPATHY, UNSPECIFIED TYPE (HCC): ICD-10-CM

## 2022-03-09 DIAGNOSIS — N42.9 DISEASE OF PROSTATE: ICD-10-CM

## 2022-03-09 DIAGNOSIS — R91.8 MASS OF LEFT LUNG: ICD-10-CM

## 2022-03-09 DIAGNOSIS — C34.82 MALIGNANT NEOPLASM OF OVERLAPPING SITES OF LEFT LUNG (HCC): Primary | ICD-10-CM

## 2022-03-09 DIAGNOSIS — G50.0 TRIGEMINAL NEURALGIA: ICD-10-CM

## 2022-03-09 LAB
ALBUMIN SERPL-MCNC: 4 GM/DL (ref 3.4–5)
ALP BLD-CCNC: 198 IU/L (ref 40–129)
ALT SERPL-CCNC: 10 U/L (ref 10–40)
ANION GAP SERPL CALCULATED.3IONS-SCNC: 17 MMOL/L (ref 4–16)
AST SERPL-CCNC: 36 IU/L (ref 15–37)
BASOPHILS ABSOLUTE: 0 K/CU MM
BASOPHILS RELATIVE PERCENT: 0.4 % (ref 0–1)
BILIRUB SERPL-MCNC: 0.3 MG/DL (ref 0–1)
BUN BLDV-MCNC: 17 MG/DL (ref 6–23)
CALCIUM SERPL-MCNC: 9.6 MG/DL (ref 8.3–10.6)
CHLORIDE BLD-SCNC: 101 MMOL/L (ref 99–110)
CO2: 22 MMOL/L (ref 21–32)
CREAT SERPL-MCNC: 2.7 MG/DL (ref 0.9–1.3)
DIFFERENTIAL TYPE: ABNORMAL
EOSINOPHILS ABSOLUTE: 0.1 K/CU MM
EOSINOPHILS RELATIVE PERCENT: 1.6 % (ref 0–3)
FERRITIN: 1404 NG/ML (ref 30–400)
GFR AFRICAN AMERICAN: 30 ML/MIN/1.73M2
GFR NON-AFRICAN AMERICAN: 25 ML/MIN/1.73M2
GLUCOSE BLD-MCNC: 106 MG/DL (ref 70–99)
HCT VFR BLD CALC: 25 % (ref 42–52)
HEMOGLOBIN: 8.3 GM/DL (ref 13.5–18)
IRON: 118 UG/DL (ref 59–158)
LYMPHOCYTES ABSOLUTE: 1.3 K/CU MM
LYMPHOCYTES RELATIVE PERCENT: 23.6 % (ref 24–44)
MCH RBC QN AUTO: 27.2 PG (ref 27–31)
MCHC RBC AUTO-ENTMCNC: 33.2 % (ref 32–36)
MCV RBC AUTO: 82 FL (ref 78–100)
MONOCYTES ABSOLUTE: 0.7 K/CU MM
MONOCYTES RELATIVE PERCENT: 12.3 % (ref 0–4)
PCT TRANSFERRIN: 47 % (ref 10–44)
PDW BLD-RTO: 17.6 % (ref 11.7–14.9)
PLATELET # BLD: 117 K/CU MM (ref 140–440)
PMV BLD AUTO: 9.5 FL (ref 7.5–11.1)
POTASSIUM SERPL-SCNC: 4.9 MMOL/L (ref 3.5–5.1)
RBC # BLD: 3.05 M/CU MM (ref 4.6–6.2)
SEGMENTED NEUTROPHILS ABSOLUTE COUNT: 3.5 K/CU MM
SEGMENTED NEUTROPHILS RELATIVE PERCENT: 62.1 % (ref 36–66)
SODIUM BLD-SCNC: 140 MMOL/L (ref 135–145)
T4 FREE: 1.29 NG/DL (ref 0.9–1.8)
TOTAL IRON BINDING CAPACITY: 252 UG/DL (ref 250–450)
TOTAL PROTEIN: 7.1 GM/DL (ref 6.4–8.2)
TSH HIGH SENSITIVITY: 1.1 UIU/ML (ref 0.27–4.2)
UNSATURATED IRON BINDING CAPACITY: 134 UG/DL (ref 110–370)
WBC # BLD: 5.6 K/CU MM (ref 4–10.5)

## 2022-03-09 PROCEDURE — 4004F PT TOBACCO SCREEN RCVD TLK: CPT | Performed by: INTERNAL MEDICINE

## 2022-03-09 PROCEDURE — 36415 COLL VENOUS BLD VENIPUNCTURE: CPT

## 2022-03-09 PROCEDURE — 84443 ASSAY THYROID STIM HORMONE: CPT

## 2022-03-09 PROCEDURE — G8427 DOCREV CUR MEDS BY ELIG CLIN: HCPCS | Performed by: INTERNAL MEDICINE

## 2022-03-09 PROCEDURE — G8419 CALC BMI OUT NRM PARAM NOF/U: HCPCS | Performed by: INTERNAL MEDICINE

## 2022-03-09 PROCEDURE — 85025 COMPLETE CBC W/AUTO DIFF WBC: CPT

## 2022-03-09 PROCEDURE — 84439 ASSAY OF FREE THYROXINE: CPT

## 2022-03-09 PROCEDURE — 80053 COMPREHEN METABOLIC PANEL: CPT

## 2022-03-09 PROCEDURE — 83540 ASSAY OF IRON: CPT

## 2022-03-09 PROCEDURE — 82728 ASSAY OF FERRITIN: CPT

## 2022-03-09 PROCEDURE — G8484 FLU IMMUNIZE NO ADMIN: HCPCS | Performed by: INTERNAL MEDICINE

## 2022-03-09 PROCEDURE — 99214 OFFICE O/P EST MOD 30 MIN: CPT | Performed by: INTERNAL MEDICINE

## 2022-03-09 PROCEDURE — 83550 IRON BINDING TEST: CPT

## 2022-03-09 PROCEDURE — 3017F COLORECTAL CA SCREEN DOC REV: CPT | Performed by: INTERNAL MEDICINE

## 2022-03-09 NOTE — TELEPHONE ENCOUNTER
This RN called and [facility] Per nurse Oneyda Obrien no one can be aware of patients time and date of appointments due to patient being incarcerated. Brisa Solares stated this RN could schedule appointment for PET and Dr. Barbara Patel follow up. When this RN called Central Scheduling it was to be noted I patients chart not to give out appointment information to anyone, including number on file. PET Monday 3/28/2022 arrive at [phone] for 1400 appointment. NPO 6 hours prior but patient can have AM meds with water. Patent needs to bring ID and insurance car if he has access to them. Follow up appointment with Dr. Barbara Patel made for Wednesday 4/6/2022 at 1100. This RN checked with Nina Pereira in registration and patient does not have a my chart at this time. Appointments will not be able to be accessed through My Chart.    This RN called nurse line at [facility], left message to return this RNs call

## 2022-03-09 NOTE — PROGRESS NOTES
Patient Name:  Beryle Awkward  Patient :  1970  Patient MRN:  [id number]     Primary Oncologist: Saida Winters MD  Referring Provider: Vanessa Wilhelm MD     Date of Service: 3/9/2022        Chief Complaint:    Chief Complaint   Patient presents with    Follow-up       Encounter Diagnoses   Name Primary?  Malignant neoplasm of overlapping sites of left lung ([address] Yes    Secondary malignant neoplasm of bone ([facility] [address]         HPI:   21: Initial [facility] visit:Hima Newell is a 45 y.o. male who presents to [facility] with report of dysuria and flank pain. Reports these symptoms started a few weeks ago. He ultimately came to the ED due to worsening back pain.      He reports ongoing issues with frequent urge to urinate and notes some incontinence. He denies hematuria. He was noted to have acute pyelonephritis and was admitted and started on IV Rocephin.      21 CT chest     Impression   1. Left hilar neoplasm extending into the left upper lobe measuring 3.2 x 5.9   x 5.3 cm obstructing the left upper lobe bronchus with probable minimal   adjacent infiltrates versus lymphangitic spread of tumor.  PET-CT/biopsy is   recommended. 2. Mediastinal lymphadenopathy. 3. Prominent left supraclavicular lymph nodes. 4. No osseous metastatic disease.      21 Ct abdomen and pelvis  Impression   1. Circumferential thickening of the bladder wall is noted which could be   related to cystitis.  Correlate with urinalysis. 2. There is left retroperitoneal lymphadenopathy.  This could be reactive or   neoplastic.  This can be further evaluated with PET-CT. 3. There is minimal stranding within the retroperitoneal on the left.  This   is uncertain etiology however could be related to acute pyelonephritis. 4. Interval development of multiple sclerotic lesions within the spine and   pelvis, concerning for metastatic osseous disease.    5. Prostate gland is enlarged.  Correlate with PSA.      .30      He denies past history of cancer. He smokes 2-3 ppd and drinks 10-12 beers daily. He reports unknown malignancy in his sister who  at 39. No other known family history of cancer.      Due to lung mass and concern for metastatic prostate cancer we were called to evaluate. 21:  Final Pathologic Diagnosis:   Needle biopsy of lung, clinically left lung mass:        SQUAMOUS CELL CARCINOMA IN SITU.     21:PET  1.  Left perihilar mass likely represents primary lung cancer.  Correlate   with biopsy results.  The opacity more peripheral in the left lower lobe has   relatively low level activity and likely represents an area of post   obstructive pneumonia adjacent to the mass.       2.  Metabolically active left AP window lymph node concerning for metastatic   disease.       3.  The prostate is enlarged and has increased FDG activity which could be   due to prostatitis or prostate cancer.  Correlate with PSA levels.       4.  No increased metabolic activity associated with retroperitoneal lymph   nodes.  This is unlikely related to the lung process given the significant   difference in metabolic activity; however, if there is prostate cancer, this   could potentially be metastatic disease from the prostate cancer, which can   have variable levels of uptake on FDG PET scans.       5.  No metabolic activity associated with multiple sclerotic lesions.  Again   this is unlikely related to the lung process, but if there is prostate   cancer, this could represent metastatic disease from prostate cancer with   poor FDG avidity.  Otherwise it could also represent large bone islands.       6.  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 There is a focal area of intense activity associated with a new lucent lesion   within the background of Paget's disease concerning for metastatic disease. Given the FDG activity, it is likely related to the lung process.           21: MRI brain  1. HTN, COPD                                                            Past Surgery History:    None per his wife                                                                        Social History:   Lives with wife. Cut down smoking to 2-4 cigarettes per day prior to which he was smoking 2 to 3 packs/day for the past 40 years. Also reported drinking alcohol 10-12 beers per day. Denied any other illicit drug abuse. Family History:    He reported that his sister  at the age of 39 with  metastatic cancer, he was not sure what the primary was                                                                                              Allergies   Allergen Reactions    Tramadol Other (See Comments)     seizures    Vicodin [Hydrocodone-Acetaminophen] Hives       Current Outpatient Medications on File Prior to Visit   Medication Sig Dispense Refill    dexamethasone (DECADRON) 4 MG tablet Two tablets the day before treatment, one table daily for four days starting the day after chemotherapy 18 tablet 0    NARCAN 4 MG/0.1ML LIQD nasal spray DISPENSED PER STANDING ORDER USE AS DIRECTED PATIENT IS TRAINED OPIOID OVERDOSE RESPONDER      lisinopril (PRINIVIL;ZESTRIL) 10 MG tablet Take 1 tablet by mouth daily 30 tablet 3    ammonium lactate (LAC-HYDRIN) 12 % lotion Apply topically as needed. 1 Bottle 0    nicotine (NICODERM CQ) 21 MG/24HR Place 1 patch onto the skin daily 30 patch 3    oxyCODONE-acetaminophen (PERCOCET) 7.5-325 MG per tablet Take 1 tablet by mouth every 6 hours as needed for Pain for up to 30 days. Intended supply: 30 days (Patient not taking: Reported on 3/9/2022) 90 tablet 0    oxyCODONE (OXYCONTIN) 20 MG extended release tablet Take 1 tablet by mouth 2 times daily for 30 days.  (Patient not taking: Reported on 3/9/2022) 60 tablet 0    melatonin (RA MELATONIN) 3 MG TABS tablet Take 1 tablet by mouth nightly as needed (incsomnia) 30 tablet 3    albuterol sulfate HFA (PROVENTIL HFA) 108 (90 Base) MCG/ACT inhaler Inhale 2 puffs into the lungs every 4 hours as needed for Wheezing or Shortness of Breath With spacer (and mask if indicated). Thanks. 1 Inhaler 1     No current facility-administered medications on file prior to visit. Interval History: 3/9/22: He arrived with  to the clinic today. Reported hemotysis , dark blood. No chest pain. Appetite is good. Lost some weight. Reported that he missed the biopsy. Reported lower abdominal pain and difficulty urinating. No hematuria or any other bleeding.      Review of Systems:  As per the interval history, rest of the review of system negative     Vital Signs: BP (!) 141/83 (Site: Right Upper Arm, Position: Sitting, Cuff Size: Medium Adult)   Pulse 83   Temp 98 °F (36.7 °C) (Infrared)   Ht 6' 1\" (1.854 m)   Wt 196 lb (88.9 kg)   SpO2 99%   BMI 25.86 kg/m²      CONSTITUTIONAL: awake, alert  EYES: RAISSA, No pallor or any icterus  ENT: ATNC  NECK: No JVD  HEMATOLOGIC/LYMPHATIC: no cervical, supraclavicular or axillary lymphadenopathy   LUNGS: CTAB  CARDIOVASCULAR: s1s2 rrr no murmurs  ABDOMEN: soft ntnd bs pos  NEUROLOGIC: GI  SKIN: Psoriatic rash with excoriation marks on the lower extremities and also upper extremities  EXTREMITIES: no LE edema bilaterally     Labs:  Hematology:  Lab Results   Component Value Date    WBC 3.6 (L) 02/17/2022    RBC 3.89 (L) 02/17/2022    HGB 10.5 (L) 02/17/2022    HCT 30.2 (L) 02/17/2022    MCV 77.6 (L) 02/17/2022    MCH 27.0 02/17/2022    MCHC 34.8 02/17/2022    RDW 16.8 (H) 02/17/2022     (L) 02/17/2022    MPV 8.9 02/17/2022    BANDSPCT 9 11/13/2018    SEGSPCT 60.5 02/17/2022    EOSRELPCT 1.7 02/17/2022    BASOPCT 0.8 02/17/2022    LYMPHOPCT 26.8 02/17/2022    MONOPCT 10.2 (H) 02/17/2022    BANDABS 0.66 11/13/2018    SEGSABS 2.2 02/17/2022    EOSABS 0.1 02/17/2022    BASOSABS 0.0 02/17/2022    LYMPHSABS 1.0 02/17/2022    MONOSABS 0.4 02/17/2022    DIFFTYPE AUTOMATED DIFFERENTIAL 02/17/2022    POLYCHROM 1+ 11/13/2018    PLTM FEW 11/13/2018     No results found for: ESR  Chemistry:  Lab Results   Component Value Date     02/17/2022    K 3.6 02/17/2022     02/17/2022    CO2 22 02/17/2022    BUN 14 02/17/2022    CREATININE 1.0 02/17/2022    GLUCOSE 126 (H) 02/17/2022    CALCIUM 9.1 02/17/2022    PROT 7.1 02/17/2022    LABALBU 4.0 02/17/2022    BILITOT 0.3 02/17/2022    ALKPHOS 242 (H) 02/17/2022    AST 29 02/17/2022    ALT 17 02/17/2022    LABGLOM >60 02/17/2022    GFRAA >60 02/17/2022    MG 1.6 (L) 07/29/2021    POCCA 1.22 02/17/2022    POCGLU 128 (H) 02/17/2022     No results found for: MMA, LDH, HOMOCYSTEINE  No components found for: LD  Lab Results   Component Value Date    TSHHS 1.940 02/10/2022    T4FREE 1.18 02/10/2022     Immunology:  Lab Results   Component Value Date    PROT 7.1 02/17/2022     No results found for: Bonifacio Goode, KLFLCR  No results found for: B2M  Coagulation Panel:  Lab Results   Component Value Date    PROTIME 12.9 12/17/2021    INR 1.00 12/17/2021    APTT 35.9 12/17/2021    DDIMER <200 12/19/2013     Anemia Panel:  No results found for: Nereida Szymanskijoseph  Tumor Markers:  Lab Results   Component Value Date    CEA 7.7 07/23/2021    .6 (H) 11/16/2021        Observations:  ECOG:  No data recorded       Assessment & Plan:                                                          Left hilar mass with mediastinal hilar lymphadenopathy. 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 PET scan results from august 2021 noted, bone lesions most probably not metastatic except for one lytic lesion. MRI of the brain with no convincing metastatic lesions. Presented  the case in the tumor board.   Decision made to proceed with a prostate biopsy and treat with ADT if malignancy  and in the meantime proceed with staging mediastinoscopy/rebiopsy for further treatment plan. But as pt very very very noncompliant, did not follow up with urology, CTS or for bone biopsy multiple times. Note PSA rising and is 250 on November 16 2021   CT imaging with progressive met disease  Bone biopsy on dec 13 2021 with met squamous cell cancer, consistent with lung primary. PDL1 >50%Thomasenia Dress) . Not enough tissue for rest of CARIS, guardant 360 with no other actionable mutation  Completed Palliative radiation to the pelvic area and also radiation to the left lung dia 10 2022  Discussed the findings, diagnosis and poor prognosis and treatment with carbo/taxol/pembro after completion of radiation. Discussed ae. PORT placed. Completed OCM  C1D1 carbo/taxol and keytruda started 1/20/22  Plan for repeat imaging after 3 C  Dose modifications as needed. Prostate enlargement and elevated PSA  (continues to rise from 147 in July 2021 to 250 in jan 2022), most probably  prostate cancer. As mentioned above recommend prostate biopsy, but pt very noncompliant and did not follow through multiple times but finally followed through. Will obtain records. Constipation: stool softener and laxatives as needed    Rash:   Looks like psoriatic rash. Has been applying topical cream    Elevated alk phos most probably secondary to the bone lesions possibly Paget's disease versus EtOH. Has been chronically elevated since 2012. Adequate analgesic and bowel regimen. Continue other medical care. Thank you for letting us be part of the care and will follow along. Discussed above findings and plan with him and he voiced understanding. Answered all his questions. Discussed healthy lifestyle including healthy diet, regular exercise as tolerated. ,  Smoking and alcohol cessation    Recommend follow-up with primary care physician and other specialists. Note he has he has been very noncompliant with appointments.     Please do not hesitate to contact us if you need further information. Return to clinic April 1 2022 or earlier if new Sx    I have recommended that the patient follow CDC guidelines for prevention of COVID-19 infection.   Received Covid vaccine/flu vaccine     TOMI

## 2022-03-09 NOTE — PROGRESS NOTES
MA Rooming Questions  Patient: Sindy Mills  MRN: [id number]    Date: 3/9/2022        1. Do you have any new issues? Coughing up blood          2. Do you need any refills on medications?    no    3. Have you had any imaging done since your last visit?   no    4. Have you been hospitalized or seen in the emergency room since your last visit here?   no    5. Did the patient have a depression screening completed today?  No    No data recorded     PHQ-9 Given to (if applicable):               PHQ-9 Score (if applicable):                     [] Positive     []  Negative              Does question #9 need addressed (if applicable)                     [] Yes    []  No               Jazmin Ferrer MA

## 2022-03-10 ENCOUNTER — TELEPHONE (OUTPATIENT)
Dept: CASE MANAGEMENT | Age: 52
End: 2022-03-10

## 2022-03-11 ENCOUNTER — HOSPITAL ENCOUNTER (OUTPATIENT)
Dept: INFUSION THERAPY | Age: 52
Discharge: HOME | End: 2022-03-11
Payer: COMMERCIAL

## 2022-03-11 ENCOUNTER — APPOINTMENT (OUTPATIENT)
Dept: GENERAL RADIOLOGY | Age: 52
End: 2022-03-11
Payer: COMMERCIAL

## 2022-03-11 ENCOUNTER — APPOINTMENT (OUTPATIENT)
Dept: CT IMAGING | Age: 52
End: 2022-03-11
Payer: COMMERCIAL

## 2022-03-11 ENCOUNTER — HOSPITAL ENCOUNTER (EMERGENCY)
Age: 52
Discharge: HOME | End: 2022-03-11
Attending: EMERGENCY MEDICINE
Payer: COMMERCIAL

## 2022-03-11 VITALS
HEIGHT: 73 IN | TEMPERATURE: 98.5 F | DIASTOLIC BLOOD PRESSURE: 83 MMHG | RESPIRATION RATE: 16 BRPM | BODY MASS INDEX: 26.51 KG/M2 | SYSTOLIC BLOOD PRESSURE: 129 MMHG | HEART RATE: 76 BPM | OXYGEN SATURATION: 98 % | WEIGHT: 200 LBS

## 2022-03-11 VITALS
HEART RATE: 94 BPM | HEIGHT: 73 IN | TEMPERATURE: 97.5 F | DIASTOLIC BLOOD PRESSURE: 84 MMHG | SYSTOLIC BLOOD PRESSURE: 140 MMHG | BODY MASS INDEX: 26.53 KG/M2 | WEIGHT: 200.2 LBS | OXYGEN SATURATION: 95 %

## 2022-03-11 DIAGNOSIS — C34.82 MALIGNANT NEOPLASM OF OVERLAPPING SITES OF LEFT LUNG (HCC): Primary | ICD-10-CM

## 2022-03-11 DIAGNOSIS — K59.03 DRUG-INDUCED CONSTIPATION: Primary | ICD-10-CM

## 2022-03-11 DIAGNOSIS — C79.51 SECONDARY MALIGNANT NEOPLASM OF BONE (HCC): ICD-10-CM

## 2022-03-11 LAB
ALBUMIN SERPL-MCNC: 3.6 GM/DL (ref 3.4–5)
ALP BLD-CCNC: 166 IU/L (ref 40–129)
ALT SERPL-CCNC: 11 U/L (ref 10–40)
ANION GAP SERPL CALCULATED.3IONS-SCNC: 13 MMOL/L (ref 4–16)
AST SERPL-CCNC: 25 IU/L (ref 15–37)
BANDED NEUTROPHILS ABSOLUTE COUNT: 0.14 K/CU MM
BANDED NEUTROPHILS RELATIVE PERCENT: 3 % (ref 5–11)
BASOPHILS ABSOLUTE: 0 K/CU MM
BASOPHILS RELATIVE PERCENT: 1 % (ref 0–1)
BILIRUB SERPL-MCNC: 0.2 MG/DL (ref 0–1)
BUN BLDV-MCNC: 11 MG/DL (ref 6–23)
CALCIUM SERPL-MCNC: 9.6 MG/DL (ref 8.3–10.6)
CHLORIDE BLD-SCNC: 100 MMOL/L (ref 99–110)
CO2: 24 MMOL/L (ref 21–32)
CREAT SERPL-MCNC: 0.8 MG/DL (ref 0.9–1.3)
DIFFERENTIAL TYPE: ABNORMAL
EOSINOPHILS ABSOLUTE: 0 K/CU MM
EOSINOPHILS RELATIVE PERCENT: 1 % (ref 0–3)
GFR AFRICAN AMERICAN: >60 ML/MIN/1.73M2
GFR NON-AFRICAN AMERICAN: >60 ML/MIN/1.73M2
GLUCOSE BLD-MCNC: 121 MG/DL (ref 70–99)
HCT VFR BLD CALC: 23.5 % (ref 42–52)
HEMOGLOBIN: 7.9 GM/DL (ref 13.5–18)
LIPASE: 27 IU/L (ref 13–60)
LYMPHOCYTES ABSOLUTE: 1.3 K/CU MM
LYMPHOCYTES RELATIVE PERCENT: 28 % (ref 24–44)
MCH RBC QN AUTO: 27.3 PG (ref 27–31)
MCHC RBC AUTO-ENTMCNC: 33.6 % (ref 32–36)
MCV RBC AUTO: 81.3 FL (ref 78–100)
METAMYELOCYTES ABSOLUTE COUNT: 0.05 K/CU MM
METAMYELOCYTES PERCENT: 1 %
MONOCYTES ABSOLUTE: 0.2 K/CU MM
MONOCYTES RELATIVE PERCENT: 5 % (ref 0–4)
NUCLEATED RED BLOOD CELLS: 1
PDW BLD-RTO: 17.1 % (ref 11.7–14.9)
PLATELET # BLD: 97 K/CU MM (ref 140–440)
PMV BLD AUTO: 9.5 FL (ref 7.5–11.1)
POTASSIUM SERPL-SCNC: 3.8 MMOL/L (ref 3.5–5.1)
RBC # BLD: 2.89 M/CU MM (ref 4.6–6.2)
SEGMENTED NEUTROPHILS ABSOLUTE COUNT: 2.8 K/CU MM
SEGMENTED NEUTROPHILS RELATIVE PERCENT: 61 % (ref 36–66)
SODIUM BLD-SCNC: 137 MMOL/L (ref 135–145)
TOTAL PROTEIN: 6.5 GM/DL (ref 6.4–8.2)
WBC # BLD: 4.5 K/CU MM (ref 4–10.5)

## 2022-03-11 PROCEDURE — 2580000003 HC RX 258: Performed by: INTERNAL MEDICINE

## 2022-03-11 PROCEDURE — 6370000000 HC RX 637 (ALT 250 FOR IP): Performed by: EMERGENCY MEDICINE

## 2022-03-11 PROCEDURE — 70450 CT HEAD/BRAIN W/O DYE: CPT

## 2022-03-11 PROCEDURE — 74022 RADEX COMPL AQT ABD SERIES: CPT

## 2022-03-11 PROCEDURE — 85027 COMPLETE CBC AUTOMATED: CPT

## 2022-03-11 PROCEDURE — 6370000000 HC RX 637 (ALT 250 FOR IP): Performed by: INTERNAL MEDICINE

## 2022-03-11 PROCEDURE — 96360 HYDRATION IV INFUSION INIT: CPT

## 2022-03-11 PROCEDURE — 80053 COMPREHEN METABOLIC PANEL: CPT

## 2022-03-11 PROCEDURE — 6360000002 HC RX W HCPCS: Performed by: INTERNAL MEDICINE

## 2022-03-11 PROCEDURE — 83690 ASSAY OF LIPASE: CPT

## 2022-03-11 PROCEDURE — 6360000002 HC RX W HCPCS: Performed by: EMERGENCY MEDICINE

## 2022-03-11 PROCEDURE — 99285 EMERGENCY DEPT VISIT HI MDM: CPT

## 2022-03-11 PROCEDURE — 85007 BL SMEAR W/DIFF WBC COUNT: CPT

## 2022-03-11 RX ORDER — ACETAMINOPHEN 325 MG/1
650 TABLET ORAL ONCE
Status: COMPLETED | OUTPATIENT
Start: 2022-03-11 | End: 2022-03-11

## 2022-03-11 RX ORDER — HEPARIN SODIUM (PORCINE) LOCK FLUSH IV SOLN 100 UNIT/ML 100 UNIT/ML
500 SOLUTION INTRAVENOUS PRN
Status: CANCELLED | OUTPATIENT
Start: 2022-03-11

## 2022-03-11 RX ORDER — SENNOSIDES 8.6 MG
1 CAPSULE ORAL 2 TIMES DAILY PRN
Qty: 20 CAPSULE | Refills: 0 | Status: SHIPPED | OUTPATIENT
Start: 2022-03-11

## 2022-03-11 RX ORDER — POLYETHYLENE GLYCOL 3350 17 G/17G
17 POWDER, FOR SOLUTION ORAL DAILY
Qty: 510 G | Refills: 0 | Status: ON HOLD | OUTPATIENT
Start: 2022-03-11 | End: 2022-04-12 | Stop reason: HOSPADM

## 2022-03-11 RX ORDER — SENNA PLUS 8.6 MG/1
2 TABLET ORAL NIGHTLY
Status: DISCONTINUED | OUTPATIENT
Start: 2022-03-11 | End: 2022-03-11 | Stop reason: HOSPADM

## 2022-03-11 RX ORDER — HEPARIN SODIUM (PORCINE) LOCK FLUSH IV SOLN 100 UNIT/ML 100 UNIT/ML
100 SOLUTION INTRAVENOUS ONCE
Status: COMPLETED | OUTPATIENT
Start: 2022-03-11 | End: 2022-03-11

## 2022-03-11 RX ORDER — 0.9 % SODIUM CHLORIDE 0.9 %
1000 INTRAVENOUS SOLUTION INTRAVENOUS ONCE
Status: CANCELLED | OUTPATIENT
Start: 2022-03-11 | End: 2022-03-11

## 2022-03-11 RX ORDER — HEPARIN SODIUM (PORCINE) LOCK FLUSH IV SOLN 100 UNIT/ML 100 UNIT/ML
500 SOLUTION INTRAVENOUS PRN
Status: DISCONTINUED | OUTPATIENT
Start: 2022-03-11 | End: 2022-03-12 | Stop reason: HOSPADM

## 2022-03-11 RX ORDER — SODIUM CHLORIDE 9 MG/ML
25 INJECTION, SOLUTION INTRAVENOUS PRN
Status: CANCELLED | OUTPATIENT
Start: 2022-03-11

## 2022-03-11 RX ORDER — LACTULOSE 10 G/15ML
20 SOLUTION ORAL ONCE
Status: COMPLETED | OUTPATIENT
Start: 2022-03-11 | End: 2022-03-11

## 2022-03-11 RX ORDER — SODIUM CHLORIDE 0.9 % (FLUSH) 0.9 %
5-40 SYRINGE (ML) INJECTION PRN
Status: CANCELLED | OUTPATIENT
Start: 2022-03-11

## 2022-03-11 RX ORDER — 0.9 % SODIUM CHLORIDE 0.9 %
1000 INTRAVENOUS SOLUTION INTRAVENOUS ONCE
Status: COMPLETED | OUTPATIENT
Start: 2022-03-11 | End: 2022-03-11

## 2022-03-11 RX ADMIN — HEPARIN 100 UNITS: 100 SYRINGE at 20:13

## 2022-03-11 RX ADMIN — GLYCERIN 2 G: 2 SUPPOSITORY RECTAL at 20:13

## 2022-03-11 RX ADMIN — SENNOSIDES 17.2 MG: 8.6 TABLET, FILM COATED ORAL at 19:36

## 2022-03-11 RX ADMIN — MAGNESIUM CITRATE 296 ML: 1.75 LIQUID ORAL at 19:36

## 2022-03-11 RX ADMIN — SODIUM CHLORIDE 1000 ML: 9 INJECTION, SOLUTION INTRAVENOUS at 10:49

## 2022-03-11 RX ADMIN — ACETAMINOPHEN 650 MG: 325 TABLET ORAL at 11:48

## 2022-03-11 RX ADMIN — LACTULOSE 20 G: 10 SOLUTION ORAL at 19:36

## 2022-03-11 ASSESSMENT — PAIN SCALES - GENERAL: PAINLEVEL_OUTOF10: 10

## 2022-03-11 ASSESSMENT — PAIN DESCRIPTION - LOCATION: LOCATION: ABDOMEN

## 2022-03-11 NOTE — ED NOTES
Pt requesting something to eat. Explained to pt we needed to have the provider see him first. Pt stated he was already seen by his doctor and he was sent here to be admitted.      Chintan Sue RN  03/11/22 1627

## 2022-03-11 NOTE — PROGRESS NOTES
Ambulated to infusion area, here today for hydration. Patient c/o lower back and abdominal pain, 10/10. Pe rpatient, last BM on 03/06, urine output has decreased, and has pain while urinating. Patient reports that he did take his pain medication about an hour before arrival to office. Right chest mediport accessed, positive blood return noted. Creat on 03/09 2.7. 1L fluid bolus given. Dr. Sonia Ag to chairside to evaluate patient--recommending ER follow up upon completion of 1L bolus today. Fluid bolus administered as ordered. While receiving bolus, patient began complaining of headache, 10/10. Discussed with Dr. Sonia Ag, [facility] to give tylenol 650 mg during visit. Call light within reach. Tolerated infusion without incident. Discharge instructions provided. Patient left accessed for ER visit-- Lauren Zafar, nurse mcghee, assisted to wheelchair and taken next door.

## 2022-03-11 NOTE — ED PROVIDER NOTES
Emergency Department Encounter    Patient: Xi Zheng  MRN: [id number]  : 1970  Date of Evaluation: 3/11/2022  ED Provider:  Wanna Lesch, DO    Triage Chief Complaint:   Headache, Constipation, and Other (dehydration)    [place]:  Xi Zheng is a 47 y.o. male that presents to the emergency department for evaluation of kidney failure and constipation. Patient states he was sent over from the oncologist office. Patient states he was going to get chemotherapy today but they gave him a liter of IV fluids Tylenol for his headache and was sent to the ER for evaluation. Patient states he has stage IV lung cancer with metastasis to the bone. Patient states he also has history of trigeminal neuralgia has on and off headache from the pain. States no headache at this time. Patient states he was given Tylenol earlier. Patient states he has had constipation no bowel movement for the past week. Patient states he does have medication for constipation which he has not been taking. Patient states he is on chronic pain pills daily. Patient has a port to the right anterior chest.  Patient states intermittent dizziness lightheaded no syncopal episode no dysuria hematuria no swelling extremities. Patient states no chest pain shortness breath no nausea vomiting diarrhea no abdominal pain no fever chills or cough. Patient here for evaluation.     ROS - see HPI, below listed is current ROS at time of my eval:  General:  No fevers, no chills, no weakness  Eyes:  No recent vison changes, no discharge  ENT:  No sore throat, no nasal congestion, no hearing changes  Cardiovascular:  No chest pain, no palpitations  Respiratory:  No shortness of breath, no cough, no wheezing  Gastrointestinal: Positive for constipation, no pain, no nausea, no vomiting, no diarrhea  Musculoskeletal:  No muscle pain, no joint pain  Skin:  No rash, no pruritis, no easy bruising  Neurologic:  No speech problems, positive for trigeminal neuralgia and intermittent headaches, no extremity numbness, no extremity tingling, no extremity weakness  Psychiatric:  No anxiety  Genitourinary:  No dysuria, no hematuria  Endocrine:  No unexpected weight gain, no unexpected weight loss  Extremities:  no edema, no pain    Past Medical History:   Diagnosis Date    CAD (coronary artery disease) 12/23/2013    cath negative per pt    History of TMJ disorder     Hypertension     Trigeminal neuralgia      Past Surgical History:   Procedure Laterality Date    ABDOMEN SURGERY      CARDIAC CATHETERIZATION  08/03/2016    CT BIOPSY PERCUTANEOUS SUPERFICIAL BONE  12/17/2021    CT BIOPSY PERCUTANEOUS SUPERFICIAL BONE 12/17/2021 [facility] CT SCAN    CT NEEDLE BIOPSY LUNG PERCUTANEOUS  7/28/2021    CT NEEDLE BIOPSY LUNG PERCUTANEOUS 7/28/2021 [facility] CT SCAN    PORT SURGERY Right 8/13/2021    MEDIPORT INSERTION performed by Joseph Iglesias MD at [facility] OR     Family History   Problem Relation Age of Onset    High Blood Pressure Mother     High Blood Pressure Sister     Cancer Sister      Social History     Socioeconomic History    Marital status:      Spouse name: Not on file    Number of children: 11    Years of education: Not on file    Highest education level: Not on file   Occupational History    Not on file   Tobacco Use    Smoking status: Current Every Day Smoker     Packs/day: 2.00     Years: 39.00     Pack years: 78.00     Types: Cigarettes    Smokeless tobacco: Never Used    Tobacco comment: smokes 1-2 a day   Vaping Use    Vaping Use: Never used   Substance and Sexual Activity    Alcohol use:  Yes     Alcohol/week: 6.0 standard drinks     Types: 6 Cans of beer per week     Comment: per week (24 oz beers)     Drug use: Yes     Types: Marijuana Dolph Nicolle)     Comment: last 12/1    Sexual activity: Yes     Partners: Female   Other Topics Concern    Not on file   Social History Narrative    Not on file     Social Determinants of Health     Financial Resource Strain:     Difficulty of Paying Living Expenses: Not on file   Food Insecurity:     Worried About Running Out of Food in the Last Year: Not on file    [place] of Food in the Last Year: Not on file   Transportation Needs:     Lack of Transportation (Medical): Not on file    Lack of Transportation (Non-Medical): Not on file   Physical Activity:     Days of Exercise per Week: Not on file    Minutes of Exercise per Session: Not on file   Stress:     Feeling of Stress : Not on file   Social Connections:     Frequency of Communication with Friends and Family: Not on file    Frequency of Social Gatherings with Friends and Family: Not on file    Attends Shinto Services: Not on file    Active Member of [address] [place] [address] Enjo[employer] or Organizations: Not on file    Attends Club or Organization Meetings: Not on file    Marital Status: Not on file   Intimate Partner Violence:     Fear of Current or Ex-Partner: Not on file    Emotionally Abused: Not on file    Physically Abused: Not on file    Sexually Abused: Not on file   Housing Stability:     Unable to Pay for Housing in the Last Year: Not on file    Number of [place] in the Last Year: Not on file    Unstable Housing in the Last Year: Not on file     No current facility-administered medications for this encounter. Current Outpatient Medications   Medication Sig Dispense Refill    oxyCODONE-acetaminophen (PERCOCET) 7.5-325 MG per tablet Take 1 tablet by mouth every 6 hours as needed for Pain for up to 30 days.  Intended supply: 30 days (Patient not taking: Reported on 3/9/2022) 90 tablet 0    dexamethasone (DECADRON) 4 MG tablet Two tablets the day before treatment, one table daily for four days starting the day after chemotherapy 18 tablet 0    melatonin (RA MELATONIN) 3 MG TABS tablet Take 1 tablet by mouth nightly as needed (incsomnia) 30 tablet 3    NARCAN 4 MG/0.1ML LIQD nasal spray DISPENSED PER STANDING ORDER USE AS DIRECTED PATIENT IS TRAINED OPIOID OVERDOSE RESPONDER      albuterol sulfate HFA (PROVENTIL HFA) 108 (90 Base) MCG/ACT inhaler Inhale 2 puffs into the lungs every 4 hours as needed for Wheezing or Shortness of Breath With spacer (and mask if indicated). Thanks. 1 Inhaler 1    lisinopril (PRINIVIL;ZESTRIL) 10 MG tablet Take 1 tablet by mouth daily 30 tablet 3    ammonium lactate (LAC-HYDRIN) 12 % lotion Apply topically as needed. 1 Bottle 0    nicotine (NICODERM CQ) 21 MG/24HR Place 1 patch onto the skin daily 30 patch 3     Facility-Administered Medications Ordered in Other Encounters   Medication Dose Route Frequency Provider Last Rate Last Admin    heparin flush 100 UNIT/ML injection 500 Units  500 Units IntraCATHeter PRN Winnie Robles MD         Allergies   Allergen Reactions    Tramadol Other (See Comments)     seizures    Vicodin [Hydrocodone-Acetaminophen] Hives       Nursing Notes Reviewed    Physical Exam:  Triage VS:    ED Triage Vitals [03/11/22 1300]   Enc Vitals Group      /85      Pulse 83      Resp 19      Temp 98.5 °F (36.9 °C)      Temp Source Oral      SpO2 100 %      Weight 200 lb (90.7 kg)      Height 6' 1\" (1.854 m)      Head Circumference       Peak Flow       Pain Score       Pain Loc       Pain Edu? Excl. in [address]? /83   Pulse 76   Temp 98.5 °F (36.9 °C) (Oral)   Resp 16   Ht 6' 1\" (1.854 m)   Wt 200 lb (90.7 kg)   SpO2 98%   BMI 26.39 kg/m²       My pulse ox interpretation is - normal    General appearance:  No acute distress. Skin:  Warm. Dry. Eye:  Extraocular movements intact. Ears, nose, mouth and throat:  Oral mucosa moist   Neck:  Trachea midline. Extremity:  No swelling. Normal ROM     Heart:  Regular rate and rhythm, normal S1 & S2, no extra heart sounds. Perfusion:  intact  Respiratory: Port right anterior chest no signs of infection, lungs clear to auscultation bilaterally. Respirations nonlabored. Abdominal:  Normal bowel sounds. Soft. Nontender.   Non distended. Back:  No CVA tenderness to palpation     Neurological:  Alert and oriented times 3. No focal neuro deficits.              Psychiatric:  Appropriate    I have reviewed and interpreted all of the currently available lab results from this visit (if applicable):  Results for orders placed or performed during the hospital encounter of 03/11/22   CBC with Auto Differential   Result Value Ref Range    WBC 4.5 4.0 - 10.5 K/CU MM    RBC 2.89 (L) 4.6 - 6.2 M/CU MM    Hemoglobin 7.9 (L) 13.5 - 18.0 GM/DL    Hematocrit 23.5 (L) 42 - 52 %    MCV 81.3 78 - 100 FL    MCH 27.3 27 - 31 PG    MCHC 33.6 32.0 - 36.0 %    RDW 17.1 (H) 11.7 - 14.9 %    Platelets 97 (L) 787 - 440 K/CU MM    MPV 9.5 7.5 - 11.1 FL    Metamyelocytes Relative 1 (H) 0.0 %    Bands Relative 3 (L) 5 - 11 %    Segs Relative 61.0 36 - 66 %    Eosinophils % 1.0 0 - 3 %    Basophils % 1.0 0 - 1 %    Lymphocytes % 28.0 24 - 44 %    Monocytes % 5.0 (H) 0 - 4 %    nRBC 1     Metamyelocytes Absolute 0.05 K/CU MM    Bands Absolute 0.14 K/CU MM    Segs Absolute 2.8 K/CU MM    Eosinophils Absolute 0.0 K/CU MM    Basophils Absolute 0.0 K/CU MM    Lymphocytes Absolute 1.3 K/CU MM    Monocytes Absolute 0.2 K/CU MM    Differential Type MANUAL DIFFERENTIAL    Comprehensive Metabolic Panel w/ Reflex to MG   Result Value Ref Range    Sodium 137 135 - 145 MMOL/L    Potassium 3.8 3.5 - 5.1 MMOL/L    Chloride 100 99 - 110 mMol/L    CO2 24 21 - 32 MMOL/L    BUN 11 6 - 23 MG/DL    CREATININE 0.8 (L) 0.9 - 1.3 MG/DL    Glucose 121 (H) 70 - 99 MG/DL    Calcium 9.6 8.3 - 10.6 MG/DL    Albumin 3.6 3.4 - 5.0 GM/DL    Total Protein 6.5 6.4 - 8.2 GM/DL    Total Bilirubin 0.2 0.0 - 1.0 MG/DL    ALT 11 10 - 40 U/L    AST 25 15 - 37 IU/L    Alkaline Phosphatase 166 (H) 40 - 129 IU/L    GFR Non-African American >60 >60 mL/min/1.73m2    GFR African American >60 >60 mL/min/1.73m2    Anion Gap 13 4 - 16   Lipase   Result Value Ref Range    Lipase 27 13 - 60 IU/L      Radiographs (if obtained):  Radiologist's Report Reviewed:  CT HEAD WO CONTRAST    Result Date: 3/11/2022  EXAMINATION: CT OF THE HEAD WITHOUT CONTRAST  3/11/2022 2:55 pm TECHNIQUE: CT of the head was performed without the administration of intravenous contrast. Dose modulation, iterative reconstruction, and/or weight based adjustment of the mA/kV was utilized to reduce the radiation dose to as low as reasonably achievable. COMPARISON: MRI brain August 20, 2021 HISTORY: ORDERING SYSTEM PROVIDED HISTORY: cancer hx on chemo, headache TECHNOLOGIST PROVIDED HISTORY: Reason for exam:->cancer hx on chemo, headache Has a \"code stroke\" or \"stroke alert\" been called? ->No Decision Support Exception - unselect if not a suspected or confirmed emergency medical condition->Emergency Medical Condition (MA) Reason for Exam: cancer hx on chemo, headache FINDINGS: BRAIN/VENTRICLES: There is no acute intracranial hemorrhage, mass effect or midline shift. No abnormal extra-axial fluid collection. The gray-white differentiation is maintained without evidence of an acute infarct. There is no evidence of hydrocephalus. ORBITS: The visualized portion of the orbits demonstrate no acute abnormality. SINUSES: The visualized paranasal sinuses and mastoid air cells demonstrate no acute abnormality. SOFT TISSUES/SKULL:  No acute abnormality of the visualized skull or soft tissues. No acute intracranial abnormality. XR ACUTE ABD SERIES CHEST 1 VW   Final Result   1. Redemonstration of the patient's known left lung mass. 2. No bowel obstruction or subdiaphragmatic free air. 3. Diffuse sclerotic osseous metastatic disease. CT HEAD WO CONTRAST   Final Result   No acute intracranial abnormality. EKG (if obtained): (All EKG's are interpreted by myself in the absence of a cardiologist)      MDM:  Patient presents to the emergency department initially sent by Oncologist for acute kidney failure from labs drawn 3/9/2022.  Patient also complains of constipation. Patient with lung cancer with mets and receiving chemotherapy. Patient states he did not get chemo today only IV fluids then sent to ED for evaluation. Patient was ordered laboratory studies and kidney function is normal and back to baseline. I did call on call Oncologist to update them on creatinine today. Patient also with hemoglobin 7.3 which continues to trend down. He states they will continue to monitor hemoblogin on outpatient basis and patient does not need admission. Patient was treated for constipation with lactulose, senokot, magnesium citrate and glycerin suppository. Patient will be given a prescription of miralax and senna for home. Patient is to follow up with oncologist and primary care physician. He is to continue laxative and or stool softner for constipation since he is on chronic pain medication for lung cancer with mets. Patient updated on labs and imaging and voiced understanding to above treatment and plan. Patient will be discharged to home. Patient to return immediately to ED if worsening symptoms. Clinical Impression:  1. Drug-induced constipation      Disposition referral (if applicable):   Regina Puentes MD  [facility] [id number]  [place] [address]  [phone]    Schedule an appointment as soon as possible for a visit in 3 days  If symptoms worsen    [facility] Emergency Department  [address] [place] [id number] [phone]  In 1 day  If symptoms worsen    Disposition medications (if applicable):  Discharge Medication List as of 3/11/2022  8:00 PM      START taking these medications    Details   Sennosides (SENNA) 8.6 MG CAPS Take 1 capsule by mouth 2 times daily as needed (constipation), Disp-20 capsule, R-0Print      polyethylene glycol (GLYCOLAX) 17 GM/SCOOP powder Take 17 g by mouth daily, Disp-510 g, R-0Print           ED Provider Disposition Time  DISPOSITION 1926      Comment: Please note this report has been produced using speech recognition software and may contain errors related to that system including errors in grammar, punctuation, and spelling, as well as words and phrases that may be inappropriate. Efforts were made to edit the dictations.         Khadijah Carlos DO  03/12/22 0109

## 2022-03-11 NOTE — ED PROVIDER NOTES
As provider-in-triage, I performed a medical screening history and physical exam on this patient. HISTORY OF PRESENT ILLNESS  Ana María Noguera is a 47 y.o. male presents with complaints of headache, dehydration and constipation. Patient is currently being treated for lung cancer and is on chemotherapy. He states a history of trigeminal neuralgia and states he had not had an exacerbation for years. He states he began to have symptoms again this week. He is also complaining of constipation and feeling dehydrated. Nothing has alleviated or exacerbated his symptoms. He states his headache feels exactly like his previous trigeminal neuralgia. PHYSICAL EXAM  /85   Pulse 83   Temp 98.5 °F (36.9 °C) (Oral)   Resp 19   Ht 6' 1\" (1.854 m)   Wt 200 lb (90.7 kg)   SpO2 100%   BMI 26.39 kg/m²     On exam, the patient appears in no acute distress. Speech is clear. Breathing is unlabored. Moves all extremities    Comment: Please note this report has been produced using speech recognition software and may contain errors related to that system including errors in grammar, punctuation, and spelling, as well as words and phrases that may be inappropriate. If there are any questions or concerns please feel free to contact the dictating provider for clarification.       GERDA Harmon - ROYCE  03/11/22 6334

## 2022-03-11 NOTE — ED NOTES
Pt refusing to keep BP and O2 wires on     oSlomon PerezEncompass Health Rehabilitation Hospital of [facility]ville  03/11/22 3027

## 2022-03-12 ENCOUNTER — HOSPITAL ENCOUNTER (EMERGENCY)
Age: 52
Discharge: HOME | End: 2022-03-12
Attending: STUDENT IN AN ORGANIZED HEALTH CARE EDUCATION/TRAINING PROGRAM
Payer: COMMERCIAL

## 2022-03-12 ENCOUNTER — APPOINTMENT (OUTPATIENT)
Dept: GENERAL RADIOLOGY | Age: 52
End: 2022-03-12
Payer: COMMERCIAL

## 2022-03-12 VITALS
HEIGHT: 73 IN | HEART RATE: 83 BPM | SYSTOLIC BLOOD PRESSURE: 123 MMHG | WEIGHT: 200 LBS | DIASTOLIC BLOOD PRESSURE: 87 MMHG | BODY MASS INDEX: 26.51 KG/M2 | TEMPERATURE: 98.5 F | RESPIRATION RATE: 19 BRPM | OXYGEN SATURATION: 100 %

## 2022-03-12 DIAGNOSIS — K62.5 RECTAL BLEEDING: Primary | ICD-10-CM

## 2022-03-12 LAB
ALBUMIN SERPL-MCNC: 3.5 GM/DL (ref 3.4–5)
ALP BLD-CCNC: 174 IU/L (ref 40–129)
ALT SERPL-CCNC: 10 U/L (ref 10–40)
ANION GAP SERPL CALCULATED.3IONS-SCNC: 12 MMOL/L (ref 4–16)
APTT: >240 SECONDS (ref 25.1–37.1)
AST SERPL-CCNC: 39 IU/L (ref 15–37)
BANDED NEUTROPHILS ABSOLUTE COUNT: 0.25 K/CU MM
BANDED NEUTROPHILS RELATIVE PERCENT: 5 % (ref 5–11)
BASOPHILS ABSOLUTE: 0.1 K/CU MM
BASOPHILS RELATIVE PERCENT: 1 % (ref 0–1)
BILIRUB SERPL-MCNC: 0.2 MG/DL (ref 0–1)
BUN BLDV-MCNC: 10 MG/DL (ref 6–23)
CALCIUM SERPL-MCNC: 8.9 MG/DL (ref 8.3–10.6)
CHLORIDE BLD-SCNC: 101 MMOL/L (ref 99–110)
CO2: 23 MMOL/L (ref 21–32)
CREAT SERPL-MCNC: 0.9 MG/DL (ref 0.9–1.3)
DIFFERENTIAL TYPE: ABNORMAL
EKG ATRIAL RATE: 91 BPM
EKG DIAGNOSIS: NORMAL
EKG P AXIS: 78 DEGREES
EKG P-R INTERVAL: 140 MS
EKG Q-T INTERVAL: 346 MS
EKG QRS DURATION: 94 MS
EKG QTC CALCULATION (BAZETT): 425 MS
EKG R AXIS: 56 DEGREES
EKG T AXIS: 74 DEGREES
EKG VENTRICULAR RATE: 91 BPM
GFR AFRICAN AMERICAN: >60 ML/MIN/1.73M2
GFR NON-AFRICAN AMERICAN: >60 ML/MIN/1.73M2
GLUCOSE BLD-MCNC: 110 MG/DL (ref 70–99)
HCT VFR BLD CALC: 24 % (ref 42–52)
HEMOGLOBIN: 8.3 GM/DL (ref 13.5–18)
HYPOCHROMIA: ABNORMAL
INR BLD: 1.17 INDEX
LIPASE: 20 IU/L (ref 13–60)
LYMPHOCYTES ABSOLUTE: 1.3 K/CU MM
LYMPHOCYTES RELATIVE PERCENT: 27 % (ref 24–44)
MCH RBC QN AUTO: 27.3 PG (ref 27–31)
MCHC RBC AUTO-ENTMCNC: 34.6 % (ref 32–36)
MCV RBC AUTO: 78.9 FL (ref 78–100)
METAMYELOCYTES ABSOLUTE COUNT: 0.05 K/CU MM
METAMYELOCYTES PERCENT: 1 %
MONOCYTES ABSOLUTE: 0.3 K/CU MM
MONOCYTES RELATIVE PERCENT: 7 % (ref 0–4)
MYELOCYTE PERCENT: 2 %
MYELOCYTES ABSOLUTE COUNT: 0.1 K/CU MM
OVALOCYTES: ABNORMAL
PDW BLD-RTO: 16.6 % (ref 11.7–14.9)
PLATELET # BLD: 84 K/CU MM (ref 140–440)
PMV BLD AUTO: 10.2 FL (ref 7.5–11.1)
POLYCHROMASIA: ABNORMAL
POTASSIUM SERPL-SCNC: 4.1 MMOL/L (ref 3.5–5.1)
PROTHROMBIN TIME: 15.1 SECONDS (ref 11.7–14.5)
RBC # BLD: 3.04 M/CU MM (ref 4.6–6.2)
SEGMENTED NEUTROPHILS ABSOLUTE COUNT: 2.8 K/CU MM
SEGMENTED NEUTROPHILS RELATIVE PERCENT: 57 % (ref 36–66)
SODIUM BLD-SCNC: 136 MMOL/L (ref 135–145)
TEAR DROP CELLS: ABNORMAL
TOTAL PROTEIN: 6.5 GM/DL (ref 6.4–8.2)
TROPONIN T: <0.01 NG/ML
WBC # BLD: 4.9 K/CU MM (ref 4–10.5)

## 2022-03-12 PROCEDURE — 93005 ELECTROCARDIOGRAM TRACING: CPT | Performed by: PHYSICIAN ASSISTANT

## 2022-03-12 PROCEDURE — 84484 ASSAY OF TROPONIN QUANT: CPT

## 2022-03-12 PROCEDURE — 85027 COMPLETE CBC AUTOMATED: CPT

## 2022-03-12 PROCEDURE — 85007 BL SMEAR W/DIFF WBC COUNT: CPT

## 2022-03-12 PROCEDURE — 85730 THROMBOPLASTIN TIME PARTIAL: CPT

## 2022-03-12 PROCEDURE — 85610 PROTHROMBIN TIME: CPT

## 2022-03-12 PROCEDURE — 93010 ELECTROCARDIOGRAM REPORT: CPT | Performed by: INTERNAL MEDICINE

## 2022-03-12 PROCEDURE — 83690 ASSAY OF LIPASE: CPT

## 2022-03-12 PROCEDURE — 99285 EMERGENCY DEPT VISIT HI MDM: CPT

## 2022-03-12 PROCEDURE — 80053 COMPREHEN METABOLIC PANEL: CPT

## 2022-03-12 PROCEDURE — 71045 X-RAY EXAM CHEST 1 VIEW: CPT

## 2022-03-12 ASSESSMENT — PAIN DESCRIPTION - ORIENTATION: ORIENTATION: LEFT;RIGHT

## 2022-03-12 ASSESSMENT — PAIN SCALES - GENERAL: PAINLEVEL_OUTOF10: 10

## 2022-03-12 ASSESSMENT — PAIN DESCRIPTION - DESCRIPTORS: DESCRIPTORS: DISCOMFORT

## 2022-03-12 ASSESSMENT — PAIN DESCRIPTION - FREQUENCY: FREQUENCY: CONTINUOUS

## 2022-03-12 ASSESSMENT — PAIN DESCRIPTION - PAIN TYPE: TYPE: ACUTE PAIN;CHRONIC PAIN

## 2022-03-12 NOTE — ED PROVIDER NOTES
Emergency Department Encounter    Patient: Stanislaw Holt  MRN: [id number]  : 1970  Date of Evaluation: 3/12/2022  ED Provider:  Susan Nascimento DO    Triage Chief Complaint:   Hemoptysis (pt reports he was coughing up blood today, hx of lung cancer, seen in ED yesterday) and Rectal Bleeding (pt c/o blood in stool today)    Solomon:  Stanislaw Holt is a 47 y.o. male pmh lung CA presents with rectal bleeding. Pt reports he was in the ED yesterday after being sent from his oncology clinic for BRENDA and constipation. He was given laxatives and went home. This morning he had a large bowel movement and noticed bright red blood in the toilet bowl. He has no more abdominal pain nor rectal pain. Also reports 2 weeks of coughing up streaks of blood. Oncology following him is aware. Currently has no symptoms however is just concerned about the blood in his stool.      ROS - see HPI, below listed is current ROS at time of my eval:  General:  No fevers, no chills, no weakness  Eyes:  No recent vison changes, no discharge  ENT:  No sore throat, no nasal congestion, no hearing changes  Cardiovascular:  No chest pain, no palpitations  Respiratory:  No shortness of breath, no cough, no wheezing  Gastrointestinal:  No pain, no nausea, no vomiting, no diarrhea  Musculoskeletal:  No muscle pain, no joint pain  Skin:  No rash, no pruritis, no easy bruising  Neurologic:  No speech problems, no headache, no extremity numbness, no extremity tingling, no extremity weakness  Psychiatric:  No anxiety  Genitourinary:  No dysuria, no hematuria  Endocrine:  No unexpected weight gain, no unexpected weight loss  Extremities:  no edema, no pain    Past Medical History:   Diagnosis Date    CAD (coronary artery disease) 2013    cath negative per pt    Cancer ([address] 2021    pt reports he has lung cancer    History of TMJ disorder     Hypertension     Trigeminal neuralgia      Past Surgical History:   Procedure Laterality Date    ABDOMEN SURGERY      CARDIAC CATHETERIZATION  08/03/2016    CT BIOPSY PERCUTANEOUS SUPERFICIAL BONE  12/17/2021    CT BIOPSY PERCUTANEOUS SUPERFICIAL BONE 12/17/2021 [facility] [address] SCAN    CT NEEDLE BIOPSY LUNG PERCUTANEOUS  7/28/2021    CT NEEDLE BIOPSY LUNG PERCUTANEOUS 7/28/2021 [facility] CT SCAN    PORT SURGERY Right 8/13/2021    MEDIPORT INSERTION performed by Lyn Anderson MD at [facility] OR     Family History   Problem Relation Age of Onset    High Blood Pressure Mother     High Blood Pressure Sister     Cancer Sister      Social History     Socioeconomic History    Marital status:      Spouse name: Not on file    Number of children: 11    Years of education: Not on file    Highest education level: Not on file   Occupational History    Not on file   Tobacco Use    Smoking status: Current Every Day Smoker     Packs/day: 2.00     Years: 39.00     Pack years: 78.00     Types: Cigarettes    Smokeless tobacco: Never Used    Tobacco comment: smokes 1-2 a day   Vaping Use    Vaping Use: Never used   Substance and Sexual Activity    Alcohol use: Yes     Alcohol/week: 6.0 standard drinks     Types: 6 Cans of beer per week     Comment: per week (24 oz beers)     Drug use: Yes     Types: Marijuana Yessenia Peals)     Comment: last 12/1    Sexual activity: Yes     Partners: Female   Other Topics Concern    Not on file   Social History Narrative    Not on file     Social Determinants of Health     Financial Resource Strain:     Difficulty of Paying Living Expenses: Not on file   Food Insecurity:     Worried About Running Out of Food in the Last Year: Not on file    [place] of Food in the Last Year: Not on file   Transportation Needs:     Lack of Transportation (Medical): Not on file    Lack of Transportation (Non-Medical):  Not on file   Physical Activity:     Days of Exercise per Week: Not on file    Minutes of Exercise per Session: Not on file   Stress:     Feeling of Stress : Not on file   Social Connections:  Frequency of Communication with Friends and Family: Not on file    Frequency of Social Gatherings with Friends and Family: Not on file    Attends [employer]ism Services: Not on file    Active Member of Clubs or Organizations: Not on file    Attends Club or Organization Meetings: Not on file    Marital Status: Not on file   Intimate Partner Violence:     Fear of Current or Ex-Partner: Not on file    Emotionally Abused: Not on file    Physically Abused: Not on file    Sexually Abused: Not on file   Housing Stability:     Unable to Pay for Housing in the Last Year: Not on file    Number of [place] in the Last Year: Not on file    Unstable Housing in the Last Year: Not on file     No current facility-administered medications for this encounter. Current Outpatient Medications   Medication Sig Dispense Refill    Sennosides (SENNA) 8.6 MG CAPS Take 1 capsule by mouth 2 times daily as needed (constipation) 20 capsule 0    polyethylene glycol (GLYCOLAX) 17 GM/SCOOP powder Take 17 g by mouth daily 510 g 0    oxyCODONE-acetaminophen (PERCOCET) 7.5-325 MG per tablet Take 1 tablet by mouth every 6 hours as needed for Pain for up to 30 days. Intended supply: 30 days (Patient not taking: Reported on 3/9/2022) 90 tablet 0    dexamethasone (DECADRON) 4 MG tablet Two tablets the day before treatment, one table daily for four days starting the day after chemotherapy 18 tablet 0    melatonin (RA MELATONIN) 3 MG TABS tablet Take 1 tablet by mouth nightly as needed (incsomnia) 30 tablet 3    NARCAN 4 MG/0.1ML LIQD nasal spray DISPENSED PER STANDING ORDER USE AS DIRECTED PATIENT IS TRAINED OPIOID OVERDOSE RESPONDER      albuterol sulfate HFA (PROVENTIL HFA) 108 (90 Base) MCG/ACT inhaler Inhale 2 puffs into the lungs every 4 hours as needed for Wheezing or Shortness of Breath With spacer (and mask if indicated). Thanks.  1 Inhaler 1    lisinopril (PRINIVIL;ZESTRIL) 10 MG tablet Take 1 tablet by mouth daily 30 tablet 3    ammonium lactate (LAC-HYDRIN) 12 % lotion Apply topically as needed. 1 Bottle 0    nicotine (NICODERM CQ) 21 MG/24HR Place 1 patch onto the skin daily 30 patch 3     Allergies   Allergen Reactions    Tramadol Other (See Comments)     seizures    Vicodin [Hydrocodone-Acetaminophen] Hives       Nursing Notes Reviewed    Physical Exam:  Triage VS:    ED Triage Vitals   Enc Vitals Group      BP 03/12/22 1246 (!) 144/87      Pulse 03/12/22 1246 93      Resp 03/12/22 1246 18      Temp 03/12/22 1248 98.5 °F (36.9 °C)      Temp Source 03/12/22 1246 Oral      SpO2 03/12/22 1246 100 %      Weight 03/12/22 1246 200 lb (90.7 kg)      Height 03/12/22 1246 6' 1\" (1.854 m)      Head Circumference --       Peak Flow --       Pain Score --       Pain Loc --       Pain Edu? --       Excl. in [address]? --        My pulse ox interpretation is - normal    General appearance:  No acute distress. Skin:  Warm. Dry. Eye:  Extraocular movements intact. Ears, nose, mouth and throat:  Oral mucosa moist   Neck:  Trachea midline. Extremity:  No swelling. Normal ROM     Heart:  Regular rate and rhythm, normal S1 & S2, no extra heart sounds. Perfusion:  intact  Respiratory:  Lungs clear to auscultation bilaterally. Respirations nonlabored. Abdominal:  Normal bowel sounds. Soft. Nontender. Non distended. Rectal: chaperoned by Cinthya Larose RN - jordan blood w/o signs of internal or external hemorrhoids or fissures  Back:  No CVA tenderness to palpation     Neurological:  Alert and oriented times 3. No focal neuro deficits.              Psychiatric:  Appropriate    I have reviewed and interpreted all of the currently available lab results from this visit (if applicable):  Results for orders placed or performed during the hospital encounter of 03/12/22   EKG 12 Lead   Result Value Ref Range    Ventricular Rate 91 BPM    Atrial Rate 91 BPM    P-R Interval 140 ms    QRS Duration 94 ms    Q-T Interval 346 ms    QTc Calculation (Bazett) 425 ms    P Axis 78 degrees    R Axis 56 degrees    T Axis 74 degrees    Diagnosis       Normal sinus rhythm  Voltage criteria for left ventricular hypertrophy  Septal infarct (cited on or before 03-AUG-2016)  Abnormal ECG  When compared with ECG of 04-JAN-2022 11:49,  Questionable change in initial forces of Septal leads  Nonspecific T wave abnormality no longer evident in Inferior leads        Radiographs (if obtained):  Radiologist's Report Reviewed:  XR CHEST PORTABLE    Result Date: 3/12/2022  EXAMINATION: ONE XRAY VIEW OF THE CHEST 3/12/2022 1:31 pm COMPARISON: Acute abdominal series March 11, 2022; CT chest February 8, 2022 HISTORY: ORDERING SYSTEM PROVIDED HISTORY: chest pain TECHNOLOGIST PROVIDED HISTORY: Reason for exam:->chest pain Reason for Exam: chest pain FINDINGS: Cardiac silhouette appears stable. Redemonstration of left suprahilar mass. Right-sided MediPort remains in place. No new consolidative change identified. Osseous metastatic disease redemonstrated. 1. No acute cardiopulmonary process evident. 2. Similar appearance of left suprahilar mass and osseous metastatic disease. EKG (if obtained): (All EKG's are interpreted by myself in the absence of a cardiologist)      MDM:  David Connell is a 47 y.o. male pmh lung CA presents with rectal bleeding. Will r/o anemia given Hgb 7.9 yesterday. No abdominal pain appreciated in ED. Hgb improved to 8.3   Vitals stable in ED. I am not concerned for massive lower GI bleed at this time. Pt comfortable and eating in ED. Feels safe to go home and follow up with oncology this week  Spoke with Oncology on call who agrees patient can be discharged and follow up in clinic this week  Pt instructed to continue taking laxatives and try not to strain with defecation.        Clinical Impression:  Rectal bleeding    Disposition medications (if applicable):  New Prescriptions    No medications on file     ED Provider Disposition Time  DISPOSITION        Comment: Please note this report has been produced using speech recognition software and may contain errors related to that system including errors in grammar, punctuation, and spelling, as well as words and phrases that may be inappropriate. Efforts were made to edit the dictations.        Cm Laws, DO  03/12/22 84 Devendra Ramirez, DO  03/17/22 [id number]

## 2022-03-12 NOTE — ED PROVIDER NOTES
As PA-in-triage, I performed a medical screening history and physical exam on this patient. HISTORY OF PRESENT ILLNESS  David Connell is a 47 y.o. male for generalized abdominal pain, chest pain and hemoptysis. Was seen in the ED yesterday for constipation and difficulty with urination. Has a history of stage IV lung cancer and follows with local oncology. States he had a bowel movement today and noted 2 episodes of bright red blood per rectum. Has also had chest pain shortness of breath and a cough intermittently streaked with pink bloody sputum. No anticoagulation therapy. No dizziness or lightheadedness or fall. [place] Awkward PHYSICAL EXAM  BP (!) 144/87   Pulse 93   Temp 98.5 °F (36.9 °C) (Oral)   Resp 18   Ht 6' 1\" (1.854 m)   Wt 200 lb (90.7 kg)   SpO2 100%   BMI 26.39 kg/m²     On exam, the patient appears well-hydrated, well-nourished, and in no acute distress. Mucous membranes are moist. Speech is clear. Breathing is unlabored. Skin is dry. Mental status is normal. The patient has normal gait, moves all extremities, and is without facial droop. Labs, imaging, medication ordered at triage. Please see ED provider's note for patient complete ED evaluation, labs/imaging interpretation and final disposition/clinical impression.          Saritha Cabezas PA-C  03/12/22 [id number]

## 2022-03-15 ENCOUNTER — TELEPHONE (OUTPATIENT)
Dept: CASE MANAGEMENT | Age: 52
End: 2022-03-15

## 2022-03-15 NOTE — TELEPHONE ENCOUNTER
Patients wife called patient is feeling tired and has minimal appetite. Patient denies n/v/d. Dr. Marylee Smolder notified, patient to come in for treatment this Friday 3/18/0222, will evaluate patient at that time. Wife Aram Dunn instructed to call this RN if anything changes, Torray voices understanding.

## 2022-03-16 DIAGNOSIS — C79.51 SECONDARY MALIGNANT NEOPLASM OF BONE (HCC): ICD-10-CM

## 2022-03-16 DIAGNOSIS — C34.82 MALIGNANT NEOPLASM OF OVERLAPPING SITES OF LEFT LUNG (HCC): ICD-10-CM

## 2022-03-16 RX ORDER — OXYCODONE HCL 20 MG/1
20 TABLET, FILM COATED, EXTENDED RELEASE ORAL 2 TIMES DAILY
Qty: 60 TABLET | Refills: 0 | Status: CANCELLED | OUTPATIENT
Start: 2022-03-16 | End: 2022-04-15

## 2022-03-17 DIAGNOSIS — C34.82 MALIGNANT NEOPLASM OF OVERLAPPING SITES OF LEFT LUNG (HCC): Primary | ICD-10-CM

## 2022-03-17 RX ORDER — OXYCODONE HCL 20 MG/1
20 TABLET, FILM COATED, EXTENDED RELEASE ORAL 2 TIMES DAILY
Qty: 60 TABLET | Refills: 0 | Status: SHIPPED | OUTPATIENT
Start: 2022-03-17 | End: 2022-04-16

## 2022-03-18 ENCOUNTER — CLINICAL DOCUMENTATION (OUTPATIENT)
Dept: ONCOLOGY | Age: 52
End: 2022-03-18

## 2022-03-18 ENCOUNTER — HOSPITAL ENCOUNTER (OUTPATIENT)
Dept: INFUSION THERAPY | Age: 52
Discharge: HOME | End: 2022-03-18
Payer: COMMERCIAL

## 2022-03-18 VITALS
BODY MASS INDEX: 25.1 KG/M2 | OXYGEN SATURATION: 97 % | DIASTOLIC BLOOD PRESSURE: 73 MMHG | SYSTOLIC BLOOD PRESSURE: 115 MMHG | TEMPERATURE: 97.9 F | HEART RATE: 97 BPM | RESPIRATION RATE: 16 BRPM | HEIGHT: 73 IN | WEIGHT: 189.4 LBS

## 2022-03-18 DIAGNOSIS — C34.82 MALIGNANT NEOPLASM OF OVERLAPPING SITES OF LEFT LUNG (HCC): Primary | ICD-10-CM

## 2022-03-18 DIAGNOSIS — C34.82 MALIGNANT NEOPLASM OF OVERLAPPING SITES OF LEFT LUNG (HCC): ICD-10-CM

## 2022-03-18 PROBLEM — D64.9 ANEMIA: Status: ACTIVE | Noted: 2022-03-18

## 2022-03-18 LAB
ALBUMIN SERPL-MCNC: 3.8 GM/DL (ref 3.4–5)
ALP BLD-CCNC: 147 IU/L (ref 40–128)
ALT SERPL-CCNC: 15 U/L (ref 10–40)
ANION GAP SERPL CALCULATED.3IONS-SCNC: 20 MMOL/L (ref 4–16)
AST SERPL-CCNC: 18 IU/L (ref 15–37)
BASOPHILS ABSOLUTE: 0 K/CU MM
BASOPHILS RELATIVE PERCENT: 0.3 % (ref 0–1)
BILIRUB SERPL-MCNC: 0.2 MG/DL (ref 0–1)
BUN BLDV-MCNC: 25 MG/DL (ref 6–23)
CALCIUM SERPL-MCNC: 9.4 MG/DL (ref 8.3–10.6)
CHLORIDE BLD-SCNC: 98 MMOL/L (ref 99–110)
CO2: 21 MMOL/L (ref 21–32)
CREAT SERPL-MCNC: 1.1 MG/DL (ref 0.9–1.3)
DIFFERENTIAL TYPE: ABNORMAL
EOSINOPHILS ABSOLUTE: 0.1 K/CU MM
EOSINOPHILS RELATIVE PERCENT: 1.8 % (ref 0–3)
GFR AFRICAN AMERICAN: >60 ML/MIN/1.73M2
GFR AFRICAN AMERICAN: >60 ML/MIN/1.73M2
GFR NON-AFRICAN AMERICAN: >60 ML/MIN/1.73M2
GFR NON-AFRICAN AMERICAN: >60 ML/MIN/1.73M2
GLUCOSE BLD-MCNC: 124 MG/DL (ref 70–99)
GLUCOSE BLD-MCNC: 125 MG/DL (ref 70–99)
HCT VFR BLD CALC: 19.2 % (ref 42–52)
HEMOGLOBIN: 6.6 GM/DL (ref 13.5–18)
LYMPHOCYTES ABSOLUTE: 0.8 K/CU MM
LYMPHOCYTES RELATIVE PERCENT: 25.8 % (ref 24–44)
MCH RBC QN AUTO: 27.2 PG (ref 27–31)
MCHC RBC AUTO-ENTMCNC: 34.4 % (ref 32–36)
MCV RBC AUTO: 79 FL (ref 78–100)
MONOCYTES ABSOLUTE: 0.6 K/CU MM
MONOCYTES RELATIVE PERCENT: 18.8 % (ref 0–4)
PDW BLD-RTO: 17.1 % (ref 11.7–14.9)
PLATELET # BLD: 89 K/CU MM (ref 140–440)
PMV BLD AUTO: 9.4 FL (ref 7.5–11.1)
POC BUN: 26 MG/DL (ref 8–26)
POC CALCIUM: 1.21 MMOL/L (ref 1.12–1.32)
POC CHLORIDE: 100 MMOL/L (ref 98–109)
POC CO2: 24 MMOL/L (ref 21–32)
POC CREATININE: 1.2 MG/DL (ref 0.9–1.3)
POTASSIUM SERPL-SCNC: 4 MMOL/L (ref 3.5–4.5)
POTASSIUM SERPL-SCNC: 4.2 MMOL/L (ref 3.5–5.1)
RBC # BLD: 2.43 M/CU MM (ref 4.6–6.2)
SEGMENTED NEUTROPHILS ABSOLUTE COUNT: 1.7 K/CU MM
SEGMENTED NEUTROPHILS RELATIVE PERCENT: 53.3 % (ref 36–66)
SODIUM BLD-SCNC: 134 MMOL/L (ref 138–146)
SODIUM BLD-SCNC: 139 MMOL/L (ref 135–145)
SOURCE, BLOOD GAS: ABNORMAL
TOTAL PROTEIN: 7.1 GM/DL (ref 6.4–8.2)
WBC # BLD: 3.3 K/CU MM (ref 4–10.5)

## 2022-03-18 PROCEDURE — 86900 BLOOD TYPING SEROLOGIC ABO: CPT

## 2022-03-18 PROCEDURE — 6360000002 HC RX W HCPCS

## 2022-03-18 PROCEDURE — 36591 DRAW BLOOD OFF VENOUS DEVICE: CPT

## 2022-03-18 PROCEDURE — 86922 COMPATIBILITY TEST ANTIGLOB: CPT

## 2022-03-18 PROCEDURE — 86901 BLOOD TYPING SEROLOGIC RH(D): CPT

## 2022-03-18 PROCEDURE — 85025 COMPLETE CBC W/AUTO DIFF WBC: CPT

## 2022-03-18 PROCEDURE — 80053 COMPREHEN METABOLIC PANEL: CPT

## 2022-03-18 PROCEDURE — 86850 RBC ANTIBODY SCREEN: CPT

## 2022-03-18 RX ORDER — DIPHENHYDRAMINE HCL 25 MG
25 TABLET ORAL ONCE
Status: CANCELLED | OUTPATIENT
Start: 2022-03-18 | End: 2022-03-18

## 2022-03-18 RX ORDER — EPINEPHRINE 1 MG/ML
0.3 INJECTION, SOLUTION, CONCENTRATE INTRAVENOUS PRN
Status: CANCELLED | OUTPATIENT
Start: 2022-03-18

## 2022-03-18 RX ORDER — OXYCODONE AND ACETAMINOPHEN 7.5; 325 MG/1; MG/1
1 TABLET ORAL EVERY 6 HOURS PRN
Qty: 60 TABLET | Refills: 0 | Status: SHIPPED | OUTPATIENT
Start: 2022-03-18 | End: 2022-03-22 | Stop reason: SDUPTHER

## 2022-03-18 RX ORDER — SODIUM CHLORIDE 9 MG/ML
20 INJECTION, SOLUTION INTRAVENOUS CONTINUOUS
Status: CANCELLED | OUTPATIENT
Start: 2022-03-18

## 2022-03-18 RX ORDER — SODIUM CHLORIDE 0.9 % (FLUSH) 0.9 %
5-40 SYRINGE (ML) INJECTION PRN
Status: CANCELLED | OUTPATIENT
Start: 2022-03-18

## 2022-03-18 RX ORDER — SODIUM CHLORIDE 9 MG/ML
25 INJECTION, SOLUTION INTRAVENOUS PRN
Status: CANCELLED | OUTPATIENT
Start: 2022-03-18

## 2022-03-18 RX ORDER — ACETAMINOPHEN 325 MG/1
650 TABLET ORAL ONCE
Status: CANCELLED | OUTPATIENT
Start: 2022-03-18 | End: 2022-03-18

## 2022-03-18 RX ORDER — SODIUM CHLORIDE 0.9 % (FLUSH) 0.9 %
5-40 SYRINGE (ML) INJECTION PRN
Status: DISCONTINUED | OUTPATIENT
Start: 2022-03-18 | End: 2022-03-19 | Stop reason: HOSPADM

## 2022-03-18 RX ORDER — DIPHENHYDRAMINE HYDROCHLORIDE 50 MG/ML
50 INJECTION INTRAMUSCULAR; INTRAVENOUS
Status: CANCELLED | OUTPATIENT
Start: 2022-03-18

## 2022-03-18 RX ORDER — ONDANSETRON 2 MG/ML
8 INJECTION INTRAMUSCULAR; INTRAVENOUS
Status: CANCELLED | OUTPATIENT
Start: 2022-03-18

## 2022-03-18 RX ORDER — METHYLPREDNISOLONE SODIUM SUCCINATE 125 MG/2ML
20 INJECTION, POWDER, LYOPHILIZED, FOR SOLUTION INTRAMUSCULAR; INTRAVENOUS ONCE
Status: CANCELLED
Start: 2022-03-18 | End: 2022-03-18

## 2022-03-18 RX ORDER — ACETAMINOPHEN 325 MG/1
650 TABLET ORAL
Status: CANCELLED | OUTPATIENT
Start: 2022-03-18

## 2022-03-18 RX ORDER — HEPARIN SODIUM (PORCINE) LOCK FLUSH IV SOLN 100 UNIT/ML 100 UNIT/ML
SOLUTION INTRAVENOUS
Status: COMPLETED
Start: 2022-03-18 | End: 2022-03-18

## 2022-03-18 RX ORDER — ALBUTEROL SULFATE 90 UG/1
4 AEROSOL, METERED RESPIRATORY (INHALATION) PRN
Status: CANCELLED | OUTPATIENT
Start: 2022-03-18

## 2022-03-18 RX ORDER — SODIUM CHLORIDE 9 MG/ML
INJECTION, SOLUTION INTRAVENOUS CONTINUOUS
Status: CANCELLED | OUTPATIENT
Start: 2022-03-18

## 2022-03-18 RX ORDER — FUROSEMIDE 10 MG/ML
20 INJECTION INTRAMUSCULAR; INTRAVENOUS ONCE
Status: CANCELLED | OUTPATIENT
Start: 2022-03-18 | End: 2022-03-18

## 2022-03-18 RX ORDER — HEPARIN SODIUM (PORCINE) LOCK FLUSH IV SOLN 100 UNIT/ML 100 UNIT/ML
500 SOLUTION INTRAVENOUS PRN
Status: DISCONTINUED | OUTPATIENT
Start: 2022-03-18 | End: 2022-03-19 | Stop reason: HOSPADM

## 2022-03-18 RX ORDER — HEPARIN SODIUM (PORCINE) LOCK FLUSH IV SOLN 100 UNIT/ML 100 UNIT/ML
500 SOLUTION INTRAVENOUS PRN
Status: CANCELLED | OUTPATIENT
Start: 2022-03-18

## 2022-03-18 RX ADMIN — HEPARIN: 100 SYRINGE at 10:00

## 2022-03-18 NOTE — TELEPHONE ENCOUNTER
Pt's wife called in requesting refill on Percocet 7.5-325 mg    Sent to Dr. Laxmi Hylton for approval

## 2022-03-18 NOTE — PROGRESS NOTES
Patient arrived to treatment suite for blood draw, pre-medications and chemotherapy infusion. Right chest mediport accessed and blood drawn from site and sent to lab for processing. Patient states has been having pain which is better when he is able to get treatment. Also has poor appetite and trouble sleeping. He is interested in getting medical marijuana, so told him to discuss with Dr. Kyle Brumfield and see if he qualifies for that. Hgb 6+ and Platelets 89. Spoke with Dr. Eyad Smith who ordered a blood transfusion and treatment to be delayed one week. Patient banded and scheduled for blood transfusion at hospital tomorrow. Rescheduled for treatment. Left treatment suite ambulatory. Discharge instructions provided.

## 2022-03-18 NOTE — PROGRESS NOTES
Patient to receive blood transfusion 03/19/2022 at [facility] OP infusion clinic. 1 unit PRBC ordered and added to blood therapy plan.

## 2022-03-18 NOTE — PROGRESS NOTES
Hgb 6.6; per Dr Lorelei Castellanos 1 unit of PRBC, called infusion dept, spoke to Rose Mary Asl, patient scheduled for 03/19/2022 at 08:00 AM, patient advised and states understanding, blood banded and sent for T&S, band on patient (verifed) and patient signed consent.

## 2022-03-19 ENCOUNTER — HOSPITAL ENCOUNTER (OUTPATIENT)
Dept: INFUSION THERAPY | Age: 52
Setting detail: INFUSION SERIES
Discharge: HOME | End: 2022-03-19
Payer: COMMERCIAL

## 2022-03-19 VITALS
TEMPERATURE: 97.3 F | OXYGEN SATURATION: 96 % | SYSTOLIC BLOOD PRESSURE: 121 MMHG | RESPIRATION RATE: 16 BRPM | DIASTOLIC BLOOD PRESSURE: 76 MMHG | HEART RATE: 90 BPM

## 2022-03-19 DIAGNOSIS — C34.82 MALIGNANT NEOPLASM OF OVERLAPPING SITES OF LEFT LUNG (HCC): ICD-10-CM

## 2022-03-19 DIAGNOSIS — D64.9 ANEMIA, UNSPECIFIED TYPE: Primary | ICD-10-CM

## 2022-03-19 PROCEDURE — 36430 TRANSFUSION BLD/BLD COMPNT: CPT

## 2022-03-19 PROCEDURE — 2580000003 HC RX 258: Performed by: INTERNAL MEDICINE

## 2022-03-19 PROCEDURE — 6360000002 HC RX W HCPCS: Performed by: INTERNAL MEDICINE

## 2022-03-19 PROCEDURE — 86900 BLOOD TYPING SEROLOGIC ABO: CPT

## 2022-03-19 PROCEDURE — 99211 OFF/OP EST MAY X REQ PHY/QHP: CPT

## 2022-03-19 PROCEDURE — P9016 RBC LEUKOCYTES REDUCED: HCPCS

## 2022-03-19 PROCEDURE — 85025 COMPLETE CBC W/AUTO DIFF WBC: CPT

## 2022-03-19 PROCEDURE — 96374 THER/PROPH/DIAG INJ IV PUSH: CPT

## 2022-03-19 PROCEDURE — 6370000000 HC RX 637 (ALT 250 FOR IP): Performed by: INTERNAL MEDICINE

## 2022-03-19 PROCEDURE — 86901 BLOOD TYPING SEROLOGIC RH(D): CPT

## 2022-03-19 RX ORDER — ONDANSETRON 2 MG/ML
8 INJECTION INTRAMUSCULAR; INTRAVENOUS
Status: CANCELLED | OUTPATIENT
Start: 2022-03-19

## 2022-03-19 RX ORDER — SODIUM CHLORIDE 9 MG/ML
25 INJECTION, SOLUTION INTRAVENOUS PRN
Status: CANCELLED | OUTPATIENT
Start: 2022-03-19

## 2022-03-19 RX ORDER — DIPHENHYDRAMINE HCL 25 MG
25 TABLET ORAL ONCE
Status: CANCELLED | OUTPATIENT
Start: 2022-03-19 | End: 2022-03-19

## 2022-03-19 RX ORDER — ALBUTEROL SULFATE 90 UG/1
4 AEROSOL, METERED RESPIRATORY (INHALATION) PRN
Status: CANCELLED | OUTPATIENT
Start: 2022-03-19

## 2022-03-19 RX ORDER — ACETAMINOPHEN 325 MG/1
650 TABLET ORAL ONCE
Status: CANCELLED | OUTPATIENT
Start: 2022-03-19 | End: 2022-03-19

## 2022-03-19 RX ORDER — METHYLPREDNISOLONE SODIUM SUCCINATE 40 MG/ML
20 INJECTION, POWDER, LYOPHILIZED, FOR SOLUTION INTRAMUSCULAR; INTRAVENOUS ONCE
Status: CANCELLED
Start: 2022-03-19 | End: 2022-03-19

## 2022-03-19 RX ORDER — SODIUM CHLORIDE 0.9 % (FLUSH) 0.9 %
5-40 SYRINGE (ML) INJECTION PRN
Status: DISCONTINUED | OUTPATIENT
Start: 2022-03-19 | End: 2022-03-19 | Stop reason: SDUPTHER

## 2022-03-19 RX ORDER — SODIUM CHLORIDE 0.9 % (FLUSH) 0.9 %
5-40 SYRINGE (ML) INJECTION PRN
Status: CANCELLED | OUTPATIENT
Start: 2022-03-19

## 2022-03-19 RX ORDER — ACETAMINOPHEN 325 MG/1
650 TABLET ORAL ONCE
Status: COMPLETED | OUTPATIENT
Start: 2022-03-19 | End: 2022-03-19

## 2022-03-19 RX ORDER — METHYLPREDNISOLONE SODIUM SUCCINATE 40 MG/ML
20 INJECTION, POWDER, LYOPHILIZED, FOR SOLUTION INTRAMUSCULAR; INTRAVENOUS ONCE
Status: COMPLETED | OUTPATIENT
Start: 2022-03-19 | End: 2022-03-19

## 2022-03-19 RX ORDER — EPINEPHRINE 1 MG/ML
0.3 INJECTION, SOLUTION, CONCENTRATE INTRAVENOUS PRN
Status: CANCELLED | OUTPATIENT
Start: 2022-03-19

## 2022-03-19 RX ORDER — SODIUM CHLORIDE 9 MG/ML
20 INJECTION, SOLUTION INTRAVENOUS CONTINUOUS
Status: CANCELLED | OUTPATIENT
Start: 2022-03-19

## 2022-03-19 RX ORDER — FUROSEMIDE 10 MG/ML
20 INJECTION INTRAMUSCULAR; INTRAVENOUS ONCE
Status: DISCONTINUED | OUTPATIENT
Start: 2022-03-19 | End: 2022-03-20 | Stop reason: HOSPADM

## 2022-03-19 RX ORDER — FUROSEMIDE 10 MG/ML
20 INJECTION INTRAMUSCULAR; INTRAVENOUS ONCE
Status: CANCELLED | OUTPATIENT
Start: 2022-03-19 | End: 2022-03-19

## 2022-03-19 RX ORDER — DIPHENHYDRAMINE HCL 25 MG
25 TABLET ORAL ONCE
Status: COMPLETED | OUTPATIENT
Start: 2022-03-19 | End: 2022-03-19

## 2022-03-19 RX ORDER — SODIUM CHLORIDE 9 MG/ML
20 INJECTION, SOLUTION INTRAVENOUS CONTINUOUS
Status: DISCONTINUED | OUTPATIENT
Start: 2022-03-19 | End: 2022-03-20 | Stop reason: HOSPADM

## 2022-03-19 RX ORDER — HEPARIN SODIUM (PORCINE) LOCK FLUSH IV SOLN 100 UNIT/ML 100 UNIT/ML
500 SOLUTION INTRAVENOUS PRN
Status: DISCONTINUED | OUTPATIENT
Start: 2022-03-19 | End: 2022-03-20 | Stop reason: HOSPADM

## 2022-03-19 RX ORDER — SODIUM CHLORIDE 0.9 % (FLUSH) 0.9 %
5-40 SYRINGE (ML) INJECTION PRN
Status: DISCONTINUED | OUTPATIENT
Start: 2022-03-19 | End: 2022-03-20 | Stop reason: HOSPADM

## 2022-03-19 RX ORDER — SODIUM CHLORIDE 9 MG/ML
INJECTION, SOLUTION INTRAVENOUS CONTINUOUS
Status: CANCELLED | OUTPATIENT
Start: 2022-03-19

## 2022-03-19 RX ORDER — HEPARIN SODIUM (PORCINE) LOCK FLUSH IV SOLN 100 UNIT/ML 100 UNIT/ML
500 SOLUTION INTRAVENOUS PRN
Status: CANCELLED | OUTPATIENT
Start: 2022-03-19

## 2022-03-19 RX ORDER — SODIUM CHLORIDE 9 MG/ML
25 INJECTION, SOLUTION INTRAVENOUS PRN
Status: DISCONTINUED | OUTPATIENT
Start: 2022-03-19 | End: 2022-03-20 | Stop reason: HOSPADM

## 2022-03-19 RX ORDER — DIPHENHYDRAMINE HYDROCHLORIDE 50 MG/ML
50 INJECTION INTRAMUSCULAR; INTRAVENOUS
Status: CANCELLED | OUTPATIENT
Start: 2022-03-19

## 2022-03-19 RX ORDER — ACETAMINOPHEN 325 MG/1
650 TABLET ORAL
Status: CANCELLED | OUTPATIENT
Start: 2022-03-19

## 2022-03-19 RX ADMIN — SODIUM CHLORIDE, PRESERVATIVE FREE 10 ML: 5 INJECTION INTRAVENOUS at 10:55

## 2022-03-19 RX ADMIN — ACETAMINOPHEN 650 MG: 325 TABLET ORAL at 08:20

## 2022-03-19 RX ADMIN — Medication 500 UNITS: at 10:55

## 2022-03-19 RX ADMIN — METHYLPREDNISOLONE SODIUM SUCCINATE 20 MG: 40 INJECTION, POWDER, FOR SOLUTION INTRAMUSCULAR; INTRAVENOUS at 08:20

## 2022-03-19 RX ADMIN — DIPHENHYDRAMINE HCL 25 MG: 25 TABLET ORAL at 08:20

## 2022-03-19 RX ADMIN — SODIUM CHLORIDE 20 ML/HR: 9 INJECTION, SOLUTION INTRAVENOUS at 08:25

## 2022-03-19 RX ADMIN — SODIUM CHLORIDE, PRESERVATIVE FREE 10 ML: 5 INJECTION INTRAVENOUS at 10:54

## 2022-03-19 ASSESSMENT — PAIN SCALES - GENERAL: PAINLEVEL_OUTOF10: 0

## 2022-03-19 NOTE — DISCHARGE SUMMARY
Tolerated tRANSFUSION well. Reviewed discharge instructions, understanding verbalized. Copies of AVS given to take home. Patient discharged HOME with SPOUSE. Down to exit per self.     Orders Placed This Encounter   Medications    0.9 % sodium chloride infusion    sodium chloride flush 0.9 % injection 5-40 mL    acetaminophen (TYLENOL) tablet 650 mg    diphenhydrAMINE (BENADRYL) tablet 25 mg    furosemide (LASIX) injection 20 mg    DISCONTD: sodium chloride flush 0.9 % injection 5-40 mL    heparin flush 100 UNIT/ML injection 500 Units    0.9 % sodium chloride infusion    methylPREDNISolone sodium (SOLU-MEDROL) injection 20 mg

## 2022-03-20 LAB
ABO/RH: NORMAL
ANTIBODY SCREEN: NEGATIVE
COMPONENT: NORMAL
CROSSMATCH RESULT: NORMAL
STATUS: NORMAL
TRANSFUSION STATUS: NORMAL
UNIT DIVISION: 0
UNIT NUMBER: NORMAL

## 2022-03-22 ENCOUNTER — OFFICE VISIT (OUTPATIENT)
Dept: ONCOLOGY | Age: 52
End: 2022-03-22
Payer: COMMERCIAL

## 2022-03-22 ENCOUNTER — HOSPITAL ENCOUNTER (OUTPATIENT)
Dept: INFUSION THERAPY | Age: 52
Discharge: HOME | End: 2022-03-22
Payer: COMMERCIAL

## 2022-03-22 ENCOUNTER — CLINICAL DOCUMENTATION (OUTPATIENT)
Dept: ONCOLOGY | Age: 52
End: 2022-03-22

## 2022-03-22 VITALS
HEIGHT: 73 IN | BODY MASS INDEX: 25.71 KG/M2 | TEMPERATURE: 98.3 F | SYSTOLIC BLOOD PRESSURE: 121 MMHG | OXYGEN SATURATION: 99 % | DIASTOLIC BLOOD PRESSURE: 68 MMHG | RESPIRATION RATE: 18 BRPM | HEART RATE: 102 BPM | WEIGHT: 194 LBS

## 2022-03-22 DIAGNOSIS — C79.51 SECONDARY MALIGNANT NEOPLASM OF BONE (HCC): Primary | ICD-10-CM

## 2022-03-22 DIAGNOSIS — C34.82 MALIGNANT NEOPLASM OF OVERLAPPING SITES OF LEFT LUNG (HCC): ICD-10-CM

## 2022-03-22 DIAGNOSIS — D64.9 ANEMIA, UNSPECIFIED TYPE: ICD-10-CM

## 2022-03-22 LAB
ALBUMIN SERPL-MCNC: 3.5 GM/DL (ref 3.4–5)
ALP BLD-CCNC: 190 IU/L (ref 40–129)
ALT SERPL-CCNC: 10 U/L (ref 10–40)
ANION GAP SERPL CALCULATED.3IONS-SCNC: 15 MMOL/L (ref 4–16)
AST SERPL-CCNC: 14 IU/L (ref 15–37)
BASOPHILS ABSOLUTE: 0 K/CU MM
BASOPHILS RELATIVE PERCENT: 0.2 % (ref 0–1)
BILIRUB SERPL-MCNC: 0.1 MG/DL (ref 0–1)
BUN BLDV-MCNC: 19 MG/DL (ref 6–23)
CALCIUM SERPL-MCNC: 9.2 MG/DL (ref 8.3–10.6)
CHLORIDE BLD-SCNC: 99 MMOL/L (ref 99–110)
CO2: 23 MMOL/L (ref 21–32)
CREAT SERPL-MCNC: 1.1 MG/DL (ref 0.9–1.3)
DIFFERENTIAL TYPE: ABNORMAL
EOSINOPHILS ABSOLUTE: 0.1 K/CU MM
EOSINOPHILS RELATIVE PERCENT: 1.8 % (ref 0–3)
ERYTHROCYTE SEDIMENTATION RATE: 47 MM/HR (ref 0–20)
FERRITIN: 1128 NG/ML (ref 30–400)
FOLATE: 4 NG/ML (ref 3.1–17.5)
GFR AFRICAN AMERICAN: >60 ML/MIN/1.73M2
GFR NON-AFRICAN AMERICAN: >60 ML/MIN/1.73M2
GLUCOSE BLD-MCNC: 106 MG/DL (ref 70–99)
HCT VFR BLD CALC: 21.8 % (ref 42–52)
HEMOGLOBIN: 7.4 GM/DL (ref 13.5–18)
IRON: 140 UG/DL (ref 59–158)
LACTATE DEHYDROGENASE: 256 IU/L (ref 120–246)
LYMPHOCYTES ABSOLUTE: 1.2 K/CU MM
LYMPHOCYTES RELATIVE PERCENT: 27.3 % (ref 24–44)
MCH RBC QN AUTO: 27.2 PG (ref 27–31)
MCHC RBC AUTO-ENTMCNC: 33.9 % (ref 32–36)
MCV RBC AUTO: 80.1 FL (ref 78–100)
MONOCYTES ABSOLUTE: 0.5 K/CU MM
MONOCYTES RELATIVE PERCENT: 10.2 % (ref 0–4)
PCT TRANSFERRIN: 69 % (ref 10–44)
PDW BLD-RTO: 16.5 % (ref 11.7–14.9)
PLATELET # BLD: 95 K/CU MM (ref 140–440)
PMV BLD AUTO: 9.2 FL (ref 7.5–11.1)
POTASSIUM SERPL-SCNC: 4.1 MMOL/L (ref 3.5–5.1)
RBC # BLD: 2.72 M/CU MM (ref 4.6–6.2)
RETICULOCYTE COUNT PCT: 1.1 % (ref 0.2–2.2)
SEGMENTED NEUTROPHILS ABSOLUTE COUNT: 2.7 K/CU MM
SEGMENTED NEUTROPHILS RELATIVE PERCENT: 60.5 % (ref 36–66)
SODIUM BLD-SCNC: 137 MMOL/L (ref 135–145)
TOTAL IRON BINDING CAPACITY: 202 UG/DL (ref 250–450)
TOTAL PROTEIN: 6.3 GM/DL (ref 6.4–8.2)
UNSATURATED IRON BINDING CAPACITY: 62 UG/DL (ref 110–370)
VITAMIN B-12: 373 PG/ML (ref 211–911)
WBC # BLD: 4.4 K/CU MM (ref 4–10.5)

## 2022-03-22 PROCEDURE — 2580000003 HC RX 258: Performed by: INTERNAL MEDICINE

## 2022-03-22 PROCEDURE — 82746 ASSAY OF FOLIC ACID SERUM: CPT

## 2022-03-22 PROCEDURE — 84165 PROTEIN E-PHORESIS SERUM: CPT

## 2022-03-22 PROCEDURE — 86850 RBC ANTIBODY SCREEN: CPT

## 2022-03-22 PROCEDURE — 85025 COMPLETE CBC W/AUTO DIFF WBC: CPT

## 2022-03-22 PROCEDURE — 6360000002 HC RX W HCPCS: Performed by: INTERNAL MEDICINE

## 2022-03-22 PROCEDURE — 86900 BLOOD TYPING SEROLOGIC ABO: CPT

## 2022-03-22 PROCEDURE — 83010 ASSAY OF HAPTOGLOBIN QUANT: CPT

## 2022-03-22 PROCEDURE — 84154 ASSAY OF PSA FREE: CPT

## 2022-03-22 PROCEDURE — 82607 VITAMIN B-12: CPT

## 2022-03-22 PROCEDURE — 83615 LACTATE (LD) (LDH) ENZYME: CPT

## 2022-03-22 PROCEDURE — G8484 FLU IMMUNIZE NO ADMIN: HCPCS | Performed by: INTERNAL MEDICINE

## 2022-03-22 PROCEDURE — 84153 ASSAY OF PSA TOTAL: CPT

## 2022-03-22 PROCEDURE — 86922 COMPATIBILITY TEST ANTIGLOB: CPT

## 2022-03-22 PROCEDURE — 3017F COLORECTAL CA SCREEN DOC REV: CPT | Performed by: INTERNAL MEDICINE

## 2022-03-22 PROCEDURE — 85652 RBC SED RATE AUTOMATED: CPT

## 2022-03-22 PROCEDURE — 85045 AUTOMATED RETICULOCYTE COUNT: CPT

## 2022-03-22 PROCEDURE — 80053 COMPREHEN METABOLIC PANEL: CPT

## 2022-03-22 PROCEDURE — 83540 ASSAY OF IRON: CPT

## 2022-03-22 PROCEDURE — 36591 DRAW BLOOD OFF VENOUS DEVICE: CPT

## 2022-03-22 PROCEDURE — G8427 DOCREV CUR MEDS BY ELIG CLIN: HCPCS | Performed by: INTERNAL MEDICINE

## 2022-03-22 PROCEDURE — 82728 ASSAY OF FERRITIN: CPT

## 2022-03-22 PROCEDURE — 99214 OFFICE O/P EST MOD 30 MIN: CPT | Performed by: INTERNAL MEDICINE

## 2022-03-22 PROCEDURE — 86320 SERUM IMMUNOELECTROPHORESIS: CPT

## 2022-03-22 PROCEDURE — 4004F PT TOBACCO SCREEN RCVD TLK: CPT | Performed by: INTERNAL MEDICINE

## 2022-03-22 PROCEDURE — 86901 BLOOD TYPING SEROLOGIC RH(D): CPT

## 2022-03-22 PROCEDURE — G8419 CALC BMI OUT NRM PARAM NOF/U: HCPCS | Performed by: INTERNAL MEDICINE

## 2022-03-22 PROCEDURE — 83550 IRON BINDING TEST: CPT

## 2022-03-22 RX ORDER — DIPHENHYDRAMINE HCL 25 MG
25 TABLET ORAL ONCE
Status: CANCELLED | OUTPATIENT
Start: 2022-03-22 | End: 2022-03-22

## 2022-03-22 RX ORDER — ALBUTEROL SULFATE 90 UG/1
4 AEROSOL, METERED RESPIRATORY (INHALATION) PRN
Status: CANCELLED | OUTPATIENT
Start: 2022-03-22

## 2022-03-22 RX ORDER — ONDANSETRON 2 MG/ML
8 INJECTION INTRAMUSCULAR; INTRAVENOUS
Status: CANCELLED | OUTPATIENT
Start: 2022-03-22

## 2022-03-22 RX ORDER — ACETAMINOPHEN 325 MG/1
650 TABLET ORAL
Status: CANCELLED | OUTPATIENT
Start: 2022-03-22

## 2022-03-22 RX ORDER — SODIUM CHLORIDE 9 MG/ML
INJECTION, SOLUTION INTRAVENOUS CONTINUOUS
Status: CANCELLED | OUTPATIENT
Start: 2022-03-22

## 2022-03-22 RX ORDER — SODIUM CHLORIDE 0.9 % (FLUSH) 0.9 %
5-40 SYRINGE (ML) INJECTION PRN
Status: DISCONTINUED | OUTPATIENT
Start: 2022-03-22 | End: 2022-03-23 | Stop reason: HOSPADM

## 2022-03-22 RX ORDER — SODIUM CHLORIDE 9 MG/ML
25 INJECTION, SOLUTION INTRAVENOUS PRN
Status: CANCELLED | OUTPATIENT
Start: 2022-03-22

## 2022-03-22 RX ORDER — ONDANSETRON HYDROCHLORIDE 8 MG/1
TABLET, FILM COATED ORAL
COMMUNITY
Start: 2022-03-11

## 2022-03-22 RX ORDER — OXYCODONE AND ACETAMINOPHEN 7.5; 325 MG/1; MG/1
1 TABLET ORAL EVERY 6 HOURS PRN
Qty: 120 TABLET | Refills: 0 | Status: ON HOLD | OUTPATIENT
Start: 2022-03-22 | End: 2022-04-12 | Stop reason: SDUPTHER

## 2022-03-22 RX ORDER — EPINEPHRINE 1 MG/ML
0.3 INJECTION, SOLUTION, CONCENTRATE INTRAVENOUS PRN
Status: CANCELLED | OUTPATIENT
Start: 2022-03-22

## 2022-03-22 RX ORDER — HEPARIN SODIUM (PORCINE) LOCK FLUSH IV SOLN 100 UNIT/ML 100 UNIT/ML
500 SOLUTION INTRAVENOUS PRN
Status: CANCELLED | OUTPATIENT
Start: 2022-03-22

## 2022-03-22 RX ORDER — SODIUM CHLORIDE 0.9 % (FLUSH) 0.9 %
5-40 SYRINGE (ML) INJECTION PRN
Status: CANCELLED | OUTPATIENT
Start: 2022-03-22

## 2022-03-22 RX ORDER — HEPARIN SODIUM (PORCINE) LOCK FLUSH IV SOLN 100 UNIT/ML 100 UNIT/ML
500 SOLUTION INTRAVENOUS PRN
Status: DISCONTINUED | OUTPATIENT
Start: 2022-03-22 | End: 2022-03-23 | Stop reason: HOSPADM

## 2022-03-22 RX ORDER — SODIUM CHLORIDE 9 MG/ML
20 INJECTION, SOLUTION INTRAVENOUS CONTINUOUS
Status: CANCELLED | OUTPATIENT
Start: 2022-03-22

## 2022-03-22 RX ORDER — ACETAMINOPHEN 325 MG/1
650 TABLET ORAL ONCE
Status: CANCELLED | OUTPATIENT
Start: 2022-03-22 | End: 2022-03-22

## 2022-03-22 RX ORDER — DIPHENHYDRAMINE HYDROCHLORIDE 50 MG/ML
50 INJECTION INTRAMUSCULAR; INTRAVENOUS
Status: CANCELLED | OUTPATIENT
Start: 2022-03-22

## 2022-03-22 RX ORDER — FUROSEMIDE 10 MG/ML
20 INJECTION INTRAMUSCULAR; INTRAVENOUS ONCE
Status: CANCELLED | OUTPATIENT
Start: 2022-03-22 | End: 2022-03-22

## 2022-03-22 RX ADMIN — SODIUM CHLORIDE, PRESERVATIVE FREE 10 ML: 5 INJECTION INTRAVENOUS at 14:20

## 2022-03-22 RX ADMIN — HEPARIN 500 UNITS: 100 SYRINGE at 14:20

## 2022-03-22 NOTE — PROGRESS NOTES
Patient Name:  Leonila Brought  Patient :  1970  Patient MRN:  [id number]     Primary Oncologist: Caridad Runner, MD  Referring Provider: Rex Bennett MD     Date of Service: 3/22/2022        Chief Complaint:    Chief Complaint   Patient presents with    Follow-up       Encounter Diagnoses   Name Primary?  Secondary malignant neoplasm of bone ([facility] [facility] Yes    Malignant neoplasm of overlapping sites of left lung ([facility]     Anemia, unspecified type         HPI:   21: Initial [facility] visit:Hima Brumfield is a 45 y.o. male who presents to [facility] with report of dysuria and flank pain. Reports these symptoms started a few weeks ago. He ultimately came to the ED due to worsening back pain.      He reports ongoing issues with frequent urge to urinate and notes some incontinence. He denies hematuria. He was noted to have acute pyelonephritis and was admitted and started on IV Rocephin.      21 CT chest     Impression   1. Left hilar neoplasm extending into the left upper lobe measuring 3.2 x 5.9   x 5.3 cm obstructing the left upper lobe bronchus with probable minimal   adjacent infiltrates versus lymphangitic spread of tumor.  PET-CT/biopsy is   recommended. 2. Mediastinal lymphadenopathy. 3. Prominent left supraclavicular lymph nodes. 4. No osseous metastatic disease.      21 Ct abdomen and pelvis  Impression   1. Circumferential thickening of the bladder wall is noted which could be   related to cystitis.  Correlate with urinalysis. 2. There is left retroperitoneal lymphadenopathy.  This could be reactive or   neoplastic.  This can be further evaluated with PET-CT. 3. There is minimal stranding within the retroperitoneal on the left.  This   is uncertain etiology however could be related to acute pyelonephritis. 4. Interval development of multiple sclerotic lesions within the spine and   pelvis, concerning for metastatic osseous disease.    5. Prostate gland is enlarged.  Correlate with PSA.      .30      He denies past history of cancer. He smokes 2-3 ppd and drinks 10-12 beers daily. He reports unknown malignancy in his sister who  at 39. No other known family history of cancer.      Due to lung mass and concern for metastatic prostate cancer we were called to evaluate. 21:  Final Pathologic Diagnosis:   Needle biopsy of lung, clinically left lung mass:        SQUAMOUS CELL CARCINOMA IN SITU.     21:PET  1.  Left perihilar mass likely represents primary lung cancer.  Correlate   with biopsy results.  The opacity more peripheral in the left lower lobe has   relatively low level activity and likely represents an area of post   obstructive pneumonia adjacent to the mass.       2.  Metabolically active left AP window lymph node concerning for metastatic   disease.       3.  The prostate is enlarged and has increased FDG activity which could be   due to prostatitis or prostate cancer.  Correlate with PSA levels.       4.  No increased metabolic activity associated with retroperitoneal lymph   nodes.  This is unlikely related to the lung process given the significant   difference in metabolic activity; however, if there is prostate cancer, this   could potentially be metastatic disease from the prostate cancer, which can   have variable levels of uptake on FDG PET scans.       5.  No metabolic activity associated with multiple sclerotic lesions.  Again   this is unlikely related to the lung process, but if there is prostate   cancer, this could represent metastatic disease from prostate cancer with   poor FDG avidity.  Otherwise it could also represent large bone islands.       6.  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 There is a focal area of intense activity associated with a new lucent lesion   within the background of Paget's disease concerning for metastatic disease. Given the FDG activity, it is likely related to the lung process.         8/20/21: MRI brain  1. There is a punctate focus of restricted diffusion within the right frontal   lobe without associated enhancement, mass effect or midline shift.  This   could represent a punctate acute infarct.  However, given history, an early   metastatic focus cannot be entirely excluded. 2. Scattered foci of susceptibility are seen within the cerebral hemispheres   bilaterally, which were not visualized on the prior exam.  Findings could   represent areas of remote microhemorrhage or perhaps treated metastases. 3. No abnormal enhancement within the brain. 4. Minimal global parenchymal volume loss with minimal chronic microvascular   ischemic changes. 12/2/21: CT chest, abdomen and pelvis:  Chest CT: Interval increased size of the left perihilar mass, with increased   adjacent obstructive pneumonitis and atelectasis.       Stable to minimally to decreased mediastinal lymphadenopathy.       Interval increased bony metastatic disease.       Abdomen and pelvis CT: Stable retroperitoneal and pelvic lymphadenopathy.       Interval increased bony metastatic disease. 12/17/21:Final Pathologic Diagnosis:   Bone, posterior ileum, needle core biopsy:   -     METASTATIC SQUAMOUS CELL CARCINOMA. PACO  PDL1 22c3 >50%(keytruda)  PTEN positive  Alk neg    CARIS, not enough tissue for rest of the testing    Gaurdant with no actionable mutation otherwise     12/27/21: Started XRT to the left pelvis  Added chest radiation dia 3  Completed dia 10 2022      Started carbo/taxol/keytruda jan 20 2022 2/8/22: CT chest:  1.  Interval decrease in size of left suprahilar mass extending along the   left upper lobe bronchi extension into the left main pulmonary artery.  There   is interval decrease in associated ground-glass opacity in the left upper   lobe.  Findings suggest response to therapy.       2.  Interval decrease in mediastinal lymphadenopathy.       3.  Diffuse osseous metastatic disease. release tablet Take 1 tablet by mouth 2 times daily for 30 days. Intended supply: 30 days (Patient not taking: Reported on 3/22/2022) 60 tablet 0    melatonin (RA MELATONIN) 3 MG TABS tablet Take 1 tablet by mouth nightly as needed (incsomnia) 30 tablet 3    NARCAN 4 MG/0.1ML LIQD nasal spray DISPENSED PER STANDING ORDER USE AS DIRECTED PATIENT IS TRAINED OPIOID OVERDOSE RESPONDER      albuterol sulfate HFA (PROVENTIL HFA) 108 (90 Base) MCG/ACT inhaler Inhale 2 puffs into the lungs every 4 hours as needed for Wheezing or Shortness of Breath With spacer (and mask if indicated). Thanks. 1 Inhaler 1     No current facility-administered medications on file prior to visit. Interval History: 3/22/22: He arrived with his wife to the clinic today. He reported pain in the abdomen. Constipation. Appetite is poor but looks like he has not lost any weight. Nose bleeds at times. Coughing up blood clots. No hemturia. Urinating but not a lot. Migraine HA.      Review of Systems:  As per the interval history, rest of the review of system negative     Vital Signs: /68 (Site: Right Upper Arm, Position: Sitting, Cuff Size: Medium Adult)   Pulse 102   Temp 98.3 °F (36.8 °C) (Temporal)   Resp 18   Ht 6' 1\" (1.854 m)   Wt 194 lb (88 kg)   SpO2 99%   BMI 25.60 kg/m²      CONSTITUTIONAL: awake, alert  EYES: RAISSA, No pallor or any icterus  ENT: ATNC  NECK: No JVD  HEMATOLOGIC/LYMPHATIC: no cervical, supraclavicular or axillary lymphadenopathy   LUNGS: CTAB  CARDIOVASCULAR: s1s2 rrr no murmurs  ABDOMEN: soft ntnd bs pos  NEUROLOGIC: GI  SKIN: Psoriatic rash with excoriation marks on the lower extremities and also upper extremities  EXTREMITIES: no LE edema bilaterally     Labs:  Hematology:  Lab Results   Component Value Date    WBC 3.3 (L) 03/18/2022    RBC 2.43 (L) 03/18/2022    HGB 6.6 (LL) 03/18/2022    HCT 19.2 (L) 03/18/2022    MCV 79.0 03/18/2022    MCH 27.2 03/18/2022    MCHC 34.4 03/18/2022    RDW 17.1 (H) 03/18/2022    PLT 89 (L) 03/18/2022    MPV 9.4 03/18/2022    BANDSPCT 5 03/12/2022    SEGSPCT 53.3 03/18/2022    EOSRELPCT 1.8 03/18/2022    BASOPCT 0.3 03/18/2022    LYMPHOPCT 25.8 03/18/2022    MONOPCT 18.8 (H) 03/18/2022    BANDABS 0.25 03/12/2022    SEGSABS 1.7 03/18/2022    EOSABS 0.1 03/18/2022    BASOSABS 0.0 03/18/2022    LYMPHSABS 0.8 03/18/2022    MONOSABS 0.6 03/18/2022    DIFFTYPE AUTOMATED DIFFERENTIAL 03/18/2022    POLYCHROM 1+ 03/12/2022    PLTM FEW 11/13/2018     No results found for: ESR  Chemistry:  Lab Results   Component Value Date     (L) 03/18/2022    K 4.0 03/18/2022    CL 98 (L) 03/18/2022    CO2 21 03/18/2022    BUN 25 (H) 03/18/2022    CREATININE 1.2 03/18/2022    GLUCOSE 124 (H) 03/18/2022    CALCIUM 9.4 03/18/2022    PROT 7.1 03/18/2022    LABALBU 3.8 03/18/2022    BILITOT 0.2 03/18/2022    ALKPHOS 147 (H) 03/18/2022    AST 18 03/18/2022    ALT 15 03/18/2022    LABGLOM >60 03/18/2022    GFRAA >60 03/18/2022    MG 1.6 (L) 07/29/2021    POCCA 1.21 03/18/2022    POCGLU 125 (H) 03/18/2022     No results found for: MMA, LDH, HOMOCYSTEINE  No components found for: LD  Lab Results   Component Value Date    TSHHS 1.100 03/09/2022    T4FREE 1.29 03/09/2022     Immunology:  Lab Results   Component Value Date    PROT 7.1 03/18/2022     No results found for: Dash Feil, KLFLCR  No results found for: B2M  Coagulation Panel:  Lab Results   Component Value Date    PROTIME 15.1 (H) 03/12/2022    INR 1.17 03/12/2022    APTT >240.0 (H) 03/12/2022    DDIMER <200 12/19/2013     Anemia Panel:  No results found for: Kenny Christian  Tumor Markers:  Lab Results   Component Value Date    CEA 7.7 07/23/2021    .6 (H) 11/16/2021        Observations:  ECOG:  No data recorded       Assessment & Plan:                                                          Left hilar mass with mediastinal hilar lymphadenopathy.   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 PET scan results from august 2021 noted, bone lesions most probably not metastatic except for one lytic lesion. MRI of the brain with no convincing metastatic lesions. Presented  the case in the tumor board. Decision made to proceed with a prostate biopsy and treat with ADT if malignancy  and in the meantime proceed with staging mediastinoscopy/rebiopsy for further treatment plan. But as pt very very very noncompliant, did not follow up with urology, CTS or for bone biopsy multiple times. Note PSA rising and was 250 on November 16 2021   CT imaging dec 2021 with progressive met disease  Bone biopsy on dec 13 2021 with met squamous cell cancer, consistent with lung primary. PDL1 >50%Ekaterina Reyes) . Not enough tissue for rest of CARIS, guardant 360 with no other actionable mutation  CT chest jan 2022 with no significant change   Completed Palliative radiation to the pelvic area and also radiation to the left lung dia 10 2022  Discussed the findings, diagnosis and poor prognosis and treatment with carbo/taxol/pembro after completion of radiation. Discussed ae. PORT placed. Completed OCM  C1D1 carbo/taxol and keytruda started 1/20/22  CT after C1 with positive response   Plan for repeat imaging after 3 C  Dose modifications as needed. Anemia: w/u in progress. Received I unit PRBCs. Prostate enlargement and elevated PSA  (continues to rise from 147 in July 2021 to 250 in Nov 2021), most probably  prostate cancer. As mentioned above recommend prostate biopsy, but pt very noncompliant and did not follow through multiple times but finally followed through and the plan was  for biopsy but could not go back there. Repeat PSA march 2022 pending. Start [facility] without biopsy?, will discuss with urology. Constipation: stool softener and laxatives as needed    Rash:   Looks like psoriatic rash.   Has been applying topical cream    Elevated alk phos most probably secondary to the bone lmets    Adequate analgesic and bowel regimen. Palliative care consult placed. Continue other medical care. Thank you for letting us be part of the care and will follow along. Discussed above findings and plan with him and he voiced understanding. Answered all his questions. Discussed healthy lifestyle including healthy diet, regular exercise as tolerated. ,  Smoking and alcohol cessation    Recommend follow-up with primary care physician and other specialists. Note he has he has been very noncompliant with appointments. Please do not hesitate to contact us if you need further information. Return to clinic may 2022 or earlier if new Sx    I have recommended that the patient follow CDC guidelines for prevention of COVID-19 infection.   Received Covid vaccine/flu vaccine     TOMI

## 2022-03-22 NOTE — PROGRESS NOTES
MA Rooming Questions  Patient: Andrew Diallo  MRN: [id number]    Date: 3/22/2022        1. Do you have any new issues? yes - headaches since he has been taking Oxycontin, pain not controlled, trouble urinating         2. Do you need any refills on medications? yes - nicotine patches, pain med? 3. Have you had any imaging done since your last visit?   no    4. Have you been hospitalized or seen in the emergency room since your last visit here?   no    5. Did the patient have a depression screening completed today?  No    No data recorded     PHQ-9 Given to (if applicable):               PHQ-9 Score (if applicable):                     [] Positive     []  Negative              Does question #9 need addressed (if applicable)                     [] Yes    []  No               Kaveh Gordon CMA

## 2022-03-22 NOTE — PROGRESS NOTES
Patient arrived to treatment suite for blood draw per port. Right chest mediport accessed and blood drawn from site and sent to lab for processing. Patient waited in treatment suite for CBC results. Hgb 7.4, so Dr. Abigail Zarate to go ahead and given 1 unit of PRBC's, so patient was banded and scheduled for blood tomorrow. Left treatment suite ambulatory.

## 2022-03-23 ENCOUNTER — TELEPHONE (OUTPATIENT)
Dept: CASE MANAGEMENT | Age: 52
End: 2022-03-23

## 2022-03-23 ENCOUNTER — HOSPITAL ENCOUNTER (OUTPATIENT)
Dept: INFUSION THERAPY | Age: 52
Setting detail: INFUSION SERIES
Discharge: HOME | End: 2022-03-23
Payer: COMMERCIAL

## 2022-03-23 VITALS
TEMPERATURE: 97.3 F | DIASTOLIC BLOOD PRESSURE: 83 MMHG | HEART RATE: 78 BPM | SYSTOLIC BLOOD PRESSURE: 131 MMHG | OXYGEN SATURATION: 98 % | RESPIRATION RATE: 14 BRPM

## 2022-03-23 DIAGNOSIS — D64.9 ANEMIA, UNSPECIFIED TYPE: Primary | ICD-10-CM

## 2022-03-23 DIAGNOSIS — C34.82 MALIGNANT NEOPLASM OF OVERLAPPING SITES OF LEFT LUNG (HCC): ICD-10-CM

## 2022-03-23 PROCEDURE — 96375 TX/PRO/DX INJ NEW DRUG ADDON: CPT

## 2022-03-23 PROCEDURE — 99211 OFF/OP EST MAY X REQ PHY/QHP: CPT

## 2022-03-23 PROCEDURE — 6360000002 HC RX W HCPCS: Performed by: INTERNAL MEDICINE

## 2022-03-23 PROCEDURE — 36430 TRANSFUSION BLD/BLD COMPNT: CPT

## 2022-03-23 PROCEDURE — 2580000003 HC RX 258: Performed by: INTERNAL MEDICINE

## 2022-03-23 PROCEDURE — 6370000000 HC RX 637 (ALT 250 FOR IP): Performed by: INTERNAL MEDICINE

## 2022-03-23 PROCEDURE — 96374 THER/PROPH/DIAG INJ IV PUSH: CPT

## 2022-03-23 PROCEDURE — P9016 RBC LEUKOCYTES REDUCED: HCPCS

## 2022-03-23 RX ORDER — SODIUM CHLORIDE 0.9 % (FLUSH) 0.9 %
5-40 SYRINGE (ML) INJECTION PRN
Status: DISCONTINUED | OUTPATIENT
Start: 2022-03-23 | End: 2022-03-23

## 2022-03-23 RX ORDER — SODIUM CHLORIDE 0.9 % (FLUSH) 0.9 %
5-40 SYRINGE (ML) INJECTION PRN
Status: CANCELLED | OUTPATIENT
Start: 2022-03-23

## 2022-03-23 RX ORDER — SODIUM CHLORIDE 9 MG/ML
20 INJECTION, SOLUTION INTRAVENOUS CONTINUOUS
Status: DISCONTINUED | OUTPATIENT
Start: 2022-03-23 | End: 2022-03-23

## 2022-03-23 RX ORDER — EPINEPHRINE 1 MG/ML
0.3 INJECTION, SOLUTION, CONCENTRATE INTRAVENOUS PRN
Status: CANCELLED | OUTPATIENT
Start: 2022-03-23

## 2022-03-23 RX ORDER — FUROSEMIDE 10 MG/ML
20 INJECTION INTRAMUSCULAR; INTRAVENOUS ONCE
Status: CANCELLED | OUTPATIENT
Start: 2022-03-23 | End: 2022-03-23

## 2022-03-23 RX ORDER — ACETAMINOPHEN 325 MG/1
650 TABLET ORAL ONCE
Status: CANCELLED | OUTPATIENT
Start: 2022-03-23 | End: 2022-03-23

## 2022-03-23 RX ORDER — SODIUM CHLORIDE 9 MG/ML
25 INJECTION, SOLUTION INTRAVENOUS PRN
Status: CANCELLED | OUTPATIENT
Start: 2022-03-23

## 2022-03-23 RX ORDER — DIPHENHYDRAMINE HCL 25 MG
25 TABLET ORAL ONCE
Status: CANCELLED | OUTPATIENT
Start: 2022-03-23 | End: 2022-03-23

## 2022-03-23 RX ORDER — DIPHENHYDRAMINE HYDROCHLORIDE 50 MG/ML
50 INJECTION INTRAMUSCULAR; INTRAVENOUS
Status: CANCELLED | OUTPATIENT
Start: 2022-03-23

## 2022-03-23 RX ORDER — ALBUTEROL SULFATE 90 UG/1
4 AEROSOL, METERED RESPIRATORY (INHALATION) PRN
Status: CANCELLED | OUTPATIENT
Start: 2022-03-23

## 2022-03-23 RX ORDER — ONDANSETRON 2 MG/ML
8 INJECTION INTRAMUSCULAR; INTRAVENOUS
Status: CANCELLED | OUTPATIENT
Start: 2022-03-23

## 2022-03-23 RX ORDER — FUROSEMIDE 10 MG/ML
20 INJECTION INTRAMUSCULAR; INTRAVENOUS ONCE
Status: COMPLETED | OUTPATIENT
Start: 2022-03-23 | End: 2022-03-23

## 2022-03-23 RX ORDER — SODIUM CHLORIDE 9 MG/ML
20 INJECTION, SOLUTION INTRAVENOUS CONTINUOUS
Status: CANCELLED | OUTPATIENT
Start: 2022-03-23

## 2022-03-23 RX ORDER — HEPARIN SODIUM (PORCINE) LOCK FLUSH IV SOLN 100 UNIT/ML 100 UNIT/ML
500 SOLUTION INTRAVENOUS PRN
Status: CANCELLED | OUTPATIENT
Start: 2022-03-23

## 2022-03-23 RX ORDER — ACETAMINOPHEN 325 MG/1
650 TABLET ORAL ONCE
Status: COMPLETED | OUTPATIENT
Start: 2022-03-23 | End: 2022-03-23

## 2022-03-23 RX ORDER — DIPHENHYDRAMINE HCL 25 MG
25 TABLET ORAL ONCE
Status: COMPLETED | OUTPATIENT
Start: 2022-03-23 | End: 2022-03-23

## 2022-03-23 RX ORDER — ACETAMINOPHEN 325 MG/1
650 TABLET ORAL
Status: CANCELLED | OUTPATIENT
Start: 2022-03-23

## 2022-03-23 RX ORDER — SODIUM CHLORIDE 9 MG/ML
INJECTION, SOLUTION INTRAVENOUS CONTINUOUS
Status: CANCELLED | OUTPATIENT
Start: 2022-03-23

## 2022-03-23 RX ORDER — HEPARIN SODIUM (PORCINE) LOCK FLUSH IV SOLN 100 UNIT/ML 100 UNIT/ML
500 SOLUTION INTRAVENOUS PRN
Status: DISCONTINUED | OUTPATIENT
Start: 2022-03-23 | End: 2022-03-24 | Stop reason: HOSPADM

## 2022-03-23 RX ADMIN — SODIUM CHLORIDE, PRESERVATIVE FREE 10 ML: 5 INJECTION INTRAVENOUS at 10:33

## 2022-03-23 RX ADMIN — FUROSEMIDE 20 MG: 10 INJECTION INTRAMUSCULAR; INTRAVENOUS at 10:32

## 2022-03-23 RX ADMIN — Medication 500 UNITS: at 10:33

## 2022-03-23 RX ADMIN — DIPHENHYDRAMINE HCL 25 MG: 25 TABLET ORAL at 07:55

## 2022-03-23 RX ADMIN — ACETAMINOPHEN 650 MG: 325 TABLET ORAL at 07:55

## 2022-03-23 RX ADMIN — SODIUM CHLORIDE, PRESERVATIVE FREE 10 ML: 5 INJECTION INTRAVENOUS at 08:00

## 2022-03-23 RX ADMIN — SODIUM CHLORIDE, PRESERVATIVE FREE 10 ML: 5 INJECTION INTRAVENOUS at 10:32

## 2022-03-23 RX ADMIN — SODIUM CHLORIDE 20 ML/HR: 9 INJECTION, SOLUTION INTRAVENOUS at 08:08

## 2022-03-23 ASSESSMENT — PAIN SCALES - GENERAL: PAINLEVEL_OUTOF10: 0

## 2022-03-23 NOTE — PLAN OF CARE
Ambulatory to unit room 4 for Blood Transfusion. Reorientated to unit. Procedure and plan of care explained. Questions answered. Understanding verbalized.

## 2022-03-23 NOTE — PROGRESS NOTES
Tolerated transfusion well. Reviewed discharge instruction, voiced understanding. Copies of AVS given. Pt discharged home. Pt to exit via steady gait.     Orders Placed This Encounter   Medications    0.9 % sodium chloride infusion    sodium chloride flush 0.9 % injection 5-40 mL    acetaminophen (TYLENOL) tablet 650 mg    diphenhydrAMINE (BENADRYL) tablet 25 mg    furosemide (LASIX) injection 20 mg    heparin flush 100 UNIT/ML injection 500 Units

## 2022-03-23 NOTE — TELEPHONE ENCOUNTER
This RN called and spoke with patients wife regarding upcoming appointments, patients wife asked if information could be text to her. The following information text to patients wife Aram Dunn. [facility] arrive 10:00 AM for 10:15 appointment. Instructions for prep will need to be picked up at the [facility] at least a few days prior to scan due to prep needing to be 24 hours prior. Follow up with Dr. Reji Blake April 20th at 3:50 PM    PET scan date changed, PET at [facility] Monday April 11th at 12:00PM NPO past 0600 no candy, gums or mints. Patient can have plain water. Confirmation text received from Aram Dunn that she got the appointments. They have this RNs direct contact information if any needs arise.

## 2022-03-24 LAB
ABO/RH: NORMAL
ANTIBODY SCREEN: NEGATIVE
COMPONENT: NORMAL
CROSSMATCH RESULT: NORMAL
HAPTOGLOBIN: 417 MG/DL (ref 30–200)
STATUS: NORMAL
TRANSFUSION STATUS: NORMAL
UNIT DIVISION: 0
UNIT NUMBER: NORMAL

## 2022-03-25 ENCOUNTER — HOSPITAL ENCOUNTER (OUTPATIENT)
Dept: INFUSION THERAPY | Age: 52
Discharge: HOME | End: 2022-03-25
Payer: COMMERCIAL

## 2022-03-25 ENCOUNTER — HOSPITAL ENCOUNTER (EMERGENCY)
Age: 52
Discharge: HOME | End: 2022-03-26
Payer: COMMERCIAL

## 2022-03-25 VITALS
HEIGHT: 73 IN | TEMPERATURE: 97 F | BODY MASS INDEX: 26.16 KG/M2 | RESPIRATION RATE: 16 BRPM | HEART RATE: 81 BPM | DIASTOLIC BLOOD PRESSURE: 80 MMHG | OXYGEN SATURATION: 97 % | SYSTOLIC BLOOD PRESSURE: 130 MMHG | WEIGHT: 197.4 LBS

## 2022-03-25 VITALS
OXYGEN SATURATION: 99 % | TEMPERATURE: 98.4 F | SYSTOLIC BLOOD PRESSURE: 131 MMHG | HEART RATE: 85 BPM | RESPIRATION RATE: 16 BRPM | DIASTOLIC BLOOD PRESSURE: 81 MMHG

## 2022-03-25 DIAGNOSIS — R33.9 URINARY RETENTION: ICD-10-CM

## 2022-03-25 DIAGNOSIS — C34.82 MALIGNANT NEOPLASM OF OVERLAPPING SITES OF LEFT LUNG (HCC): Primary | ICD-10-CM

## 2022-03-25 DIAGNOSIS — R93.5 ABNORMAL CT OF THE ABDOMEN: ICD-10-CM

## 2022-03-25 DIAGNOSIS — N13.30 HYDRONEPHROSIS, UNSPECIFIED HYDRONEPHROSIS TYPE: Primary | ICD-10-CM

## 2022-03-25 LAB
ALBUMIN ELP: 2.8 GM/DL (ref 3.2–5.6)
ALBUMIN SERPL-MCNC: 3.6 GM/DL (ref 3.4–5)
ALP BLD-CCNC: 211 IU/L (ref 40–129)
ALPHA-1-GLOBULIN: 0.4 GM/DL (ref 0.1–0.4)
ALPHA-2-GLOBULIN: 1.1 GM/DL (ref 0.4–1.2)
ALT SERPL-CCNC: 17 U/L (ref 10–40)
ANION GAP SERPL CALCULATED.3IONS-SCNC: 10 MMOL/L (ref 4–16)
ANISOCYTOSIS: ABNORMAL
AST SERPL-CCNC: 23 IU/L (ref 15–37)
BACTERIA: NEGATIVE /HPF
BANDED NEUTROPHILS ABSOLUTE COUNT: 0.52 K/CU MM
BANDED NEUTROPHILS RELATIVE PERCENT: 13 % (ref 5–11)
BASOPHILS ABSOLUTE: 0 K/CU MM
BASOPHILS RELATIVE PERCENT: 0.2 % (ref 0–1)
BETA GLOBULIN: 1.1 GM/DL (ref 0.5–1.3)
BILIRUB SERPL-MCNC: 0.1 MG/DL (ref 0–1)
BILIRUBIN URINE: NEGATIVE MG/DL
BLOOD, URINE: NEGATIVE
BUN BLDV-MCNC: 14 MG/DL (ref 6–23)
CALCIUM SERPL-MCNC: 8.7 MG/DL (ref 8.3–10.6)
CHLORIDE BLD-SCNC: 99 MMOL/L (ref 99–110)
CLARITY: CLEAR
CO2: 26 MMOL/L (ref 21–32)
COLOR: YELLOW
CREAT SERPL-MCNC: 0.9 MG/DL (ref 0.9–1.3)
DIFFERENTIAL TYPE: ABNORMAL
DIFFERENTIAL TYPE: ABNORMAL
EOSINOPHILS ABSOLUTE: 0.1 K/CU MM
EOSINOPHILS ABSOLUTE: 0.1 K/CU MM
EOSINOPHILS RELATIVE PERCENT: 1.2 % (ref 0–3)
EOSINOPHILS RELATIVE PERCENT: 3 % (ref 0–3)
GAMMA GLOBULIN: 0.8 GM/DL (ref 0.5–1.6)
GFR AFRICAN AMERICAN: >60 ML/MIN/1.73M2
GFR NON-AFRICAN AMERICAN: >60 ML/MIN/1.73M2
GLUCOSE BLD-MCNC: 106 MG/DL (ref 70–99)
GLUCOSE, URINE: NEGATIVE MG/DL
HCT VFR BLD CALC: 23 % (ref 42–52)
HCT VFR BLD CALC: 23.9 % (ref 42–52)
HEMOGLOBIN: 7.5 GM/DL (ref 13.5–18)
HEMOGLOBIN: 7.9 GM/DL (ref 13.5–18)
HYPOCHROMIA: ABNORMAL
KETONES, URINE: NEGATIVE MG/DL
LACTATE: 0.7 MMOL/L (ref 0.4–2)
LEUKOCYTE ESTERASE, URINE: NEGATIVE
LIPASE: 36 IU/L (ref 13–60)
LYMPHOCYTES ABSOLUTE: 1 K/CU MM
LYMPHOCYTES ABSOLUTE: 1.2 K/CU MM
LYMPHOCYTES RELATIVE PERCENT: 24 % (ref 24–44)
LYMPHOCYTES RELATIVE PERCENT: 29.2 % (ref 24–44)
MCH RBC QN AUTO: 27 PG (ref 27–31)
MCH RBC QN AUTO: 27.1 PG (ref 27–31)
MCHC RBC AUTO-ENTMCNC: 32.6 % (ref 32–36)
MCHC RBC AUTO-ENTMCNC: 33.1 % (ref 32–36)
MCV RBC AUTO: 82.1 FL (ref 78–100)
MCV RBC AUTO: 82.7 FL (ref 78–100)
METAMYELOCYTES ABSOLUTE COUNT: 0.12 K/CU MM
METAMYELOCYTES PERCENT: 3 %
MONOCYTES ABSOLUTE: 0.2 K/CU MM
MONOCYTES ABSOLUTE: 0.5 K/CU MM
MONOCYTES RELATIVE PERCENT: 11 % (ref 0–4)
MONOCYTES RELATIVE PERCENT: 4 % (ref 0–4)
MUCUS: ABNORMAL HPF
NITRITE URINE, QUANTITATIVE: NEGATIVE
PDW BLD-RTO: 16.4 % (ref 11.7–14.9)
PDW BLD-RTO: 16.7 % (ref 11.7–14.9)
PH, URINE: 5.5 (ref 5–8)
PLATELET # BLD: 64 K/CU MM (ref 140–440)
PLATELET # BLD: 64 K/CU MM (ref 140–440)
PMV BLD AUTO: 8.2 FL (ref 7.5–11.1)
PMV BLD AUTO: 9.7 FL (ref 7.5–11.1)
POLYCHROMASIA: ABNORMAL
POTASSIUM SERPL-SCNC: 4 MMOL/L (ref 3.5–5.1)
PROTEIN UA: NEGATIVE MG/DL
RBC # BLD: 2.78 M/CU MM (ref 4.6–6.2)
RBC # BLD: 2.91 M/CU MM (ref 4.6–6.2)
RBC URINE: ABNORMAL /HPF (ref 0–3)
SEGMENTED NEUTROPHILS ABSOLUTE COUNT: 2.1 K/CU MM
SEGMENTED NEUTROPHILS ABSOLUTE COUNT: 2.4 K/CU MM
SEGMENTED NEUTROPHILS RELATIVE PERCENT: 53 % (ref 36–66)
SEGMENTED NEUTROPHILS RELATIVE PERCENT: 58.4 % (ref 36–66)
SODIUM BLD-SCNC: 135 MMOL/L (ref 135–145)
SPECIFIC GRAVITY UA: <1.005 (ref 1–1.03)
SPEP INTERPRETATION: ABNORMAL
SPEP INTERPRETATION: NORMAL
TOTAL PROTEIN: 5.9 GM/DL (ref 6.4–8.2)
TOTAL PROTEIN: 6.3 GM/DL (ref 6.4–8.2)
UROBILINOGEN, URINE: 0.2 MG/DL (ref 0.2–1)
WBC # BLD: 4 K/CU MM (ref 4–10.5)
WBC # BLD: 4.1 K/CU MM (ref 4–10.5)
WBC UA: 1 /HPF (ref 0–2)

## 2022-03-25 PROCEDURE — 83690 ASSAY OF LIPASE: CPT

## 2022-03-25 PROCEDURE — 2580000003 HC RX 258: Performed by: INTERNAL MEDICINE

## 2022-03-25 PROCEDURE — 36591 DRAW BLOOD OFF VENOUS DEVICE: CPT

## 2022-03-25 PROCEDURE — 87086 URINE CULTURE/COLONY COUNT: CPT

## 2022-03-25 PROCEDURE — 85007 BL SMEAR W/DIFF WBC COUNT: CPT

## 2022-03-25 PROCEDURE — 85027 COMPLETE CBC AUTOMATED: CPT

## 2022-03-25 PROCEDURE — 80053 COMPREHEN METABOLIC PANEL: CPT

## 2022-03-25 PROCEDURE — 81001 URINALYSIS AUTO W/SCOPE: CPT

## 2022-03-25 PROCEDURE — 99284 EMERGENCY DEPT VISIT MOD MDM: CPT

## 2022-03-25 PROCEDURE — 83605 ASSAY OF LACTIC ACID: CPT

## 2022-03-25 PROCEDURE — 85025 COMPLETE CBC W/AUTO DIFF WBC: CPT

## 2022-03-25 PROCEDURE — 6360000002 HC RX W HCPCS: Performed by: INTERNAL MEDICINE

## 2022-03-25 PROCEDURE — 51798 US URINE CAPACITY MEASURE: CPT

## 2022-03-25 RX ORDER — HEPARIN SODIUM (PORCINE) LOCK FLUSH IV SOLN 100 UNIT/ML 100 UNIT/ML
500 SOLUTION INTRAVENOUS PRN
Status: DISCONTINUED | OUTPATIENT
Start: 2022-03-25 | End: 2022-03-26 | Stop reason: HOSPADM

## 2022-03-25 RX ORDER — HEPARIN SODIUM (PORCINE) LOCK FLUSH IV SOLN 100 UNIT/ML 100 UNIT/ML
500 SOLUTION INTRAVENOUS PRN
Status: CANCELLED | OUTPATIENT
Start: 2022-03-25

## 2022-03-25 RX ORDER — LIDOCAINE HYDROCHLORIDE 20 MG/ML
JELLY TOPICAL ONCE
Status: COMPLETED | OUTPATIENT
Start: 2022-03-25 | End: 2022-03-26

## 2022-03-25 RX ORDER — SODIUM CHLORIDE 0.9 % (FLUSH) 0.9 %
5-40 SYRINGE (ML) INJECTION PRN
Status: DISCONTINUED | OUTPATIENT
Start: 2022-03-25 | End: 2022-03-26 | Stop reason: HOSPADM

## 2022-03-25 RX ORDER — OXYCODONE HYDROCHLORIDE AND ACETAMINOPHEN 5; 325 MG/1; MG/1
2 TABLET ORAL ONCE
Status: COMPLETED | OUTPATIENT
Start: 2022-03-25 | End: 2022-03-26

## 2022-03-25 RX ORDER — SODIUM CHLORIDE 9 MG/ML
25 INJECTION, SOLUTION INTRAVENOUS PRN
Status: CANCELLED | OUTPATIENT
Start: 2022-03-25

## 2022-03-25 RX ORDER — SODIUM CHLORIDE 0.9 % (FLUSH) 0.9 %
5-40 SYRINGE (ML) INJECTION PRN
Status: CANCELLED | OUTPATIENT
Start: 2022-03-25

## 2022-03-25 RX ADMIN — SODIUM CHLORIDE, PRESERVATIVE FREE 20 ML: 5 INJECTION INTRAVENOUS at 09:57

## 2022-03-25 RX ADMIN — HEPARIN 500 UNITS: 100 SYRINGE at 09:57

## 2022-03-25 NOTE — PROGRESS NOTES
Patient arrived to treatment suite for blood and treatment. Right chest mediport accessed and blood drawn from site and sent to lab for processing. Hgb: 7.9, Plt: 64. Discussed labs with Dr. Ryne Vazquez who recommended to hold treatment for 1 week and have Pt F/U with Dr. Amy Velasco before next treatment administered. Pt was upset about not being able to receive treatment today. Bonifacio Winters coordinator at chair side and told Pt she would call him on Monday and let him know if Dr. Amy Velasco can see him before Friday next week.

## 2022-03-26 ENCOUNTER — APPOINTMENT (OUTPATIENT)
Dept: CT IMAGING | Age: 52
End: 2022-03-26
Payer: COMMERCIAL

## 2022-03-26 LAB
ALBUMIN SERPL-MCNC: 3.8 GM/DL (ref 3.4–5)
ALP BLD-CCNC: 211 IU/L (ref 40–128)
ALT SERPL-CCNC: 12 U/L (ref 10–40)
ANION GAP SERPL CALCULATED.3IONS-SCNC: 15 MMOL/L (ref 4–16)
AST SERPL-CCNC: 21 IU/L (ref 15–37)
BILIRUB SERPL-MCNC: 0.2 MG/DL (ref 0–1)
BUN BLDV-MCNC: 15 MG/DL (ref 6–23)
CALCIUM SERPL-MCNC: 8.9 MG/DL (ref 8.3–10.6)
CHLORIDE BLD-SCNC: 100 MMOL/L (ref 99–110)
CO2: 22 MMOL/L (ref 21–32)
CREAT SERPL-MCNC: 1.1 MG/DL (ref 0.9–1.3)
GFR AFRICAN AMERICAN: >60 ML/MIN/1.73M2
GFR NON-AFRICAN AMERICAN: >60 ML/MIN/1.73M2
GLUCOSE BLD-MCNC: 98 MG/DL (ref 70–99)
POTASSIUM SERPL-SCNC: 4.2 MMOL/L (ref 3.5–5.1)
SODIUM BLD-SCNC: 137 MMOL/L (ref 135–145)
TOTAL PROTEIN: 6.4 GM/DL (ref 6.4–8.2)

## 2022-03-26 PROCEDURE — 2580000003 HC RX 258: Performed by: PHYSICIAN ASSISTANT

## 2022-03-26 PROCEDURE — 6370000000 HC RX 637 (ALT 250 FOR IP): Performed by: PHYSICIAN ASSISTANT

## 2022-03-26 PROCEDURE — 6360000004 HC RX CONTRAST MEDICATION: Performed by: PHYSICIAN ASSISTANT

## 2022-03-26 PROCEDURE — 6360000002 HC RX W HCPCS: Performed by: PHYSICIAN ASSISTANT

## 2022-03-26 PROCEDURE — 74177 CT ABD & PELVIS W/CONTRAST: CPT

## 2022-03-26 RX ORDER — SODIUM CHLORIDE 0.9 % (FLUSH) 0.9 %
5-40 SYRINGE (ML) INJECTION 2 TIMES DAILY
Status: DISCONTINUED | OUTPATIENT
Start: 2022-03-26 | End: 2022-03-26 | Stop reason: HOSPADM

## 2022-03-26 RX ORDER — HEPARIN SODIUM (PORCINE) LOCK FLUSH IV SOLN 100 UNIT/ML 100 UNIT/ML
100 SOLUTION INTRAVENOUS ONCE
Status: COMPLETED | OUTPATIENT
Start: 2022-03-26 | End: 2022-03-26

## 2022-03-26 RX ORDER — LORAZEPAM 0.5 MG/1
0.5 TABLET ORAL ONCE
Status: COMPLETED | OUTPATIENT
Start: 2022-03-26 | End: 2022-03-26

## 2022-03-26 RX ADMIN — LIDOCAINE HYDROCHLORIDE: 20 JELLY TOPICAL at 01:15

## 2022-03-26 RX ADMIN — IOPAMIDOL 75 ML: 755 INJECTION, SOLUTION INTRAVENOUS at 00:26

## 2022-03-26 RX ADMIN — LORAZEPAM 0.5 MG: 0.5 TABLET ORAL at 01:16

## 2022-03-26 RX ADMIN — OXYCODONE HYDROCHLORIDE AND ACETAMINOPHEN 2 TABLET: 5; 325 TABLET ORAL at 00:34

## 2022-03-26 RX ADMIN — SODIUM CHLORIDE, PRESERVATIVE FREE 10 ML: 5 INJECTION INTRAVENOUS at 01:16

## 2022-03-26 RX ADMIN — Medication 100 UNITS: at 03:33

## 2022-03-26 ASSESSMENT — PAIN SCALES - GENERAL: PAINLEVEL_OUTOF10: 10

## 2022-03-26 NOTE — ED NOTES
Patient having anxiety over sampson. Wife called and is coming in to be with patient during placement.   Patient will not let nurse place sampson until wife's arrival     [place], [address] [facility]  03/26/22 [id number]

## 2022-03-26 NOTE — ED PROVIDER NOTES
EMERGENCY DEPARTMENT ENCOUNTER      PCP: Lucy Mendez MD    [address] [facility]    Chief Complaint   Patient presents with    Flank Pain     left sided flank pain       This patient was not evaluated by the attending physician. I have independently evaluated this patient. HPI    Christy Cottrell is a 47 y.o. male who presents with left flank pain. Onset today. Patient has associated difficulty urinating and constipation. Patient states he went to have chemo today and did not have it done due to his hemoglobin being low. Patient denies chest pain, shortness of breath. Patient denies pain with urination. Patient unsure if he has history of prostate problems. Patient denies lower extremity pain or swelling. Patient denies abdominal pain, vomiting. Patient denies fever. REVIEW OF SYSTEMS    Constitutional:  Denies fever  HENT:  Denies sore throat or ear pain   Cardiovascular:  Denies chest pain  Respiratory:  Denies cough or shortness of breath   GI:  See HPI above  : see HPI  Musculoskeletal:  See HPI. No pain or swelling of extremities.   Skin:  Denies rash  Neurologic:  Denies focal weakness or sensory changes   Lymphatic:  Denies swollen glands     All other review of systems are negative  See HPI and nursing notes for additional information     PAST MEDICAL & SURGICAL HISTORY    Past Medical History:   Diagnosis Date    CAD (coronary artery disease) 12/23/2013    cath negative per pt    Cancer ([facility] [address] 06/2021    pt reports he has lung cancer    History of TMJ disorder     Hypertension     Trigeminal neuralgia      Past Surgical History:   Procedure Laterality Date    ABDOMEN SURGERY      CARDIAC CATHETERIZATION  08/03/2016    CT BIOPSY PERCUTANEOUS SUPERFICIAL BONE  12/17/2021    CT BIOPSY PERCUTANEOUS SUPERFICIAL BONE 12/17/2021 [facility] CT SCAN    CT NEEDLE BIOPSY LUNG PERCUTANEOUS  7/28/2021    CT NEEDLE BIOPSY LUNG PERCUTANEOUS 7/28/2021 [facility] CT SCAN    PORT SURGERY Right 8/13/2021 [facility]IPORT INSERTION performed by Kris Zheng MD at [facility]    Current Outpatient Rx   Medication Sig Dispense Refill    ondansetron (ZOFRAN) 8 MG tablet take 1 tablet by mouth every 8 hours if needed for nausea and vomiting      oxyCODONE-acetaminophen (PERCOCET) 7.5-325 MG per tablet Take 1 tablet by mouth every 6 hours as needed for Pain for up to 30 days. 120 tablet 0    oxyCODONE (OXYCONTIN) 20 MG extended release tablet Take 1 tablet by mouth 2 times daily for 30 days. Intended supply: 30 days (Patient not taking: Reported on 3/22/2022) 60 tablet 0    Sennosides (SENNA) 8.6 MG CAPS Take 1 capsule by mouth 2 times daily as needed (constipation) 20 capsule 0    polyethylene glycol (GLYCOLAX) 17 GM/SCOOP powder Take 17 g by mouth daily 510 g 0    dexamethasone (DECADRON) 4 MG tablet Two tablets the day before treatment, one table daily for four days starting the day after chemotherapy 18 tablet 0    melatonin (RA MELATONIN) 3 MG TABS tablet Take 1 tablet by mouth nightly as needed (incsomnia) 30 tablet 3    NARCAN 4 MG/0.1ML LIQD nasal spray DISPENSED PER STANDING ORDER USE AS DIRECTED PATIENT IS TRAINED OPIOID OVERDOSE RESPONDER      albuterol sulfate HFA (PROVENTIL HFA) 108 (90 Base) MCG/ACT inhaler Inhale 2 puffs into the lungs every 4 hours as needed for Wheezing or Shortness of Breath With spacer (and mask if indicated). Thanks. 1 Inhaler 1    lisinopril (PRINIVIL;ZESTRIL) 10 MG tablet Take 1 tablet by mouth daily 30 tablet 3    ammonium lactate (LAC-HYDRIN) 12 % lotion Apply topically as needed.  1 Bottle 0    nicotine (NICODERM CQ) 21 MG/24HR Place 1 patch onto the skin daily 30 patch 3       ALLERGIES    Allergies   Allergen Reactions    Tramadol Other (See Comments)     seizures    Vicodin [Hydrocodone-Acetaminophen] Hives       SOCIAL AND FAMILY HISTORY    Social History     Socioeconomic History    Marital status:      Spouse name: Not on file    Number of children: 5    Years of education: Not on file    Highest education level: Not on file   Occupational History    Not on file   Tobacco Use    Smoking status: Current Every Day Smoker     Packs/day: 2.00     Years: 39.00     Pack years: 78.00     Types: Cigarettes    Smokeless tobacco: Never Used    Tobacco comment: smokes 1-2 a day   Vaping Use    Vaping Use: Never used   Substance and Sexual Activity    Alcohol use: Yes     Alcohol/week: 6.0 standard drinks     Types: 6 Cans of beer per week     Comment: per week (24 oz beers)     Drug use: Yes     Types: Marijuana Angelique Dinning)     Comment: last 12/1    Sexual activity: Yes     Partners: Female   Other Topics Concern    Not on file   Social History Narrative    Not on file     Social Determinants of Health     Financial Resource Strain:     Difficulty of Paying Living Expenses: Not on file   Food Insecurity:     Worried About Running Out of Food in the Last Year: Not on file    [place] of Food in the Last Year: Not on file   Transportation Needs:     Lack of Transportation (Medical): Not on file    Lack of Transportation (Non-Medical):  Not on file   Physical Activity:     Days of Exercise per Week: Not on file    Minutes of Exercise per Session: Not on file   Stress:     Feeling of Stress : Not on file   Social Connections:     Frequency of Communication with Friends and Family: Not on file    Frequency of Social Gatherings with Friends and Family: Not on file    Attends Zoroastrian Services: Not on file    Active Member of Clubs or Organizations: Not on file    Attends Club or Organization Meetings: Not on file    Marital Status: Not on file   Intimate Partner Violence:     Fear of Current or Ex-Partner: Not on file    Emotionally Abused: Not on file    Physically Abused: Not on file    Sexually Abused: Not on file   Housing Stability:     Unable to Pay for Housing in the Last Year: Not on file    Number of [place] in the Last Year: Not on file    Unstable Housing in the Last Year: Not on file     Family History   Problem Relation Age of Onset    High Blood Pressure Mother     High Blood Pressure Sister     Cancer Sister        PHYSICAL EXAM    VITAL SIGNS: /81   Pulse 85   Temp 98.4 °F (36.9 °C) (Oral)   Resp 16   SpO2 99%   Constitutional:  Well developed, well nourished  Eyes:  Sclera nonicteric, conjunctiva moist  HENT:  Atraumatic. PERRL. Neck/Lymphatics: supple, no JVD, no swollen nodes  Respiratory:  No retractions, no accessory muscle use, normal breath sounds   Cardiovascular: Normal rate and rhythm  GI:     No gross discoloration. Bowel sounds present, no audible bruits. Soft,  no guarding,   no abdominal tenderness, no rebound, no palpable pulsatile masses  Back:  Left sided CVA tenderness to percussion.   Musculoskeletal:  No edema, no deformity  Integument: No rash, dry skin  Neurologic:  Alert & oriented, normal speech  Psychiatric: Cooperative, pleasant affect       LABS:  Results for orders placed or performed during the hospital encounter of 03/25/22   CBC with Auto Differential   Result Value Ref Range    WBC 4.0 4.0 - 10.5 K/CU MM    RBC 2.78 (L) 4.6 - 6.2 M/CU MM    Hemoglobin 7.5 (L) 13.5 - 18.0 GM/DL    Hematocrit 23.0 (L) 42 - 52 %    MCV 82.7 78 - 100 FL    MCH 27.0 27 - 31 PG    MCHC 32.6 32.0 - 36.0 %    RDW 16.4 (H) 11.7 - 14.9 %    Platelets 64 (L) 880 - 440 K/CU MM    MPV 9.7 7.5 - 11.1 FL    Metamyelocytes Relative 3 (H) 0.0 %    Bands Relative 13 (H) 5 - 11 %    Segs Relative 53.0 36 - 66 %    Eosinophils % 3.0 0 - 3 %    Lymphocytes % 24.0 24 - 44 %    Monocytes % 4.0 0 - 4 %    Metamyelocytes Absolute 0.12 K/CU MM    Bands Absolute 0.52 K/CU MM    Segs Absolute 2.1 K/CU MM    Eosinophils Absolute 0.1 K/CU MM    Lymphocytes Absolute 1.0 K/CU MM    Monocytes Absolute 0.2 K/CU MM    Differential Type MANUAL DIFFERENTIAL     Anisocytosis 1+     Hypochromia 1+     Polychromasia 1+    Comprehensive Metabolic Panel   Result Value Ref Range    Sodium 135 135 - 145 MMOL/L    Potassium 4.0 3.5 - 5.1 MMOL/L    Chloride 99 99 - 110 mMol/L    CO2 26 21 - 32 MMOL/L    BUN 14 6 - 23 MG/DL    CREATININE 0.9 0.9 - 1.3 MG/DL    Glucose 106 (H) 70 - 99 MG/DL    Calcium 8.7 8.3 - 10.6 MG/DL    Albumin 3.6 3.4 - 5.0 GM/DL    Total Protein 5.9 (L) 6.4 - 8.2 GM/DL    Total Bilirubin 0.1 0.0 - 1.0 MG/DL    ALT 17 10 - 40 U/L    AST 23 15 - 37 IU/L    Alkaline Phosphatase 211 (H) 40 - 129 IU/L    GFR Non-African American >60 >60 mL/min/1.73m2    GFR African American >60 >60 mL/min/1.73m2    Anion Gap 10 4 - 16   Lipase   Result Value Ref Range    Lipase 36 13 - 60 IU/L   Urinalysis   Result Value Ref Range    Color, UA YELLOW YELLOW    Clarity, UA CLEAR CLEAR    Glucose, Urine NEGATIVE NEGATIVE MG/DL    Bilirubin Urine NEGATIVE NEGATIVE MG/DL    Ketones, Urine NEGATIVE NEGATIVE MG/DL    Specific Gravity, UA <1.005 1.001 - 1.035    Blood, Urine NEGATIVE NEGATIVE    pH, Urine 5.5 5.0 - 8.0    Protein, UA NEGATIVE NEGATIVE MG/DL    Urobilinogen, Urine 0.2 0.2 - 1.0 MG/DL    Nitrite Urine, Quantitative NEGATIVE NEGATIVE    Leukocyte Esterase, Urine NEGATIVE NEGATIVE    RBC, UA NONE SEEN 0 - 3 /HPF    WBC, UA 1 0 - 2 /HPF    Bacteria, UA NEGATIVE NEGATIVE /HPF    Mucus, UA RARE (A) NEGATIVE HPF   Lactic Acid   Result Value Ref Range    Lactate 0.7 0.4 - 2.0 mMOL/L           RADIOLOGY/PROCEDURES    CT ABDOMEN PELVIS W IV CONTRAST Additional Contrast? None   Final Result   1. Interval development of a mild degree of left hydronephrosis and   hydroureter, to the level of the left UVJ. An approximate 7.3 x 5 cm soft   tissue mass is present along the left posterior margin of the urinary   bladder, and contiguous with the prostate gland, resulting in obstructing of   the ureteral orifice. Soft tissue mass is most consistent with primary   prostate carcinoma, in light of the diffuse blastic metastatic disease.    2. Interval increase in confluent adenopathy at the level of the aortic   bifurcation, consistent with progression of metastatic disease   3. Diffuse osseous blastic metastatic disease, with interval increase in size   of the lytic metastatic lesion involving the proximal left iliac bone                   ED [address] [facility]      Patient presents as above. CBC shows hemoglobin of 7.5 and hematocrit of 23, platelets of 64 which is similar to previous. Patient denies any active bleeding. Urinalysis unremarkable. Urine sent for culture. CMP is grossly within her limits. Lipase 36. Lactic acid 0.7. Bladder scan shows full bladder. Doherty catheter is ordered. CT abdomen pelvis with IV contrast shows interval development of mild degree of left hydronephrosis and hydroureter to the level of the left UVJ and approximate 7.3 x 5 cm soft tissue mass is present along the left posterior margin of the urinary bladder and contiguous with the prostate gland resulting nonobstructing ureteral orifice, interval increase in confluent adenopathy in the level aortic bifurcation, diffuse osseous blastic metastatic disease. I discussed labs and imaging with patient today. Patient states pain is improved after Doherty catheter is placed. Patient provided a leg bag. Patient case discussed with supervising physician, believes this patient is appropriate for outpatient follow-up with primary care and oncology and lab work, vital signs are stable. I recommend calling oncology and urology to schedule appointment in 2 days for recheck. Discussed with patient importance return to the emergency department immediately if new or worsening symptoms develop. Clinical  IMPRESSION    1. Hydronephrosis, unspecified hydronephrosis type    2. Urinary retention    3. Abnormal CT of the abdomen        I discussed with patient the importance of return to the emergency department immediately if new or worsening symptoms develop.       Comment: Please note this report has been produced using speech recognition software and may contain errors related to that system including errors in grammar, punctuation, and spelling, as well as words and phrases that may be inappropriate. If there are any questions or concerns please feel free to contact the dictating provider for clarification.       Vani Chance PA-C  03/26/22 0139

## 2022-03-27 LAB
CULTURE: NORMAL
Lab: NORMAL
PROSTATE SPECIFIC ANTIGEN FREE: 244.2 NG/ML
PROSTATE SPECIFIC ANTIGEN PERCENT FREE: 25 %
PROSTATE SPECIFIC ANTIGEN: 976 NG/ML (ref 0–4)
SPECIMEN: NORMAL

## 2022-03-29 ENCOUNTER — INITIAL CONSULT (OUTPATIENT)
Dept: ONCOLOGY | Age: 52
End: 2022-03-29
Payer: COMMERCIAL

## 2022-03-29 ENCOUNTER — HOSPITAL ENCOUNTER (OUTPATIENT)
Dept: INFUSION THERAPY | Age: 52
Discharge: HOME | End: 2022-03-29

## 2022-03-29 ENCOUNTER — CLINICAL DOCUMENTATION (OUTPATIENT)
Dept: ONCOLOGY | Age: 52
End: 2022-03-29

## 2022-03-29 VITALS
SYSTOLIC BLOOD PRESSURE: 140 MMHG | DIASTOLIC BLOOD PRESSURE: 82 MMHG | BODY MASS INDEX: 26.51 KG/M2 | RESPIRATION RATE: 16 BRPM | WEIGHT: 200 LBS | OXYGEN SATURATION: 99 % | HEIGHT: 73 IN | TEMPERATURE: 96 F | HEART RATE: 88 BPM

## 2022-03-29 DIAGNOSIS — N42.9 DISEASE OF PROSTATE: Primary | ICD-10-CM

## 2022-03-29 DIAGNOSIS — C34.82 MALIGNANT NEOPLASM OF OVERLAPPING SITES OF LEFT LUNG (HCC): ICD-10-CM

## 2022-03-29 DIAGNOSIS — M89.9 BONE LESION: ICD-10-CM

## 2022-03-29 DIAGNOSIS — R97.20 ELEVATED PSA: ICD-10-CM

## 2022-03-29 DIAGNOSIS — C79.51 SECONDARY MALIGNANT NEOPLASM OF BONE (HCC): Primary | ICD-10-CM

## 2022-03-29 PROCEDURE — G8484 FLU IMMUNIZE NO ADMIN: HCPCS | Performed by: INTERNAL MEDICINE

## 2022-03-29 PROCEDURE — 99403 PREV MED CNSL INDIV APPRX 45: CPT | Performed by: INTERNAL MEDICINE

## 2022-03-29 RX ORDER — LORAZEPAM 1 MG/1
1 TABLET ORAL NIGHTLY PRN
Qty: 30 TABLET | Refills: 0 | Status: SHIPPED | OUTPATIENT
Start: 2022-03-29 | End: 2022-04-22 | Stop reason: SDUPTHER

## 2022-03-29 RX ORDER — OXYCODONE HYDROCHLORIDE 10 MG/1
10 TABLET ORAL EVERY 4 HOURS PRN
Qty: 50 TABLET | Refills: 0 | Status: ON HOLD | OUTPATIENT
Start: 2022-03-29 | End: 2022-04-12

## 2022-03-29 RX ORDER — BICALUTAMIDE 50 MG/1
50 TABLET, FILM COATED ORAL DAILY
Qty: 30 TABLET | Refills: 11 | Status: SHIPPED | OUTPATIENT
Start: 2022-03-29 | End: 2022-11-04

## 2022-03-29 NOTE — PROGRESS NOTES
Per Dr Sarah Root orders Casodex ordered for patient and Lupron injection to be ordered every 3 months after patient takes oral Casodex x 15 days. Therapy plan will be entered. Placed a referral to [facility] for teaching.

## 2022-03-29 NOTE — PROGRESS NOTES
MA Rooming Questions  Patient: Priyanka Hickman  MRN: [id number]    Date: 3/29/2022        Palliative Care Consult    Pt was recently in ER for pain    Pt would like to discuss getting Ativan Rx.                Erika Portillo MA

## 2022-03-29 NOTE — LETTER
[address] [place], [place] [address]  Phone: [phone]  Fax: [phone]    Abelardo Franks MD         March 29, 2022     Patient: Corey Arriaza   YOB: 1970   Date of Visit: 3/29/2022       To Whom It May Concern: It is my medical opinion that Noel Leo requires a disability parking placard for the following reasons:  He cannot walk 200 feet without stopping to rest.  Duration of need: 1 year    If you have any questions or concerns, please don't hesitate to call.     Sincerely,        Abelardo Franks MD

## 2022-03-29 NOTE — PROGRESS NOTES
Palliative Care Initial Assessment    Medical Oncology History:    July 2021 evaluated for both symptoms of flank pain, dysuria as well as back pain. CT chest 5.9 cm left upper lobe mass along with mediastinal and this prominent supraclavicular adenopathy. Also noted circumferential thickening of bladder wall left retroperitoneal adenopathy prostate gland and sclerotic bony lesions.  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 January 2022 started on treatment with carboplatin, Taxol and Keytruda. Scheduled for biopsy of the prostate April 11, 2022  Other Medical Problems:    Stated in general was feeling quite well prior to summer 2021. Issue with hypertension and COPD     Personal History     A. Life Time:    Moved to [place] area from [place] about 28 years ago. Been  to his present wife for the past 22 years. Between them had 4 children and have a 14 grandchildren. Family is quite close and they get to see the grandchildren repeatedly. He did used to smoke and did drink alcohol though socially. Also in the past has tried marijuana. Had different type of jobs including being  as well as part-time [employer]. 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 Family:     As above pretty close    Physical Symptoms:    In general doing poorly. Number of symptoms including pain for which she has been taking OxyContin 20 mg every 12 hours. In between he had tried as needed oxycodone with some help. Of course has had issues with constipation has been taking senna and occasional MiraLAX. Appetite states is fair has not lost weight food at times taste well. Has not been able to relax or sleep. Recently tried Ativan at the hospital with significant help. Continued trouble with frequency as well as pelvic discomfort.   Also complains of marked weakness  Psychological and Cognitive Symptoms:   Overall has not noticed any significant cognitive decline  Illness Understanding and Care Preferences:   Appears to have a very good understanding of the disease process. Existential and Spiritual:   Not a regular Islam person but has strong farrah. Brother-in-law is a   Social and Economic Resources: Has good support around him especially with his wife who is very attentive and has other caregivers  Care Coordination:   March 29, 2022 spent time with him and his wife. We did of course review his personal story as well as medical history. He currently is being treated with chemotherapy for metastatic squamous cell carcinoma of the lung. Also appears to have advanced prostatic malignancy for which she is going to have a biopsy and possible start therapy with ADT. Also issue with anemia relating to bony metastasis necessitating the use of transfusions. I did discuss with him about pain control need for him to stay on OxyContin 20 mg every 12 and also gave him a prescription for oxycodone 10 mg every 4 hours as needed for breakthrough. Talked about of course constipation need for him to be little more liberal with the use of stool softeners as well as MiraLAX. For his inability to sleep did prescribe Ativan to see if that will be of help at night. Also offered cannabis to see if that will be helpful for other symptoms of discomfort, not been able to rest as well as appetite. Advised him and his wife to call us if there is any other thing needs to be done.   Handicap placard was issued    Time spent 45 minutes    Oswaldo Frey MD

## 2022-03-30 DIAGNOSIS — N42.9 DISEASE OF PROSTATE: Primary | ICD-10-CM

## 2022-03-30 DIAGNOSIS — C79.51 SECONDARY MALIGNANT NEOPLASM OF BONE (HCC): ICD-10-CM

## 2022-04-01 ENCOUNTER — CLINICAL DOCUMENTATION (OUTPATIENT)
Dept: ONCOLOGY | Age: 52
End: 2022-04-01

## 2022-04-01 ENCOUNTER — TELEPHONE (OUTPATIENT)
Dept: ONCOLOGY | Age: 52
End: 2022-04-01

## 2022-04-01 ENCOUNTER — OFFICE VISIT (OUTPATIENT)
Dept: ONCOLOGY | Age: 52
End: 2022-04-01
Payer: COMMERCIAL

## 2022-04-01 ENCOUNTER — HOSPITAL ENCOUNTER (OUTPATIENT)
Dept: INFUSION THERAPY | Age: 52
Discharge: HOME | End: 2022-04-01
Payer: COMMERCIAL

## 2022-04-01 VITALS
DIASTOLIC BLOOD PRESSURE: 77 MMHG | BODY MASS INDEX: 25.45 KG/M2 | SYSTOLIC BLOOD PRESSURE: 131 MMHG | WEIGHT: 192 LBS | HEART RATE: 110 BPM | RESPIRATION RATE: 16 BRPM | OXYGEN SATURATION: 99 % | TEMPERATURE: 95.1 F | HEIGHT: 73 IN

## 2022-04-01 DIAGNOSIS — D69.6 THROMBOCYTOPENIA (HCC): ICD-10-CM

## 2022-04-01 DIAGNOSIS — C79.51 SECONDARY MALIGNANT NEOPLASM OF BONE (HCC): Primary | ICD-10-CM

## 2022-04-01 DIAGNOSIS — C34.82 MALIGNANT NEOPLASM OF OVERLAPPING SITES OF LEFT LUNG (HCC): ICD-10-CM

## 2022-04-01 DIAGNOSIS — C79.51 SECONDARY MALIGNANT NEOPLASM OF BONE (HCC): ICD-10-CM

## 2022-04-01 DIAGNOSIS — D64.9 ANEMIA, UNSPECIFIED TYPE: Primary | ICD-10-CM

## 2022-04-01 DIAGNOSIS — D64.9 ANEMIA, UNSPECIFIED TYPE: ICD-10-CM

## 2022-04-01 LAB
ALBUMIN SERPL-MCNC: 3.7 GM/DL (ref 3.4–5)
ALP BLD-CCNC: 224 IU/L (ref 40–128)
ALT SERPL-CCNC: 9 U/L (ref 10–40)
ANION GAP SERPL CALCULATED.3IONS-SCNC: 14 MMOL/L (ref 4–16)
AST SERPL-CCNC: 35 IU/L (ref 15–37)
BASOPHILS ABSOLUTE: 0 K/CU MM
BASOPHILS RELATIVE PERCENT: 0.5 % (ref 0–1)
BILIRUB SERPL-MCNC: 0.2 MG/DL (ref 0–1)
BUN BLDV-MCNC: 11 MG/DL (ref 6–23)
CALCIUM SERPL-MCNC: 8.7 MG/DL (ref 8.3–10.6)
CHLORIDE BLD-SCNC: 103 MMOL/L (ref 99–110)
CO2: 22 MMOL/L (ref 21–32)
CREAT SERPL-MCNC: 0.8 MG/DL (ref 0.9–1.3)
DIFFERENTIAL TYPE: ABNORMAL
EOSINOPHILS ABSOLUTE: 0.1 K/CU MM
EOSINOPHILS RELATIVE PERCENT: 1.2 % (ref 0–3)
GFR AFRICAN AMERICAN: >60 ML/MIN/1.73M2
GFR AFRICAN AMERICAN: >60 ML/MIN/1.73M2
GFR NON-AFRICAN AMERICAN: >60 ML/MIN/1.73M2
GFR NON-AFRICAN AMERICAN: >60 ML/MIN/1.73M2
GLUCOSE BLD-MCNC: 120 MG/DL (ref 70–99)
GLUCOSE BLD-MCNC: 121 MG/DL (ref 70–99)
HCT VFR BLD CALC: 20.6 % (ref 42–52)
HEMOGLOBIN: 6.9 GM/DL (ref 13.5–18)
LYMPHOCYTES ABSOLUTE: 1 K/CU MM
LYMPHOCYTES RELATIVE PERCENT: 24.4 % (ref 24–44)
MCH RBC QN AUTO: 27 PG (ref 27–31)
MCHC RBC AUTO-ENTMCNC: 33.5 % (ref 32–36)
MCV RBC AUTO: 80.5 FL (ref 78–100)
MONOCYTES ABSOLUTE: 0.4 K/CU MM
MONOCYTES RELATIVE PERCENT: 10.9 % (ref 0–4)
PDW BLD-RTO: 16.7 % (ref 11.7–14.9)
PLATELET # BLD: 64 K/CU MM (ref 140–440)
PMV BLD AUTO: 9.4 FL (ref 7.5–11.1)
POC BUN: 12 MG/DL (ref 8–26)
POC CALCIUM: 1.18 MMOL/L (ref 1.12–1.32)
POC CHLORIDE: 104 MMOL/L (ref 98–109)
POC CO2: 25 MMOL/L (ref 21–32)
POC CREATININE: 1.1 MG/DL (ref 0.9–1.3)
POTASSIUM SERPL-SCNC: 3.6 MMOL/L (ref 3.6–5.1)
POTASSIUM SERPL-SCNC: 3.7 MMOL/L (ref 3.5–5.1)
RBC # BLD: 2.56 M/CU MM (ref 4.6–6.2)
SEGMENTED NEUTROPHILS ABSOLUTE COUNT: 2.6 K/CU MM
SEGMENTED NEUTROPHILS RELATIVE PERCENT: 63 % (ref 36–66)
SODIUM BLD-SCNC: 139 MMOL/L (ref 135–145)
SODIUM BLD-SCNC: 139 MMOL/L (ref 136–145)
SOURCE, BLOOD GAS: ABNORMAL
T4 FREE: 1.15 NG/DL (ref 0.9–1.8)
TOTAL PROTEIN: 6.1 GM/DL (ref 6.4–8.2)
TSH HIGH SENSITIVITY: 2.38 UIU/ML (ref 0.27–4.2)
WBC # BLD: 4.1 K/CU MM (ref 4–10.5)

## 2022-04-01 PROCEDURE — 81003 URINALYSIS AUTO W/O SCOPE: CPT

## 2022-04-01 PROCEDURE — 80053 COMPREHEN METABOLIC PANEL: CPT

## 2022-04-01 PROCEDURE — 86850 RBC ANTIBODY SCREEN: CPT

## 2022-04-01 PROCEDURE — 84439 ASSAY OF FREE THYROXINE: CPT

## 2022-04-01 PROCEDURE — 84443 ASSAY THYROID STIM HORMONE: CPT

## 2022-04-01 PROCEDURE — 85025 COMPLETE CBC W/AUTO DIFF WBC: CPT

## 2022-04-01 PROCEDURE — 36415 COLL VENOUS BLD VENIPUNCTURE: CPT

## 2022-04-01 PROCEDURE — G8419 CALC BMI OUT NRM PARAM NOF/U: HCPCS | Performed by: INTERNAL MEDICINE

## 2022-04-01 PROCEDURE — 86922 COMPATIBILITY TEST ANTIGLOB: CPT

## 2022-04-01 PROCEDURE — 86900 BLOOD TYPING SEROLOGIC ABO: CPT

## 2022-04-01 PROCEDURE — 3017F COLORECTAL CA SCREEN DOC REV: CPT | Performed by: INTERNAL MEDICINE

## 2022-04-01 PROCEDURE — G8427 DOCREV CUR MEDS BY ELIG CLIN: HCPCS | Performed by: INTERNAL MEDICINE

## 2022-04-01 PROCEDURE — 99214 OFFICE O/P EST MOD 30 MIN: CPT | Performed by: INTERNAL MEDICINE

## 2022-04-01 PROCEDURE — 86901 BLOOD TYPING SEROLOGIC RH(D): CPT

## 2022-04-01 PROCEDURE — 4004F PT TOBACCO SCREEN RCVD TLK: CPT | Performed by: INTERNAL MEDICINE

## 2022-04-01 RX ORDER — ONDANSETRON 2 MG/ML
8 INJECTION INTRAMUSCULAR; INTRAVENOUS
Status: CANCELLED | OUTPATIENT
Start: 2022-04-01

## 2022-04-01 RX ORDER — ACETAMINOPHEN 325 MG/1
650 TABLET ORAL ONCE
Status: CANCELLED | OUTPATIENT
Start: 2022-04-01 | End: 2022-04-01

## 2022-04-01 RX ORDER — SODIUM CHLORIDE 9 MG/ML
INJECTION, SOLUTION INTRAVENOUS CONTINUOUS
Status: CANCELLED | OUTPATIENT
Start: 2022-04-01

## 2022-04-01 RX ORDER — EPINEPHRINE 1 MG/ML
0.3 INJECTION, SOLUTION, CONCENTRATE INTRAVENOUS PRN
Status: CANCELLED | OUTPATIENT
Start: 2022-04-01

## 2022-04-01 RX ORDER — SODIUM CHLORIDE 9 MG/ML
25 INJECTION, SOLUTION INTRAVENOUS PRN
Status: CANCELLED | OUTPATIENT
Start: 2022-04-01

## 2022-04-01 RX ORDER — ACETAMINOPHEN 325 MG/1
650 TABLET ORAL
Status: CANCELLED | OUTPATIENT
Start: 2022-04-01

## 2022-04-01 RX ORDER — ALBUTEROL SULFATE 90 UG/1
4 AEROSOL, METERED RESPIRATORY (INHALATION) PRN
Status: CANCELLED | OUTPATIENT
Start: 2022-04-01

## 2022-04-01 RX ORDER — B-COMPLEX WITH VITAMIN C
1 TABLET ORAL DAILY
Qty: 30 TABLET | Refills: 3 | Status: SHIPPED | OUTPATIENT
Start: 2022-04-01 | End: 2022-11-04

## 2022-04-01 RX ORDER — DIPHENHYDRAMINE HCL 25 MG
25 TABLET ORAL ONCE
Status: CANCELLED | OUTPATIENT
Start: 2022-04-01 | End: 2022-04-01

## 2022-04-01 RX ORDER — SODIUM CHLORIDE 9 MG/ML
20 INJECTION, SOLUTION INTRAVENOUS CONTINUOUS
Status: CANCELLED | OUTPATIENT
Start: 2022-04-01

## 2022-04-01 RX ORDER — METHYLPREDNISOLONE SODIUM SUCCINATE 125 MG/2ML
40 INJECTION, POWDER, LYOPHILIZED, FOR SOLUTION INTRAMUSCULAR; INTRAVENOUS ONCE
Status: CANCELLED
Start: 2022-04-01 | End: 2022-04-01

## 2022-04-01 RX ORDER — SODIUM CHLORIDE 0.9 % (FLUSH) 0.9 %
5-40 SYRINGE (ML) INJECTION PRN
Status: CANCELLED | OUTPATIENT
Start: 2022-04-01

## 2022-04-01 RX ORDER — FUROSEMIDE 10 MG/ML
20 INJECTION INTRAMUSCULAR; INTRAVENOUS ONCE
Status: CANCELLED | OUTPATIENT
Start: 2022-04-01 | End: 2022-04-01

## 2022-04-01 RX ORDER — NICOTINE 21 MG/24HR
1 PATCH, TRANSDERMAL 24 HOURS TRANSDERMAL DAILY
Qty: 30 PATCH | Refills: 3 | Status: ON HOLD | OUTPATIENT
Start: 2022-04-01 | End: 2022-10-13 | Stop reason: HOSPADM

## 2022-04-01 RX ORDER — DIPHENHYDRAMINE HYDROCHLORIDE 50 MG/ML
50 INJECTION INTRAMUSCULAR; INTRAVENOUS
Status: CANCELLED | OUTPATIENT
Start: 2022-04-01

## 2022-04-01 NOTE — PROGRESS NOTES
Patient Name:  Dayana Santos  Patient :  1970  Patient MRN:  [id number]     Primary Oncologist: Nelson Knight MD  Referring Provider: Tari Ball MD     Date of Service: 2022        Chief Complaint:    Chief Complaint   Patient presents with    Follow-up       Encounter Diagnoses   Name Primary?  Secondary malignant neoplasm of bone ([address] Yes    Malignant neoplasm of overlapping sites of left lung [facility])         HPI:   21: Initial [facility] visit:Hima Waggoner is a 45 y.o. male who presents to [facility] with report of dysuria and flank pain. Reports these symptoms started a few weeks ago. He ultimately came to the ED due to worsening back pain.      He reports ongoing issues with frequent urge to urinate and notes some incontinence. He denies hematuria. He was noted to have acute pyelonephritis and was admitted and started on IV Rocephin.      21 CT chest     Impression   1. Left hilar neoplasm extending into the left upper lobe measuring 3.2 x 5.9   x 5.3 cm obstructing the left upper lobe bronchus with probable minimal   adjacent infiltrates versus lymphangitic spread of tumor.  PET-CT/biopsy is   recommended. 2. Mediastinal lymphadenopathy. 3. Prominent left supraclavicular lymph nodes. 4. No osseous metastatic disease.      21 Ct abdomen and pelvis  Impression   1. Circumferential thickening of the bladder wall is noted which could be   related to cystitis.  Correlate with urinalysis. 2. There is left retroperitoneal lymphadenopathy.  This could be reactive or   neoplastic.  This can be further evaluated with PET-CT. 3. There is minimal stranding within the retroperitoneal on the left.  This   is uncertain etiology however could be related to acute pyelonephritis. 4. Interval development of multiple sclerotic lesions within the spine and   pelvis, concerning for metastatic osseous disease.    5. Prostate gland is enlarged.  Correlate with PSA.      .30      He denies past history of cancer. He smokes 2-3 ppd and drinks 10-12 beers daily. He reports unknown malignancy in his sister who  at 39. No other known family history of cancer.      Due to lung mass and concern for metastatic prostate cancer we were called to evaluate. 21:  Final Pathologic Diagnosis:   Needle biopsy of lung, clinically left lung mass:        SQUAMOUS CELL CARCINOMA IN SITU.     21:PET  1.  Left perihilar mass likely represents primary lung cancer.  Correlate   with biopsy results.  The opacity more peripheral in the left lower lobe has   relatively low level activity and likely represents an area of post   obstructive pneumonia adjacent to the mass.       2.  Metabolically active left AP window lymph node concerning for metastatic   disease.       3.  The prostate is enlarged and has increased FDG activity which could be   due to prostatitis or prostate cancer.  Correlate with PSA levels.       4.  No increased metabolic activity associated with retroperitoneal lymph   nodes.  This is unlikely related to the lung process given the significant   difference in metabolic activity; however, if there is prostate cancer, this   could potentially be metastatic disease from the prostate cancer, which can   have variable levels of uptake on FDG PET scans.       5.  No metabolic activity associated with multiple sclerotic lesions.  Again   this is unlikely related to the lung process, but if there is prostate   cancer, this could represent metastatic disease from prostate cancer with   poor FDG avidity.  Otherwise it could also represent large bone islands.       6.  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 There is a focal area of intense activity associated with a new lucent lesion   within the background of Paget's disease concerning for metastatic disease. Given the FDG activity, it is likely related to the lung process.           21: MRI brain  1. There is a punctate focus of restricted diffusion within the right frontal   lobe without associated enhancement, mass effect or midline shift.  This   could represent a punctate acute infarct.  However, given history, an early   metastatic focus cannot be entirely excluded. 2. Scattered foci of susceptibility are seen within the cerebral hemispheres   bilaterally, which were not visualized on the prior exam.  Findings could   represent areas of remote microhemorrhage or perhaps treated metastases. 3. No abnormal enhancement within the brain. 4. Minimal global parenchymal volume loss with minimal chronic microvascular   ischemic changes. 12/2/21: CT chest, abdomen and pelvis:  Chest CT: Interval increased size of the left perihilar mass, with increased   adjacent obstructive pneumonitis and atelectasis.       Stable to minimally to decreased mediastinal lymphadenopathy.       Interval increased bony metastatic disease.       Abdomen and pelvis CT: Stable retroperitoneal and pelvic lymphadenopathy.       Interval increased bony metastatic disease. 12/17/21:Final Pathologic Diagnosis:   Bone, posterior ileum, needle core biopsy:   -     METASTATIC SQUAMOUS CELL CARCINOMA. PACO  PDL1 22c3 >50%(keytruda)  PTEN positive  Alk neg    CARIS, not enough tissue for rest of the testing    Gaurdant with no actionable mutation otherwise     12/27/21: Started XRT to the left pelvis  Added chest radiation dia 3  Completed dia 10 2022      Started carbo/taxol/keytruda jan 20 2022 2/8/22: CT chest:  1.  Interval decrease in size of left suprahilar mass extending along the   left upper lobe bronchi extension into the left main pulmonary artery.  There   is interval decrease in associated ground-glass opacity in the left upper   lobe.  Findings suggest response to therapy.       2.  Interval decrease in mediastinal lymphadenopathy.       3.  Diffuse osseous metastatic disease.      3/11/22: CT head: No acute abn    3/26/22: Ct abdomen and pelvis:  1. Interval development of a mild degree of left hydronephrosis and   hydroureter, to the level of the left UVJ.  An approximate 7.3 x 5 cm soft   tissue mass is present along the left posterior margin of the urinary   bladder, and contiguous with the prostate gland, resulting in obstructing of   the ureteral orifice.  Soft tissue mass is most consistent with primary   prostate carcinoma, in light of the diffuse blastic metastatic disease. 2. Interval increase in confluent adenopathy at the level of the aortic   bifurcation, consistent with progression of metastatic disease   3. Diffuse osseous blastic metastatic disease, with interval increase in size   of the lytic metastatic lesion involving the proximal left iliac bone     3/22/22:     Past Medical History:     BPH, HTN, COPD                                                            Past Surgery History:    None per his wife                                                                        Social History:   Lives with wife. Cut down smoking to 2-4 cigarettes per day prior to which he was smoking 2 to 3 packs/day for the past 40 years. Also reported drinking alcohol 10-12 beers per day. Denied any other illicit drug abuse. Family History:    He reported that his sister  at the age of 39 with  metastatic cancer, he was not sure what the primary was                                                                                              Allergies   Allergen Reactions    Tramadol Other (See Comments)     seizures    Vicodin [Hydrocodone-Acetaminophen] Hives       Current Outpatient Medications on File Prior to Visit   Medication Sig Dispense Refill    LORazepam (ATIVAN) 1 MG tablet Take 1 tablet by mouth nightly as needed for Anxiety for up to 30 days.  30 tablet 0    oxyCODONE HCl (OXY-IR) 10 MG immediate release tablet Take 1 tablet by mouth every 4 hours as needed for Pain for up to 10 days. 50 tablet 0    bicalutamide (CASODEX) 50 MG chemo tablet Take 1 tablet by mouth daily 30 tablet 11    ondansetron (ZOFRAN) 8 MG tablet take 1 tablet by mouth every 8 hours if needed for nausea and vomiting      oxyCODONE-acetaminophen (PERCOCET) 7.5-325 MG per tablet Take 1 tablet by mouth every 6 hours as needed for Pain for up to 30 days. 120 tablet 0    oxyCODONE (OXYCONTIN) 20 MG extended release tablet Take 1 tablet by mouth 2 times daily for 30 days. Intended supply: 30 days 60 tablet 0    Sennosides (SENNA) 8.6 MG CAPS Take 1 capsule by mouth 2 times daily as needed (constipation) 20 capsule 0    polyethylene glycol (GLYCOLAX) 17 GM/SCOOP powder Take 17 g by mouth daily 510 g 0    dexamethasone (DECADRON) 4 MG tablet Two tablets the day before treatment, one table daily for four days starting the day after chemotherapy 18 tablet 0    NARCAN 4 MG/0.1ML LIQD nasal spray DISPENSED PER STANDING ORDER USE AS DIRECTED PATIENT IS TRAINED OPIOID OVERDOSE RESPONDER      lisinopril (PRINIVIL;ZESTRIL) 10 MG tablet Take 1 tablet by mouth daily 30 tablet 3    ammonium lactate (LAC-HYDRIN) 12 % lotion Apply topically as needed. 1 Bottle 0    nicotine (NICODERM CQ) 21 MG/24HR Place 1 patch onto the skin daily 30 patch 3    melatonin (RA MELATONIN) 3 MG TABS tablet Take 1 tablet by mouth nightly as needed (incsomnia) 30 tablet 3    albuterol sulfate HFA (PROVENTIL HFA) 108 (90 Base) MCG/ACT inhaler Inhale 2 puffs into the lungs every 4 hours as needed for Wheezing or Shortness of Breath With spacer (and mask if indicated). Thanks. 1 Inhaler 1     No current facility-administered medications on file prior to visit. Interval History: 4/1/22: He arrived with his wife to the clinic today. He slept well last night. No HA yesterday after he held his long acting pain medication. Did not take decadron today.  Has a catheter placed last Friday. No chest pain or any abdominal pain. No nausea or any emesis. Poor appetite and weight loss. Review of Systems:  As per the interval history, rest of the review of system negative     Vital Signs: /77 (Site: Right Upper Arm, Position: Sitting, Cuff Size: Medium Adult)   Pulse 110   Temp 95.1 °F (35.1 °C) (Infrared)   Resp 16   Ht 6' 1\" (1.854 m)   Wt 192 lb (87.1 kg)   SpO2 99%   BMI 25.33 kg/m²      CONSTITUTIONAL: sleepy today.   EYES: RAISSA, No pallor or any icterus  ENT: ATNC  NECK: No JVD  HEMATOLOGIC/LYMPHATIC: no cervical, supraclavicular or axillary lymphadenopathy   LUNGS: CTAB  CARDIOVASCULAR: s1s2 rrr no murmurs  ABDOMEN: soft ntnd bs pos  NEUROLOGIC: GI  SKIN: Psoriatic rash with excoriation marks on the lower extremities and also upper extremities  EXTREMITIES: no LE edema bilaterally     Labs:  Hematology:  Lab Results   Component Value Date    WBC 4.0 03/25/2022    RBC 2.78 (L) 03/25/2022    HGB 7.5 (L) 03/25/2022    HCT 23.0 (L) 03/25/2022    MCV 82.7 03/25/2022    MCH 27.0 03/25/2022    MCHC 32.6 03/25/2022    RDW 16.4 (H) 03/25/2022    PLT 64 (L) 03/25/2022    MPV 9.7 03/25/2022    BANDSPCT 13 (H) 03/25/2022    SEGSPCT 53.0 03/25/2022    EOSRELPCT 3.0 03/25/2022    BASOPCT 0.2 03/25/2022    LYMPHOPCT 24.0 03/25/2022    MONOPCT 4.0 03/25/2022    BANDABS 0.52 03/25/2022    SEGSABS 2.1 03/25/2022    EOSABS 0.1 03/25/2022    BASOSABS 0.0 03/25/2022    LYMPHSABS 1.0 03/25/2022    MONOSABS 0.2 03/25/2022    DIFFTYPE MANUAL DIFFERENTIAL 03/25/2022    ANISOCYTOSIS 1+ 03/25/2022    POLYCHROM 1+ 03/25/2022    PLTM FEW 11/13/2018     Lab Results   Component Value Date    ESR 47 (H) 03/22/2022     Chemistry:  Lab Results   Component Value Date     03/25/2022    K 4.0 03/25/2022    CL 99 03/25/2022    CO2 26 03/25/2022    BUN 14 03/25/2022    CREATININE 0.9 03/25/2022    GLUCOSE 106 (H) 03/25/2022    CALCIUM 8.7 03/25/2022    PROT 5.9 (L) 03/25/2022 LABALBU 3.6 03/25/2022    BILITOT 0.1 03/25/2022    ALKPHOS 211 (H) 03/25/2022    AST 23 03/25/2022    ALT 17 03/25/2022    LABGLOM >60 03/25/2022    GFRAA >60 03/25/2022    MG 1.6 (L) 07/29/2021    POCCA 1.21 03/18/2022    POCGLU 125 (H) 03/18/2022     Lab Results   Component Value Date     (H) 03/22/2022     No components found for: LD  Lab Results   Component Value Date    TSHHS 1.100 03/09/2022    T4FREE 1.29 03/09/2022     Immunology:  Lab Results   Component Value Date    PROT 5.9 (L) 03/25/2022    SPEP  03/22/2022     INTERPRETATION - Decreased albumin and total prorein. No definitive monoclonal bands are seen. SAF    SPEP INTERPRETATION - No monoclonal bands are seen. SAF 03/22/2022    ALBUMINELP 2.8 (L) 03/22/2022    LABALPH 0.4 03/22/2022    LABALPH 1.1 03/22/2022    LABBETA 1.1 03/22/2022    GAMGLOB 0.8 03/22/2022     No results found for: Rosenda Spence, KLFLCR  No results found for: B2M  Coagulation Panel:  Lab Results   Component Value Date    PROTIME 15.1 (H) 03/12/2022    INR 1.17 03/12/2022    APTT >240.0 (H) 03/12/2022    DDIMER <200 12/19/2013     Anemia Panel:  Lab Results   Component Value Date    HWPOEBMT98 373.0 03/22/2022    FOLATE 4.0 03/22/2022     Tumor Markers:  Lab Results   Component Value Date    CEA 7.7 07/23/2021    .0 (H) 03/22/2022        Observations:  ECOG:  No data recorded       Assessment & Plan:                                                          Left hilar mass with mediastinal hilar lymphadenopathy. 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 PET scan results from august 2021 noted, bone lesions most probably not metastatic except for one lytic lesion. MRI of the brain with no convincing metastatic lesions. Presented  the case in the tumor board.   Decision made to proceed with a prostate biopsy and treat with ADT if malignancy  and in the meantime proceed with staging mediastinoscopy/rebiopsy for further treatment plan. But as pt very very very noncompliant, did not follow up with urology, CTS or for bone biopsy multiple times. Note PSA rising and was 250 on November 16 2021   CT imaging dec 2021 with progressive met disease  Bone biopsy on dec 13 2021 with met squamous cell cancer, consistent with lung primary. PDL1 >50%Nadira Belts) . Not enough tissue for rest of CARIS, guardant 360 with no other actionable mutation  CT chest jan 2022 with no significant change   Completed Palliative radiation to the pelvic area and also radiation to the left lung dia 10 2022  Discussed the findings, diagnosis and poor prognosis and treatment with carbo/taxol/pembro after completion of radiation. Discussed ae. PORT placed. Completed OCM  C1D1 carbo/taxol and keytruda started 1/20/22  CT after C1 with positive response   Plan for repeat imaging after 3 C  Dose modifications as needed. Discussed poor prognosis and discussed the philosophhy of hospice. Anemia: Received I unit PRBCs. Most probably sec to aocd, underlying met prostate cancer, chemotherapy. B12 def, recommend replacement. Recommend SHABBIR    Prostate enlargement and elevated PSA  (continues to rise from 147 in July 2021 to 250 in Nov 2021), most probably  prostate cancer. As mentioned above urology  recommended prostate biopsy, but pt very noncompliant and did not follow through multiple times but finally followed through and the plan was  for biopsy but could not go back there again. Now with possible prostate biopsy on April 11 2022. Repeat PSA march 2022 ~900  Start casodex followed by [facility]. Constipation: stool softener and laxatives as needed    Rash:   Looks like psoriatic rash. Has been applying topical cream    Elevated alk phos most probably secondary to the bone lmets    Adequate analgesic and bowel regimen. Palliative care consult placed. Continue other medical care.     Thank you for letting us be part of the care and will follow along. Discussed above findings and plan with him and he voiced understanding. Answered all his questions. Discussed healthy lifestyle including healthy diet, regular exercise as tolerated. ,  Smoking and alcohol cessation    Recommend follow-up with primary care physician and other specialists. Note he has he has been very noncompliant with appointments. Please do not hesitate to contact us if you need further information. Return to clinic April 22  or earlier if new Sx    I have recommended that the patient follow CDC guidelines for prevention of COVID-19 infection.   Received Covid vaccine/flu vaccine     TOMI

## 2022-04-01 NOTE — PROGRESS NOTES
MA Rooming Questions  Patient: Cyndi Otero  MRN: [id number]    Date: 4/1/2022        1. Do you have any new issues? yes - pt c/o HAS when taking Oxycodone 20mg ER         2. Do you need any refills on medications?    no    3. Have you had any imaging done since your last visit?   no    4. Have you been hospitalized or seen in the emergency room since your last visit here?   no    5. Did the patient have a depression screening completed today?  No    No data recorded     PHQ-9 Given to (if applicable):               PHQ-9 Score (if applicable):                     [] Positive     []  Negative              Does question #9 need addressed (if applicable)                     [] Yes    []  No               Aurea Sanderson MA

## 2022-04-01 NOTE — PROGRESS NOTES
Patient scheduled for blood transfusion tomorrow 04/02/2022 at [facility] infusion clinic. Blood therapy added for patient to receive 1 unit PRBC.

## 2022-04-01 NOTE — PROGRESS NOTES
Scheduled PET scan for 4/12/22 at 0900AM at [facility]. Appointment prep and information written and given to patient.

## 2022-04-01 NOTE — TELEPHONE ENCOUNTER
Spoke with Josh notified of PET scan appointment on 5/16/22 at [phone] at [facility]. Spouse advised of prep and states understanding.

## 2022-04-01 NOTE — PROGRESS NOTES
Per Dr Osito Mason 1 unit PRBC ordered at [facility] 4/2/22 at 0800am. Consent signed by patient. Blood banded and type and screen. Patient banded. Patient advised and voiced understanding.

## 2022-04-01 NOTE — PROGRESS NOTES
Patient arrived to treatment suite for blood draw and treatment. Right chest mediport accessed by Ligia Schuster RN and blood drawn from site and sent to lab for processing.  Hgb: 6.9, Plt: 64. Discussed labs with Dr. Sera Duncan who recommended to hold treatment for 1 week. Also Pt to start Aranesp 300 mg every 3 weeks. Manual PLT count, Type and screen, 1 unit of blood and decrease treatment by 10 %, Pt is also to have a pet scan after his next treatment. Pt was upset about not being able to receive treatment today. Dr. Bianca Park at chair side and explained plan to treatment. Pt was still upset when he left.

## 2022-04-02 ENCOUNTER — HOSPITAL ENCOUNTER (OUTPATIENT)
Dept: INFUSION THERAPY | Age: 52
Setting detail: INFUSION SERIES
Discharge: HOME | End: 2022-04-02
Payer: COMMERCIAL

## 2022-04-02 VITALS
HEART RATE: 79 BPM | OXYGEN SATURATION: 100 % | RESPIRATION RATE: 16 BRPM | TEMPERATURE: 97.3 F | DIASTOLIC BLOOD PRESSURE: 78 MMHG | SYSTOLIC BLOOD PRESSURE: 121 MMHG

## 2022-04-02 DIAGNOSIS — C34.82 MALIGNANT NEOPLASM OF OVERLAPPING SITES OF LEFT LUNG (HCC): ICD-10-CM

## 2022-04-02 DIAGNOSIS — D64.9 ANEMIA, UNSPECIFIED TYPE: Primary | ICD-10-CM

## 2022-04-02 PROCEDURE — 6360000002 HC RX W HCPCS: Performed by: INTERNAL MEDICINE

## 2022-04-02 PROCEDURE — 96374 THER/PROPH/DIAG INJ IV PUSH: CPT

## 2022-04-02 PROCEDURE — P9016 RBC LEUKOCYTES REDUCED: HCPCS

## 2022-04-02 PROCEDURE — 6370000000 HC RX 637 (ALT 250 FOR IP): Performed by: INTERNAL MEDICINE

## 2022-04-02 PROCEDURE — 2580000003 HC RX 258: Performed by: INTERNAL MEDICINE

## 2022-04-02 PROCEDURE — 99211 OFF/OP EST MAY X REQ PHY/QHP: CPT

## 2022-04-02 PROCEDURE — 36430 TRANSFUSION BLD/BLD COMPNT: CPT

## 2022-04-02 RX ORDER — METHYLPREDNISOLONE SODIUM SUCCINATE 40 MG/ML
40 INJECTION, POWDER, LYOPHILIZED, FOR SOLUTION INTRAMUSCULAR; INTRAVENOUS ONCE
Status: COMPLETED | OUTPATIENT
Start: 2022-04-02 | End: 2022-04-02

## 2022-04-02 RX ORDER — ACETAMINOPHEN 325 MG/1
650 TABLET ORAL ONCE
Status: CANCELLED | OUTPATIENT
Start: 2022-04-02 | End: 2022-04-02

## 2022-04-02 RX ORDER — SODIUM CHLORIDE 0.9 % (FLUSH) 0.9 %
5-40 SYRINGE (ML) INJECTION PRN
Status: CANCELLED | OUTPATIENT
Start: 2022-04-02

## 2022-04-02 RX ORDER — DIPHENHYDRAMINE HYDROCHLORIDE 50 MG/ML
50 INJECTION INTRAMUSCULAR; INTRAVENOUS
Status: CANCELLED | OUTPATIENT
Start: 2022-04-02

## 2022-04-02 RX ORDER — DIPHENHYDRAMINE HCL 25 MG
25 TABLET ORAL ONCE
Status: COMPLETED | OUTPATIENT
Start: 2022-04-02 | End: 2022-04-02

## 2022-04-02 RX ORDER — SODIUM CHLORIDE 0.9 % (FLUSH) 0.9 %
5-40 SYRINGE (ML) INJECTION PRN
Status: DISCONTINUED | OUTPATIENT
Start: 2022-04-02 | End: 2022-04-02

## 2022-04-02 RX ORDER — METHYLPREDNISOLONE SODIUM SUCCINATE 40 MG/ML
40 INJECTION, POWDER, LYOPHILIZED, FOR SOLUTION INTRAMUSCULAR; INTRAVENOUS ONCE
Status: CANCELLED
Start: 2022-04-02 | End: 2022-04-02

## 2022-04-02 RX ORDER — ACETAMINOPHEN 325 MG/1
650 TABLET ORAL ONCE
Status: COMPLETED | OUTPATIENT
Start: 2022-04-02 | End: 2022-04-02

## 2022-04-02 RX ORDER — SODIUM CHLORIDE 9 MG/ML
25 INJECTION, SOLUTION INTRAVENOUS PRN
Status: CANCELLED | OUTPATIENT
Start: 2022-04-02

## 2022-04-02 RX ORDER — EPINEPHRINE 1 MG/ML
0.3 INJECTION, SOLUTION, CONCENTRATE INTRAVENOUS PRN
Status: CANCELLED | OUTPATIENT
Start: 2022-04-02

## 2022-04-02 RX ORDER — SODIUM CHLORIDE 9 MG/ML
INJECTION, SOLUTION INTRAVENOUS CONTINUOUS
Status: CANCELLED | OUTPATIENT
Start: 2022-04-02

## 2022-04-02 RX ORDER — ACETAMINOPHEN 325 MG/1
650 TABLET ORAL
Status: CANCELLED | OUTPATIENT
Start: 2022-04-02

## 2022-04-02 RX ORDER — FUROSEMIDE 10 MG/ML
20 INJECTION INTRAMUSCULAR; INTRAVENOUS ONCE
Status: DISCONTINUED | OUTPATIENT
Start: 2022-04-02 | End: 2022-04-02

## 2022-04-02 RX ORDER — ALBUTEROL SULFATE 90 UG/1
4 AEROSOL, METERED RESPIRATORY (INHALATION) PRN
Status: CANCELLED | OUTPATIENT
Start: 2022-04-02

## 2022-04-02 RX ORDER — SODIUM CHLORIDE 9 MG/ML
20 INJECTION, SOLUTION INTRAVENOUS CONTINUOUS
Status: CANCELLED | OUTPATIENT
Start: 2022-04-02

## 2022-04-02 RX ORDER — ONDANSETRON 2 MG/ML
8 INJECTION INTRAMUSCULAR; INTRAVENOUS
Status: CANCELLED | OUTPATIENT
Start: 2022-04-02

## 2022-04-02 RX ORDER — HEPARIN SODIUM (PORCINE) LOCK FLUSH IV SOLN 100 UNIT/ML 100 UNIT/ML
500 SOLUTION INTRAVENOUS PRN
Status: DISCONTINUED | OUTPATIENT
Start: 2022-04-02 | End: 2022-04-03 | Stop reason: HOSPADM

## 2022-04-02 RX ORDER — FUROSEMIDE 10 MG/ML
20 INJECTION INTRAMUSCULAR; INTRAVENOUS ONCE
Status: CANCELLED | OUTPATIENT
Start: 2022-04-02 | End: 2022-04-02

## 2022-04-02 RX ORDER — DIPHENHYDRAMINE HCL 25 MG
25 TABLET ORAL ONCE
Status: CANCELLED | OUTPATIENT
Start: 2022-04-02 | End: 2022-04-02

## 2022-04-02 RX ORDER — HEPARIN SODIUM (PORCINE) LOCK FLUSH IV SOLN 100 UNIT/ML 100 UNIT/ML
500 SOLUTION INTRAVENOUS PRN
Status: CANCELLED | OUTPATIENT
Start: 2022-04-02

## 2022-04-02 RX ORDER — SODIUM CHLORIDE 9 MG/ML
20 INJECTION, SOLUTION INTRAVENOUS CONTINUOUS
Status: DISCONTINUED | OUTPATIENT
Start: 2022-04-02 | End: 2022-04-02

## 2022-04-02 RX ADMIN — METHYLPREDNISOLONE SODIUM SUCCINATE 40 MG: 40 INJECTION, POWDER, FOR SOLUTION INTRAMUSCULAR; INTRAVENOUS at 08:10

## 2022-04-02 RX ADMIN — SODIUM CHLORIDE, PRESERVATIVE FREE 20 ML: 5 INJECTION INTRAVENOUS at 10:30

## 2022-04-02 RX ADMIN — Medication 500 UNITS: at 10:30

## 2022-04-02 RX ADMIN — SODIUM CHLORIDE, PRESERVATIVE FREE 10 ML: 5 INJECTION INTRAVENOUS at 08:08

## 2022-04-02 RX ADMIN — SODIUM CHLORIDE 20 ML/HR: 9 INJECTION, SOLUTION INTRAVENOUS at 08:09

## 2022-04-02 RX ADMIN — DIPHENHYDRAMINE HCL 25 MG: 25 TABLET ORAL at 08:10

## 2022-04-02 RX ADMIN — ACETAMINOPHEN 650 MG: 325 TABLET ORAL at 08:10

## 2022-04-02 ASSESSMENT — PAIN SCALES - GENERAL: PAINLEVEL_OUTOF10: 0

## 2022-04-02 NOTE — PROGRESS NOTES
Dale deaccessed. DSD applied. Pt tolerated well. Discharged instructions given to pt, pt voiced understanding. Pt discharged via AMBULATORY by SELF WITH WIFE TO EXIT.

## 2022-04-08 ENCOUNTER — HOSPITAL ENCOUNTER (INPATIENT)
Age: 52
LOS: 4 days | Discharge: HOME | DRG: 136 | End: 2022-04-12
Attending: INTERNAL MEDICINE | Admitting: INTERNAL MEDICINE
Payer: COMMERCIAL

## 2022-04-08 ENCOUNTER — APPOINTMENT (OUTPATIENT)
Dept: GENERAL RADIOLOGY | Age: 52
DRG: 136 | End: 2022-04-08
Payer: COMMERCIAL

## 2022-04-08 ENCOUNTER — APPOINTMENT (OUTPATIENT)
Dept: CT IMAGING | Age: 52
DRG: 136 | End: 2022-04-08
Payer: COMMERCIAL

## 2022-04-08 DIAGNOSIS — R06.02 SHORTNESS OF BREATH: Primary | ICD-10-CM

## 2022-04-08 DIAGNOSIS — C34.82 MALIGNANT NEOPLASM OF OVERLAPPING SITES OF LEFT LUNG (HCC): ICD-10-CM

## 2022-04-08 DIAGNOSIS — C79.51 SECONDARY MALIGNANT NEOPLASM OF BONE (HCC): ICD-10-CM

## 2022-04-08 DIAGNOSIS — D64.9 ANEMIA, UNSPECIFIED TYPE: ICD-10-CM

## 2022-04-08 DIAGNOSIS — C34.90 PRIMARY MALIGNANT NEOPLASM OF LUNG METASTATIC TO OTHER SITE, UNSPECIFIED LATERALITY (HCC): ICD-10-CM

## 2022-04-08 DIAGNOSIS — M89.9 BONE LESION: ICD-10-CM

## 2022-04-08 DIAGNOSIS — J18.9 PNEUMONIA DUE TO INFECTIOUS ORGANISM, UNSPECIFIED LATERALITY, UNSPECIFIED PART OF LUNG: ICD-10-CM

## 2022-04-08 DIAGNOSIS — N42.9 DISEASE OF PROSTATE: ICD-10-CM

## 2022-04-08 LAB
ALBUMIN SERPL-MCNC: 3.2 GM/DL (ref 3.4–5)
ALP BLD-CCNC: 178 IU/L (ref 40–128)
ALT SERPL-CCNC: 14 U/L (ref 10–40)
ANION GAP SERPL CALCULATED.3IONS-SCNC: 13 MMOL/L (ref 4–16)
ANISOCYTOSIS: ABNORMAL
AST SERPL-CCNC: 16 IU/L (ref 15–37)
BANDED NEUTROPHILS ABSOLUTE COUNT: 0.31 K/CU MM
BANDED NEUTROPHILS RELATIVE PERCENT: 10 % (ref 5–11)
BILIRUB SERPL-MCNC: 0.2 MG/DL (ref 0–1)
BUN BLDV-MCNC: 6 MG/DL (ref 6–23)
CALCIUM SERPL-MCNC: 8.5 MG/DL (ref 8.3–10.6)
CHLORIDE BLD-SCNC: 102 MMOL/L (ref 99–110)
CO2: 21 MMOL/L (ref 21–32)
CREAT SERPL-MCNC: 0.7 MG/DL (ref 0.9–1.3)
D DIMER: 1709 NG/ML(DDU)
DIFFERENTIAL TYPE: ABNORMAL
EKG ATRIAL RATE: 88 BPM
EKG DIAGNOSIS: NORMAL
EKG P AXIS: 68 DEGREES
EKG P-R INTERVAL: 158 MS
EKG Q-T INTERVAL: 374 MS
EKG QRS DURATION: 98 MS
EKG QTC CALCULATION (BAZETT): 452 MS
EKG R AXIS: 53 DEGREES
EKG T AXIS: 69 DEGREES
EKG VENTRICULAR RATE: 88 BPM
EOSINOPHILS ABSOLUTE: 0.1 K/CU MM
EOSINOPHILS RELATIVE PERCENT: 2 % (ref 0–3)
GFR AFRICAN AMERICAN: >60 ML/MIN/1.73M2
GFR NON-AFRICAN AMERICAN: >60 ML/MIN/1.73M2
GIANT PLATELETS: PRESENT
GLUCOSE BLD-MCNC: 105 MG/DL (ref 70–99)
HCT VFR BLD CALC: 19.5 % (ref 42–52)
HCT VFR BLD CALC: 22.5 % (ref 42–52)
HEMOGLOBIN: 6.3 GM/DL (ref 13.5–18)
HEMOGLOBIN: 7.4 GM/DL (ref 13.5–18)
LACTATE: 0.6 MMOL/L (ref 0.4–2)
LYMPHOCYTES ABSOLUTE: 0.6 K/CU MM
LYMPHOCYTES RELATIVE PERCENT: 20 % (ref 24–44)
MAGNESIUM: 1.7 MG/DL (ref 1.8–2.4)
MCH RBC QN AUTO: 27.4 PG (ref 27–31)
MCHC RBC AUTO-ENTMCNC: 32.3 % (ref 32–36)
MCV RBC AUTO: 84.8 FL (ref 78–100)
METAMYELOCYTES ABSOLUTE COUNT: 0.06 K/CU MM
METAMYELOCYTES PERCENT: 2 %
MONOCYTES ABSOLUTE: 0.2 K/CU MM
MONOCYTES RELATIVE PERCENT: 7 % (ref 0–4)
NUCLEATED RED BLOOD CELLS: 2
PDW BLD-RTO: 17 % (ref 11.7–14.9)
PLATELET # BLD: 60 K/CU MM (ref 140–440)
PMV BLD AUTO: 9.5 FL (ref 7.5–11.1)
POLYCHROMASIA: ABNORMAL
POTASSIUM SERPL-SCNC: 3.3 MMOL/L (ref 3.5–5.1)
PRO-BNP: 248.7 PG/ML
RAPID INFLUENZA  B AGN: NEGATIVE
RAPID INFLUENZA A AGN: NEGATIVE
RBC # BLD: 2.3 M/CU MM (ref 4.6–6.2)
SARS-COV-2, NAAT: NOT DETECTED
SEGMENTED NEUTROPHILS ABSOLUTE COUNT: 1.8 K/CU MM
SEGMENTED NEUTROPHILS RELATIVE PERCENT: 59 % (ref 36–66)
SODIUM BLD-SCNC: 136 MMOL/L (ref 135–145)
SOURCE: NORMAL
TEAR DROP CELLS: ABNORMAL
TOTAL PROTEIN: 6.3 GM/DL (ref 6.4–8.2)
TROPONIN T: <0.01 NG/ML
WBC # BLD: 3.1 K/CU MM (ref 4–10.5)

## 2022-04-08 PROCEDURE — 71275 CT ANGIOGRAPHY CHEST: CPT

## 2022-04-08 PROCEDURE — 83735 ASSAY OF MAGNESIUM: CPT

## 2022-04-08 PROCEDURE — 86922 COMPATIBILITY TEST ANTIGLOB: CPT

## 2022-04-08 PROCEDURE — 87040 BLOOD CULTURE FOR BACTERIA: CPT

## 2022-04-08 PROCEDURE — 6370000000 HC RX 637 (ALT 250 FOR IP): Performed by: INTERNAL MEDICINE

## 2022-04-08 PROCEDURE — 85379 FIBRIN DEGRADATION QUANT: CPT

## 2022-04-08 PROCEDURE — 87635 SARS-COV-2 COVID-19 AMP PRB: CPT

## 2022-04-08 PROCEDURE — 2580000003 HC RX 258: Performed by: PHYSICIAN ASSISTANT

## 2022-04-08 PROCEDURE — 6370000000 HC RX 637 (ALT 250 FOR IP): Performed by: PHYSICIAN ASSISTANT

## 2022-04-08 PROCEDURE — 83880 ASSAY OF NATRIURETIC PEPTIDE: CPT

## 2022-04-08 PROCEDURE — 86850 RBC ANTIBODY SCREEN: CPT

## 2022-04-08 PROCEDURE — 83605 ASSAY OF LACTIC ACID: CPT

## 2022-04-08 PROCEDURE — 6360000002 HC RX W HCPCS: Performed by: INTERNAL MEDICINE

## 2022-04-08 PROCEDURE — 85007 BL SMEAR W/DIFF WBC COUNT: CPT

## 2022-04-08 PROCEDURE — 87804 INFLUENZA ASSAY W/OPTIC: CPT

## 2022-04-08 PROCEDURE — 6360000002 HC RX W HCPCS: Performed by: PHYSICIAN ASSISTANT

## 2022-04-08 PROCEDURE — 85018 HEMOGLOBIN: CPT

## 2022-04-08 PROCEDURE — 87899 AGENT NOS ASSAY W/OPTIC: CPT

## 2022-04-08 PROCEDURE — 85014 HEMATOCRIT: CPT

## 2022-04-08 PROCEDURE — 6360000004 HC RX CONTRAST MEDICATION: Performed by: PHYSICIAN ASSISTANT

## 2022-04-08 PROCEDURE — P9016 RBC LEUKOCYTES REDUCED: HCPCS

## 2022-04-08 PROCEDURE — 86900 BLOOD TYPING SEROLOGIC ABO: CPT

## 2022-04-08 PROCEDURE — 71045 X-RAY EXAM CHEST 1 VIEW: CPT

## 2022-04-08 PROCEDURE — 96365 THER/PROPH/DIAG IV INF INIT: CPT

## 2022-04-08 PROCEDURE — 94761 N-INVAS EAR/PLS OXIMETRY MLT: CPT

## 2022-04-08 PROCEDURE — 80053 COMPREHEN METABOLIC PANEL: CPT

## 2022-04-08 PROCEDURE — 86901 BLOOD TYPING SEROLOGIC RH(D): CPT

## 2022-04-08 PROCEDURE — 93005 ELECTROCARDIOGRAM TRACING: CPT | Performed by: PHYSICIAN ASSISTANT

## 2022-04-08 PROCEDURE — 84484 ASSAY OF TROPONIN QUANT: CPT

## 2022-04-08 PROCEDURE — 6370000000 HC RX 637 (ALT 250 FOR IP): Performed by: NURSE PRACTITIONER

## 2022-04-08 PROCEDURE — 1200000000 HC SEMI PRIVATE

## 2022-04-08 PROCEDURE — 99285 EMERGENCY DEPT VISIT HI MDM: CPT

## 2022-04-08 PROCEDURE — 85027 COMPLETE CBC AUTOMATED: CPT

## 2022-04-08 PROCEDURE — 93010 ELECTROCARDIOGRAM REPORT: CPT | Performed by: INTERNAL MEDICINE

## 2022-04-08 PROCEDURE — 2580000003 HC RX 258: Performed by: INTERNAL MEDICINE

## 2022-04-08 RX ORDER — OXYCODONE HYDROCHLORIDE 5 MG/1
10 TABLET ORAL ONCE
Status: COMPLETED | OUTPATIENT
Start: 2022-04-08 | End: 2022-04-08

## 2022-04-08 RX ORDER — SODIUM CHLORIDE 9 MG/ML
INJECTION, SOLUTION INTRAVENOUS PRN
Status: DISCONTINUED | OUTPATIENT
Start: 2022-04-08 | End: 2022-04-12 | Stop reason: HOSPADM

## 2022-04-08 RX ORDER — NICOTINE 21 MG/24HR
1 PATCH, TRANSDERMAL 24 HOURS TRANSDERMAL DAILY
Status: DISCONTINUED | OUTPATIENT
Start: 2022-04-09 | End: 2022-04-12 | Stop reason: HOSPADM

## 2022-04-08 RX ORDER — ACETAMINOPHEN 325 MG/1
650 TABLET ORAL EVERY 6 HOURS PRN
Status: DISCONTINUED | OUTPATIENT
Start: 2022-04-08 | End: 2022-04-12 | Stop reason: HOSPADM

## 2022-04-08 RX ORDER — NICOTINE 21 MG/24HR
1 PATCH, TRANSDERMAL 24 HOURS TRANSDERMAL ONCE
Status: COMPLETED | OUTPATIENT
Start: 2022-04-08 | End: 2022-04-09

## 2022-04-08 RX ORDER — LANOLIN ALCOHOL/MO/W.PET/CERES
3 CREAM (GRAM) TOPICAL NIGHTLY PRN
Status: DISCONTINUED | OUTPATIENT
Start: 2022-04-08 | End: 2022-04-12 | Stop reason: HOSPADM

## 2022-04-08 RX ORDER — LANOLIN ALCOHOL/MO/W.PET/CERES
1000 CREAM (GRAM) TOPICAL DAILY
Status: DISCONTINUED | OUTPATIENT
Start: 2022-04-08 | End: 2022-04-12 | Stop reason: HOSPADM

## 2022-04-08 RX ORDER — SUMATRIPTAN 50 MG/1
50 TABLET, FILM COATED ORAL ONCE
Status: COMPLETED | OUTPATIENT
Start: 2022-04-08 | End: 2022-04-08

## 2022-04-08 RX ORDER — LISINOPRIL 10 MG/1
10 TABLET ORAL DAILY
Status: DISCONTINUED | OUTPATIENT
Start: 2022-04-08 | End: 2022-04-11

## 2022-04-08 RX ORDER — ACETAMINOPHEN 650 MG/1
650 SUPPOSITORY RECTAL EVERY 6 HOURS PRN
Status: DISCONTINUED | OUTPATIENT
Start: 2022-04-08 | End: 2022-04-12 | Stop reason: HOSPADM

## 2022-04-08 RX ORDER — SUMATRIPTAN 50 MG/1
50 TABLET, FILM COATED ORAL 2 TIMES DAILY PRN
Status: DISCONTINUED | OUTPATIENT
Start: 2022-04-08 | End: 2022-04-12 | Stop reason: HOSPADM

## 2022-04-08 RX ORDER — ONDANSETRON 4 MG/1
4 TABLET, ORALLY DISINTEGRATING ORAL EVERY 8 HOURS PRN
Status: DISCONTINUED | OUTPATIENT
Start: 2022-04-08 | End: 2022-04-12 | Stop reason: HOSPADM

## 2022-04-08 RX ORDER — ALBUTEROL SULFATE 90 UG/1
2 AEROSOL, METERED RESPIRATORY (INHALATION) EVERY 4 HOURS PRN
Status: DISCONTINUED | OUTPATIENT
Start: 2022-04-08 | End: 2022-04-12 | Stop reason: HOSPADM

## 2022-04-08 RX ORDER — ONDANSETRON 2 MG/ML
4 INJECTION INTRAMUSCULAR; INTRAVENOUS EVERY 6 HOURS PRN
Status: DISCONTINUED | OUTPATIENT
Start: 2022-04-08 | End: 2022-04-12 | Stop reason: HOSPADM

## 2022-04-08 RX ORDER — SODIUM CHLORIDE 0.9 % (FLUSH) 0.9 %
5-40 SYRINGE (ML) INJECTION 2 TIMES DAILY
Status: DISCONTINUED | OUTPATIENT
Start: 2022-04-08 | End: 2022-04-08 | Stop reason: SDUPTHER

## 2022-04-08 RX ORDER — ONDANSETRON 2 MG/ML
4 INJECTION INTRAMUSCULAR; INTRAVENOUS EVERY 30 MIN PRN
Status: DISCONTINUED | OUTPATIENT
Start: 2022-04-08 | End: 2022-04-08 | Stop reason: SDUPTHER

## 2022-04-08 RX ORDER — SODIUM CHLORIDE 0.9 % (FLUSH) 0.9 %
5-40 SYRINGE (ML) INJECTION PRN
Status: DISCONTINUED | OUTPATIENT
Start: 2022-04-08 | End: 2022-04-12 | Stop reason: HOSPADM

## 2022-04-08 RX ORDER — LORAZEPAM 1 MG/1
1 TABLET ORAL NIGHTLY PRN
Status: DISCONTINUED | OUTPATIENT
Start: 2022-04-08 | End: 2022-04-12 | Stop reason: HOSPADM

## 2022-04-08 RX ORDER — OXYCODONE HYDROCHLORIDE 10 MG/1
10 TABLET ORAL EVERY 4 HOURS PRN
Status: DISCONTINUED | OUTPATIENT
Start: 2022-04-08 | End: 2022-04-11

## 2022-04-08 RX ORDER — GUAIFENESIN/DEXTROMETHORPHAN 100-10MG/5
5 SYRUP ORAL ONCE
Status: COMPLETED | OUTPATIENT
Start: 2022-04-08 | End: 2022-04-08

## 2022-04-08 RX ORDER — SODIUM CHLORIDE 0.9 % (FLUSH) 0.9 %
5-40 SYRINGE (ML) INJECTION EVERY 12 HOURS SCHEDULED
Status: DISCONTINUED | OUTPATIENT
Start: 2022-04-08 | End: 2022-04-12 | Stop reason: HOSPADM

## 2022-04-08 RX ORDER — BICALUTAMIDE 50 MG/1
50 TABLET, FILM COATED ORAL DAILY
Status: DISCONTINUED | OUTPATIENT
Start: 2022-04-08 | End: 2022-04-08

## 2022-04-08 RX ORDER — FENTANYL CITRATE 50 UG/ML
50 INJECTION, SOLUTION INTRAMUSCULAR; INTRAVENOUS
Status: DISCONTINUED | OUTPATIENT
Start: 2022-04-08 | End: 2022-04-08

## 2022-04-08 RX ORDER — MAGNESIUM SULFATE IN WATER 40 MG/ML
2000 INJECTION, SOLUTION INTRAVENOUS ONCE
Status: COMPLETED | OUTPATIENT
Start: 2022-04-08 | End: 2022-04-08

## 2022-04-08 RX ORDER — POLYETHYLENE GLYCOL 3350 17 G/17G
17 POWDER, FOR SOLUTION ORAL DAILY PRN
Status: DISCONTINUED | OUTPATIENT
Start: 2022-04-08 | End: 2022-04-12 | Stop reason: HOSPADM

## 2022-04-08 RX ORDER — BICALUTAMIDE 50 MG/1
50 TABLET, FILM COATED ORAL DAILY
Status: DISCONTINUED | OUTPATIENT
Start: 2022-04-08 | End: 2022-04-12 | Stop reason: HOSPADM

## 2022-04-08 RX ADMIN — IOPAMIDOL 75 ML: 755 INJECTION, SOLUTION INTRAVENOUS at 06:25

## 2022-04-08 RX ADMIN — CEFEPIME HYDROCHLORIDE 2000 MG: 2 INJECTION, POWDER, FOR SOLUTION INTRAVENOUS at 18:09

## 2022-04-08 RX ADMIN — MAGNESIUM SULFATE HEPTAHYDRATE 2000 MG: 2 INJECTION, SOLUTION INTRAVENOUS at 13:05

## 2022-04-08 RX ADMIN — VANCOMYCIN HYDROCHLORIDE 1250 MG: 5 INJECTION, POWDER, LYOPHILIZED, FOR SOLUTION INTRAVENOUS at 21:04

## 2022-04-08 RX ADMIN — SUMATRIPTAN SUCCINATE 50 MG: 50 TABLET, FILM COATED ORAL at 11:28

## 2022-04-08 RX ADMIN — SODIUM CHLORIDE, PRESERVATIVE FREE 10 ML: 5 INJECTION INTRAVENOUS at 21:05

## 2022-04-08 RX ADMIN — GUAIFENESIN AND DEXTROMETHORPHAN 5 ML: 100; 10 SYRUP ORAL at 03:06

## 2022-04-08 RX ADMIN — BICALUTAMIDE 50 MG: 50 TABLET, FILM COATED ORAL at 17:15

## 2022-04-08 RX ADMIN — CEFEPIME HYDROCHLORIDE 2000 MG: 2 INJECTION, POWDER, FOR SOLUTION INTRAVENOUS at 10:40

## 2022-04-08 RX ADMIN — OXYCODONE HYDROCHLORIDE 10 MG: 5 TABLET ORAL at 03:07

## 2022-04-08 RX ADMIN — PIPERACILLIN AND TAZOBACTAM 4500 MG: 4; .5 INJECTION, POWDER, FOR SOLUTION INTRAVENOUS at 10:46

## 2022-04-08 RX ADMIN — ONDANSETRON 4 MG: 2 INJECTION INTRAMUSCULAR; INTRAVENOUS at 08:34

## 2022-04-08 RX ADMIN — LORAZEPAM 1 MG: 1 TABLET ORAL at 22:35

## 2022-04-08 RX ADMIN — OXYCODONE HYDROCHLORIDE 10 MG: 10 TABLET ORAL at 21:03

## 2022-04-08 RX ADMIN — OXYCODONE HYDROCHLORIDE 10 MG: 5 TABLET ORAL at 07:47

## 2022-04-08 RX ADMIN — SUMATRIPTAN SUCCINATE 50 MG: 50 TABLET, FILM COATED ORAL at 22:36

## 2022-04-08 RX ADMIN — Medication 1 TABLET: at 12:57

## 2022-04-08 RX ADMIN — VANCOMYCIN HYDROCHLORIDE 1750 MG: 5 INJECTION, POWDER, LYOPHILIZED, FOR SOLUTION INTRAVENOUS at 08:05

## 2022-04-08 RX ADMIN — AZITHROMYCIN DIHYDRATE 500 MG: 500 INJECTION, POWDER, LYOPHILIZED, FOR SOLUTION INTRAVENOUS at 07:49

## 2022-04-08 RX ADMIN — CYANOCOBALAMIN TAB 1000 MCG 1000 MCG: 1000 TAB at 11:15

## 2022-04-08 ASSESSMENT — PAIN SCALES - GENERAL
PAINLEVEL_OUTOF10: 10
PAINLEVEL_OUTOF10: 4
PAINLEVEL_OUTOF10: 3
PAINLEVEL_OUTOF10: 10
PAINLEVEL_OUTOF10: 10
PAINLEVEL_OUTOF10: 7
PAINLEVEL_OUTOF10: 10

## 2022-04-08 ASSESSMENT — PAIN DESCRIPTION - PAIN TYPE
TYPE: ACUTE PAIN
TYPE: ACUTE PAIN;CHRONIC PAIN

## 2022-04-08 NOTE — ED NOTES
Patient urinary cath drained and replaced per request. Urine sent to lab at this time.       Renee Burleson RN  04/08/22 [id number]

## 2022-04-08 NOTE — ED NOTES
This RN accessed patient's implanted port w/ Power Accessible tubing.      Sherri Villeda RN  04/08/22 [id number]

## 2022-04-08 NOTE — H&P
History and Physical      Name:  Kim Winkler /Age/Sex: 1970  (47 y.o. male)   MRN & CSN:  [id number] & [id number] Admission Date/Time: 2022  2:44 AM   Location:  ED[facility] PCP: Jes Costa MD       Hospital Day: 1    Assessment and Plan:   Kim Winkler is a 47 y.o.  male  who presents with Shortness of breath    1. Shortness of breath: Case d/w ED; CT PE without PE but did show changes c/w possible post-obstructive pneumonia and a new LLL pneumonia, abx as per below, Albuterol PRN, Oncology consulted as per below  2. Post-Obstructive Pneumonia: Likely given CT findings, will continue IV abx and monitor; MRSA nares ordered  3. Known Left Hilar Mass: Per chart review previous bx c/w metastatic squamous cell carcinoma (met to bone after bx in Dec of 2021); is on chemo but held due to worsening anemia; Oncology consulted, recs apprecaited. Patient has very poor follow-up with specialists. Given poor prognosis Hospice had previously been d/w family. 4. Acute on Chronic Anemia: Hgb noted to be 6.3, transfusing one unit in ED; stool guiac ordered, repeat H/H and CBC in AM   5. Thrombocytopenia: Monitor; in setting of malignancy and chemo; Hematology following  6. Hypomagnesemia: Replacement ordered  7. B12 Deficiency: Continue B12 supplements  8. Likely Prostate Cancer: Per chart review non-compliant and has been scheduled for biopsy's but has never followed through; is scheduled for ; continue Casodex and Tamsulosin  9. HTN: Continue Lisinopril    Diet No diet orders on file   DVT Prophylaxis [] Lovenox, []  Heparin, [] SCDs, [] Ambulation   GI Prophylaxis [] PPI,  [] H2 Blocker,  [] Carafate,  [] Diet/Tube Feeds   Code Status Prior   Disposition Patient requires continued admission due to    MDM [] Low, [] Moderate,[]  High  Patient's risk as above due to      History of Present Illness:    This is a 50-year-old male with a known history of a left hilar mass with previous biopsy consistent with metastatic squamous cell carcinoma (metastatic to bone), on chemotherapy the patient states that recently stopped due to persistent anemia, B12 deficiency, likely prostate cancer on Casodex (poor f/u with Urology so was started empirically with high PSA), hypertension that is presenting with shortness of breath. States that shortness of breath has been persistent since his last chemotherapy. Recently has had some chills at home but no objective fevers. States he has been coughing up copious amounts of mucus recently and yesterday specifically states he coughed up a large amount of green sputum with possible blood. He does endorse some possible dark stools as well. He currently denies any chest pain nausea or vomiting. Denies any significant lower extremity swelling denies any abdominal pain . Ten point ROS reviewed negative, unless as noted above. In the ED patient was afebrile vital signs are stable saturating well on room air. Labs remarkable for magnesium 1.7 potassium 3.3, hemoglobin noted to be 6.3 and an order for transfusion was given in the emergency room. CT chest completed in the emergency room did not show any evidence of a PE but did redemonstrate his left hilar malignant mass as well as left lower lobe consolidation consistent with pneumonia as well as near complete occlusion of the left upper lobe bronchus secondary to his hilar mass. Objective:   No intake or output data in the 24 hours ending 04/08/22 0821   Vitals:   Vitals:    04/08/22 0726   BP:    Pulse: 92   Resp: 17   Temp:    SpO2: 94%     Physical Exam:   GEN Awake male, sitting upright in bed in no apparent distress. Appears given age. EYES Pupils are equally round. No scleral erythema, discharge, or conjunctivitis. NECK Supple, no apparent thyromegaly or masses. RESP Decreased upper lung field breath sounds, no distress, on room air  CARDIO/VASC S1/S2 auscultated.  Regular rate without appreciable murmurs, rubs, or gallops. No JVD or carotid bruits. GI Abdomen is soft without significant tenderness, masses, or guarding   No costovertebral angle tenderness. HEME/LYMPH  No petechiae or ecchymoses. MSK No gross joint deformities. SKIN Normal coloration, warm, dry. NEURO Cranial nerves appear grossly intact, normal speech  PSYCH Awake, alert, oriented x 4. Affect appropriate. Past Medical History:      Past Medical History:   Diagnosis Date    CAD (coronary artery disease) 12/23/2013    cath negative per pt    Cancer [facility]) 06/2021    pt reports he has lung cancer    History of TMJ disorder     Hypertension     Trigeminal neuralgia      PSHX:  has a past surgical history that includes Abdomen surgery; Cardiac catheterization (08/03/2016); CT NEEDLE BIOPSY LUNG PERCUTANEOUS (7/28/2021); Port Surgery (Right, 8/13/2021); and CT BIOPSY SUPERFICIAL BONE PERCUTANEOUS (12/17/2021). Allergies: Allergies   Allergen Reactions    Tramadol Other (See Comments)     seizures    Vicodin [Hydrocodone-Acetaminophen] Hives       FAM HX: family history includes Cancer in his sister; High Blood Pressure in his mother and sister. Soc HX:   Social History     Socioeconomic History    Marital status:      Spouse name: None    Number of children: 11    Years of education: None    Highest education level: None   Occupational History    None   Tobacco Use    Smoking status: Current Every Day Smoker     Packs/day: 2.00     Years: 39.00     Pack years: 78.00     Types: Cigarettes    Smokeless tobacco: Never Used    Tobacco comment: smokes 1-2 a day   Vaping Use    Vaping Use: Never used   Substance and Sexual Activity    Alcohol use:  Yes     Alcohol/week: 6.0 standard drinks     Types: 6 Cans of beer per week     Comment: per week (24 oz beers)     Drug use: Yes     Types: Marijuana [place])     Comment: last 12/1    Sexual activity: Yes     Partners: Female   Other Topics Concern    None   Social History Narrative    None     Social Determinants of Health     Financial Resource Strain:     Difficulty of Paying Living Expenses: Not on file   Food Insecurity:     Worried About Running Out of Food in the Last Year: Not on file    [place] of Food in the Last Year: Not on file   Transportation Needs:     Lack of Transportation (Medical): Not on file    Lack of Transportation (Non-Medical):  Not on file   Physical Activity:     Days of Exercise per Week: Not on file    Minutes of Exercise per Session: Not on file   Stress:     Feeling of Stress : Not on file   Social Connections:     Frequency of Communication with Friends and Family: Not on file    Frequency of Social Gatherings with Friends and Family: Not on file    Attends Mandaen Services: Not on file    Active Member of [address] [place] [address] [employer] or Organizations: Not on file    Attends Club or Organization Meetings: Not on file    Marital Status: Not on file   Intimate Partner Violence:     Fear of Current or Ex-Partner: Not on file    Emotionally Abused: Not on file    Physically Abused: Not on file    Sexually Abused: Not on file   Housing Stability:     Unable to Pay for Housing in the Last Year: Not on file    Number of [place] in the Last Year: Not on file    Unstable Housing in the Last Year: Not on file       Medications:   Medications:    sodium chloride flush  5-40 mL IntraVENous BID    piperacillin-tazobactam  4,500 mg IntraVENous Once    azithromycin  500 mg IntraVENous Once    vancomycin  20 mg/kg IntraVENous Once      Infusions:    sodium chloride       PRN Meds: sodium chloride, , PRN          Electronically signed by Christy Hatfield MD on 4/8/2022 at 8:21 AM

## 2022-04-08 NOTE — ED PROVIDER NOTES
[place]      PCP: Sandie Dia MD    CHIEF COMPLAINT    Chief Complaint   Patient presents with    Other     states there is a new mass in his abd    Shortness of Breath       This patient was not evaluated by the attending physician. I have independently evaluated this patient. HPI    Randolph Bianchi is a 47 y.o. male who presents with cough, nasal congestion and sore throat. Onset 1 week ago. Patient has had associated shortness of breath which started today. Patient has associated chest pain with cough. Patient has history of lung cancer, coronary artery disease. Patient denies abdominal pain, pain with urination. Patient denies lower extremity pain or swelling. Patient denies fever, vomiting or diarrhea. REVIEW OF SYSTEMS    Constitutional:  Denies fever  HENT:  See HPI  Cardiovascular: see HPI  Respiratory:  See HPI.    GI:  Denies abdominal pain, vomiting, or diarrhea  :  Denies any urinary symptoms   Musculoskeletal:  Denies back pain   Skin:  Denies rash  Neurologic:  Denies focal weakness or sensory changes     All other review of systems are negative  See HPI and nursing notes for additional information       PAST MEDICAL & SURGICAL HISTORY    Past Medical History:   Diagnosis Date    CAD (coronary artery disease) 12/23/2013    cath negative per pt    Cancer ([address] 06/2021    pt reports he has lung cancer    History of TMJ disorder     Hypertension     Trigeminal neuralgia      Past Surgical History:   Procedure Laterality Date    ABDOMEN SURGERY      CARDIAC CATHETERIZATION  08/03/2016    CT BIOPSY PERCUTANEOUS SUPERFICIAL BONE  12/17/2021    CT BIOPSY PERCUTANEOUS SUPERFICIAL BONE 12/17/2021 [facility] CT SCAN    CT NEEDLE BIOPSY LUNG PERCUTANEOUS  7/28/2021    CT NEEDLE BIOPSY LUNG PERCUTANEOUS 7/28/2021 [facility] CT SCAN    PORT SURGERY Right 8/13/2021    MEDIPORT INSERTION performed by Abdulkadir Riojas MD at [address] [facility] MG tablet Take 1 tablet by mouth daily 30 tablet 3    ammonium lactate (LAC-HYDRIN) 12 % lotion Apply topically as needed. 1 Bottle 0       ALLERGIES    Allergies   Allergen Reactions    Tramadol Other (See Comments)     seizures    Vicodin [Hydrocodone-Acetaminophen] Hives       SOCIAL & FAMILY HISTORY    Social History     Socioeconomic History    Marital status:      Spouse name: None    Number of children: 5    Years of education: None    Highest education level: None   Occupational History    None   Tobacco Use    Smoking status: Current Every Day Smoker     Packs/day: 2.00     Years: 39.00     Pack years: 78.00     Types: Cigarettes    Smokeless tobacco: Never Used    Tobacco comment: smokes 1-2 a day   Vaping Use    Vaping Use: Never used   Substance and Sexual Activity    Alcohol use: Yes     Alcohol/week: 6.0 standard drinks     Types: 6 Cans of beer per week     Comment: per week (24 oz beers)     Drug use: Yes     Types: Marijuana [employer])     Comment: last 12/1    Sexual activity: Yes     Partners: Female   Other Topics Concern    None   Social History Narrative    None     Social Determinants of Health     Financial Resource Strain:     Difficulty of Paying Living Expenses: Not on file   Food Insecurity:     Worried About Running Out of Food in the Last Year: Not on file    [place] of Food in the Last Year: Not on file   Transportation Needs:     Lack of Transportation (Medical): Not on file    Lack of Transportation (Non-Medical):  Not on file   Physical Activity:     Days of Exercise per Week: Not on file    Minutes of Exercise per Session: Not on file   Stress:     Feeling of Stress : Not on file   Social Connections:     Frequency of Communication with Friends and Family: Not on file    Frequency of Social Gatherings with Friends and Family: Not on file    Attends Alevism Services: Not on file    Active Member of Clubs or Organizations: Not on file    Attends [employer] or Organization Meetings: Not on file    Marital Status: Not on file   Intimate Partner Violence:     Fear of Current or Ex-Partner: Not on file    Emotionally Abused: Not on file    Physically Abused: Not on file    Sexually Abused: Not on file   Housing Stability:     Unable to Pay for Housing in the Last Year: Not on file    Number of [place] in the Last Year: Not on file    Unstable Housing in the Last Year: Not on file     Family History   Problem Relation Age of Onset    High Blood Pressure Mother     High Blood Pressure Sister     Cancer Sister        PHYSICAL EXAM    VITAL SIGNS: /84   Pulse 88   Temp 97.7 °F (36.5 °C) (Oral)   Resp 22   Ht 6' 1\" (1.854 m)   Wt 192 lb (87.1 kg)   SpO2 100%   BMI 25.33 kg/m²    Constitutional:  Well developed, well nourished, no acute distress   HENT:  Atraumatic, moist mucus membranes  Neck/Lymphatics: supple, no JVD, no swollen nodes  Respiratory:   Nonlabored breathing. Lungs clear bilaterally, no retractions   Cardiovascular: Normal rate and rhythm  GI:  Soft, nontender, normal bowel sounds. No CVA tenderness.   Musculoskeletal:  No edema, no acute deformities  Integument:  Skin is warm and dry, no petechiae   Neurologic:  Alert & oriented, no slurred speech  Psych: Pleasant affect, no hallucinations      EKG      EKG Interpretation  Please see ED physician's note for EKG interpretation        LABS:  Results for orders placed or performed during the hospital encounter of 04/08/22   COVID-19, Rapid    Specimen: Nasopharyngeal   Result Value Ref Range    Source THROAT     SARS-CoV-2, NAAT NOT DETECTED NOT DETECTED   Rapid influenza A/B antigens    Specimen: Nasopharyngeal   Result Value Ref Range    Rapid Influenza A Ag NEGATIVE NEGATIVE    Rapid Influenza B Ag NEGATIVE NEGATIVE   CBC with Auto Differential   Result Value Ref Range    WBC 3.1 (L) 4.0 - 10.5 K/CU MM    RBC 2.30 (L) 4.6 - 6.2 M/CU MM    Hemoglobin 6.3 (LL) 13.5 - 18.0 GM/DL Hematocrit 19.5 (L) 42 - 52 %    MCV 84.8 78 - 100 FL    MCH 27.4 27 - 31 PG    MCHC 32.3 32.0 - 36.0 %    RDW 17.0 (H) 11.7 - 14.9 %    Platelets 60 (L) 454 - 440 K/CU MM    MPV 9.5 7.5 - 11.1 FL    Metamyelocytes Relative 2 (H) 0.0 %    Bands Relative 10 5 - 11 %    Segs Relative 59.0 36 - 66 %    Eosinophils % 2.0 0 - 3 %    Lymphocytes % 20.0 (L) 24 - 44 %    Monocytes % 7.0 (H) 0 - 4 %    nRBC 2     Metamyelocytes Absolute 0.06 K/CU MM    Bands Absolute 0.31 K/CU MM    Segs Absolute 1.8 K/CU MM    Eosinophils Absolute 0.1 K/CU MM    Lymphocytes Absolute 0.6 K/CU MM    Monocytes Absolute 0.2 K/CU MM    Differential Type MANUAL DIFFERENTIAL     Anisocytosis 1+     Polychromasia 2+     Tear Drop Cells 1+     Giant PLTs PRESENT    Comprehensive Metabolic Panel   Result Value Ref Range    Sodium 136 135 - 145 MMOL/L    Potassium 3.3 (L) 3.5 - 5.1 MMOL/L    Chloride 102 99 - 110 mMol/L    CO2 21 21 - 32 MMOL/L    BUN 6 6 - 23 MG/DL    CREATININE 0.7 (L) 0.9 - 1.3 MG/DL    Glucose 105 (H) 70 - 99 MG/DL    Calcium 8.5 8.3 - 10.6 MG/DL    Albumin 3.2 (L) 3.4 - 5.0 GM/DL    Total Protein 6.3 (L) 6.4 - 8.2 GM/DL    Total Bilirubin 0.2 0.0 - 1.0 MG/DL    ALT 14 10 - 40 U/L    AST 16 15 - 37 IU/L    Alkaline Phosphatase 178 (H) 40 - 128 IU/L    GFR Non-African American >60 >60 mL/min/1.73m2    GFR African American >60 >60 mL/min/1.73m2    Anion Gap 13 4 - 16   Troponin   Result Value Ref Range    Troponin T <0.010 <0.01 NG/ML   D-Dimer, Quantitative   Result Value Ref Range    D-Dimer, Quant 1709 (H) <230 NG/mL(DDU)   Brain Natriuretic Peptide   Result Value Ref Range    Pro-.7 <300 PG/ML   EKG 12 Lead   Result Value Ref Range    Ventricular Rate 88 BPM    Atrial Rate 88 BPM    P-R Interval 158 ms    QRS Duration 98 ms    Q-T Interval 374 ms    QTc Calculation (Bazett) 452 ms    P Axis 68 degrees    R Axis 53 degrees    T Axis 69 degrees    Diagnosis       Normal sinus rhythm  Voltage criteria for left ventricular hypertrophy  Cannot rule out Septal infarct (cited on or before 03-AUG-2016)  Abnormal ECG  When compared with ECG of 12-MAR-2022 12:50,  Questionable change in initial forces of Septal leads             RADIOLOGY/PROCEDURES    XR CHEST PORTABLE   Final Result   1. Patchy opacification involving the left mid to upper lung, which appears   slightly worsened. 2. No convincing focal consolidation the right lung. 3. Scattered sclerotic bony metastases. CTA PULMONARY W CONTRAST    (Results Pending)           ED [address]      Patient presents as above. Patient provided Roxicodone and cough medication. Rapid flu and COVID are negative. See physician note for EKG reading. CBC shows hemoglobin is 6.3 and hematocrit of 19.5. Blood transfusion ordered. Troponin is negative. D-dimer 1709. . CMP is grossly within normal limits. Chest x-ray shows patchy opacification involving the left mid to upper lobe which appears slightly worsened. Lactic acid, blood cultures ordered. CTA chest is pending at the end of my shift. Patient signed out to my camera, see his note for final impression and plan. Clinical  IMPRESSION    1. Shortness of breath    2. Pneumonia due to infectious organism, unspecified laterality, unspecified part of lung    3. Anemia, unspecified type        See Miguel's note for final impression and plan. Comment: Please note this report has been produced using speech recognition software and may contain errors related to that system including errors in grammar, punctuation, and spelling, as well as words and phrases that may be inappropriate. If there are any questions or concerns please feel free to contact the dictating provider for clarification.                           Meredith Nixon PA-C  04/08/22 3620

## 2022-04-08 NOTE — ED NOTES
Patient receiving blood transfusion at this time. 0 s/sx of distress. Patient denies complaints. 0 s/sx of transfusion reaction.       Ahmet Alcantara RN  04/08/22 4497

## 2022-04-08 NOTE — ED PROVIDER NOTES
6:11 AM EDT  Kenny Bettencourt was checked out to me by Charley Peterson PA-C. Please see his/her initial documentation for details of the patient's ED presentation, physical exam and completed studies. In brief, Kenny Bettencourt presented for cough and nasal congestion for a week, SOB for 1 day. Has h/o lung cancer on chemo. D dimer is elevated. Also has h/o abdominal mass with urinary retention dx 2 weeks ago, has sampson in place that is draining urine. Exam: WD, thin, Heart RRR, Lungs menaced throughout with rhonchi and wheezing. Abdomen soft and nontender.     I have reviewed and interpreted all of the currently available lab results from this visit (if applicable):  Results for orders placed or performed during the hospital encounter of 04/08/22   COVID-19, Rapid    Specimen: Nasopharyngeal   Result Value Ref Range    Source THROAT     SARS-CoV-2, NAAT NOT DETECTED NOT DETECTED   Rapid influenza A/B antigens    Specimen: Nasopharyngeal   Result Value Ref Range    Rapid Influenza A Ag NEGATIVE NEGATIVE    Rapid Influenza B Ag NEGATIVE NEGATIVE   CBC with Auto Differential   Result Value Ref Range    WBC 3.1 (L) 4.0 - 10.5 K/CU MM    RBC 2.30 (L) 4.6 - 6.2 M/CU MM    Hemoglobin 6.3 (LL) 13.5 - 18.0 GM/DL    Hematocrit 19.5 (L) 42 - 52 %    MCV 84.8 78 - 100 FL    MCH 27.4 27 - 31 PG    MCHC 32.3 32.0 - 36.0 %    RDW 17.0 (H) 11.7 - 14.9 %    Platelets 60 (L) 650 - 440 K/CU MM    MPV 9.5 7.5 - 11.1 FL    Metamyelocytes Relative 2 (H) 0.0 %    Bands Relative 10 5 - 11 %    Segs Relative 59.0 36 - 66 %    Eosinophils % 2.0 0 - 3 %    Lymphocytes % 20.0 (L) 24 - 44 %    Monocytes % 7.0 (H) 0 - 4 %    nRBC 2     Metamyelocytes Absolute 0.06 K/CU MM    Bands Absolute 0.31 K/CU MM    Segs Absolute 1.8 K/CU MM    Eosinophils Absolute 0.1 K/CU MM    Lymphocytes Absolute 0.6 K/CU MM    Monocytes Absolute 0.2 K/CU MM    Differential Type MANUAL DIFFERENTIAL     Anisocytosis 1+     Polychromasia 2+     Tear Drop Cells 1+     Giant PLTs PRESENT    Comprehensive Metabolic Panel   Result Value Ref Range    Sodium 136 135 - 145 MMOL/L    Potassium 3.3 (L) 3.5 - 5.1 MMOL/L    Chloride 102 99 - 110 mMol/L    CO2 21 21 - 32 MMOL/L    BUN 6 6 - 23 MG/DL    CREATININE 0.7 (L) 0.9 - 1.3 MG/DL    Glucose 105 (H) 70 - 99 MG/DL    Calcium 8.5 8.3 - 10.6 MG/DL    Albumin 3.2 (L) 3.4 - 5.0 GM/DL    Total Protein 6.3 (L) 6.4 - 8.2 GM/DL    Total Bilirubin 0.2 0.0 - 1.0 MG/DL    ALT 14 10 - 40 U/L    AST 16 15 - 37 IU/L    Alkaline Phosphatase 178 (H) 40 - 128 IU/L    GFR Non-African American >60 >60 mL/min/1.73m2    GFR African American >60 >60 mL/min/1.73m2    Anion Gap 13 4 - 16   Troponin   Result Value Ref Range    Troponin T <0.010 <0.01 NG/ML   D-Dimer, Quantitative   Result Value Ref Range    D-Dimer, Quant 1709 (H) <230 NG/mL(DDU)   Brain Natriuretic Peptide   Result Value Ref Range    Pro-.7 <300 PG/ML   Lactic Acid   Result Value Ref Range    Lactate 0.6 0.4 - 2.0 mMOL/L   Magnesium   Result Value Ref Range    Magnesium 1.7 (L) 1.8 - 2.4 mg/dl   EKG 12 Lead   Result Value Ref Range    Ventricular Rate 88 BPM    Atrial Rate 88 BPM    P-R Interval 158 ms    QRS Duration 98 ms    Q-T Interval 374 ms    QTc Calculation (Bazett) 452 ms    P Axis 68 degrees    R Axis 53 degrees    T Axis 69 degrees    Diagnosis       Normal sinus rhythm  Voltage criteria for left ventricular hypertrophy  Cannot rule out Septal infarct (cited on or before 03-AUG-2016)  Abnormal ECG  When compared with ECG of 12-MAR-2022 12:50,  Questionable change in initial forces of Septal leads     TYPE AND SCREEN   Result Value Ref Range    ABO/Rh A POSITIVE     Antibody Screen NEGATIVE     Unit Number [id number]     Component LEUKO-POOR RED CELLS     Unit Divison 00     Status ISSUED     Transfusion Status OK TO TRANSFUSE     Crossmatch Result COMPATIBLE        MDM:  Patient presents for shortness of breath, cough.   Work-up reveals worsening masses in his chest, left lower lobe pneumonia, anemia requiring transfusion. Started on broad-spectrum antibiotics to status his chemotherapy patient. Plan is to admit. Spoke with Dr. Isabel Joseph with hospitalist service who agreed to admit. I have independently evaluated this patient. Final Impression:  1. Shortness of breath    2. Pneumonia due to infectious organism, unspecified laterality, unspecified part of lung    3. Anemia, unspecified type    4.  Primary malignant neoplasm of lung metastatic to other site, unspecified laterality [facility])        (Please note that portions of this note may have been completed with a voice recognition program. Efforts were made to edit the dictations but occasionally words are mis-transcribed.)    Laxmi Rosis PA-C          [address] [place] [address] AMBER  04/08/22 8093

## 2022-04-08 NOTE — PROGRESS NOTES
[address] [facility]  consulted by Dr. Shaq Elder for monitoring and adjustment. Indication for treatment:   Goal trough: [] 10-15 mcg/mL or [x] 15-20 mcg/ml  AUC/CASH: [] <500 or [x] 400-600    Pertinent Laboratory Values:   Temp Readings from Last 3 Encounters:   04/08/22 98.2 °F (36.8 °C) (Oral)   04/02/22 97.3 °F (36.3 °C) (Infrared)   04/01/22 95.1 °F (35.1 °C) (Infrared)     Recent Labs     04/08/22  0255 04/08/22  0601   WBC 3.1*  --    LACTATE  --  0.6     Recent Labs     04/08/22  0255   BUN 6   CREATININE 0.7*     Estimated Creatinine Clearance: 138 mL/min (A) (based on SCr of 0.7 mg/dL (L)). No intake or output data in the 24 hours ending 04/08/22 1047    Pertinent Cultures:  Date    Source    Results  4/8   MRSA nasal   Ordered  4/8   Blood    Ordered    Vancomycin level:   TROUGH:  No results for input(s): VANCOTROUGH in the last 72 hours. RANDOM:  No results for input(s): VANCORANDOM in the last 72 hours.     Assessment:  · SCr, BUN, and urine output:  · WNL, at baseline  · Day(s) of therapy: 1  · Vancomycin concentration: to be collected    Plan:  · Vancomycin 1750 mg IVPB x1 dose  · Continue vancomycin 1250 mg IVPB q12h  · Pharmacy will continue to monitor patient and adjust therapy as indicated    Pedro 3 4/10 @ 0600    Thank you for the consult,  Joaquin Gamboa [address] [place] [address], PharmD  4/8/2022 10:47 AM

## 2022-04-08 NOTE — LETTER
Feng Guaman was here with his father on 4/8/2022. If you have any questions or concerns, please don't hesitate to call.

## 2022-04-08 NOTE — PROGRESS NOTES
[address] [place] [address]    Barbara Mendez is a 47 y.o. male started on vancomycin for pneumonia. Pharmacy consulted by ED provider Dixon Gilbert PA-C to order a dose of vancomycin in the emergency department. Other antimicrobials: Cefepime, Zithromax    Ht Readings from Last 1 Encounters:   04/08/22 6' 1\" (1.854 m)     Wt Readings from Last 3 Encounters:   04/08/22 192 lb (87.1 kg)   04/01/22 192 lb (87.1 kg)   03/29/22 200 lb (90.7 kg)        Pertinent Laboratory Values:   Temp Readings from Last 3 Encounters:   04/08/22 97.7 °F (36.5 °C) (Oral)   04/02/22 97.3 °F (36.3 °C) (Infrared)   04/01/22 95.1 °F (35.1 °C) (Infrared)     Recent Labs     04/08/22  0255   WBC 3.1*     Recent Labs     04/08/22  0255   BUN 6   CREATININE 0.7*     Estimated Creatinine Clearance: 138 mL/min (A) (based on SCr of 0.7 mg/dL (L)). No intake or output data in the 24 hours ending 04/08/22 0617    Assessment/Plan:  Pharmacy will order vancomycin 1,750 mg (20 mg/kg). Please note, pharmacy will order a one-time dose of vancomycin for the Emergency Department. The consult will need to be re-ordered if vancomycin is to continue upon admission. Thank you for the consult.   Abimael Hines [facility]  4/8/2022 6:17 AM

## 2022-04-08 NOTE — PROGRESS NOTES
[address] [place] [address]    Kim Winkler is a 47 y.o. male started on vancomycin for pneumonia. Pharmacy consulted by ED provider Becki Lutz PA-C to order a dose of vancomycin in the emergency department. Other antimicrobials: Zosyn, Zithromax    Ht Readings from Last 1 Encounters:   04/08/22 6' 1\" (1.854 m)     Wt Readings from Last 3 Encounters:   04/08/22 192 lb (87.1 kg)   04/01/22 192 lb (87.1 kg)   03/29/22 200 lb (90.7 kg)        Pertinent Laboratory Values:   Temp Readings from Last 3 Encounters:   04/08/22 97.7 °F (36.5 °C) (Oral)   04/02/22 97.3 °F (36.3 °C) (Infrared)   04/01/22 95.1 °F (35.1 °C) (Infrared)     Recent Labs     04/08/22  0255   WBC 3.1*     Recent Labs     04/08/22  0255   BUN 6   CREATININE 0.7*     Estimated Creatinine Clearance: 138 mL/min (A) (based on SCr of 0.7 mg/dL (L)). No intake or output data in the 24 hours ending 04/08/22 4906    Assessment/Plan:  Pharmacy will order vancomycin 1,750 mg (20 mg/kg). Please note, pharmacy will order a one-time dose of vancomycin for the Emergency Department. The consult will need to be re-ordered if vancomycin is to continue upon admission. Thank you for the consult.   Nathan Main, [address] [place] [address]  4/8/2022 6:19 AM

## 2022-04-08 NOTE — ED PROVIDER NOTES
Twelve-lead EKG obtained at 0307 on 8 April 2022 and interpreted by me in the absence of a cardiologist.  There is no criteria ST elevation or reciprocal change. There are no hyperacute T wave changes. There is no sign of acute ischemia or infarction. This tracing shows a normal sinus rhythm with LVH criteria. Rate and intervals are 88 beats per minute, LA interval 158 milliseconds, QRS duration 98 milliseconds, QTc interval 452 milliseconds, and R axis normal at 53 degrees. There is no acute change compared with the most recent EKG dated March 12, 2022.         Kyle Prater MD  04/08/22 2722

## 2022-04-08 NOTE — ED NOTES
Patient requesting pain medication for complaints of low back pain. Logan Rudd notified.       Berenice Rockwell RN  04/08/22 6774

## 2022-04-09 LAB
ANION GAP SERPL CALCULATED.3IONS-SCNC: 15 MMOL/L (ref 4–16)
ANISOCYTOSIS: ABNORMAL
BANDED NEUTROPHILS ABSOLUTE COUNT: 0.46 K/CU MM
BANDED NEUTROPHILS RELATIVE PERCENT: 12 % (ref 5–11)
BASOPHILS ABSOLUTE: 0 K/CU MM
BASOPHILS RELATIVE PERCENT: 1 % (ref 0–1)
BUN BLDV-MCNC: 7 MG/DL (ref 6–23)
CALCIUM SERPL-MCNC: 8.5 MG/DL (ref 8.3–10.6)
CHLORIDE BLD-SCNC: 103 MMOL/L (ref 99–110)
CO2: 21 MMOL/L (ref 21–32)
CREAT SERPL-MCNC: 0.6 MG/DL (ref 0.9–1.3)
DIFFERENTIAL TYPE: ABNORMAL
EOSINOPHILS ABSOLUTE: 0.1 K/CU MM
EOSINOPHILS RELATIVE PERCENT: 2 % (ref 0–3)
GFR AFRICAN AMERICAN: >60 ML/MIN/1.73M2
GFR NON-AFRICAN AMERICAN: >60 ML/MIN/1.73M2
GLUCOSE BLD-MCNC: 94 MG/DL (ref 70–99)
HCT VFR BLD CALC: 21.8 % (ref 42–52)
HCT VFR BLD CALC: 25.4 % (ref 42–52)
HEMOGLOBIN: 7.2 GM/DL (ref 13.5–18)
HEMOGLOBIN: 8.4 GM/DL (ref 13.5–18)
HIGH SENSITIVE C-REACTIVE PROTEIN: 57.6 MG/L
LYMPHOCYTES ABSOLUTE: 0.7 K/CU MM
LYMPHOCYTES RELATIVE PERCENT: 19 % (ref 24–44)
MCH RBC QN AUTO: 27.8 PG (ref 27–31)
MCHC RBC AUTO-ENTMCNC: 33 % (ref 32–36)
MCV RBC AUTO: 84.2 FL (ref 78–100)
METAMYELOCYTES ABSOLUTE COUNT: 0.04 K/CU MM
METAMYELOCYTES PERCENT: 1 %
MICROCYTES: ABNORMAL
MONOCYTES ABSOLUTE: 0.3 K/CU MM
MONOCYTES RELATIVE PERCENT: 7 % (ref 0–4)
NUCLEATED RED BLOOD CELLS: 1
PDW BLD-RTO: 16.5 % (ref 11.7–14.9)
PLATELET # BLD: 65 K/CU MM (ref 140–440)
PLT MORPHOLOGY: ABNORMAL
PMV BLD AUTO: 9.6 FL (ref 7.5–11.1)
POLYCHROMASIA: ABNORMAL
POTASSIUM SERPL-SCNC: 3.8 MMOL/L (ref 3.5–5.1)
PROCALCITONIN: 0.07
RBC # BLD: 2.59 M/CU MM (ref 4.6–6.2)
SEGMENTED NEUTROPHILS ABSOLUTE COUNT: 2.2 K/CU MM
SEGMENTED NEUTROPHILS RELATIVE PERCENT: 58 % (ref 36–66)
SODIUM BLD-SCNC: 139 MMOL/L (ref 135–145)
STREP PNEUMONIAE ANTIGEN: NORMAL
TEAR DROP CELLS: ABNORMAL
WBC # BLD: 3.8 K/CU MM (ref 4–10.5)

## 2022-04-09 PROCEDURE — 36430 TRANSFUSION BLD/BLD COMPNT: CPT

## 2022-04-09 PROCEDURE — 86141 C-REACTIVE PROTEIN HS: CPT

## 2022-04-09 PROCEDURE — 6370000000 HC RX 637 (ALT 250 FOR IP): Performed by: INTERNAL MEDICINE

## 2022-04-09 PROCEDURE — 2580000003 HC RX 258: Performed by: INTERNAL MEDICINE

## 2022-04-09 PROCEDURE — 6360000002 HC RX W HCPCS: Performed by: INTERNAL MEDICINE

## 2022-04-09 PROCEDURE — 94761 N-INVAS EAR/PLS OXIMETRY MLT: CPT

## 2022-04-09 PROCEDURE — 80048 BASIC METABOLIC PNL TOTAL CA: CPT

## 2022-04-09 PROCEDURE — 6370000000 HC RX 637 (ALT 250 FOR IP): Performed by: NURSE PRACTITIONER

## 2022-04-09 PROCEDURE — 36415 COLL VENOUS BLD VENIPUNCTURE: CPT

## 2022-04-09 PROCEDURE — 99222 1ST HOSP IP/OBS MODERATE 55: CPT | Performed by: INTERNAL MEDICINE

## 2022-04-09 PROCEDURE — 85014 HEMATOCRIT: CPT

## 2022-04-09 PROCEDURE — 85018 HEMOGLOBIN: CPT

## 2022-04-09 PROCEDURE — 36591 DRAW BLOOD OFF VENOUS DEVICE: CPT

## 2022-04-09 PROCEDURE — 1200000000 HC SEMI PRIVATE

## 2022-04-09 PROCEDURE — P9016 RBC LEUKOCYTES REDUCED: HCPCS

## 2022-04-09 PROCEDURE — 85007 BL SMEAR W/DIFF WBC COUNT: CPT

## 2022-04-09 PROCEDURE — 85027 COMPLETE CBC AUTOMATED: CPT

## 2022-04-09 PROCEDURE — 84145 PROCALCITONIN (PCT): CPT

## 2022-04-09 RX ORDER — SODIUM CHLORIDE 9 MG/ML
INJECTION, SOLUTION INTRAVENOUS PRN
Status: COMPLETED | OUTPATIENT
Start: 2022-04-09 | End: 2022-04-12

## 2022-04-09 RX ADMIN — VANCOMYCIN HYDROCHLORIDE 1250 MG: 5 INJECTION, POWDER, LYOPHILIZED, FOR SOLUTION INTRAVENOUS at 08:44

## 2022-04-09 RX ADMIN — SODIUM CHLORIDE, PRESERVATIVE FREE 10 ML: 5 INJECTION INTRAVENOUS at 23:52

## 2022-04-09 RX ADMIN — OXYCODONE HYDROCHLORIDE 10 MG: 10 TABLET ORAL at 20:40

## 2022-04-09 RX ADMIN — CYANOCOBALAMIN TAB 1000 MCG 1000 MCG: 1000 TAB at 08:38

## 2022-04-09 RX ADMIN — BICALUTAMIDE 50 MG: 50 TABLET, FILM COATED ORAL at 16:01

## 2022-04-09 RX ADMIN — OXYCODONE HYDROCHLORIDE 10 MG: 10 TABLET ORAL at 13:40

## 2022-04-09 RX ADMIN — VANCOMYCIN HYDROCHLORIDE 1250 MG: 5 INJECTION, POWDER, LYOPHILIZED, FOR SOLUTION INTRAVENOUS at 20:33

## 2022-04-09 RX ADMIN — LISINOPRIL 10 MG: 10 TABLET ORAL at 08:38

## 2022-04-09 RX ADMIN — SUMATRIPTAN SUCCINATE 50 MG: 50 TABLET, FILM COATED ORAL at 20:40

## 2022-04-09 RX ADMIN — CEFEPIME HYDROCHLORIDE 2000 MG: 2 INJECTION, POWDER, FOR SOLUTION INTRAVENOUS at 18:15

## 2022-04-09 RX ADMIN — POLYETHYLENE GLYCOL (3350) 17 G: 17 POWDER, FOR SOLUTION ORAL at 04:01

## 2022-04-09 RX ADMIN — CEFEPIME HYDROCHLORIDE 2000 MG: 2 INJECTION, POWDER, FOR SOLUTION INTRAVENOUS at 02:15

## 2022-04-09 RX ADMIN — Medication 1 TABLET: at 08:39

## 2022-04-09 RX ADMIN — SODIUM CHLORIDE, PRESERVATIVE FREE 10 ML: 5 INJECTION INTRAVENOUS at 08:39

## 2022-04-09 RX ADMIN — CEFEPIME HYDROCHLORIDE 2000 MG: 2 INJECTION, POWDER, FOR SOLUTION INTRAVENOUS at 10:49

## 2022-04-09 ASSESSMENT — PAIN SCALES - GENERAL
PAINLEVEL_OUTOF10: 3
PAINLEVEL_OUTOF10: 10
PAINLEVEL_OUTOF10: 10
PAINLEVEL_OUTOF10: 9
PAINLEVEL_OUTOF10: 8
PAINLEVEL_OUTOF10: 9

## 2022-04-09 ASSESSMENT — PAIN DESCRIPTION - LOCATION: LOCATION: GENERALIZED

## 2022-04-09 ASSESSMENT — PAIN DESCRIPTION - PAIN TYPE: TYPE: ACUTE PAIN;CHRONIC PAIN

## 2022-04-09 NOTE — PROGRESS NOTES
Comprehensive Nutrition Assessment    Type and Reason for Visit:  Initial,Positive Nutrition Screen (MST: 3)    Nutrition Recommendations/Plan:     Continue regular diet   Started high calorie oral nutrition supplement     Nutrition Assessment:  Pt admitted with shortness of breath. No PO Intake recorded. Pt with report of decreased oral intake and weight loss PTA. Pt with NSCLC and has been on chemo. Pt with worsening anemia. Malnutrition Assessment:  Malnutrition Status:  Insufficient data    Context:  Chronic Illness       Estimated Daily Nutrient Needs:  Energy (kcal):  6446-7993; Weight Used for Energy Requirements:  Current     Protein (g):  ; Weight Used for Protein Requirements:  Current        Fluid (ml/day):  3247-5943; Method Used for Fluid Requirements:  1 ml/kcal      Nutrition Related Findings:  Labs: H/H: 7.2/21.8, Glucose ~100. Meds: Ca, vit D, atb      Wounds:  None       Current Nutrition Therapies:    ADULT DIET; Regular    Anthropometric Measures:  · Height: 6' 1\" (185.4 cm)  · Current Body Weight: 192 lb (87.1 kg)   · Admission Body Weight: 192 lb (87.1 kg)    · Usual Body Weight: 205 lb (93 kg) (October 2021)     · Ideal Body Weight: 184 lbs; % Ideal Body Weight 104.3 %   · BMI: 25.3  · BMI Categories: Overweight (BMI 25.0-29. 9)       Nutrition Diagnosis:   · Inadequate protein-energy intake related to catabolic illness (metastatic cancer) as evidenced by poor intake prior to admission,weight loss (6% weight loss in 6 months)      Nutrition Interventions:   Food and/or Nutrient Delivery:  Continue Current Diet,Start Oral Nutrition Supplement  Nutrition Education/Counseling:  No recommendation at this time   Coordination of Nutrition Care:  Continue to monitor while inpatient    Goals:  Pt will tolerate PO diet and supplements with greater than 75% intake.        Nutrition Monitoring and Evaluation:   Behavioral-Environmental Outcomes:  None Identified   Food/Nutrient Intake Outcomes:  Diet Advancement/Tolerance,Food and Nutrient Intake,Supplement Intake  Physical Signs/Symptoms Outcomes:  Chewing or Swallowing,Skin,Weight     Discharge Planning:    No discharge needs at this time     Electronically signed by Bossman Myrick RD, LD on 4/9/22 at 3:03 PM EDT    Contact: [phone]

## 2022-04-09 NOTE — PROGRESS NOTES
[address] [facility]  consulted by Dr. Griffin Crouch for monitoring and adjustment. Indication for treatment: Post obstructive pneumonia  Goal trough: [] 10-15 mcg/mL or [x] 15-20 mcg/ml  AUC/CASH: [] <500 or [x] 400-600    Pertinent Laboratory Values:   Temp Readings from Last 3 Encounters:   04/09/22 99.3 °F (37.4 °C) (Oral)   04/02/22 97.3 °F (36.3 °C) (Infrared)   04/01/22 95.1 °F (35.1 °C) (Infrared)     Recent Labs     04/08/22  0255 04/08/22  0601 04/09/22  0620   WBC 3.1*  --  3.8*   LACTATE  --  0.6  --      Recent Labs     04/08/22  0255 04/09/22  0620   BUN 6 7   CREATININE 0.7* 0.6*     Estimated Creatinine Clearance: 161 mL/min (A) (based on SCr of 0.6 mg/dL (L)). No intake or output data in the 24 hours ending 04/09/22 1511    Pertinent Cultures:  Date    Source    Results  4/8   MRSA nasal   Ordered  4/8   Blood    Ordered    Vancomycin level:   TROUGH:  No results for input(s): VANCOTROUGH in the last 72 hours. RANDOM:  No results for input(s): VANCORANDOM in the last 72 hours. Assessment:  · SCr, BUN, and urine output:  · WNL, at baseline  · Day(s) of therapy: 2  · Vancomycin concentration: to be collected    Plan:  · Renal trends at baseline  · Continue vancomycin 1250 mg IVPB q12h for a predicted AUC of 465 at steady state. · Pharmacy will continue to monitor patient and adjust therapy as indicated    Pedro 3 4/10 @ 0600    Thank you for the consult,  Idania Malloy.  Chetna Nicole, [facility]  4/9/2022 3:11 PM

## 2022-04-09 NOTE — CONSULTS
ONCOLOGY HEMATOLOGY CARE ([facility])  CONSULTATION REPORT    REASON FOR CONSULT    NSCLC on treatment with Shortness of breath    CHIEF COMPLAINT    Chief Complaint   Patient presents with    Other     states there is a new mass in his abd    Shortness of Breath       HISTORY OF PRESENT ILLNESS   Dian Linares is a 47 y.o. male who presents with worsening shortness of breath. He endoreses cough, nasal congestion for one week. Last ED visit 3/26/22 with flank pain, noted to have hydronephrosis. He has sampson catheter. CXR 4/8/22 with patchy opacification involving left mid to upper lung    4/8/22 CTA pulmonary   Impression   1. Redemonstration of left hilar malignant pulmonary arterial encasement and   mild stenosis with no central malignant occlusion.  No acute pulmonary emboli. 2. Interval enlargement of the left hilar/upper lobe mass which now results   in central complete occlusion of the left upper lobe bronchus.  Progressive   multi segmental volume loss/consolidation.  Cannot exclude a post obstructive   pneumonia. 3. Acute left lower lobe consolidation concerning for pneumonia. 4. New mild left pleural effusion which may be malignant in nature. 5. Progressive mediastinal lymphadenopathy.  This could be a combination of   reactive and malignant changes. 6. Redemonstration of left hydronephrosis. 7. Stable extensive blastic skeletal metastasis with no evidence of a   pathologic fracture. He was started on antibiotics. Denies fevers at home. Has been coughing up green mucous for about one week. Denies hemoptysis. No abdominal pain. Oncology history:  Noted to have left hilar mass with nmediastinal hilar lyymphadenopathy consistent with squamous cell carinoma in situ. PET CT 8/21 with bone lesions. MRI brain without metastatic disease. Prostate biopsy, bone biopsy delayed due to patient non compliance. Noted to have rising PSA 11/21. Imaging 12/21 with progressive disease.   Bone biopsy 12/13/21 with met squamous cell cancer, consistent with lung primary. 1/10/22 completed palliaitve radiation to the pelvic area and left lung  C1D1 carbo/taxol and keytruda initiated 1/20/22  Plan for prostate biopsy 4/11/22. Repeat PSA ~900  Casodex initiated    PAST MEDICAL HISTORY    Past Medical History:   Diagnosis Date    CAD (coronary artery disease) 12/23/2013    cath negative per pt    Cancer ([address] 06/2021    pt reports he has lung cancer    History of TMJ disorder     Hypertension     Trigeminal neuralgia        SURGICAL HISTORY    Past Surgical History:   Procedure Laterality Date    ABDOMEN SURGERY      CARDIAC CATHETERIZATION  08/03/2016    CT BIOPSY PERCUTANEOUS SUPERFICIAL BONE  12/17/2021    CT BIOPSY PERCUTANEOUS SUPERFICIAL BONE 12/17/2021 [facility] [address] SCAN    CT NEEDLE BIOPSY LUNG PERCUTANEOUS  7/28/2021    CT NEEDLE BIOPSY LUNG PERCUTANEOUS 7/28/2021 [facility] [address] SCAN    PORT SURGERY Right 8/13/2021    MEDIPORT INSERTION performed by Jose Eduardo Swanson MD at [address] OR       FAMILY HISTORY    Family History   Problem Relation Age of Onset    High Blood Pressure Mother     High Blood Pressure Sister     Cancer Sister        SOCIAL HISTORY    Social History     Socioeconomic History    Marital status:      Spouse name: None    Number of children: 5    Years of education: None    Highest education level: None   Occupational History    None   Tobacco Use    Smoking status: Current Every Day Smoker     Packs/day: 2.00     Years: 39.00     Pack years: 78.00     Types: Cigarettes    Smokeless tobacco: Never Used    Tobacco comment: smokes 1-2 a day   Vaping Use    Vaping Use: Never used   Substance and Sexual Activity    Alcohol use:  Yes     Alcohol/week: 6.0 standard drinks     Types: 6 Cans of beer per week     Comment: per week (24 oz beers)     Drug use: Yes     Types: Marijuana Elfida Anderson)     Comment: last 12/1    Sexual activity: Yes     Partners: Female   Other Topics Concern 1\" (1.854 m)   Wt 192 lb (87.1 kg)   SpO2 98%   BMI 25.33 kg/m²   CONSTITUTIONAL: awake, alert, cooperative, no apparent distress   EYES:  sclera clear and conjunctiva normal  ENT: Normocephalic, without obvious abnormality, atraumatic  NECK: supple, symmetrical  HEMATOLOGIC/LYMPHATIC: no cervical, supraclavicular or axillary lymphadenopathy   LUNGS: no increased work of breathing, expiratory wheezes upper lobes CARDIOVASCULAR: regular rate and rhythm, normal S1 and S2, no murmur noted  ABDOMEN: normal bowel sounds x 4, soft, non-distended, non-tender, no masses palpated, no hepatosplenomgaly   MUSCULOSKELETAL: full range of motion noted, tone is normal  NEUROLOGIC: awake, alert, oriented to name, place and time. Motor skills grossly intact. SKIN: Normal skin color, texture, turgor and no jaundice. Skin appears intact   EXTREMITIES: no LE edema  LABORATORY RESULTS  CBC:   Recent Labs     04/08/22  0255 04/08/22  1135 04/09/22  0620   WBC 3.1*  --  3.8*   HGB 6.3* 7.4* 7.2*   PLT 60*  --  65*     BMP:    Recent Labs     04/08/22  0255 04/09/22  0620    139   K 3.3* 3.8    103   CO2 21 21   BUN 6 7   CREATININE 0.7* 0.6*   GLUCOSE 105* 94     Hepatic:   Recent Labs     04/08/22  0255   AST 16   ALT 14   BILITOT 0.2   ALKPHOS 178*       ASSESSMENT/RECOMMENDATION    Non small cell lung cancer- CT 4/8/22 shows interval enlargement of left hilar/upper lobe mass. Due to new headaches we will plan for MRI brain to rule out metastatic disease. Due to progressive disease we will discuss changing chemotherapy. To follow up with Dr. Lauren Mares in office once acute issues resolve. Anemia- secondary to AOCD, underlying met prostate cancer, chemotherapy, B12 deficiency. 4/8/21 one unit PRBC. Recommend additional unit PRBC today. FOB ordered. Continue B12 supplementation. IR consult for BMB for further evaluation of anemia to rule out bone marrow infiltration.  Started Retacrit 10,000 units three times a week.    Prostate enlargement/elevated PSA- started on Casodex, Eligard. Had biopsy planned for 4/11. Pneumonia-started on antibiotics    We will follow the patient. Thank you for allowing me to participate in the care of this very pleasant patient. I have independently evaluated and examined this patient today. I have reviewed radiologic and biochemical tests on this patient. Management Plan is developed mutually with Dez Jaffe NP.  I have reviewed above note and agree with assessment and plan

## 2022-04-09 NOTE — PROGRESS NOTES
Hospitalist Progress Note      Name:  Gay Mejia /Age/Sex: 1970  (47 y.o. male)   MRN & CSN:  [id number] & [id number] Admission Date/Time: 2022  2:44 AM   Location:  [facility] PCP: Jill Cochran MD         Hospital Day: 2    Assessment and Plan:       1. 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 after  biopsy in 2021, patient was on chemotherapy, last chemotherapy 5 days ago currently held due to worsening anemia  Oncology consulted    3-acute on chronic anemia with thrombocytopenia: Most likely secondary to malignancy and chemotherapy  No evidence of bleeding  Hemoglobin was 6.3 on admission status post 1 unit of blood transfusion  Fecal occult blood test ordered still pending  Monitor CBC daily  Transfuse for hemoglobin less than 7    Thrombocytopenia with a platelet count 08,028 secondary to malignancy and chemotherapy hematologist following    Hypomagnesemia and hypokalemia : Replaced. Today labs still pending   B12 Deficiency: Continue B12 supplements  Likely Prostate Cancer: Per chart review non-compliant and has been scheduled for biopsy's but has never followed through; is scheduled for ; continue Casodex and Tamsulosin  HTN: Continue Lisinopril          Diet ADULT DIET; Regular   DVT Prophylaxis [x] Lovenox, []  Heparin, [] SCDs, [] Ambulation   GI Prophylaxis [] PPI,  [] H2 Blocker,  [] Carafate,  [] Diet/Tube Feeds   Code Status Full Code   Disposition Patient requires continued admission due to    MDM [] Low, [] Moderate,[]  High  Patient's risk as above due to      History of Present Illness:    The patient was seen and examined at the bedside  Resting comfortably in bed not in respiratory distress  Denies shortness of breath chest pain or cough  Afebrile   denies nausea or vomiting      Objective:   No intake or output data in the 24 hours ending 04/09/22 0822   Vitals:   Vitals:    04/09/22 0209   BP: 131/85   Pulse: 90   Resp: 22   Temp: 99 °F (37.2 °C)   SpO2: 97%     Physical Exam:   GEN Awake.  Alert , not in respiratory distress, not in pain  HEENT: PEERLA, , supple neck,   Chest: air entry equal bilaterally, no wheezing or crepitation  Heart: S1 and S2 heard, no murmur, no gallop or rub, regular rate  Abdomen: soft, ND , Nt, +BS  Extremities: no cyanosis, tenderness or erythema, peripheral pulses audible  Neurology: alert, oriented x3, able to move 4 limbs    Medications:   Medications:    cefepime  2,000 mg IntraVENous Q8H    vancomycin  1,250 mg IntraVENous Q12H    sodium chloride flush  5-40 mL IntraVENous 2 times per day    enoxaparin  40 mg SubCUTAneous Daily    calcium-cholecalciferol  1 tablet Oral Daily    cyanocobalamin  1,000 mcg Oral Daily    lisinopril  10 mg Oral Daily    bicalutamide  50 mg Oral Daily    nicotine  1 patch TransDERmal Daily    nicotine  1 patch TransDERmal Once      Infusions:    sodium chloride      sodium chloride       PRN Meds: sodium chloride, , PRN  sodium chloride flush, 5-40 mL, PRN  sodium chloride, , PRN  ondansetron, 4 mg, Q8H PRN   Or  ondansetron, 4 mg, Q6H PRN  polyethylene glycol, 17 g, Daily PRN  acetaminophen, 650 mg, Q6H PRN   Or  acetaminophen, 650 mg, Q6H PRN  albuterol sulfate HFA, 2 puff, Q4H PRN  LORazepam, 1 mg, Nightly PRN  melatonin, 3 mg, Nightly PRN  oxyCODONE HCl, 10 mg, Q4H PRN  SUMAtriptan, 50 mg, BID PRN          Electronically signed by Isaac Barraza MD on 4/9/2022 at 8:22 AM

## 2022-04-10 ENCOUNTER — APPOINTMENT (OUTPATIENT)
Dept: GENERAL RADIOLOGY | Age: 52
DRG: 136 | End: 2022-04-10
Payer: COMMERCIAL

## 2022-04-10 LAB
ABO/RH: NORMAL
ALBUMIN SERPL-MCNC: 3.6 GM/DL (ref 3.4–5)
ALP BLD-CCNC: 221 IU/L (ref 40–128)
ALT SERPL-CCNC: 13 U/L (ref 10–40)
ANION GAP SERPL CALCULATED.3IONS-SCNC: 13 MMOL/L (ref 4–16)
ANTIBODY SCREEN: NEGATIVE
AST SERPL-CCNC: 14 IU/L (ref 15–37)
BILIRUB SERPL-MCNC: 0.2 MG/DL (ref 0–1)
BUN BLDV-MCNC: 9 MG/DL (ref 6–23)
CALCIUM SERPL-MCNC: 9 MG/DL (ref 8.3–10.6)
CHLORIDE BLD-SCNC: 100 MMOL/L (ref 99–110)
CO2: 24 MMOL/L (ref 21–32)
COMPONENT: NORMAL
COMPONENT: NORMAL
CREAT SERPL-MCNC: 0.6 MG/DL (ref 0.9–1.3)
CROSSMATCH RESULT: NORMAL
CROSSMATCH RESULT: NORMAL
DOSE AMOUNT: NORMAL
DOSE TIME: NORMAL
GFR AFRICAN AMERICAN: >60 ML/MIN/1.73M2
GFR NON-AFRICAN AMERICAN: >60 ML/MIN/1.73M2
GLUCOSE BLD-MCNC: 109 MG/DL (ref 70–99)
HCT VFR BLD CALC: 25.7 % (ref 42–52)
HEMOGLOBIN: 8.7 GM/DL (ref 13.5–18)
MAGNESIUM: 1.8 MG/DL (ref 1.8–2.4)
MCH RBC QN AUTO: 28.3 PG (ref 27–31)
MCHC RBC AUTO-ENTMCNC: 33.9 % (ref 32–36)
MCV RBC AUTO: 83.7 FL (ref 78–100)
PDW BLD-RTO: 16 % (ref 11.7–14.9)
PLATELET # BLD: 75 K/CU MM (ref 140–440)
PMV BLD AUTO: 9.8 FL (ref 7.5–11.1)
POTASSIUM SERPL-SCNC: 4.1 MMOL/L (ref 3.5–5.1)
RBC # BLD: 3.07 M/CU MM (ref 4.6–6.2)
SODIUM BLD-SCNC: 137 MMOL/L (ref 135–145)
STATUS: NORMAL
STATUS: NORMAL
TOTAL PROTEIN: 6.4 GM/DL (ref 6.4–8.2)
TRANSFUSION STATUS: NORMAL
TRANSFUSION STATUS: NORMAL
UNIT DIVISION: 0
UNIT DIVISION: 0
UNIT NUMBER: NORMAL
UNIT NUMBER: NORMAL
VANCOMYCIN RANDOM: 11.4 UG/ML
WBC # BLD: 4.7 K/CU MM (ref 4–10.5)

## 2022-04-10 PROCEDURE — 80202 ASSAY OF VANCOMYCIN: CPT

## 2022-04-10 PROCEDURE — 83735 ASSAY OF MAGNESIUM: CPT

## 2022-04-10 PROCEDURE — 99232 SBSQ HOSP IP/OBS MODERATE 35: CPT | Performed by: INTERNAL MEDICINE

## 2022-04-10 PROCEDURE — 6370000000 HC RX 637 (ALT 250 FOR IP): Performed by: INTERNAL MEDICINE

## 2022-04-10 PROCEDURE — 6360000002 HC RX W HCPCS: Performed by: HOSPITALIST

## 2022-04-10 PROCEDURE — 80053 COMPREHEN METABOLIC PANEL: CPT

## 2022-04-10 PROCEDURE — 94761 N-INVAS EAR/PLS OXIMETRY MLT: CPT

## 2022-04-10 PROCEDURE — 71045 X-RAY EXAM CHEST 1 VIEW: CPT

## 2022-04-10 PROCEDURE — 1200000000 HC SEMI PRIVATE

## 2022-04-10 PROCEDURE — 85027 COMPLETE CBC AUTOMATED: CPT

## 2022-04-10 PROCEDURE — 6360000002 HC RX W HCPCS: Performed by: INTERNAL MEDICINE

## 2022-04-10 PROCEDURE — 2580000003 HC RX 258: Performed by: HOSPITALIST

## 2022-04-10 PROCEDURE — 36591 DRAW BLOOD OFF VENOUS DEVICE: CPT

## 2022-04-10 PROCEDURE — 6370000000 HC RX 637 (ALT 250 FOR IP): Performed by: NURSE PRACTITIONER

## 2022-04-10 PROCEDURE — 2580000003 HC RX 258: Performed by: INTERNAL MEDICINE

## 2022-04-10 RX ORDER — GUAIFENESIN/DEXTROMETHORPHAN 100-10MG/5
10 SYRUP ORAL EVERY 4 HOURS PRN
Status: DISCONTINUED | OUTPATIENT
Start: 2022-04-10 | End: 2022-04-12 | Stop reason: HOSPADM

## 2022-04-10 RX ADMIN — CYANOCOBALAMIN TAB 1000 MCG 1000 MCG: 1000 TAB at 08:26

## 2022-04-10 RX ADMIN — VANCOMYCIN HYDROCHLORIDE 1500 MG: 5 INJECTION, POWDER, LYOPHILIZED, FOR SOLUTION INTRAVENOUS at 21:23

## 2022-04-10 RX ADMIN — SODIUM CHLORIDE, PRESERVATIVE FREE 10 ML: 5 INJECTION INTRAVENOUS at 08:38

## 2022-04-10 RX ADMIN — GUAIFENESIN SYRUP AND DEXTROMETHORPHAN 10 ML: 100; 10 SYRUP ORAL at 21:17

## 2022-04-10 RX ADMIN — OXYCODONE HYDROCHLORIDE 10 MG: 10 TABLET ORAL at 03:38

## 2022-04-10 RX ADMIN — CEFEPIME HYDROCHLORIDE 2000 MG: 2 INJECTION, POWDER, FOR SOLUTION INTRAVENOUS at 19:02

## 2022-04-10 RX ADMIN — GUAIFENESIN SYRUP AND DEXTROMETHORPHAN 10 ML: 100; 10 SYRUP ORAL at 11:49

## 2022-04-10 RX ADMIN — LORAZEPAM 1 MG: 1 TABLET ORAL at 21:17

## 2022-04-10 RX ADMIN — CEFEPIME HYDROCHLORIDE 2000 MG: 2 INJECTION, POWDER, FOR SOLUTION INTRAVENOUS at 03:41

## 2022-04-10 RX ADMIN — POLYETHYLENE GLYCOL (3350) 17 G: 17 POWDER, FOR SOLUTION ORAL at 17:31

## 2022-04-10 RX ADMIN — CEFEPIME HYDROCHLORIDE 2000 MG: 2 INJECTION, POWDER, FOR SOLUTION INTRAVENOUS at 11:42

## 2022-04-10 RX ADMIN — OXYCODONE HYDROCHLORIDE 10 MG: 10 TABLET ORAL at 06:02

## 2022-04-10 RX ADMIN — SODIUM CHLORIDE, PRESERVATIVE FREE 10 ML: 5 INJECTION INTRAVENOUS at 21:20

## 2022-04-10 RX ADMIN — GUAIFENESIN SYRUP AND DEXTROMETHORPHAN 10 ML: 100; 10 SYRUP ORAL at 06:03

## 2022-04-10 RX ADMIN — AZITHROMYCIN DIHYDRATE 500 MG: 500 INJECTION, POWDER, LYOPHILIZED, FOR SOLUTION INTRAVENOUS at 17:28

## 2022-04-10 RX ADMIN — SUMATRIPTAN SUCCINATE 50 MG: 50 TABLET, FILM COATED ORAL at 15:41

## 2022-04-10 RX ADMIN — OXYCODONE HYDROCHLORIDE 10 MG: 10 TABLET ORAL at 11:49

## 2022-04-10 RX ADMIN — Medication 1 TABLET: at 08:26

## 2022-04-10 RX ADMIN — VANCOMYCIN HYDROCHLORIDE 1250 MG: 5 INJECTION, POWDER, LYOPHILIZED, FOR SOLUTION INTRAVENOUS at 08:26

## 2022-04-10 ASSESSMENT — PAIN DESCRIPTION - PAIN TYPE: TYPE: ACUTE PAIN

## 2022-04-10 ASSESSMENT — PAIN SCALES - GENERAL
PAINLEVEL_OUTOF10: 9
PAINLEVEL_OUTOF10: 4
PAINLEVEL_OUTOF10: 10
PAINLEVEL_OUTOF10: 10
PAINLEVEL_OUTOF10: 0
PAINLEVEL_OUTOF10: 10
PAINLEVEL_OUTOF10: 8

## 2022-04-10 ASSESSMENT — PAIN DESCRIPTION - ORIENTATION: ORIENTATION: RIGHT

## 2022-04-10 ASSESSMENT — PAIN DESCRIPTION - LOCATION: LOCATION: FLANK

## 2022-04-10 NOTE — PROGRESS NOTES
[address] [facility]  consulted by Dr. Griffin Crouch for monitoring and adjustment. Indication for treatment: Post obstructive pneumonia  Goal trough: [] 10-15 mcg/mL or [x] 15-20 mcg/ml  AUC/CASH: [] <500 or [x] 400-600    Pertinent Laboratory Values:   Temp Readings from Last 3 Encounters:   04/10/22 98.4 °F (36.9 °C) (Oral)   04/02/22 97.3 °F (36.3 °C) (Infrared)   04/01/22 95.1 °F (35.1 °C) (Infrared)     Recent Labs     04/08/22  0255 04/08/22  0601 04/09/22  0620 04/10/22  0345   WBC 3.1*  --  3.8* 4.7   LACTATE  --  0.6  --   --      Recent Labs     04/08/22  0255 04/09/22  0620 04/10/22  0345   BUN 6 7 9   CREATININE 0.7* 0.6* 0.6*     Estimated Creatinine Clearance: 161 mL/min (A) (based on SCr of 0.6 mg/dL (L)). No intake or output data in the 24 hours ending 04/10/22 1410    Pertinent Cultures:  Date    Source    Results  4/8   MRSA nasal   Ordered  4/8   Blood    Ordered    Vancomycin level:   TROUGH:  No results for input(s): VANCOTROUGH in the last 72 hours. RANDOM:    Recent Labs     04/10/22  0345   VANCORANDOM 11.4       Assessment:  · SCr, BUN, and urine output:  SCR remains at baseline, no UOP data  · Day(s) of therapy: 2  · Vancomycin concentration: to be collected:  · 4/10 - 11.4, 7 hour level on 1250mg q12h,       Plan:  · Renal trends at baseline  · Vanco level this am predicts an AUC below target on the current regimen. · Increase to vancomycin 1500mg ivpb q12h for a preicted AUC of 443 at steady state. · Recheck the vanco level in 2 days. · Pharmacy will continue to monitor patient and adjust therapy as indicated    Pedro 3 4/12 @ 06:00    Thank you for the consult,  Idania Malloy.  Chetna Nicole, [facility]  4/10/2022 2:10 PM

## 2022-04-10 NOTE — PROGRESS NOTES
ONCOLOGY HEMATOLOGY CARE ([facility])  PROGRESS NOTE      4/10/2022  12:16 PM    Patient:    Jaclyn Fischer  : 1970   47 y.o. MRN: [id number]  Admitted: 2022  2:44 AM ATT: Vero Shields MD   1101/1101-A  AdmitDx: Shortness of breath [R06.02]  Primary malignant neoplasm of lung metastatic to other site, unspecified laterality ([address] [C34.90]  Anemia, unspecified type [D64.9]  Pneumonia due to infectious organism, unspecified laterality, unspecified part of lung [J18.9]  PCP: Kashmir Blanton MD      Patient was seen and examined today. Breathing better  Cough with clear sputum  No fever  Pain in the right side of the chest    3/26/22: Ct abdomen and pelvis:  1. Interval development of a mild degree of left hydronephrosis and   hydroureter, to the level of the left UVJ.  An approximate 7.3 x 5 cm soft   tissue mass is present along the left posterior margin of the urinary   bladder, and contiguous with the prostate gland, resulting in obstructing of   the ureteral orifice.  Soft tissue mass is most consistent with primary   prostate carcinoma, in light of the diffuse blastic metastatic disease. 2. Interval increase in confluent adenopathy at the level of the aortic   bifurcation, consistent with progression of metastatic disease   3. Diffuse osseous blastic metastatic disease, with interval increase in size   of the lytic metastatic lesion involving the proximal left iliac bone     22: CTA chest:  1. Redemonstration of left hilar malignant pulmonary arterial encasement and   mild stenosis with no central malignant occlusion.  No acute pulmonary emboli. 2. Interval enlargement of the left hilar/upper lobe mass which now results   in central complete occlusion of the left upper lobe bronchus.  Progressive   multi segmental volume loss/consolidation.  Cannot exclude a post obstructive   pneumonia. 3. Acute left lower lobe consolidation concerning for pneumonia.    4. New mild left

## 2022-04-10 NOTE — PROGRESS NOTES
Hospitalist Progress Note      Name:  Kenny Oquendo /Age/Sex: 1970  (47 y.o. male)   MRN & CSN:  [id number] & [id number] Admission Date/Time: 2022  2:44 AM   Location:  [facility] PCP: Gregg Lombardi MD         Hospital Day: 3    Assessment and Plan:       1. 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 after  biopsy in 2021, patient was on chemotherapy, last chemotherapy 5 days ago currently held due to worsening anemia  Oncology consulted  MRI of the brain is ordered by oncologist to rule out metastatic brain disease the patient complain of headache    3-Acute on chronic anemia with thrombocytopenia: Most likely secondary to malignancy and chemotherapy  No evidence of bleeding  Hemoglobin was 6.3 on admission status post 1 unit of blood transfusion  Fecal occult blood test ordered still pending  Monitor CBC daily  Transfuse for hemoglobin less than 7  Today hemoglobin 8.7 patient received another unit of blood yesterday  Continue B12 supplement  IR consult for bone marrow biopsy for further evaluation of anemia as per oncologist  Started on Retacrit 10,000 units 3 times a week    Thrombocytopenia with a platelet count 72,293 secondary to malignancy and chemotherapy hematologist following  Today platelet count 97,728    Hypomagnesemia and hypokalemia : Replaced.   Serum K  4.1  Check serum Mg   B12 Deficiency: Continue B12 supplements  Likely Prostate Cancer: Per chart review non-compliant and has been scheduled for biopsy's but has never followed through; is scheduled for April 11; continue Casodex and Tamsulosin  HTN: Continue Lisinopril          Diet ADULT DIET; Regular  ADULT ORAL NUTRITION SUPPLEMENT; Breakfast, Lunch, Dinner; Standard High Calorie/High Protein Oral Supplement   DVT Prophylaxis [x] Lovenox, []  Heparin, [] SCDs, [] Ambulation   GI Prophylaxis [] PPI,  [] H2 Blocker,  [] Carafate,  [] Diet/Tube Feeds   Code Status Full Code   Disposition Patient requires continued admission due to    MDM [] Low, [] Moderate,[]  High  Patient's risk as above due to      History of Present Illness: The patient was seen and examined at the bedside  Resting comfortably in bed not in respiratory distress  Patient still has cough with green sputum  Afebrile   denies nausea or vomiting  Patient complain of headache MRI of the brain is ordered by oncologist to rule out metastatic disease      Objective:   No intake or output data in the 24 hours ending 04/10/22 0839   Vitals:   Vitals:    04/10/22 0806   BP: 125/78   Pulse:    Resp: 20   Temp: 98.4 °F (36.9 °C)   SpO2: 96%     Physical Exam:   GEN Awake.  Alert , not in respiratory distress, not in pain  HEENT: PEERLA, , supple neck,   Chest: air entry equal bilaterally, no wheezing or crepitation  Heart: S1 and S2 heard, no murmur, no gallop or rub, regular rate  Abdomen: soft, ND , Nt, +BS  Extremities: no cyanosis, tenderness or erythema, peripheral pulses audible  Neurology: alert, oriented x3, able to move 4 limbs    Medications:   Medications:    [START ON 4/11/2022] epoetin kelsi-epbx  10,000 Units SubCUTAneous Once per day on Mon Wed Fri    cefepime  2,000 mg IntraVENous Q8H    vancomycin  1,250 mg IntraVENous Q12H    sodium chloride flush  5-40 mL IntraVENous 2 times per day    enoxaparin  40 mg SubCUTAneous Daily    calcium-cholecalciferol  1 tablet Oral Daily    cyanocobalamin  1,000 mcg Oral Daily    lisinopril  10 mg Oral Daily    bicalutamide  50 mg Oral Daily    nicotine  1 patch TransDERmal Daily Infusions:    sodium chloride      sodium chloride      sodium chloride 100 mL/hr at 04/10/22 0341     PRN Meds: guaiFENesin-dextromethorphan, 10 mL, Q4H PRN  sodium chloride, , PRN  sodium chloride, , PRN  sodium chloride flush, 5-40 mL, PRN  sodium chloride, , PRN  ondansetron, 4 mg, Q8H PRN   Or  ondansetron, 4 mg, Q6H PRN  polyethylene glycol, 17 g, Daily PRN  acetaminophen, 650 mg, Q6H PRN   Or  acetaminophen, 650 mg, Q6H PRN  albuterol sulfate HFA, 2 puff, Q4H PRN  LORazepam, 1 mg, Nightly PRN  melatonin, 3 mg, Nightly PRN  oxyCODONE HCl, 10 mg, Q4H PRN  SUMAtriptan, 50 mg, BID PRN          Electronically signed by Leah Castro MD on 4/10/2022 at 8:39 AM

## 2022-04-11 ENCOUNTER — APPOINTMENT (OUTPATIENT)
Dept: INTERVENTIONAL RADIOLOGY/VASCULAR | Age: 52
DRG: 136 | End: 2022-04-11
Payer: COMMERCIAL

## 2022-04-11 ENCOUNTER — APPOINTMENT (OUTPATIENT)
Dept: MRI IMAGING | Age: 52
DRG: 136 | End: 2022-04-11
Payer: COMMERCIAL

## 2022-04-11 LAB
ALBUMIN SERPL-MCNC: 3.5 GM/DL (ref 3.4–5)
ALP BLD-CCNC: 228 IU/L (ref 40–129)
ALT SERPL-CCNC: 16 U/L (ref 10–40)
ANION GAP SERPL CALCULATED.3IONS-SCNC: 10 MMOL/L (ref 4–16)
APTT: 32.8 SECONDS (ref 25.1–37.1)
AST SERPL-CCNC: 17 IU/L (ref 15–37)
BILIRUB SERPL-MCNC: 0.1 MG/DL (ref 0–1)
BUN BLDV-MCNC: 12 MG/DL (ref 6–23)
CALCIUM SERPL-MCNC: 8.4 MG/DL (ref 8.3–10.6)
CHLORIDE BLD-SCNC: 100 MMOL/L (ref 99–110)
CO2: 25 MMOL/L (ref 21–32)
CREAT SERPL-MCNC: 0.6 MG/DL (ref 0.9–1.3)
GFR AFRICAN AMERICAN: >60 ML/MIN/1.73M2
GFR NON-AFRICAN AMERICAN: >60 ML/MIN/1.73M2
GLUCOSE BLD-MCNC: 108 MG/DL (ref 70–99)
HCT VFR BLD CALC: 24.5 % (ref 42–52)
HEMOGLOBIN: 8.1 GM/DL (ref 13.5–18)
INR BLD: 1.02 INDEX
MCH RBC QN AUTO: 27.8 PG (ref 27–31)
MCHC RBC AUTO-ENTMCNC: 33.1 % (ref 32–36)
MCV RBC AUTO: 84.2 FL (ref 78–100)
PDW BLD-RTO: 15.9 % (ref 11.7–14.9)
PLATELET # BLD: 70 K/CU MM (ref 140–440)
PMV BLD AUTO: 9.8 FL (ref 7.5–11.1)
POTASSIUM SERPL-SCNC: 4.2 MMOL/L (ref 3.5–5.1)
PROTHROMBIN TIME: 13.1 SECONDS (ref 11.7–14.5)
RBC # BLD: 2.91 M/CU MM (ref 4.6–6.2)
SODIUM BLD-SCNC: 135 MMOL/L (ref 135–145)
TOTAL PROTEIN: 6.2 GM/DL (ref 6.4–8.2)
WBC # BLD: 4.3 K/CU MM (ref 4–10.5)

## 2022-04-11 PROCEDURE — 36591 DRAW BLOOD OFF VENOUS DEVICE: CPT

## 2022-04-11 PROCEDURE — 6370000000 HC RX 637 (ALT 250 FOR IP): Performed by: HOSPITALIST

## 2022-04-11 PROCEDURE — 70553 MRI BRAIN STEM W/O & W/DYE: CPT

## 2022-04-11 PROCEDURE — 85027 COMPLETE CBC AUTOMATED: CPT

## 2022-04-11 PROCEDURE — 1200000000 HC SEMI PRIVATE

## 2022-04-11 PROCEDURE — 85610 PROTHROMBIN TIME: CPT

## 2022-04-11 PROCEDURE — 6360000004 HC RX CONTRAST MEDICATION: Performed by: INTERNAL MEDICINE

## 2022-04-11 PROCEDURE — G0328 FECAL BLOOD SCRN IMMUNOASSAY: HCPCS

## 2022-04-11 PROCEDURE — 2580000003 HC RX 258: Performed by: INTERNAL MEDICINE

## 2022-04-11 PROCEDURE — 6360000002 HC RX W HCPCS: Performed by: INTERNAL MEDICINE

## 2022-04-11 PROCEDURE — 6370000000 HC RX 637 (ALT 250 FOR IP): Performed by: INTERNAL MEDICINE

## 2022-04-11 PROCEDURE — 80053 COMPREHEN METABOLIC PANEL: CPT

## 2022-04-11 PROCEDURE — 6370000000 HC RX 637 (ALT 250 FOR IP): Performed by: NURSE PRACTITIONER

## 2022-04-11 PROCEDURE — 2580000003 HC RX 258: Performed by: HOSPITALIST

## 2022-04-11 PROCEDURE — 85730 THROMBOPLASTIN TIME PARTIAL: CPT

## 2022-04-11 PROCEDURE — 99222 1ST HOSP IP/OBS MODERATE 55: CPT | Performed by: PHYSICIAN ASSISTANT

## 2022-04-11 PROCEDURE — 6360000002 HC RX W HCPCS: Performed by: HOSPITALIST

## 2022-04-11 PROCEDURE — A9579 GAD-BASE MR CONTRAST NOS,1ML: HCPCS | Performed by: INTERNAL MEDICINE

## 2022-04-11 PROCEDURE — 94761 N-INVAS EAR/PLS OXIMETRY MLT: CPT

## 2022-04-11 PROCEDURE — 6360000002 HC RX W HCPCS: Performed by: NURSE PRACTITIONER

## 2022-04-11 RX ORDER — SENNA PLUS 8.6 MG/1
1 TABLET ORAL 2 TIMES DAILY
Status: DISCONTINUED | OUTPATIENT
Start: 2022-04-11 | End: 2022-04-12 | Stop reason: HOSPADM

## 2022-04-11 RX ORDER — OXYCODONE AND ACETAMINOPHEN 7.5; 325 MG/1; MG/1
1 TABLET ORAL EVERY 6 HOURS PRN
Status: DISCONTINUED | OUTPATIENT
Start: 2022-04-11 | End: 2022-04-12 | Stop reason: HOSPADM

## 2022-04-11 RX ADMIN — AZITHROMYCIN DIHYDRATE 500 MG: 500 INJECTION, POWDER, LYOPHILIZED, FOR SOLUTION INTRAVENOUS at 14:17

## 2022-04-11 RX ADMIN — CYANOCOBALAMIN TAB 1000 MCG 1000 MCG: 1000 TAB at 08:39

## 2022-04-11 RX ADMIN — GADOTERIDOL 20 ML: 279.3 INJECTION, SOLUTION INTRAVENOUS at 09:31

## 2022-04-11 RX ADMIN — SODIUM CHLORIDE, PRESERVATIVE FREE 10 ML: 5 INJECTION INTRAVENOUS at 10:51

## 2022-04-11 RX ADMIN — OXYCODONE HYDROCHLORIDE AND ACETAMINOPHEN 1 TABLET: 7.5; 325 TABLET ORAL at 21:53

## 2022-04-11 RX ADMIN — LORAZEPAM 1 MG: 1 TABLET ORAL at 21:53

## 2022-04-11 RX ADMIN — SUMATRIPTAN SUCCINATE 50 MG: 50 TABLET, FILM COATED ORAL at 18:40

## 2022-04-11 RX ADMIN — OXYCODONE HYDROCHLORIDE 10 MG: 10 TABLET ORAL at 12:39

## 2022-04-11 RX ADMIN — POLYETHYLENE GLYCOL (3350) 17 G: 17 POWDER, FOR SOLUTION ORAL at 08:38

## 2022-04-11 RX ADMIN — SENNOSIDES 8.6 MG: 8.6 TABLET, COATED ORAL at 10:47

## 2022-04-11 RX ADMIN — ACETAMINOPHEN 650 MG: 325 TABLET ORAL at 13:16

## 2022-04-11 RX ADMIN — GUAIFENESIN SYRUP AND DEXTROMETHORPHAN 10 ML: 100; 10 SYRUP ORAL at 08:38

## 2022-04-11 RX ADMIN — VANCOMYCIN HYDROCHLORIDE 1500 MG: 5 INJECTION, POWDER, LYOPHILIZED, FOR SOLUTION INTRAVENOUS at 10:49

## 2022-04-11 RX ADMIN — SENNOSIDES 8.6 MG: 8.6 TABLET, COATED ORAL at 21:48

## 2022-04-11 RX ADMIN — SODIUM CHLORIDE, PRESERVATIVE FREE 10 ML: 5 INJECTION INTRAVENOUS at 21:45

## 2022-04-11 RX ADMIN — SODIUM CHLORIDE: 9 INJECTION, SOLUTION INTRAVENOUS at 03:52

## 2022-04-11 RX ADMIN — SODIUM CHLORIDE: 9 INJECTION, SOLUTION INTRAVENOUS at 09:55

## 2022-04-11 RX ADMIN — CEFEPIME HYDROCHLORIDE 2000 MG: 2 INJECTION, POWDER, FOR SOLUTION INTRAVENOUS at 03:54

## 2022-04-11 RX ADMIN — EPOETIN ALFA-EPBX 10000 UNITS: 10000 INJECTION, SOLUTION INTRAVENOUS; SUBCUTANEOUS at 08:41

## 2022-04-11 RX ADMIN — CEFEPIME HYDROCHLORIDE 2000 MG: 2 INJECTION, POWDER, FOR SOLUTION INTRAVENOUS at 09:58

## 2022-04-11 RX ADMIN — CEFEPIME HYDROCHLORIDE 2000 MG: 2 INJECTION, POWDER, FOR SOLUTION INTRAVENOUS at 17:54

## 2022-04-11 RX ADMIN — VANCOMYCIN HYDROCHLORIDE 1500 MG: 5 INJECTION, POWDER, LYOPHILIZED, FOR SOLUTION INTRAVENOUS at 21:45

## 2022-04-11 RX ADMIN — Medication 1 TABLET: at 08:39

## 2022-04-11 ASSESSMENT — PAIN SCALES - GENERAL
PAINLEVEL_OUTOF10: 10
PAINLEVEL_OUTOF10: 10
PAINLEVEL_OUTOF10: 0
PAINLEVEL_OUTOF10: 10
PAINLEVEL_OUTOF10: 10

## 2022-04-11 ASSESSMENT — PAIN DESCRIPTION - DESCRIPTORS
DESCRIPTORS: ACHING
DESCRIPTORS: HEADACHE

## 2022-04-11 ASSESSMENT — PAIN DESCRIPTION - PAIN TYPE: TYPE: ACUTE PAIN

## 2022-04-11 ASSESSMENT — PAIN - FUNCTIONAL ASSESSMENT: PAIN_FUNCTIONAL_ASSESSMENT: PREVENTS OR INTERFERES SOME ACTIVE ACTIVITIES AND ADLS

## 2022-04-11 ASSESSMENT — PAIN DESCRIPTION - ORIENTATION: ORIENTATION: RIGHT

## 2022-04-11 ASSESSMENT — PAIN DESCRIPTION - PROGRESSION: CLINICAL_PROGRESSION: GRADUALLY IMPROVING

## 2022-04-11 ASSESSMENT — PAIN DESCRIPTION - LOCATION: LOCATION: ABDOMEN

## 2022-04-11 ASSESSMENT — PAIN DESCRIPTION - FREQUENCY: FREQUENCY: CONTINUOUS

## 2022-04-11 ASSESSMENT — PAIN DESCRIPTION - ONSET: ONSET: ON-GOING

## 2022-04-11 NOTE — PROGRESS NOTES
Discussed IR bone marrow biopsy procedure with Gaviota DEL REAL. DRGs will not match for a marrow biopsy. Procedure will need to be scheduled as an outpatient. Notified Debbie Cervantes RN on [facility]. Completing consult at this time.

## 2022-04-11 NOTE — PROGRESS NOTES
Hospitalist Progress Note      Name:  Kenny Bettencourt /Age/Sex: 1970  (47 y.o. male)   MRN & CSN:  [id number] & [id number] Admission Date/Time: 2022  2:44 AM   Location:  [facility] [place] PCP: Ana Araiza MD         Hospital Day: 4    Assessment and Plan:       1. Postobstructive pneumonia likely given the CT finding, CTA chest showed no evidence of PE, continue on broad-spectrum IV antibiotic  Serum procalcitonin 0.06 and  C-reactive protein 57  Patient saturating well on room air  Afebrile   CXr 4/10 show worsening lung opacities   Covid test negative  Add iv azithromycin 500 mg daily to cover possible atypical pneumonia   Cough is resolved after adding azithromycin  Plan to deescalate AB tomorrow   MRSA screen still pending     2-hilar mass: Patient with history of metastatic squamous cell carcinoma metastatic to the bone after  biopsy in 2021, patient was on chemotherapy, last chemotherapy 5 days ago currently held due to worsening anemia  Oncology consulted  MRI of the brain is ordered by oncologist to rule out metastatic brain disease the patient complain of headache    MRI of brain show   Interval development of diffuse osseous metastatic disease compared with   the previous MRI from 2021.   2. No intracranial metastatic disease identified. 3. Redemonstration of multiple remote microhemorrhages, unchanged and   possibly the sequelae of longstanding hypertension.      Consult neurosurgery   Patient does not want to take lisinopril because of the cough as side effect  Consider start the patient on Norvasc 5 mg daily if systolic blood pressure more than 135    3-Acute on chronic anemia with thrombocytopenia: Most likely secondary to malignancy and chemotherapy  No evidence of bleeding  Hemoglobin was 6.3 on admission status post 1 unit of blood transfusion  Fecal occult blood test ordered still pending  Monitor CBC daily  Transfuse for hemoglobin less than 7  Today hemoglobin 8.7 patient received another unit of blood yesterday  Continue B12 supplement  IR consult for bone marrow biopsy for further evaluation of anemia as per oncologist  Started on Retacrit 10,000 units 3 times a week  Hb stable at 8.1    Thrombocytopenia with a platelet count 88,310 secondary to malignancy and chemotherapy hematologist following  Today platelet count 48,748    Hypomagnesemia and hypokalemia : Replaced. Serum K  4.2  serum Mg  1.8  B12 Deficiency: Continue B12 supplements  Likely Prostate Cancer: Per chart review non-compliant and has been scheduled for biopsy's but has never followed through; is scheduled for April 11; continue Casodex and Tamsulosin  HTN: Patient stated that he does not take lisinopril at home    Constipation: No bowel movement since 5 days  Resume on home senna  Medication  Continue on MiraLAX          Diet ADULT DIET; Regular  ADULT ORAL NUTRITION SUPPLEMENT; Breakfast, Lunch, Dinner; Standard High Calorie/High Protein Oral Supplement   DVT Prophylaxis [x] Lovenox, []  Heparin, [] SCDs, [] Ambulation   GI Prophylaxis [] PPI,  [] H2 Blocker,  [] Carafate,  [] Diet/Tube Feeds   Code Status Full Code   Disposition Patient requires continued admission due to    MDM [] Low, [] Moderate,[]  High  Patient's risk as above due to      History of Present Illness: The patient was seen and examined at the bedside  Resting comfortably in bed not in respiratory distress  Patient states cough is better   No SOB   Afebrile   denies nausea or vomiting  Patient complain of headache MRI of the brain is ordered by oncologist to rule out metastatic disease      Objective:   No intake or output data in the 24 hours ending 04/11/22 0821   Vitals:   Vitals:    04/11/22 0339   BP: 112/74   Pulse:    Resp: 17   Temp: 98.7 °F (37.1 °C)   SpO2: 96%     Physical Exam:   GEN Awake.  Alert , not in respiratory distress, not in pain  HEENT: PEERLA, , supple neck,   Chest: air entry equal bilaterally, no wheezing or crepitation  Heart: S1 and S2 heard, no murmur, no gallop or rub, regular rate  Abdomen: soft, ND , Nt, +BS  Extremities: no cyanosis, tenderness or erythema, peripheral pulses audible  Neurology: alert, oriented x3, able to move 4 limbs    Medications:   Medications:    vancomycin  1,500 mg IntraVENous Q12H    azithromycin  500 mg IntraVENous Daily    epoetin kelsi-epbx  10,000 Units SubCUTAneous Once per day on Mon Wed Fri    cefepime  2,000 mg IntraVENous Q8H    sodium chloride flush  5-40 mL IntraVENous 2 times per day    enoxaparin  40 mg SubCUTAneous Daily    calcium-cholecalciferol  1 tablet Oral Daily    cyanocobalamin  1,000 mcg Oral Daily    lisinopril  10 mg Oral Daily    bicalutamide  50 mg Oral Daily    nicotine  1 patch TransDERmal Daily      Infusions:    sodium chloride      sodium chloride      sodium chloride 100 mL/hr at 04/11/22 0352     PRN Meds: guaiFENesin-dextromethorphan, 10 mL, Q4H PRN  sodium chloride, , PRN  sodium chloride, , PRN  sodium chloride flush, 5-40 mL, PRN  sodium chloride, , PRN  ondansetron, 4 mg, Q8H PRN   Or  ondansetron, 4 mg, Q6H PRN  polyethylene glycol, 17 g, Daily PRN  acetaminophen, 650 mg, Q6H PRN   Or  acetaminophen, 650 mg, Q6H PRN  albuterol sulfate HFA, 2 puff, Q4H PRN  LORazepam, 1 mg, Nightly PRN  melatonin, 3 mg, Nightly PRN  oxyCODONE HCl, 10 mg, Q4H PRN  SUMAtriptan, 50 mg, BID PRN          Electronically signed by Manjit Mayer MD on 4/11/2022 at 8:21 AM

## 2022-04-11 NOTE — CONSULTS
Neurosurgery   Consult Note      Reason for Consult: Abnormal brain MRI  Consulting Physician: Amanda Melendez MD  Attending Physician: Amanda Melendez MD  Date of Admission: 4/8/2022  Subjective:   CHIEF COMPLAINT: shortness of breath, headache    HPI:  47 y.o. 1970  Who presented to the ED 4/8/22 with complaints of shortness of breath. The patient has a known history of a left hilar mass and has been diagnosed with metastatic squamous cell carcinoma with mets to the bone. Patient had been on chemotherapy for this but chemo has been stopped due to worsening anemia. Patient CT of the chest did not show a PE but did show potential postobstructive pneumonia. He is currently being treated with IV antibiotics. Oncology has been seeing him here. An MRI of the brain with and without contrast was ordered due to patient complaining of worsening headaches. This showed interval development of osseous metastasis within the skull and clivus as well as remote microhemorrhages when compared with an MRI of the brain from August 2021. The patient is being seen on [address] his wife is bedside. He states that he has had migraines chronically, he followed with a neurologist many years ago and received injections for TMJ. His wife states that after he was treated for TMJ his headaches went away. He states he started to develop migraines again roughly 3 weeks ago. He denies any changes in his vision such as blurred or diplopia, denies any unsteadiness with gait or nausea/vomiting. He is alert and is sitting in his bedside chair. He currently does not feel short of breath but states that he does become very short of breath when he attempts to ambulate. Despite this he states he walked 3 laps around the unit earlier today. Patient has been restarted on chemotherapy. His wife states that he stopped taking it because he did not like the way that it made him feel.   He does not complain of any unilateral weakness or numbness/tingling in any extremity. He does have a chronic indwelling Doherty that was placed roughly 3 weeks ago. He does have an enlarged prostate and an elevated PSA and was scheduled for a prostate biopsy today. Patient is not on any antiplatelets or anticoagulants. PMHx positive for HTN, TMJ, Squamous cell carcinoma. Past Medical and Surgical History:       Diagnosis Date    CAD (coronary artery disease) 12/23/2013    cath negative per pt    Cancer ([address] 06/2021    pt reports he has lung cancer    History of TMJ disorder     Hypertension     Trigeminal neuralgia          Procedure Laterality Date    ABDOMEN SURGERY      CARDIAC CATHETERIZATION  08/03/2016    CT BIOPSY PERCUTANEOUS SUPERFICIAL BONE  12/17/2021    CT BIOPSY PERCUTANEOUS SUPERFICIAL BONE 12/17/2021 [facility] CT SCAN    CT NEEDLE BIOPSY LUNG PERCUTANEOUS  7/28/2021    CT NEEDLE BIOPSY LUNG PERCUTANEOUS 7/28/2021 [facility] CT SCAN    PORT SURGERY Right 8/13/2021    MEDIPORT INSERTION performed by Michelle Fulton MD at [address] [place] [address] History:    TOBACCO:   reports that he has been smoking cigarettes. He has a 78.00 pack-year smoking history. He has never used smokeless tobacco.  ETOH:   reports current alcohol use of about 6.0 standard drinks of alcohol per week.     Family History:       Problem Relation Age of Onset    High Blood Pressure Mother     High Blood Pressure Sister     Cancer Sister        Current Medications:    Current Facility-Administered Medications: senna (SENOKOT) tablet 8.6 mg, 1 tablet, Oral, BID  guaiFENesin-dextromethorphan (ROBITUSSIN DM) 100-10 MG/5ML syrup 10 mL, 10 mL, Oral, Q4H PRN  vancomycin (VANCOCIN) 1,500 mg in dextrose 5 % 500 mL IVPB, 1,500 mg, IntraVENous, Q12H  azithromycin (ZITHROMAX) 500 mg in dextrose 5 % 250 mL IVPB, 500 mg, IntraVENous, Daily  0.9 % sodium chloride infusion, , IntraVENous, PRN  epoetin kelsi-epbx (RETACRIT) injection 10,000 Units, 10,000 Units, SubCUTAneous, Once per day on Mon Wed Fri  0.9 % sodium chloride infusion, , IntraVENous, PRN  cefepime (MAXIPIME) 2000 mg IVPB minibag, 2,000 mg, IntraVENous, Q8H  sodium chloride flush 0.9 % injection 5-40 mL, 5-40 mL, IntraVENous, 2 times per day  sodium chloride flush 0.9 % injection 5-40 mL, 5-40 mL, IntraVENous, PRN  0.9 % sodium chloride infusion, , IntraVENous, PRN  enoxaparin (LOVENOX) injection 40 mg, 40 mg, SubCUTAneous, Daily  ondansetron (ZOFRAN-ODT) disintegrating tablet 4 mg, 4 mg, Oral, Q8H PRN **OR** ondansetron (ZOFRAN) injection 4 mg, 4 mg, IntraVENous, Q6H PRN  polyethylene glycol (GLYCOLAX) packet 17 g, 17 g, Oral, Daily PRN  acetaminophen (TYLENOL) tablet 650 mg, 650 mg, Oral, Q6H PRN **OR** acetaminophen (TYLENOL) suppository 650 mg, 650 mg, Rectal, Q6H PRN  albuterol sulfate  (90 Base) MCG/ACT inhaler 2 puff, 2 puff, Inhalation, Q4H PRN  calcium-cholecalciferol 500-200 MG-UNIT per tablet 1 tablet, 1 tablet, Oral, Daily  vitamin B-12 (CYANOCOBALAMIN) tablet 1,000 mcg, 1,000 mcg, Oral, Daily  LORazepam (ATIVAN) tablet 1 mg, 1 mg, Oral, Nightly PRN  melatonin tablet 3 mg, 3 mg, Oral, Nightly PRN  oxyCODONE HCl (OXY-IR) immediate release tablet 10 mg, 10 mg, Oral, Q4H PRN  bicalutamide (CASODEX) chemo tablet 50 mg, 50 mg, Oral, Daily  SUMAtriptan (IMITREX) tablet 50 mg, 50 mg, Oral, BID PRN  nicotine (NICODERM CQ) 21 MG/24HR 1 patch, 1 patch, TransDERmal, Daily    Allergies   Allergen Reactions    Tramadol Other (See Comments)     seizures    Vicodin [Hydrocodone-Acetaminophen] Hives        REVIEW OF SYSTEMS:    CONSTITUTIONAL:  negative for fevers, chills, diaphoresis, activity change, appetite change, fatigue  EYES:  negative for blurred vision, eye discharge, visual disturbance and icterus  HEENT:  negative for hearing loss, tinnitus, ear drainage, sinus pressure, nasal congestion  RESPIRATORY:  No cough, shortness of breath, hemoptysis  CARDIOVASCULAR:  negative for chest pain, palpitations, exertional chest pressure/discomfort, edema, syncope  GASTROINTESTINAL: negative for nausea, vomiting, diarrhea, constipation, blood in stool and abdominal pain  GENITOURINARY:  negative for frequency, dysuria, urinary incontinence, decreased urine volume, and hematuria  HEMATOLOGIC/LYMPHATIC:  negative for easy bruising, bleeding and lymphadenopathy  MUSCULOSKELETAL:  negative for pain, joint swelling, decreased range of motion and muscle weakness  NEUROLOGICAL:  positive for headaches, negative slurred speech, unilateral weakness  PSYCHIATRIC/BEHAVIORAL: negative for hallucinations, behavioral problems, confusion and agitation. Objective:   PHYSICAL EXAM:      VITALS:  /80   Pulse 96   Temp 99.1 °F (37.3 °C) (Oral)   Resp 18   Ht 6' 1\" (1.854 m)   Wt 194 lb 14.2 oz (88.4 kg)   SpO2 97%   BMI 25.71 kg/m²      24HR INTAKE/OUTPUT:  No intake or output data in the 24 hours ending 04/11/22 1438  CONSTITUTIONAL:  Awake, alert, cooperative, no apparent distress, and appears stated age  [de-identified]: NCAT, PERRL, EOMI. Sclera white, conjunctive full. OP with moist mucosal membranes, no thrush, tongue protrudes midline  PSYCHIATRIC: Oriented to person place and time. No obvious depression or anxiety. MUSCULOSKELETAL: No obvious misalignment or effusion of the joints. No clubbing, cyanosis of the digits. SKIN:  normal skin color, texture, turgor and no redness, warmth, or swelling. No palpable nodules or stigmata of embolic phenomenon  NEUROLOGIC: Alert and oriented x4, face symmetrical, no obvious droop, speech clear and coherent, no pronator drift, no significant finger to nose dysmetria, sensation intact to light touch and pinprick sensation.    Upper extremity strength: Bilateral upper extremities 5/5 throughout  Lower extremity strength: Bilateral lower extremities 5/5 throughout    DATA:    Old records have been reviewed    CBC:  Recent Labs     04/09/22  0620 04/09/22  0620 04/09/22  1550 04/10/22  5764 04/11/22  0530   WBC 3.8*  --   --  4.7 4.3   RBC 2.59*  --   --  3.07* 2.91*   HGB 7.2*   < > 8.4* 8.7* 8.1*   HCT 21.8*   < > 25.4* 25.7* 24.5*   PLT 65*  --   --  75* 70*   MCV 84.2  --   --  83.7 84.2   MCH 27.8  --   --  28.3 27.8   MCHC 33.0  --   --  33.9 33.1   RDW 16.5*  --   --  16.0* 15.9*   NRBC 1  --   --   --   --    SEGSPCT 58.0  --   --   --   --    BANDSPCT 12*  --   --   --   --     < > = values in this interval not displayed. BMP:  Recent Labs     04/09/22  0620 04/10/22  0345 04/11/22  0530    137 135   K 3.8 4.1 4.2    100 100   CO2 21 24 25   BUN 7 9 12   CREATININE 0.6* 0.6* 0.6*   CALCIUM 8.5 9.0 8.4   GLUCOSE 94 109* 108*        Radiology Review:  All pertinent images / reports were reviewed as a part of this visit. MRI brain w/wo contrast 4/11/22  Impression   1. Interval development of diffuse osseous metastatic disease compared with   the previous MRI from 08/20/2021.   2. No intracranial metastatic disease identified. 3. Redemonstration of multiple remote microhemorrhages, unchanged and   possibly the sequelae of longstanding hypertension. Assessment:   Osseous metastatic disease to skull, clivus, cervical spine  Metastatic squamous cell carcinoma  Left hilar mass  Enlarged prostate, elevated PSA  Thrombocytopenia  Plan:   Patient who presents to the ER 4/8/22 with shortness of breath. He has known squamous cell carcinoma with osseous mets, was diagnosed with this at the end of 2021. Patient had been on and off chemotherapy. Chemotherapy did have to be stopped due to patient's anemia. His hemoglobin this morning was 8.1, he also has chronic thrombocytopenia, platelets 70 today. Patient has a history of chronic migraines that have worsened over the past 3 weeks. MRI brain with and without contrast was obtained that shows double osseous mets within the skull, specifically the clivus and upper cervical spine. No intracranial lesions are noted.   Remote micro hemorrhages are noted. These are stable compared to an MRI obtained August 2021. Patient has no significant findings on their exam.  Is no lateralizing weakness or numbness/tingling. Discussed case with Dr. Trevor Clark, is recommended that patient be transferred to [place] [address] for skull base neurosurgery consultation. I did discuss with the hospitalist, he states that the patient is stable and plan to discharge tomorrow. After discussion with the patient and his wife, they decided to follow-up with [facility] outpatient. We will facilitate arranging a follow-up appointment. Prostate biopsy due to enlargement and elevated PSA today, however patient's wife states that he is unable to be performed due to patient's admittance diagnosis. Electronically signed by Nehemiah Adkins PA-C on 4/11/2022 at 2:38 PM    Supervising physician: Bhavik Allen. Trevor Clark MD.  Dr. Trevor Clark was readily and continuously available by phone for direct consultation regarding the care of this patient. Time spent with patient in consultation, education, and collaboration with medical time is >50% of total time spent on case, including time spent in chart review and dictation. Total time spent: 40 minutes    Thank you for the opportunity to participate in the care of your patient.

## 2022-04-11 NOTE — PROGRESS NOTES
Perfect served Dr. Chinmay Durham to let her know that pt refused Casodex yesterday and today. States it gives him a headache.

## 2022-04-11 NOTE — PROGRESS NOTES
[address] [facility]  consulted by Dr. Shahrzad Chávez for monitoring and adjustment. Indication for treatment: Post obstructive pneumonia  Goal trough: [] 10-15 mcg/mL or [x] 15-20 mcg/ml  AUC/CASH: [] <500 or [x] 400-600    Pertinent Laboratory Values:   Temp Readings from Last 3 Encounters:   04/11/22 99.1 °F (37.3 °C) (Oral)   04/02/22 97.3 °F (36.3 °C) (Infrared)   04/01/22 95.1 °F (35.1 °C) (Infrared)     Recent Labs     04/09/22  0620 04/10/22  0345 04/11/22  0530   WBC 3.8* 4.7 4.3     Recent Labs     04/09/22  0620 04/10/22  0345 04/11/22  0530   BUN 7 9 12   CREATININE 0.6* 0.6* 0.6*     Estimated Creatinine Clearance: 161 mL/min (A) (based on SCr of 0.6 mg/dL (L)). No intake or output data in the 24 hours ending 04/11/22 1406    Pertinent Cultures:  Date    Source    Results  4/8   MRSA nasal   Ordered  4/8   Blood    NGTD  4/8   Strep pneumo   Negative    Vancomycin level:   TROUGH:  No results for input(s): VANCOTROUGH in the last 72 hours.   RANDOM:    Recent Labs     04/10/22  0345   VANCORANDOM 11.4       Assessment:  · SCr, BUN, and urine output:  SCR remains at baseline, no UOP data  · Day(s) of therapy: 3  · Vancomycin concentration: to be collected:  · 4/10 - 11.4, 7 hour level on 1250mg q12h,   · 4/12 - to be collected      Plan:  · Continue vancomycin 1500mg IVPB q12h  · Predicted trough: 11.4,   · Repeat next level in 24h  · Pharmacy will continue to monitor patient and adjust therapy as indicated    [address] 4/12 @ 06:00    Thank you for the consult,  BOB [facility]  4/11/2022 2:06 PM

## 2022-04-11 NOTE — PROGRESS NOTES
Physician Progress Note      PATIENT:               Saud Seen  CSN #:                  [id number]  :                       1970  ADMIT DATE:       2022 2:44 AM  DISCH DATE:  RESPONDING  PROVIDER #:        Lauri Rothman MD          QUERY TEXT:    Hospitalists,    Patient admitted with post obstructive PNA and noted to have low RBCs, WBC and   low platelet count. If possible, please document in progress notes and   discharge summary if you are evaluating and/or treating any of the following: The medical record reflects the following:  Risk Factors: CA, chemotherapy  Clinical Indicators: CA mets to bone, WBC 3.1, RBC 2.3, platelets 60, acute on   chronic anemia and chemo held due to anemia documented  Treatment: labs, imaging    Thank you,  Tomeka Moya RN CDS  [phone]  Options provided:  -- Antineoplastic (chemotherapy) induced pancytopenia  -- Drug induced pancytopenia due to ##please specify, please specify.   -- Pancytopenia due to aplastic anemia  -- Pancytopenia with aplastic anemia  -- Pancytopenia due to ##please specify, please specify  -- Pancytopenia due to bone marrow infiltration  -- Other - I will add my own diagnosis  -- Disagree - Not applicable / Not valid  -- Disagree - Clinically unable to determine / Unknown  -- Refer to Clinical Documentation Reviewer    PROVIDER RESPONSE TEXT:    Anemia and thrombocytopenia 2/2 to malignancy and chemotherapy    Query created by: Kevin Tran on 2022 11:29 AM      Electronically signed by:  Lauri Rothman MD 2022 11:19 AM

## 2022-04-12 ENCOUNTER — APPOINTMENT (OUTPATIENT)
Dept: MRI IMAGING | Age: 52
DRG: 136 | End: 2022-04-12
Payer: COMMERCIAL

## 2022-04-12 VITALS
HEART RATE: 99 BPM | OXYGEN SATURATION: 95 % | DIASTOLIC BLOOD PRESSURE: 80 MMHG | WEIGHT: 198.41 LBS | HEIGHT: 73 IN | BODY MASS INDEX: 26.3 KG/M2 | TEMPERATURE: 98.9 F | RESPIRATION RATE: 24 BRPM | SYSTOLIC BLOOD PRESSURE: 127 MMHG

## 2022-04-12 DIAGNOSIS — D64.9 ANEMIA, UNSPECIFIED TYPE: Primary | ICD-10-CM

## 2022-04-12 DIAGNOSIS — R53.83 OTHER FATIGUE: ICD-10-CM

## 2022-04-12 LAB
ALBUMIN SERPL-MCNC: 3.5 GM/DL (ref 3.4–5)
ALP BLD-CCNC: 245 IU/L (ref 40–128)
ALT SERPL-CCNC: 16 U/L (ref 10–40)
ANION GAP SERPL CALCULATED.3IONS-SCNC: 13 MMOL/L (ref 4–16)
AST SERPL-CCNC: 15 IU/L (ref 15–37)
BILIRUB SERPL-MCNC: 0.2 MG/DL (ref 0–1)
BUN BLDV-MCNC: 13 MG/DL (ref 6–23)
CALCIUM SERPL-MCNC: 8.6 MG/DL (ref 8.3–10.6)
CHLORIDE BLD-SCNC: 101 MMOL/L (ref 99–110)
CO2: 24 MMOL/L (ref 21–32)
CREAT SERPL-MCNC: 0.6 MG/DL (ref 0.9–1.3)
DOSE AMOUNT: NORMAL
DOSE TIME: NORMAL
GFR AFRICAN AMERICAN: >60 ML/MIN/1.73M2
GFR NON-AFRICAN AMERICAN: >60 ML/MIN/1.73M2
GLUCOSE BLD-MCNC: 127 MG/DL (ref 70–99)
HCT VFR BLD CALC: 24.9 % (ref 42–52)
HEMOCCULT SP1 STL QL: NEGATIVE
HEMOGLOBIN: 8.2 GM/DL (ref 13.5–18)
MCH RBC QN AUTO: 27.7 PG (ref 27–31)
MCHC RBC AUTO-ENTMCNC: 32.9 % (ref 32–36)
MCV RBC AUTO: 84.1 FL (ref 78–100)
PDW BLD-RTO: 15.8 % (ref 11.7–14.9)
PLATELET # BLD: 68 K/CU MM (ref 140–440)
PMV BLD AUTO: 9.3 FL (ref 7.5–11.1)
POTASSIUM SERPL-SCNC: 3.9 MMOL/L (ref 3.5–5.1)
RBC # BLD: 2.96 M/CU MM (ref 4.6–6.2)
SODIUM BLD-SCNC: 138 MMOL/L (ref 135–145)
TOTAL PROTEIN: 6 GM/DL (ref 6.4–8.2)
VANCOMYCIN RANDOM: 19.2 UG/ML
WBC # BLD: 4.4 K/CU MM (ref 4–10.5)

## 2022-04-12 PROCEDURE — 6370000000 HC RX 637 (ALT 250 FOR IP): Performed by: HOSPITALIST

## 2022-04-12 PROCEDURE — 94761 N-INVAS EAR/PLS OXIMETRY MLT: CPT

## 2022-04-12 PROCEDURE — 85027 COMPLETE CBC AUTOMATED: CPT

## 2022-04-12 PROCEDURE — 36591 DRAW BLOOD OFF VENOUS DEVICE: CPT

## 2022-04-12 PROCEDURE — 6360000004 HC RX CONTRAST MEDICATION: Performed by: PHYSICIAN ASSISTANT

## 2022-04-12 PROCEDURE — 2580000003 HC RX 258: Performed by: INTERNAL MEDICINE

## 2022-04-12 PROCEDURE — 6370000000 HC RX 637 (ALT 250 FOR IP): Performed by: INTERNAL MEDICINE

## 2022-04-12 PROCEDURE — A9579 GAD-BASE MR CONTRAST NOS,1ML: HCPCS | Performed by: PHYSICIAN ASSISTANT

## 2022-04-12 PROCEDURE — 6360000002 HC RX W HCPCS: Performed by: INTERNAL MEDICINE

## 2022-04-12 PROCEDURE — 6360000002 HC RX W HCPCS: Performed by: HOSPITALIST

## 2022-04-12 PROCEDURE — 6370000000 HC RX 637 (ALT 250 FOR IP): Performed by: NURSE PRACTITIONER

## 2022-04-12 PROCEDURE — 2580000003 HC RX 258: Performed by: NURSE PRACTITIONER

## 2022-04-12 PROCEDURE — 2580000003 HC RX 258: Performed by: HOSPITALIST

## 2022-04-12 PROCEDURE — 80053 COMPREHEN METABOLIC PANEL: CPT

## 2022-04-12 PROCEDURE — 99232 SBSQ HOSP IP/OBS MODERATE 35: CPT | Performed by: PHYSICIAN ASSISTANT

## 2022-04-12 PROCEDURE — 80202 ASSAY OF VANCOMYCIN: CPT

## 2022-04-12 PROCEDURE — 72156 MRI NECK SPINE W/O & W/DYE: CPT

## 2022-04-12 RX ORDER — LORAZEPAM 1 MG/1
1 TABLET ORAL ONCE
Status: COMPLETED | OUTPATIENT
Start: 2022-04-12 | End: 2022-04-12

## 2022-04-12 RX ORDER — OXYCODONE HYDROCHLORIDE 10 MG/1
10 TABLET ORAL EVERY 4 HOURS PRN
Qty: 50 TABLET | Refills: 0 | Status: SHIPPED | OUTPATIENT
Start: 2022-04-12 | End: 2022-04-14 | Stop reason: SDUPTHER

## 2022-04-12 RX ORDER — AMOXICILLIN AND CLAVULANATE POTASSIUM 875; 125 MG/1; MG/1
1 TABLET, FILM COATED ORAL 2 TIMES DAILY
Qty: 14 TABLET | Refills: 0 | Status: SHIPPED | OUTPATIENT
Start: 2022-04-12 | End: 2022-04-19

## 2022-04-12 RX ORDER — OXYCODONE HYDROCHLORIDE AND ACETAMINOPHEN 5; 325 MG/1; MG/1
1 TABLET ORAL ONCE
Status: DISCONTINUED | OUTPATIENT
Start: 2022-04-12 | End: 2022-04-12 | Stop reason: HOSPADM

## 2022-04-12 RX ORDER — HEPARIN SODIUM (PORCINE) LOCK FLUSH IV SOLN 100 UNIT/ML 100 UNIT/ML
500 SOLUTION INTRAVENOUS PRN
Status: DISCONTINUED | OUTPATIENT
Start: 2022-04-12 | End: 2022-04-12 | Stop reason: HOSPADM

## 2022-04-12 RX ORDER — OXYCODONE AND ACETAMINOPHEN 7.5; 325 MG/1; MG/1
1 TABLET ORAL EVERY 8 HOURS PRN
Qty: 9 TABLET | Refills: 0 | Status: SHIPPED | OUTPATIENT
Start: 2022-04-12 | End: 2022-04-14

## 2022-04-12 RX ORDER — NALOXONE HYDROCHLORIDE 4 MG/.1ML
1 SPRAY NASAL PRN
Qty: 1 EACH | Refills: 5 | Status: SHIPPED | OUTPATIENT
Start: 2022-04-12

## 2022-04-12 RX ADMIN — LORAZEPAM 1 MG: 1 TABLET ORAL at 13:40

## 2022-04-12 RX ADMIN — SENNOSIDES 8.6 MG: 8.6 TABLET, COATED ORAL at 10:17

## 2022-04-12 RX ADMIN — Medication 500 UNITS: at 14:21

## 2022-04-12 RX ADMIN — SODIUM CHLORIDE: 9 INJECTION, SOLUTION INTRAVENOUS at 10:33

## 2022-04-12 RX ADMIN — OXYCODONE HYDROCHLORIDE AND ACETAMINOPHEN 1 TABLET: 7.5; 325 TABLET ORAL at 10:50

## 2022-04-12 RX ADMIN — VANCOMYCIN HYDROCHLORIDE 1500 MG: 5 INJECTION, POWDER, LYOPHILIZED, FOR SOLUTION INTRAVENOUS at 08:06

## 2022-04-12 RX ADMIN — GUAIFENESIN SYRUP AND DEXTROMETHORPHAN 10 ML: 100; 10 SYRUP ORAL at 10:23

## 2022-04-12 RX ADMIN — OXYCODONE HYDROCHLORIDE AND ACETAMINOPHEN 1 TABLET: 7.5; 325 TABLET ORAL at 05:27

## 2022-04-12 RX ADMIN — Medication 1 TABLET: at 10:17

## 2022-04-12 RX ADMIN — AZITHROMYCIN DIHYDRATE 500 MG: 500 INJECTION, POWDER, LYOPHILIZED, FOR SOLUTION INTRAVENOUS at 13:14

## 2022-04-12 RX ADMIN — CYANOCOBALAMIN TAB 1000 MCG 1000 MCG: 1000 TAB at 10:17

## 2022-04-12 RX ADMIN — SODIUM CHLORIDE, PRESERVATIVE FREE 10 ML: 5 INJECTION INTRAVENOUS at 10:36

## 2022-04-12 RX ADMIN — GADOTERIDOL 18 ML: 279.3 INJECTION, SOLUTION INTRAVENOUS at 12:25

## 2022-04-12 RX ADMIN — CEFEPIME HYDROCHLORIDE 2000 MG: 2 INJECTION, POWDER, FOR SOLUTION INTRAVENOUS at 03:47

## 2022-04-12 RX ADMIN — CEFEPIME HYDROCHLORIDE 2000 MG: 2 INJECTION, POWDER, FOR SOLUTION INTRAVENOUS at 10:35

## 2022-04-12 ASSESSMENT — PAIN DESCRIPTION - ONSET: ONSET: ON-GOING

## 2022-04-12 ASSESSMENT — PAIN DESCRIPTION - FREQUENCY: FREQUENCY: CONTINUOUS

## 2022-04-12 ASSESSMENT — PAIN DESCRIPTION - PROGRESSION: CLINICAL_PROGRESSION: GRADUALLY WORSENING

## 2022-04-12 ASSESSMENT — PAIN SCALES - GENERAL
PAINLEVEL_OUTOF10: 10
PAINLEVEL_OUTOF10: 0
PAINLEVEL_OUTOF10: 10

## 2022-04-12 ASSESSMENT — PAIN DESCRIPTION - PAIN TYPE
TYPE: CHRONIC PAIN
TYPE: ACUTE PAIN
TYPE: CHRONIC PAIN

## 2022-04-12 ASSESSMENT — PAIN DESCRIPTION - ORIENTATION: ORIENTATION: OTHER (COMMENT)

## 2022-04-12 ASSESSMENT — PAIN DESCRIPTION - DESCRIPTORS: DESCRIPTORS: ACHING

## 2022-04-12 ASSESSMENT — PAIN - FUNCTIONAL ASSESSMENT: PAIN_FUNCTIONAL_ASSESSMENT: PREVENTS OR INTERFERES SOME ACTIVE ACTIVITIES AND ADLS

## 2022-04-12 NOTE — PROGRESS NOTES
extremity. Does have a headache at this time, no vision changes or nausea/vomiting. Data:   Scheduled Meds:   senna  1 tablet Oral BID    vancomycin  1,500 mg IntraVENous Q12H    azithromycin  500 mg IntraVENous Daily    epoetin kelsi-epbx  10,000 Units SubCUTAneous Once per day on Mon Wed Fri    cefepime  2,000 mg IntraVENous Q8H    sodium chloride flush  5-40 mL IntraVENous 2 times per day    enoxaparin  40 mg SubCUTAneous Daily    calcium-cholecalciferol  1 tablet Oral Daily    cyanocobalamin  1,000 mcg Oral Daily    bicalutamide  50 mg Oral Daily    nicotine  1 patch TransDERmal Daily         I/O last 3 completed shifts: In: 840 [P.O.:240; IV Piggyback:600]  Out: -   No intake/output data recorded.     Intake/Output Summary (Last 24 hours) at 4/12/2022 0739  Last data filed at 4/12/2022 0420  Gross per 24 hour   Intake 840 ml   Output --   Net 840 ml     CULTURE results: Invalid input(s): BLOOD CULTURE,  URINE CULTURE, SURGICAL CULTURE  CBC:   Recent Labs     04/10/22  0345 04/11/22  0530 04/12/22  0500   WBC 4.7 4.3 4.4   HGB 8.7* 8.1* 8.2*   PLT 75* 70* 68*     BMP:    Recent Labs     04/10/22  0345 04/11/22  0530 04/12/22  0500    135 138   K 4.1 4.2 3.9    100 101   CO2 24 25 24   BUN 9 12 13   CREATININE 0.6* 0.6* 0.6*   GLUCOSE 109* 108* 127*     Hepatic:   Recent Labs     04/10/22  0345 04/11/22  0530 04/12/22  0500   AST 14* 17 15   ALT 13 16 16   BILITOT 0.2 0.1 0.2   ALKPHOS 221* 228* 245*         IMAGING: available xray, CT, and MRI results reviewed    Objective:   Vitals: /84   Pulse 94   Temp 98.3 °F (36.8 °C) (Oral)   Resp 18   Ht 6' 1\" (1.854 m)   Wt 198 lb 6.6 oz (90 kg)   SpO2 96%   BMI 26.18 kg/m²   General appearance: Alert, laying in bed, no distress  HEENT: Normocephalic, atraumatic, EOM intact, anisocoria present with right pupil 3 mm and left pupil 4 mm, this was present yesterday as well, both pupils are briskly reactive  Neurologic: Mental status: Alert and oriented x4, speech clear and coherent, no facial droop, follows commands briskly, sensation intact in lower extremity  Extremities: Bilateral upper and lower extremities 5/5 throughout, negative Clement's bilaterally  Incision: none  Drains: none    Electronically signed by Kipp Nissen, PA-C on 4/12/2022 at 7:39 AM

## 2022-04-12 NOTE — DISCHARGE SUMMARY
Discharge Summary    Name:  Libertad Rodas /Age/Sex: 1970  (47 y.o. male)   MRN & CSN:  [id number] & [id number] Admission Date/Time: 2022  2:44 AM   Attending:  Sanjuana Barrientos MD Discharging Physician: Sanjuana Barrientos MD     Hospital Course:   Libertad Rodas is a 47 y.o.  male  who presents with Shortness of breath    Hospital Course. Patient is a 74-year-old male past medical history of lung malignancy/left hilar mass with previous biopsy consistent with metastatic squamous cell carcinoma, on chemotherapy which was recently stopped due to persistent anemia, B12 deficiency, likely prostate cancer on Casodex, ?hypertension who was admitted for shortness of breath. Patient was started on broad-spectrum antibiotics for suspected obstructive pneumonia. Patient was found to have hemoglobin of 6.3 on admission therefore he was transfused packed red RBCs. Patient was seen by oncology and neurosurgery as well. Subsequent MRI brain showed interval development of diffuse osseous metastases compared with prior MRI on 2021 but no intracranial metastatic disease were defined. MRI also showed redemonstration of multiple remote microhemorrhages, which were unchanged and possibly sequelae of longstanding hypertension. Neurosurgery recommended outpatient follow-up at [facility]. Patient shortness of breath has improved with antibiotics. He was taken off of lisinopril due to cough. Patient blood pressure has remained in the 120s. I had the discussion with him and his wife at bedside that we will go ahead and take him off lisinopril and watch his blood pressure closely outpatient. If his blood pressure remains high with PCP, they should consider starting him on amlodipine. Since patient has been for the most of hospitalization, he will be discharged back home in stable condition. He was advised to follow-up with neurosurgery at [facility], his oncologist and PCP for transition of care.     The patient expressed appropriate understanding of and agreement with the discharge recommendations, medications, and plan. Consults this admission:  PHARMACY TO DOSE VANCOMYCIN  IP CONSULT TO HOSPITALIST  IP CONSULT TO ONCOLOGY  IP CONSULT TO PHARMACY  IP CONSULT TO INTERVENTIONAL RADIOLOGY  IP CONSULT TO NEUROSURGERY    Discharge Instruction:   Follow up appointments:   Primary care physician:  within 2 weeks    Diet:  regular diet   Activity: activity as tolerated  Disposition: Discharged to:   [x]Home, [][facility], []SNF, []Acute Rehab, []Hospice   Condition on discharge: Stable    Discharge Medications:        Medication List      START taking these medications    amoxicillin-clavulanate 875-125 MG per tablet  Commonly known as: AUGMENTIN  Take 1 tablet by mouth 2 times daily for 7 days        CHANGE how you take these medications    * Narcan 4 MG/0.1ML Liqd nasal spray  Generic drug: naloxone  What changed: Another medication with the same name was added. Make sure you understand how and when to take each. * naloxone 4 MG/0.1ML Liqd nasal spray  1 spray by Nasal route as needed for Opioid Reversal  What changed: You were already taking a medication with the same name, and this prescription was added. Make sure you understand how and when to take each. oxyCODONE-acetaminophen 7.5-325 MG per tablet  Commonly known as: Percocet  Take 1 tablet by mouth every 8 hours as needed for Pain for up to 3 days. What changed: when to take this         * This list has 2 medication(s) that are the same as other medications prescribed for you. Read the directions carefully, and ask your doctor or other care provider to review them with you. CONTINUE taking these medications    albuterol sulfate  (90 Base) MCG/ACT inhaler  Commonly known as: Proventil HFA  Inhale 2 puffs into the lungs every 4 hours as needed for Wheezing or Shortness of Breath With spacer (and mask if indicated). Thanks.      ammonium lactate 12 % lotion  Commonly known as: LAC-HYDRIN  Apply topically as needed. bicalutamide 50 MG chemo tablet  Commonly known as: Casodex  Take 1 tablet by mouth daily     cyanocobalamin 1000 MCG tablet  Commonly known as: [employer] VITAMIN B12  Take 1 tablet by mouth daily     dexamethasone 4 MG tablet  Commonly known as: DECADRON  Two tablets the day before treatment, one table daily for four days starting the day after chemotherapy     LORazepam 1 MG tablet  Commonly known as: ATIVAN  Take 1 tablet by mouth nightly as needed for Anxiety for up to 30 days. melatonin 3 MG Tabs tablet  Commonly known as: RA Melatonin  Take 1 tablet by mouth nightly as needed (incsomnia)     nicotine 21 MG/24HR  Commonly known as: NICODERM CQ  Place 1 patch onto the skin daily     ondansetron 8 MG tablet  Commonly known as: ZOFRAN     * oxyCODONE 20 MG extended release tablet  Commonly known as: OxyCONTIN  Take 1 tablet by mouth 2 times daily for 30 days. Intended supply: 30 days     * oxyCODONE HCl 10 MG immediate release tablet  Commonly known as: OXY-IR  Take 1 tablet by mouth every 4 hours as needed for Pain for up to 10 days. Oyster Shell Calcium/D 500-200 MG-UNIT Tabs  Take 1 tablet by mouth daily     Senna 8.6 MG Caps  Take 1 capsule by mouth 2 times daily as needed (constipation)         * This list has 2 medication(s) that are the same as other medications prescribed for you. Read the directions carefully, and ask your doctor or other care provider to review them with you.             STOP taking these medications    polyethylene glycol 17 GM/SCOOP powder  Commonly known as: GLYCOLAX           Where to Get Your Medications      These medications were sent to [facility] [address] [place] [id number]    Phone: [phone]   amoxicillin-clavulanate 875-125 MG per tablet     You can get these medications from any pharmacy    Bring a paper prescription for each of these medications  naloxone 4 MG/0.1ML Liqd nasal spray  oxyCODONE HCl 10 MG immediate release tablet  oxyCODONE-acetaminophen 7.5-325 MG per tablet         Objective Findings at Discharge:   /80   Pulse 99   Temp 98.9 °F (37.2 °C) (Oral)   Resp 24   Ht 6' 1\" (1.854 m)   Wt 198 lb 6.6 oz (90 kg)   SpO2 95%   BMI 26.18 kg/m²            PHYSICAL EXAM   GEN Awake, Alert, in no apparent distress  HEENT Atraumatic, nomocephalic, PERRLA, EOMI  NECK Supple, no lymphadenopaty  RESP Clear lungs to auscultation bilaterally  CARDIO/VASC Normal S1/S2. RRRR. No mumurs. GI Abdomen is soft without significant tenderness, masses, or guarding. Bowel sounds +  MSK No gross joint deformities. SKIN Normal coloration, warm, dry. NEURO Cranial nerves appear grossly intact, normal speech, no lateralizing weakness. PSYCH Awake, alert, oriented x 3. Affect appropriate.     BMP/CBC  Recent Labs     04/10/22  0345 04/11/22  0530 04/12/22  0500    135 138   K 4.1 4.2 3.9    100 101   CO2 24 25 24   BUN 9 12 13   CREATININE 0.6* 0.6* 0.6*   WBC 4.7 4.3 4.4   HCT 25.7* 24.5* 24.9*   PLT 75* 70* 68*       IMAGING:      Discharge Time of 35minutes    Electronically signed by Indy Arita MD on 4/12/2022 at 9:32 AM

## 2022-04-12 NOTE — PROGRESS NOTES
[id number] [address] instructor for RN students for [employer] for [employer] [facility] for [id number]

## 2022-04-12 NOTE — PROGRESS NOTES
Physician Progress Note      PATIENT:               Abhi Garcia  CSN #:                  [id number]  :                       1970  ADMIT DATE:       2022 2:44 AM  DISCH DATE:  RESPONDING  PROVIDER #:        Christine Rutledge          QUERY TEXT:    Hospitalists,    Pt admitted with post obstructive PNA and has lung CA. If possible, please   document in progress notes and discharge summary the relationship, if any,   between the PNA  and the lung CA. The medical record reflects the following:  Risk Factors: lung CA  Clinical Indicators: post obstructive PNA documented  Treatment: labs, imaging, IV ATB    Thank you,  Cristobal Rodriguez RN [facility]  [phone]  Options provided:  -- Post obstructive PNA related to lung CA  -- Post obstructive PNA unrelated to lung CA  -- Other - I will add my own diagnosis  -- Disagree - Not applicable / Not valid  -- Disagree - Clinically unable to determine / Unknown  -- Refer to Clinical Documentation Reviewer    PROVIDER RESPONSE TEXT:    This patient has post obstructive PNA related to lung CA.     Query created by: Meera Rodriguez on 2022 11:36 AM      Electronically signed by:  Christine Rutledge 2022 11:18 AM

## 2022-04-13 ENCOUNTER — HOSPITAL ENCOUNTER (OUTPATIENT)
Dept: INFUSION THERAPY | Age: 52
Discharge: HOME | End: 2022-04-13
Payer: COMMERCIAL

## 2022-04-13 ENCOUNTER — TELEPHONE (OUTPATIENT)
Dept: CASE MANAGEMENT | Age: 52
End: 2022-04-13

## 2022-04-13 VITALS
SYSTOLIC BLOOD PRESSURE: 128 MMHG | RESPIRATION RATE: 16 BRPM | TEMPERATURE: 96.2 F | HEIGHT: 73 IN | WEIGHT: 190 LBS | DIASTOLIC BLOOD PRESSURE: 77 MMHG | OXYGEN SATURATION: 96 % | BODY MASS INDEX: 25.18 KG/M2 | HEART RATE: 105 BPM

## 2022-04-13 DIAGNOSIS — D69.6 THROMBOCYTOPENIA (HCC): ICD-10-CM

## 2022-04-13 DIAGNOSIS — D64.9 ANEMIA, UNSPECIFIED TYPE: Primary | ICD-10-CM

## 2022-04-13 DIAGNOSIS — C79.51 SECONDARY MALIGNANT NEOPLASM OF BONE (HCC): ICD-10-CM

## 2022-04-13 DIAGNOSIS — C79.51 SECONDARY MALIGNANT NEOPLASM OF BONE (HCC): Primary | ICD-10-CM

## 2022-04-13 DIAGNOSIS — C34.82 MALIGNANT NEOPLASM OF OVERLAPPING SITES OF LEFT LUNG (HCC): ICD-10-CM

## 2022-04-13 DIAGNOSIS — C34.82 MALIGNANT NEOPLASM OF OVERLAPPING SITES OF LEFT LUNG (HCC): Primary | ICD-10-CM

## 2022-04-13 LAB
CULTURE: NORMAL
CULTURE: NORMAL
Lab: NORMAL
Lab: NORMAL
SPECIMEN: NORMAL
SPECIMEN: NORMAL

## 2022-04-13 PROCEDURE — 96372 THER/PROPH/DIAG INJ SC/IM: CPT

## 2022-04-13 PROCEDURE — 6360000002 HC RX W HCPCS: Performed by: INTERNAL MEDICINE

## 2022-04-13 RX ORDER — OXYCODONE AND ACETAMINOPHEN 7.5; 325 MG/1; MG/1
1 TABLET ORAL EVERY 6 HOURS PRN
Qty: 60 TABLET | Refills: 0 | Status: SHIPPED | OUTPATIENT
Start: 2022-04-13 | End: 2022-04-14

## 2022-04-13 RX ADMIN — ROMIPLOSTIM 85 MCG: 250 INJECTION, POWDER, LYOPHILIZED, FOR SOLUTION SUBCUTANEOUS at 15:21

## 2022-04-13 RX ADMIN — EPOETIN ALFA-EPBX 20000 UNITS: 20000 INJECTION, SOLUTION INTRAVENOUS; SUBCUTANEOUS at 15:15

## 2022-04-13 NOTE — PROGRESS NOTES
Patient brought to treatment room for injections. Gait steady. C/O fatigue. Printed info for Retacrit and NPlate given to patient to share with his wife. Injections given as ordered. RTC 4/22 for chemo.

## 2022-04-14 ENCOUNTER — TELEPHONE (OUTPATIENT)
Dept: CASE MANAGEMENT | Age: 52
End: 2022-04-14

## 2022-04-14 DIAGNOSIS — C79.51 SECONDARY MALIGNANT NEOPLASM OF BONE (HCC): ICD-10-CM

## 2022-04-14 DIAGNOSIS — C34.82 MALIGNANT NEOPLASM OF OVERLAPPING SITES OF LEFT LUNG (HCC): ICD-10-CM

## 2022-04-14 DIAGNOSIS — M89.9 BONE LESION: ICD-10-CM

## 2022-04-14 RX ORDER — OXYCODONE HYDROCHLORIDE 10 MG/1
10 TABLET ORAL EVERY 4 HOURS PRN
Qty: 60 TABLET | Refills: 0 | Status: SHIPPED | OUTPATIENT
Start: 2022-04-14 | End: 2022-04-22 | Stop reason: SDUPTHER

## 2022-04-14 NOTE — TELEPHONE ENCOUNTER
Patients wife called wanting clarification on pain medication. Reinforced what Dr. Xavi Burroughs stated this morning. Patient is to take Oxycodone on a schedule, every 4 hours. Patient is to take stool softener with every pain pill to ensure his bowels are moving daily. Patients wife stated she only wanted Dr. Xavi Burroughs prescribing pain medication so things \"aren't confusing\" Clarified Dr. Xiomy Issa discontinued the Percocet 7.5-325 mg tablets and ordered the Oxycodone 10 mg tablets. That is the only thing Leatha Dsouza should be taking at this time for pain. Torray voices understanding. No further questions at this time.

## 2022-04-14 NOTE — TELEPHONE ENCOUNTER
Dr. Malu Ford called and spoke with patient and his wife Julia Thornton with this RN present. Dr. Malu Ford reviewed pain medication and bowel regimen. Dr. Malu Ford is prescribing Oxycodone 10 mg to be taken q4 hours, scheduled. If patient is too sleepy he can wait an hour or 2 but patient should be taking pain medication on a schedule. Patient is to take a stool softener with every pain med, if his bowels are not moving he is to take 2 stool softeners with every pain med and can add Miralax if needed. Emphasis placed on ensuring patients bowels are moving daily. Patient is not to take Percocet 7.5-325mg or Oxycodone 20 mg ER . Percocet 7.5-325mg RX sent in by [facility] Olmstead was discontinued by Dr. Malu Ford. Patient and his wife aware of POC and agreeable. Patient and/or his wife are to call this RN tomorrow 4/15/2022 per Dr. Clarissa Figueroa request to inform us how the pain management is doing on the q4h schedule. Marcelino Harrison agreeable and voices understanding.

## 2022-04-15 VITALS
HEART RATE: 97 BPM | SYSTOLIC BLOOD PRESSURE: 130 MMHG | RESPIRATION RATE: 19 BRPM | DIASTOLIC BLOOD PRESSURE: 85 MMHG | TEMPERATURE: 98.1 F | OXYGEN SATURATION: 100 %

## 2022-04-16 ENCOUNTER — HOSPITAL ENCOUNTER (EMERGENCY)
Age: 52
Discharge: LEFT BEFORE DISCHARGE | End: 2022-04-16

## 2022-04-16 NOTE — ED NOTES
Patient signed LWBS form. Patient wants to come back tomorrow.       Luly Calderon RN  04/16/22 0203

## 2022-04-18 ENCOUNTER — HOSPITAL ENCOUNTER (OUTPATIENT)
Dept: RADIATION ONCOLOGY | Age: 52
Discharge: HOME | End: 2022-04-18
Attending: RADIOLOGY

## 2022-04-18 ENCOUNTER — HOSPITAL ENCOUNTER (OUTPATIENT)
Dept: INFUSION THERAPY | Age: 52
Discharge: HOME | End: 2022-04-18
Payer: COMMERCIAL

## 2022-04-18 VITALS
OXYGEN SATURATION: 98 % | RESPIRATION RATE: 16 BRPM | HEIGHT: 73 IN | BODY MASS INDEX: 25.58 KG/M2 | TEMPERATURE: 95.1 F | HEART RATE: 104 BPM | WEIGHT: 193 LBS | SYSTOLIC BLOOD PRESSURE: 132 MMHG | DIASTOLIC BLOOD PRESSURE: 69 MMHG

## 2022-04-18 DIAGNOSIS — R97.20 ELEVATED PSA: ICD-10-CM

## 2022-04-18 DIAGNOSIS — C79.51 SECONDARY MALIGNANT NEOPLASM OF BONE (HCC): Primary | ICD-10-CM

## 2022-04-18 PROCEDURE — 6360000002 HC RX W HCPCS: Performed by: INTERNAL MEDICINE

## 2022-04-18 PROCEDURE — 96372 THER/PROPH/DIAG INJ SC/IM: CPT

## 2022-04-18 RX ORDER — OXYCODONE AND ACETAMINOPHEN 7.5; 325 MG/1; MG/1
2 TABLET ORAL 3 TIMES DAILY
Status: ON HOLD | COMMUNITY
Start: 2022-04-15 | End: 2022-10-20 | Stop reason: HOSPADM

## 2022-04-18 RX ADMIN — LEUPROLIDE ACETATE 22.5 MG: KIT SUBCUTANEOUS at 12:22

## 2022-04-18 ASSESSMENT — PAIN SCALES - GENERAL: PAINLEVEL_OUTOF10: 10

## 2022-04-18 ASSESSMENT — PAIN DESCRIPTION - LOCATION: LOCATION: ABDOMEN;CHEST;RIB CAGE

## 2022-04-18 NOTE — PROGRESS NOTES
Austen Mary  4/18/2022    CONSULT     Vitals:    04/18/22 1109   BP: 132/69   Pulse: 104   Resp: 16   Temp: 95.1 °F (35.1 °C)   SpO2: 98%        Oxygen Therapy  SpO2: 98 %  Pulse Oximeter Device Mode: Continuous  Pulse Oximeter Device Location: Finger  O2 Device: None (Room air)  Skin Assessment: Clean, dry, & intact    Wt Readings from Last 3 Encounters:   04/18/22 193 lb (87.5 kg)   04/13/22 190 lb (86.2 kg)   04/12/22 198 lb 6.6 oz (90 kg)        Pain Assessment  Pain Assessment: 0-10  Pain Level: 10  Pain Location: Abdomen,Chest,Rib cage  Prescribed Narcotics    Fall Risk:    Not currently a fall risk  Assist with Transfer/Ambulation, Provide Wheelchair PRN, Educate on Assistive Devices and Re-Evaluate Weekly    Nutritional Alteration:  Nausea  No Difficulties Swallowing  Voices Sufficient Oral Intake    Mediport: yes, right side chest    Pacemaker/ICD: no    Implants: no    Previous XRT: no    Assessment: Pt presents for consult      Past Medical History:   Diagnosis Date    CAD (coronary artery disease) 12/23/2013    cath negative per pt    Cancer ([facility] [address] 06/2021    pt reports he has lung cancer    History of TMJ disorder     Hypertension     Trigeminal neuralgia        Past Surgical History:   Procedure Laterality Date    ABDOMEN SURGERY      CARDIAC CATHETERIZATION  08/03/2016    CT BIOPSY PERCUTANEOUS SUPERFICIAL BONE  12/17/2021    CT BIOPSY PERCUTANEOUS SUPERFICIAL BONE 12/17/2021 [facility] CT SCAN    CT NEEDLE BIOPSY LUNG PERCUTANEOUS  7/28/2021    CT NEEDLE BIOPSY LUNG PERCUTANEOUS 7/28/2021 [facility] CT SCAN    PORT SURGERY Right 8/13/2021    MEDIPORT INSERTION performed by Scott Dobbs MD at [facility] OR       Allergies   Allergen Reactions    Tramadol Other (See Comments)     seizures    Vicodin [Hydrocodone-Acetaminophen] Hives          Current Outpatient Medications:     oxyCODONE-acetaminophen (PERCOCET) 7.5-325 MG per tablet, take 1 tablet by mouth every 6 hours AS NEEDED FOR PAIN, Disp: , Rfl:   oxyCODONE HCl (OXY-IR) 10 MG immediate release tablet, Take 1 tablet by mouth every 4 hours as needed for Pain for up to 10 days. , Disp: 60 tablet, Rfl: 0    amoxicillin-clavulanate (AUGMENTIN) 875-125 MG per tablet, Take 1 tablet by mouth 2 times daily for 7 days, Disp: 14 tablet, Rfl: 0    naloxone 4 MG/0.1ML LIQD nasal spray, 1 spray by Nasal route as needed for Opioid Reversal, Disp: 1 each, Rfl: 5    Calcium Carbonate-Vitamin D (OYSTER SHELL CALCIUM/D) 500-200 MG-UNIT TABS, Take 1 tablet by mouth daily, Disp: 30 tablet, Rfl: 3    cyanocobalamin ([employer] VITAMIN B12) 1000 MCG tablet, Take 1 tablet by mouth daily, Disp: 30 tablet, Rfl: 3    nicotine (NICODERM CQ) 21 MG/24HR, Place 1 patch onto the skin daily, Disp: 30 patch, Rfl: 3    LORazepam (ATIVAN) 1 MG tablet, Take 1 tablet by mouth nightly as needed for Anxiety for up to 30 days. , Disp: 30 tablet, Rfl: 0    bicalutamide (CASODEX) 50 MG chemo tablet, Take 1 tablet by mouth daily, Disp: 30 tablet, Rfl: 11    ondansetron (ZOFRAN) 8 MG tablet, take 1 tablet by mouth every 8 hours if needed for nausea and vomiting, Disp: , Rfl:     Sennosides (SENNA) 8.6 MG CAPS, Take 1 capsule by mouth 2 times daily as needed (constipation), Disp: 20 capsule, Rfl: 0    dexamethasone (DECADRON) 4 MG tablet, Two tablets the day before treatment, one table daily for four days starting the day after chemotherapy, Disp: 18 tablet, Rfl: 0    NARCAN 4 MG/0.1ML LIQD nasal spray, DISPENSED PER STANDING ORDER USE AS DIRECTED PATIENT IS TRAINED OPIOID OVERDOSE RESPONDER, Disp: , Rfl:     albuterol sulfate HFA (PROVENTIL HFA) 108 (90 Base) MCG/ACT inhaler, Inhale 2 puffs into the lungs every 4 hours as needed for Wheezing or Shortness of Breath With spacer (and mask if indicated). Thanks. , Disp: 1 Inhaler, Rfl: 1        Electronically signed by Thedore Kayser, MA on 4/18/2022 at 11:11 AM

## 2022-04-19 NOTE — PROGRESS NOTES
Pt. Here for new Eligard injection. Pt. Denies any questions or concerns. Injection given. Pt. Tolerated well.

## 2022-04-20 ENCOUNTER — TELEPHONE (OUTPATIENT)
Dept: INFUSION THERAPY | Age: 52
End: 2022-04-20

## 2022-04-20 ENCOUNTER — HOSPITAL ENCOUNTER (OUTPATIENT)
Dept: INFUSION THERAPY | Age: 52
Discharge: HOME | End: 2022-04-20
Payer: COMMERCIAL

## 2022-04-20 VITALS
BODY MASS INDEX: 25.6 KG/M2 | DIASTOLIC BLOOD PRESSURE: 78 MMHG | HEIGHT: 72 IN | HEART RATE: 89 BPM | RESPIRATION RATE: 16 BRPM | TEMPERATURE: 98.6 F | SYSTOLIC BLOOD PRESSURE: 119 MMHG | OXYGEN SATURATION: 93 % | WEIGHT: 189 LBS

## 2022-04-20 DIAGNOSIS — D69.6 THROMBOCYTOPENIA (HCC): ICD-10-CM

## 2022-04-20 DIAGNOSIS — D64.9 ANEMIA, UNSPECIFIED TYPE: ICD-10-CM

## 2022-04-20 DIAGNOSIS — C79.51 SECONDARY MALIGNANT NEOPLASM OF BONE (HCC): Primary | ICD-10-CM

## 2022-04-20 DIAGNOSIS — C34.82 MALIGNANT NEOPLASM OF OVERLAPPING SITES OF LEFT LUNG (HCC): ICD-10-CM

## 2022-04-20 LAB
BASOPHILS ABSOLUTE: 0 K/CU MM
BASOPHILS RELATIVE PERCENT: 0.2 % (ref 0–1)
DIFFERENTIAL TYPE: ABNORMAL
EOSINOPHILS ABSOLUTE: 0.1 K/CU MM
EOSINOPHILS RELATIVE PERCENT: 2.7 % (ref 0–3)
HCT VFR BLD CALC: 23.5 % (ref 42–52)
HEMOGLOBIN: 7.7 GM/DL (ref 13.5–18)
LYMPHOCYTES ABSOLUTE: 1.1 K/CU MM
LYMPHOCYTES RELATIVE PERCENT: 24.7 % (ref 24–44)
MCH RBC QN AUTO: 27.6 PG (ref 27–31)
MCHC RBC AUTO-ENTMCNC: 32.8 % (ref 32–36)
MCV RBC AUTO: 84.2 FL (ref 78–100)
MONOCYTES ABSOLUTE: 0.5 K/CU MM
MONOCYTES RELATIVE PERCENT: 11.2 % (ref 0–4)
PDW BLD-RTO: 16.4 % (ref 11.7–14.9)
PLATELET # BLD: 81 K/CU MM (ref 140–440)
PMV BLD AUTO: 11.2 FL (ref 7.5–11.1)
RBC # BLD: 2.79 M/CU MM (ref 4.6–6.2)
SEGMENTED NEUTROPHILS ABSOLUTE COUNT: 2.7 K/CU MM
SEGMENTED NEUTROPHILS RELATIVE PERCENT: 61.2 % (ref 36–66)
WBC # BLD: 4.4 K/CU MM (ref 4–10.5)

## 2022-04-20 PROCEDURE — 85025 COMPLETE CBC W/AUTO DIFF WBC: CPT

## 2022-04-20 PROCEDURE — 6360000002 HC RX W HCPCS: Performed by: INTERNAL MEDICINE

## 2022-04-20 PROCEDURE — 96372 THER/PROPH/DIAG INJ SC/IM: CPT

## 2022-04-20 RX ADMIN — ROMIPLOSTIM 85 MCG: 125 INJECTION, POWDER, LYOPHILIZED, FOR SOLUTION SUBCUTANEOUS at 14:49

## 2022-04-20 NOTE — TELEPHONE ENCOUNTER
Pt's daughter S [facility] called to check on pt's appts. She was asking if pt has an injection appt for today; only appt on the schedule is for Fri 4/22 for tx; pt voiced understanding. Daughter informed I will check with Orlin Antonio RN NN to make sure he does not need anything else & have her return call to her at [phone].    9:28 returned call to [facility] per Orlin Antonio RN NN pt should come in today @ 1:30 for a Retacrit & Nplate injection + lab; daughter voiced understanding.

## 2022-04-20 NOTE — PROGRESS NOTES
Pt. Here for nplate injection. PLT 81, hemoglobin 7.7 Spoke with Dr. Jose Frost regarding lab work. Dr. Jose Frost would like CBC rechecked prior to treatment on Friday. Discharge AVS given.

## 2022-04-21 NOTE — CONSULTS
Radiation Oncology Consultation  Encounter Date: 2022 12:12 PM    Mr. Dian Linares is a 47 y.o. male  : 1970  MRN: [id number]  Acct Number: [de-identified]  Requesting Provider: Tong Trinh MD        CONSULTANT: Tong Trinh MD    PHYSICIANS:   Primary Care: MD Antonio Camacho MD      DIAGNOSIS: Metastatic lung cancer and PSA of 976 ng/mL      Cancer Staging  Malignant neoplasm of overlapping sites of left lung [facility])  Staging form: Lung, AJCC 8th Edition  - Clinical: Stage IV (pM1) - Signed by Antonio Gould MD on 2021  - Pathologic: No stage assigned - Unsigned         TREATMENT COURSE:  Oncology History   Malignant neoplasm of overlapping sites of left lung ([facility]   2021 Initial Diagnosis    Malignant neoplasm of overlapping sites of left lung ([address]     1/3/2022 - 2022 Radiation    Left upper lobe mass: 2000 cGy in 5 fractions 3D conformal radiation     Secondary malignant neoplasm of bone ([address]   2021 Initial Diagnosis    Secondary malignant neoplasm of bone ([facility] [address]     2021 - 1/10/2022 Radiation    Left pelvic metastasis: 3000 cGy 3D conformal           HPI:   Today I had the privilege of seeing Dian Linares in consultation. As you recall, Dian Linares is a 47 y.o. male who has a history as above who was recently found to have progression of pain. He reports he had been having severe headaches, but states they have been better lately without any significant headaches in the past week. He also complains of some migratory bone pain, sometimes pressing at the sternum and sometimes pointing to the left anterior mid and lower ribs. He also reports pain occurring along the right lateral posterior chest wall. He reports he had lost some weight while an inpatient with pneumonia, but has recently gained a few pounds back. He reports having had fevers with the pneumonia. He denies any chills or drenching night sweats.   He reports his breathing has been \"all right\". The hemoptysis has resolved. He reports still occasionally coughing up green phlegm. He denies any chest pain. He reports some intermittent numbness and tingling of the left foot over the past 3 to 4 months which has been slightly worse lately. He currently denies any neck pain. Imaging studies are as below showing diffuse osseous metastasis. Specifically, a cervical spine MRI from 4/12/2022 showed diffuse osseous metastatic disease without Pathologic fracture. Multilevel cervical spondylosis along with reversal of the normal lordotic curvature resulting in mild cord compression at C4-5 and C5-6 was noted. His most recent PSA was noted to be 976 ng/mL. He was initiated on Casodex and Eligard.   His biopsy has been scheduled for 4/11/2022, however, patient ended up being admitted during that time interval.      Past Medical History:   Diagnosis Date    CAD (coronary artery disease) 12/23/2013    cath negative per pt    Cancer ([address] 06/2021    pt reports he has lung cancer    History of TMJ disorder     Hypertension     Trigeminal neuralgia         Past Surgical History:   Procedure Laterality Date    ABDOMEN SURGERY      CARDIAC CATHETERIZATION  08/03/2016    CT BIOPSY PERCUTANEOUS SUPERFICIAL BONE  12/17/2021    CT BIOPSY PERCUTANEOUS SUPERFICIAL BONE 12/17/2021 [address] [place] [address] CT SCAN    CT NEEDLE BIOPSY LUNG PERCUTANEOUS  7/28/2021    CT NEEDLE BIOPSY LUNG PERCUTANEOUS 7/28/2021 [address] [place] [address] CT SCAN    PORT SURGERY Right 8/13/2021    MEDIPORT INSERTION performed by Flor Jara MD at [address] [place] [address] OR       Family History   Problem Relation Age of Onset    High Blood Pressure Mother     High Blood Pressure Sister     Cancer Sister        Social History     Socioeconomic History    Marital status:      Spouse name: Not on file    Number of children: 11    Years of education: Not on file    Highest education level: Not on file   Occupational History    Not on file   Tobacco Use    Smoking status: Current Every Day Smoker     Packs/day: 2.00     Years: 39.00     Pack years: 78.00     Types: Cigarettes    Smokeless tobacco: Never Used    Tobacco comment: smokes 1-2 a day   Vaping Use    Vaping Use: Never used   Substance and Sexual Activity    Alcohol use: Yes     Alcohol/week: 6.0 standard drinks     Types: 6 Cans of beer per week     Comment: per week (24 oz beers)     Drug use: Yes     Types: Marijuana Tamiko Aver)     Comment: last 12/1    Sexual activity: Yes     Partners: Female   Other Topics Concern    Not on file   Social History Narrative    Not on file     Social Determinants of Health     Financial Resource Strain:     Difficulty of Paying Living Expenses: Not on file   Food Insecurity:     Worried About Running Out of Food in the Last Year: Not on file    [place] of Food in the Last Year: Not on file   Transportation Needs:     Lack of Transportation (Medical): Not on file    Lack of Transportation (Non-Medical):  Not on file   Physical Activity:     Days of Exercise per Week: Not on file    Minutes of Exercise per Session: Not on file   Stress:     Feeling of Stress : Not on file   Social Connections:     Frequency of Communication with Friends and Family: Not on file    Frequency of Social Gatherings with Friends and Family: Not on file    Attends Mandaeism Services: Not on file    Active Member of [address] [place] [address] or Organizations: Not on file    Attends Club or Organization Meetings: Not on file    Marital Status: Not on file   Intimate Partner Violence:     Fear of Current or Ex-Partner: Not on file    Emotionally Abused: Not on file    Physically Abused: Not on file    Sexually Abused: Not on file   Housing Stability:     Unable to Pay for Housing in the Last Year: Not on file    Number of [place] in the Last Year: Not on file    Unstable Housing in the Last Year: Not on file           ALLERGIES:   Allergies   Allergen Reactions    Tramadol Other (See Comments)     seizures    Vicodin [Hydrocodone-Acetaminophen] Hives        Current Outpatient Medications   Medication Sig Dispense Refill    oxyCODONE-acetaminophen (PERCOCET) 7.5-325 MG per tablet take 1 tablet by mouth every 6 hours AS NEEDED FOR PAIN      oxyCODONE HCl (OXY-IR) 10 MG immediate release tablet Take 1 tablet by mouth every 4 hours as needed for Pain for up to 10 days. 60 tablet 0    naloxone 4 MG/0.1ML LIQD nasal spray 1 spray by Nasal route as needed for Opioid Reversal 1 each 5    Calcium Carbonate-Vitamin D (OYSTER SHELL CALCIUM/D) 500-200 MG-UNIT TABS Take 1 tablet by mouth daily 30 tablet 3    cyanocobalamin ([employer] VITAMIN B12) 1000 MCG tablet Take 1 tablet by mouth daily 30 tablet 3    nicotine (NICODERM CQ) 21 MG/24HR Place 1 patch onto the skin daily 30 patch 3    LORazepam (ATIVAN) 1 MG tablet Take 1 tablet by mouth nightly as needed for Anxiety for up to 30 days. 30 tablet 0    bicalutamide (CASODEX) 50 MG chemo tablet Take 1 tablet by mouth daily 30 tablet 11    ondansetron (ZOFRAN) 8 MG tablet take 1 tablet by mouth every 8 hours if needed for nausea and vomiting      Sennosides (SENNA) 8.6 MG CAPS Take 1 capsule by mouth 2 times daily as needed (constipation) 20 capsule 0    dexamethasone (DECADRON) 4 MG tablet Two tablets the day before treatment, one table daily for four days starting the day after chemotherapy 18 tablet 0    NARCAN 4 MG/0.1ML LIQD nasal spray DISPENSED PER STANDING ORDER USE AS DIRECTED PATIENT IS TRAINED OPIOID OVERDOSE RESPONDER      albuterol sulfate HFA (PROVENTIL HFA) 108 (90 Base) MCG/ACT inhaler Inhale 2 puffs into the lungs every 4 hours as needed for Wheezing or Shortness of Breath With spacer (and mask if indicated). Thanks. 1 Inhaler 1     No current facility-administered medications for this encounter.      No outpatient medications have been marked as taking for the 4/18/22 encounter [facility] Encounter) with Cindy Arteaga MD. LABORATORY STUDIES:   CBC:   Lab Results   Component Value Date    WBC 4.4 04/20/2022    RBC 2.79 04/20/2022    HGB 7.7 04/20/2022    HCT 23.5 04/20/2022    MCV 84.2 04/20/2022    MCH 27.6 04/20/2022    MCHC 32.8 04/20/2022    RDW 16.4 04/20/2022    PLT 81 04/20/2022    MPV 11.2 04/20/2022     CMP:  Lab Results   Component Value Date     04/12/2022    K 3.9 04/12/2022     04/12/2022    CO2 24 04/12/2022    BUN 13 04/12/2022    CREATININE 0.6 04/12/2022    GFRAA >60 04/12/2022    LABGLOM >60 04/12/2022    GLUCOSE 127 04/12/2022    PROT 6.0 04/12/2022    PROT 7.6 12/29/2012    LABALBU 3.5 04/12/2022    CALCIUM 8.6 04/12/2022    BILITOT 0.2 04/12/2022    ALKPHOS 245 04/12/2022    AST 15 04/12/2022    ALT 16 04/12/2022     Onc labs:   Lab Results   Component Value Date    .0 03/22/2022    CEA 7.7 07/23/2021     03/22/2022     Component Ref Range & Units 3/22/22 1441 11/16/21 1316 10/6/21 1328 7/21/21 0536   PSA, Free ng/mL 244.2  35.5  28.6     PSA 0.0 - 4.0 ng/mL 976.0 High   250.6 High  CM  181.8 High  CM  147.30 High          RADIOLOGIC STUDIES:     MRI CERVICAL SPINE W WO CONTRAST    Result Date: 4/12/2022  EXAMINATION: MRI OF THE CERVICAL SPINE WITHOUT AND WITH CONTRAST  4/12/2022 12:10 pm: TECHNIQUE: Multiplanar multisequence MRI of the cervical spine was performed without and with the administration of intravenous contrast. COMPARISON: None. HISTORY: ORDERING SYSTEM PROVIDED HISTORY: concern for cervical osseous mets FINDINGS: BONES/ALIGNMENT: Slight reversal of the normal cervical lordotic curvature centered at C5. Diffuse osseous metastatic disease throughout the imaged bone marrow. Vertebral body heights are maintained without pathologic fracture. Multilevel degenerative changes throughout the cervical spine. SPINAL CORD: No abnormal cord signal is seen. No apparent abnormal intradural enhancement. SOFT TISSUES: No paraspinal mass identified.  C2-C3: There is no significant disc protrusion, spinal canal stenosis or neural foraminal narrowing. C3-C4: Posterior disc osteophyte complex resulting in mild canal stenosis. Mild bilateral foraminal stenosis related to uncovertebral and facet hypertrophy. C4-C5: Disc osteophyte complex along with reversal of the cervical lordotic curvature and dorsal ligamentous hypertrophy resulting in mild cord compression. Severe left and moderate right foraminal stenosis related to uncovertebral and facet hypertrophy. C5-C6: Disc osteophyte complex along with reversal of the cervical lordotic curvature and dorsal ligamentous hypertrophy resulting in mild cord compression. Moderate left and severe right foraminal stenosis related to uncovertebral and facet hypertrophy. C6-C7: Disc osteophyte complex along with dorsal ligamentous hypertrophy resulting in mild to moderate canal stenosis. Mild-to-moderate bilateral foraminal stenosis related to uncovertebral and facet hypertrophy. C7-T1: There is no significant disc protrusion, spinal canal stenosis or neural foraminal narrowing. 1. Diffuse osseous metastatic disease throughout the cervical spine without pathologic fracture. 2. Multilevel cervical spondylosis along with reversal of the normal lordotic curvature resulting in mild cord compression at C4-5 and C5-6. 3. No expansile, edematous cord signal or apparent abnormal intradural enhancement. CT ABDOMEN PELVIS W IV CONTRAST Additional Contrast? None    Result Date: 3/26/2022  EXAMINATION: CT OF THE ABDOMEN AND PELVIS WITH CONTRAST 3/25/2022 9:07 pm TECHNIQUE: CT of the abdomen and pelvis was performed with the administration of intravenous contrast. Multiplanar reformatted images are provided for review. Dose modulation, iterative reconstruction, and/or weight based adjustment of the mA/kV was utilized to reduce the radiation dose to as low as reasonably achievable.  COMPARISON: CT scan dated December 2, 2021 HISTORY: ORDERING SYSTEM PROVIDED HISTORY: left flank pain, difficulty urinating and constipation TECHNOLOGIST PROVIDED HISTORY: Reason for exam:->left flank pain, difficulty urinating and constipation Additional Contrast?->None Decision Support Exception - unselect if not a suspected or confirmed emergency medical condition->Emergency Medical Condition (MA) Reason for Exam: left flank pain, difficulty urinating and constipation FINDINGS: Lower Chest: Dependent changes are present within the lung bases. Trace left pleural effusion is present. Atelectasis versus scarring is present within the left upper lobe. Gynecomastia is present bilaterally. Organs: Hepatomegaly is present with a sagittal length of 20 cm. No focal hepatic masses present. The gallbladder, pancreas, spleen, and adrenal glands demonstrate no acute abnormality. Right kidney is normal.  A mild degree of left hydronephrosis and hydroureter is present, to the level of the left pelvic mass. GI/Bowel: Stomach is nondistended. No evidence of small-bowel obstruction is present. No focal inflammatory change or wall thickening is present involving the large bowel. The appendix is normal.  Mild amount of stool is present within the rectum. Pelvis: The prostate gland is abnormally enlarged, and appears to be invading the left posterior margin of the urinary bladder, and involving the left ureteral orifice. Diffuse bladder wall thickening is present with a mild degree of surrounding stranding, likely due to the enlarged prostate gland resulting in bladder outlet obstruction. Peritoneum/Retroperitoneum: Retroperitoneal and left iliac chain adenopathy is again noted. Distal left external iliac chain lymph node measures 2.2 x 1.6 cm. Compliant soft tissue attenuation is present at the level of the aortic bifurcation, and within the distal aortocaval region, consistent with confluent adenopathy. This soft tissue attenuation has progressed since the prior study.   Left pelvic sidewall lymph node measures 2.4 x 0.8 cm, previously measured 3.1 x 1.4 cm. Largest left thyroid gland node measures 2.2 x 1.2 cm. Bones/Soft Tissues: Diffuse sclerotic metastatic disease is again noted throughout the osseous structures. Large lytic lesion is again identified within the posterior aspect of the left iliac bone, now measuring approximately 4.9 cm.     1. Interval development of a mild degree of left hydronephrosis and hydroureter, to the level of the left UVJ. An approximate 7.3 x 5 cm soft tissue mass is present along the left posterior margin of the urinary bladder, and contiguous with the prostate gland, resulting in obstructing of the ureteral orifice. Soft tissue mass is most consistent with primary prostate carcinoma, in light of the diffuse blastic metastatic disease. 2. Interval increase in confluent adenopathy at the level of the aortic bifurcation, consistent with progression of metastatic disease 3. Diffuse osseous blastic metastatic disease, with interval increase in size of the lytic metastatic lesion involving the proximal left iliac bone     XR CHEST PORTABLE    Result Date: 4/10/2022  EXAMINATION: ONE XRAY VIEW OF THE CHEST 4/10/2022 5:35 am COMPARISON: April 8, 2022 HISTORY: ORDERING SYSTEM PROVIDED HISTORY: CHEST PAIN TECHNOLOGIST PROVIDED HISTORY: Reason for exam:->CHEST PAIN Reason for Exam: right side rib / chest pain Additional signs and symptoms: chest pain, sob FINDINGS: Right chest wall MediPort in place. Stable cardiomediastinal silhouette. The lungs show interval worsening of bilateral airspace opacities, left greater than right. Findings include a masslike opacity in the left suprahilar region. No pneumothorax. No displaced fractures. 1. Interval worsening of bilateral opacities, left greater than right. XR CHEST PORTABLE    Result Date: 4/8/2022  EXAMINATION: ONE XRAY VIEW OF THE CHEST 4/8/2022 3:11 am COMPARISON: 03/12/2022.  HISTORY: ORDERING SYSTEM PROVIDED HISTORY: SOB cough TECHNOLOGIST PROVIDED HISTORY: Reason for exam:->SOB cough Reason for Exam: SOB cough Additional signs and symptoms: hx of lung ca Initial evaluation. FINDINGS: A right chest port is seen unchanged in position. There is patchy opacification involving the left mid to upper lung, which appears slightly worsened from the prior exam.  No convincing focal consolidation within the right lung. No evidence of a pleural effusion or pneumothorax. Multiple sclerotic bony metastases are seen bilaterally. 1. Patchy opacification involving the left mid to upper lung, which appears slightly worsened. 2. No convincing focal consolidation the right lung. 3. Scattered sclerotic bony metastases. CTA PULMONARY W CONTRAST    Result Date: 4/8/2022  EXAMINATION: CTA OF THE CHEST 4/8/2022 6:08 am TECHNIQUE: CTA of the chest was performed after the administration of intravenous contrast.  Multiplanar reformatted images are provided for review. MIP images are provided for review. Dose modulation, iterative reconstruction, and/or weight based adjustment of the mA/kV was utilized to reduce the radiation dose to as low as reasonably achievable. COMPARISON: Chest radiograph 04/08/2022. CT abdomen/pelvis 03/26/2022. CT chest 02/08/2022. HISTORY: ORDERING SYSTEM PROVIDED HISTORY: SOB history of cancer, elevated D-dimer TECHNOLOGIST PROVIDED HISTORY: Reason for exam:->SOB history of cancer, elevated D-dimer Decision Support Exception - unselect if not a suspected or confirmed emergency medical condition->Emergency Medical Condition (MA) Reason for Exam: SOB history of cancer, elevated D-dimer FINDINGS: Pulmonary Arteries: Adequate contrast opacification to the subsegmental level. The main pulmonary artery through the right pulmonary arterial tree demonstrates normal size and contrast opacification with no acute intraluminal filling defect.  Redemonstration of left hilar mass with pulmonary artery encasement and mild malignant stenosis. There is adequate contrast opacification to the subsegmental level with no acute intraluminal filling defect. No central malignant occlusion. Mediastinum: Normal heart size with no significant pericardial effusion. The thoracic aorta demonstrates no aneurysmal dilatation or evidence of dissection. The esophagus appears unremarkable. Right subcutaneous medical infusion port with catheter tip in the right atrium. No significant enlarged right hilar lymph nodes. Interval enlargement of a precarinal lymph node measuring 1.0 x 1.6 cm on axial image 34. Interval enlargement of a left prevascular lymph node measuring 0.7 x 1.2 cm on axial image 29. Interval enlargement of subcarinal lymph node measuring 1.8 cm in short axis on axial image 58. Lungs/pleura: The trachea and right endobronchial tree are patent. The right lung demonstrates mild dependent atelectasis with no acute consolidation or pulmonary mass. No effusion or pneumothorax. New mild left pleural effusion with associated atelectasis. No pneumothorax. The left mainstem bronchus and lower lobe bronchi are patent. There is malignant occlusion of the left upper lobe bronchus new since the prior CT exam.  On axial image 52, the left hilar mass extending into the left upper lobe measures 5.4 x 5.5 cm compared to 3.0 x 5.2 cm, interval increase in size. There is progressive left upper lobe volume loss and consolidation. New moderate left lower lobe multifocal consolidative changes. Upper Abdomen: No intrahepatic or adrenal mass. Redemonstration of left hydronephrosis identified on the prior CT abdomen exam. Soft Tissues/Bones: Bilateral retroareolar soft tissue density consistent with gynecomastia. Enlarged left supraclavicular lymph node on axial image 10 measuring 0.8 x 1.0 cm stable compared to the prior exam.  No significant enlarged axillary lymph nodes.   Redemonstration of left axillary skin thickening and subcutaneous fat reticular densities. No loculated fluid collection. Normal thoracic spine kyphotic curvature, alignment, and vertebral body heights. Multifocal sternal, bilateral clavicular, bilateral scapular, thoracic spine, and bilateral rib blastic metastases. These appear stable in size. No acute fracture. 1. Redemonstration of left hilar malignant pulmonary arterial encasement and mild stenosis with no central malignant occlusion. No acute pulmonary emboli. 2. Interval enlargement of the left hilar/upper lobe mass which now results in central complete occlusion of the left upper lobe bronchus. Progressive multi segmental volume loss/consolidation. Cannot exclude a post obstructive pneumonia. 3. Acute left lower lobe consolidation concerning for pneumonia. 4. New mild left pleural effusion which may be malignant in nature. 5. Progressive mediastinal lymphadenopathy. This could be a combination of reactive and malignant changes. 6. Redemonstration of left hydronephrosis. 7. Stable extensive blastic skeletal metastasis with no evidence of a pathologic fracture. MRI BRAIN W WO CONTRAST    Result Date: 4/11/2022  EXAMINATION: MRI OF THE BRAIN WITHOUT AND WITH CONTRAST  4/11/2022 9:12 am TECHNIQUE: Multiplanar multisequence MRI of the head/brain was performed without and with the administration of intravenous contrast. COMPARISON: MRI brain 08/20/2021 HISTORY: ORDERING SYSTEM PROVIDED HISTORY: r/o metastatic disease, new headache TECHNOLOGIST PROVIDED HISTORY: Reason for exam:->r/o metastatic disease, new headache What is the sedation requirement?->None Reason for Exam: Other; Shortness of Breath FINDINGS: INTRACRANIAL STRUCTURES/VENTRICLES:  There are no areas of restricted diffusion identified to suggest an acute infarct. Previously identified punctate focus of restricted diffusion within the right frontal lobe has resolved. There is no acute intracranial hemorrhage. No mass effect or midline shift is present. There are multiple punctate foci of blooming on gradient imaging within subcortical and deep white matter of both frontal lobes, unchanged and suggestive of remote microhemorrhages. There is no abnormal enhancement. There is no sellar or suprasellar mass present. There is no ventriculomegaly or abnormal extra-axial fluid collection present. The proximal portions of the [place] demonstrate normal flow voids. ORBITS: Limited evaluation of the orbits is unremarkable. SINUSES: There is mucosal thickening throughout the paranasal sinuses. Fluid is present within the left mastoid air cells. BONES/SOFT TISSUES: There has been interval development of innumerable osseous metastatic lesions involving the upper cervical spine, clivus and skull. 1. Interval development of diffuse osseous metastatic disease compared with the previous MRI from 08/20/2021. 2. No intracranial metastatic disease identified. 3. Redemonstration of multiple remote microhemorrhages, unchanged and possibly the sequelae of longstanding hypertension. CT CHEST W CONTRAST    Narrative  EXAMINATION:  CT OF THE CHEST WITH CONTRAST 2/8/2022 8:47 am    TECHNIQUE:  CT of the chest was performed with the administration of intravenous  contrast. Multiplanar reformatted images are provided for review. Dose  modulation, iterative reconstruction, and/or weight based adjustment of the  mA/kV was utilized to reduce the radiation dose to as low as reasonably  achievable. COMPARISON:  December 2, 2021, and January 4, 2022    HISTORY:  ORDERING SYSTEM PROVIDED HISTORY: Malignant neoplasm of overlapping sites of  left lung [facility])  TECHNOLOGIST PROVIDED HISTORY:  Reason for exam:->post RT  Reason for Exam: Recheck Lung Cancer, Inj 75cc Isovue 370, GFR >60 1/27/22    FINDINGS:  Mediastinum: There is no axillary lymphadenopathy. There is no mediastinal  lymphadenopathy.   Interval decrease in size of a prevascular node measuring  up to 0.7 cm, previously measuring up to 1.4 cm. There is no definite hilar  lymphadenopathy. The heart is normal in size. There is no pericardial  effusion. The aorta and main pulmonary artery are normal in caliber. Lungs/pleura: There has been interval decrease in size of left suprahilar  mass measuring 3 x 5.2 cm, previously measuring 5.8 x 7.8 cm. The mass is  again noted to extend along the left upper lobe bronchus with probable  extension into the left main pulmonary artery. There has been interval  decrease in ground-glass opacities in the left upper lobe since the prior  examination. Mild bibasilar atelectasis is noted. Mild right apical  pleuroparenchymal scarring is noted. Upper Abdomen: No acute upper abdominal pathology is noted. Questionable  mildly prominent left renal collecting system, increased since the prior  examination. Soft Tissues/Bones: Diffuse sclerotic lesions are noted throughout the  osseous structures consistent with osseous metastases. Impression  1. Interval decrease in size of left suprahilar mass extending along the  left upper lobe bronchi extension into the left main pulmonary artery. There  is interval decrease in associated ground-glass opacity in the left upper  lobe. Findings suggest response to therapy. 2.  Interval decrease in mediastinal lymphadenopathy. 3.  Diffuse osseous metastatic disease. REVIEW OF SYSTEMS:   Constitutional:  Patient denies fevers, rigors, or drenching night sweats. The patient's weight and appetite have been stable. Eyes:  The patient denies any recent visual changes, diplopia, or cataracts  ENMT:  The patient denies any ear pain, hearing changes. + Epistaxis  Respiratory:  The patient reports green sputum production. See HPI. He does report intermittent wheezing. Cardiovascular:   The patient denies any chest pain, leg edema, or fainting spells  GI:  Patient denies nausea, vomiting, diarrhea, melena, or hematochezia  : Patient currently with indwelling catheter. Integumentary: patient denies skin rashes, pruritis, or arm edema  Endocrine:  Patient denies any diabetic or thyroid problems  Neurologic: Patient denies focal weakness, or disorientation. See HPI  Psychiatric: The patient denies any depression, or rapid mood swings. He does report frequent anxiety. PHYSICAL EXAMINATION:   VITAL SIGNS: /69   Pulse 104   Temp 95.1 °F (35.1 °C) (Infrared)   Resp 16   Ht 6' 1\" (1.854 m)   Wt 193 lb (87.5 kg)   SpO2 98%   BMI 25.46 kg/m²   ECOG PERFORMANCE STATUS: 1  PAIN RATING:  10    GENERAL: Pleasant well-developed adult in no acute distress. Alert and oriented ×3  SKIN: Warm and dry, without jaundice, ecchymoses, or petechiae. HEENT: Normocephalic, atraumatic. Pupils are round and symmetric. Sclerae anicteric  NECK:  No JVD; Supple. No cervical, supraclavicular, or infraclavicular lymphadenopathy is palpable. LUNGS: Bilaterally clear to auscultation. No rales, rhonci, or wheezing are present. Good inspiratory effort, no accessory muscle use. CARDIAC: Regular rate and rhythm, without murmurs, clicks, or gallops   ABDOMEN: Normoactive bowels sounds are present. Soft. Non-tender and non-distended. No hepatosplenomegaly or masses are present. EXTREMITIES: No cyanosis, clubbing or edema is present. FROM  NEUROLOGIC:  The patient is alert and oriented. CN II-XII are grossly intact. Sensation to light touch is intact and symmetric in the fingers and feet. Muscle strength is 5/5 in both the upper and lower extremities. Musculoskeletal: No significant tenderness is elicited on palpation of the axial or appendicular skeleton. IMPRESSION/PLAN:  Tommy Jimenez is a 47 y.o. male who has a history of metastatic lung cancer as well as likely metastatic prostate cancer who was recently found to have progression of his pain.   I have reviewed the patient's radiographic images with the patient and his family member in

## 2022-04-22 DIAGNOSIS — M89.9 BONE LESION: ICD-10-CM

## 2022-04-22 DIAGNOSIS — C34.82 MALIGNANT NEOPLASM OF OVERLAPPING SITES OF LEFT LUNG (HCC): ICD-10-CM

## 2022-04-22 DIAGNOSIS — C79.51 SECONDARY MALIGNANT NEOPLASM OF BONE (HCC): ICD-10-CM

## 2022-04-22 RX ORDER — PALONOSETRON 0.05 MG/ML
0.25 INJECTION, SOLUTION INTRAVENOUS ONCE
Status: CANCELLED | OUTPATIENT
Start: 2022-04-29

## 2022-04-22 RX ORDER — DIPHENHYDRAMINE HYDROCHLORIDE 50 MG/ML
50 INJECTION INTRAMUSCULAR; INTRAVENOUS ONCE
Status: CANCELLED | OUTPATIENT
Start: 2022-04-29 | End: 2022-04-22

## 2022-04-22 RX ORDER — SODIUM CHLORIDE 9 MG/ML
20 INJECTION, SOLUTION INTRAVENOUS ONCE
Status: CANCELLED | OUTPATIENT
Start: 2022-04-29 | End: 2022-04-22

## 2022-04-22 RX ORDER — SODIUM CHLORIDE 9 MG/ML
INJECTION, SOLUTION INTRAVENOUS CONTINUOUS
Status: CANCELLED | OUTPATIENT
Start: 2022-04-29

## 2022-04-22 RX ORDER — SODIUM CHLORIDE 0.9 % (FLUSH) 0.9 %
5-40 SYRINGE (ML) INJECTION PRN
Status: CANCELLED | OUTPATIENT
Start: 2022-04-29

## 2022-04-22 RX ORDER — FAMOTIDINE 10 MG/ML
20 INJECTION, SOLUTION INTRAVENOUS
Status: CANCELLED | OUTPATIENT
Start: 2022-04-29

## 2022-04-22 RX ORDER — DIPHENHYDRAMINE HYDROCHLORIDE 50 MG/ML
50 INJECTION INTRAMUSCULAR; INTRAVENOUS
Status: CANCELLED | OUTPATIENT
Start: 2022-04-29

## 2022-04-22 RX ORDER — SODIUM CHLORIDE 9 MG/ML
5-40 INJECTION INTRAVENOUS PRN
Status: CANCELLED | OUTPATIENT
Start: 2022-04-29

## 2022-04-22 RX ORDER — ACETAMINOPHEN 325 MG/1
650 TABLET ORAL
Status: CANCELLED | OUTPATIENT
Start: 2022-04-29

## 2022-04-22 RX ORDER — MEPERIDINE HYDROCHLORIDE 50 MG/ML
12.5 INJECTION INTRAMUSCULAR; INTRAVENOUS; SUBCUTANEOUS PRN
Status: CANCELLED | OUTPATIENT
Start: 2022-04-29

## 2022-04-22 RX ORDER — FAMOTIDINE 10 MG/ML
20 INJECTION, SOLUTION INTRAVENOUS ONCE
Status: CANCELLED | OUTPATIENT
Start: 2022-04-29 | End: 2022-04-22

## 2022-04-22 RX ORDER — LORAZEPAM 1 MG/1
1 TABLET ORAL NIGHTLY PRN
Qty: 30 TABLET | Refills: 0 | Status: SHIPPED | OUTPATIENT
Start: 2022-04-22 | End: 2022-05-22

## 2022-04-22 RX ORDER — SODIUM CHLORIDE 9 MG/ML
25 INJECTION, SOLUTION INTRAVENOUS PRN
Status: CANCELLED | OUTPATIENT
Start: 2022-04-29

## 2022-04-22 RX ORDER — EPINEPHRINE 1 MG/ML
0.3 INJECTION, SOLUTION, CONCENTRATE INTRAVENOUS PRN
Status: CANCELLED | OUTPATIENT
Start: 2022-04-29

## 2022-04-22 RX ORDER — ONDANSETRON 2 MG/ML
8 INJECTION INTRAMUSCULAR; INTRAVENOUS
Status: CANCELLED | OUTPATIENT
Start: 2022-04-29

## 2022-04-22 RX ORDER — HEPARIN SODIUM (PORCINE) LOCK FLUSH IV SOLN 100 UNIT/ML 100 UNIT/ML
500 SOLUTION INTRAVENOUS PRN
Status: CANCELLED | OUTPATIENT
Start: 2022-04-29

## 2022-04-22 RX ORDER — OXYCODONE HYDROCHLORIDE 10 MG/1
10 TABLET ORAL EVERY 4 HOURS PRN
Qty: 60 TABLET | Refills: 0 | Status: SHIPPED | OUTPATIENT
Start: 2022-04-22 | End: 2022-05-02

## 2022-04-22 RX ORDER — ALBUTEROL SULFATE 90 UG/1
4 AEROSOL, METERED RESPIRATORY (INHALATION) PRN
Status: CANCELLED | OUTPATIENT
Start: 2022-04-29

## 2022-05-04 ENCOUNTER — TELEPHONE (OUTPATIENT)
Dept: CASE MANAGEMENT | Age: 52
End: 2022-05-04

## 2022-05-04 NOTE — TELEPHONE ENCOUNTER
This RN returned call from [address], no answer at this time, message left giving verbal order from Dr. Kirti Lindsay for skilled nursing, PT and OT. Per Dr. Kirti Lindsay she would like Alvarez Portillo with [facility] to manage nephrostomy tube, call placed to his office but this RN had to leave a message for call back.  Patients name and situation left on [employer] office secure VM

## 2022-05-09 ENCOUNTER — HOSPITAL ENCOUNTER (EMERGENCY)
Age: 52
Discharge: LEFT BEFORE DISCHARGE | End: 2022-05-09

## 2022-05-16 ENCOUNTER — HOSPITAL ENCOUNTER (OUTPATIENT)
Dept: PET IMAGING | Age: 52
Discharge: HOME | End: 2022-05-16
Payer: COMMERCIAL

## 2022-05-16 DIAGNOSIS — C34.82 MALIGNANT NEOPLASM OF OVERLAPPING SITES OF LEFT LUNG (HCC): ICD-10-CM

## 2022-05-16 DIAGNOSIS — C79.51 SECONDARY MALIGNANT NEOPLASM OF BONE (HCC): ICD-10-CM

## 2022-05-16 PROCEDURE — 3430000000 HC RX DIAGNOSTIC RADIOPHARMACEUTICAL: Performed by: INTERNAL MEDICINE

## 2022-05-16 PROCEDURE — A9552 F18 FDG: HCPCS | Performed by: INTERNAL MEDICINE

## 2022-05-16 PROCEDURE — 2580000003 HC RX 258: Performed by: INTERNAL MEDICINE

## 2022-05-16 PROCEDURE — 78815 PET IMAGE W/CT SKULL-THIGH: CPT

## 2022-05-16 RX ORDER — SODIUM CHLORIDE 0.9 % (FLUSH) 0.9 %
10 SYRINGE (ML) INJECTION PRN
Status: COMPLETED | OUTPATIENT
Start: 2022-05-16 | End: 2022-05-16

## 2022-05-16 RX ORDER — FLUDEOXYGLUCOSE F 18 200 MCI/ML
16.02 INJECTION, SOLUTION INTRAVENOUS
Status: COMPLETED | OUTPATIENT
Start: 2022-05-16 | End: 2022-05-16

## 2022-05-16 RX ADMIN — SODIUM CHLORIDE, PRESERVATIVE FREE 10 ML: 5 INJECTION INTRAVENOUS at 12:34

## 2022-05-16 RX ADMIN — FLUDEOXYGLUCOSE F 18 16.02 MILLICURIE: 200 INJECTION, SOLUTION INTRAVENOUS at 12:34

## 2022-06-02 ENCOUNTER — HOSPITAL ENCOUNTER (OUTPATIENT)
Dept: INFUSION THERAPY | Age: 52
Discharge: HOME | End: 2022-06-02
Payer: COMMERCIAL

## 2022-06-02 ENCOUNTER — OFFICE VISIT (OUTPATIENT)
Dept: ONCOLOGY | Age: 52
End: 2022-06-02
Payer: COMMERCIAL

## 2022-06-02 VITALS
WEIGHT: 198 LBS | TEMPERATURE: 97.2 F | HEART RATE: 97 BPM | RESPIRATION RATE: 20 BRPM | DIASTOLIC BLOOD PRESSURE: 66 MMHG | HEIGHT: 72 IN | BODY MASS INDEX: 26.82 KG/M2 | OXYGEN SATURATION: 99 % | SYSTOLIC BLOOD PRESSURE: 140 MMHG

## 2022-06-02 DIAGNOSIS — C61 PROSTATE CANCER (HCC): ICD-10-CM

## 2022-06-02 DIAGNOSIS — C34.82 MALIGNANT NEOPLASM OF OVERLAPPING SITES OF LEFT LUNG (HCC): ICD-10-CM

## 2022-06-02 DIAGNOSIS — C79.51 SECONDARY MALIGNANT NEOPLASM OF BONE (HCC): Primary | ICD-10-CM

## 2022-06-02 DIAGNOSIS — D64.9 ANEMIA, UNSPECIFIED TYPE: ICD-10-CM

## 2022-06-02 LAB
BASOPHILS ABSOLUTE: 0 K/CU MM
BASOPHILS RELATIVE PERCENT: 0.3 % (ref 0–1)
DIFFERENTIAL TYPE: ABNORMAL
EOSINOPHILS ABSOLUTE: 0.1 K/CU MM
EOSINOPHILS RELATIVE PERCENT: 1 % (ref 0–3)
HCT VFR BLD CALC: 21.5 % (ref 42–52)
HEMOGLOBIN: 6.7 GM/DL (ref 13.5–18)
LYMPHOCYTES ABSOLUTE: 1.6 K/CU MM
LYMPHOCYTES RELATIVE PERCENT: 25.6 % (ref 24–44)
MCH RBC QN AUTO: 28.3 PG (ref 27–31)
MCHC RBC AUTO-ENTMCNC: 31.2 % (ref 32–36)
MCV RBC AUTO: 90.7 FL (ref 78–100)
MONOCYTES ABSOLUTE: 0.5 K/CU MM
MONOCYTES RELATIVE PERCENT: 8.1 % (ref 0–4)
PDW BLD-RTO: 17.9 % (ref 11.7–14.9)
PLATELET # BLD: 128 K/CU MM (ref 140–440)
PMV BLD AUTO: 10.5 FL (ref 7.5–11.1)
RBC # BLD: 2.37 M/CU MM (ref 4.6–6.2)
SEGMENTED NEUTROPHILS ABSOLUTE COUNT: 4.1 K/CU MM
SEGMENTED NEUTROPHILS RELATIVE PERCENT: 65 % (ref 36–66)
WBC # BLD: 6.3 K/CU MM (ref 4–10.5)

## 2022-06-02 PROCEDURE — G8419 CALC BMI OUT NRM PARAM NOF/U: HCPCS | Performed by: INTERNAL MEDICINE

## 2022-06-02 PROCEDURE — 99214 OFFICE O/P EST MOD 30 MIN: CPT | Performed by: INTERNAL MEDICINE

## 2022-06-02 PROCEDURE — 36591 DRAW BLOOD OFF VENOUS DEVICE: CPT

## 2022-06-02 PROCEDURE — 3017F COLORECTAL CA SCREEN DOC REV: CPT | Performed by: INTERNAL MEDICINE

## 2022-06-02 PROCEDURE — 85025 COMPLETE CBC W/AUTO DIFF WBC: CPT

## 2022-06-02 PROCEDURE — G8427 DOCREV CUR MEDS BY ELIG CLIN: HCPCS | Performed by: INTERNAL MEDICINE

## 2022-06-02 PROCEDURE — 4004F PT TOBACCO SCREEN RCVD TLK: CPT | Performed by: INTERNAL MEDICINE

## 2022-06-02 PROCEDURE — 6360000002 HC RX W HCPCS: Performed by: INTERNAL MEDICINE

## 2022-06-02 RX ORDER — ABIRATERONE ACETATE 250 MG/1
1000 TABLET ORAL DAILY
COMMUNITY
Start: 2022-05-19 | End: 2022-11-04

## 2022-06-02 RX ORDER — SODIUM CHLORIDE 9 MG/ML
25 INJECTION, SOLUTION INTRAVENOUS PRN
Status: CANCELLED | OUTPATIENT
Start: 2022-06-02

## 2022-06-02 RX ORDER — OXYCODONE HYDROCHLORIDE 15 MG/1
15 TABLET ORAL EVERY 6 HOURS PRN
Qty: 120 TABLET | Refills: 0 | Status: SHIPPED | OUTPATIENT
Start: 2022-06-02 | End: 2022-07-02

## 2022-06-02 RX ORDER — HEPARIN SODIUM (PORCINE) LOCK FLUSH IV SOLN 100 UNIT/ML 100 UNIT/ML
500 SOLUTION INTRAVENOUS PRN
Status: CANCELLED | OUTPATIENT
Start: 2022-06-02

## 2022-06-02 RX ORDER — LORAZEPAM 1 MG/1
1 TABLET ORAL NIGHTLY PRN
Qty: 30 TABLET | Refills: 0 | Status: SHIPPED | OUTPATIENT
Start: 2022-06-02 | End: 2022-06-22 | Stop reason: SDUPTHER

## 2022-06-02 RX ORDER — HEPARIN SODIUM (PORCINE) LOCK FLUSH IV SOLN 100 UNIT/ML 100 UNIT/ML
500 SOLUTION INTRAVENOUS PRN
Status: DISCONTINUED | OUTPATIENT
Start: 2022-06-02 | End: 2022-06-03 | Stop reason: HOSPADM

## 2022-06-02 RX ORDER — SODIUM CHLORIDE 0.9 % (FLUSH) 0.9 %
5-40 SYRINGE (ML) INJECTION PRN
Status: CANCELLED | OUTPATIENT
Start: 2022-06-02

## 2022-06-02 RX ORDER — METHADONE HYDROCHLORIDE 5 MG/1
5 TABLET ORAL 2 TIMES DAILY
Qty: 60 TABLET | Refills: 0 | Status: SHIPPED | OUTPATIENT
Start: 2022-06-02 | End: 2022-07-02

## 2022-06-02 RX ORDER — PREDNISONE 1 MG/1
TABLET ORAL
Status: ON HOLD | COMMUNITY
Start: 2022-05-26 | End: 2022-10-20 | Stop reason: HOSPADM

## 2022-06-02 RX ADMIN — HEPARIN 500 UNITS: 100 SYRINGE at 15:33

## 2022-06-02 ASSESSMENT — PATIENT HEALTH QUESTIONNAIRE - PHQ9
SUM OF ALL RESPONSES TO PHQ QUESTIONS 1-9: 0
SUM OF ALL RESPONSES TO PHQ QUESTIONS 1-9: 0
SUM OF ALL RESPONSES TO PHQ9 QUESTIONS 1 & 2: 0
SUM OF ALL RESPONSES TO PHQ QUESTIONS 1-9: 0
SUM OF ALL RESPONSES TO PHQ QUESTIONS 1-9: 0
1. LITTLE INTEREST OR PLEASURE IN DOING THINGS: 0
2. FEELING DOWN, DEPRESSED OR HOPELESS: 0

## 2022-06-02 NOTE — PROGRESS NOTES
Pt reported today for port draw after OV with Dr. Griffin Sneed. Has a Mediport on right side chest and was accessed with a 20 G  1 in  Gripper Plus Non-Coring safety needle. Was accessed on 1st attempt, blood return was noted. Labs drawn. Port was flushed with 20 cc's (0.9% Sodium Chloride Injection USP) and locked with 500 units of heparin. Pt tolerated procedure well. Patient RTC 06/03 for CBC and T/S. HgB 6.7 today, will re-draw tomorrow with type and screen as it is only valid for 72 hours.

## 2022-06-02 NOTE — PROGRESS NOTES
Patient Name:  Tommy Jimenez  Patient :  1970  Patient MRN:  [id number]     Primary Oncologist: Araceli Puentes MD  Referring Provider: Rosana Armstrong MD     Date of Service: 2022        Chief Complaint:    Chief Complaint   Patient presents with    Follow-up       Encounter Diagnoses   Name Primary?  Secondary malignant neoplasm of bone ([facility] Yes    Malignant neoplasm of overlapping sites of left lung (HCC)     Anemia, unspecified type     Prostate cancer ([facility]         HPI:   21: Initial [facility] visit:Hima Ortiz is a 45 y.o. male who presents to [facility] with report of dysuria and flank pain. Reports these symptoms started a few weeks ago. He ultimately came to the ED due to worsening back pain.      He reports ongoing issues with frequent urge to urinate and notes some incontinence. He denies hematuria. He was noted to have acute pyelonephritis and was admitted and started on IV Rocephin.      21 CT chest     Impression   1. Left hilar neoplasm extending into the left upper lobe measuring 3.2 x 5.9   x 5.3 cm obstructing the left upper lobe bronchus with probable minimal   adjacent infiltrates versus lymphangitic spread of tumor.  PET-CT/biopsy is   recommended. 2. Mediastinal lymphadenopathy. 3. Prominent left supraclavicular lymph nodes. 4. No osseous metastatic disease.      21 Ct abdomen and pelvis  Impression   1. Circumferential thickening of the bladder wall is noted which could be   related to cystitis.  Correlate with urinalysis. 2. There is left retroperitoneal lymphadenopathy.  This could be reactive or   neoplastic.  This can be further evaluated with PET-CT. 3. There is minimal stranding within the retroperitoneal on the left.  This   is uncertain etiology however could be related to acute pyelonephritis. 4. Interval development of multiple sclerotic lesions within the spine and   pelvis, concerning for metastatic osseous disease.    5. Prostate gland is enlarged.  Correlate with PSA.      .30      He denies past history of cancer. He smokes 2-3 ppd and drinks 10-12 beers daily. He reports unknown malignancy in his sister who  at 39. No other known family history of cancer.      Due to lung mass and concern for metastatic prostate cancer we were called to evaluate. 21:  Final Pathologic Diagnosis:   Needle biopsy of lung, clinically left lung mass:        SQUAMOUS CELL CARCINOMA IN SITU.     21:PET  1.  Left perihilar mass likely represents primary lung cancer.  Correlate   with biopsy results.  The opacity more peripheral in the left lower lobe has   relatively low level activity and likely represents an area of post   obstructive pneumonia adjacent to the mass.       2.  Metabolically active left AP window lymph node concerning for metastatic   disease.       3.  The prostate is enlarged and has increased FDG activity which could be   due to prostatitis or prostate cancer.  Correlate with PSA levels.       4.  No increased metabolic activity associated with retroperitoneal lymph   nodes.  This is unlikely related to the lung process given the significant   difference in metabolic activity; however, if there is prostate cancer, this   could potentially be metastatic disease from the prostate cancer, which can   have variable levels of uptake on FDG PET scans.       5.  No metabolic activity associated with multiple sclerotic lesions.  Again   this is unlikely related to the lung process, but if there is prostate   cancer, this could represent metastatic disease from prostate cancer with   poor FDG avidity.  Otherwise it could also represent large bone islands.       6.  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 There is a focal area of intense activity associated with a new lucent lesion   within the background of Paget's disease concerning for metastatic disease.    Given the FDG activity, it is likely related to the lung process.           8/20/21: MRI brain  1. There is a punctate focus of restricted diffusion within the right frontal   lobe without associated enhancement, mass effect or midline shift.  This   could represent a punctate acute infarct.  However, given history, an early   metastatic focus cannot be entirely excluded. 2. Scattered foci of susceptibility are seen within the cerebral hemispheres   bilaterally, which were not visualized on the prior exam.  Findings could   represent areas of remote microhemorrhage or perhaps treated metastases. 3. No abnormal enhancement within the brain. 4. Minimal global parenchymal volume loss with minimal chronic microvascular   ischemic changes. 12/2/21: CT chest, abdomen and pelvis:  Chest CT: Interval increased size of the left perihilar mass, with increased   adjacent obstructive pneumonitis and atelectasis.       Stable to minimally to decreased mediastinal lymphadenopathy.       Interval increased bony metastatic disease.       Abdomen and pelvis CT: Stable retroperitoneal and pelvic lymphadenopathy.       Interval increased bony metastatic disease. 12/17/21:Final Pathologic Diagnosis:   Bone, posterior ileum, needle core biopsy:   -     METASTATIC SQUAMOUS CELL CARCINOMA.    PACO  PDL1 22c3 >50%(keytruda)  PTEN positive  Alk neg    CARIS, not enough tissue for rest of the testing    Gaurdant with no actionable mutation otherwise     12/27/21: Started XRT to the left pelvis  Added chest radiation dia 3  Completed dia 10 2022      Started carbo/taxol/keytruda jan 20 2022 2/8/22: CT chest:  1.  Interval decrease in size of left suprahilar mass extending along the   left upper lobe bronchi extension into the left main pulmonary artery.  There   is interval decrease in associated ground-glass opacity in the left upper   lobe.  Findings suggest response to therapy.       2.  Interval decrease in mediastinal lymphadenopathy.       3.  Diffuse osseous metastatic disease. 3/11/22: CT head: No acute abn    3/26/22: Ct abdomen and pelvis:  1. Interval development of a mild degree of left hydronephrosis and   hydroureter, to the level of the left UVJ.  An approximate 7.3 x 5 cm soft   tissue mass is present along the left posterior margin of the urinary   bladder, and contiguous with the prostate gland, resulting in obstructing of   the ureteral orifice.  Soft tissue mass is most consistent with primary   prostate carcinoma, in light of the diffuse blastic metastatic disease. 2. Interval increase in confluent adenopathy at the level of the aortic   bifurcation, consistent with progression of metastatic disease   3. Diffuse osseous blastic metastatic disease, with interval increase in size   of the lytic metastatic lesion involving the proximal left iliac bone     3/22/22:     He was admitted at [address] [place] [address] in April (4/22 - 4/30). He had debulking of his primary tumor via bronchoscopy during admit. BMB at [address] [place] [address] consistent with met prostate cancer  - Sampson prior to admission; exchanged in ED 4/22. Urine cx neg  - CT A/P:  Soft tissue mass along the left UVJ/posterior lateral left bladder wall with moderate left hydroureteronephrosis and diffuse urinary bladder  thickening. UVJ mass is likely a metastatic deposit involving the bladder and possibly the left prostate/seminal vesicle. - 4/25L neph tube placed by IR; sampson removed. Also treated for PNA. 5/16/2022 Imaging   PET - local:   1. Decreased size and uptake of the left perihilar mass. Adjacent   consolidation extending into the left lower lobe likely represents   post-treatment and post-obstructive atelectasis. Resolution of the   hypermetabolic prevascular lymph node. 2. Decreased uptake involving the lytic lesion along the left sacroiliac   joint. Diffuse sclerosis throughout the skeleton without significant uptake   is unchanged and may represent treated metastatic disease.     May 19 2022 CBC with hb 6.9, platelets 89, psa 399, testerone <7    May 31 2022 started Zytiga and prednisone    Past Medical History:     BPH, HTN, COPD                                                            Past Surgery History:    None per his wife                                                                        Social History:   Lives with wife. Cut down smoking to 2-4 cigarettes per day prior to which he was smoking 2 to 3 packs/day for the past 40 years. Also reported drinking alcohol 10-12 beers per day. Denied any other illicit drug abuse.                                                                                                    Family History:    He reported that his sister  at the age of 39 with  metastatic cancer, he was not sure what the primary was                                                                                              Allergies   Allergen Reactions    Tramadol Other (See Comments)     seizures    Vicodin [Hydrocodone-Acetaminophen] Hives       Current Outpatient Medications on File Prior to Visit   Medication Sig Dispense Refill    predniSONE (DELTASONE) 5 MG tablet       abiraterone acetate (ZYTIGA) 250 MG tablet Take 1,000 mg by mouth daily      oxyCODONE-acetaminophen (PERCOCET) 7.5-325 MG per tablet take 1 tablet by mouth every 6 hours AS NEEDED FOR PAIN      naloxone 4 MG/0.1ML LIQD nasal spray 1 spray by Nasal route as needed for Opioid Reversal 1 each 5    Calcium Carbonate-Vitamin D (OYSTER SHELL CALCIUM/D) 500-200 MG-UNIT TABS Take 1 tablet by mouth daily 30 tablet 3    cyanocobalamin ([employer] VITAMIN B12) 1000 MCG tablet Take 1 tablet by mouth daily 30 tablet 3    nicotine (NICODERM CQ) 21 MG/24HR Place 1 patch onto the skin daily 30 patch 3    bicalutamide (CASODEX) 50 MG chemo tablet Take 1 tablet by mouth daily 30 tablet 11    ondansetron (ZOFRAN) 8 MG tablet take 1 tablet by mouth every 8 hours if needed for nausea and vomiting      Sennosides (SENNA) 8.6 MG CAPS Take 1 capsule by mouth 2 times daily as needed (constipation) 20 capsule 0    dexamethasone (DECADRON) 4 MG tablet Two tablets the day before treatment, one table daily for four days starting the day after chemotherapy 18 tablet 0    NARCAN 4 MG/0.1ML LIQD nasal spray DISPENSED PER STANDING ORDER USE AS DIRECTED PATIENT IS TRAINED OPIOID OVERDOSE RESPONDER      albuterol sulfate HFA (PROVENTIL HFA) 108 (90 Base) MCG/ACT inhaler Inhale 2 puffs into the lungs every 4 hours as needed for Wheezing or Shortness of Breath With spacer (and mask if indicated). Thanks. 1 Inhaler 1     No current facility-administered medications on file prior to visit. Interval History: 6/2/22: He arrived with his wife to the clinic today. Reported that he was following with hospice but discontinued since may 19. Whole body hurts. Left side abdomen pain. Chest hurts. Feels weak and tired. Review of Systems:  As per the interval history, rest of the review of system negative     Vital Signs: BP (!) 140/66 (Site: Right Upper Arm, Position: Sitting, Cuff Size: Medium Adult)   Pulse 97   Temp 97.2 °F (36.2 °C) (Infrared)   Resp 20   Ht 6' (1.829 m)   Wt 198 lb (89.8 kg)   SpO2 99%   BMI 26.85 kg/m²      CONSTITUTIONAL: lethargic   EYES: RAISSA, No pallor or any icterus  ENT: ATNC  NECK: No JVD  HEMATOLOGIC/LYMPHATIC: no cervical, supraclavicular or axillary lymphadenopathy   LUNGS: CTAB  CARDIOVASCULAR: s1s2 rrr no murmurs  ABDOMEN: soft ntnd bs pos, left nephrostomy tube.    NEUROLOGIC: GI  SKIN: Psoriatic rash with excoriation marks on the lower extremities and also upper extremities  EXTREMITIES: no LE edema bilaterally     Labs:  Hematology:  Lab Results   Component Value Date    WBC 4.4 04/20/2022    RBC 2.79 (L) 04/20/2022    HGB 7.7 (L) 04/20/2022    HCT 23.5 (L) 04/20/2022    MCV 84.2 04/20/2022    MCH 27.6 04/20/2022    MCHC 32.8 04/20/2022    RDW 16.4 (H) 04/20/2022    PLT 81 (L) 04/20/2022    MPV 11. 2 (H) 04/20/2022    BANDSPCT 12 (H) 04/09/2022    SEGSPCT 61.2 04/20/2022    EOSRELPCT 2.7 04/20/2022    BASOPCT 0.2 04/20/2022    LYMPHOPCT 24.7 04/20/2022    MONOPCT 11.2 (H) 04/20/2022    BANDABS 0.46 04/09/2022    SEGSABS 2.7 04/20/2022    EOSABS 0.1 04/20/2022    BASOSABS 0.0 04/20/2022    LYMPHSABS 1.1 04/20/2022    MONOSABS 0.5 04/20/2022    DIFFTYPE AUTOMATED DIFFERENTIAL 04/20/2022    ANISOCYTOSIS 1+ 04/09/2022    POLYCHROM 1+ 04/09/2022    PLTM DECREASED 04/09/2022     Lab Results   Component Value Date    ESR 47 (H) 03/22/2022     Chemistry:  Lab Results   Component Value Date     04/12/2022    K 3.9 04/12/2022     04/12/2022    CO2 24 04/12/2022    BUN 13 04/12/2022    CREATININE 0.6 (L) 04/12/2022    GLUCOSE 127 (H) 04/12/2022    CALCIUM 8.6 04/12/2022    PROT 6.0 (L) 04/12/2022    LABALBU 3.5 04/12/2022    BILITOT 0.2 04/12/2022    ALKPHOS 245 (H) 04/12/2022    AST 15 04/12/2022    ALT 16 04/12/2022    LABGLOM >60 04/12/2022    GFRAA >60 04/12/2022    MG 1.8 04/10/2022    POCCA 1.18 04/01/2022    POCGLU 121 (H) 04/01/2022     Lab Results   Component Value Date     (H) 03/22/2022     No components found for: LD  Lab Results   Component Value Date    TSHHS 2.380 04/01/2022    T4FREE 1.15 04/01/2022     Immunology:  Lab Results   Component Value Date    PROT 6.0 (L) 04/12/2022    SPEP  03/22/2022     INTERPRETATION - Decreased albumin and total prorein. No definitive monoclonal bands are seen. SAF    SPEP INTERPRETATION - No monoclonal bands are seen.  SAF 03/22/2022    ALBUMINELP 2.8 (L) 03/22/2022    LABALPH 0.4 03/22/2022    LABALPH 1.1 03/22/2022    LABBETA 1.1 03/22/2022    GAMGLOB 0.8 03/22/2022     No results found for: Darrell Ahr, KLFLCR  No results found for: B2M  Coagulation Panel:  Lab Results   Component Value Date    PROTIME 13.1 04/11/2022    INR 1.02 04/11/2022    APTT 32.8 04/11/2022    DDIMER 1709 (H) 04/08/2022     Anemia Panel:  Lab Results   Component Value Date    BVWJGACK10 373.0 03/22/2022    FOLATE 4.0 03/22/2022     Tumor Markers:  Lab Results   Component Value Date    CEA 7.7 07/23/2021    .0 (H) 03/22/2022        Observations:  ECOG:  PHQ-9 Total Score: 0 (6/2/2022  2:11 PM)       Assessment & Plan:                                                          Left hilar mass with mediastinal hilar lymphadenopathy. 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 PET scan results from august 2021 noted, bone lesions most probably not metastatic except for one lytic lesion. MRI of the brain with no convincing metastatic lesions. Presented  the case in the tumor board. Decision made to proceed with a prostate biopsy and treat with ADT if malignancy  and in the meantime proceed with staging mediastinoscopy/rebiopsy for further treatment plan. But as pt very very very noncompliant, did not follow up with urology, CTS or for bone biopsy multiple times. Note PSA rising and was 250 on November 16 2021   CT imaging dec 2021 with progressive met disease  Bone biopsy on dec 13 2021 with met squamous cell cancer, consistent with lung primary. PDL1 >50%Chetna Rodriguez) . Not enough tissue for rest of CARIS, guardant 360 with no other actionable mutation  CT chest jan 2022 with no significant change   Completed Palliative radiation to the pelvic area and also radiation to the left lung dia 10 2022  Discussed the findings, diagnosis and poor prognosis and treatment with carbo/taxol/pembro after completion of radiation. Discussed ae. PORT placed. Completed OCM  C1D1 carbo/taxol and keytruda started 1/20/22, unfortunately received only one treatment so far sec to being very very noncompliant  CT after C1 with positive response   And then treatment held sec to cytopenias and also admission to [facility]. PET in may 2022 with continued response. Will resume [place] alone. Anemia: sec to met prostate cancer.  Transfusion support as needed. SHABBIR and TPO    Met prostate cancer: PSA was ~900, started on casodex and received GnRH analogue. But BMB involvement biopsy consistent with that. Started on Zytiga and prednisone. Continue lupron. Elevated alk phos most probably secondary to the bone lmets    Adequate analgesic and bowel regimen. Continue other medical care. Thank you for letting us be part of the care and will follow along. Discussed above findings and plan with him and he voiced understanding. Answered all his questions. Discussed healthy lifestyle including healthy diet, regular exercise as tolerated. ,  Smoking and alcohol cessation    Recommend follow-up with primary care physician and other specialists. Note he has he has been very noncompliant with appointments. Please do not hesitate to contact us if you need further information. Return to clinic a week  or earlier if new Sx    I have recommended that the patient follow CDC guidelines for prevention of COVID-19 infection.   Received Covid vaccine/flu vaccine     TOMI

## 2022-06-02 NOTE — PROGRESS NOTES
[facility] Rooming Questions  Patient: Tommy Jimenez  MRN: [id number]    Date: 6/2/2022        1. Do you have any new issues?   no         2. Do you need any refills on medications?    no    3. Have you had any imaging done since your last visit?   no    4. Have you been hospitalized or seen in the emergency room since your last visit here?   no    5. Did the patient have a depression screening completed today?  Yes    PHQ-9 Total Score: 0 (6/2/2022  2:11 PM)       PHQ-9 Given to (if applicable):               PHQ-9 Score (if applicable):                     [] Positive     [x]  Negative              Does question #9 need addressed (if applicable)                     [] Yes    []  No               Emani Duncan MA

## 2022-06-03 ENCOUNTER — HOSPITAL ENCOUNTER (OUTPATIENT)
Dept: INFUSION THERAPY | Age: 52
Discharge: HOME | End: 2022-06-03
Payer: COMMERCIAL

## 2022-06-03 ENCOUNTER — CLINICAL DOCUMENTATION (OUTPATIENT)
Dept: INFUSION THERAPY | Age: 52
End: 2022-06-03

## 2022-06-03 DIAGNOSIS — C79.51 SECONDARY MALIGNANT NEOPLASM OF BONE (HCC): ICD-10-CM

## 2022-06-03 DIAGNOSIS — C61 PROSTATE CANCER (HCC): ICD-10-CM

## 2022-06-03 DIAGNOSIS — D64.9 ANEMIA, UNSPECIFIED TYPE: Primary | ICD-10-CM

## 2022-06-03 DIAGNOSIS — C34.82 MALIGNANT NEOPLASM OF OVERLAPPING SITES OF LEFT LUNG (HCC): Primary | ICD-10-CM

## 2022-06-03 LAB
BASOPHILS ABSOLUTE: 0 K/CU MM
BASOPHILS RELATIVE PERCENT: 0.3 % (ref 0–1)
DIFFERENTIAL TYPE: ABNORMAL
EOSINOPHILS ABSOLUTE: 0.1 K/CU MM
EOSINOPHILS RELATIVE PERCENT: 1.1 % (ref 0–3)
HCT VFR BLD CALC: 20.3 % (ref 42–52)
HEMOGLOBIN: 6.5 GM/DL (ref 13.5–18)
LYMPHOCYTES ABSOLUTE: 1.8 K/CU MM
LYMPHOCYTES RELATIVE PERCENT: 27.5 % (ref 24–44)
MCH RBC QN AUTO: 28.9 PG (ref 27–31)
MCHC RBC AUTO-ENTMCNC: 32 % (ref 32–36)
MCV RBC AUTO: 90.2 FL (ref 78–100)
MONOCYTES ABSOLUTE: 0.6 K/CU MM
MONOCYTES RELATIVE PERCENT: 9 % (ref 0–4)
PDW BLD-RTO: 17.8 % (ref 11.7–14.9)
PLATELET # BLD: 122 K/CU MM (ref 140–440)
PMV BLD AUTO: 10.3 FL (ref 7.5–11.1)
RBC # BLD: 2.25 M/CU MM (ref 4.6–6.2)
SEGMENTED NEUTROPHILS ABSOLUTE COUNT: 4 K/CU MM
SEGMENTED NEUTROPHILS RELATIVE PERCENT: 62.1 % (ref 36–66)
WBC # BLD: 6.5 K/CU MM (ref 4–10.5)

## 2022-06-03 PROCEDURE — 85025 COMPLETE CBC W/AUTO DIFF WBC: CPT

## 2022-06-03 PROCEDURE — 86850 RBC ANTIBODY SCREEN: CPT

## 2022-06-03 PROCEDURE — 6360000002 HC RX W HCPCS: Performed by: INTERNAL MEDICINE

## 2022-06-03 PROCEDURE — 36591 DRAW BLOOD OFF VENOUS DEVICE: CPT

## 2022-06-03 PROCEDURE — 86900 BLOOD TYPING SEROLOGIC ABO: CPT

## 2022-06-03 PROCEDURE — 86922 COMPATIBILITY TEST ANTIGLOB: CPT

## 2022-06-03 PROCEDURE — 86901 BLOOD TYPING SEROLOGIC RH(D): CPT

## 2022-06-03 RX ORDER — DIPHENHYDRAMINE HYDROCHLORIDE 50 MG/ML
50 INJECTION INTRAMUSCULAR; INTRAVENOUS
Status: CANCELLED | OUTPATIENT
Start: 2022-07-11

## 2022-06-03 RX ORDER — ALBUTEROL SULFATE 90 UG/1
4 AEROSOL, METERED RESPIRATORY (INHALATION) PRN
Status: CANCELLED | OUTPATIENT
Start: 2022-06-07

## 2022-06-03 RX ORDER — ONDANSETRON 2 MG/ML
8 INJECTION INTRAMUSCULAR; INTRAVENOUS
Status: CANCELLED | OUTPATIENT
Start: 2022-06-03

## 2022-06-03 RX ORDER — SODIUM CHLORIDE 9 MG/ML
20 INJECTION, SOLUTION INTRAVENOUS CONTINUOUS
Status: CANCELLED | OUTPATIENT
Start: 2022-06-03

## 2022-06-03 RX ORDER — HEPARIN SODIUM (PORCINE) LOCK FLUSH IV SOLN 100 UNIT/ML 100 UNIT/ML
500 SOLUTION INTRAVENOUS PRN
Status: DISCONTINUED | OUTPATIENT
Start: 2022-06-03 | End: 2022-06-04 | Stop reason: HOSPADM

## 2022-06-03 RX ORDER — SODIUM CHLORIDE 9 MG/ML
INJECTION, SOLUTION INTRAVENOUS CONTINUOUS
Status: CANCELLED | OUTPATIENT
Start: 2022-07-11

## 2022-06-03 RX ORDER — SODIUM CHLORIDE 9 MG/ML
25 INJECTION, SOLUTION INTRAVENOUS PRN
Status: CANCELLED | OUTPATIENT
Start: 2022-06-03

## 2022-06-03 RX ORDER — ONDANSETRON 2 MG/ML
8 INJECTION INTRAMUSCULAR; INTRAVENOUS
Status: CANCELLED | OUTPATIENT
Start: 2022-06-07

## 2022-06-03 RX ORDER — SODIUM CHLORIDE 9 MG/ML
INJECTION, SOLUTION INTRAVENOUS CONTINUOUS
Status: CANCELLED | OUTPATIENT
Start: 2022-06-07

## 2022-06-03 RX ORDER — ACETAMINOPHEN 325 MG/1
650 TABLET ORAL
Status: CANCELLED | OUTPATIENT
Start: 2022-07-11

## 2022-06-03 RX ORDER — ALBUTEROL SULFATE 90 UG/1
4 AEROSOL, METERED RESPIRATORY (INHALATION) PRN
Status: CANCELLED | OUTPATIENT
Start: 2022-06-03

## 2022-06-03 RX ORDER — ALBUTEROL SULFATE 90 UG/1
4 AEROSOL, METERED RESPIRATORY (INHALATION) PRN
Status: CANCELLED | OUTPATIENT
Start: 2022-07-11

## 2022-06-03 RX ORDER — DIPHENHYDRAMINE HCL 25 MG
25 TABLET ORAL ONCE
Status: CANCELLED | OUTPATIENT
Start: 2022-06-03 | End: 2022-06-03

## 2022-06-03 RX ORDER — HEPARIN SODIUM (PORCINE) LOCK FLUSH IV SOLN 100 UNIT/ML 100 UNIT/ML
500 SOLUTION INTRAVENOUS PRN
Status: CANCELLED | OUTPATIENT
Start: 2022-06-03

## 2022-06-03 RX ORDER — DIPHENHYDRAMINE HYDROCHLORIDE 50 MG/ML
50 INJECTION INTRAMUSCULAR; INTRAVENOUS
Status: CANCELLED | OUTPATIENT
Start: 2022-06-03

## 2022-06-03 RX ORDER — ACETAMINOPHEN 325 MG/1
650 TABLET ORAL ONCE
Status: CANCELLED | OUTPATIENT
Start: 2022-06-03 | End: 2022-06-03

## 2022-06-03 RX ORDER — SODIUM CHLORIDE 0.9 % (FLUSH) 0.9 %
5-40 SYRINGE (ML) INJECTION PRN
Status: CANCELLED | OUTPATIENT
Start: 2022-06-03

## 2022-06-03 RX ORDER — ACETAMINOPHEN 325 MG/1
650 TABLET ORAL
Status: CANCELLED | OUTPATIENT
Start: 2022-06-03

## 2022-06-03 RX ORDER — SODIUM CHLORIDE 9 MG/ML
INJECTION, SOLUTION INTRAVENOUS CONTINUOUS
Status: CANCELLED | OUTPATIENT
Start: 2022-06-03

## 2022-06-03 RX ORDER — EPINEPHRINE 1 MG/ML
0.3 INJECTION, SOLUTION, CONCENTRATE INTRAVENOUS PRN
Status: CANCELLED | OUTPATIENT
Start: 2022-06-03

## 2022-06-03 RX ORDER — EPINEPHRINE 1 MG/ML
0.3 INJECTION, SOLUTION, CONCENTRATE INTRAVENOUS PRN
Status: CANCELLED | OUTPATIENT
Start: 2022-06-07

## 2022-06-03 RX ORDER — ACETAMINOPHEN 325 MG/1
650 TABLET ORAL
Status: CANCELLED | OUTPATIENT
Start: 2022-06-07

## 2022-06-03 RX ORDER — METHYLPREDNISOLONE SODIUM SUCCINATE 40 MG/ML
40 INJECTION, POWDER, LYOPHILIZED, FOR SOLUTION INTRAMUSCULAR; INTRAVENOUS ONCE
Status: CANCELLED
Start: 2022-06-03 | End: 2022-06-03

## 2022-06-03 RX ORDER — EPINEPHRINE 1 MG/ML
0.3 INJECTION, SOLUTION, CONCENTRATE INTRAVENOUS PRN
Status: CANCELLED | OUTPATIENT
Start: 2022-07-11

## 2022-06-03 RX ORDER — SODIUM CHLORIDE 0.9 % (FLUSH) 0.9 %
5-40 SYRINGE (ML) INJECTION PRN
Status: DISCONTINUED | OUTPATIENT
Start: 2022-06-03 | End: 2022-06-04 | Stop reason: HOSPADM

## 2022-06-03 RX ORDER — ONDANSETRON 2 MG/ML
8 INJECTION INTRAMUSCULAR; INTRAVENOUS
Status: CANCELLED | OUTPATIENT
Start: 2022-07-11

## 2022-06-03 RX ORDER — DIPHENHYDRAMINE HYDROCHLORIDE 50 MG/ML
50 INJECTION INTRAMUSCULAR; INTRAVENOUS
Status: CANCELLED | OUTPATIENT
Start: 2022-06-07

## 2022-06-03 RX ORDER — FAMOTIDINE 10 MG/ML
20 INJECTION, SOLUTION INTRAVENOUS
Status: CANCELLED | OUTPATIENT
Start: 2022-06-03

## 2022-06-03 NOTE — PROGRESS NOTES
Patient scheduled for blood transfusion at [facility] OP infusion on 06/06/22. 1 unit PRBC placed per physician order. Blood therapy plan added.

## 2022-06-03 NOTE — PROGRESS NOTES
Hgb 6.7; per Dr Lauren Mares 1 unit of PRBC, called infusion dept, spoke to Mallory Simons, patient scheduled for Monday, 6/6 @ 21 [phone], patient advised and states understanding, blood banded and sent for T&S, band on patient (verifed) and patient signed consent.

## 2022-06-06 ENCOUNTER — HOSPITAL ENCOUNTER (OUTPATIENT)
Dept: INFUSION THERAPY | Age: 52
Setting detail: INFUSION SERIES
Discharge: HOME | End: 2022-06-06
Payer: COMMERCIAL

## 2022-06-06 VITALS
RESPIRATION RATE: 16 BRPM | TEMPERATURE: 97.9 F | OXYGEN SATURATION: 98 % | HEART RATE: 88 BPM | DIASTOLIC BLOOD PRESSURE: 56 MMHG | SYSTOLIC BLOOD PRESSURE: 108 MMHG

## 2022-06-06 DIAGNOSIS — D64.9 ANEMIA, UNSPECIFIED TYPE: Primary | ICD-10-CM

## 2022-06-06 DIAGNOSIS — C34.82 MALIGNANT NEOPLASM OF OVERLAPPING SITES OF LEFT LUNG (HCC): ICD-10-CM

## 2022-06-06 PROCEDURE — 6370000000 HC RX 637 (ALT 250 FOR IP): Performed by: INTERNAL MEDICINE

## 2022-06-06 PROCEDURE — P9016 RBC LEUKOCYTES REDUCED: HCPCS

## 2022-06-06 PROCEDURE — 2580000003 HC RX 258: Performed by: INTERNAL MEDICINE

## 2022-06-06 PROCEDURE — 6360000002 HC RX W HCPCS: Performed by: INTERNAL MEDICINE

## 2022-06-06 PROCEDURE — 36430 TRANSFUSION BLD/BLD COMPNT: CPT

## 2022-06-06 PROCEDURE — 99211 OFF/OP EST MAY X REQ PHY/QHP: CPT

## 2022-06-06 RX ORDER — ALBUTEROL SULFATE 90 UG/1
4 AEROSOL, METERED RESPIRATORY (INHALATION) PRN
Status: CANCELLED | OUTPATIENT
Start: 2022-06-06

## 2022-06-06 RX ORDER — DIPHENHYDRAMINE HCL 25 MG
25 TABLET ORAL ONCE
Status: COMPLETED | OUTPATIENT
Start: 2022-06-06 | End: 2022-06-06

## 2022-06-06 RX ORDER — SODIUM CHLORIDE 9 MG/ML
25 INJECTION, SOLUTION INTRAVENOUS PRN
Status: DISCONTINUED | OUTPATIENT
Start: 2022-06-06 | End: 2022-06-06

## 2022-06-06 RX ORDER — SODIUM CHLORIDE 9 MG/ML
25 INJECTION, SOLUTION INTRAVENOUS PRN
Status: CANCELLED | OUTPATIENT
Start: 2022-06-06

## 2022-06-06 RX ORDER — SODIUM CHLORIDE 0.9 % (FLUSH) 0.9 %
5-40 SYRINGE (ML) INJECTION PRN
Status: CANCELLED | OUTPATIENT
Start: 2022-06-06

## 2022-06-06 RX ORDER — SODIUM CHLORIDE 0.9 % (FLUSH) 0.9 %
5-40 SYRINGE (ML) INJECTION PRN
Status: DISCONTINUED | OUTPATIENT
Start: 2022-06-06 | End: 2022-06-06

## 2022-06-06 RX ORDER — ACETAMINOPHEN 325 MG/1
650 TABLET ORAL ONCE
Status: CANCELLED | OUTPATIENT
Start: 2022-06-06 | End: 2022-06-06

## 2022-06-06 RX ORDER — SODIUM CHLORIDE 0.9 % (FLUSH) 0.9 %
5-40 SYRINGE (ML) INJECTION PRN
Status: DISCONTINUED | OUTPATIENT
Start: 2022-06-06 | End: 2022-06-07 | Stop reason: HOSPADM

## 2022-06-06 RX ORDER — ACETAMINOPHEN 325 MG/1
650 TABLET ORAL ONCE
Status: COMPLETED | OUTPATIENT
Start: 2022-06-06 | End: 2022-06-06

## 2022-06-06 RX ORDER — HEPARIN SODIUM (PORCINE) LOCK FLUSH IV SOLN 100 UNIT/ML 100 UNIT/ML
500 SOLUTION INTRAVENOUS PRN
Status: DISCONTINUED | OUTPATIENT
Start: 2022-06-06 | End: 2022-06-07 | Stop reason: HOSPADM

## 2022-06-06 RX ORDER — ACETAMINOPHEN 325 MG/1
650 TABLET ORAL
Status: CANCELLED | OUTPATIENT
Start: 2022-06-06

## 2022-06-06 RX ORDER — HEPARIN SODIUM (PORCINE) LOCK FLUSH IV SOLN 100 UNIT/ML 100 UNIT/ML
500 SOLUTION INTRAVENOUS PRN
Status: CANCELLED | OUTPATIENT
Start: 2022-06-06

## 2022-06-06 RX ORDER — SODIUM CHLORIDE 9 MG/ML
20 INJECTION, SOLUTION INTRAVENOUS CONTINUOUS
Status: CANCELLED | OUTPATIENT
Start: 2022-06-06

## 2022-06-06 RX ORDER — SODIUM CHLORIDE 9 MG/ML
INJECTION, SOLUTION INTRAVENOUS CONTINUOUS
Status: CANCELLED | OUTPATIENT
Start: 2022-06-06

## 2022-06-06 RX ORDER — DIPHENHYDRAMINE HYDROCHLORIDE 50 MG/ML
50 INJECTION INTRAMUSCULAR; INTRAVENOUS
Status: CANCELLED | OUTPATIENT
Start: 2022-06-06

## 2022-06-06 RX ORDER — FAMOTIDINE 10 MG/ML
20 INJECTION, SOLUTION INTRAVENOUS
Status: CANCELLED | OUTPATIENT
Start: 2022-06-06

## 2022-06-06 RX ORDER — EPINEPHRINE 1 MG/ML
0.3 INJECTION, SOLUTION, CONCENTRATE INTRAVENOUS PRN
Status: CANCELLED | OUTPATIENT
Start: 2022-06-06

## 2022-06-06 RX ORDER — SODIUM CHLORIDE 9 MG/ML
20 INJECTION, SOLUTION INTRAVENOUS CONTINUOUS
Status: DISCONTINUED | OUTPATIENT
Start: 2022-06-06 | End: 2022-06-06

## 2022-06-06 RX ORDER — ONDANSETRON 2 MG/ML
8 INJECTION INTRAMUSCULAR; INTRAVENOUS
Status: CANCELLED | OUTPATIENT
Start: 2022-06-06

## 2022-06-06 RX ORDER — METHYLPREDNISOLONE SODIUM SUCCINATE 40 MG/ML
40 INJECTION, POWDER, LYOPHILIZED, FOR SOLUTION INTRAMUSCULAR; INTRAVENOUS ONCE
Status: CANCELLED
Start: 2022-06-06 | End: 2022-06-06

## 2022-06-06 RX ORDER — DIPHENHYDRAMINE HCL 25 MG
25 TABLET ORAL ONCE
Status: CANCELLED | OUTPATIENT
Start: 2022-06-06 | End: 2022-06-06

## 2022-06-06 RX ADMIN — DIPHENHYDRAMINE HCL 25 MG: 25 TABLET ORAL at 10:22

## 2022-06-06 RX ADMIN — SODIUM CHLORIDE, PRESERVATIVE FREE 20 ML: 5 INJECTION INTRAVENOUS at 12:55

## 2022-06-06 RX ADMIN — ACETAMINOPHEN 650 MG: 325 TABLET ORAL at 10:22

## 2022-06-06 RX ADMIN — HEPARIN 500 UNITS: 100 SYRINGE at 12:55

## 2022-06-06 RX ADMIN — SODIUM CHLORIDE 20 ML/HR: 9 INJECTION, SOLUTION INTRAVENOUS at 10:27

## 2022-06-06 RX ADMIN — SODIUM CHLORIDE, PRESERVATIVE FREE 10 ML: 5 INJECTION INTRAVENOUS at 10:26

## 2022-06-06 NOTE — DISCHARGE SUMMARY
Tolerated Blood well. Reviewed discharge instructions, understanding verbalized. Copies of AVS given to take home. Patient discharged home. Down to exit per self.     Orders Placed This Encounter   Medications    DISCONTD: 0.9 % sodium chloride infusion    DISCONTD: sodium chloride flush 0.9 % injection 5-40 mL    DISCONTD: 0.9 % sodium chloride infusion    acetaminophen (TYLENOL) tablet 650 mg    diphenhydrAMINE (BENADRYL) tablet 25 mg    sodium chloride flush 0.9 % injection 5-40 mL    heparin flush 100 UNIT/ML injection 500 Units

## 2022-06-07 ENCOUNTER — HOSPITAL ENCOUNTER (OUTPATIENT)
Dept: INFUSION THERAPY | Age: 52
Discharge: HOME | End: 2022-06-07
Payer: COMMERCIAL

## 2022-06-07 VITALS
DIASTOLIC BLOOD PRESSURE: 73 MMHG | RESPIRATION RATE: 18 BRPM | BODY MASS INDEX: 26.28 KG/M2 | WEIGHT: 194 LBS | HEIGHT: 72 IN | HEART RATE: 82 BPM | TEMPERATURE: 96.5 F | SYSTOLIC BLOOD PRESSURE: 134 MMHG | OXYGEN SATURATION: 97 %

## 2022-06-07 DIAGNOSIS — R06.02 SHORTNESS OF BREATH: Primary | ICD-10-CM

## 2022-06-07 DIAGNOSIS — R06.02 SHORTNESS OF BREATH: ICD-10-CM

## 2022-06-07 DIAGNOSIS — C34.82 MALIGNANT NEOPLASM OF OVERLAPPING SITES OF LEFT LUNG (HCC): Primary | ICD-10-CM

## 2022-06-07 LAB
ABO/RH: NORMAL
ALBUMIN SERPL-MCNC: 3.9 GM/DL (ref 3.4–5)
ALP BLD-CCNC: 945 IU/L (ref 40–129)
ALT SERPL-CCNC: <5 U/L (ref 10–40)
ANION GAP SERPL CALCULATED.3IONS-SCNC: 12 MMOL/L (ref 4–16)
ANTIBODY SCREEN: NEGATIVE
AST SERPL-CCNC: 13 IU/L (ref 15–37)
BASOPHILS ABSOLUTE: 0 K/CU MM
BASOPHILS RELATIVE PERCENT: 0.4 % (ref 0–1)
BILIRUB SERPL-MCNC: 0.2 MG/DL (ref 0–1)
BUN BLDV-MCNC: 17 MG/DL (ref 6–23)
CALCIUM SERPL-MCNC: 8.9 MG/DL (ref 8.3–10.6)
CHLORIDE BLD-SCNC: 105 MMOL/L (ref 99–110)
CO2: 19 MMOL/L (ref 21–32)
COMPONENT: NORMAL
CREAT SERPL-MCNC: 0.9 MG/DL (ref 0.9–1.3)
CROSSMATCH RESULT: NORMAL
DIFFERENTIAL TYPE: ABNORMAL
EOSINOPHILS ABSOLUTE: 0.1 K/CU MM
EOSINOPHILS RELATIVE PERCENT: 1.9 % (ref 0–3)
GFR AFRICAN AMERICAN: >60 ML/MIN/1.73M2
GFR NON-AFRICAN AMERICAN: >60 ML/MIN/1.73M2
GLUCOSE BLD-MCNC: 104 MG/DL (ref 70–99)
HCT VFR BLD CALC: 25 % (ref 42–52)
HEMOGLOBIN: 8 GM/DL (ref 13.5–18)
LYMPHOCYTES ABSOLUTE: 1.4 K/CU MM
LYMPHOCYTES RELATIVE PERCENT: 26.5 % (ref 24–44)
MCH RBC QN AUTO: 28.6 PG (ref 27–31)
MCHC RBC AUTO-ENTMCNC: 32 % (ref 32–36)
MCV RBC AUTO: 89.3 FL (ref 78–100)
MONOCYTES ABSOLUTE: 0.5 K/CU MM
MONOCYTES RELATIVE PERCENT: 9.6 % (ref 0–4)
PDW BLD-RTO: 18 % (ref 11.7–14.9)
PLATELET # BLD: 129 K/CU MM (ref 140–440)
PMV BLD AUTO: 9.9 FL (ref 7.5–11.1)
POTASSIUM SERPL-SCNC: 4.7 MMOL/L (ref 3.5–5.1)
RBC # BLD: 2.8 M/CU MM (ref 4.6–6.2)
SEGMENTED NEUTROPHILS ABSOLUTE COUNT: 3.3 K/CU MM
SEGMENTED NEUTROPHILS RELATIVE PERCENT: 61.6 % (ref 36–66)
SODIUM BLD-SCNC: 136 MMOL/L (ref 135–145)
STATUS: NORMAL
T4 FREE: 1.21 NG/DL (ref 0.9–1.8)
TOTAL PROTEIN: 7.2 GM/DL (ref 6.4–8.2)
TRANSFUSION STATUS: NORMAL
TSH HIGH SENSITIVITY: 1.42 UIU/ML (ref 0.27–4.2)
UNIT DIVISION: 0
UNIT NUMBER: NORMAL
WBC # BLD: 5.3 K/CU MM (ref 4–10.5)

## 2022-06-07 PROCEDURE — 84439 ASSAY OF FREE THYROXINE: CPT

## 2022-06-07 PROCEDURE — 6360000002 HC RX W HCPCS: Performed by: INTERNAL MEDICINE

## 2022-06-07 PROCEDURE — 96413 CHEMO IV INFUSION 1 HR: CPT

## 2022-06-07 PROCEDURE — 85025 COMPLETE CBC W/AUTO DIFF WBC: CPT

## 2022-06-07 PROCEDURE — 2580000003 HC RX 258: Performed by: INTERNAL MEDICINE

## 2022-06-07 PROCEDURE — 80053 COMPREHEN METABOLIC PANEL: CPT

## 2022-06-07 PROCEDURE — 84443 ASSAY THYROID STIM HORMONE: CPT

## 2022-06-07 RX ORDER — ACETAMINOPHEN 325 MG/1
650 TABLET ORAL
Status: CANCELLED | OUTPATIENT
Start: 2022-06-07

## 2022-06-07 RX ORDER — SODIUM CHLORIDE 9 MG/ML
5-250 INJECTION, SOLUTION INTRAVENOUS PRN
Status: CANCELLED | OUTPATIENT
Start: 2022-06-07

## 2022-06-07 RX ORDER — EPINEPHRINE 1 MG/ML
0.3 INJECTION, SOLUTION, CONCENTRATE INTRAVENOUS PRN
Status: CANCELLED | OUTPATIENT
Start: 2022-06-07

## 2022-06-07 RX ORDER — SODIUM CHLORIDE 9 MG/ML
5-40 INJECTION INTRAVENOUS PRN
Status: CANCELLED | OUTPATIENT
Start: 2022-06-07

## 2022-06-07 RX ORDER — SODIUM CHLORIDE 0.9 % (FLUSH) 0.9 %
5-40 SYRINGE (ML) INJECTION PRN
Status: DISCONTINUED | OUTPATIENT
Start: 2022-06-07 | End: 2022-06-08 | Stop reason: HOSPADM

## 2022-06-07 RX ORDER — DIPHENHYDRAMINE HYDROCHLORIDE 50 MG/ML
50 INJECTION INTRAMUSCULAR; INTRAVENOUS
Status: CANCELLED | OUTPATIENT
Start: 2022-06-07

## 2022-06-07 RX ORDER — MEPERIDINE HYDROCHLORIDE 25 MG/ML
12.5 INJECTION INTRAMUSCULAR; INTRAVENOUS; SUBCUTANEOUS PRN
Status: CANCELLED | OUTPATIENT
Start: 2022-06-07

## 2022-06-07 RX ORDER — SODIUM CHLORIDE 9 MG/ML
INJECTION, SOLUTION INTRAVENOUS CONTINUOUS
Status: CANCELLED | OUTPATIENT
Start: 2022-06-07

## 2022-06-07 RX ORDER — ONDANSETRON 2 MG/ML
8 INJECTION INTRAMUSCULAR; INTRAVENOUS
Status: CANCELLED | OUTPATIENT
Start: 2022-06-07

## 2022-06-07 RX ORDER — SODIUM CHLORIDE 9 MG/ML
5-250 INJECTION, SOLUTION INTRAVENOUS PRN
Status: DISCONTINUED | OUTPATIENT
Start: 2022-06-07 | End: 2022-06-08 | Stop reason: HOSPADM

## 2022-06-07 RX ORDER — HEPARIN SODIUM (PORCINE) LOCK FLUSH IV SOLN 100 UNIT/ML 100 UNIT/ML
500 SOLUTION INTRAVENOUS PRN
Status: DISCONTINUED | OUTPATIENT
Start: 2022-06-07 | End: 2022-06-08 | Stop reason: HOSPADM

## 2022-06-07 RX ORDER — ALBUTEROL SULFATE 90 UG/1
4 AEROSOL, METERED RESPIRATORY (INHALATION) PRN
Status: CANCELLED | OUTPATIENT
Start: 2022-06-07

## 2022-06-07 RX ORDER — FAMOTIDINE 10 MG/ML
20 INJECTION, SOLUTION INTRAVENOUS
Status: CANCELLED | OUTPATIENT
Start: 2022-06-07

## 2022-06-07 RX ADMIN — SODIUM CHLORIDE 20 ML/HR: 9 INJECTION, SOLUTION INTRAVENOUS at 15:08

## 2022-06-07 RX ADMIN — SODIUM CHLORIDE, PRESERVATIVE FREE 20 ML: 5 INJECTION INTRAVENOUS at 15:51

## 2022-06-07 RX ADMIN — SODIUM CHLORIDE 200 MG: 9 INJECTION, SOLUTION INTRAVENOUS at 15:12

## 2022-06-07 RX ADMIN — HEPARIN 500 UNITS: 100 SYRINGE at 15:51

## 2022-06-07 NOTE — PROGRESS NOTES
Patient arrived to treatment suite for blood draw and chemotherapy infusion.  Right chest mediport accessed,  Blood drawn from site and sent to lab for processing.  No other questions or concerns at this time. Treatment approved and given as ordered.  Patient tolerated well.  Left treatment suite ambulatory.  Discharge instructions provided.     Patient's status assessed and documented appropriately.  All labs and required results were also reviewed today.  Treatment parameters have been reviewed.  Today's treatment has been approved by the provider.  Treatment orders and medication sequencing (when applicable) was verified by 2 registered nurses.  The treatment plan was confirmed with the patient prior to administration, and the patient understands the need to report any treatment-related symptoms.     Prior to administration, when applicable, the following 8 elements of medication administration were reviewed with 2nd Registered Nurse prior to dosing: drug name, drug dose, infusion volume when prepared in a syringe, rate of administration, expiration dates and/or times, appearance and integrity of drug(s), and rate of pump for infusion.  The 5 rights of medication administration have been verified

## 2022-06-09 ENCOUNTER — HOSPITAL ENCOUNTER (OUTPATIENT)
Dept: INFUSION THERAPY | Age: 52
Discharge: HOME | End: 2022-06-09
Payer: COMMERCIAL

## 2022-06-09 ENCOUNTER — OFFICE VISIT (OUTPATIENT)
Dept: ONCOLOGY | Age: 52
End: 2022-06-09
Payer: COMMERCIAL

## 2022-06-09 VITALS
DIASTOLIC BLOOD PRESSURE: 79 MMHG | WEIGHT: 196.4 LBS | TEMPERATURE: 96.9 F | OXYGEN SATURATION: 97 % | HEART RATE: 111 BPM | RESPIRATION RATE: 16 BRPM | HEIGHT: 72 IN | BODY MASS INDEX: 26.6 KG/M2 | SYSTOLIC BLOOD PRESSURE: 132 MMHG

## 2022-06-09 DIAGNOSIS — D64.9 ANEMIA, UNSPECIFIED TYPE: ICD-10-CM

## 2022-06-09 DIAGNOSIS — D64.9 ANEMIA, UNSPECIFIED TYPE: Primary | ICD-10-CM

## 2022-06-09 DIAGNOSIS — C34.82 MALIGNANT NEOPLASM OF OVERLAPPING SITES OF LEFT LUNG (HCC): ICD-10-CM

## 2022-06-09 DIAGNOSIS — C61 PROSTATE CANCER (HCC): ICD-10-CM

## 2022-06-09 DIAGNOSIS — C79.51 SECONDARY MALIGNANT NEOPLASM OF BONE (HCC): Primary | ICD-10-CM

## 2022-06-09 PROCEDURE — 3017F COLORECTAL CA SCREEN DOC REV: CPT | Performed by: INTERNAL MEDICINE

## 2022-06-09 PROCEDURE — 4004F PT TOBACCO SCREEN RCVD TLK: CPT | Performed by: INTERNAL MEDICINE

## 2022-06-09 PROCEDURE — 6360000002 HC RX W HCPCS: Performed by: INTERNAL MEDICINE

## 2022-06-09 PROCEDURE — 96372 THER/PROPH/DIAG INJ SC/IM: CPT

## 2022-06-09 PROCEDURE — 99214 OFFICE O/P EST MOD 30 MIN: CPT | Performed by: INTERNAL MEDICINE

## 2022-06-09 PROCEDURE — G8419 CALC BMI OUT NRM PARAM NOF/U: HCPCS | Performed by: INTERNAL MEDICINE

## 2022-06-09 PROCEDURE — G8427 DOCREV CUR MEDS BY ELIG CLIN: HCPCS | Performed by: INTERNAL MEDICINE

## 2022-06-09 RX ORDER — ONDANSETRON 2 MG/ML
8 INJECTION INTRAMUSCULAR; INTRAVENOUS
Status: CANCELLED | OUTPATIENT
Start: 2022-06-12

## 2022-06-09 RX ORDER — ACETAMINOPHEN 325 MG/1
650 TABLET ORAL
Status: CANCELLED | OUTPATIENT
Start: 2022-06-12

## 2022-06-09 RX ORDER — DIPHENHYDRAMINE HYDROCHLORIDE 50 MG/ML
50 INJECTION INTRAMUSCULAR; INTRAVENOUS
Status: CANCELLED | OUTPATIENT
Start: 2022-06-12

## 2022-06-09 RX ORDER — EPINEPHRINE 1 MG/ML
0.3 INJECTION, SOLUTION, CONCENTRATE INTRAVENOUS PRN
Status: CANCELLED | OUTPATIENT
Start: 2022-06-12

## 2022-06-09 RX ORDER — SODIUM CHLORIDE 9 MG/ML
INJECTION, SOLUTION INTRAVENOUS CONTINUOUS
Status: CANCELLED | OUTPATIENT
Start: 2022-06-12

## 2022-06-09 RX ORDER — FAMOTIDINE 10 MG/ML
20 INJECTION, SOLUTION INTRAVENOUS
Status: CANCELLED | OUTPATIENT
Start: 2022-06-12

## 2022-06-09 RX ORDER — ALBUTEROL SULFATE 90 UG/1
4 AEROSOL, METERED RESPIRATORY (INHALATION) PRN
Status: CANCELLED | OUTPATIENT
Start: 2022-06-12

## 2022-06-09 RX ADMIN — EPOETIN ALFA-EPBX 20000 UNITS: 10000 INJECTION, SOLUTION INTRAVENOUS; SUBCUTANEOUS at 12:06

## 2022-06-09 ASSESSMENT — PATIENT HEALTH QUESTIONNAIRE - PHQ9
SUM OF ALL RESPONSES TO PHQ QUESTIONS 1-9: 0
2. FEELING DOWN, DEPRESSED OR HOPELESS: 0
1. LITTLE INTEREST OR PLEASURE IN DOING THINGS: 0
SUM OF ALL RESPONSES TO PHQ QUESTIONS 1-9: 0
SUM OF ALL RESPONSES TO PHQ9 QUESTIONS 1 & 2: 0
SUM OF ALL RESPONSES TO PHQ QUESTIONS 1-9: 0
SUM OF ALL RESPONSES TO PHQ QUESTIONS 1-9: 0

## 2022-06-09 NOTE — PROGRESS NOTES
JAYA Agueroing Questions  Patient: Elvia Barajas  MRN: [id number]    Date: 6/9/2022        1. Do you have any new issues?   no         2. Do you need any refills on medications?    no    3. Have you had any imaging done since your last visit?   no    4. Have you been hospitalized or seen in the emergency room since your last visit here?   no    5. Did the patient have a depression screening completed today?  Yes    PHQ-9 Total Score: 0 (6/9/2022 11:55 AM)       PHQ-9 Given to (if applicable):               PHQ-9 Score (if applicable):                     [] Positive     []  Negative              Does question #9 need addressed (if applicable)                     [] Yes    []  No               Pool Gracia CMA

## 2022-06-09 NOTE — PROGRESS NOTES
Pt here for Retacrit injection. Pt has no complaints. Blood pressure and labs reviewed prior to administration. Pt given injection to left upper arm. Tolerated well. Pt has an OV today. Pt left tx center to [address]

## 2022-06-09 NOTE — PROGRESS NOTES
Patient Name:  Shannon Hagen  Patient :  1970  Patient MRN:  [id number]     Primary Oncologist: Torres Acuna MD  Referring Provider: Pankaj Griffin MD     Date of Service: 2022        Chief Complaint:    Chief Complaint   Patient presents with   [address]    Chemotherapy       Encounter Diagnoses   Name Primary?  Secondary malignant neoplasm of bone ([facility] Yes    Malignant neoplasm of overlapping sites of left lung ([facility])     Anemia, unspecified type     Prostate cancer ([facility]         HPI:   21: Initial [facility] visit:Hima Dean is a 45 y.o. male who presents to [facility] with report of dysuria and flank pain. Reports these symptoms started a few weeks ago. He ultimately came to the ED due to worsening back pain.      He reports ongoing issues with frequent urge to urinate and notes some incontinence. He denies hematuria. He was noted to have acute pyelonephritis and was admitted and started on IV Rocephin.      21 CT chest     Impression   1. Left hilar neoplasm extending into the left upper lobe measuring 3.2 x 5.9   x 5.3 cm obstructing the left upper lobe bronchus with probable minimal   adjacent infiltrates versus lymphangitic spread of tumor.  PET-CT/biopsy is   recommended. 2. Mediastinal lymphadenopathy. 3. Prominent left supraclavicular lymph nodes. 4. No osseous metastatic disease.      21 Ct abdomen and pelvis  Impression   1. Circumferential thickening of the bladder wall is noted which could be   related to cystitis.  Correlate with urinalysis. 2. There is left retroperitoneal lymphadenopathy.  This could be reactive or   neoplastic.  This can be further evaluated with PET-CT. 3. There is minimal stranding within the retroperitoneal on the left.  This   is uncertain etiology however could be related to acute pyelonephritis. 4. Interval development of multiple sclerotic lesions within the spine and   pelvis, concerning for metastatic osseous disease.    5. Prostate gland is enlarged.  Correlate with PSA.      .30      He denies past history of cancer. He smokes 2-3 ppd and drinks 10-12 beers daily. He reports unknown malignancy in his sister who  at 39. No other known family history of cancer.      Due to lung mass and concern for metastatic prostate cancer we were called to evaluate. 21:  Final Pathologic Diagnosis:   Needle biopsy of lung, clinically left lung mass:        SQUAMOUS CELL CARCINOMA IN SITU.     21:PET  1.  Left perihilar mass likely represents primary lung cancer.  Correlate   with biopsy results.  The opacity more peripheral in the left lower lobe has   relatively low level activity and likely represents an area of post   obstructive pneumonia adjacent to the mass.       2.  Metabolically active left AP window lymph node concerning for metastatic   disease.       3.  The prostate is enlarged and has increased FDG activity which could be   due to prostatitis or prostate cancer.  Correlate with PSA levels.       4.  No increased metabolic activity associated with retroperitoneal lymph   nodes.  This is unlikely related to the lung process given the significant   difference in metabolic activity; however, if there is prostate cancer, this   could potentially be metastatic disease from the prostate cancer, which can   have variable levels of uptake on FDG PET scans.       5.  No metabolic activity associated with multiple sclerotic lesions.  Again   this is unlikely related to the lung process, but if there is prostate   cancer, this could represent metastatic disease from prostate cancer with   poor FDG avidity.  Otherwise it could also represent large bone islands.       6.  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 There is a focal area of intense activity associated with a new lucent lesion   within the background of Paget's disease concerning for metastatic disease.    Given the FDG activity, it is likely related to the lung process.           8/20/21: MRI brain  1. There is a punctate focus of restricted diffusion within the right frontal   lobe without associated enhancement, mass effect or midline shift.  This   could represent a punctate acute infarct.  However, given history, an early   metastatic focus cannot be entirely excluded. 2. Scattered foci of susceptibility are seen within the cerebral hemispheres   bilaterally, which were not visualized on the prior exam.  Findings could   represent areas of remote microhemorrhage or perhaps treated metastases. 3. No abnormal enhancement within the brain. 4. Minimal global parenchymal volume loss with minimal chronic microvascular   ischemic changes. 12/2/21: CT chest, abdomen and pelvis:  Chest CT: Interval increased size of the left perihilar mass, with increased   adjacent obstructive pneumonitis and atelectasis.       Stable to minimally to decreased mediastinal lymphadenopathy.       Interval increased bony metastatic disease.       Abdomen and pelvis CT: Stable retroperitoneal and pelvic lymphadenopathy.       Interval increased bony metastatic disease. 12/17/21:Final Pathologic Diagnosis:   Bone, posterior ileum, needle core biopsy:   -     METASTATIC SQUAMOUS CELL CARCINOMA. PACO  PDL1 22c3 >50%(keytruda)  PTEN positive  Alk neg    CARIS, not enough tissue for rest of the testing    Gaurdant with no actionable mutation otherwise     12/27/21: Started XRT to the left pelvis  Added chest radiation dia 3  Completed dia 10 2022      Started carbo/taxol/keytruda jan 20 2022 2/8/22: CT chest:  1.  Interval decrease in size of left suprahilar mass extending along the   left upper lobe bronchi extension into the left main pulmonary artery.  There   is interval decrease in associated ground-glass opacity in the left upper   lobe.  Findings suggest response to therapy.       2.  Interval decrease in mediastinal lymphadenopathy.     3.  Diffuse osseous metastatic disease. Feb/march 2022 he received casodex and GnRH analogue     3/11/22: CT head: No acute abn    3/26/22: Ct abdomen and pelvis:  1. Interval development of a mild degree of left hydronephrosis and   hydroureter, to the level of the left UVJ.  An approximate 7.3 x 5 cm soft   tissue mass is present along the left posterior margin of the urinary   bladder, and contiguous with the prostate gland, resulting in obstructing of   the ureteral orifice.  Soft tissue mass is most consistent with primary   prostate carcinoma, in light of the diffuse blastic metastatic disease. 2. Interval increase in confluent adenopathy at the level of the aortic   bifurcation, consistent with progression of metastatic disease   3. Diffuse osseous blastic metastatic disease, with interval increase in size   of the lytic metastatic lesion involving the proximal left iliac bone     3/22/22:     He was admitted at [place] in April (4/22 - 4/30). He had debulking of his primary tumor via bronchoscopy during admit. BMB at [place] consistent with met prostate cancer  - Sampson prior to admission; exchanged in ED 4/22. Urine cx neg  - CT A/P:  Soft tissue mass along the left UVJ/posterior lateral left bladder wall with moderate left hydroureteronephrosis and diffuse urinary bladder  thickening. UVJ mass is likely a metastatic deposit involving the bladder and possibly the left prostate/seminal vesicle. - 4/25L neph tube placed by IR; sampson removed. Also treated for PNA. 5/16/2022 Imaging   PET - local:   1. Decreased size and uptake of the left perihilar mass. Adjacent   consolidation extending into the left lower lobe likely represents   post-treatment and post-obstructive atelectasis. Resolution of the   hypermetabolic prevascular lymph node. 2. Decreased uptake involving the lytic lesion along the left sacroiliac   joint.  Diffuse sclerosis throughout the skeleton without significant uptake   is unchanged and may represent treated metastatic disease. May 19 2022 CBC with hb 6.9, platelets 89, psa 137, testerone <7    May 31 2022 started Zytiga and prednisone    2022 resumed Oracio Lindershyann    22: Started SHABBIR    Past Medical History:     BPH, HTN, COPD                                                            Past Surgery History:    None per his wife                                                                        Social History:   Lives with wife. Cut down smoking to 2-4 cigarettes per day prior to which he was smoking 2 to 3 packs/day for the past 40 years. Also reported drinking alcohol 10-12 beers per day. Denied any other illicit drug abuse. Family History:    He reported that his sister  at the age of 39 with  metastatic cancer, he was not sure what the primary was                                                                                              Allergies   Allergen Reactions    Tramadol Other (See Comments)     seizures    Vicodin [Hydrocodone-Acetaminophen] Hives       Current Outpatient Medications on File Prior to Visit   Medication Sig Dispense Refill    predniSONE (DELTASONE) 5 MG tablet       abiraterone acetate (ZYTIGA) 250 MG tablet Take 1,000 mg by mouth daily      methadone (DOLOPHINE) 5 MG tablet Take 1 tablet by mouth in the morning and at bedtime for 30 days. 60 tablet 0    oxyCODONE (OXY-IR) 15 MG immediate release tablet Take 1 tablet by mouth every 6 hours as needed for Pain for up to 30 days. 120 tablet 0    LORazepam (ATIVAN) 1 MG tablet Take 1 tablet by mouth nightly as needed for Anxiety for up to 30 days.  30 tablet 0    oxyCODONE-acetaminophen (PERCOCET) 7.5-325 MG per tablet take 1 tablet by mouth every 6 hours AS NEEDED FOR PAIN      naloxone 4 MG/0.1ML LIQD nasal spray 1 spray by Nasal route as needed for Opioid Reversal 1 each 5    cyanocobalamin ([employer] VITAMIN B12) 1000 MCG tablet Take 1 tablet by mouth daily 30 tablet 3    nicotine (NICODERM CQ) 21 MG/24HR Place 1 patch onto the skin daily 30 patch 3    bicalutamide (CASODEX) 50 MG chemo tablet Take 1 tablet by mouth daily 30 tablet 11    ondansetron (ZOFRAN) 8 MG tablet take 1 tablet by mouth every 8 hours if needed for nausea and vomiting      Sennosides (SENNA) 8.6 MG CAPS Take 1 capsule by mouth 2 times daily as needed (constipation) 20 capsule 0    dexamethasone (DECADRON) 4 MG tablet Two tablets the day before treatment, one table daily for four days starting the day after chemotherapy 18 tablet 0    NARCAN 4 MG/0.1ML LIQD nasal spray DISPENSED PER STANDING ORDER USE AS DIRECTED PATIENT IS TRAINED OPIOID OVERDOSE RESPONDER      Calcium Carbonate-Vitamin D (OYSTER SHELL CALCIUM/D) 500-200 MG-UNIT TABS Take 1 tablet by mouth daily 30 tablet 3    albuterol sulfate HFA (PROVENTIL HFA) 108 (90 Base) MCG/ACT inhaler Inhale 2 puffs into the lungs every 4 hours as needed for Wheezing or Shortness of Breath With spacer (and mask if indicated). Thanks. 1 Inhaler 1     No current facility-administered medications on file prior to visit. Interval History: 6/9/22: He arrived with his wife to the clinic today. Reported that he gets sob when he walk 2-3 blocks. No pain. Feels tired all the time. No fever, cough. Methadone makes him feel drowsy. Reported that he been able to urinate. Is on O2 at home.      Review of Systems:  As per the interval history, rest of the review of system negative     Vital Signs: /79 (Site: Left Upper Arm, Position: Sitting, Cuff Size: Large Adult)   Pulse (!) 111   Temp 96.9 °F (36.1 °C) (Infrared)   Resp 16   Ht 6' (1.829 m)   Wt 196 lb 6.4 oz (89.1 kg)   SpO2 97%   BMI 26.64 kg/m²      CONSTITUTIONAL:alert and awake, sob on ambulation to the bathroom,  EYES: RAISSA, palor but no icterus   ENT: ATNC  NECK: No JVD  HEMATOLOGIC/LYMPHATIC: no cervical, supraclavicular or axillary lymphadenopathy   LUNGS: CTAB  CARDIOVASCULAR: s1s2 rrr no murmurs  ABDOMEN: soft ntnd bs pos, left nephrostomy tube.    NEUROLOGIC: GI  SKIN: Psoriatic rash with excoriation marks on the lower extremities and also upper extremities  EXTREMITIES: no LE edema bilaterally     Labs:  Hematology:  Lab Results   Component Value Date    WBC 5.3 06/07/2022    RBC 2.80 (L) 06/07/2022    HGB 8.0 (L) 06/07/2022    HCT 25.0 (L) 06/07/2022    MCV 89.3 06/07/2022    MCH 28.6 06/07/2022    MCHC 32.0 06/07/2022    RDW 18.0 (H) 06/07/2022     (L) 06/07/2022    MPV 9.9 06/07/2022    BANDSPCT 12 (H) 04/09/2022    SEGSPCT 61.6 06/07/2022    EOSRELPCT 1.9 06/07/2022    BASOPCT 0.4 06/07/2022    LYMPHOPCT 26.5 06/07/2022    MONOPCT 9.6 (H) 06/07/2022    BANDABS 0.46 04/09/2022    SEGSABS 3.3 06/07/2022    EOSABS 0.1 06/07/2022    BASOSABS 0.0 06/07/2022    LYMPHSABS 1.4 06/07/2022    MONOSABS 0.5 06/07/2022    DIFFTYPE AUTOMATED DIFFERENTIAL 06/07/2022    ANISOCYTOSIS 1+ 04/09/2022    POLYCHROM 1+ 04/09/2022    PLTM DECREASED 04/09/2022     Lab Results   Component Value Date    ESR 47 (H) 03/22/2022     Chemistry:  Lab Results   Component Value Date     06/07/2022    K 4.7 06/07/2022     06/07/2022    CO2 19 (L) 06/07/2022    BUN 17 06/07/2022    CREATININE 0.9 06/07/2022    GLUCOSE 104 (H) 06/07/2022    CALCIUM 8.9 06/07/2022    PROT 7.2 06/07/2022    LABALBU 3.9 06/07/2022    BILITOT 0.2 06/07/2022    ALKPHOS 945 (H) 06/07/2022    AST 13 (L) 06/07/2022    ALT <5 (L) 06/07/2022    LABGLOM >60 06/07/2022    GFRAA >60 06/07/2022    MG 1.8 04/10/2022    POCCA 1.18 04/01/2022    POCGLU 121 (H) 04/01/2022     Lab Results   Component Value Date     (H) 03/22/2022     No components found for: LD  Lab Results   Component Value Date    TSHHS 1.420 06/07/2022    T4FREE 1.21 06/07/2022     Immunology:  Lab Results   Component Value Date    PROT 7.2 06/07/2022    SPEP  03/22/2022 INTERPRETATION - Decreased albumin and total prorein. No definitive monoclonal bands are seen. SAF    SPEP INTERPRETATION - No monoclonal bands are seen. SAF 03/22/2022    ALBUMINELP 2.8 (L) 03/22/2022    LABALPH 0.4 03/22/2022    LABALPH 1.1 03/22/2022    LABBETA 1.1 03/22/2022    GAMGLOB 0.8 03/22/2022     No results found for: Nadya Bullocks, KLFLCR  No results found for: B2M  Coagulation Panel:  Lab Results   Component Value Date    PROTIME 13.1 04/11/2022    INR 1.02 04/11/2022    APTT 32.8 04/11/2022    DDIMER 1709 (H) 04/08/2022     Anemia Panel:  Lab Results   Component Value Date    PZWUUQRS24 373.0 03/22/2022    FOLATE 4.0 03/22/2022     Tumor Markers:  Lab Results   Component Value Date    CEA 7.7 07/23/2021    .0 (H) 03/22/2022        Observations:  ECOG:  PHQ-9 Total Score: 0 (6/9/2022 11:55 AM)       Assessment & Plan:   H/O noncompliance. Left hilar mass with mediastinal hilar lymphadenopathy. 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 PET scan results from august 2021 noted, bone lesions most probably not metastatic except for one lytic lesion. MRI of the brain with no convincing metastatic lesions. Presented  the case in the tumor board. Decision made to proceed with a prostate biopsy and treat with ADT if malignancy  and in the meantime proceed with staging mediastinoscopy/rebiopsy for further treatment plan. But as pt very very very noncompliant, did not follow up with urology, CTS or for bone biopsy multiple times. Note PSA rising and was 250 on November 16 2021   CT imaging dec 2021 with progressive met disease  Bone biopsy on dec 13 2021 with met squamous cell cancer, consistent with lung primary. PDL1 >50%Antonio Cisneros) .    Not enough tissue for rest of CARIS, guardant 360 with no other actionable mutation  CT chest jan 2022 with no significant change   Completed Palliative radiation to the pelvic area and also radiation to the left lung dia 10 2022  Discussed the findings, diagnosis and poor prognosis and treatment with carbo/taxol/pembro after completion of radiation. Discussed ae. PORT placed. Completed OCM  C1D1 carbo/taxol and keytruda started 1/20/22, unfortunately received only one treatment so far sec to being very very noncompliant  CT after C1 with positive response   And then treatment held sec to cytopenias/noncompliance  and also admission to [facility]. PET in may 2022 with continued response. Resumed Keytruda alone June 7 2022  Plan for repeat PET after 3-4C. Anemia: sec to met prostate cancer, involvement of the bone marrow. Transfusion support as needed. SHABBIR. Met prostate cancer: PSA was ~900, started on casodex and received GnRH analogue in feb/march . But BMB biopsy in April  consistent with involvement by prostate cancer. May 2022 PSA was 256. Was Started on Zytiga and prednisone in may 2022 . Continue lupron. Repeat PSA in July 2022     Elevated alk phos most probably secondary to the bone mets, increased levels noted     Adequate analgesic and bowel regimen. Rt hydronephrosis: PCN placed and is followed by urology at [facility]. Continue other medical care. Thank you for letting us be part of the care and will follow along. Discussed above findings and overall very poor prognosis and plan with him and he voiced understanding. Answered all his questions. Smoking and alcohol cessation    Recommend follow-up with primary care physician and other specialists. Note he has he has been very noncompliant with appointments in the past.    Please do not hesitate to contact us if you need further information. Return to clinic June 28 2022  or earlier if new Sx    I have recommended that the patient follow CDC guidelines for prevention of COVID-19 infection.   Received Covid vaccine/flu vaccine     TOMI

## 2022-06-14 ENCOUNTER — CLINICAL DOCUMENTATION (OUTPATIENT)
Dept: ONCOLOGY | Age: 52
End: 2022-06-14

## 2022-06-14 ENCOUNTER — HOSPITAL ENCOUNTER (OUTPATIENT)
Dept: INFUSION THERAPY | Age: 52
Discharge: HOME | End: 2022-06-14

## 2022-06-14 ENCOUNTER — OFFICE VISIT (OUTPATIENT)
Dept: ONCOLOGY | Age: 52
End: 2022-06-14
Payer: COMMERCIAL

## 2022-06-14 ENCOUNTER — TELEPHONE (OUTPATIENT)
Dept: ONCOLOGY | Age: 52
End: 2022-06-14

## 2022-06-14 VITALS
DIASTOLIC BLOOD PRESSURE: 58 MMHG | TEMPERATURE: 98.2 F | BODY MASS INDEX: 25.17 KG/M2 | HEIGHT: 72 IN | RESPIRATION RATE: 16 BRPM | HEART RATE: 96 BPM | OXYGEN SATURATION: 97 % | SYSTOLIC BLOOD PRESSURE: 127 MMHG | WEIGHT: 185.8 LBS

## 2022-06-14 DIAGNOSIS — C79.51 SECONDARY MALIGNANT NEOPLASM OF BONE (HCC): Primary | ICD-10-CM

## 2022-06-14 DIAGNOSIS — C61 PROSTATE CANCER (HCC): ICD-10-CM

## 2022-06-14 DIAGNOSIS — C34.82 MALIGNANT NEOPLASM OF OVERLAPPING SITES OF LEFT LUNG (HCC): ICD-10-CM

## 2022-06-14 PROCEDURE — 99402 PREV MED CNSL INDIV APPRX 30: CPT | Performed by: INTERNAL MEDICINE

## 2022-06-14 RX ORDER — OXYCODONE HCL 10 MG/1
10 TABLET, FILM COATED, EXTENDED RELEASE ORAL 2 TIMES DAILY
Qty: 60 TABLET | Refills: 0 | Status: SHIPPED | OUTPATIENT
Start: 2022-06-14 | End: 2022-07-14

## 2022-06-14 ASSESSMENT — PATIENT HEALTH QUESTIONNAIRE - PHQ9
2. FEELING DOWN, DEPRESSED OR HOPELESS: 0
SUM OF ALL RESPONSES TO PHQ QUESTIONS 1-9: 0
SUM OF ALL RESPONSES TO PHQ9 QUESTIONS 1 & 2: 0
SUM OF ALL RESPONSES TO PHQ QUESTIONS 1-9: 0
1. LITTLE INTEREST OR PLEASURE IN DOING THINGS: 0
SUM OF ALL RESPONSES TO PHQ QUESTIONS 1-9: 0
SUM OF ALL RESPONSES TO PHQ QUESTIONS 1-9: 0

## 2022-06-14 NOTE — PROGRESS NOTES
PA initiated through [facility]; [place] [employer] has not yet replied to your PA request. Nnea Garcia may close this dialog, return to your dashboard, and perform other tasks. To check for an update later, open this request again from your dashboard. If [place] [employer] has not replied to your request within 24 hours, please reach out to the plan using the phone number located on the back on the [employer] card.

## 2022-06-14 NOTE — TELEPHONE ENCOUNTER
6/14/22 - CONFIRMED 6/23/22 INJECTION APPT  (MOVED FROM 2:15 TO 11:15) AND DR CUMMINGS OV AT 11:00 WITH WIFE ROCIO.  DF

## 2022-06-14 NOTE — PROGRESS NOTES
Palliative Care Initial Assessment    Medical Oncology History:    July 2021 evaluated for both symptoms of flank pain, dysuria as well as back pain. CT chest 5.9 cm left upper lobe mass along with mediastinal and this prominent supraclavicular adenopathy. Also noted circumferential thickening of bladder wall left retroperitoneal adenopathy prostate gland and sclerotic bony lesions.  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 January 2022 started on treatment with carboplatin, Taxol and Keytruda. PSA March 22, 2022 was 976. Subsequently started on treatment with Lupron and Zytiga. Admitted to [facility] in April 2020. Underwent bronchoscopy as well as bone marrow biopsy consistent with metastatic prostatic carcinoma. Also noted to have hydronephrosis and left nephrostomy tube was inserted. Other Medical Problems:    Stated in general was feeling quite well prior to summer 2021. Issue with hypertension and COPD     Personal History     A. Life Time:    Moved to [facility] from [place] about 28 years ago. Been  to his present wife for the past 22 years. Between them had 4 children and have a 14 grandchildren. Family is quite close and they get to see the grandchildren repeatedly. He did used to smoke and did drink alcohol though socially. Also in the past has tried marijuana. Had different type of jobs including being  as well as part-time [employer]. 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 Family:     As above pretty close    Physical Symptoms:   Saw me on June 14, 2022. He was being treated for carcinoma of the lung with Keytruda as well as Lupron and Zytiga and prednisone. After his previous visit here he had been taking long-acting OxyContin as well as as needed oxycodone and pain was under decent control.   After his visit to [place] was changed to small amount of methadone and oxycodone for breakthrough pain and he and his wife did not think he was comfortable with that regimen. .  He has been using marijuana that was helpful with his appetite. Bowels were still functioning well. She and his wife really wanted to go back to different pain medication regimen. Psychological and Cognitive Symptoms:   Overall has not noticed any significant cognitive decline  Illness Understanding and Care Preferences:   Appears to have a very good understanding of the disease process. Existential and Spiritual:   Not a regular Mormon person but has strong farrah. Brother-in-law is a   Social and Economic Resources: Has good support around him especially with his wife who is very attentive and has other caregivers  Care Coordination:   March 29, 2022 spent time with him and his wife. We did of course review his personal story as well as medical history. He currently is being treated with chemotherapy for metastatic squamous cell carcinoma of the lung. Also appears to have advanced prostatic malignancy for which she is going to have a biopsy and possible start therapy with ADT. Also issue with anemia relating to bony metastasis necessitating the use of transfusions. I did discuss with him about pain control need for him to stay on OxyContin 20 mg every 12 and also gave him a prescription for oxycodone 10 mg every 4 hours as needed for breakthrough. Talked about of course constipation need for him to be little more liberal with the use of stool softeners as well as MiraLAX. For his inability to sleep did prescribe Ativan to see if that will be of help at night. Also offered cannabis to see if that will be helpful for other symptoms of discomfort, not been able to rest as well as appetite. Advised him and his wife to call us if there is any other thing needs to be done. Handicap placard was issued    June 14, 2022 spent time with him and his wife. We of course reviewed interim issues with his malignancies as well as hospitalizations. He was being treated for lung as well as prostatic malignancy. I think overall his PSA level had improved somewhat and possibly his disease would start to improve involving his bone marrow and the pain may also improve following nephrostomy. But he and his wife is not comfortable with the present regimen. We did start him back on OxyContin 10mg every 12 hours and oxycodone 7.5 mg (half of 15 mg tablets that he already had at home) for  breakthrough. .  Still advised him to avoid constipation. Can use cannabis for bowel rest as well as appetite. Advised him to call me if he is not comfortable.     Time spent on 30 minutes    Tiffani Peguero MD

## 2022-06-14 NOTE — PROGRESS NOTES
MA Rooming Questions  Patient: Shukri La  MRN: [id number]    Date: 6/14/2022        1. Do you have any new issues?   no         2. Do you need any refills on medications?    no    3. Have you had any imaging done since your last visit?   no    4. Have you been hospitalized or seen in the emergency room since your last visit here?   no    5. Did the patient have a depression screening completed today?  Yes    PHQ-9 Total Score: 0 (6/14/2022 10:58 AM)       PHQ-9 Given to (if applicable):               PHQ-9 Score (if applicable):                     [] Positive     []  Negative              Does question #9 need addressed (if applicable)                     [] Yes    []  No               Severa Both, CMA

## 2022-06-15 NOTE — PROGRESS NOTES
Approved on June 14  Approved. Approved for OXYCONTIN Tab 12HR Deter, quantity up to 60 per 30 days, under the pharmacy benefit. The drug has been approved from 06/14/2022 to 12/14/2022. Generic substitution required when available.

## 2022-06-21 DIAGNOSIS — C79.51 SECONDARY MALIGNANT NEOPLASM OF BONE (HCC): ICD-10-CM

## 2022-06-21 DIAGNOSIS — C34.82 MALIGNANT NEOPLASM OF OVERLAPPING SITES OF LEFT LUNG (HCC): ICD-10-CM

## 2022-06-21 DIAGNOSIS — D64.9 ANEMIA, UNSPECIFIED TYPE: ICD-10-CM

## 2022-06-21 DIAGNOSIS — C61 PROSTATE CANCER (HCC): ICD-10-CM

## 2022-06-21 NOTE — TELEPHONE ENCOUNTER
Patients wife called stating he is in need of a refill on his lorazepam she states that per [facility] he is to be taking it morning and night. (twice a day) I informed her the script that doctor Orlin Baker wrote was for once at bedtime, and insurance may not cover it she states she will pay out of pocket if need be.  Please advise

## 2022-06-22 RX ORDER — LORAZEPAM 1 MG/1
1 TABLET ORAL NIGHTLY PRN
Qty: 30 TABLET | Refills: 0 | Status: SHIPPED | OUTPATIENT
Start: 2022-06-22 | End: 2022-07-22

## 2022-06-23 DIAGNOSIS — R53.83 OTHER FATIGUE: Primary | ICD-10-CM

## 2022-06-27 RX ORDER — MEPERIDINE HYDROCHLORIDE 25 MG/ML
12.5 INJECTION INTRAMUSCULAR; INTRAVENOUS; SUBCUTANEOUS PRN
Status: CANCELLED | OUTPATIENT
Start: 2022-06-28

## 2022-06-27 RX ORDER — DIPHENHYDRAMINE HYDROCHLORIDE 50 MG/ML
50 INJECTION INTRAMUSCULAR; INTRAVENOUS
Status: CANCELLED | OUTPATIENT
Start: 2022-06-28

## 2022-06-27 RX ORDER — ALBUTEROL SULFATE 90 UG/1
4 AEROSOL, METERED RESPIRATORY (INHALATION) PRN
Status: CANCELLED | OUTPATIENT
Start: 2022-06-28

## 2022-06-27 RX ORDER — FAMOTIDINE 10 MG/ML
20 INJECTION, SOLUTION INTRAVENOUS
Status: CANCELLED | OUTPATIENT
Start: 2022-06-28

## 2022-06-27 RX ORDER — EPINEPHRINE 1 MG/ML
0.3 INJECTION, SOLUTION, CONCENTRATE INTRAVENOUS PRN
Status: CANCELLED | OUTPATIENT
Start: 2022-06-28

## 2022-06-27 RX ORDER — SODIUM CHLORIDE 9 MG/ML
INJECTION, SOLUTION INTRAVENOUS CONTINUOUS
Status: CANCELLED | OUTPATIENT
Start: 2022-06-28

## 2022-06-27 RX ORDER — ACETAMINOPHEN 325 MG/1
650 TABLET ORAL
Status: CANCELLED | OUTPATIENT
Start: 2022-06-28

## 2022-06-27 RX ORDER — SODIUM CHLORIDE 0.9 % (FLUSH) 0.9 %
5-40 SYRINGE (ML) INJECTION PRN
Status: CANCELLED | OUTPATIENT
Start: 2022-06-28

## 2022-06-27 RX ORDER — ONDANSETRON 2 MG/ML
8 INJECTION INTRAMUSCULAR; INTRAVENOUS
Status: CANCELLED | OUTPATIENT
Start: 2022-06-28

## 2022-06-27 RX ORDER — HEPARIN SODIUM (PORCINE) LOCK FLUSH IV SOLN 100 UNIT/ML 100 UNIT/ML
500 SOLUTION INTRAVENOUS PRN
Status: CANCELLED | OUTPATIENT
Start: 2022-06-28

## 2022-06-27 RX ORDER — SODIUM CHLORIDE 9 MG/ML
5-250 INJECTION, SOLUTION INTRAVENOUS PRN
Status: CANCELLED | OUTPATIENT
Start: 2022-06-28

## 2022-06-27 RX ORDER — SODIUM CHLORIDE 9 MG/ML
5-40 INJECTION INTRAVENOUS PRN
Status: CANCELLED | OUTPATIENT
Start: 2022-06-28

## 2022-06-28 ENCOUNTER — OFFICE VISIT (OUTPATIENT)
Dept: ONCOLOGY | Age: 52
End: 2022-06-28
Payer: COMMERCIAL

## 2022-06-28 ENCOUNTER — HOSPITAL ENCOUNTER (OUTPATIENT)
Dept: INFUSION THERAPY | Age: 52
Discharge: HOME | End: 2022-06-28
Payer: COMMERCIAL

## 2022-06-28 VITALS
HEIGHT: 73 IN | DIASTOLIC BLOOD PRESSURE: 73 MMHG | HEART RATE: 98 BPM | BODY MASS INDEX: 25.5 KG/M2 | OXYGEN SATURATION: 97 % | TEMPERATURE: 97 F | SYSTOLIC BLOOD PRESSURE: 133 MMHG | RESPIRATION RATE: 16 BRPM | WEIGHT: 192.4 LBS

## 2022-06-28 VITALS
DIASTOLIC BLOOD PRESSURE: 73 MMHG | WEIGHT: 192.4 LBS | SYSTOLIC BLOOD PRESSURE: 133 MMHG | OXYGEN SATURATION: 100 % | TEMPERATURE: 97.9 F | HEIGHT: 72 IN | BODY MASS INDEX: 26.06 KG/M2 | HEART RATE: 98 BPM

## 2022-06-28 DIAGNOSIS — C34.82 MALIGNANT NEOPLASM OF OVERLAPPING SITES OF LEFT LUNG (HCC): Primary | ICD-10-CM

## 2022-06-28 DIAGNOSIS — C79.51 SECONDARY MALIGNANT NEOPLASM OF BONE (HCC): Primary | ICD-10-CM

## 2022-06-28 DIAGNOSIS — D64.9 ANEMIA, UNSPECIFIED TYPE: ICD-10-CM

## 2022-06-28 DIAGNOSIS — C61 PROSTATE CANCER (HCC): ICD-10-CM

## 2022-06-28 DIAGNOSIS — C34.82 MALIGNANT NEOPLASM OF OVERLAPPING SITES OF LEFT LUNG (HCC): ICD-10-CM

## 2022-06-28 LAB
ALBUMIN SERPL-MCNC: 3.7 GM/DL (ref 3.4–5)
ALP BLD-CCNC: 887 IU/L (ref 40–128)
ALT SERPL-CCNC: <5 U/L (ref 10–40)
ANION GAP SERPL CALCULATED.3IONS-SCNC: 12 MMOL/L (ref 4–16)
AST SERPL-CCNC: 9 IU/L (ref 15–37)
BASOPHILS ABSOLUTE: 0 K/CU MM
BASOPHILS RELATIVE PERCENT: 0.4 % (ref 0–1)
BILIRUB SERPL-MCNC: 0.2 MG/DL (ref 0–1)
BUN BLDV-MCNC: 14 MG/DL (ref 6–23)
CALCIUM SERPL-MCNC: 8.9 MG/DL (ref 8.3–10.6)
CHLORIDE BLD-SCNC: 106 MMOL/L (ref 99–110)
CO2: 22 MMOL/L (ref 21–32)
CREAT SERPL-MCNC: 0.7 MG/DL (ref 0.9–1.3)
DIFFERENTIAL TYPE: ABNORMAL
EOSINOPHILS ABSOLUTE: 0.2 K/CU MM
EOSINOPHILS RELATIVE PERCENT: 3.1 % (ref 0–3)
GFR AFRICAN AMERICAN: >60 ML/MIN/1.73M2
GFR NON-AFRICAN AMERICAN: >60 ML/MIN/1.73M2
GLUCOSE BLD-MCNC: 118 MG/DL (ref 70–99)
HCT VFR BLD CALC: 23.6 % (ref 42–52)
HEMOGLOBIN: 7.4 GM/DL (ref 13.5–18)
LYMPHOCYTES ABSOLUTE: 0.9 K/CU MM
LYMPHOCYTES RELATIVE PERCENT: 13.8 % (ref 24–44)
MCH RBC QN AUTO: 28.1 PG (ref 27–31)
MCHC RBC AUTO-ENTMCNC: 31.4 % (ref 32–36)
MCV RBC AUTO: 89.7 FL (ref 78–100)
MONOCYTES ABSOLUTE: 0.5 K/CU MM
MONOCYTES RELATIVE PERCENT: 7.3 % (ref 0–4)
PDW BLD-RTO: 17.8 % (ref 11.7–14.9)
PLATELET # BLD: 236 K/CU MM (ref 140–440)
PMV BLD AUTO: 9.6 FL (ref 7.5–11.1)
POTASSIUM SERPL-SCNC: 4.4 MMOL/L (ref 3.5–5.1)
RBC # BLD: 2.63 M/CU MM (ref 4.6–6.2)
SEGMENTED NEUTROPHILS ABSOLUTE COUNT: 5.1 K/CU MM
SEGMENTED NEUTROPHILS RELATIVE PERCENT: 75.4 % (ref 36–66)
SODIUM BLD-SCNC: 140 MMOL/L (ref 135–145)
TOTAL PROTEIN: 7.1 GM/DL (ref 6.4–8.2)
WBC # BLD: 6.8 K/CU MM (ref 4–10.5)

## 2022-06-28 PROCEDURE — 6360000002 HC RX W HCPCS

## 2022-06-28 PROCEDURE — G8419 CALC BMI OUT NRM PARAM NOF/U: HCPCS | Performed by: INTERNAL MEDICINE

## 2022-06-28 PROCEDURE — 3017F COLORECTAL CA SCREEN DOC REV: CPT | Performed by: INTERNAL MEDICINE

## 2022-06-28 PROCEDURE — 99214 OFFICE O/P EST MOD 30 MIN: CPT | Performed by: INTERNAL MEDICINE

## 2022-06-28 PROCEDURE — 85025 COMPLETE CBC W/AUTO DIFF WBC: CPT

## 2022-06-28 PROCEDURE — 4004F PT TOBACCO SCREEN RCVD TLK: CPT | Performed by: INTERNAL MEDICINE

## 2022-06-28 PROCEDURE — 36591 DRAW BLOOD OFF VENOUS DEVICE: CPT

## 2022-06-28 PROCEDURE — 80053 COMPREHEN METABOLIC PANEL: CPT

## 2022-06-28 PROCEDURE — 2580000003 HC RX 258: Performed by: INTERNAL MEDICINE

## 2022-06-28 PROCEDURE — G8427 DOCREV CUR MEDS BY ELIG CLIN: HCPCS | Performed by: INTERNAL MEDICINE

## 2022-06-28 RX ORDER — HEPARIN SODIUM (PORCINE) LOCK FLUSH IV SOLN 100 UNIT/ML 100 UNIT/ML
500 SOLUTION INTRAVENOUS PRN
Status: DISCONTINUED | OUTPATIENT
Start: 2022-06-28 | End: 2022-06-29 | Stop reason: HOSPADM

## 2022-06-28 RX ORDER — SODIUM CHLORIDE 0.9 % (FLUSH) 0.9 %
5-40 SYRINGE (ML) INJECTION PRN
Status: DISCONTINUED | OUTPATIENT
Start: 2022-06-28 | End: 2022-06-29 | Stop reason: HOSPADM

## 2022-06-28 RX ORDER — HEPARIN SODIUM (PORCINE) LOCK FLUSH IV SOLN 100 UNIT/ML 100 UNIT/ML
SOLUTION INTRAVENOUS
Status: COMPLETED
Start: 2022-06-28 | End: 2022-06-28

## 2022-06-28 RX ORDER — HEPARIN SODIUM (PORCINE) LOCK FLUSH IV SOLN 100 UNIT/ML 100 UNIT/ML
500 SOLUTION INTRAVENOUS PRN
Status: CANCELLED | OUTPATIENT
Start: 2022-06-28

## 2022-06-28 RX ORDER — SODIUM CHLORIDE 9 MG/ML
25 INJECTION, SOLUTION INTRAVENOUS PRN
Status: CANCELLED | OUTPATIENT
Start: 2022-06-28

## 2022-06-28 RX ORDER — SODIUM CHLORIDE 0.9 % (FLUSH) 0.9 %
5-40 SYRINGE (ML) INJECTION PRN
Status: CANCELLED | OUTPATIENT
Start: 2022-06-28

## 2022-06-28 RX ADMIN — HEPARIN 500 UNITS: 100 SYRINGE at 15:21

## 2022-06-28 RX ADMIN — HEPARIN SODIUM (PORCINE) LOCK FLUSH IV SOLN 100 UNIT/ML 500 UNITS: 100 SOLUTION at 15:21

## 2022-06-28 RX ADMIN — SODIUM CHLORIDE, PRESERVATIVE FREE 10 ML: 5 INJECTION INTRAVENOUS at 15:21

## 2022-06-28 NOTE — PROGRESS NOTES
Pt ambulated into treatment area today. Mediport accessed on Rt chest by Navneet Hdez RN. Labs drawn and sent to lab for processing. Pt has office visit with Dr. He Garcia as well. Treatment deferred until clarification with Hospice, Per Dr. Daljit Garcia.

## 2022-06-28 NOTE — PROGRESS NOTES
Patient Name:  Dayana Santos  Patient :  1970  Patient MRN:  [id number]     Primary Oncologist: Nelson Knight MD  Referring Provider: Tari Ball MD     Date of Service: 2022        Chief Complaint:    Chief Complaint   Patient presents with   [address] Chemotherapy       Encounter Diagnoses   Name Primary?  Secondary malignant neoplasm of bone (HCC) Yes    Prostate [facility] [facility]     Malignant neoplasm of overlapping sites of left lung ([facility] [address] [facility]     Anemia, unspecified type         HPI:   21: Initial [facility] visit:Hima Low is a 45 y.o. male who presents to [facility] with report of dysuria and flank pain. Reports these symptoms started a few weeks ago. He ultimately came to the ED due to worsening back pain.      He reports ongoing issues with frequent urge to urinate and notes some incontinence. He denies hematuria. He was noted to have acute pyelonephritis and was admitted and started on IV Rocephin.      21 CT chest     Impression   1. Left hilar neoplasm extending into the left upper lobe measuring 3.2 x 5.9   x 5.3 cm obstructing the left upper lobe bronchus with probable minimal   adjacent infiltrates versus lymphangitic spread of tumor.  PET-CT/biopsy is   recommended. 2. Mediastinal lymphadenopathy. 3. Prominent left supraclavicular lymph nodes. 4. No osseous metastatic disease.      21 Ct abdomen and pelvis  Impression   1. Circumferential thickening of the bladder wall is noted which could be   related to cystitis.  Correlate with urinalysis. 2. There is left retroperitoneal lymphadenopathy.  This could be reactive or   neoplastic.  This can be further evaluated with PET-CT. 3. There is minimal stranding within the retroperitoneal on the left.  This   is uncertain etiology however could be related to acute pyelonephritis. 4. Interval development of multiple sclerotic lesions within the spine and   pelvis, concerning for metastatic osseous disease.    5. Prostate gland is enlarged.  Correlate with PSA.      .30      He denies past history of cancer. He smokes 2-3 ppd and drinks 10-12 beers daily. He reports unknown malignancy in his sister who  at 39. No other known family history of cancer.      Due to lung mass and concern for metastatic prostate cancer we were called to evaluate. 21:  Final Pathologic Diagnosis:   Needle biopsy of lung, clinically left lung mass:        SQUAMOUS CELL CARCINOMA IN SITU.     21:PET  1.  Left perihilar mass likely represents primary lung cancer.  Correlate   with biopsy results.  The opacity more peripheral in the left lower lobe has   relatively low level activity and likely represents an area of post   obstructive pneumonia adjacent to the mass.       2.  Metabolically active left AP window lymph node concerning for metastatic   disease.       3.  The prostate is enlarged and has increased FDG activity which could be   due to prostatitis or prostate cancer.  Correlate with PSA levels.       4.  No increased metabolic activity associated with retroperitoneal lymph   nodes.  This is unlikely related to the lung process given the significant   difference in metabolic activity; however, if there is prostate cancer, this   could potentially be metastatic disease from the prostate cancer, which can   have variable levels of uptake on FDG PET scans.       5.  No metabolic activity associated with multiple sclerotic lesions.  Again   this is unlikely related to the lung process, but if there is prostate   cancer, this could represent metastatic disease from prostate cancer with   poor FDG avidity.  Otherwise it could also represent large bone islands.       6.  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 There is a focal area of intense activity associated with a new lucent lesion   within the background of Paget's disease concerning for metastatic disease.    Given the FDG activity, it is likely related to the lung process.           8/20/21: MRI brain  1. There is a punctate focus of restricted diffusion within the right frontal   lobe without associated enhancement, mass effect or midline shift.  This   could represent a punctate acute infarct.  However, given history, an early   metastatic focus cannot be entirely excluded. 2. Scattered foci of susceptibility are seen within the cerebral hemispheres   bilaterally, which were not visualized on the prior exam.  Findings could   represent areas of remote microhemorrhage or perhaps treated metastases. 3. No abnormal enhancement within the brain. 4. Minimal global parenchymal volume loss with minimal chronic microvascular   ischemic changes. 12/2/21: CT chest, abdomen and pelvis:  Chest CT: Interval increased size of the left perihilar mass, with increased   adjacent obstructive pneumonitis and atelectasis.       Stable to minimally to decreased mediastinal lymphadenopathy.       Interval increased bony metastatic disease.       Abdomen and pelvis CT: Stable retroperitoneal and pelvic lymphadenopathy.       Interval increased bony metastatic disease. 12/17/21:Final Pathologic Diagnosis:   Bone, posterior ileum, needle core biopsy:   -     METASTATIC SQUAMOUS CELL CARCINOMA. PACO  PDL1 22c3 >50%(keytruda)  PTEN positive  Alk neg    CARIS, not enough tissue for rest of the testing    Gaurdant with no actionable mutation otherwise     12/27/21: Started XRT to the left pelvis  Added chest radiation dia 3  Completed dia 10 2022      Started carbo/taxol/keytruda jan 20 2022 2/8/22: CT chest:  1.  Interval decrease in size of left suprahilar mass extending along the   left upper lobe bronchi extension into the left main pulmonary artery.  There   is interval decrease in associated ground-glass opacity in the left upper   lobe.  Findings suggest response to therapy.       2.  Interval decrease in mediastinal lymphadenopathy.     3.  Diffuse osseous metastatic disease. Feb/march 2022 he received casodex and GnRH analogue     3/11/22: CT head: No acute abn    3/26/22: Ct abdomen and pelvis:  1. Interval development of a mild degree of left hydronephrosis and   hydroureter, to the level of the left UVJ.  An approximate 7.3 x 5 cm soft   tissue mass is present along the left posterior margin of the urinary   bladder, and contiguous with the prostate gland, resulting in obstructing of   the ureteral orifice.  Soft tissue mass is most consistent with primary   prostate carcinoma, in light of the diffuse blastic metastatic disease. 2. Interval increase in confluent adenopathy at the level of the aortic   bifurcation, consistent with progression of metastatic disease   3. Diffuse osseous blastic metastatic disease, with interval increase in size   of the lytic metastatic lesion involving the proximal left iliac bone     3/22/22:     He was admitted at [facility] in April (4/22 - 4/30). He had debulking of his primary tumor via bronchoscopy during admit. BMB at [facility] consistent with met prostate cancer  - Sampson prior to admission; exchanged in ED 4/22. Urine cx neg  - CT A/P:  Soft tissue mass along the left UVJ/posterior lateral left bladder wall with moderate left hydroureteronephrosis and diffuse urinary bladder  thickening. UVJ mass is likely a metastatic deposit involving the bladder and possibly the left prostate/seminal vesicle. - 4/25L neph tube placed by IR; sampson removed. Also treated for PNA. 5/16/2022 Imaging   PET - local:   1. Decreased size and uptake of the left perihilar mass. Adjacent   consolidation extending into the left lower lobe likely represents   post-treatment and post-obstructive atelectasis. Resolution of the   hypermetabolic prevascular lymph node. 2. Decreased uptake involving the lytic lesion along the left sacroiliac   joint.  Diffuse sclerosis throughout the skeleton without significant uptake   is unchanged and may represent treated metastatic disease. May 19 2022 CBC with hb 6.9, platelets 89, psa 619, testerone <7    May 31 2022 started Zytiga and prednisone    2022 resumed C1D1  Nataliia Sands    22: Started SHABBIR    Past Medical History:     BPH, HTN, COPD                                                            Past Surgery History:    None per his wife                                                                        Social History:   Lives with wife. Cut down smoking to 2-4 cigarettes per day prior to which he was smoking 2 to 3 packs/day for the past 40 years. Also reported drinking alcohol 10-12 beers per day. Denied any other illicit drug abuse. Family History:    He reported that his sister  at the age of 39 with  metastatic cancer, he was not sure what the primary was                                                                                              Allergies   Allergen Reactions    Tramadol Other (See Comments)     seizures    Vicodin [Hydrocodone-Acetaminophen] Hives       Current Outpatient Medications on File Prior to Visit   Medication Sig Dispense Refill    LORazepam (ATIVAN) 1 MG tablet Take 1 tablet by mouth nightly as needed for Anxiety for up to 30 days. 30 tablet 0    oxyCODONE (OXYCONTIN) 10 MG extended release tablet Take 1 tablet by mouth 2 times daily for 30 days. Intended supply: 30 days 60 tablet 0    predniSONE (DELTASONE) 5 MG tablet       abiraterone acetate (ZYTIGA) 250 MG tablet Take 1,000 mg by mouth daily      methadone (DOLOPHINE) 5 MG tablet Take 1 tablet by mouth in the morning and at bedtime for 30 days. 60 tablet 0    oxyCODONE (OXY-IR) 15 MG immediate release tablet Take 1 tablet by mouth every 6 hours as needed for Pain for up to 30 days.  120 tablet 0    oxyCODONE-acetaminophen (PERCOCET) 7.5-325 MG per tablet take 1 tablet by mouth every 6 hours AS NEEDED FOR PAIN      naloxone 4 MG/0.1ML LIQD nasal spray 1 spray by Nasal route as needed for Opioid Reversal 1 each 5    cyanocobalamin ([employer] VITAMIN B12) 1000 MCG tablet Take 1 tablet by mouth daily 30 tablet 3    nicotine (NICODERM CQ) 21 MG/24HR Place 1 patch onto the skin daily 30 patch 3    bicalutamide (CASODEX) 50 MG chemo tablet Take 1 tablet by mouth daily 30 tablet 11    ondansetron (ZOFRAN) 8 MG tablet take 1 tablet by mouth every 8 hours if needed for nausea and vomiting      Sennosides (SENNA) 8.6 MG CAPS Take 1 capsule by mouth 2 times daily as needed (constipation) 20 capsule 0    dexamethasone (DECADRON) 4 MG tablet Two tablets the day before treatment, one table daily for four days starting the day after chemotherapy 18 tablet 0    NARCAN 4 MG/0.1ML LIQD nasal spray DISPENSED PER STANDING ORDER USE AS DIRECTED PATIENT IS TRAINED OPIOID OVERDOSE RESPONDER      Calcium Carbonate-Vitamin D (OYSTER SHELL CALCIUM/D) 500-200 MG-UNIT TABS Take 1 tablet by mouth daily 30 tablet 3    albuterol sulfate HFA (PROVENTIL HFA) 108 (90 Base) MCG/ACT inhaler Inhale 2 puffs into the lungs every 4 hours as needed for Wheezing or Shortness of Breath With spacer (and mask if indicated). Thanks. 1 Inhaler 1     No current facility-administered medications on file prior to visit. Interval History: 6/28/22: He arrived with his partner  to the clinic today. Generalized aches and pains. Reported that he has been feeling tired.      Review of Systems:  As per the interval history, rest of the review of system negative     Vital Signs: /73 (Site: Right Upper Arm, Position: Sitting, Cuff Size: Medium Adult)   Pulse 98   Temp 97 °F (36.1 °C) (Infrared)   Resp 16   Ht 6' 1\" (1.854 m)   Wt 192 lb 6.4 oz (87.3 kg)   SpO2 97%   BMI 25.38 kg/m²      CONSTITUTIONAL:alert and awake, sob on ambulation to the bathroom,  EYES: RAISSA, palor but no icterus   ENT: ATNC  NECK: No JVD  HEMATOLOGIC/LYMPHATIC: no cervical, supraclavicular or axillary lymphadenopathy   LUNGS: CTAB  CARDIOVASCULAR: s1s2 rrr no murmurs  ABDOMEN: soft ntnd bs pos, left nephrostomy tube.    NEUROLOGIC: GI  SKIN: Psoriatic rash with excoriation marks on the lower extremities and also upper extremities  EXTREMITIES: no LE edema bilaterally     Labs:  Hematology:  Lab Results   Component Value Date    WBC 6.8 06/28/2022    RBC 2.63 (L) 06/28/2022    HGB 7.4 (L) 06/28/2022    HCT 23.6 (L) 06/28/2022    MCV 89.7 06/28/2022    MCH 28.1 06/28/2022    MCHC 31.4 (L) 06/28/2022    RDW 17.8 (H) 06/28/2022     06/28/2022    MPV 9.6 06/28/2022    BANDSPCT 12 (H) 04/09/2022    SEGSPCT 75.4 (H) 06/28/2022    EOSRELPCT 3.1 (H) 06/28/2022    BASOPCT 0.4 06/28/2022    LYMPHOPCT 13.8 (L) 06/28/2022    MONOPCT 7.3 (H) 06/28/2022    BANDABS 0.46 04/09/2022    SEGSABS 5.1 06/28/2022    EOSABS 0.2 06/28/2022    BASOSABS 0.0 06/28/2022    LYMPHSABS 0.9 06/28/2022    MONOSABS 0.5 06/28/2022    DIFFTYPE AUTOMATED DIFFERENTIAL 06/28/2022    ANISOCYTOSIS 1+ 04/09/2022    POLYCHROM 1+ 04/09/2022    PLTM DECREASED 04/09/2022     Lab Results   Component Value Date    ESR 47 (H) 03/22/2022     Chemistry:  Lab Results   Component Value Date     06/07/2022    K 4.7 06/07/2022     06/07/2022    CO2 19 (L) 06/07/2022    BUN 17 06/07/2022    CREATININE 0.9 06/07/2022    GLUCOSE 104 (H) 06/07/2022    CALCIUM 8.9 06/07/2022    PROT 7.2 06/07/2022    LABALBU 3.9 06/07/2022    BILITOT 0.2 06/07/2022    ALKPHOS 945 (H) 06/07/2022    AST 13 (L) 06/07/2022    ALT <5 (L) 06/07/2022    LABGLOM >60 06/07/2022    GFRAA >60 06/07/2022    MG 1.8 04/10/2022    POCCA 1.18 04/01/2022    POCGLU 121 (H) 04/01/2022     Lab Results   Component Value Date     (H) 03/22/2022     No components found for: LD  Lab Results   Component Value Date    TSHHS 1.420 06/07/2022    T4FREE 1.21 06/07/2022     Immunology:  Lab Results   Component Value Date PROT 7.2 06/07/2022    SPEP  03/22/2022     INTERPRETATION - Decreased albumin and total prorein. No definitive monoclonal bands are seen. SAF    SPEP INTERPRETATION - No monoclonal bands are seen. SAF 03/22/2022    ALBUMINELP 2.8 (L) 03/22/2022    LABALPH 0.4 03/22/2022    LABALPH 1.1 03/22/2022    LABBETA 1.1 03/22/2022    GAMGLOB 0.8 03/22/2022     No results found for: Pam Lachine, KLFLCR  No results found for: B2M  Coagulation Panel:  Lab Results   Component Value Date    PROTIME 13.1 04/11/2022    INR 1.02 04/11/2022    APTT 32.8 04/11/2022    DDIMER 1709 (H) 04/08/2022     Anemia Panel:  Lab Results   Component Value Date    LCJBVRII40 373.0 03/22/2022    FOLATE 4.0 03/22/2022     Tumor Markers:  Lab Results   Component Value Date    CEA 7.7 07/23/2021    .0 (H) 03/22/2022        Observations:  ECOG:  No data recorded       Assessment & Plan:   H/O noncompliance. Left hilar mass with mediastinal hilar lymphadenopathy. 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 PET scan results from august 2021 noted, bone lesions most probably not metastatic except for one lytic lesion. MRI of the brain with no convincing metastatic lesions. Presented  the case in the tumor board. Decision made to proceed with a prostate biopsy and treat with ADT if malignancy  and in the meantime proceed with staging mediastinoscopy/rebiopsy for further treatment plan. But as pt very very very noncompliant, did not follow up with urology, CTS or for bone biopsy multiple times. Note PSA rising and was 250 on November 16 2021   CT imaging dec 2021 with progressive met disease  Bone biopsy on dec 13 2021 with met squamous cell cancer, consistent with lung primary. PDL1 >50%Chetna Rodriguez) .    Not enough tissue for rest of CARIS, guardant 360 with no other actionable mutation  CT chest jan 2022 with no significant change Completed Palliative radiation to the pelvic area and also radiation to the left lung dia 10 2022  Discussed the findings, diagnosis and poor prognosis and treatment with carbo/taxol/pembro after completion of radiation. Discussed ae. PORT placed. Completed OCM  C1D1 carbo/taxol and keytruda started 1/20/22, unfortunately received only one treatment so far sec to being very very noncompliant  CT after C1 with positive response   And then treatment held sec to cytopenias/noncompliance  and also admission to [facility]. PET in may 2022 with continued response. Resumed Keytruda alone June 7 2022 and   Plan was  for repeat PET after 3-4C. But hospsice has been involved, now trying to figure out the best plan of action. Anemia: sec to met prostate cancer, involvement of the bone marrow. Transfusion support as needed. SHABBIR started    Met prostate cancer: PSA was ~900, started on casodex and received GnRH analogue in feb/march . But BMB biopsy in April  consistent with involvement by prostate cancer. May 2022 PSA was 256. Was Started on Zytiga and prednisone in may 2022 . Continue lupron. Repeat PSA in July 2022     Elevated alk phos most probably secondary to the bone mets, increased levels noted     Adequate analgesic and bowel regimen. Rt hydronephrosis: PCN placed. Continue other medical care. Thank you for letting us be part of the care and will follow along. Discussed above findings and overall very poor prognosis and plan with him and he voiced understanding. Answered all his questions. Smoking and alcohol cessation    Recommend follow-up with primary care physician and other specialists. Note he has he has been very noncompliant with appointments in the past.    Please do not hesitate to contact us if you need further information.     Return to clinic  July 2022 or earlier if new Sx    TOMI

## 2022-06-28 NOTE — PROGRESS NOTES
MA Rooming Questions  Patient: Beth Rhodes  MRN: [id number]    Date: 6/28/2022        1. Do you have any new issues? Yes- Back pain         2. Do you need any refills on medications?    no    3. Have you had any imaging done since your last visit?   no    4. Have you been hospitalized or seen in the emergency room since your last visit here?   no    5. Did the patient have a depression screening completed today?  Yes    No data recorded     PHQ-9 Given to (if applicable):               PHQ-9 Score (if applicable):                     [] Positive     []  Negative              Does question #9 need addressed (if applicable)                     [] Yes    []  No               Carmen Hampton CMA

## 2022-07-03 ENCOUNTER — APPOINTMENT (OUTPATIENT)
Dept: CT IMAGING | Age: 52
End: 2022-07-03
Payer: COMMERCIAL

## 2022-07-03 ENCOUNTER — HOSPITAL ENCOUNTER (EMERGENCY)
Age: 52
Discharge: HOME | End: 2022-07-03
Attending: EMERGENCY MEDICINE
Payer: COMMERCIAL

## 2022-07-03 VITALS
HEART RATE: 90 BPM | WEIGHT: 200 LBS | OXYGEN SATURATION: 93 % | RESPIRATION RATE: 25 BRPM | DIASTOLIC BLOOD PRESSURE: 90 MMHG | HEIGHT: 73 IN | SYSTOLIC BLOOD PRESSURE: 122 MMHG | TEMPERATURE: 98.9 F | BODY MASS INDEX: 26.51 KG/M2

## 2022-07-03 DIAGNOSIS — N12 PYELONEPHRITIS: Primary | ICD-10-CM

## 2022-07-03 LAB
ADENOVIRUS DETECTION BY PCR: NOT DETECTED
ALBUMIN SERPL-MCNC: 3.5 GM/DL (ref 3.4–5)
ALP BLD-CCNC: 774 IU/L (ref 40–129)
ALT SERPL-CCNC: 5 U/L (ref 10–40)
ANION GAP SERPL CALCULATED.3IONS-SCNC: 12 MMOL/L (ref 4–16)
APTT: 30 SECONDS (ref 25.1–37.1)
AST SERPL-CCNC: 10 IU/L (ref 15–37)
BACTERIA: ABNORMAL /HPF
BASOPHILS ABSOLUTE: 0 K/CU MM
BASOPHILS RELATIVE PERCENT: 0.3 % (ref 0–1)
BILIRUB SERPL-MCNC: 0.2 MG/DL (ref 0–1)
BILIRUBIN URINE: NEGATIVE MG/DL
BLOOD, URINE: ABNORMAL
BORDETELLA PARAPERTUSSIS BY PCR: NOT DETECTED
BORDETELLA PERTUSSIS PCR: NOT DETECTED
BUN BLDV-MCNC: 13 MG/DL (ref 6–23)
CALCIUM SERPL-MCNC: 8.4 MG/DL (ref 8.3–10.6)
CHLAMYDOPHILA PNEUMONIA PCR: NOT DETECTED
CHLORIDE BLD-SCNC: 103 MMOL/L (ref 99–110)
CLARITY: CLEAR
CO2: 19 MMOL/L (ref 21–32)
COLOR: YELLOW
CORONAVIRUS 229E PCR: NOT DETECTED
CORONAVIRUS HKU1 PCR: NOT DETECTED
CORONAVIRUS NL63 PCR: NOT DETECTED
CORONAVIRUS OC43 PCR: NOT DETECTED
CREAT SERPL-MCNC: 0.8 MG/DL (ref 0.9–1.3)
DIFFERENTIAL TYPE: ABNORMAL
EOSINOPHILS ABSOLUTE: 0.2 K/CU MM
EOSINOPHILS RELATIVE PERCENT: 3 % (ref 0–3)
GFR AFRICAN AMERICAN: >60 ML/MIN/1.73M2
GFR NON-AFRICAN AMERICAN: >60 ML/MIN/1.73M2
GLUCOSE BLD-MCNC: 138 MG/DL (ref 70–99)
GLUCOSE, URINE: NEGATIVE MG/DL
HCT VFR BLD CALC: 21.1 % (ref 42–52)
HCT VFR BLD CALC: 26.9 % (ref 42–52)
HEMOGLOBIN: 6.4 GM/DL (ref 13.5–18)
HEMOGLOBIN: 8.2 GM/DL (ref 13.5–18)
HUMAN METAPNEUMOVIRUS PCR: NOT DETECTED
IMMATURE NEUTROPHIL %: 0.5 % (ref 0–0.43)
INFLUENZA A BY PCR: NOT DETECTED
INFLUENZA A H1 (2009) PCR: NOT DETECTED
INFLUENZA A H1 PANDEMIC PCR: NOT DETECTED
INFLUENZA A H3 PCR: NOT DETECTED
INFLUENZA B BY PCR: NOT DETECTED
INR BLD: 1.03 INDEX
KETONES, URINE: NEGATIVE MG/DL
LACTATE: 1.5 MMOL/L (ref 0.4–2)
LEUKOCYTE ESTERASE, URINE: ABNORMAL
LYMPHOCYTES ABSOLUTE: 0.8 K/CU MM
LYMPHOCYTES RELATIVE PERCENT: 10.5 % (ref 24–44)
MCH RBC QN AUTO: 28.6 PG (ref 27–31)
MCHC RBC AUTO-ENTMCNC: 30.3 % (ref 32–36)
MCV RBC AUTO: 94.2 FL (ref 78–100)
MONOCYTES ABSOLUTE: 0.7 K/CU MM
MONOCYTES RELATIVE PERCENT: 9 % (ref 0–4)
MUCUS: ABNORMAL HPF
MYCOPLASMA PNEUMONIAE PCR: NOT DETECTED
NITRITE URINE, QUANTITATIVE: POSITIVE
NUCLEATED RBC %: 0.8 %
PARAINFLUENZA 1 PCR: NOT DETECTED
PARAINFLUENZA 2 PCR: NOT DETECTED
PARAINFLUENZA 3 PCR: NOT DETECTED
PARAINFLUENZA 4 PCR: NOT DETECTED
PDW BLD-RTO: 18.3 % (ref 11.7–14.9)
PH, URINE: 6 (ref 5–8)
PLATELET # BLD: 214 K/CU MM (ref 140–440)
PMV BLD AUTO: 9.9 FL (ref 7.5–11.1)
POTASSIUM SERPL-SCNC: 3.8 MMOL/L (ref 3.5–5.1)
PROTEIN UA: NEGATIVE MG/DL
PROTHROMBIN TIME: 13.3 SECONDS (ref 11.7–14.5)
RBC # BLD: 2.24 M/CU MM (ref 4.6–6.2)
RBC URINE: 5 /HPF (ref 0–3)
RHINOVIRUS ENTEROVIRUS PCR: NOT DETECTED
RSV PCR: NOT DETECTED
SARS-COV-2: NOT DETECTED
SEGMENTED NEUTROPHILS ABSOLUTE COUNT: 6.1 K/CU MM
SEGMENTED NEUTROPHILS RELATIVE PERCENT: 76.7 % (ref 36–66)
SODIUM BLD-SCNC: 134 MMOL/L (ref 135–145)
SPECIFIC GRAVITY UA: 1.01 (ref 1–1.03)
SQUAMOUS EPITHELIAL: <1 /HPF
T4 FREE: 0.97 NG/DL (ref 0.9–1.8)
TOTAL IMMATURE NEUTOROPHIL: 0.04 K/CU MM
TOTAL NUCLEATED RBC: 0.1 K/CU MM
TOTAL PROTEIN: 7 GM/DL (ref 6.4–8.2)
TRICHOMONAS: ABNORMAL /HPF
TSH HIGH SENSITIVITY: 0.52 UIU/ML (ref 0.27–4.2)
UROBILINOGEN, URINE: 0.2 MG/DL (ref 0.2–1)
WBC # BLD: 7.9 K/CU MM (ref 4–10.5)
WBC CLUMP: ABNORMAL /HPF
WBC UA: 59 /HPF (ref 0–2)

## 2022-07-03 PROCEDURE — 81001 URINALYSIS AUTO W/SCOPE: CPT

## 2022-07-03 PROCEDURE — 80053 COMPREHEN METABOLIC PANEL: CPT

## 2022-07-03 PROCEDURE — 2580000003 HC RX 258: Performed by: EMERGENCY MEDICINE

## 2022-07-03 PROCEDURE — 85014 HEMATOCRIT: CPT

## 2022-07-03 PROCEDURE — 36430 TRANSFUSION BLD/BLD COMPNT: CPT

## 2022-07-03 PROCEDURE — 96365 THER/PROPH/DIAG IV INF INIT: CPT

## 2022-07-03 PROCEDURE — 87086 URINE CULTURE/COLONY COUNT: CPT

## 2022-07-03 PROCEDURE — 86901 BLOOD TYPING SEROLOGIC RH(D): CPT

## 2022-07-03 PROCEDURE — 99285 EMERGENCY DEPT VISIT HI MDM: CPT

## 2022-07-03 PROCEDURE — 86850 RBC ANTIBODY SCREEN: CPT

## 2022-07-03 PROCEDURE — P9016 RBC LEUKOCYTES REDUCED: HCPCS

## 2022-07-03 PROCEDURE — 83605 ASSAY OF LACTIC ACID: CPT

## 2022-07-03 PROCEDURE — 84443 ASSAY THYROID STIM HORMONE: CPT

## 2022-07-03 PROCEDURE — 87040 BLOOD CULTURE FOR BACTERIA: CPT

## 2022-07-03 PROCEDURE — 86922 COMPATIBILITY TEST ANTIGLOB: CPT

## 2022-07-03 PROCEDURE — 84439 ASSAY OF FREE THYROXINE: CPT

## 2022-07-03 PROCEDURE — 85018 HEMOGLOBIN: CPT

## 2022-07-03 PROCEDURE — 0202U NFCT DS 22 TRGT SARS-COV-2: CPT

## 2022-07-03 PROCEDURE — 6370000000 HC RX 637 (ALT 250 FOR IP): Performed by: EMERGENCY MEDICINE

## 2022-07-03 PROCEDURE — 86900 BLOOD TYPING SEROLOGIC ABO: CPT

## 2022-07-03 PROCEDURE — 87186 SC STD MICRODIL/AGAR DIL: CPT

## 2022-07-03 PROCEDURE — 96375 TX/PRO/DX INJ NEW DRUG ADDON: CPT

## 2022-07-03 PROCEDURE — 6360000002 HC RX W HCPCS: Performed by: EMERGENCY MEDICINE

## 2022-07-03 PROCEDURE — 85730 THROMBOPLASTIN TIME PARTIAL: CPT

## 2022-07-03 PROCEDURE — 87077 CULTURE AEROBIC IDENTIFY: CPT

## 2022-07-03 PROCEDURE — 85610 PROTHROMBIN TIME: CPT

## 2022-07-03 PROCEDURE — 85025 COMPLETE CBC W/AUTO DIFF WBC: CPT

## 2022-07-03 RX ORDER — 0.9 % SODIUM CHLORIDE 0.9 %
1000 INTRAVENOUS SOLUTION INTRAVENOUS ONCE
Status: COMPLETED | OUTPATIENT
Start: 2022-07-03 | End: 2022-07-03

## 2022-07-03 RX ORDER — ACETAMINOPHEN 500 MG
1000 TABLET ORAL ONCE
Status: COMPLETED | OUTPATIENT
Start: 2022-07-03 | End: 2022-07-03

## 2022-07-03 RX ORDER — ONDANSETRON 2 MG/ML
4 INJECTION INTRAMUSCULAR; INTRAVENOUS EVERY 6 HOURS PRN
Status: DISCONTINUED | OUTPATIENT
Start: 2022-07-03 | End: 2022-07-03 | Stop reason: HOSPADM

## 2022-07-03 RX ORDER — CEFUROXIME AXETIL 500 MG/1
500 TABLET ORAL 2 TIMES DAILY
Qty: 14 TABLET | Refills: 0 | Status: SHIPPED | OUTPATIENT
Start: 2022-07-03 | End: 2022-07-10

## 2022-07-03 RX ORDER — SODIUM CHLORIDE 9 MG/ML
INJECTION, SOLUTION INTRAVENOUS PRN
Status: DISCONTINUED | OUTPATIENT
Start: 2022-07-03 | End: 2022-07-03 | Stop reason: HOSPADM

## 2022-07-03 RX ADMIN — ACETAMINOPHEN 1000 MG: 500 TABLET ORAL at 08:57

## 2022-07-03 RX ADMIN — ONDANSETRON 4 MG: 2 INJECTION INTRAMUSCULAR; INTRAVENOUS at 08:57

## 2022-07-03 RX ADMIN — SODIUM CHLORIDE 1000 ML: 9 INJECTION, SOLUTION INTRAVENOUS at 08:57

## 2022-07-03 RX ADMIN — PIPERACILLIN SODIUM AND TAZOBACTAM SODIUM 3375 MG: 3; .375 INJECTION, POWDER, LYOPHILIZED, FOR SOLUTION INTRAVENOUS at 09:36

## 2022-07-03 RX ADMIN — HYDROMORPHONE HYDROCHLORIDE 1 MG: 1 INJECTION, SOLUTION INTRAMUSCULAR; INTRAVENOUS; SUBCUTANEOUS at 08:57

## 2022-07-03 ASSESSMENT — PAIN SCALES - GENERAL: PAINLEVEL_OUTOF10: 10

## 2022-07-03 NOTE — ED PROVIDER NOTES
Triage Chief Complaint:   Other (thinks his hemoglobin is low because \"hes freezing\")    [place]:  Pat James is a 47 y.o. male that presents with concern regarding chills. Patient reports that he feels like his hemoglobin might be low again. Patient has had diffuse body chills reports that he is \"freezing\". Patient when asked does report a new cough. Patient also with worsening of his chronic pain. Patient tells me that he has metastatic prostate and lung cancer. Patient is on hormonal chemotherapy treatments and follows with Dr. Yuliana Chavez for this. Patient also is with nephrostomy tube to the left kidney. Patient denies any change in color of his urine from nephrostomy tube bag or per urethra. Patient has chronic constipation issues secondary to his long-term pain medications. Patient denies any active bleeding. Patient does report he is required prior transfusions secondary to his chemotherapy-induced anemia.         ROS:  General:  No fevers, + chills, no weakness  Eyes:  No recent vison changes, no discharge  ENT:  No sore throat, no nasal congestion, no hearing changes  Cardiovascular:  No chest pain, no palpitations  Respiratory:  No shortness of breath, + cough, no wheezing  Gastrointestinal:  No pain, no nausea, no vomiting, no diarrhea  Musculoskeletal:  No muscle pain, no joint pain  Skin:  No rash, no pruritis, no easy bruising  Neurologic:  No speech problems, no headache, no extremity numbness, no extremity tingling, no extremity weakness  Psychiatric:  No anxiety  Genitourinary:  No dysuria, no hematuria  Endocrine:  No unexpected weight gain, no unexpected weight loss  Extremities:  no edema, no pain    Past Medical History:   Diagnosis Date    CAD (coronary artery disease) 12/23/2013    cath negative per pt    Cancer ([facility] [address] 06/2021    pt reports he has lung cancer    History of TMJ disorder     Hypertension     Trigeminal neuralgia      Past Surgical History:   Procedure Laterality Date  ABDOMEN SURGERY      CARDIAC CATHETERIZATION  08/03/2016    CT BIOPSY PERCUTANEOUS SUPERFICIAL BONE  12/17/2021    CT BIOPSY PERCUTANEOUS SUPERFICIAL BONE 12/17/2021 [facility] CT SCAN    CT NEEDLE BIOPSY LUNG PERCUTANEOUS  7/28/2021    CT NEEDLE BIOPSY LUNG PERCUTANEOUS 7/28/2021 [facility] CT SCAN    PORT SURGERY Right 8/13/2021    MEDIPORT INSERTION performed by Canelo Ya MD at [facility] OR     Family History   Problem Relation Age of Onset    High Blood Pressure Mother     High Blood Pressure Sister     Cancer Sister      Social History     Socioeconomic History    Marital status:      Spouse name: Not on file    Number of children: 11    Years of education: Not on file    Highest education level: Not on file   Occupational History    Not on file   Tobacco Use    Smoking status: Current Every Day Smoker     Packs/day: 2.00     Years: 39.00     Pack years: 78.00     Types: Cigarettes    Smokeless tobacco: Never Used    Tobacco comment: smokes 1-2 a day   Vaping Use    Vaping Use: Never used   Substance and Sexual Activity    Alcohol use: Yes     Alcohol/week: 6.0 standard drinks     Types: 6 Cans of beer per week     Comment: per week (24 oz beers)     Drug use: Yes     Types: Marijuana Gary Mustard)     Comment: last 12/1    Sexual activity: Yes     Partners: Female   Other Topics Concern    Not on file   Social History Narrative    Not on file     Social Determinants of Health     Financial Resource Strain:     Difficulty of Paying Living Expenses: Not on file   Food Insecurity:     Worried About Running Out of Food in the Last Year: Not on file    [place] of Food in the Last Year: Not on file   Transportation Needs:     Lack of Transportation (Medical): Not on file    Lack of Transportation (Non-Medical):  Not on file   Physical Activity:     Days of Exercise per Week: Not on file    Minutes of Exercise per Session: Not on file   Stress:     Feeling of Stress : Not on file   Social Connections:     Frequency of Communication with Friends and Family: Not on file    Frequency of Social Gatherings with Friends and Family: Not on file    Attends Cheondoism Services: Not on file    Active Member of Clubs or Organizations: Not on file    Attends Club or Organization Meetings: Not on file    Marital Status: Not on file   Intimate Partner Violence:     Fear of Current or Ex-Partner: Not on file    Emotionally Abused: Not on file    Physically Abused: Not on file    Sexually Abused: Not on file   Housing Stability:     Unable to Pay for Housing in the Last Year: Not on file    Number of [place] in the Last Year: Not on file    Unstable Housing in the Last Year: Not on file     Current Facility-Administered Medications   Medication Dose Route Frequency Provider Last Rate Last Admin    ondansetron (ZOFRAN) injection 4 mg  4 mg IntraVENous Q6H PRN Lobito David MD   4 mg at 07/03/22 0857    0.9 % sodium chloride infusion   IntraVENous PRN Lobito David MD         Current Outpatient Medications   Medication Sig Dispense Refill    cefUROXime (CEFTIN) 500 MG tablet Take 1 tablet by mouth 2 times daily for 7 days 14 tablet 0    LORazepam (ATIVAN) 1 MG tablet Take 1 tablet by mouth nightly as needed for Anxiety for up to 30 days. 30 tablet 0    oxyCODONE (OXYCONTIN) 10 MG extended release tablet Take 1 tablet by mouth 2 times daily for 30 days.  Intended supply: 30 days 60 tablet 0    predniSONE (DELTASONE) 5 MG tablet       abiraterone acetate (ZYTIGA) 250 MG tablet Take 1,000 mg by mouth daily      oxyCODONE-acetaminophen (PERCOCET) 7.5-325 MG per tablet take 1 tablet by mouth every 6 hours AS NEEDED FOR PAIN      naloxone 4 MG/0.1ML LIQD nasal spray 1 spray by Nasal route as needed for Opioid Reversal 1 each 5    Calcium Carbonate-Vitamin D (OYSTER SHELL CALCIUM/D) 500-200 MG-UNIT TABS Take 1 tablet by mouth daily 30 tablet 3    cyanocobalamin ([employer] VITAMIN B12) 1000 MCG tablet Take 1 tablet by mouth daily 30 tablet 3    nicotine (NICODERM CQ) 21 MG/24HR Place 1 patch onto the skin daily 30 patch 3    bicalutamide (CASODEX) 50 MG chemo tablet Take 1 tablet by mouth daily 30 tablet 11    ondansetron (ZOFRAN) 8 MG tablet take 1 tablet by mouth every 8 hours if needed for nausea and vomiting      Sennosides (SENNA) 8.6 MG CAPS Take 1 capsule by mouth 2 times daily as needed (constipation) 20 capsule 0    dexamethasone (DECADRON) 4 MG tablet Two tablets the day before treatment, one table daily for four days starting the day after chemotherapy 18 tablet 0    NARCAN 4 MG/0.1ML LIQD nasal spray DISPENSED PER STANDING ORDER USE AS DIRECTED PATIENT IS TRAINED OPIOID OVERDOSE RESPONDER      albuterol sulfate HFA (PROVENTIL HFA) 108 (90 Base) MCG/ACT inhaler Inhale 2 puffs into the lungs every 4 hours as needed for Wheezing or Shortness of Breath With spacer (and mask if indicated). Thanks. 1 Inhaler 1     Allergies   Allergen Reactions    Tramadol Other (See Comments)     seizures    Vicodin [Hydrocodone-Acetaminophen] Hives       Nursing Notes Reviewed    Physical Exam:  ED Triage Vitals   Enc Vitals Group      BP 07/03/22 0740 130/78      Heart Rate 07/03/22 0746 (!) 113      Resp 07/03/22 0740 20      Temp 07/03/22 0740 98.5 °F (36.9 °C)      Temp Source 07/03/22 0740 Oral      SpO2 07/03/22 0746 99 %      Weight 07/03/22 0740 200 lb (90.7 kg)      Height 07/03/22 0740 6' 1\" (1.854 m)      Head Circumference --       Peak Flow --       Pain Score --       Pain Loc --       Pain Edu? --       Excl. in [address]? --        My pulse ox interpretation is - normal    General appearance:  No acute distress. Skin:  Warm. Dry. Eye:  Extraocular movements intact. Ears, nose, mouth and throat:  Oral mucosa moist   Neck:  Trachea midline. Extremity:  No swelling. Normal ROM     Heart:  Regular rate and rhythm, normal S1 & S2, no extra heart sounds.     Perfusion:  Intact   Respiratory:  Lungs clear to auscultation bilaterally. Respirations nonlabored. Abdominal:  Normal bowel sounds. Soft. Nontender. Non distended. Back:  No CVA tenderness to palpation     Neurological:  Alert and oriented times 3. No focal neuro deficits.              Psychiatric:  Appropriate    I have reviewed and interpreted all of the currently available lab results from this visit (if applicable):  Results for orders placed or performed during the hospital encounter of 07/03/22   Respiratory Panel, Molecular, with COVID-19 (Restricted: peds pts or suitable admitted adults)    Specimen: Nasopharyngeal   Result Value Ref Range    Adenovirus Detection by PCR NOT DETECTED NOT DETECTED    Coronavirus 229E PCR NOT DETECTED NOT DETECTED    Coronavirus HKU1 PCR NOT DETECTED NOT DETECTED    Coronavirus NL63 PCR NOT DETECTED NOT DETECTED    Coronavirus OC43 PCR NOT DETECTED NOT DETECTED    SARS-CoV-2 NOT DETECTED NOT DETECTED    Human Metapneumovirus PCR NOT DETECTED NOT DETECTED    Rhinovirus Enterovirus PCR NOT DETECTED NOT DETECTED    Influenza A by PCR NOT DETECTED NOT DETECTED    Influenza A H1 Pandemic PCR NOT DETECTED NOT DETECTED    Influenza A H1 (2009) PCR NOT DETECTED NOT DETECTED    Influenza A H3 PCR NOT DETECTED NOT DETECTED    Influenza B by PCR NOT DETECTED NOT DETECTED    Parainfluenza 1 PCR NOT DETECTED NOT DETECTED    Parainfluenza 2 PCR NOT DETECTED NOT DETECTED    Parainfluenza 3 PCR NOT DETECTED NOT DETECTED    Parainfluenza 4 PCR NOT DETECTED NOT DETECTED    RSV PCR NOT DETECTED NOT DETECTED    Bordetella parapertussis by PCR NOT DETECTED NOT DETECTED    B Pertussis by PCR NOT DETECTED NOT DETECTED    Chlamydophila Pneumonia PCR NOT DETECTED NOT DETECTED    Mycoplasma pneumo by PCR NOT DETECTED NOT DETECTED   CBC with Auto Differential   Result Value Ref Range    WBC 7.9 4.0 - 10.5 K/CU MM    RBC 2.24 (L) 4.6 - 6.2 M/CU MM    Hemoglobin 6.4 (LL) 13.5 - 18.0 GM/DL    Hematocrit 21.1 (L) 42 - 52 %    MCV 94.2 78 - 100 FL    MCH 28.6 27 - 31 PG    MCHC 30.3 (L) 32.0 - 36.0 %    RDW 18.3 (H) 11.7 - 14.9 %    Platelets 155 659 - 219 K/CU MM    MPV 9.9 7.5 - 11.1 FL    Differential Type AUTOMATED DIFFERENTIAL     Segs Relative 76.7 (H) 36 - 66 %    Lymphocytes % 10.5 (L) 24 - 44 %    Monocytes % 9.0 (H) 0 - 4 %    Eosinophils % 3.0 0 - 3 %    Basophils % 0.3 0 - 1 %    Segs Absolute 6.1 K/CU MM    Lymphocytes Absolute 0.8 K/CU MM    Monocytes Absolute 0.7 K/CU MM    Eosinophils Absolute 0.2 K/CU MM    Basophils Absolute 0.0 K/CU MM    Nucleated RBC % 0.8 %    Total Nucleated RBC 0.1 K/CU MM    Total Immature Neutrophil 0.04 K/CU MM    Immature Neutrophil % 0.5 (H) 0 - 0.43 %   Comprehensive Metabolic Panel   Result Value Ref Range    Sodium 134 (L) 135 - 145 MMOL/L    Potassium 3.8 3.5 - 5.1 MMOL/L    Chloride 103 99 - 110 mMol/L    CO2 19 (L) 21 - 32 MMOL/L    BUN 13 6 - 23 MG/DL    CREATININE 0.8 (L) 0.9 - 1.3 MG/DL    Glucose 138 (H) 70 - 99 MG/DL    Calcium 8.4 8.3 - 10.6 MG/DL    Albumin 3.5 3.4 - 5.0 GM/DL    Total Protein 7.0 6.4 - 8.2 GM/DL    Total Bilirubin 0.2 0.0 - 1.0 MG/DL    ALT 5 (L) 10 - 40 U/L    AST 10 (L) 15 - 37 IU/L    Alkaline Phosphatase 774 (H) 40 - 129 IU/L    GFR Non-African American >60 >60 mL/min/1.73m2    GFR African American >60 >60 mL/min/1.73m2    Anion Gap 12 4 - 16   Protime/INR & PTT   Result Value Ref Range    Protime 13.3 11.7 - 14.5 SECONDS    INR 1.03 INDEX    aPTT 30.0 25.1 - 37.1 SECONDS   TSH   Result Value Ref Range    TSH, High Sensitivity 0.524 0.270 - 4.20 uIu/ml   T4, Free   Result Value Ref Range    T4 Free 0.97 0.9 - 1.8 NG/DL   Lactic Acid   Result Value Ref Range    Lactate 1.5 0.4 - 2.0 mMOL/L   Urinalysis with Microscopic   Result Value Ref Range    Color, UA YELLOW YELLOW    Clarity, UA CLEAR CLEAR    Glucose, Urine NEGATIVE NEGATIVE MG/DL    Bilirubin Urine NEGATIVE NEGATIVE MG/DL    Ketones, Urine NEGATIVE NEGATIVE MG/DL    Specific Gravity, UA 1.010 1.001 - 1.035    Blood, Urine TRACE (A) NEGATIVE    pH, Urine 6.0 5.0 - 8.0    Protein, UA NEGATIVE NEGATIVE MG/DL    Urobilinogen, Urine 0.2 0.2 - 1.0 MG/DL    Nitrite Urine, Quantitative POSITIVE (A) NEGATIVE    Leukocyte Esterase, Urine SMALL (A) NEGATIVE    RBC, UA 5 (H) 0 - 3 /HPF    WBC, UA 59 (H) 0 - 2 /HPF    Bacteria, UA MANY (A) NEGATIVE /HPF    WBC Clumps, UA RARE /HPF    Squam Epithel, UA <1 /HPF    Mucus, UA RARE (A) NEGATIVE HPF    Trichomonas, UA NONE SEEN NONE SEEN /HPF   Hemoglobin and Hematocrit   Result Value Ref Range    Hemoglobin 8.2 (L) 13.5 - 18.0 GM/DL    Hematocrit 26.9 (L) 42 - 52 %   TYPE AND SCREEN   Result Value Ref Range    ABO/Rh A POSITIVE     Antibody Screen NEGATIVE     Unit Number [id number]     Component LEUKOREDUCED PACKED CELL     Unit Divison 00     Status ISSUED     Transfusion Status OK TO TRANSFUSE     Crossmatch Result COMPATIBLE       Radiographs (if obtained):  [] The following radiograph was interpreted by myself in the absence of a radiologist:   [x] Radiologist's Report Reviewed:  No orders to display         EKG (if obtained): (All EKG's are interpreted by myself in the absence of a cardiologist)      Chart review shows recent radiographs:  No results found. MDM:  Pt presents as above. Emergent conditions considered. Presentation prompted initial broad work-up with labs and imaging. Patient is with complex medical history and broad work-up was pursued. IVs established IV fluids, IV Dilaudid, IV Zofran and oral Tylenol given. Blood and urine cultures were sent on arrival.    Patient on recheck of temperature did have a fever of 100.5 degrees. IV Zosyn is then initiated with concern for possible sepsis. Respiratory panel is negative. CBC is with acute on chronic anemia with a hemoglobin of 6.4. No leukocytosis. CMP is with mild hyperglycemia and mild metabolic acidosis without elevated anion gap. INR and APTT are within normal limits. TSH and free T4 within normal limits. Lactic acid is not suggestive of tissue hypoperfusion. Urinalysis is consistent with infection with many bacteria, 59 whites, small leukocyte esterase and positive nitrites. Urine is sent for culture. IV Zosyn is ordered for antibiotic therapy as above in the emergency department which should cover this. I plan for CT imaging of patient's abdomen pelvis and chest given his history to rule out any other more insidious process however prior to CT imaging I was informed by patient and family that they would like to go home per their hospice plans. They do not want to be admitted to the hospital as patient lose his hospice designation. I did  them regarding my plans for admission as I do believe patient is septic and they are understanding of this as well as the consequences possibly of going home of deterioration. I did encourage them to return to the emergency department anytime should they change their mind. I will place patient on Ceftin for coverage. Patient and family informed me that patient does have adequate analgesics at home. I did strongly encourage them to update their hospice team regarding all of the above. Discharged home per their request.    I discussed specific signs and symptoms on when to return to the emergency department as well as the need for close outpatient follow-up. Questions sought and answered with the patient and family. They voice understanding and agree with plan. Is this patient to be included in the SEP-1 Core Measure due to severe sepsis or septic shock? No   Exclusion criteria - the patient is NOT to be included for SEP-1 Core Measure due to:  Sepsis protocols declined due to palliative care/end-of-life wishes or refusal of care by patient or patient advocate          Care of this patient occurred during the COVID-19 pandemic. Clinical Impression:  1. Pyelonephritis      Disposition referral (if applicable):   Porter Lazaro MD  [address] [place] [address]   [id number]  Arnulfo Ramirez  [phone]    Schedule an appointment as soon as possible for a visit       Shane Woodard MD  [address] [facility] Emergency Department  [place] [phone]  Today  If symptoms worsen or if you change your mind regarding admission. Disposition medications (if applicable):  New Prescriptions    CEFUROXIME (CEFTIN) 500 MG TABLET    Take 1 tablet by mouth 2 times daily for 7 days       Comment: Please note this report has been produced using speech recognition software and may contain errors related to that system including errors in grammar, punctuation, and spelling, as well as words and phrases that may be inappropriate. If there are any questions or concerns please feel free to contact the dictating provider for clarification.        Sona Faith MD  07/03/22 6571

## 2022-07-03 NOTE — ED NOTES
Discharge instructions given. Pt verbalized understanding. Pt ambulated to waiting room.         John Jimenezelor, RN  07/03/22 4025

## 2022-07-03 NOTE — CARE COORDINATION
CM consulted for [facility]. Pt lives with spouse, does not drive, has DME, has insurance and PCP. Pt has history of lung and prostate cancer. Pt follows with Dr. Jose Swanson here and has encounters for [facility]. CM went and spoke to pt. Pt was drowsy and slow to answer questions. Pt main concern was for something to eat. Pt says he has help but when asked about [facility] pt says no. Says he would be interested. CM went and spoke to [address] [place] [address]. Pt unable to eat at this time per      [facility] went to update Dr. Florencio De La Cruz. Dr. Florencio De La Cruz says pt is going to be admitted. Says in note states pt is on hospice. But it appears pt may need more help at home. CM came back and reviewed chart deeper. Found referral for palliative care was placed 4/1 by Dr. Jose Swanson. 6/28 note says treatment has been deferred until clarification with hospice. In a note from 6/21 CM found name   [facility]  [address] [place] [address]  P: [facility] and spoke with on call [facility]. Pt only receives nurse visits 2 times a week. They offered aides services and they declined. RN asked for a call back once 100 % on what is going on. Charlene DODSON [phone] her cell number. Pt is now being d/c by request of him and spouse. GT will update Nadege DODSON from [facility] of d/c.

## 2022-07-03 NOTE — ED NOTES
Family states they would like pt d/c home with hospice as original plan. Pt is already a hospice pt and if pt is admitted, he will be d/c from hospice. Pt's family will be back in 30 min for p/u. Dr. Sidney Ramos notified.       Torrie Brenner RN  07/03/22 2877

## 2022-07-03 NOTE — ED TRIAGE NOTES
Pt presents to ED from home stating \" I think my hemoglobin is low because i'm freezing\" denies other complaints

## 2022-07-05 LAB
CULTURE: ABNORMAL
CULTURE: ABNORMAL
Lab: ABNORMAL
SPECIMEN: ABNORMAL

## 2022-07-08 LAB
CULTURE: NORMAL
Lab: NORMAL
SPECIMEN: NORMAL

## 2022-07-13 ENCOUNTER — HOSPITAL ENCOUNTER (OUTPATIENT)
Age: 52
Setting detail: OBSERVATION
Discharge: HOME | End: 2022-07-14
Attending: EMERGENCY MEDICINE
Payer: COMMERCIAL

## 2022-07-13 ENCOUNTER — APPOINTMENT (OUTPATIENT)
Dept: GENERAL RADIOLOGY | Age: 52
End: 2022-07-13
Payer: COMMERCIAL

## 2022-07-13 DIAGNOSIS — N39.0 URINARY TRACT INFECTION WITHOUT HEMATURIA, SITE UNSPECIFIED: ICD-10-CM

## 2022-07-13 DIAGNOSIS — R07.9 CHEST PAIN, UNSPECIFIED TYPE: Primary | ICD-10-CM

## 2022-07-13 DIAGNOSIS — Z85.9 HISTORY OF CANCER: ICD-10-CM

## 2022-07-13 DIAGNOSIS — F14.90 COCAINE USE: ICD-10-CM

## 2022-07-13 DIAGNOSIS — Z78.9 ALCOHOL USE: ICD-10-CM

## 2022-07-13 LAB
ALBUMIN SERPL-MCNC: 3.8 GM/DL (ref 3.4–5)
ALCOHOL SCREEN SERUM: <0.01 %WT/VOL
ALP BLD-CCNC: 690 IU/L (ref 40–129)
ALT SERPL-CCNC: <5 U/L (ref 10–40)
AMMONIA: 35 UMOL/L (ref 16–60)
AMPHETAMINES: NEGATIVE
AMPHETAMINES: NEGATIVE
ANION GAP SERPL CALCULATED.3IONS-SCNC: 13 MMOL/L (ref 4–16)
AST SERPL-CCNC: 13 IU/L (ref 15–37)
BACTERIA: ABNORMAL /HPF
BARBITURATE SCREEN URINE: NEGATIVE
BARBITURATE SCREEN URINE: NEGATIVE
BASOPHILS ABSOLUTE: 0 K/CU MM
BASOPHILS RELATIVE PERCENT: 0.4 % (ref 0–1)
BENZODIAZEPINE SCREEN, URINE: NEGATIVE
BENZODIAZEPINE SCREEN, URINE: NEGATIVE
BILIRUB SERPL-MCNC: 0.3 MG/DL (ref 0–1)
BILIRUBIN URINE: NEGATIVE MG/DL
BLOOD, URINE: ABNORMAL
BUN BLDV-MCNC: 18 MG/DL (ref 6–23)
CALCIUM SERPL-MCNC: 9.2 MG/DL (ref 8.3–10.6)
CANNABINOID SCREEN URINE: NEGATIVE
CANNABINOID SCREEN URINE: NEGATIVE
CHLORIDE BLD-SCNC: 99 MMOL/L (ref 99–110)
CLARITY: ABNORMAL
CO2: 20 MMOL/L (ref 21–32)
COCAINE METABOLITE: ABNORMAL
COCAINE METABOLITE: ABNORMAL
COLOR: YELLOW
CREAT SERPL-MCNC: 0.9 MG/DL (ref 0.9–1.3)
DIFFERENTIAL TYPE: ABNORMAL
EKG ATRIAL RATE: 94 BPM
EKG DIAGNOSIS: NORMAL
EKG P AXIS: 85 DEGREES
EKG P-R INTERVAL: 150 MS
EKG Q-T INTERVAL: 396 MS
EKG QRS DURATION: 98 MS
EKG QTC CALCULATION (BAZETT): 495 MS
EKG R AXIS: 67 DEGREES
EKG T AXIS: 94 DEGREES
EKG VENTRICULAR RATE: 94 BPM
EOSINOPHILS ABSOLUTE: 0.4 K/CU MM
EOSINOPHILS RELATIVE PERCENT: 4.9 % (ref 0–3)
GFR AFRICAN AMERICAN: >60 ML/MIN/1.73M2
GFR NON-AFRICAN AMERICAN: >60 ML/MIN/1.73M2
GLUCOSE BLD-MCNC: 111 MG/DL (ref 70–99)
GLUCOSE, URINE: NEGATIVE MG/DL
HCT VFR BLD CALC: 26.9 % (ref 42–52)
HEMOGLOBIN: 8.2 GM/DL (ref 13.5–18)
IMMATURE NEUTROPHIL %: 0.6 % (ref 0–0.43)
KETONES, URINE: NEGATIVE MG/DL
LACTATE: 0.9 MMOL/L (ref 0.4–2)
LEUKOCYTE ESTERASE, URINE: ABNORMAL
LIPASE: 24 IU/L (ref 13–60)
LYMPHOCYTES ABSOLUTE: 1.1 K/CU MM
LYMPHOCYTES RELATIVE PERCENT: 12.7 % (ref 24–44)
MCH RBC QN AUTO: 28 PG (ref 27–31)
MCHC RBC AUTO-ENTMCNC: 30.5 % (ref 32–36)
MCV RBC AUTO: 91.8 FL (ref 78–100)
MONOCYTES ABSOLUTE: 0.6 K/CU MM
MONOCYTES RELATIVE PERCENT: 6.8 % (ref 0–4)
MUCUS: ABNORMAL HPF
NITRITE URINE, QUANTITATIVE: NEGATIVE
NUCLEATED RBC %: 0.3 %
OPIATES, URINE: NEGATIVE
OPIATES, URINE: NEGATIVE
OXYCODONE: NEGATIVE
OXYCODONE: NEGATIVE
PDW BLD-RTO: 17.6 % (ref 11.7–14.9)
PH, URINE: 5.5 (ref 5–8)
PHENCYCLIDINE, URINE: NEGATIVE
PHENCYCLIDINE, URINE: NEGATIVE
PLATELET # BLD: 266 K/CU MM (ref 140–440)
PMV BLD AUTO: 10.1 FL (ref 7.5–11.1)
POTASSIUM SERPL-SCNC: 4.1 MMOL/L (ref 3.5–5.1)
PRO-BNP: 183.4 PG/ML
PROTEIN UA: ABNORMAL MG/DL
RBC # BLD: 2.93 M/CU MM (ref 4.6–6.2)
RBC URINE: 7 /HPF (ref 0–3)
SEGMENTED NEUTROPHILS ABSOLUTE COUNT: 6.7 K/CU MM
SEGMENTED NEUTROPHILS RELATIVE PERCENT: 74.6 % (ref 36–66)
SODIUM BLD-SCNC: 132 MMOL/L (ref 135–145)
SPECIFIC GRAVITY UA: 1.02 (ref 1–1.03)
TOTAL CK: 69 IU/L (ref 38–174)
TOTAL IMMATURE NEUTOROPHIL: 0.05 K/CU MM
TOTAL NUCLEATED RBC: 0 K/CU MM
TOTAL PROTEIN: 7.7 GM/DL (ref 6.4–8.2)
TRICHOMONAS: ABNORMAL /HPF
TROPONIN T: <0.01 NG/ML
UROBILINOGEN, URINE: 0.2 MG/DL (ref 0.2–1)
WBC # BLD: 8.9 K/CU MM (ref 4–10.5)
WBC CLUMP: ABNORMAL /HPF
WBC UA: 189 /HPF (ref 0–2)

## 2022-07-13 PROCEDURE — 2580000003 HC RX 258: Performed by: NURSE PRACTITIONER

## 2022-07-13 PROCEDURE — 96366 THER/PROPH/DIAG IV INF ADDON: CPT

## 2022-07-13 PROCEDURE — 87086 URINE CULTURE/COLONY COUNT: CPT

## 2022-07-13 PROCEDURE — 81001 URINALYSIS AUTO W/SCOPE: CPT

## 2022-07-13 PROCEDURE — 87077 CULTURE AEROBIC IDENTIFY: CPT

## 2022-07-13 PROCEDURE — 71045 X-RAY EXAM CHEST 1 VIEW: CPT

## 2022-07-13 PROCEDURE — 99219 PR INITIAL OBSERVATION CARE/DAY 50 MINUTES: CPT | Performed by: INTERNAL MEDICINE

## 2022-07-13 PROCEDURE — 87186 SC STD MICRODIL/AGAR DIL: CPT

## 2022-07-13 PROCEDURE — 96365 THER/PROPH/DIAG IV INF INIT: CPT

## 2022-07-13 PROCEDURE — 96361 HYDRATE IV INFUSION ADD-ON: CPT

## 2022-07-13 PROCEDURE — 84484 ASSAY OF TROPONIN QUANT: CPT

## 2022-07-13 PROCEDURE — 83880 ASSAY OF NATRIURETIC PEPTIDE: CPT

## 2022-07-13 PROCEDURE — 85025 COMPLETE CBC W/AUTO DIFF WBC: CPT

## 2022-07-13 PROCEDURE — 80307 DRUG TEST PRSMV CHEM ANLYZR: CPT

## 2022-07-13 PROCEDURE — 80053 COMPREHEN METABOLIC PANEL: CPT

## 2022-07-13 PROCEDURE — 96375 TX/PRO/DX INJ NEW DRUG ADDON: CPT

## 2022-07-13 PROCEDURE — 93005 ELECTROCARDIOGRAM TRACING: CPT | Performed by: EMERGENCY MEDICINE

## 2022-07-13 PROCEDURE — 2500000003 HC RX 250 WO HCPCS: Performed by: EMERGENCY MEDICINE

## 2022-07-13 PROCEDURE — 6360000002 HC RX W HCPCS: Performed by: NURSE PRACTITIONER

## 2022-07-13 PROCEDURE — 2580000003 HC RX 258: Performed by: EMERGENCY MEDICINE

## 2022-07-13 PROCEDURE — 82140 ASSAY OF AMMONIA: CPT

## 2022-07-13 PROCEDURE — 93010 ELECTROCARDIOGRAM REPORT: CPT | Performed by: INTERNAL MEDICINE

## 2022-07-13 PROCEDURE — 6370000000 HC RX 637 (ALT 250 FOR IP): Performed by: NURSE PRACTITIONER

## 2022-07-13 PROCEDURE — 96372 THER/PROPH/DIAG INJ SC/IM: CPT

## 2022-07-13 PROCEDURE — 82805 BLOOD GASES W/O2 SATURATION: CPT

## 2022-07-13 PROCEDURE — 84443 ASSAY THYROID STIM HORMONE: CPT

## 2022-07-13 PROCEDURE — 83605 ASSAY OF LACTIC ACID: CPT

## 2022-07-13 PROCEDURE — G0480 DRUG TEST DEF 1-7 CLASSES: HCPCS

## 2022-07-13 PROCEDURE — A4216 STERILE WATER/SALINE, 10 ML: HCPCS | Performed by: EMERGENCY MEDICINE

## 2022-07-13 PROCEDURE — 83690 ASSAY OF LIPASE: CPT

## 2022-07-13 PROCEDURE — 96374 THER/PROPH/DIAG INJ IV PUSH: CPT

## 2022-07-13 PROCEDURE — 6360000002 HC RX W HCPCS: Performed by: EMERGENCY MEDICINE

## 2022-07-13 PROCEDURE — G0378 HOSPITAL OBSERVATION PER HR: HCPCS

## 2022-07-13 PROCEDURE — 82550 ASSAY OF CK (CPK): CPT

## 2022-07-13 PROCEDURE — 99285 EMERGENCY DEPT VISIT HI MDM: CPT

## 2022-07-13 RX ORDER — ONDANSETRON 4 MG/1
4 TABLET, ORALLY DISINTEGRATING ORAL EVERY 8 HOURS PRN
Status: DISCONTINUED | OUTPATIENT
Start: 2022-07-13 | End: 2022-07-14 | Stop reason: HOSPADM

## 2022-07-13 RX ORDER — POLYETHYLENE GLYCOL 3350 17 G/17G
17 POWDER, FOR SOLUTION ORAL DAILY PRN
Status: DISCONTINUED | OUTPATIENT
Start: 2022-07-13 | End: 2022-07-14 | Stop reason: HOSPADM

## 2022-07-13 RX ORDER — NITROGLYCERIN 0.4 MG/1
0.4 TABLET SUBLINGUAL EVERY 5 MIN PRN
Status: DISCONTINUED | OUTPATIENT
Start: 2022-07-13 | End: 2022-07-14 | Stop reason: HOSPADM

## 2022-07-13 RX ORDER — BICALUTAMIDE 50 MG/1
50 TABLET, FILM COATED ORAL DAILY
Status: DISCONTINUED | OUTPATIENT
Start: 2022-07-13 | End: 2022-07-14 | Stop reason: HOSPADM

## 2022-07-13 RX ORDER — ONDANSETRON 2 MG/ML
4 INJECTION INTRAMUSCULAR; INTRAVENOUS EVERY 6 HOURS PRN
Status: DISCONTINUED | OUTPATIENT
Start: 2022-07-13 | End: 2022-07-14 | Stop reason: HOSPADM

## 2022-07-13 RX ORDER — ENOXAPARIN SODIUM 100 MG/ML
40 INJECTION SUBCUTANEOUS DAILY
Status: DISCONTINUED | OUTPATIENT
Start: 2022-07-13 | End: 2022-07-14 | Stop reason: HOSPADM

## 2022-07-13 RX ORDER — LORAZEPAM 1 MG/1
1 TABLET ORAL NIGHTLY PRN
Status: DISCONTINUED | OUTPATIENT
Start: 2022-07-13 | End: 2022-07-14 | Stop reason: HOSPADM

## 2022-07-13 RX ORDER — SODIUM CHLORIDE 0.9 % (FLUSH) 0.9 %
5-40 SYRINGE (ML) INJECTION PRN
Status: DISCONTINUED | OUTPATIENT
Start: 2022-07-13 | End: 2022-07-14 | Stop reason: HOSPADM

## 2022-07-13 RX ORDER — KETOROLAC TROMETHAMINE 30 MG/ML
30 INJECTION, SOLUTION INTRAMUSCULAR; INTRAVENOUS ONCE
Status: COMPLETED | OUTPATIENT
Start: 2022-07-13 | End: 2022-07-13

## 2022-07-13 RX ORDER — ALBUTEROL SULFATE 90 UG/1
2 AEROSOL, METERED RESPIRATORY (INHALATION) EVERY 4 HOURS PRN
Status: DISCONTINUED | OUTPATIENT
Start: 2022-07-13 | End: 2022-07-14 | Stop reason: HOSPADM

## 2022-07-13 RX ORDER — ONDANSETRON 2 MG/ML
4 INJECTION INTRAMUSCULAR; INTRAVENOUS EVERY 30 MIN PRN
Status: DISCONTINUED | OUTPATIENT
Start: 2022-07-13 | End: 2022-07-14 | Stop reason: HOSPADM

## 2022-07-13 RX ORDER — ABIRATERONE ACETATE 250 MG/1
1000 TABLET ORAL DAILY
Status: DISCONTINUED | OUTPATIENT
Start: 2022-07-13 | End: 2022-07-14 | Stop reason: HOSPADM

## 2022-07-13 RX ORDER — NICOTINE 21 MG/24HR
1 PATCH, TRANSDERMAL 24 HOURS TRANSDERMAL DAILY
Status: DISCONTINUED | OUTPATIENT
Start: 2022-07-13 | End: 2022-07-14 | Stop reason: HOSPADM

## 2022-07-13 RX ORDER — ASPIRIN 81 MG/1
81 TABLET, CHEWABLE ORAL DAILY
Status: DISCONTINUED | OUTPATIENT
Start: 2022-07-14 | End: 2022-07-14 | Stop reason: HOSPADM

## 2022-07-13 RX ORDER — FOLIC ACID 1 MG/1
1 TABLET ORAL DAILY
Status: DISCONTINUED | OUTPATIENT
Start: 2022-07-13 | End: 2022-07-14 | Stop reason: HOSPADM

## 2022-07-13 RX ORDER — OXYCODONE AND ACETAMINOPHEN 7.5; 325 MG/1; MG/1
1 TABLET ORAL EVERY 6 HOURS PRN
Status: DISCONTINUED | OUTPATIENT
Start: 2022-07-13 | End: 2022-07-14 | Stop reason: HOSPADM

## 2022-07-13 RX ORDER — LANOLIN ALCOHOL/MO/W.PET/CERES
100 CREAM (GRAM) TOPICAL DAILY
Status: DISCONTINUED | OUTPATIENT
Start: 2022-07-13 | End: 2022-07-14 | Stop reason: HOSPADM

## 2022-07-13 RX ORDER — DICYCLOMINE HYDROCHLORIDE 10 MG/ML
20 INJECTION INTRAMUSCULAR ONCE
Status: COMPLETED | OUTPATIENT
Start: 2022-07-13 | End: 2022-07-13

## 2022-07-13 RX ORDER — SODIUM CHLORIDE 9 MG/ML
INJECTION, SOLUTION INTRAVENOUS PRN
Status: DISCONTINUED | OUTPATIENT
Start: 2022-07-13 | End: 2022-07-14 | Stop reason: HOSPADM

## 2022-07-13 RX ORDER — 0.9 % SODIUM CHLORIDE 0.9 %
1000 INTRAVENOUS SOLUTION INTRAVENOUS ONCE
Status: COMPLETED | OUTPATIENT
Start: 2022-07-13 | End: 2022-07-13

## 2022-07-13 RX ORDER — SODIUM CHLORIDE 0.9 % (FLUSH) 0.9 %
5-40 SYRINGE (ML) INJECTION EVERY 12 HOURS SCHEDULED
Status: DISCONTINUED | OUTPATIENT
Start: 2022-07-13 | End: 2022-07-14 | Stop reason: HOSPADM

## 2022-07-13 RX ORDER — PREDNISONE 10 MG/1
5 TABLET ORAL DAILY
Status: DISCONTINUED | OUTPATIENT
Start: 2022-07-13 | End: 2022-07-14 | Stop reason: HOSPADM

## 2022-07-13 RX ORDER — ATORVASTATIN CALCIUM 40 MG/1
40 TABLET, FILM COATED ORAL NIGHTLY
Status: DISCONTINUED | OUTPATIENT
Start: 2022-07-13 | End: 2022-07-14 | Stop reason: HOSPADM

## 2022-07-13 RX ADMIN — KETOROLAC TROMETHAMINE 30 MG: 30 INJECTION, SOLUTION INTRAMUSCULAR; INTRAVENOUS at 07:11

## 2022-07-13 RX ADMIN — Medication 100 MG: at 14:02

## 2022-07-13 RX ADMIN — DICYCLOMINE HYDROCHLORIDE 20 MG: 20 INJECTION INTRAMUSCULAR at 07:11

## 2022-07-13 RX ADMIN — SODIUM CHLORIDE, PRESERVATIVE FREE 10 ML: 5 INJECTION INTRAVENOUS at 17:07

## 2022-07-13 RX ADMIN — ATORVASTATIN CALCIUM 40 MG: 40 TABLET, FILM COATED ORAL at 20:20

## 2022-07-13 RX ADMIN — FOLIC ACID 1 MG: 1 TABLET ORAL at 14:02

## 2022-07-13 RX ADMIN — PREDNISONE 5 MG: 10 TABLET ORAL at 14:02

## 2022-07-13 RX ADMIN — SODIUM CHLORIDE 25 ML/HR: 9 INJECTION, SOLUTION INTRAVENOUS at 16:54

## 2022-07-13 RX ADMIN — SODIUM CHLORIDE 1000 ML: 9 INJECTION, SOLUTION INTRAVENOUS at 07:12

## 2022-07-13 RX ADMIN — ONDANSETRON 4 MG: 2 INJECTION INTRAMUSCULAR; INTRAVENOUS at 07:09

## 2022-07-13 RX ADMIN — FAMOTIDINE 20 MG: 10 INJECTION, SOLUTION INTRAVENOUS at 07:10

## 2022-07-13 RX ADMIN — CEFTRIAXONE SODIUM 1000 MG: 1 INJECTION, POWDER, FOR SOLUTION INTRAMUSCULAR; INTRAVENOUS at 17:06

## 2022-07-13 ASSESSMENT — ENCOUNTER SYMPTOMS
VOMITING: 1
RESPIRATORY NEGATIVE: 1
EYES NEGATIVE: 1
ABDOMINAL PAIN: 1
ALLERGIC/IMMUNOLOGIC NEGATIVE: 1
NAUSEA: 1

## 2022-07-13 ASSESSMENT — PAIN SCALES - GENERAL: PAINLEVEL_OUTOF10: 0

## 2022-07-13 NOTE — ED NOTES
Full report given to [facility] RN, No further questions at this time.      Shayy Maldonado RN  07/13/22 4537

## 2022-07-13 NOTE — H&P
History and Physical      Name:  Lana Morrissey /Age/Sex: 1970  (47 y.o. male)   MRN & CSN:  [id number] & [id number] Admission Date/Time: 2022  6:39 AM   Location:  01/01TR-01 PCP: Trey Klein MD       Hospital Day: 1           Assessment and Plan:   # Chest pain in setting of cocaine abuse - UDS + cocaine. EKG with ST-T wave abn, trop neg x1. CXR non acute, showing perihilar changes. [facility] 2016 showed normal coronaries. Check TTE, trend trops, will manage on asa, lipid profile in am, check TSH, Cardiology c/s    # Cocaine abuse - positive on UDS, cessation encouraged, avoid beta-blocker. Monitoring on tele. # UTI - Continue IV Ceftriaxone, f/u urine cx and adjust abx as indicated    # Hyponatremia, mild - Given 1l NS in ED, encourage oral hydration, repeat chemistry in am    # NSCLC, metastatic prostate CA - Followed by Oncology [facility] and [facility]. Continue current oncologic agents, prednisone, pain regimen    # Chronic anemia/thrombocytopenia in setting of metastatic CA with osseous involvement - followed by Oncology, requires PRBC's prn, on SHABBIR, monitor    # Chronic elevated Alk phos in setting of bone mets    # ETOH abuse - place on CIWA protocol, thiamin and folic acid supplements    # Hx right hydronephrosis s/p PCN    # Tobacco abuse - Nicotine patch ordered, cessation encouraged        Present on Admission:   Chest pain             Diet No diet orders on file   DVT Prophylaxis [x] Lovenox, []  Heparin, [] SCDs, [] Ambulation  [] Long term AC   GI Prophylaxis [] PPI,  [x] H2 Blocker,  [] Carafate,  [] Diet/Tube Feeds   Code Status Full code    Disposition Admit to med surg.     Patient plans to return home upon discharge         Chief Complaint: Chest Pain      History obtained from EHR and patient   History of Present Illness:   Lana Morrissey is a 47 y.o. male  with history of NSCLC and metastatic prostate CA followed by Oncology,  ETOH and cocaine abuse, HTN, chronic anemia and edema.   GI -Abdomen is soft non distended, non tender to palpation. + BS. No masses or guarding.  -No CVA/ flank tenderness. LYMPH- No petechiae or ecchymoses. MS -No gross joint deformities. SKIN -Normal coloration, warm, dry. NEURO-no lateralizing weakness, unable to fully eval due to patient lethargy  PSYC-drowsy, awakens to name unable to fully eval.    Past Medical History:      Past Medical History:   Diagnosis Date    CAD (coronary artery disease) 12/23/2013    cath negative per pt    Cancer [facility]) 06/2021    pt reports he has lung cancer    History of TMJ disorder     Hypertension     Trigeminal neuralgia        PMH reviewed  Past Surgical  History:    has a past surgical history that includes Abdomen surgery; Cardiac catheterization (08/03/2016); CT NEEDLE BIOPSY LUNG PERCUTANEOUS (7/28/2021); Port Surgery (Right, 8/13/2021); and CT BIOPSY SUPERFICIAL BONE PERCUTANEOUS (12/17/2021). Surgical history reviewed  Family  History:   family history includes Cancer in his sister; High Blood Pressure in his mother and sister. Family history reviewed  Social History:     Social History     Socioeconomic History    Marital status:      Spouse name: None    Number of children: 5    Years of education: None    Highest education level: None   Occupational History    None   Tobacco Use    Smoking status: Current Every Day Smoker     Packs/day: 2.00     Years: 39.00     Pack years: 78.00     Types: Cigarettes    Smokeless tobacco: Never Used    Tobacco comment: smokes 1-2 a day   Vaping Use    Vaping Use: Never used   Substance and Sexual Activity    Alcohol use:  Yes     Alcohol/week: 6.0 standard drinks     Types: 6 Cans of beer per week     Comment: per week (24 oz beers)     Drug use: Yes     Types: Marijuana Dre Coil)     Comment: last 12/1    Sexual activity: Yes     Partners: Female   Other Topics Concern    None   Social History Narrative    None     Social Determinants of Health     Financial Resource Strain:     Difficulty of Paying Living Expenses: Not on file   Food Insecurity:     Worried About Running Out of Food in the Last Year: Not on file    [place] of Food in the Last Year: Not on file   Transportation Needs:     Lack of Transportation (Medical): Not on file    Lack of Transportation (Non-Medical): Not on file   Physical Activity:     Days of Exercise per Week: Not on file    Minutes of Exercise per Session: Not on file   Stress:     Feeling of Stress : Not on file   Social Connections:     Frequency of Communication with Friends and Family: Not on file    Frequency of Social Gatherings with Friends and Family: Not on file    Attends Oriental orthodox Services: Not on file    Active Member of [address] [place] [address] [employer] or Organizations: Not on file    Attends Club or Organization Meetings: Not on file    Marital Status: Not on file   Intimate Partner Violence:     Fear of Current or Ex-Partner: Not on file    Emotionally Abused: Not on file    Physically Abused: Not on file    Sexually Abused: Not on file   Housing Stability:     Unable to Pay for Housing in the Last Year: Not on file    Number of [place] in the Last Year: Not on file    Unstable Housing in the Last Year: Not on file      reports that he has been smoking cigarettes. He has a 78.00 pack-year smoking history. He has never used smokeless tobacco.   reports current alcohol use of about 6.0 standard drinks of alcohol per week. reports current drug use. Drug: Marijuana Carmelita Wells). Social history reviewed  Allergies: Allergies   Allergen Reactions    Tramadol Other (See Comments)     seizures    Vicodin [Hydrocodone-Acetaminophen] Hives       Home Medications:     Prior to Admission medications    Medication Sig Start Date End Date Taking? Authorizing Provider   LORazepam (ATIVAN) 1 MG tablet Take 1 tablet by mouth nightly as needed for Anxiety for up to 30 days.  6/22/22 7/22/22  Meghna Wild MD   oxyCODONE (OXYCONTIN) 10 MG extended release tablet Take 1 tablet by mouth 2 times daily for 30 days. Intended supply: 30 days 6/14/22 7/14/22  Rita Brooks MD   predniSONE (DELTASONE) 5 MG tablet  5/26/22   Historical Provider, MD   abiraterone acetate (ZYTIGA) 250 MG tablet Take 1,000 mg by mouth daily 5/19/22   Historical Provider, MD   oxyCODONE-acetaminophen (PERCOCET) 7.5-325 MG per tablet take 1 tablet by mouth every 6 hours AS NEEDED FOR PAIN 4/15/22   Historical Provider, MD   naloxone 4 MG/0.1ML LIQD nasal spray 1 spray by Nasal route as needed for Opioid Reversal 4/12/22   Trudi Wilkes MD   Calcium Carbonate-Vitamin D (OYSTER SHELL CALCIUM/D) 500-200 MG-UNIT TABS Take 1 tablet by mouth daily 4/1/22 6/2/22  Jaxson Barry MD   cyanocobalamin ([employer] VITAMIN B12) 1000 MCG tablet Take 1 tablet by mouth daily 4/1/22   Jaxson Barry MD   nicotine (NICODERM CQ) 21 MG/24HR Place 1 patch onto the skin daily 4/1/22   Jaxson Barry MD   bicalutamide (CASODEX) 50 MG chemo tablet Take 1 tablet by mouth daily 3/29/22   Jaxson Barry MD   ondansetron (ZOFRAN) 8 MG tablet take 1 tablet by mouth every 8 hours if needed for nausea and vomiting 3/11/22   Historical Provider, MD   Sennosides (SENNA) 8.6 MG CAPS Take 1 capsule by mouth 2 times daily as needed (constipation) 3/11/22   Bridget Walker,    dexamethasone (DECADRON) 4 MG tablet Two tablets the day before treatment, one table daily for four days starting the day after chemotherapy 1/14/22   Jaxson Barry MD   NARCAN 4 MG/0.1ML LIQD nasal spray DISPENSED PER STANDING ORDER USE AS DIRECTED PATIENT IS TRAINED OPIOID OVERDOSE RESPONDER 9/8/21   Historical Provider, MD   albuterol sulfate HFA (PROVENTIL HFA) 108 (90 Base) MCG/ACT inhaler Inhale 2 puffs into the lungs every 4 hours as needed for Wheezing or Shortness of Breath With spacer (and mask if indicated). Thanks.  7/30/21 6/2/22  Joel Bis, DO         Medications:   Medications:    cefTRIAXone (ROCEPHIN) IV 1,000 mg IntraVENous Once      Infusions:   PRN Meds: ondansetron, 4 mg, Q30 Min PRN        Data:     Laboratory this visit:  Reviewed  Recent Labs     07/13/22  0644   WBC 8.9   HGB 8.2*   HCT 26.9*         Recent Labs     07/13/22  0644   *   K 4.1   CL 99   CO2 20*   BUN 18   CREATININE 0.9     Recent Labs     07/13/22  0644   AST 13*   ALT <5*   BILITOT 0.3   ALKPHOS 690*     No results for input(s): INR in the last 72 hours. Radiology this visit:  Reviewed. XR CHEST PORTABLE    Result Date: 7/13/2022  EXAMINATION: ONE XRAY VIEW OF THE CHEST 7/13/2022 6:50 am COMPARISON: Chest radiograph dated April 10, 2022 HISTORY: ORDERING SYSTEM PROVIDED HISTORY: CP TECHNOLOGIST PROVIDED HISTORY: Reason for exam:->CP Reason for Exam: CP FINDINGS: The cardiomediastinal silhouette is stable. There is opacity within the left upper lobe. A right chest port is seen. There is no pneumothorax. No significant change in the left perihilar opacity consistent with patient's known neoplasm with associated left upper lobe collapse.            EKG this visit:  personally reviewed         Electronically signed by GERDA Shultz CNP on 7/13/2022 at 10:26 AM

## 2022-07-13 NOTE — CONSULTS
INPATIENT CARDIOLOGY CONSULT NOTE         Reason for consultation:  CP     Referring physician:  Bobby Fiore MD     Primary care physician: Geremias Maldonado MD      Dear Bobby Fiore MD Thank you for the consult    Chief Complaint   Patient presents with    Chest Pain       History of present illness:Hima is a 47 y. o.year old who  presents with   Chief Complaint   Patient presents with    Chest Pain     Patient does not see any cardiologist in outpatient setting      Patient is a 58-year-old -American male with prior medical history significant for non-small cell lung cancer metastatic with bone involvement as per EMR,, prostate cancer, chronic anemia and thrombocytopenia, presents to the hospital with chief complaint of chest pain. Patient is somnolent and difficult to arouse. Upon questioning he admits to using cocaine almost on a daily basis, also admits to using marijuana. Denies any IV opioids use. Smokes cigarettes half to 1 pack/day. Patient admission complaint is chest pain. Currently denies any active chest pain. He is a poor historian however mentions that he has been experiencing intermittent chest discomfort over the past several days which is retrosternal nonexertional in nature. EKG shows sinus rhythm nonspecific ST-T changes interventricular conduction delay, left ventricular hypertrophy with strain pattern. Serial cardiac troponins are negative     Last echocardiogram in 2016 was normal    Last heart cath in 2016 as per EMR was normal coronary angiogram.    Past medical history:    has a past medical history of CAD (coronary artery disease), Cancer ([address] History of TMJ disorder, Hypertension, and Trigeminal neuralgia. Past surgical history:   has a past surgical history that includes Abdomen surgery; Cardiac catheterization (08/03/2016); CT NEEDLE BIOPSY LUNG PERCUTANEOUS (7/28/2021);  Port Surgery (Right, 8/13/2021); and CT BIOPSY SUPERFICIAL BONE PERCUTANEOUS (12/17/2021). Social History:   reports that he has been smoking cigarettes. He has a 78.00 pack-year smoking history. He has never used smokeless tobacco. He reports current alcohol use of about 6.0 standard drinks of alcohol per week. He reports current drug use. Drug: Marijuana Yordan Skyison).   Family history:   no family history of CAD, STROKE of DM    Allergies   Allergen Reactions    Tramadol Other (See Comments)     seizures    Vicodin [Hydrocodone-Acetaminophen] Hives       ondansetron (ZOFRAN) injection 4 mg, Q30 Min PRN  sodium chloride flush 0.9 % injection 5-40 mL, 2 times per day  sodium chloride flush 0.9 % injection 5-40 mL, PRN  0.9 % sodium chloride infusion, PRN  ondansetron (ZOFRAN-ODT) disintegrating tablet 4 mg, Q8H PRN   Or  ondansetron (ZOFRAN) injection 4 mg, Q6H PRN  polyethylene glycol (GLYCOLAX) packet 17 g, Daily PRN  [START ON 7/14/2022] aspirin chewable tablet 81 mg, Daily  atorvastatin (LIPITOR) tablet 40 mg, Nightly  enoxaparin (LOVENOX) injection 40 mg, Daily  nitroGLYCERIN (NITROSTAT) SL tablet 0.4 mg, Q5 Min PRN  abiraterone acetate (ZYTIGA) 250 MG tablet TABS 1,000 mg (PATIENT SUPPLY), Daily  albuterol sulfate HFA (PROVENTIL;VENTOLIN;PROAIR) 108 (90 Base) MCG/ACT inhaler 2 puff, Q4H PRN  bicalutamide (CASODEX) chemo tablet 50 mg, Daily  LORazepam (ATIVAN) tablet 1 mg, Nightly PRN  nicotine (NICODERM CQ) 21 MG/24HR 1 patch, Daily  cefTRIAXone (ROCEPHIN) 1000 mg IVPB in 50 mL D5W minibag, Q24H  predniSONE (DELTASONE) tablet 5 mg, Daily  folic acid (FOLVITE) tablet 1 mg, Daily  thiamine tablet 100 mg, Daily      Current Facility-Administered Medications   Medication Dose Route Frequency Provider Last Rate Last Admin    ondansetron (ZOFRAN) injection 4 mg  4 mg IntraVENous Q30 Min PRN Lori Sow, DO   4 mg at 07/13/22 0709    sodium chloride flush 0.9 % injection 5-40 mL  5-40 mL IntraVENous 2 times per day Sundeep Perez, APRN - CNP        sodium chloride flush 0.9 % injection 5-40 mL  5-40 mL IntraVENous PRN Sushma Curl, APRN - CNP        0.9 % sodium chloride infusion   IntraVENous PRN Sushma Curl, APRN - CNP        ondansetron (ZOFRAN-ODT) disintegrating tablet 4 mg  4 mg Oral Q8H PRN Sushma Curl, APRN - CNP        Or    ondansetron (ZOFRAN) injection 4 mg  4 mg IntraVENous Q6H PRN Sushma Curl, APRN - CNP        polyethylene glycol (GLYCOLAX) packet 17 g  17 g Oral Daily PRN Sushma Curl, APRN - CNP        [START ON 7/14/2022] aspirin chewable tablet 81 mg  81 mg Oral Daily GERDA Lewis CNP        atorvastatin (LIPITOR) tablet 40 mg  40 mg Oral Nightly GERDA Lewis CNP        enoxaparin (LOVENOX) injection 40 mg  40 mg SubCUTAneous Daily GERDA Lewis CNP        nitroGLYCERIN (NITROSTAT) SL tablet 0.4 mg  0.4 mg SubLINGual Q5 Min PRN GERDA Lewis CNP        abiraterone acetate (ZYTIGA) 250 MG tablet TABS 1,000 mg (PATIENT SUPPLY)  1,000 mg Oral Daily GERDA Lewis CNP        albuterol sulfate HFA (PROVENTIL;VENTOLIN;PROAIR) 108 (90 Base) MCG/ACT inhaler 2 puff  2 puff Inhalation Q4H PRN Sushma Curl, APRN - CNP        bicalutamide (CASODEX) chemo tablet 50 mg  50 mg Oral Daily GERDA Lewis CNP        LORazepam (ATIVAN) tablet 1 mg  1 mg Oral Nightly PRN Sushma Curl, APRN - CNP        nicotine (NICODERM CQ) 21 MG/24HR 1 patch  1 patch TransDERmal Daily Sushma Curl, APRN - CNP   1 patch at 07/13/22 1401    cefTRIAXone (ROCEPHIN) 1000 mg IVPB in 50 mL D5W minibag  1,000 mg IntraVENous Q24H Sushma Curl, APRN - CNP        predniSONE (DELTASONE) tablet 5 mg  5 mg Oral Daily Sushma Curl, APRN - CNP   5 mg at 38/83/36 8191    folic acid (FOLVITE) tablet 1 mg  1 mg Oral Daily Sushma Curl, APRN - CNP   1 mg at 07/13/22 1402    thiamine tablet 100 mg  100 mg Oral Daily GERDA Love CNP   100 mg at 07/13/22 1402         Review of Systems:      Patient is a poor historian however denies any active symptoms currently    · Constitutional: No Fever or Weight Loss   · Eyes: No Decreased Vision  · ENT: No Headaches, Hearing Loss or Vertigo  · Cardiovascular:   no chest pain,  no dyspnea on exertion,  no palpitations or loss of consciousness  · Respiratory: No cough or wheezing    · Gastrointestinal: No abdominal pain, appetite loss, blood in stools, constipation, diarrhea or heartburn  · Genitourinary: No dysuria, trouble voiding, or hematuria  · Musculoskeletal:  No gait disturbance, weakness or joint complaints  · Integumentary: No rash or pruritis  · Neurological: No TIA or stroke symptoms  · Psychiatric: No anxiety or depression  · Endocrine: No malaise, fatigue or temperature intolerance  · Hematologic/Lymphatic: No bleeding problems, blood clots or swollen lymph nodes  · Allergic/Immunologic: No nasal congestion or hives    All other systems were reviewed and were negative otherwise. Physical Examination:      Vitals:    07/13/22 1500   BP: 107/61   Pulse: 93   Resp: 18   SpO2: 97%      Wt Readings from Last 3 Encounters:   07/13/22 190 lb 9.6 oz (86.5 kg)   07/03/22 200 lb (90.7 kg)   06/28/22 192 lb 6.4 oz (87.3 kg)     Body mass index is 25.15 kg/m². General Appearance:  No distress, conversant  Constitutional:  Well developed, Well nourished  HEENT:  Normocephalic, Atraumatic, Oropharynx moist   Nose normal. Neck Supple Carotid: no carotid bruit  Eyes:  Conjunctiva normal, No discharge. Respiratory:    ormal breath sounds, No respiratory distress, No wheezing, no use of accessory muscles, diaphragm movement is normal  No chest Tenderness  Cardiovascular: S1-S2 No murmurs auscultated. No rubs, thrills or gallops. Normal  rhythm. Pedal pulses are normal. No pedal edema  GI:  Soft Non tender, non distended. Musculoskeletal:   No tenderness, No cyanosis, No clubbing. Integument:  Warm, Dry, No erythema, No rash.    Lymphatic: No lymphadenopathy noted. Neurologic: Somnolent  Psychiatric: Somnolent      Lab Review     Recent Labs     07/13/22  0644   WBC 8.9   HGB 8.2*   HCT 26.9*         Recent Labs     07/13/22  0644   *   K 4.1   CL 99   CO2 20*   BUN 18   CREATININE 0.9     Recent Labs     07/13/22  0644   AST 13*   ALT <5*   BILITOT 0.3   ALKPHOS 690*     No results for input(s): TROPONINI in the last 72 hours. Lab Results   Component Value Date    BNP <2 04/26/2014    BNP 9 12/19/2013    BNP 4 07/27/2013     Lab Results   Component Value Date    INR 1.03 07/03/2022    PROTIME 13.3 07/03/2022         All labs, images, EKGs were personally reviewed      Assessment: 47 y. o.year old with PMH of  has a past medical history of CAD (coronary artery disease), Cancer ([address] History of TMJ disorder, Hypertension, and Trigeminal neuralgia. Medical Decision Making :       1. Atypical chest pain  2. Lung/prostate cancer/metastasis  3. History of normal coronary injury in 2016  4. Cocaine abuse  5. Marijuana abuse  6. Nicotine abuse  7. Urine tract infection    Chest pain likely secondary to vasospasms due to cocaine abuse, complemented by lung malignancy. Obtain echocardiogram to rule out structural heart disease. No plan of any ischemic work-up at this point given poor life expectancy/drug abuse. Recommend low-dose aspirin and statins  Avoid nonselective beta-blockers   Avoid drug abuse.   Patient was counseled although I do not think he will comply         Thank you for the consult    Dr. Stanford Gomez  7/13/2022 4:16 PM

## 2022-07-13 NOTE — ED TRIAGE NOTES
Chest pain started this am around 0545. Mild shortness of breath.   Drinking alcohol this am.  Was walking and called ems

## 2022-07-13 NOTE — ED PROVIDER NOTES
3 Abhishek Coyle CHIEF COMPLAINT:   Chest Pain      [place]:  Ana Duckworth is a 47 y.o. male that presents with complaint of chest pain abdominal pain nausea vomiting. Patient arrives by EMS I saw patient walking next of the gas station on the way to work today. Few minutes later he arrived to the hospital after calling 911 for chest pain. Patient states he has not slept all night he has been on drinking. He has known pancreatic cancer, lung cancer he follows at [facility] and here. On arrival patient sleeping he states again he did not sleep all night his chest pain is an 8 out of 10. But currently he is sleeping he is easily arousable. He denies other questions or concerns just chest pain nausea vomiting abdominal pain. Denies other questions or concerns. REVIEW OF SYSTEMS:  At least 10 systems reviewed and otherwise acutely negative except as in the [address] Review of Systems   Constitutional: Positive for fatigue. HENT: Negative. Eyes: Negative. Respiratory: Negative. Cardiovascular: Positive for chest pain. Gastrointestinal: Positive for abdominal pain, nausea and vomiting. Endocrine: Negative. Genitourinary: Negative. Musculoskeletal: Negative. Skin: Negative. Allergic/Immunologic: Negative. Neurological: Negative. Hematological: Negative. Psychiatric/Behavioral: Negative. All other systems reviewed and are negative.       Past Medical History:   Diagnosis Date    CAD (coronary artery disease) 12/23/2013    cath negative per pt    Cancer [facility]) 06/2021    pt reports he has lung cancer    History of TMJ disorder     Hypertension     Trigeminal neuralgia      Past Surgical History:   Procedure Laterality Date    ABDOMEN SURGERY      CARDIAC CATHETERIZATION  08/03/2016    CT BIOPSY PERCUTANEOUS SUPERFICIAL BONE  12/17/2021    CT BIOPSY [facility]CUTANE[facility]FICIAL [facility]    CT NEEDLE BIOPSY LUNG PERCUTANEOUS 7/28/2021    CT NEEDLE BIOPSY LUNG PERCUTANEOUS 7/28/2021 [facility] CT SCAN    PORT SURGERY Right 8/13/2021    MEDIPORT INSERTION performed by Micheal Vargas MD at [facility] OR     Family History   Problem Relation Age of Onset    High Blood Pressure Mother     High Blood Pressure Sister     Cancer Sister      Social History     Socioeconomic History    Marital status:      Spouse name: Not on file    Number of children: 11    Years of education: Not on file    Highest education level: Not on file   Occupational History    Not on file   Tobacco Use    Smoking status: Current Every Day Smoker     Packs/day: 2.00     Years: 39.00     Pack years: 78.00     Types: Cigarettes    Smokeless tobacco: Never Used    Tobacco comment: smokes 1-2 a day   Vaping Use    Vaping Use: Never used   Substance and Sexual Activity    Alcohol use: Yes     Alcohol/week: 6.0 standard drinks     Types: 6 Cans of beer per week     Comment: per week (24 oz beers)     Drug use: Yes     Types: Marijuana Crystal Gouty)     Comment: last 12/1    Sexual activity: Yes     Partners: Female   Other Topics Concern    Not on file   Social History Narrative    Not on file     Social Determinants of Health     Financial Resource Strain:     Difficulty of Paying Living Expenses: Not on file   Food Insecurity:     Worried About Running Out of Food in the Last Year: Not on file    [place] of Food in the Last Year: Not on file   Transportation Needs:     Lack of Transportation (Medical): Not on file    Lack of Transportation (Non-Medical):  Not on file   Physical Activity:     Days of Exercise per Week: Not on file    Minutes of Exercise per Session: Not on file   Stress:     Feeling of Stress : Not on file   Social Connections:     Frequency of Communication with Friends and Family: Not on file    Frequency of Social Gatherings with Friends and Family: Not on file    Attends Anabaptism Services: Not on file   [employer] of [employer]s or Organizations: Not on file    Attends Club or Organization Meetings: Not on file    Marital Status: Not on file   Intimate Partner Violence:     Fear of Current or Ex-Partner: Not on file    Emotionally Abused: Not on file    Physically Abused: Not on file    Sexually Abused: Not on file   Housing Stability:     Unable to Pay for Housing in the Last Year: Not on file    Number of Radha in the Last Year: Not on file    Unstable Housing in the Last Year: Not on file     Current Facility-Administered Medications   Medication Dose Route Frequency Provider Last Rate Last Admin    ondansetron (ZOFRAN) injection 4 mg  4 mg IntraVENous Q30 Min PRN Busch Forts, DO   4 mg at 07/13/22 0709    cefTRIAXone (ROCEPHIN) 1000 mg IVPB in 50 mL D5W minibag  1,000 mg IntraVENous Once Busch Forts, DO         Current Outpatient Medications   Medication Sig Dispense Refill    LORazepam (ATIVAN) 1 MG tablet Take 1 tablet by mouth nightly as needed for Anxiety for up to 30 days. 30 tablet 0    oxyCODONE (OXYCONTIN) 10 MG extended release tablet Take 1 tablet by mouth 2 times daily for 30 days.  Intended supply: 30 days 60 tablet 0    predniSONE (DELTASONE) 5 MG tablet       abiraterone acetate (ZYTIGA) 250 MG tablet Take 1,000 mg by mouth daily      oxyCODONE-acetaminophen (PERCOCET) 7.5-325 MG per tablet take 1 tablet by mouth every 6 hours AS NEEDED FOR PAIN      naloxone 4 MG/0.1ML LIQD nasal spray 1 spray by Nasal route as needed for Opioid Reversal 1 each 5    Calcium Carbonate-Vitamin D (OYSTER SHELL CALCIUM/D) 500-200 MG-UNIT TABS Take 1 tablet by mouth daily 30 tablet 3    cyanocobalamin ([employer]S VITAMIN B12) 1000 MCG tablet Take 1 tablet by mouth daily 30 tablet 3    nicotine (NICODERM CQ) 21 MG/24HR Place 1 patch onto the skin daily 30 patch 3    bicalutamide (CASODEX) 50 MG chemo tablet Take 1 tablet by mouth daily 30 tablet 11    ondansetron (ZOFRAN) 8 MG tablet take 1 tablet by mouth every 8 hours if needed for nausea and vomiting      Sennosides (SENNA) 8.6 MG CAPS Take 1 capsule by mouth 2 times daily as needed (constipation) 20 capsule 0    dexamethasone (DECADRON) 4 MG tablet Two tablets the day before treatment, one table daily for four days starting the day after chemotherapy 18 tablet 0    NARCAN 4 MG/0.1ML LIQD nasal spray DISPENSED PER STANDING ORDER USE AS DIRECTED PATIENT IS TRAINED OPIOID OVERDOSE RESPONDER      albuterol sulfate HFA (PROVENTIL HFA) 108 (90 Base) MCG/ACT inhaler Inhale 2 puffs into the lungs every 4 hours as needed for Wheezing or Shortness of Breath With spacer (and mask if indicated). Thanks. 1 Inhaler 1      Allergies   Allergen Reactions    Tramadol Other (See Comments)     seizures    Vicodin [Hydrocodone-Acetaminophen] Hives     Current Facility-Administered Medications   Medication Dose Route Frequency Provider Last Rate Last Admin    ondansetron (ZOFRAN) injection 4 mg  4 mg IntraVENous Q30 Min PRN Carla Del Rosario DO   4 mg at 07/13/22 0709    cefTRIAXone (ROCEPHIN) 1000 mg IVPB in 50 mL D5W minibag  1,000 mg IntraVENous Once Carla Del Rosario DO         Current Outpatient Medications   Medication Sig Dispense Refill    LORazepam (ATIVAN) 1 MG tablet Take 1 tablet by mouth nightly as needed for Anxiety for up to 30 days. 30 tablet 0    oxyCODONE (OXYCONTIN) 10 MG extended release tablet Take 1 tablet by mouth 2 times daily for 30 days.  Intended supply: 30 days 60 tablet 0    predniSONE (DELTASONE) 5 MG tablet       abiraterone acetate (ZYTIGA) 250 MG tablet Take 1,000 mg by mouth daily      oxyCODONE-acetaminophen (PERCOCET) 7.5-325 MG per tablet take 1 tablet by mouth every 6 hours AS NEEDED FOR PAIN      naloxone 4 MG/0.1ML LIQD nasal spray 1 spray by Nasal route as needed for Opioid Reversal 1 each 5    Calcium Carbonate-Vitamin D (OYSTER SHELL CALCIUM/D) 500-200 MG-UNIT TABS Take 1 tablet by mouth daily 30 tablet 3    cyanocobalamin (CVS VITAMIN B12) 1000 MCG tablet Take 1 tablet by mouth daily 30 tablet 3    nicotine (NICODERM CQ) 21 MG/24HR Place 1 patch onto the skin daily 30 patch 3    bicalutamide (CASODEX) 50 MG chemo tablet Take 1 tablet by mouth daily 30 tablet 11    ondansetron (ZOFRAN) 8 MG tablet take 1 tablet by mouth every 8 hours if needed for nausea and vomiting      Sennosides (SENNA) 8.6 MG CAPS Take 1 capsule by mouth 2 times daily as needed (constipation) 20 capsule 0    dexamethasone (DECADRON) 4 MG tablet Two tablets the day before treatment, one table daily for four days starting the day after chemotherapy 18 tablet 0    NARCAN 4 MG/0.1ML LIQD nasal spray DISPENSED PER STANDING ORDER USE AS DIRECTED PATIENT IS TRAINED OPIOID OVERDOSE RESPONDER      albuterol sulfate HFA (PROVENTIL HFA) 108 (90 Base) MCG/ACT inhaler Inhale 2 puffs into the lungs every 4 hours as needed for Wheezing or Shortness of Breath With spacer (and mask if indicated). Thanks. 1 Inhaler 1       Nursing Notes Reviewed    VITAL SIGNS:  ED Triage Vitals   Enc Vitals Group      BP       Pulse       Resp       Temp       Temp src       SpO2       Weight       Height       Head Circumference       Peak Flow       Pain Score       Pain Loc       Pain Edu? Excl. in [address]? PHYSICAL EXAM:  Physical Exam  Vitals and nursing note reviewed. Constitutional:       General: He is sleeping. He is not in acute distress. Appearance: Normal appearance. He is well-developed and well-groomed. He is not ill-appearing, toxic-appearing or diaphoretic. Neck:      Vascular: No JVD. Trachea: Phonation normal.   Abdominal:      Palpations: There is no pulsatile mass. Tenderness: There is no abdominal tenderness. There is no guarding or rebound. Negative signs include Davis's sign, Rovsing's sign and McBurney's sign. Musculoskeletal:      Cervical back: Full passive range of motion without pain and normal range of motion.  No edema, erythema, signs of trauma, rigidity, torticollis or crepitus. No pain with movement. Normal range of motion. Neurological:      Mental Status: He is oriented to person, place, and time and easily aroused. GCS: GCS eye subscore is 4. GCS verbal subscore is 5. GCS motor subscore is 6. Cranial Nerves: Cranial nerves are intact. No cranial nerve deficit, dysarthria or facial asymmetry. Sensory: Sensation is intact. Motor: Motor function is intact. No weakness, tremor, atrophy, abnormal muscle tone or seizure activity. Psychiatric:         Attention and Perception: Attention and perception normal.         Mood and Affect: Mood and affect normal.         Speech: Speech normal.         Behavior: Behavior is cooperative. Thought Content:  Thought content normal.         Cognition and Memory: Cognition and memory normal.         Judgment: Judgment normal.           I have reviewed andinterpreted all of the currently available lab results from this visit (if applicable):    Results for orders placed or performed during the hospital encounter of 07/13/22   CBC with Auto Differential   Result Value Ref Range    WBC 8.9 4.0 - 10.5 K/CU MM    RBC 2.93 (L) 4.6 - 6.2 M/CU MM    Hemoglobin 8.2 (L) 13.5 - 18.0 GM/DL    Hematocrit 26.9 (L) 42 - 52 %    MCV 91.8 78 - 100 FL    MCH 28.0 27 - 31 PG    MCHC 30.5 (L) 32.0 - 36.0 %    RDW 17.6 (H) 11.7 - 14.9 %    Platelets 696 966 - 778 K/CU MM    MPV 10.1 7.5 - 11.1 FL    Differential Type AUTOMATED DIFFERENTIAL     Segs Relative 74.6 (H) 36 - 66 %    Lymphocytes % 12.7 (L) 24 - 44 %    Monocytes % 6.8 (H) 0 - 4 %    Eosinophils % 4.9 (H) 0 - 3 %    Basophils % 0.4 0 - 1 %    Segs Absolute 6.7 K/CU MM    Lymphocytes Absolute 1.1 K/CU MM    Monocytes Absolute 0.6 K/CU MM    Eosinophils Absolute 0.4 K/CU MM    Basophils Absolute 0.0 K/CU MM    Nucleated RBC % 0.3 %    Total Nucleated RBC 0.0 K/CU MM    Total Immature Neutrophil 0.05 K/CU MM    Immature Neutrophil % 0.6 (H) 0 - Benzodiazepine Screen, Urine NEGATIVE NEGATIVE    Barbiturate Screen, Ur NEGATIVE NEGATIVE    Opiates, Urine NEGATIVE NEGATIVE    Phencyclidine, Urine NEGATIVE NEGATIVE    Oxycodone NEGATIVE NEGATIVE   Troponin   Result Value Ref Range    Troponin T <0.010 <0.01 NG/ML   EKG 12 Lead   Result Value Ref Range    Ventricular Rate 94 BPM    Atrial Rate 94 BPM    P-R Interval 150 ms    QRS Duration 98 ms    Q-T Interval 396 ms    QTc Calculation (Bazett) 495 ms    P Axis 85 degrees    R Axis 67 degrees    T Axis 94 degrees    Diagnosis       Normal sinus rhythm  Septal infarct (cited on or before 03-AUG-2016)  Abnormal ECG  When compared with ECG of 08-APR-2022 03:07,  No significant change was found          Radiographs (if obtained):  [] The following radiograph was interpreted by myself in the absence of a radiologist:  [x] Radiologist's Report Reviewed:    CXR    EKG (if obtained): (All EKG's are interpreted by myself in the absence of a cardiologist)    12 lead EKG per my interpretation:  Normal Sinus Rhythm 94  Axis is   Normal  QTc is  495  There is specific T wave changes appreciated. Inverted T wave V2  There is no specific ST wave changes appreciated. Prior EKG to compare with was not available       MDM:    Patient with complaint of chest pain abdominal pain nausea vomiting. Again patient arrives by EMS. I saw patient walking next of the gas station on my way to work this morning a few minutes later when I arrived to work he arrived by EMS. Complaint of chest pain. He appears otherwise well he is sleeping arousable he states he is tired he has not been sleeping all night he was out drinking. He again he has known pancreatic, lung cancer he follows at [facility] and here. He otherwise appears well vital signs are stable we will check labs, imaging we will give him pain nausea medicine. We will give him IV fluids. Patient rechecked still sleeping no distress.   I did do labs imaging so far labs imaging negative sent for UTI I cultured urine gave him Rocephin. He also has cocaine on board he states he has been using the last day or 2. Alcohol is negative although he states has been drinking all night. Otherwise patient stable however given his chest pain cocaine positive UTI will admit to hospital medicine. Otherwise stable. CLINICAL IMPRESSION:  Final diagnoses:   Chest pain, unspecified type   History of cancer   Alcohol use   Urinary tract infection without hematuria, site unspecified   Cocaine use       (Please note that portions of this note may have been completed with a voice recognition program. Efforts were made to edit the dictations but occasionally words aremis-transcribed.)    DISPOSITION REFERRAL (if applicable):  No follow-up provider specified.     DISPOSITION MEDICATIONS (if applicable):  New Prescriptions    No medications on file          DO Yesi White DO  07/13/22 1100

## 2022-07-14 VITALS
HEART RATE: 80 BPM | WEIGHT: 192.9 LBS | DIASTOLIC BLOOD PRESSURE: 92 MMHG | RESPIRATION RATE: 18 BRPM | BODY MASS INDEX: 25.57 KG/M2 | OXYGEN SATURATION: 100 % | HEIGHT: 73 IN | SYSTOLIC BLOOD PRESSURE: 136 MMHG | TEMPERATURE: 98.6 F

## 2022-07-14 LAB
EKG ATRIAL RATE: 77 BPM
EKG DIAGNOSIS: NORMAL
EKG P AXIS: 61 DEGREES
EKG P-R INTERVAL: 136 MS
EKG Q-T INTERVAL: 414 MS
EKG QRS DURATION: 94 MS
EKG QTC CALCULATION (BAZETT): 468 MS
EKG R AXIS: 46 DEGREES
EKG T AXIS: 75 DEGREES
EKG VENTRICULAR RATE: 77 BPM
TSH HIGH SENSITIVITY: 1.36 UIU/ML (ref 0.27–4.2)

## 2022-07-14 PROCEDURE — 96361 HYDRATE IV INFUSION ADD-ON: CPT

## 2022-07-14 PROCEDURE — 6360000002 HC RX W HCPCS: Performed by: NURSE PRACTITIONER

## 2022-07-14 PROCEDURE — 93010 ELECTROCARDIOGRAM REPORT: CPT | Performed by: INTERNAL MEDICINE

## 2022-07-14 PROCEDURE — APPSS60 APP SPLIT SHARED TIME 46-60 MINUTES: Performed by: NURSE PRACTITIONER

## 2022-07-14 PROCEDURE — 2580000003 HC RX 258: Performed by: NURSE PRACTITIONER

## 2022-07-14 PROCEDURE — 6370000000 HC RX 637 (ALT 250 FOR IP): Performed by: NURSE PRACTITIONER

## 2022-07-14 PROCEDURE — 94761 N-INVAS EAR/PLS OXIMETRY MLT: CPT

## 2022-07-14 PROCEDURE — 99226 PR SBSQ OBSERVATION CARE/DAY 35 MINUTES: CPT | Performed by: INTERNAL MEDICINE

## 2022-07-14 PROCEDURE — G0378 HOSPITAL OBSERVATION PER HR: HCPCS

## 2022-07-14 PROCEDURE — 93005 ELECTROCARDIOGRAM TRACING: CPT | Performed by: NURSE PRACTITIONER

## 2022-07-14 PROCEDURE — 80061 LIPID PANEL: CPT

## 2022-07-14 RX ORDER — CEPHALEXIN 500 MG/1
500 CAPSULE ORAL 4 TIMES DAILY
Qty: 16 CAPSULE | Refills: 0 | Status: SHIPPED | OUTPATIENT
Start: 2022-07-14 | End: 2022-07-18

## 2022-07-14 RX ADMIN — SODIUM CHLORIDE, PRESERVATIVE FREE 10 ML: 5 INJECTION INTRAVENOUS at 09:29

## 2022-07-14 RX ADMIN — ASPIRIN 81 MG CHEWABLE TABLET 81 MG: 81 TABLET CHEWABLE at 09:28

## 2022-07-14 RX ADMIN — PREDNISONE 5 MG: 10 TABLET ORAL at 09:28

## 2022-07-14 RX ADMIN — Medication 100 MG: at 09:28

## 2022-07-14 RX ADMIN — FOLIC ACID 1 MG: 1 TABLET ORAL at 09:28

## 2022-07-14 ASSESSMENT — PAIN SCALES - WONG BAKER
WONGBAKER_NUMERICALRESPONSE: 2

## 2022-07-14 ASSESSMENT — PAIN SCALES - GENERAL
PAINLEVEL_OUTOF10: 7
PAINLEVEL_OUTOF10: 9

## 2022-07-14 ASSESSMENT — PAIN DESCRIPTION - LOCATION: LOCATION: GENERALIZED

## 2022-07-14 ASSESSMENT — ENCOUNTER SYMPTOMS: SHORTNESS OF BREATH: 0

## 2022-07-14 ASSESSMENT — PAIN - FUNCTIONAL ASSESSMENT: PAIN_FUNCTIONAL_ASSESSMENT: PREVENTS OR INTERFERES SOME ACTIVE ACTIVITIES AND ADLS

## 2022-07-14 ASSESSMENT — PAIN DESCRIPTION - FREQUENCY: FREQUENCY: CONTINUOUS

## 2022-07-14 ASSESSMENT — PAIN DESCRIPTION - PAIN TYPE: TYPE: CHRONIC PAIN

## 2022-07-14 ASSESSMENT — PAIN DESCRIPTION - ONSET: ONSET: ON-GOING

## 2022-07-14 ASSESSMENT — PAIN DESCRIPTION - DESCRIPTORS: DESCRIPTORS: ACHING;DISCOMFORT

## 2022-07-14 NOTE — DISCHARGE SUMMARY
V2.0  Discharge Summary    Name:  Tiara Paulino /Age/Sex: 1970 (47 y.o. male)   Admit Date: 2022  Discharge Date: 22    MRN & CSN:  [id number] & [id number] Encounter Date and Time 22 11:08 AM EDT    Attending:  Dayday Burch MD Discharging Provider: Fatuma Monterroso, [facility] Course:     Brief HPI: Tiara Paulino is a 47 y.o. male with past medical history of metastatic lung cancer and prostate CA, alcohol and substance use disorder, hypertension, CAD who presents with Chest pain    Problems addressed during this hospitalization:     Chest Pain  - admit EKG with no acute ischemic change   - trop T neg x3  - CXR with no acute process  - discussed with cardiology - feel chest pain related to cocaine use, no plans for ischemic work-up given poor life expectancy/drug abuse, follow-up for echo as outpatient and OK to discharge   - chest pain free on discharge, return precautions discussed with patient    Cocaine abuse   - recommend cessation     UTI  -UA infectious  -Was recently seen in ED 7/3 and diagnosed with UTI, prescribed antibiotics however patient states he never picked them up  -Urine culture showed 50K CFU pansensitive E. Coli  - received IV Rocephin x1, prescribed Keflex on discharge to complete 5 day course     Mild hyponatremia  -Admit sodium 132   -encourage p.o. intake  -Repeat BMP in 1 week    NSCLC, metastatic prostate cancer  -Follows with [facility] and [facility] oncology  -Continue home regimen and follow-up as outpatient    Chronic anemia   -Hemoglobin near baseline, continue outpatient follow-up with hematology  -Repeat CBC in 1 week    Tobacco abuse  -Recommend cessation    Alcohol abuse  -No signs of withdrawal on discharge  -Recommend cessation    This patient was discussed with Dr. Trinity Randhawa. He was agreeable with patient's plan at discharge as dictated above.      The patient expressed appropriate understanding of, and agreement with the discharge recommendations, medications, and plan. Consults this admission:  IP CONSULT TO HOSPITALIST  IP CONSULT TO CARDIOLOGY  IP CONSULT TO SOCIAL WORK    Discharge Diagnosis:   Chest pain    Discharge Instruction:   Follow up appointments: PCP, cardiology   Primary care physician: Darline Baever MD within 2 weeks  Diet: cardiac diet   Activity: activity as tolerated  Disposition: Discharged to:   [x]Home, [][facility], []SNF, []Acute Rehab, []Hospice   Condition on discharge: Stable  Labs and Tests to be Followed up as an outpatient by PCP or Specialist: CBC, BMP    Discharge Medications:        Medication List      START taking these medications    cephALEXin 500 MG capsule  Commonly known as: KEFLEX  Take 1 capsule by mouth 4 times daily for 4 days        CONTINUE taking these medications    abiraterone acetate 250 MG tablet  Commonly known as: ZYTIGA     albuterol sulfate  (90 Base) MCG/ACT inhaler  Commonly known as: Proventil HFA  Inhale 2 puffs into the lungs every 4 hours as needed for Wheezing or Shortness of Breath With spacer (and mask if indicated). Thanks. bicalutamide 50 MG chemo tablet  Commonly known as: Casodex  Take 1 tablet by mouth daily     cyanocobalamin 1000 MCG tablet  Commonly known as: CVS VITAMIN B12  Take 1 tablet by mouth daily     dexamethasone 4 MG tablet  Commonly known as: DECADRON  Two tablets the day before treatment, one table daily for four days starting the day after chemotherapy     LORazepam 1 MG tablet  Commonly known as: ATIVAN  Take 1 tablet by mouth nightly as needed for Anxiety for up to 30 days.      * Narcan 4 MG/0.1ML Liqd nasal spray  Generic drug: naloxone     * naloxone 4 MG/0.1ML Liqd nasal spray  1 spray by Nasal route as needed for Opioid Reversal     nicotine 21 MG/24HR  Commonly known as: NICODERM CQ  Place 1 patch onto the skin daily     ondansetron 8 MG tablet  Commonly known as: ZOFRAN     oxyCODONE 10 MG extended release tablet  Commonly known as: OxyCONTIN  Take 1 tablet by mouth 2 times daily for 30 days. Intended supply: 30 days     oxyCODONE-acetaminophen 7.5-325 MG per tablet  Commonly known as: PERCOCET     Oyster Shell Calcium/D 500-200 MG-UNIT Tabs  Take 1 tablet by mouth daily     predniSONE 5 MG tablet  Commonly known as: DELTASONE     Senna 8.6 MG Caps  Take 1 capsule by mouth 2 times daily as needed (constipation)         * This list has 2 medication(s) that are the same as other medications prescribed for you. Read the directions carefully, and ask your doctor or other care provider to review them with you. Where to Get Your Medications      [facility] [address] [address]    Phone: [phone]   · cephALEXin 500 MG capsule        Objective Findings at Discharge:   BP (!) 136/92   Pulse 80   Temp 98.6 °F (37 °C) (Oral)   Resp 18   Ht 6' 1\" (1.854 m)   Wt 192 lb 14.4 oz (87.5 kg)   SpO2 100%   BMI 25.45 kg/m²       Physical Exam:   General: NAD  Eyes: EOMI  ENT: neck supple  Cardiovascular: Regular rate and rhythm   Respiratory: Clear to auscultation bilaterally, respirations even and unlabored on RA   Gastrointestinal: Abdomen soft, non tender  Genitourinary: no suprapubic tenderness  Musculoskeletal: No edema  Skin: warm, dry  Neuro: Alert and oriented x4. Psych: Mood appropriate. Labs and Imaging   XR CHEST PORTABLE    Result Date: 7/13/2022  EXAMINATION: ONE XRAY VIEW OF THE CHEST 7/13/2022 6:50 am COMPARISON: Chest radiograph dated April 10, 2022 HISTORY: ORDERING SYSTEM PROVIDED HISTORY: CP TECHNOLOGIST PROVIDED HISTORY: Reason for exam:->CP Reason for Exam: CP FINDINGS: The cardiomediastinal silhouette is stable. There is opacity within the left upper lobe. A right chest port is seen. There is no pneumothorax.      No significant change in the left perihilar opacity consistent with patient's known neoplasm with associated left upper lobe collapse. CBC:   Recent Labs     07/13/22  0644   WBC 8.9   HGB 8.2*        BMP:    Recent Labs     07/13/22  0644   *   K 4.1   CL 99   CO2 20*   BUN 18   CREATININE 0.9   GLUCOSE 111*     Hepatic:   Recent Labs     07/13/22  0644   AST 13*   ALT <5*   BILITOT 0.3   ALKPHOS 690*     Lipids:   Lab Results   Component Value Date/Time    CHOL 124 12/20/2013 05:22 AM    HDL 53 12/20/2013 05:22 AM    TRIG 52 12/20/2013 05:22 AM     Hemoglobin A1C: No results found for: LABA1C  TSH: No results found for: TSH  Troponin:   Lab Results   Component Value Date/Time    TROPONINT <0.010 07/13/2022 03:05 PM    TROPONINT <0.010 07/13/2022 10:10 AM    TROPONINT <0.010 07/13/2022 06:44 AM     Lactic Acid: No results for input(s): LACTA in the last 72 hours.   BNP:   Recent Labs     07/13/22  0644   PROBNP 183.4     UA:  Lab Results   Component Value Date/Time    NITRU NEGATIVE 07/13/2022 08:41 AM    COLORU YELLOW 07/13/2022 08:41 AM    WBCUA 189 07/13/2022 08:41 AM    RBCUA 7 07/13/2022 08:41 AM    MUCUS RARE 07/13/2022 08:41 AM    TRICHOMONAS NONE SEEN 07/13/2022 08:41 AM    BACTERIA MODERATE 07/13/2022 08:41 AM    CLARITYU CLOUDY 07/13/2022 08:41 AM    SPECGRAV 1.020 07/13/2022 08:41 AM    LEUKOCYTESUR MODERATE 07/13/2022 08:41 AM    UROBILINOGEN 0.2 07/13/2022 08:41 AM    BILIRUBINUR NEGATIVE 07/13/2022 08:41 AM    BLOODU SMALL 07/13/2022 08:41 AM    KETUA NEGATIVE 07/13/2022 08:41 AM    AMORPHOUS RARE 07/21/2021 05:24 AM     Urine Cultures: No results found for: LABURIN  Blood Cultures: No results found for: BC  No results found for: BLOODCULT2  Organism: No results found for: ORG    Time Spent Discharging patient 35 minutes    Electronically signed by Suzzanna Boas, PA-C on 7/14/2022 at 11:08 AM

## 2022-07-14 NOTE — PROGRESS NOTES
Cardiology Progress Note     Today's Plan: will sign off     Admit Date:  7/13/2022    Consult reason/ Seen today for: chest pain    Subjective and  Overnight Events:  Currently denies any chest pain, patient eager for D/C. History of Presenting Illness:    Chief complain on admission : 47 y. o.year old who is admitted for  Chief Complaint   Patient presents with    Chest Pain        Past medical history:    has a past medical history of CAD (coronary artery disease), Cancer ([address] History of TMJ disorder, Hypertension, and Trigeminal neuralgia. Past surgical history:   has a past surgical history that includes Abdomen surgery; Cardiac catheterization (08/03/2016); CT NEEDLE BIOPSY LUNG PERCUTANEOUS (7/28/2021); Port Surgery (Right, 8/13/2021); and CT BIOPSY SUPERFICIAL BONE PERCUTANEOUS (12/17/2021). Social History:   reports that he has been smoking cigarettes. He has a 78.00 pack-year smoking history. He has never used smokeless tobacco. He reports current alcohol use of about 6.0 standard drinks of alcohol per week. He reports current drug use. Drug: Marijuana Champ Cue). Family history:  family history includes Cancer in his sister; High Blood Pressure in his mother and sister. Allergies   Allergen Reactions    Tramadol Other (See Comments)     seizures    Vicodin [Hydrocodone-Acetaminophen] Hives       Review of Systems:  Review of Systems   Respiratory: Negative for shortness of breath. Cardiovascular: Negative for chest pain, palpitations and leg swelling. Musculoskeletal: Negative. Skin: Negative. Neurological: Negative for dizziness and weakness. All other systems reviewed and are negative.        BP (!) 136/92   Pulse 80   Temp 98.6 °F (37 °C) (Oral)   Resp 18   Ht 6' 1\" (1.854 m)   Wt 192 lb 14.4 oz (87.5 kg)   SpO2 100%   BMI 25.45 kg/m²       Intake/Output Summary (Last 24 hours) at 7/14/2022 1141  Last data filed at 7/14/2022 1120  Gross per 24 hour   Intake 490 ml   Output 975 ml   Net -485 ml       Physical Exam:  Physical Exam  Constitutional:       Appearance: He is well-developed. Cardiovascular:      Rate and Rhythm: Normal rate and regular rhythm. Pulses: Intact distal pulses. Dorsalis pedis pulses are 2+ on the right side and 2+ on the left side. Posterior tibial pulses are 2+ on the right side and 2+ on the left side. Heart sounds: Normal heart sounds, S1 normal and S2 normal.   Pulmonary:      Effort: Pulmonary effort is normal.      Breath sounds: Normal breath sounds. Musculoskeletal:         General: Normal range of motion. Skin:     General: Skin is warm and dry. Neurological:      Mental Status: He is alert and oriented to person, place, and time. Medications:    sodium chloride flush  5-40 mL IntraVENous 2 times per day    aspirin  81 mg Oral Daily    atorvastatin  40 mg Oral Nightly    enoxaparin  40 mg SubCUTAneous Daily    abiraterone acetate  1,000 mg Oral Daily    bicalutamide  50 mg Oral Daily    nicotine  1 patch TransDERmal Daily    cefTRIAXone (ROCEPHIN) IV  1,000 mg IntraVENous Q24H    predniSONE  5 mg Oral Daily    folic acid  1 mg Oral Daily    thiamine  100 mg Oral Daily      sodium chloride Stopped (07/14/22 1114)     ondansetron, sodium chloride flush, sodium chloride, ondansetron **OR** ondansetron, polyethylene glycol, nitroGLYCERIN, albuterol sulfate HFA, LORazepam, oxyCODONE-acetaminophen    Lab Data:  CBC:   Recent Labs     07/13/22  0644   WBC 8.9   HGB 8.2*   HCT 26.9*   MCV 91.8        BMP:   Recent Labs     07/13/22  0644   *   K 4.1   CL 99   CO2 20*   BUN 18   CREATININE 0.9     PT/INR: No results for input(s): PROTIME, INR in the last 72 hours.   BNP:    Recent Labs     07/13/22  0644   PROBNP 183.4     TROPONIN:   Recent Labs     07/13/22  0644 07/13/22  1010 07/13/22  1505 TROPONINT <0.010 <0.010 <0.010        Impression:  Principal Problem:    Chest pain  Resolved Problems:    * No resolved hospital problems. *       All labs, medications and tests reviewed by myself, continue all other medications of all above medical condition listed as is except for changes mentioned above. Thank you   Please call with questions. Electronically signed by Shey Cloud. Melodie Marsh, GERDA - CNP on 7/14/2022 at 11:41 AM           CARDIOLOGY ATTENDING ADDENDUM    I have seen, spoken to and examined this patient personally, independent of the NP/PAC. I have reviewed the hospital care given to date and reviewed all pertinent labs and imaging. I have spoken with patient, nursing staff and provided written and verbal instructions . The above note has been reviewed. I have spent substantive amount of time in formulating patient care. Physical Exam:    General:   Awake, alert  Head:normal  Eye:normal  Chest:   Clear to auscultation 0 Basilar crackles   Cardiovascular:  S1S2   Abdomen: soft   Extremities:  0 edema  Pulses; palpable      MEDICAL DECISION MAKING :         Assessment / Plan / Recommendation:     1. Atypical chest pain: Likely secondary to cocaine abuse resulting and vasospasm. No ischemic work-up at this time. Primarily because of noncompliance as well as metastatic lung/prostate disease with poor life expectancy. Avoid nonselective BB. 2. Lung/prostate cancer with metastasis: On hospice, poor life expectancy.    3. Multi-substance abuse: Cocaine/marijuana/nicotine    Case was discussed with patient's primary hospitalist as well as nursing staff      Dr. Tong Camacho MD

## 2022-07-14 NOTE — PROGRESS NOTES
Called the patients wife due to the patient went home with his mediport accessed. She stated that the hospice nurse deaccessed his port at this time.
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
## 2022-07-15 LAB
CULTURE: ABNORMAL
CULTURE: ABNORMAL
Lab: ABNORMAL
SPECIMEN: ABNORMAL

## 2022-07-26 ENCOUNTER — CLINICAL DOCUMENTATION (OUTPATIENT)
Dept: ONCOLOGY | Age: 52
End: 2022-07-26

## 2022-08-09 ENCOUNTER — APPOINTMENT (OUTPATIENT)
Dept: GENERAL RADIOLOGY | Age: 52
End: 2022-08-09
Payer: MEDICAID

## 2022-08-09 ENCOUNTER — HOSPITAL ENCOUNTER (EMERGENCY)
Age: 52
Discharge: HOME | End: 2022-08-09
Attending: EMERGENCY MEDICINE
Payer: MEDICAID

## 2022-08-09 ENCOUNTER — APPOINTMENT (OUTPATIENT)
Dept: CT IMAGING | Age: 52
End: 2022-08-09
Payer: MEDICAID

## 2022-08-09 VITALS
TEMPERATURE: 98.1 F | SYSTOLIC BLOOD PRESSURE: 122 MMHG | RESPIRATION RATE: 17 BRPM | HEART RATE: 70 BPM | WEIGHT: 195 LBS | DIASTOLIC BLOOD PRESSURE: 89 MMHG | BODY MASS INDEX: 25.84 KG/M2 | OXYGEN SATURATION: 96 % | HEIGHT: 73 IN

## 2022-08-09 DIAGNOSIS — R07.9 CHEST PAIN, UNSPECIFIED TYPE: Primary | ICD-10-CM

## 2022-08-09 DIAGNOSIS — J18.9 PNEUMONIA OF LOWER LOBE DUE TO INFECTIOUS ORGANISM, UNSPECIFIED LATERALITY: ICD-10-CM

## 2022-08-09 DIAGNOSIS — M84.48XA PATHOLOGICAL FRACTURE OF RIB, INITIAL ENCOUNTER: ICD-10-CM

## 2022-08-09 LAB
ALBUMIN SERPL-MCNC: 4.1 GM/DL (ref 3.4–5)
ALCOHOL SCREEN SERUM: <0.01 %WT/VOL
ALP BLD-CCNC: 505 IU/L (ref 40–129)
ALT SERPL-CCNC: 5 U/L (ref 10–40)
ANION GAP SERPL CALCULATED.3IONS-SCNC: 14 MMOL/L (ref 4–16)
AST SERPL-CCNC: 14 IU/L (ref 15–37)
BASOPHILS ABSOLUTE: 0 K/CU MM
BASOPHILS RELATIVE PERCENT: 0.5 % (ref 0–1)
BILIRUB SERPL-MCNC: 0.3 MG/DL (ref 0–1)
BUN BLDV-MCNC: 11 MG/DL (ref 6–23)
CALCIUM SERPL-MCNC: 9.2 MG/DL (ref 8.3–10.6)
CHLORIDE BLD-SCNC: 103 MMOL/L (ref 99–110)
CO2: 21 MMOL/L (ref 21–32)
CREAT SERPL-MCNC: 0.8 MG/DL (ref 0.9–1.3)
D DIMER: 908 NG/ML(DDU)
DIFFERENTIAL TYPE: ABNORMAL
EOSINOPHILS ABSOLUTE: 0.4 K/CU MM
EOSINOPHILS RELATIVE PERCENT: 4.9 % (ref 0–3)
GFR AFRICAN AMERICAN: >60 ML/MIN/1.73M2
GFR NON-AFRICAN AMERICAN: >60 ML/MIN/1.73M2
GLUCOSE BLD-MCNC: 84 MG/DL (ref 70–99)
HCT VFR BLD CALC: 28.7 % (ref 42–52)
HEMOGLOBIN: 8.9 GM/DL (ref 13.5–18)
IMMATURE NEUTROPHIL %: 0.4 % (ref 0–0.43)
LIPASE: 22 IU/L (ref 13–60)
LYMPHOCYTES ABSOLUTE: 1.6 K/CU MM
LYMPHOCYTES RELATIVE PERCENT: 20.3 % (ref 24–44)
MCH RBC QN AUTO: 27.6 PG (ref 27–31)
MCHC RBC AUTO-ENTMCNC: 31 % (ref 32–36)
MCV RBC AUTO: 89.1 FL (ref 78–100)
MONOCYTES ABSOLUTE: 0.6 K/CU MM
MONOCYTES RELATIVE PERCENT: 7.5 % (ref 0–4)
NUCLEATED RBC %: 0 %
PDW BLD-RTO: 18.2 % (ref 11.7–14.9)
PLATELET # BLD: 329 K/CU MM (ref 140–440)
PMV BLD AUTO: 9 FL (ref 7.5–11.1)
POTASSIUM SERPL-SCNC: 4 MMOL/L (ref 3.5–5.1)
PRO-BNP: 175.1 PG/ML
RBC # BLD: 3.22 M/CU MM (ref 4.6–6.2)
SEGMENTED NEUTROPHILS ABSOLUTE COUNT: 5.1 K/CU MM
SEGMENTED NEUTROPHILS RELATIVE PERCENT: 66.4 % (ref 36–66)
SODIUM BLD-SCNC: 138 MMOL/L (ref 135–145)
TOTAL IMMATURE NEUTOROPHIL: 0.03 K/CU MM
TOTAL NUCLEATED RBC: 0 K/CU MM
TOTAL PROTEIN: 7.7 GM/DL (ref 6.4–8.2)
TROPONIN T: <0.01 NG/ML
TROPONIN T: <0.01 NG/ML
WBC # BLD: 7.6 K/CU MM (ref 4–10.5)

## 2022-08-09 PROCEDURE — 71275 CT ANGIOGRAPHY CHEST: CPT

## 2022-08-09 PROCEDURE — 94640 AIRWAY INHALATION TREATMENT: CPT

## 2022-08-09 PROCEDURE — 6370000000 HC RX 637 (ALT 250 FOR IP): Performed by: EMERGENCY MEDICINE

## 2022-08-09 PROCEDURE — G0480 DRUG TEST DEF 1-7 CLASSES: HCPCS

## 2022-08-09 PROCEDURE — 6360000004 HC RX CONTRAST MEDICATION: Performed by: EMERGENCY MEDICINE

## 2022-08-09 PROCEDURE — 80053 COMPREHEN METABOLIC PANEL: CPT

## 2022-08-09 PROCEDURE — 83880 ASSAY OF NATRIURETIC PEPTIDE: CPT

## 2022-08-09 PROCEDURE — 83690 ASSAY OF LIPASE: CPT

## 2022-08-09 PROCEDURE — 99285 EMERGENCY DEPT VISIT HI MDM: CPT

## 2022-08-09 PROCEDURE — 84484 ASSAY OF TROPONIN QUANT: CPT

## 2022-08-09 PROCEDURE — 85379 FIBRIN DEGRADATION QUANT: CPT

## 2022-08-09 PROCEDURE — 93005 ELECTROCARDIOGRAM TRACING: CPT | Performed by: EMERGENCY MEDICINE

## 2022-08-09 PROCEDURE — 85025 COMPLETE CBC W/AUTO DIFF WBC: CPT

## 2022-08-09 PROCEDURE — 71045 X-RAY EXAM CHEST 1 VIEW: CPT

## 2022-08-09 RX ORDER — ALBUTEROL SULFATE 90 UG/1
2 AEROSOL, METERED RESPIRATORY (INHALATION) ONCE
Status: COMPLETED | OUTPATIENT
Start: 2022-08-09 | End: 2022-08-09

## 2022-08-09 RX ORDER — AMOXICILLIN AND CLAVULANATE POTASSIUM 875; 125 MG/1; MG/1
1 TABLET, FILM COATED ORAL 2 TIMES DAILY
Qty: 14 TABLET | Refills: 0 | Status: SHIPPED | OUTPATIENT
Start: 2022-08-09 | End: 2022-08-16

## 2022-08-09 RX ORDER — CEFUROXIME AXETIL 500 MG/1
500 TABLET ORAL 2 TIMES DAILY
Qty: 14 TABLET | Refills: 0 | Status: SHIPPED | OUTPATIENT
Start: 2022-08-09 | End: 2022-08-16

## 2022-08-09 RX ADMIN — IOPAMIDOL 80 ML: 755 INJECTION, SOLUTION INTRAVENOUS at 07:35

## 2022-08-09 RX ADMIN — ALBUTEROL SULFATE 2 PUFF: 90 AEROSOL, METERED RESPIRATORY (INHALATION) at 05:20

## 2022-08-09 RX ADMIN — PREDNISONE 50 MG: 20 TABLET ORAL at 05:23

## 2022-08-09 ASSESSMENT — PAIN DESCRIPTION - LOCATION: LOCATION: CHEST

## 2022-08-09 ASSESSMENT — PAIN - FUNCTIONAL ASSESSMENT: PAIN_FUNCTIONAL_ASSESSMENT: 0-10

## 2022-08-09 ASSESSMENT — PAIN SCALES - GENERAL: PAINLEVEL_OUTOF10: 10

## 2022-08-09 NOTE — PROGRESS NOTES
PT was transported to CT by this tech, PT refused to wake up and cooperate for exams. At this time I brought him back to the ER and will try again when he wants to wake up.

## 2022-08-09 NOTE — ED PROVIDER NOTES
EMERGENCY DEPARTMENT ENCOUNTER    Patient: Stephenie Saez  MRN: [id number]  : 1970  Date of Evaluation: 2022  ED Provider:  Gretchen Toribio MD    CHIEF COMPLAINT  Chief Complaint   Patient presents with    Chest Pain     Started around 4 hours ago    Shortness of Breath     X2 days       HPI  Stephenie Saez is a 47 y.o. male with lung cancer and history of alcohol and cocaine abuse who presents with complaints of central sharp chest pain with shortness of breath with onset within the last several hours. Constant. Moderate in severity. No known triggering or modifying factors. He denies any other associated symptoms or complaints. He does admit that he is still using cocaine and last use yesterday. He also has had multiple alcohol beverages to drink tonight. Of note, he was admitted last month for chest pain and had 3 negative serial troponins. Last cardiac cath on record was about 6 years ago and revealed patent coronary arteries.     REVIEW OF SYSTEMS    Constitutional: negative for fever, chills  Neurological: negative for HA, focal weakness, loss of sensation  Ophthalmic: negative for vision change  ENT: negative for congestion, rhinorrhea  Cardiovascular: negative for palpitations, edema  Respiratory: negative for cough, wheezing  GI: negative for abdominal pain, nausea, vomiting, diarrhea, constipation  : negative for dysuria, hematuria  Musculoskeletal: negative for myalgias, decreased ROM, joint swelling  Dermatological: negative for rash, wounds  Heme: Negative for bleeding, bruising      PAST MEDICAL HISTORY  Past Medical History:   Diagnosis Date    CAD (coronary artery disease) 2013    cath negative per pt    Cancer ([facility] [address] 2021    pt reports he has lung cancer    History of TMJ disorder     Hypertension     Trigeminal neuralgia        CURRENT MEDICATIONS  [unfilled]    ALLERGIES  Allergies   Allergen Reactions    Tramadol Other (See Comments)     seizures    Vicodin [Hydrocodone-Acetaminophen] Hives       SURGICAL HISTORY  Past Surgical History:   Procedure Laterality Date    ABDOMEN SURGERY      CARDIAC CATHETERIZATION  08/03/2016    CT BIOPSY PERCUTANEOUS SUPERFICIAL BONE  12/17/2021    CT BIOPSY PERCUTANEOUS SUPERFICIAL BONE 12/17/2021 [facility] CT SCAN    CT NEEDLE BIOPSY LUNG PERCUTANEOUS  7/28/2021    CT NEEDLE BIOPSY LUNG PERCUTANEOUS 7/28/2021 [facility] CT SCAN    PORT SURGERY Right 8/13/2021    MEDIPORT INSERTION performed by Harsh Sharp MD at [facility] OR       FAMILY HISTORY  Family History   Problem Relation Age of Onset    High Blood Pressure Mother     High Blood Pressure Sister     Cancer Sister        SOCIAL HISTORY  Social History     Socioeconomic History    Marital status:     Number of children: 5   Tobacco Use    Smoking status: Every Day     Packs/day: 2.00     Years: 39.00     Pack years: 78.00     Types: Cigarettes    Smokeless tobacco: Never    Tobacco comments:     smokes 1-2 a day   Vaping Use    Vaping Use: Never used   Substance and Sexual Activity    Alcohol use: Yes     Alcohol/week: 6.0 standard drinks     Types: 6 Cans of beer per week     Comment: per week (24 oz beers)     Drug use: Yes     Types: Marijuana Michelle Bath)     Comment: last 12/1    Sexual activity: Yes     Partners: Female         **Past medical, family and social histories, and nursing notes reviewed and verified by me**      PHYSICAL EXAM  VITAL SIGNS:   ED Triage Vitals [08/09/22 0441]   Enc Vitals Group      /81      Heart Rate 93      Resp 20      Temp 97.9 °F (36.6 °C)      Temp Source Oral      SpO2 97 %      Weight 195 lb (88.5 kg)      Height 6' 1\" (1.854 m)      Head Circumference       Peak Flow       Pain Score       Pain Loc       Pain Edu? Excl. in [address]? Vitals during ED course were reviewed and are as charted.     Constitutional: Minimal distress, Non-toxic appearance  Eyes: Conjunctiva normal, No discharge  HENT: Normocephalic, Atraumatic, bilateral external ears normal, posterior oropharynx is nonerythematous and without exudate, oropharynx moist  Neck: Supple, no stridor, no grossly visible or palpable masses  Cardiovascular: Regular rate and rhythm, No murmurs, No rubs, No gallops, 2+ symmetrical distal pulses, no JVD  Pulmonary/Chest: Diffuse bilateral wheezing, otherwise no respiratory distress or accessory muscle use, No rackles or rhonchi. Abdomen: Soft, nondistended and nonrigid, No tenderness or peritoneal signs, No masses, normal bowel sounds  Back: No midline point tenderness, No paraspinous muscle tenderness.  No CVA tenderness  Extremities: No gross deformities, no edema, no tenderness  Neurologic: Normal motor function, Normal sensory function, No focal deficits  Skin: Warm, Dry, No erythema, No rash, No cyanosis, No mottling  Lymphatic: No lymphadenopathy in the following location(s): cervical  Psychiatric: Alert and oriented x3, Affect normal          EKG Interpretation    Interpreted by emergency department physician    Rhythm: normal sinus   Rate: normal  Axis: normal  Ectopy: none  Conduction: normal  ST Segments: normal  T Waves: normal  Q Waves: none    Clinical Impression: Normal sinus rhythm        RADIOLOGY/PROCEDURES/LABS/MEDICATIONS ADMINISTERED:    I have reviewed and interpreted all of the currently available lab results from this visit (if applicable):  Results for orders placed or performed during the hospital encounter of 08/09/22   CBC with Auto Differential   Result Value Ref Range    WBC 7.6 4.0 - 10.5 K/CU MM    RBC 3.22 (L) 4.6 - 6.2 M/CU MM    Hemoglobin 8.9 (L) 13.5 - 18.0 GM/DL    Hematocrit 28.7 (L) 42 - 52 %    MCV 89.1 78 - 100 FL    MCH 27.6 27 - 31 PG    MCHC 31.0 (L) 32.0 - 36.0 %    RDW 18.2 (H) 11.7 - 14.9 %    Platelets 975 230 - 577 K/CU MM    MPV 9.0 7.5 - 11.1 FL    Differential Type AUTOMATED DIFFERENTIAL     Segs Relative 66.4 (H) 36 - 66 %    Lymphocytes % 20.3 (L) 24 - 44 %    Monocytes % 7.5 (H) 0 - 4 % Eosinophils % 4.9 (H) 0 - 3 %    Basophils % 0.5 0 - 1 %    Segs Absolute 5.1 K/CU MM    Lymphocytes Absolute 1.6 K/CU MM    Monocytes Absolute 0.6 K/CU MM    Eosinophils Absolute 0.4 K/CU MM    Basophils Absolute 0.0 K/CU MM    Nucleated RBC % 0.0 %    Total Nucleated RBC 0.0 K/CU MM    Total Immature Neutrophil 0.03 K/CU MM    Immature Neutrophil % 0.4 0 - 0.43 %   Comprehensive Metabolic Panel w/ Reflex to MG   Result Value Ref Range    Sodium 138 135 - 145 MMOL/L    Potassium 4.0 3.5 - 5.1 MMOL/L    Chloride 103 99 - 110 mMol/L    CO2 21 21 - 32 MMOL/L    BUN 11 6 - 23 MG/DL    Creatinine 0.8 (L) 0.9 - 1.3 MG/DL    Glucose 84 70 - 99 MG/DL    Calcium 9.2 8.3 - 10.6 MG/DL    Albumin 4.1 3.4 - 5.0 GM/DL    Total Protein 7.7 6.4 - 8.2 GM/DL    Total Bilirubin 0.3 0.0 - 1.0 MG/DL    ALT 5 (L) 10 - 40 U/L    AST 14 (L) 15 - 37 IU/L    Alkaline Phosphatase 505 (H) 40 - 129 IU/L    GFR Non-African American >60 >60 mL/min/1.73m2    GFR African American >60 >60 mL/min/1.73m2    Anion Gap 14 4 - 16   Troponin   Result Value Ref Range    Troponin T <0.010 <0.01 NG/ML   Lipase   Result Value Ref Range    Lipase 22 13 - 60 IU/L   Ethanol   Result Value Ref Range    Alcohol Scrn <0.01 <0.01 %WT/VOL   D-Dimer, Quantitative   Result Value Ref Range    D-Dimer, Quant 908 (H) <230 NG/mL(DDU)   Brain Natriuretic Peptide   Result Value Ref Range    Pro-.1 <300 PG/ML   Troponin   Result Value Ref Range    Troponin T <0.010 <0.01 NG/ML   EKG 12 Lead   Result Value Ref Range    Ventricular Rate 89 BPM    Atrial Rate 89 BPM    P-R Interval 150 ms    QRS Duration 98 ms    Q-T Interval 402 ms    QTc Calculation (Bazett) 489 ms    P Axis 80 degrees    R Axis 57 degrees    T Axis 91 degrees    Diagnosis       Normal sinus rhythm  Voltage criteria for left ventricular hypertrophy  Prolonged QT  Abnormal ECG  When compared with ECG of 14-JUL-2022 08:15,  No significant change was found            ABNORMAL LABS:  Labs Reviewed   CBC WITH AUTO DIFFERENTIAL - Abnormal; Notable for the following components:       Result Value    RBC 3.22 (*)     Hemoglobin 8.9 (*)     Hematocrit 28.7 (*)     MCHC 31.0 (*)     RDW 18.2 (*)     Segs Relative 66.4 (*)     Lymphocytes % 20.3 (*)     Monocytes % 7.5 (*)     Eosinophils % 4.9 (*)     All other components within normal limits   COMPREHENSIVE METABOLIC PANEL W/ REFLEX TO MG FOR LOW K - Abnormal; Notable for the following components:    Creatinine 0.8 (*)     ALT 5 (*)     AST 14 (*)     Alkaline Phosphatase 505 (*)     All other components within normal limits   D-DIMER, QUANTITATIVE - Abnormal; Notable for the following components:    D-Dimer, Quant 908 (*)     All other components within normal limits   TROPONIN   LIPASE   ETHANOL   BRAIN NATRIURETIC PEPTIDE   TROPONIN   SPECIMEN REJECTION         IMAGING STUDIES ORDERED:  XR CHEST PORTABLE  CTA PULMONARY W CONTRAST    I have personally viewed the imaging studies. The radiologist interpretation is:   CTA PULMONARY W CONTRAST   Final Result   1. Artifact limits evaluation of the distal pulmonary arteries. No evidence   of central pulmonary embolism or right heart strain. 2. New from 05/16/2022, there is now dense consolidation anteriorly in the   left upper lobe, which may represent postobstructive pneumonia or progression   of disease. More patchy consolidation in the perihilar left lung is slightly   increased and could represent pneumonia or post radiation change. 3. Trace left pleural effusion. 4. Findings are concerning for progression of osseous metastatic disease. Nondisplaced pathologic fractures in the posterior right 4th and 7th ribs   appear subacute all although are new from prior exam.         XR CHEST PORTABLE   Final Result   Postobstructive atelectasis or pneumonia involving the left upper lobe with   volume loss.                MEDICATIONS ADMINISTERED:  Medications   albuterol sulfate HFA (PROVENTIL;VENTOLIN;PROAIR) 108 (90 Base) MCG/ACT inhaler 2 puff (2 puffs Inhalation Given 8/9/22 6820)   predniSONE (DELTASONE) tablet 50 mg (50 mg Oral Given 8/9/22 7706)   iopamidol (ISOVUE-370) 76 % injection 80 mL (80 mLs IntraVENous Given 8/9/22 6760)         COURSE & MEDICAL DECISION MAKING  Last vitals: /89   Pulse 70   Temp 98.1 °F (36.7 °C) (Oral)   Resp 17   Ht 6' 1\" (1.854 m)   Wt 195 lb (88.5 kg)   SpO2 96%   BMI 25.73 kg/m²     Patient presented with chest pain. Given history and presentation cardiac workup initiated. Patient had labs, EKG, x-ray and troponin ordered. Patient was placed on cardiac monitor. Differential diagnoses considered included, but were not limited to, acute coronary syndrome, pulmonary embolus, aortic dissection, pericarditis/cardiac tamponade/effusion, myocarditis, pneumonia, pneumothorax, esophageal rupture, pancreatitis and an acute hepatobiliary process. Work-up still pending at the end of my shift and the patient was signed out to the day attending, Dr. Priyanka Gifford, who will follow up on this and determine final disposition. Clinical Impression:  1. Chest pain, unspecified type    2. Pneumonia of lower lobe due to infectious organism, unspecified laterality    3. Pathological fracture of rib, initial encounter        Disposition referral (if applicable): Oscar Méndez MD  [facility] [address]  [id number]  [place] [phone]    Schedule an appointment as soon as possible for a visit in 2 days      Disposition medications (if applicable):  Discharge Medication List as of 8/9/2022  9:19 AM        START taking these medications    Details   amoxicillin-clavulanate (AUGMENTIN) 875-125 MG per tablet Take 1 tablet by mouth in the morning and 1 tablet before bedtime. Do all this for 7 days. , Disp-14 tablet, R-0Normal      cefUROXime (CEFTIN) 500 MG tablet Take 1 tablet by mouth in the morning and 1 tablet before bedtime. Do all this for 7 days. , Disp-14 tablet, R-0Normal ED Provider Disposition Time  DISPOSITION Decision To Discharge 08/09/2022 09:10:14 AM          Electronically signed by: Zuleyka Rai M.D., 8/11/2022 8:18 AM      This dictation was created with voice recognition software. While attempts have been made to review the dictation as it is transcribed, on occasion the spoken word can be misinterpreted by the technology leading to omissions or inappropriate words, phrases or sentences.         Sharita Montana MD  08/11/22 7075

## 2022-08-09 NOTE — ED NOTES
Patient sleeping at this time. Placed pulse ox on patient's finger and patient took it off and crossed arms on his chest to sleep. Asked patient to keep it on due to complaints of SOB. Patient said no.      Grace Jackson RN  08/09/22 4078

## 2022-08-09 NOTE — ED PROVIDER NOTES
Patient signed out to me. Patient is a 65-year-old man with a history of lung cancer, alcohol and cocaine abuse who presented with sharp chest pain and shortness of breath. His initial EKG and troponin were normal.  At time of signout awaiting CTA of the chest given his elevated D-dimer and history of lung cancer. On evaluation the patient is resting comfortably. He was recently admitted to the hospital and had serial troponins that were negative. Felt to be low suspicion for ACS. Per oncology notes the patient has a poor prognosis for his cancer diagnosis. Patient tells me that he is not currently on chemotherapy or radiation treatment and is in hospice care. CTA of the chest shows no large PE or right heart strain. Patient's vitals have remained stable while in the emergency department. There is a new dense consolidation in the anterior left upper lobe which may be postobstructive pneumonia or progression of the disease. There is concerning progression of metastatic disease with nondisplaced pathologic fractures in the posterior right fourth and seventh ribs which appear subacute. I will start the patient on antibiotics for possible pneumonia, though given his symptoms and physical exam I feel this may be less likely and most likely related to his lung cancer. Patient does not want to be admitted and I think this is reasonable. He will follow up with his physician. HEART SCORE:    History: +0 for low suspicion  EKG: +0 for normal EKG   Age: +1 for age 44-72 years  Risk factors (includes HLD, HTN, DM, tobacco use, obesity, and +FHx): +1 for 1-2 risk factors  Initial troponin: +0 for negative troponin    Heart score: 2.   This falls under the following category: Score of 0-3, which indicates a very low risk for major adverse cardiac event and supports early discharge       Jacolyn Goldmann, MD  08/09/22 [id number]

## 2022-08-11 LAB
EKG ATRIAL RATE: 89 BPM
EKG DIAGNOSIS: NORMAL
EKG P AXIS: 80 DEGREES
EKG P-R INTERVAL: 150 MS
EKG Q-T INTERVAL: 402 MS
EKG QRS DURATION: 98 MS
EKG QTC CALCULATION (BAZETT): 489 MS
EKG R AXIS: 57 DEGREES
EKG T AXIS: 91 DEGREES
EKG VENTRICULAR RATE: 89 BPM

## 2022-08-11 PROCEDURE — 93010 ELECTROCARDIOGRAM REPORT: CPT | Performed by: INTERNAL MEDICINE

## 2022-09-20 ENCOUNTER — TELEPHONE (OUTPATIENT)
Dept: ONCOLOGY | Age: 52
End: 2022-09-20

## 2022-09-20 NOTE — TELEPHONE ENCOUNTER
Patients SO returned call, states patient needs his nephrostomy tube removed. Patients SO also states patient would like to see Dr. Dayana Wilkerson. Patient has not been seen in office since 6/28/2022. Patients SO transferred to  to make appointment. Appointment noted to be Friday 9/23/2022 at 1300.

## 2022-09-23 ENCOUNTER — HOSPITAL ENCOUNTER (OUTPATIENT)
Dept: INFUSION THERAPY | Age: 52
Discharge: HOME | End: 2022-09-23
Payer: MEDICAID

## 2022-09-23 ENCOUNTER — OFFICE VISIT (OUTPATIENT)
Dept: ONCOLOGY | Age: 52
End: 2022-09-23
Payer: MEDICAID

## 2022-09-23 VITALS
DIASTOLIC BLOOD PRESSURE: 64 MMHG | OXYGEN SATURATION: 99 % | TEMPERATURE: 97.2 F | HEIGHT: 73 IN | BODY MASS INDEX: 25.58 KG/M2 | RESPIRATION RATE: 18 BRPM | HEART RATE: 91 BPM | SYSTOLIC BLOOD PRESSURE: 119 MMHG | WEIGHT: 193 LBS

## 2022-09-23 DIAGNOSIS — C79.51 SECONDARY MALIGNANT NEOPLASM OF BONE (HCC): ICD-10-CM

## 2022-09-23 DIAGNOSIS — C34.82 MALIGNANT NEOPLASM OF OVERLAPPING SITES OF LEFT LUNG (HCC): Primary | ICD-10-CM

## 2022-09-23 DIAGNOSIS — D64.9 ANEMIA, UNSPECIFIED TYPE: ICD-10-CM

## 2022-09-23 DIAGNOSIS — C61 PROSTATE CANCER (HCC): ICD-10-CM

## 2022-09-23 DIAGNOSIS — C34.82 MALIGNANT NEOPLASM OF OVERLAPPING SITES OF LEFT LUNG (HCC): ICD-10-CM

## 2022-09-23 DIAGNOSIS — R53.83 OTHER FATIGUE: ICD-10-CM

## 2022-09-23 DIAGNOSIS — C79.51 SECONDARY MALIGNANT NEOPLASM OF BONE (HCC): Primary | ICD-10-CM

## 2022-09-23 LAB
ALBUMIN SERPL-MCNC: 3.8 GM/DL (ref 3.4–5)
ALP BLD-CCNC: 278 IU/L (ref 40–129)
ALT SERPL-CCNC: 5 U/L (ref 10–40)
ANION GAP SERPL CALCULATED.3IONS-SCNC: 11 MMOL/L (ref 4–16)
AST SERPL-CCNC: 12 IU/L (ref 15–37)
BASOPHILS ABSOLUTE: 0 K/CU MM
BASOPHILS RELATIVE PERCENT: 0.2 % (ref 0–1)
BILIRUB SERPL-MCNC: 0.2 MG/DL (ref 0–1)
BUN BLDV-MCNC: 11 MG/DL (ref 6–23)
CALCIUM SERPL-MCNC: 9.1 MG/DL (ref 8.3–10.6)
CHLORIDE BLD-SCNC: 98 MMOL/L (ref 99–110)
CO2: 26 MMOL/L (ref 21–32)
CREAT SERPL-MCNC: 0.8 MG/DL (ref 0.9–1.3)
DIFFERENTIAL TYPE: ABNORMAL
EOSINOPHILS ABSOLUTE: 0.1 K/CU MM
EOSINOPHILS RELATIVE PERCENT: 0.8 % (ref 0–3)
GFR AFRICAN AMERICAN: >60 ML/MIN/1.73M2
GFR NON-AFRICAN AMERICAN: >60 ML/MIN/1.73M2
GLUCOSE BLD-MCNC: 123 MG/DL (ref 70–99)
HCT VFR BLD CALC: 28.8 % (ref 42–52)
HEMOGLOBIN: 9.4 GM/DL (ref 13.5–18)
LYMPHOCYTES ABSOLUTE: 1 K/CU MM
LYMPHOCYTES RELATIVE PERCENT: 11.5 % (ref 24–44)
MCH RBC QN AUTO: 26.1 PG (ref 27–31)
MCHC RBC AUTO-ENTMCNC: 32.6 % (ref 32–36)
MCV RBC AUTO: 80 FL (ref 78–100)
MONOCYTES ABSOLUTE: 0.8 K/CU MM
MONOCYTES RELATIVE PERCENT: 8.3 % (ref 0–4)
PDW BLD-RTO: 18.3 % (ref 11.7–14.9)
PLATELET # BLD: 301 K/CU MM (ref 140–440)
PMV BLD AUTO: 8.3 FL (ref 7.5–11.1)
POTASSIUM SERPL-SCNC: 4.4 MMOL/L (ref 3.5–5.1)
RBC # BLD: 3.6 M/CU MM (ref 4.6–6.2)
SEGMENTED NEUTROPHILS ABSOLUTE COUNT: 7.2 K/CU MM
SEGMENTED NEUTROPHILS RELATIVE PERCENT: 79.2 % (ref 36–66)
SODIUM BLD-SCNC: 135 MMOL/L (ref 135–145)
TOTAL PROTEIN: 7.3 GM/DL (ref 6.4–8.2)
WBC # BLD: 9.1 K/CU MM (ref 4–10.5)

## 2022-09-23 PROCEDURE — 99214 OFFICE O/P EST MOD 30 MIN: CPT | Performed by: INTERNAL MEDICINE

## 2022-09-23 PROCEDURE — 2580000003 HC RX 258: Performed by: INTERNAL MEDICINE

## 2022-09-23 PROCEDURE — 99211 OFF/OP EST MAY X REQ PHY/QHP: CPT

## 2022-09-23 PROCEDURE — 36591 DRAW BLOOD OFF VENOUS DEVICE: CPT

## 2022-09-23 PROCEDURE — 80053 COMPREHEN METABOLIC PANEL: CPT

## 2022-09-23 PROCEDURE — 85025 COMPLETE CBC W/AUTO DIFF WBC: CPT

## 2022-09-23 RX ORDER — SODIUM CHLORIDE 9 MG/ML
25 INJECTION, SOLUTION INTRAVENOUS PRN
OUTPATIENT
Start: 2022-09-23

## 2022-09-23 RX ORDER — HEPARIN SODIUM (PORCINE) LOCK FLUSH IV SOLN 100 UNIT/ML 100 UNIT/ML
500 SOLUTION INTRAVENOUS PRN
OUTPATIENT
Start: 2022-09-23

## 2022-09-23 RX ORDER — SODIUM CHLORIDE 0.9 % (FLUSH) 0.9 %
5-40 SYRINGE (ML) INJECTION PRN
Status: DISCONTINUED | OUTPATIENT
Start: 2022-09-23 | End: 2022-09-24 | Stop reason: HOSPADM

## 2022-09-23 RX ORDER — HEPARIN SODIUM (PORCINE) LOCK FLUSH IV SOLN 100 UNIT/ML 100 UNIT/ML
500 SOLUTION INTRAVENOUS PRN
Status: DISCONTINUED | OUTPATIENT
Start: 2022-09-23 | End: 2022-09-24 | Stop reason: HOSPADM

## 2022-09-23 RX ORDER — SODIUM CHLORIDE 9 MG/ML
25 INJECTION, SOLUTION INTRAVENOUS PRN
Status: CANCELLED | OUTPATIENT
Start: 2022-09-23

## 2022-09-23 RX ORDER — SODIUM CHLORIDE 0.9 % (FLUSH) 0.9 %
5-40 SYRINGE (ML) INJECTION PRN
OUTPATIENT
Start: 2022-09-23

## 2022-09-23 RX ADMIN — HEPARIN SODIUM (PORCINE) LOCK FLUSH IV SOLN 100 UNIT/ML 500 UNITS: 100 SOLUTION at 14:15

## 2022-09-23 RX ADMIN — SODIUM CHLORIDE, PRESERVATIVE FREE 30 ML: 5 INJECTION INTRAVENOUS at 14:15

## 2022-09-23 NOTE — PROGRESS NOTES
MA Rooming Questions  Patient: Hans Coronel  MRN: [id number]    Date: 9/23/2022        1. Do you have any new issues? yes - Pt wants to discuss nephrostomy tube         2. Do you need any refills on medications?    no    3. Have you had any imaging done since your last visit?   no    4. Have you been hospitalized or seen in the emergency room since your last visit here?   no    5. Did the patient have a depression screening completed today?  No    No data recorded     PHQ-9 Given to (if applicable):               PHQ-9 Score (if applicable):                     [] Positive     []  Negative              Does question #9 need addressed (if applicable)                     [] Yes    []  No               Carmelita Hall MA

## 2022-09-23 NOTE — PROGRESS NOTES
Patient Name:  Tho Sainz  Patient :  1970  Patient MRN:  [id number]     Primary Oncologist: Kevin Lindsey MD  Referring Provider: Елена Shah MD     Date of Service: 2022        Chief Complaint:    Chief Complaint   Patient presents with    Other     Nephrostomy tube issues       Encounter Diagnoses   Name Primary? Secondary malignant neoplasm of bone (HCC) Yes    Prostate cancer ([facility] [address]     Malignant neoplasm of overlapping sites of left lung ([facility] [address]     Anemia, unspecified type     Other fatigue         HPI:   21: Initial [facility] visit:Hima Kothari is a 45 y.o. male who presents to [facility] with report of dysuria and flank pain. Reports these symptoms started a few weeks ago. He ultimately came to the ED due to worsening back pain. He reports ongoing issues with frequent urge to urinate and notes some incontinence. He denies hematuria. He was noted to have acute pyelonephritis and was admitted and started on IV Rocephin.      21 CT chest     Impression   1. Left hilar neoplasm extending into the left upper lobe measuring 3.2 x 5.9   x 5.3 cm obstructing the left upper lobe bronchus with probable minimal   adjacent infiltrates versus lymphangitic spread of tumor. PET-CT/biopsy is   recommended. 2. Mediastinal lymphadenopathy. 3. Prominent left supraclavicular lymph nodes. 4. No osseous metastatic disease. 21 Ct abdomen and pelvis  Impression   1. Circumferential thickening of the bladder wall is noted which could be   related to cystitis. Correlate with urinalysis. 2. There is left retroperitoneal lymphadenopathy. This could be reactive or   neoplastic. This can be further evaluated with PET-CT. 3. There is minimal stranding within the retroperitoneal on the left. This   is uncertain etiology however could be related to acute pyelonephritis.    4. Interval development of multiple sclerotic lesions within the spine and   pelvis, concerning for metastatic osseous disease. 5. Prostate gland is enlarged. Correlate with PSA. .30      He denies past history of cancer. He smokes 2-3 ppd and drinks 10-12 beers daily. He reports unknown malignancy in his sister who  at 39. No other known family history of cancer. Due to lung mass and concern for metastatic prostate cancer we were called to evaluate. 21:  Final Pathologic Diagnosis:   Needle biopsy of lung, clinically left lung mass:        SQUAMOUS CELL CARCINOMA IN SITU.     21:PET  1. Left perihilar mass likely represents primary lung cancer. Correlate   with biopsy results. The opacity more peripheral in the left lower lobe has   relatively low level activity and likely represents an area of post   obstructive pneumonia adjacent to the mass. 2.  Metabolically active left AP window lymph node concerning for metastatic   disease. 3.  The prostate is enlarged and has increased FDG activity which could be   due to prostatitis or prostate cancer. Correlate with PSA levels. 4.  No increased metabolic activity associated with retroperitoneal lymph   nodes. This is unlikely related to the lung process given the significant   difference in metabolic activity; however, if there is prostate cancer, this   could potentially be metastatic disease from the prostate cancer, which can   have variable levels of uptake on FDG PET scans. 5.  No metabolic activity associated with multiple sclerotic lesions. Again   this is unlikely related to the lung process, but if there is prostate   cancer, this could represent metastatic disease from prostate cancer with   poor FDG avidity. Otherwise it could also represent large bone islands. 6.  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 There is a focal area of intense activity associated with a new lucent lesion   within the background of Paget's disease concerning for metastatic disease. Given the FDG activity, it is likely related to the lung process. 8/20/21: MRI brain  1. There is a punctate focus of restricted diffusion within the right frontal   lobe without associated enhancement, mass effect or midline shift. This   could represent a punctate acute infarct. However, given history, an early   metastatic focus cannot be entirely excluded. 2. Scattered foci of susceptibility are seen within the cerebral hemispheres   bilaterally, which were not visualized on the prior exam.  Findings could   represent areas of remote microhemorrhage or perhaps treated metastases. 3. No abnormal enhancement within the brain. 4. Minimal global parenchymal volume loss with minimal chronic microvascular   ischemic changes. 12/2/21: CT chest, abdomen and pelvis:  Chest CT: Interval increased size of the left perihilar mass, with increased   adjacent obstructive pneumonitis and atelectasis. Stable to minimally to decreased mediastinal lymphadenopathy. Interval increased bony metastatic disease. Abdomen and pelvis CT: Stable retroperitoneal and pelvic lymphadenopathy. Interval increased bony metastatic disease. 12/17/21:Final Pathologic Diagnosis:   Bone, posterior ileum, needle core biopsy:   -     METASTATIC SQUAMOUS CELL CARCINOMA. PACO  PDL1 22c3 >50%(keytruda)  PTEN positive  Alk neg    CARIS, not enough tissue for rest of the testing    Gaurdant with no actionable mutation otherwise     12/27/21: Started XRT to the left pelvis  Added chest radiation dia 3  Completed dia 10 2022      Started carbo/taxol/keytruda jan 20 2022 2/8/22: CT chest:  1. Interval decrease in size of left suprahilar mass extending along the   left upper lobe bronchi extension into the left main pulmonary artery. There   is interval decrease in associated ground-glass opacity in the left upper   lobe. Findings suggest response to therapy.        2.  Interval decrease in mediastinal lymphadenopathy. 3.  Diffuse osseous metastatic disease. Feb/march 2022 he received casodex and GnRH analogue     3/11/22: CT head: No acute abn    3/26/22: Ct abdomen and pelvis:  1. Interval development of a mild degree of left hydronephrosis and   hydroureter, to the level of the left UVJ. An approximate 7.3 x 5 cm soft   tissue mass is present along the left posterior margin of the urinary   bladder, and contiguous with the prostate gland, resulting in obstructing of   the ureteral orifice. Soft tissue mass is most consistent with primary   prostate carcinoma, in light of the diffuse blastic metastatic disease. 2. Interval increase in confluent adenopathy at the level of the aortic   bifurcation, consistent with progression of metastatic disease   3. Diffuse osseous blastic metastatic disease, with interval increase in size   of the lytic metastatic lesion involving the proximal left iliac bone     3/22/22:     He was admitted at [facility] in April (4/22 - 4/30). He had debulking of his primary tumor via bronchoscopy during admit. BMB at [facility] consistent with met prostate cancer  - Sampson prior to admission; exchanged in ED 4/22. Urine cx neg  - CT A/P:  Soft tissue mass along the left UVJ/posterior lateral left bladder wall with moderate left hydroureteronephrosis and diffuse urinary bladder  thickening. UVJ mass is likely a metastatic deposit involving the bladder and possibly the left prostate/seminal vesicle. - 4/25L neph tube placed by IR; sampson removed. Also treated for PNA. 5/16/2022 Imaging   PET - local:   1. Decreased size and uptake of the left perihilar mass. Adjacent   consolidation extending into the left lower lobe likely represents   post-treatment and post-obstructive atelectasis. Resolution of the   hypermetabolic prevascular lymph node. 2. Decreased uptake involving the lytic lesion along the left sacroiliac   joint.  Diffuse sclerosis throughout the skeleton without [Hydrocodone-Acetaminophen] Hives       Current Outpatient Medications on File Prior to Visit   Medication Sig Dispense Refill    predniSONE (DELTASONE) 5 MG tablet       abiraterone acetate (ZYTIGA) 250 MG tablet Take 1,000 mg by mouth daily      oxyCODONE-acetaminophen (PERCOCET) 7.5-325 MG per tablet take 1 tablet by mouth every 6 hours AS NEEDED FOR PAIN      naloxone 4 MG/0.1ML LIQD nasal spray 1 spray by Nasal route as needed for Opioid Reversal 1 each 5    Calcium Carbonate-Vitamin D (OYSTER SHELL CALCIUM/D) 500-200 MG-UNIT TABS Take 1 tablet by mouth daily 30 tablet 3    cyanocobalamin ([employer] VITAMIN B12) 1000 MCG tablet Take 1 tablet by mouth daily 30 tablet 3    nicotine (NICODERM CQ) 21 MG/24HR Place 1 patch onto the skin daily 30 patch 3    bicalutamide (CASODEX) 50 MG chemo tablet Take 1 tablet by mouth daily 30 tablet 11    ondansetron (ZOFRAN) 8 MG tablet take 1 tablet by mouth every 8 hours if needed for nausea and vomiting      Sennosides (SENNA) 8.6 MG CAPS Take 1 capsule by mouth 2 times daily as needed (constipation) 20 capsule 0    dexamethasone (DECADRON) 4 MG tablet Two tablets the day before treatment, one table daily for four days starting the day after chemotherapy 18 tablet 0    NARCAN 4 MG/0.1ML LIQD nasal spray DISPENSED PER STANDING ORDER USE AS DIRECTED PATIENT IS TRAINED OPIOID OVERDOSE RESPONDER      albuterol sulfate HFA (PROVENTIL HFA) 108 (90 Base) MCG/ACT inhaler Inhale 2 puffs into the lungs every 4 hours as needed for Wheezing or Shortness of Breath With spacer (and mask if indicated). Thanks. 1 Inhaler 1     No current facility-administered medications on file prior to visit. Interval History: 9/23/22: He arrived with his partner  to the clinic today. He is sleepy today. Not able to answer questions. Partner says that he has been asking the tube needs to come out.      Review of Systems:  As per the interval history, rest of the review of system negative     Vital Signs: BP 119/64 (Site: Right Upper Arm, Position: Sitting, Cuff Size: Medium Adult)   Pulse 91   Temp 97.2 °F (36.2 °C) (Infrared)   Resp 18   Ht 6' 1\" (1.854 m)   Wt 193 lb (87.5 kg)   SpO2 99%   BMI 25.46 kg/m²      CONSTITUTIONAL:somnolent  LUNGS: coarse bs   CARDIOVASCULAR: s1s2 rrr no murmurs, tachycardia  ABDOMEN: soft ntnd bs pos, left nephrostomy tube back leaking  NEUROLOGIC: somnolent      Labs:  Hematology:  Lab Results   Component Value Date    WBC 7.6 08/09/2022    RBC 3.22 (L) 08/09/2022    HGB 8.9 (L) 08/09/2022    HCT 28.7 (L) 08/09/2022    MCV 89.1 08/09/2022    MCH 27.6 08/09/2022    MCHC 31.0 (L) 08/09/2022    RDW 18.2 (H) 08/09/2022     08/09/2022    MPV 9.0 08/09/2022    BANDSPCT 12 (H) 04/09/2022    SEGSPCT 66.4 (H) 08/09/2022    EOSRELPCT 4.9 (H) 08/09/2022    BASOPCT 0.5 08/09/2022    LYMPHOPCT 20.3 (L) 08/09/2022    MONOPCT 7.5 (H) 08/09/2022    BANDABS 0.46 04/09/2022    SEGSABS 5.1 08/09/2022    EOSABS 0.4 08/09/2022    BASOSABS 0.0 08/09/2022    LYMPHSABS 1.6 08/09/2022    MONOSABS 0.6 08/09/2022    DIFFTYPE AUTOMATED DIFFERENTIAL 08/09/2022    ANISOCYTOSIS 1+ 04/09/2022    POLYCHROM 1+ 04/09/2022    PLTM DECREASED 04/09/2022     Lab Results   Component Value Date    ESR 47 (H) 03/22/2022     Chemistry:  Lab Results   Component Value Date     08/09/2022    K 4.0 08/09/2022     08/09/2022    CO2 21 08/09/2022    BUN 11 08/09/2022    CREATININE 0.8 (L) 08/09/2022    GLUCOSE 84 08/09/2022    CALCIUM 9.2 08/09/2022    PROT 7.7 08/09/2022    LABALBU 4.1 08/09/2022    BILITOT 0.3 08/09/2022    ALKPHOS 505 (H) 08/09/2022    AST 14 (L) 08/09/2022    ALT 5 (L) 08/09/2022    LABGLOM >60 08/09/2022    GFRAA >60 08/09/2022    MG 1.8 04/10/2022    POCCA 1.18 04/01/2022    POCGLU 121 (H) 04/01/2022     Lab Results   Component Value Date     (H) 03/22/2022     No components found for: LD  Lab Results   Component Value Date    TSHHS 1.360 07/13/2022    T4FREE 0.97 07/03/2022 Immunology:  Lab Results   Component Value Date    PROT 7.7 08/09/2022    SPEP  03/22/2022     INTERPRETATION - Decreased albumin and total prorein. No definitive monoclonal bands are seen. SAF    SPEP INTERPRETATION - No monoclonal bands are seen. SAF 03/22/2022    ALBUMINELP 2.8 (L) 03/22/2022    LABALPH 0.4 03/22/2022    LABALPH 1.1 03/22/2022    LABBETA 1.1 03/22/2022    GAMGLOB 0.8 03/22/2022     No results found for: Sam Dimmer, KLFLCR  No results found for: B2M  Coagulation Panel:  Lab Results   Component Value Date    PROTIME 13.3 07/03/2022    INR 1.03 07/03/2022    APTT 30.0 07/03/2022    DDIMER 908 (H) 08/09/2022     Anemia Panel:  Lab Results   Component Value Date    MNUYDGPX01 373.0 03/22/2022    FOLATE 4.0 03/22/2022     Tumor Markers:  Lab Results   Component Value Date    CEA 7.7 07/23/2021    .0 (H) 03/22/2022        Observations:  ECOG:  No data recorded       Assessment & Plan:   H/O noncompliance. Left hilar mass with mediastinal hilar lymphadenopathy. 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 PET scan results from august 2021 noted, bone lesions most probably not metastatic except for one lytic lesion. MRI of the brain with no convincing metastatic lesions. Presented  the case in the tumor board. Decision made to proceed with a prostate biopsy and treat with ADT if malignancy  and in the meantime proceed with staging mediastinoscopy/rebiopsy for further treatment plan. But as pt very very very noncompliant, did not follow up with urology, CTS or for bone biopsy multiple times. Note PSA rising and was 250 on November 16 2021   CT imaging dec 2021 with progressive met disease  Bone biopsy on dec 13 2021 with met squamous cell cancer, consistent with lung primary. PDL1 >50%Tricia Arbour) .    Not enough tissue for rest of CARIS, guardant 360 with no other actionable mutation  CT chest jan 2022 with no significant change   Completed Palliative radiation to the pelvic area and also radiation to the left lung dia 10 2022  Discussed the findings, diagnosis and poor prognosis and treatment with carbo/taxol/pembro after completion of radiation. Discussed ae. PORT placed. Completed OCM  C1D1 carbo/taxol and keytruda started 1/20/22, unfortunately received only one treatment so far sec to being very very noncompliant  CT after C1 with positive response   And then treatment held sec to cytopenias/noncompliance  and also admission to [facility]. PET in may 2022 with continued response. Resumed Keytruda alone June 7 2022 and   Plan was  for repeat PET after 3-4C. But then they discussed and established care with hospice  and did not follow  with us after June 2022 until sep 2022. He is very sleepy today on his visit on sep 23 2022 and not able to discuss what he wishes for really    Met prostate cancer: PSA was ~900, started on casodex and received GnRH analogue in feb/march . But BMB biopsy in April 2022 consistent with involvement by prostate cancer. May 2022 PSA was 256. Was Started on Zytiga and prednisone in may 2022 . Was on  lupron. But then looks like he discontinued Zytiga in June 2022    Left nephrostomy , wants it to be discontinued. Will order Ct abdomen and pelvis and ? IR consult after that as she says that he has been urinating very well. Discussed above findings and overall very poor prognosis and plan with his partner and I am not really sure whether she understands as well     Smoking and alcohol cessation    Note he has he has been very noncompliant with appointments in the past.    Please do not hesitate to contact us if you need further information.     Return to clinic  PRN    TOMI

## 2022-10-02 ENCOUNTER — HOSPITAL ENCOUNTER (EMERGENCY)
Age: 52
Discharge: HOME | End: 2022-10-02
Payer: MEDICAID

## 2022-10-02 ENCOUNTER — APPOINTMENT (OUTPATIENT)
Dept: GENERAL RADIOLOGY | Age: 52
End: 2022-10-02
Payer: MEDICAID

## 2022-10-02 VITALS
OXYGEN SATURATION: 94 % | SYSTOLIC BLOOD PRESSURE: 127 MMHG | TEMPERATURE: 98.5 F | DIASTOLIC BLOOD PRESSURE: 86 MMHG | WEIGHT: 193 LBS | BODY MASS INDEX: 25.46 KG/M2 | RESPIRATION RATE: 18 BRPM | HEART RATE: 90 BPM

## 2022-10-02 DIAGNOSIS — M25.511 ACUTE PAIN OF RIGHT SHOULDER: Primary | ICD-10-CM

## 2022-10-02 LAB
EKG ATRIAL RATE: 82 BPM
EKG DIAGNOSIS: NORMAL
EKG P AXIS: 76 DEGREES
EKG P-R INTERVAL: 164 MS
EKG Q-T INTERVAL: 392 MS
EKG QRS DURATION: 92 MS
EKG QTC CALCULATION (BAZETT): 457 MS
EKG R AXIS: 67 DEGREES
EKG T AXIS: 72 DEGREES
EKG VENTRICULAR RATE: 82 BPM

## 2022-10-02 PROCEDURE — 73030 X-RAY EXAM OF SHOULDER: CPT

## 2022-10-02 PROCEDURE — 93005 ELECTROCARDIOGRAM TRACING: CPT | Performed by: PHYSICIAN ASSISTANT

## 2022-10-02 PROCEDURE — 6370000000 HC RX 637 (ALT 250 FOR IP): Performed by: PHYSICIAN ASSISTANT

## 2022-10-02 PROCEDURE — 93010 ELECTROCARDIOGRAM REPORT: CPT | Performed by: INTERNAL MEDICINE

## 2022-10-02 PROCEDURE — 99284 EMERGENCY DEPT VISIT MOD MDM: CPT

## 2022-10-02 RX ORDER — IBUPROFEN 600 MG/1
600 TABLET ORAL ONCE
Status: COMPLETED | OUTPATIENT
Start: 2022-10-02 | End: 2022-10-02

## 2022-10-02 RX ADMIN — IBUPROFEN 600 MG: 600 TABLET ORAL at 01:54

## 2022-10-02 ASSESSMENT — PAIN SCALES - GENERAL
PAINLEVEL_OUTOF10: 9
PAINLEVEL_OUTOF10: 8
PAINLEVEL_OUTOF10: 0

## 2022-10-02 ASSESSMENT — PAIN - FUNCTIONAL ASSESSMENT: PAIN_FUNCTIONAL_ASSESSMENT: 0-10

## 2022-10-02 NOTE — ED PROVIDER NOTES
EKG as interpreted by me. EKG shows sinus rhythm at 82 bpm, axis nondeviated, no remarkable axis elevations or depressions, T waves are unremarkable, FL interval of 164, QRS duration of 92, QTC of 457. Final impression, nonspecific EKG.     Lilinaa Alexandra MD  10/2/2022  1:47 AM       Liliana Alexandra MD  10/02/22 1209

## 2022-10-02 NOTE — ED PROVIDER NOTES
Triage Chief Complaint:   Pain (All over body pain, states he has \"nerve pain\", states it shoots from his neck to his arm, states it makes it so he cannot move)    [place]:  Silvestre Steve is a 47 y.o. male that presents today complaining of  neck pain. Context is, no known injury. Pain originates in the right shoulder radiates into the right-sided neck no chest pain or back pain. No flank pain no shortness of breath no sensation of palpitations no paresthesias associated with this. Patient states he went home and the rest of the dorsum was stuck outside. And then he called EMS to bring him here. He states his pain has been ongoing x1 month no worse today than usual    No paresthesias. Pain is ranked 06/10. Patient admits to no radiation. Pain is made worse with movement and palpation. Pain relieved some with rest.     ROS:  At least 06 systems reviewed and otherwise negative except as in the [address]     Past Medical History:   Diagnosis Date    CAD (coronary artery disease) 12/23/2013    cath negative per pt    Cancer ([facility] [address] 06/2021    pt reports he has lung cancer    History of TMJ disorder     Hypertension     Trigeminal neuralgia      Past Surgical History:   Procedure Laterality Date    ABDOMEN SURGERY      CARDIAC CATHETERIZATION  08/03/2016    CT BIOPSY PERCUTANEOUS SUPERFICIAL BONE  12/17/2021    CT BIOPSY PERCUTANEOUS SUPERFICIAL BONE 12/17/2021 [facility] CT SCAN    CT NEEDLE BIOPSY LUNG PERCUTANEOUS  7/28/2021    CT NEEDLE BIOPSY LUNG PERCUTANEOUS 7/28/2021 [facility] CT SCAN    PORT SURGERY Right 8/13/2021    MEDIPORT INSERTION performed by Madyson Sigala MD at [facility] OR     Family History   Problem Relation Age of Onset    High Blood Pressure Mother     High Blood Pressure Sister     Cancer Sister      Social History     Socioeconomic History    Marital status:      Spouse name: Not on file    Number of children: 5    Years of education: Not on file    Highest education level: Not on file   Occupational History Not on file   Tobacco Use    Smoking status: Every Day     Packs/day: 2.00     Years: 39.00     Pack years: 78.00     Types: Cigarettes    Smokeless tobacco: Never    Tobacco comments:     smokes 1-2 a day   Vaping Use    Vaping Use: Never used   Substance and Sexual Activity    Alcohol use: Yes     Alcohol/week: 6.0 standard drinks     Types: 6 Cans of beer per week     Comment: per week (24 oz beers)     Drug use: Yes     Types: Marijuana Austyn Hail)     Comment: last 12/1    Sexual activity: Yes     Partners: Female   Other Topics Concern    Not on file   Social History Narrative    Not on file     Social Determinants of Health     Financial Resource Strain: Not on file   Food Insecurity: Not on file   Transportation Needs: Not on file   Physical Activity: Not on file   Stress: Not on file   Social Connections: Not on file   Intimate Partner Violence: Not on file   Housing Stability: Not on file     No current facility-administered medications for this encounter.      Current Outpatient Medications   Medication Sig Dispense Refill    predniSONE (DELTASONE) 5 MG tablet       abiraterone acetate (ZYTIGA) 250 MG tablet Take 1,000 mg by mouth daily      oxyCODONE-acetaminophen (PERCOCET) 7.5-325 MG per tablet take 1 tablet by mouth every 6 hours AS NEEDED FOR PAIN      naloxone 4 MG/0.1ML LIQD nasal spray 1 spray by Nasal route as needed for Opioid Reversal 1 each 5    Calcium Carbonate-Vitamin D (OYSTER SHELL CALCIUM/D) 500-200 MG-UNIT TABS Take 1 tablet by mouth daily 30 tablet 3    cyanocobalamin ([employer] VITAMIN B12) 1000 MCG tablet Take 1 tablet by mouth daily 30 tablet 3    nicotine (NICODERM CQ) 21 MG/24HR Place 1 patch onto the skin daily 30 patch 3    bicalutamide (CASODEX) 50 MG chemo tablet Take 1 tablet by mouth daily 30 tablet 11    ondansetron (ZOFRAN) 8 MG tablet take 1 tablet by mouth every 8 hours if needed for nausea and vomiting      Sennosides (SENNA) 8.6 MG CAPS Take 1 capsule by mouth 2 times daily as needed (constipation) 20 capsule 0    dexamethasone (DECADRON) 4 MG tablet Two tablets the day before treatment, one table daily for four days starting the day after chemotherapy 18 tablet 0    NARCAN 4 MG/0.1ML LIQD nasal spray DISPENSED PER STANDING ORDER USE AS DIRECTED PATIENT IS TRAINED OPIOID OVERDOSE RESPONDER      albuterol sulfate HFA (PROVENTIL HFA) 108 (90 Base) MCG/ACT inhaler Inhale 2 puffs into the lungs every 4 hours as needed for Wheezing or Shortness of Breath With spacer (and mask if indicated). Thanks. 1 Inhaler 1     Allergies   Allergen Reactions    Tramadol Other (See Comments)     seizures    Vicodin [Hydrocodone-Acetaminophen] Hives       Nursing Notes Reviewed    Physical Exam:  ED Triage Vitals   Enc Vitals Group      BP 10/02/22 0058 97/70      Heart Rate 10/02/22 0058 88      Resp 10/02/22 0058 18      Temp 10/02/22 0058 98.5 °F (36.9 °C)      Temp src --       SpO2 10/02/22 0058 98 %      Weight 10/02/22 0059 193 lb (87.5 kg)      Height --       Head Circumference --       Peak Flow --       Pain Score --       Pain Loc --       Pain Edu? --       Excl. in [address]? --      GENERAL APPEARANCE: Awake and alert. Cooperative. No acute distress. HEAD: Normocephalic. Atraumatic. NECK: Supple. No meningismus. No palpable masses. No lymphadenopathy. LUNGS: Respirations unlabored. CTAB. ABDOMEN: Soft. Non-tender. No guarding or rebound. No organomegaly. No palpable masses  MUSCULOSKELETAL: No acute deformities. There is unilateral paracervical tenderness to palpation noted. Decreased range of motion actively of the neck secondary to pain. No meningismus. No mid spinal tenderness or step-offs no overlying rashes of the cervical, thoracic, lumbar regions. FOCUSED MUSCULOSKELETAL: right clavicle, humerus and scapula are non-tender to palpation. Active range of motion of the joints without ligamentous laxity. Theres no obvious joint or bony deformity. Radial head is non-tender.  No joint effusions. SKIN: Warm and dry. No rash, No erythema, No edema. No ecchymoses. NEUROLOGICAL: No gross facial drooping. Moves all 4 extremities spontaneously. PSYCHIATRIC: Normal mood. I have reviewed and interpreted all of the currently available lab results from this visit (if applicable):  No results found for this visit on 10/02/22. Radiographs (if obtained):  [] The following radiograph was interpreted by myself in the absence of a radiologist:   [] Radiologist's Report Reviewed:  XR SHOULDER RIGHT (MIN 2 VIEWS)   Final Result   Sclerotic lesions are seen involving the right shoulder, concerning for   metastatic disease. No pathologic fracture. EKG (if obtained):   Please See Note of attending physician for EKG interpretation. Chart review shows recent radiograph(s):  No results found. MDM:   Neurovascularly intact. Patient presents today with signs and symptoms that are congruent with musculoskeletal strain/sprain of the neck and cervical muscles  Xray negative for any acute osseous abnormality. However this is concerning sclerotic lesions that are likely metastatic diseas. No pathological fracture. Screening EKG unremarkable ischemic changes. I have very low clinical suspicion of any fracture. However patient is educated that should symptoms persist and did not improve over the next 4-5 days patient should have orthopedic follow up as referred for x-rays to rule out fracture. I estimate there is LOW risk for Fracture, COMPARTMENT SYNDROME, DEEP VENOUS THROMBOSIS, COMPLETE TENDON RUPTURE, OR NEUROVASCULAR INJURY, thus I consider the discharge disposition reasonable. Pt is to be discharged home. Pt is  to return immediately to the emergency department if he has any new, worrisome or worsening symptoms. Pt is to follow up with PCP within 2 days. Patient/Surrogate vocalizes agreement and understanding with this plan and he has no questions upon disposition.   Pt is comfortable upon disposition home. Patient is stable, Patients vital signs are stable. Vital signs and nursing notes reviewed during ED course. I independently managed patient today in the ED. All pertinent Lab data and radiographic results reviewed with patient at bedside. The patient and/or the family were informed of the results of any tests/labs/imaging, the treatment plan, and time was allotted to answer questions. See chart for details of medications given during the ED stay. BP 97/70   Pulse 88   Temp 98.5 °F (36.9 °C)   Resp 18   Wt 193 lb (87.5 kg)   SpO2 98%   BMI 25.46 kg/m²       Clinical Impression:  1. Acute pain of right shoulder        Disposition referral (if applicable):  Melba Rivers MD  [address] [facility]  [phone]    In 1 day    Disposition medications (if applicable):  New Prescriptions    No medications on file       Comment: Please note this report has been produced using speech recognition software and may contain errors related to that system including errors in grammar, punctuation, and spelling, as well as words and phrases that may be inappropriate. If there are any questions or concerns please feel free to contact the dictating provider for clarification.     Katarina Johnson [address] [place] [address]  10/02/22 9195

## 2022-10-04 ENCOUNTER — CLINICAL DOCUMENTATION (OUTPATIENT)
Dept: INFUSION THERAPY | Age: 52
End: 2022-10-04

## 2022-10-05 RX ORDER — SODIUM CHLORIDE 0.9 % (FLUSH) 0.9 %
10 SYRINGE (ML) INJECTION PRN
OUTPATIENT
Start: 2022-10-05

## 2022-10-07 ENCOUNTER — HOSPITAL ENCOUNTER (OUTPATIENT)
Dept: INTERVENTIONAL RADIOLOGY/VASCULAR | Age: 52
Discharge: HOME | End: 2022-10-07

## 2022-10-07 NOTE — NURSE NAVIGATOR
Called and spoke with pt's wife, stated pt will not be coming in for procedure today, stated his blood count is too low and he has spoken to Dr Nicole Ewing  about a blood transfusion

## 2022-10-11 ENCOUNTER — APPOINTMENT (OUTPATIENT)
Dept: GENERAL RADIOLOGY | Age: 52
DRG: 720 | End: 2022-10-11
Payer: MEDICAID

## 2022-10-11 ENCOUNTER — HOSPITAL ENCOUNTER (INPATIENT)
Age: 52
LOS: 1 days | Discharge: HOME | DRG: 720 | End: 2022-10-13
Attending: EMERGENCY MEDICINE | Admitting: INTERNAL MEDICINE
Payer: MEDICAID

## 2022-10-11 ENCOUNTER — APPOINTMENT (OUTPATIENT)
Dept: CT IMAGING | Age: 52
DRG: 720 | End: 2022-10-11
Payer: MEDICAID

## 2022-10-11 ENCOUNTER — APPOINTMENT (OUTPATIENT)
Dept: INTERVENTIONAL RADIOLOGY/VASCULAR | Age: 52
DRG: 720 | End: 2022-10-11
Payer: MEDICAID

## 2022-10-11 DIAGNOSIS — N28.9 ACUTE RENAL INSUFFICIENCY: ICD-10-CM

## 2022-10-11 DIAGNOSIS — J93.12 SECONDARY SPONTANEOUS PNEUMOTHORAX: Primary | ICD-10-CM

## 2022-10-11 DIAGNOSIS — R20.2 PARESTHESIA: ICD-10-CM

## 2022-10-11 PROBLEM — J93.9 PNEUMOTHORAX: Status: ACTIVE | Noted: 2022-10-11

## 2022-10-11 LAB
ABO/RH: NORMAL
ALBUMIN SERPL-MCNC: 3.8 GM/DL (ref 3.4–5)
ALP BLD-CCNC: 212 IU/L (ref 40–129)
ALT SERPL-CCNC: <5 U/L (ref 10–40)
ANION GAP SERPL CALCULATED.3IONS-SCNC: 8 MMOL/L (ref 4–16)
ANTIBODY SCREEN: NEGATIVE
AST SERPL-CCNC: 10 IU/L (ref 15–37)
BACTERIA: NEGATIVE /HPF
BASOPHILS ABSOLUTE: 0.1 K/CU MM
BASOPHILS RELATIVE PERCENT: 0.7 % (ref 0–1)
BILIRUB SERPL-MCNC: 0.1 MG/DL (ref 0–1)
BILIRUBIN URINE: NEGATIVE MG/DL
BLOOD, URINE: ABNORMAL
BUN BLDV-MCNC: 17 MG/DL (ref 6–23)
CALCIUM SERPL-MCNC: 10 MG/DL (ref 8.3–10.6)
CHLORIDE BLD-SCNC: 100 MMOL/L (ref 99–110)
CLARITY: CLEAR
CO2: 26 MMOL/L (ref 21–32)
COLOR: YELLOW
CREAT SERPL-MCNC: 1.4 MG/DL (ref 0.9–1.3)
DIFFERENTIAL TYPE: ABNORMAL
EOSINOPHILS ABSOLUTE: 0.2 K/CU MM
EOSINOPHILS RELATIVE PERCENT: 2 % (ref 0–3)
GFR AFRICAN AMERICAN: >60 ML/MIN/1.73M2
GFR NON-AFRICAN AMERICAN: 53 ML/MIN/1.73M2
GLUCOSE BLD-MCNC: 101 MG/DL (ref 70–99)
GLUCOSE BLD-MCNC: 140 MG/DL (ref 70–99)
GLUCOSE, URINE: NEGATIVE MG/DL
HCT VFR BLD CALC: 33.1 % (ref 42–52)
HEMOGLOBIN: 10.6 GM/DL (ref 13.5–18)
IMMATURE NEUTROPHIL %: 0.1 % (ref 0–0.43)
KETONES, URINE: NEGATIVE MG/DL
LEUKOCYTE ESTERASE, URINE: ABNORMAL
LYMPHOCYTES ABSOLUTE: 1.7 K/CU MM
LYMPHOCYTES RELATIVE PERCENT: 21.9 % (ref 24–44)
MCH RBC QN AUTO: 25.7 PG (ref 27–31)
MCHC RBC AUTO-ENTMCNC: 32 % (ref 32–36)
MCV RBC AUTO: 80.3 FL (ref 78–100)
MONOCYTES ABSOLUTE: 0.6 K/CU MM
MONOCYTES RELATIVE PERCENT: 7.8 % (ref 0–4)
MUCUS: ABNORMAL HPF
NITRITE URINE, QUANTITATIVE: NEGATIVE
NON SQUAM EPI CELLS: <1 /HPF
NUCLEATED RBC %: 0 %
PDW BLD-RTO: 18.3 % (ref 11.7–14.9)
PH, URINE: 7 (ref 5–8)
PLATELET # BLD: 411 K/CU MM (ref 140–440)
PMV BLD AUTO: 9.6 FL (ref 7.5–11.1)
POTASSIUM SERPL-SCNC: 4.1 MMOL/L (ref 3.5–5.1)
PROTEIN UA: ABNORMAL MG/DL
RBC # BLD: 4.12 M/CU MM (ref 4.6–6.2)
RBC URINE: 9 /HPF (ref 0–3)
SEGMENTED NEUTROPHILS ABSOLUTE COUNT: 5.1 K/CU MM
SEGMENTED NEUTROPHILS RELATIVE PERCENT: 67.5 % (ref 36–66)
SODIUM BLD-SCNC: 134 MMOL/L (ref 135–145)
SPECIFIC GRAVITY UA: 1.01 (ref 1–1.03)
SQUAMOUS EPITHELIAL: 1 /HPF
TOTAL IMMATURE NEUTOROPHIL: 0.01 K/CU MM
TOTAL NUCLEATED RBC: 0 K/CU MM
TOTAL PROTEIN: 8 GM/DL (ref 6.4–8.2)
TRICHOMONAS: ABNORMAL /HPF
UROBILINOGEN, URINE: 0.2 MG/DL (ref 0.2–1)
WBC # BLD: 7.5 K/CU MM (ref 4–10.5)
WBC UA: <1 /HPF (ref 0–2)

## 2022-10-11 PROCEDURE — 71275 CT ANGIOGRAPHY CHEST: CPT

## 2022-10-11 PROCEDURE — 2580000003 HC RX 258: Performed by: EMERGENCY MEDICINE

## 2022-10-11 PROCEDURE — 94761 N-INVAS EAR/PLS OXIMETRY MLT: CPT

## 2022-10-11 PROCEDURE — 85025 COMPLETE CBC W/AUTO DIFF WBC: CPT

## 2022-10-11 PROCEDURE — G0378 HOSPITAL OBSERVATION PER HR: HCPCS

## 2022-10-11 PROCEDURE — 81001 URINALYSIS AUTO W/SCOPE: CPT

## 2022-10-11 PROCEDURE — 87070 CULTURE OTHR SPECIMN AEROBIC: CPT

## 2022-10-11 PROCEDURE — 99222 1ST HOSP IP/OBS MODERATE 55: CPT | Performed by: INTERNAL MEDICINE

## 2022-10-11 PROCEDURE — 2580000003 HC RX 258: Performed by: NURSE PRACTITIONER

## 2022-10-11 PROCEDURE — 96361 HYDRATE IV INFUSION ADD-ON: CPT

## 2022-10-11 PROCEDURE — 96366 THER/PROPH/DIAG IV INF ADDON: CPT

## 2022-10-11 PROCEDURE — 80053 COMPREHEN METABOLIC PANEL: CPT

## 2022-10-11 PROCEDURE — 6370000000 HC RX 637 (ALT 250 FOR IP): Performed by: EMERGENCY MEDICINE

## 2022-10-11 PROCEDURE — 6370000000 HC RX 637 (ALT 250 FOR IP): Performed by: NURSE PRACTITIONER

## 2022-10-11 PROCEDURE — 6360000004 HC RX CONTRAST MEDICATION: Performed by: EMERGENCY MEDICINE

## 2022-10-11 PROCEDURE — 84153 ASSAY OF PSA TOTAL: CPT

## 2022-10-11 PROCEDURE — 2580000003 HC RX 258: Performed by: STUDENT IN AN ORGANIZED HEALTH CARE EDUCATION/TRAINING PROGRAM

## 2022-10-11 PROCEDURE — 82962 GLUCOSE BLOOD TEST: CPT

## 2022-10-11 PROCEDURE — 71045 X-RAY EXAM CHEST 1 VIEW: CPT

## 2022-10-11 PROCEDURE — 87205 SMEAR GRAM STAIN: CPT

## 2022-10-11 PROCEDURE — 86901 BLOOD TYPING SEROLOGIC RH(D): CPT

## 2022-10-11 PROCEDURE — 2580000003 HC RX 258: Performed by: INTERNAL MEDICINE

## 2022-10-11 PROCEDURE — 87040 BLOOD CULTURE FOR BACTERIA: CPT

## 2022-10-11 PROCEDURE — 6370000000 HC RX 637 (ALT 250 FOR IP): Performed by: INTERNAL MEDICINE

## 2022-10-11 PROCEDURE — 6370000000 HC RX 637 (ALT 250 FOR IP): Performed by: SURGERY

## 2022-10-11 PROCEDURE — 6370000000 HC RX 637 (ALT 250 FOR IP): Performed by: STUDENT IN AN ORGANIZED HEALTH CARE EDUCATION/TRAINING PROGRAM

## 2022-10-11 PROCEDURE — 99285 EMERGENCY DEPT VISIT HI MDM: CPT

## 2022-10-11 PROCEDURE — 93005 ELECTROCARDIOGRAM TRACING: CPT | Performed by: NURSE PRACTITIONER

## 2022-10-11 PROCEDURE — 96365 THER/PROPH/DIAG IV INF INIT: CPT

## 2022-10-11 PROCEDURE — 6360000002 HC RX W HCPCS: Performed by: NURSE PRACTITIONER

## 2022-10-11 PROCEDURE — 86900 BLOOD TYPING SEROLOGIC ABO: CPT

## 2022-10-11 PROCEDURE — 86850 RBC ANTIBODY SCREEN: CPT

## 2022-10-11 PROCEDURE — 70450 CT HEAD/BRAIN W/O DYE: CPT

## 2022-10-11 RX ORDER — SODIUM CHLORIDE 0.9 % (FLUSH) 0.9 %
5-40 SYRINGE (ML) INJECTION PRN
Status: DISCONTINUED | OUTPATIENT
Start: 2022-10-11 | End: 2022-10-13 | Stop reason: HOSPADM

## 2022-10-11 RX ORDER — SODIUM CHLORIDE 9 MG/ML
INJECTION, SOLUTION INTRAVENOUS PRN
Status: DISCONTINUED | OUTPATIENT
Start: 2022-10-11 | End: 2022-10-13 | Stop reason: HOSPADM

## 2022-10-11 RX ORDER — ABIRATERONE ACETATE 250 MG/1
1000 TABLET ORAL DAILY
Status: DISCONTINUED | OUTPATIENT
Start: 2022-10-11 | End: 2022-10-13 | Stop reason: HOSPADM

## 2022-10-11 RX ORDER — OXYCODONE HYDROCHLORIDE AND ACETAMINOPHEN 5; 325 MG/1; MG/1
2 TABLET ORAL ONCE
Status: COMPLETED | OUTPATIENT
Start: 2022-10-11 | End: 2022-10-11

## 2022-10-11 RX ORDER — MORPHINE SULFATE 15 MG/1
15 TABLET, FILM COATED, EXTENDED RELEASE ORAL EVERY 12 HOURS SCHEDULED
Status: DISCONTINUED | OUTPATIENT
Start: 2022-10-11 | End: 2022-10-12

## 2022-10-11 RX ORDER — BICALUTAMIDE 50 MG/1
50 TABLET, FILM COATED ORAL DAILY
Status: DISCONTINUED | OUTPATIENT
Start: 2022-10-11 | End: 2022-10-11

## 2022-10-11 RX ORDER — SENNA PLUS 8.6 MG/1
1 TABLET ORAL 2 TIMES DAILY PRN
Status: DISCONTINUED | OUTPATIENT
Start: 2022-10-11 | End: 2022-10-13 | Stop reason: HOSPADM

## 2022-10-11 RX ORDER — ENOXAPARIN SODIUM 100 MG/ML
40 INJECTION SUBCUTANEOUS DAILY
Status: DISCONTINUED | OUTPATIENT
Start: 2022-10-11 | End: 2022-10-13 | Stop reason: HOSPADM

## 2022-10-11 RX ORDER — PREDNISONE 1 MG/1
5 TABLET ORAL DAILY
Status: DISCONTINUED | OUTPATIENT
Start: 2022-10-11 | End: 2022-10-13 | Stop reason: HOSPADM

## 2022-10-11 RX ORDER — SODIUM CHLORIDE 9 MG/ML
INJECTION, SOLUTION INTRAVENOUS CONTINUOUS
Status: DISCONTINUED | OUTPATIENT
Start: 2022-10-11 | End: 2022-10-11

## 2022-10-11 RX ORDER — ALBUTEROL SULFATE 90 UG/1
2 AEROSOL, METERED RESPIRATORY (INHALATION) EVERY 4 HOURS PRN
Status: DISCONTINUED | OUTPATIENT
Start: 2022-10-11 | End: 2022-10-13 | Stop reason: HOSPADM

## 2022-10-11 RX ORDER — ONDANSETRON 2 MG/ML
4 INJECTION INTRAMUSCULAR; INTRAVENOUS EVERY 6 HOURS PRN
Status: DISCONTINUED | OUTPATIENT
Start: 2022-10-11 | End: 2022-10-13 | Stop reason: HOSPADM

## 2022-10-11 RX ORDER — ACETAMINOPHEN 325 MG/1
650 TABLET ORAL EVERY 4 HOURS PRN
Status: DISCONTINUED | OUTPATIENT
Start: 2022-10-11 | End: 2022-10-13 | Stop reason: HOSPADM

## 2022-10-11 RX ORDER — 0.9 % SODIUM CHLORIDE 0.9 %
1000 INTRAVENOUS SOLUTION INTRAVENOUS ONCE
Status: COMPLETED | OUTPATIENT
Start: 2022-10-11 | End: 2022-10-12

## 2022-10-11 RX ORDER — SODIUM CHLORIDE 0.9 % (FLUSH) 0.9 %
5-40 SYRINGE (ML) INJECTION 2 TIMES DAILY
Status: DISCONTINUED | OUTPATIENT
Start: 2022-10-11 | End: 2022-10-11

## 2022-10-11 RX ORDER — MORPHINE SULFATE 15 MG/1
7.5 TABLET ORAL EVERY 6 HOURS PRN
Status: DISCONTINUED | OUTPATIENT
Start: 2022-10-11 | End: 2022-10-11

## 2022-10-11 RX ORDER — ONDANSETRON 4 MG/1
4 TABLET, ORALLY DISINTEGRATING ORAL EVERY 8 HOURS PRN
Status: DISCONTINUED | OUTPATIENT
Start: 2022-10-11 | End: 2022-10-13 | Stop reason: HOSPADM

## 2022-10-11 RX ORDER — SODIUM CHLORIDE 0.9 % (FLUSH) 0.9 %
5-40 SYRINGE (ML) INJECTION EVERY 12 HOURS SCHEDULED
Status: DISCONTINUED | OUTPATIENT
Start: 2022-10-11 | End: 2022-10-13 | Stop reason: HOSPADM

## 2022-10-11 RX ORDER — OXYCODONE HYDROCHLORIDE 10 MG/1
10 TABLET ORAL EVERY 4 HOURS PRN
Status: DISCONTINUED | OUTPATIENT
Start: 2022-10-11 | End: 2022-10-13 | Stop reason: HOSPADM

## 2022-10-11 RX ORDER — 0.9 % SODIUM CHLORIDE 0.9 %
1000 INTRAVENOUS SOLUTION INTRAVENOUS ONCE
Status: COMPLETED | OUTPATIENT
Start: 2022-10-11 | End: 2022-10-11

## 2022-10-11 RX ADMIN — SODIUM CHLORIDE, PRESERVATIVE FREE 10 ML: 5 INJECTION INTRAVENOUS at 09:40

## 2022-10-11 RX ADMIN — ACETAMINOPHEN 650 MG: 325 TABLET ORAL at 20:54

## 2022-10-11 RX ADMIN — IOPAMIDOL 75 ML: 755 INJECTION, SOLUTION INTRAVENOUS at 04:18

## 2022-10-11 RX ADMIN — SODIUM CHLORIDE 1000 ML: 9 INJECTION, SOLUTION INTRAVENOUS at 03:21

## 2022-10-11 RX ADMIN — MORPHINE SULFATE 15 MG: 15 TABLET, FILM COATED, EXTENDED RELEASE ORAL at 20:49

## 2022-10-11 RX ADMIN — SODIUM CHLORIDE: 9 INJECTION, SOLUTION INTRAVENOUS at 09:43

## 2022-10-11 RX ADMIN — PIPERACILLIN AND TAZOBACTAM 3375 MG: 3; .375 INJECTION, POWDER, LYOPHILIZED, FOR SOLUTION INTRAVENOUS at 20:52

## 2022-10-11 RX ADMIN — PREDNISONE 5 MG: 5 TABLET ORAL at 09:39

## 2022-10-11 RX ADMIN — MORPHINE SULFATE 7.5 MG: 15 TABLET ORAL at 11:54

## 2022-10-11 RX ADMIN — MORPHINE SULFATE 15 MG: 15 TABLET, FILM COATED, EXTENDED RELEASE ORAL at 10:09

## 2022-10-11 RX ADMIN — SODIUM CHLORIDE 1000 ML: 9 INJECTION, SOLUTION INTRAVENOUS at 23:00

## 2022-10-11 RX ADMIN — OXYCODONE AND ACETAMINOPHEN 2 TABLET: 5; 325 TABLET ORAL at 04:23

## 2022-10-11 ASSESSMENT — PAIN DESCRIPTION - DESCRIPTORS
DESCRIPTORS: THROBBING
DESCRIPTORS: ACHING;DISCOMFORT

## 2022-10-11 ASSESSMENT — PAIN SCALES - GENERAL
PAINLEVEL_OUTOF10: 8
PAINLEVEL_OUTOF10: 10
PAINLEVEL_OUTOF10: 0
PAINLEVEL_OUTOF10: 10
PAINLEVEL_OUTOF10: 10
PAINLEVEL_OUTOF10: 9

## 2022-10-11 ASSESSMENT — PAIN DESCRIPTION - LOCATION
LOCATION: ARM
LOCATION: ARM
LOCATION: ARM;COCCYX
LOCATION: ARM

## 2022-10-11 ASSESSMENT — PAIN DESCRIPTION - ORIENTATION
ORIENTATION: RIGHT
ORIENTATION: RIGHT

## 2022-10-11 ASSESSMENT — PAIN - FUNCTIONAL ASSESSMENT: PAIN_FUNCTIONAL_ASSESSMENT: ACTIVITIES ARE NOT PREVENTED
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
## 2022-10-11 NOTE — CONSULTS
[facility], [address]   Consult Note  [facility] 1 2 3 4 5    Date: 10/11/2022   Patient: Shawn Ruth   : 1970   DOA: 10/11/2022   MRN: [id number]   ROOM#: [id number]     Reason for Consult: Hx of obstructive uropathy in setting of metastatic disease, please evaluate patient for ongoing need for nephrostomy tube. Requesting Physician:  Dr. Amparo Seay  Collaborating Urologist on Call at time of admission: Dr. Chandra Cueva: Right arm pain    History Obtained From:  patient, electronic medical record    HISTORY OF PRESENT ILLNESS:                The patient is a 47 y.o. male with significant past medical history of CAD, metastatic squamous cell cancer, metastatic prostate cancer, left hydronephrosis s/p PCN placement 22 and HTN who presented with right arm pain. Work-up in the ED revealed a left side anterior pneumothorax for which he was admitted. Pt also reports leaking from his nephrostomy tube bag for several weeks. His nephrostomy tube has not been exchanged since it was placed 22 at [address] [place] [address]. He reports fatigue and difficulty voiding today. ED Provider's HPI 10/10/22: Shawn Ruth is a 47 y.o. male that presents with complaint of acute on chronic right shoulder pain and cold like feeling all over his body. States that this seems worse on the right side of his body where he has some perceived numbness in his arm and leg as well. States that he is mostly concerned because the last time he felt like this he needed blood transfusions. The patient has a history of metastatic lung cancer and is currently on hospice. He states that over the past day he is also had some coughing and coughing up bright red blood. States that this has been a small amount. Denies any shortness of breath, chest pain, abdominal pain, nausea, vomiting. Denies any vision changes, headache. He is not anticoagulated. Denies any focal weakness.   Last known normal was around 11 PM last night. Past Medical History:        Diagnosis Date    CAD (coronary artery disease) 12/23/2013    cath negative per pt    Cancer ([address] 06/2021    pt reports he has lung cancer    History of TMJ disorder     Hypertension     Trigeminal neuralgia      Past Surgical History:        Procedure Laterality Date    ABDOMEN SURGERY      CARDIAC CATHETERIZATION  08/03/2016    CT BIOPSY PERCUTANEOUS SUPERFICIAL BONE  12/17/2021    CT BIOPSY PERCUTANEOUS SUPERFICIAL BONE 12/17/2021 [facility] CT SCAN    CT NEEDLE BIOPSY LUNG PERCUTANEOUS  7/28/2021    CT NEEDLE BIOPSY LUNG PERCUTANEOUS 7/28/2021 [facility] CT SCAN    PORT SURGERY Right 8/13/2021    MEDIPORT INSERTION performed by Claudine Cha MD at [facility] OR     Current Medications:   Current Facility-Administered Medications: sodium chloride flush 0.9 % injection 5-40 mL, 5-40 mL, IntraVENous, 2 times per day  sodium chloride flush 0.9 % injection 5-40 mL, 5-40 mL, IntraVENous, PRN  0.9 % sodium chloride infusion, , IntraVENous, PRN  enoxaparin (LOVENOX) injection 40 mg, 40 mg, SubCUTAneous, Daily  ondansetron (ZOFRAN-ODT) disintegrating tablet 4 mg, 4 mg, Oral, Q8H PRN **OR** ondansetron (ZOFRAN) injection 4 mg, 4 mg, IntraVENous, Q6H PRN  abiraterone acetate (ZYTIGA) 250 MG tablet TABS 1,000 mg  ++NON FORMULARY++ (Patient Supplied), 1,000 mg, Oral, Daily  albuterol sulfate HFA (PROVENTIL;VENTOLIN;PROAIR) 108 (90 Base) MCG/ACT inhaler 2 puff, 2 puff, Inhalation, Q4H PRN  senna (SENOKOT) tablet 8.6 mg, 1 tablet, Oral, BID PRN  0.9 % sodium chloride infusion, , IntraVENous, Continuous  predniSONE (DELTASONE) tablet 5 mg, 5 mg, Oral, Daily  morphine (MS CONTIN) extended release tablet 15 mg, 15 mg, Oral, 2 times per day  morphine (MSIR) tablet 7.5 mg, 7.5 mg, Oral, Q6H PRN    Allergies:  Tramadol and Vicodin [hydrocodone-acetaminophen]    Social History:   TOBACCO:   reports that he has been smoking cigarettes. He has a 78.00 pack-year smoking history.  He has never used smokeless tobacco.  ETOH:   reports current alcohol use of about 6.0 standard drinks per week. DRUGS:   reports current drug use. Drug: Marijuana [employer]). Family History:       Problem Relation Age of Onset    High Blood Pressure Mother     High Blood Pressure Sister     Cancer Sister        REVIEW OF SYSTEMS:     CONSTITUTIONAL:  positive for  fatigue  RESPIRATORY:  negative  CARDIOVASCULAR:  negative  GASTROINTESTINAL:  negative  GENITOURINARY:  positive for difficulty voiding    PHYSICAL EXAM:      VITALS:  /84   Pulse 80   Temp 98 °F (36.7 °C) (Oral)   Resp 18   Ht 6' 1\" (1.854 m)   Wt 190 lb (86.2 kg)   SpO2 100%   BMI 25.07 kg/m²      TEMPERATURE:  Current - Temp: 98 °F (36.7 °C); Max - Temp  Av °F (36.7 °C)  Min: 97.9 °F (36.6 °C)  Max: 98 °F (36.7 °C)  24HR BLOOD PRESSURE RANGE:  Systolic (50DDW), GJU:816 , Min:102 , MKK:182   ; Diastolic (42ZSM), URH:84, Min:61, Max:99    8HR INTAKE OUTPUT:  No intake/output data recorded. URINARY CATHETER OUTPUT (Doherty):     DRAIN/TUBE OUTPUT:        Physical Exam:  General appearance: alert, appears stated age, cooperative, fatigued, and no distress  Head: Normocephalic, without obvious abnormality, atraumatic  Back:  No CVA tenderness.  Left PCN in place w hazy yellow urine output  Abdomen:  Soft, non-tender, non-distended    DATA:    WBC:    Lab Results   Component Value Date/Time    WBC 7.5 10/11/2022 02:00 AM     Hemoglobin/Hematocrit:    Lab Results   Component Value Date/Time    HGB 10.6 10/11/2022 02:00 AM    HCT 33.1 10/11/2022 02:00 AM     BMP:    Lab Results   Component Value Date/Time     10/11/2022 02:00 AM    K 4.1 10/11/2022 02:00 AM     10/11/2022 02:00 AM    CO2 26 10/11/2022 02:00 AM    BUN 17 10/11/2022 02:00 AM    LABALBU 3.8 10/11/2022 02:00 AM    CREATININE 1.4 10/11/2022 02:00 AM    CALCIUM 10.0 10/11/2022 02:00 AM    GFRAA >60 10/11/2022 02:00 AM    LABGLOM 53 10/11/2022 02:00 AM     PT/INR:    Lab Results   Component Value Date/Time    PROTIME 13.3 07/03/2022 08:41 AM    INR 1.03 07/03/2022 08:41 AM     Imaging:  XR SHOULDER RIGHT (MIN 2 VIEWS)    Result Date: 10/2/2022  EXAMINATION: TWO XRAY VIEWS OF THE RIGHT SHOULDER 10/2/2022 2:04 am COMPARISON: None. HISTORY: ORDERING SYSTEM PROVIDED HISTORY: trauma TECHNOLOGIST PROVIDED HISTORY: Right Shoulder - XR Portable Reason for exam:->trauma Reason for Exam: right shoulder pain, trauma Additional signs and symptoms: right shoulder pain, trauma FINDINGS: Right-sided chest port is seen. Multifocal sclerotic areas are seen within the visualized osseous structures, likely related to metastatic disease. There is no acute fracture, dislocation, or bone destruction. The acromioclavicular joint is unremarkable. There is no significant soft tissue swelling. Sclerotic lesions are seen involving the right shoulder, concerning for metastatic disease. No pathologic fracture. CT HEAD WO CONTRAST    Result Date: 10/11/2022  EXAMINATION: CT OF THE HEAD WITHOUT CONTRAST  10/11/2022 3:48 am TECHNIQUE: CT of the head was performed without the administration of intravenous contrast. Automated exposure control, iterative reconstruction, and/or weight based adjustment of the mA/kV was utilized to reduce the radiation dose to as low as reasonably achievable. COMPARISON: 03/11/2022 HISTORY: ORDERING SYSTEM PROVIDED HISTORY: numbness TECHNOLOGIST PROVIDED HISTORY: Has a \"code stroke\" or \"stroke alert\" been called? ->No Reason for exam:->numbness Decision Support Exception - unselect if not a suspected or confirmed emergency medical condition->Emergency Medical Condition (MA) Reason for Exam:  right sided Arm Pain; Cold Extremity Relevant Medical/Surgical History: hx of bone cancer FINDINGS: BRAIN/VENTRICLES: Motion artifact in the lower images but without convincing evidence for intracranial hemorrhage. There is no extra-axial fluid collection.   Small calcifications are present at both globus pallidus. No mass effect, midline shift, or focal sulcal effacement is identified. Evaluation of the gray-white junction is suboptimal but without evidence of territorial infarct. The ventricles are not dilated. ORBITS: The visualized portion of the orbits demonstrate no acute abnormality. SINUSES: The visualized paranasal sinuses and mastoid air cells demonstrate no acute abnormality. SOFT TISSUES/SKULL:  Areas of increased sclerosis within the skull. Low lying position of the right mandibular condyle at the temporomandibular joint. .     Motion artifact on the lower images. No convincing evidence for acute intracranial hemorrhage. No mass effect, midline shift, or focal sulcal effacement. The ventricles are not dilated. Low lying position of the right mandibular condyle at the temporomandibular joint suggesting subluxation. Areas of increased sclerosis in the skull concerning for sclerotic metastases. CTA CHEST W CONTRAST    Result Date: 10/11/2022  EXAMINATION: CTA OF THE CHEST 10/11/2022 4:11 am TECHNIQUE: CTA of the chest was performed after the administration of intravenous contrast.  Multiplanar reformatted images are provided for review. MIP images are provided for review. Automated exposure control, iterative reconstruction, and/or weight based adjustment of the mA/kV was utilized to reduce the radiation dose to as low as reasonably achievable. COMPARISON: 02/08/2022, 08/09/2022 HISTORY: ORDERING SYSTEM PROVIDED HISTORY: hemoptysis TECHNOLOGIST PROVIDED HISTORY: Reason for exam:->hemoptysis Reason for Exam: hemoptysis Relevant Medical/Surgical History: hx of bone cancer FINDINGS: Pulmonary Arteries: Suboptimal opacification of the pulmonary arteries. No central pulmonary embolus. No definite lobar pulmonary embolus. Motion artifact and contrast bolus timing limits evaluation of the segmental pulmonary arteries. Mediastinum: No thoracic adenopathy. Heart size is normal.  No pericardial effusion. Thoracic aorta is normal caliber. Lungs and pleura: Masslike consolidation in the perihilar region and left upper lobe corresponds to the prior CT, with suspected mass measuring up to 3.8 by 3.2 cm, previously 5.2 x 3 cm. Persistent complete collapse of the left upper lobe. Atelectasis has developed in the infrahilar left lower lobe with trace left pleural effusion. Trace left anterior pneumothorax measures up to 4 mm between the pleural margins. The right lung is clear. No central endobronchial lesion is otherwise present. Upper abdomen: Limited images of the upper abdomen demonstrate no significant abnormality. Bones and soft tissues: Diffuse bony sclerosis compatible with multifocal metastatic disease. The extent of sclerosis and metastatic lesions has significantly progressed with diffuse osseous involvement throughout the bones. No superimposed pathologic fracture is noted. 1. Suboptimal opacification of the pulmonary arteries. No central pulmonary embolus. No definite lobar pulmonary embolus. Motion artifact and contrast bolus timing limits evaluation of the segmental pulmonary arteries. 2. New trace left anterior pneumothorax measuring 4 mm. 3. Otherwise no significant change since 08/09/2022. Complete collapse of the left upper lobe with persistent left perihilar 3.8 cm mass obscured by the surrounding atelectasis. 4. Trace left pleural effusion 5. Mild infrahilar left lower lobe atelectasis 6. Osseous metastatic disease with diffuse sclerotic osseous metastatic lesions. No pathologic fracture. Assessment & Plan:      Sara Guerrero is a 47y.o. year old male admitted 10/11/2022 for pneumothorax. Pt known to Dr. Francisco Malloy.     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 S/p left PCN placement at [facility] 4/25/22. Cr 1.4, increased from 0.8 9/23/22   Obtain a PVR bladder scan to ensure proper bladder emptying. UA w reflex to cx ordered   IR consulted for PCN exchange   CT a/p w contrast once BRENDA resolved. Will follow. Patient seen and examined, chart reviewed.      Electronically signed by Ina Mauricio PA-C on 10/11/2022 at 11:48 AM

## 2022-10-11 NOTE — ED PROVIDER NOTES
Triage Chief Complaint:   Arm Pain and Cold Extremity    [place]:  Kirstie Franco is a 47 y.o. male that presents with complaint of acute on chronic right shoulder pain and cold like feeling all over his body. States that this seems worse on the right side of his body where he has some perceived numbness in his arm and leg as well. States that he is mostly concerned because the last time he felt like this he needed blood transfusions. The patient has a history of metastatic lung cancer and is currently on hospice. He states that over the past day he is also had some coughing and coughing up bright red blood. States that this has been a small amount. Denies any shortness of breath, chest pain, abdominal pain, nausea, vomiting. Denies any vision changes, headache. He is not anticoagulated. Denies any focal weakness. Last known normal was around 11 PM last night. ROS:  At least 10 systems reviewed and otherwise acutely negative except as in the [address]     Past Medical History:   Diagnosis Date    CAD (coronary artery disease) 12/23/2013    cath negative per pt    Cancer ([facility] [address] 06/2021    pt reports he has lung cancer    History of TMJ disorder     Hypertension     Trigeminal neuralgia      Past Surgical History:   Procedure Laterality Date    ABDOMEN SURGERY      CARDIAC CATHETERIZATION  08/03/2016    CT BIOPSY PERCUTANEOUS SUPERFICIAL BONE  12/17/2021    CT BIOPSY PERCUTANEOUS SUPERFICIAL BONE 12/17/2021 [facility] CT SCAN    CT NEEDLE BIOPSY LUNG PERCUTANEOUS  7/28/2021    CT NEEDLE BIOPSY LUNG PERCUTANEOUS 7/28/2021 [facility] CT SCAN    PORT SURGERY Right 8/13/2021    MEDIPORT INSERTION performed by Mark Dia MD at [address] [facility] OR     Family History   Problem Relation Age of Onset    High Blood Pressure Mother     High Blood Pressure Sister     Cancer Sister      Social History     Socioeconomic History    Marital status:      Spouse name: Not on file    Number of children: 5    Years of education: Not on file Highest education level: Not on file   Occupational History    Not on file   Tobacco Use    Smoking status: Every Day     Packs/day: 2.00     Years: 39.00     Pack years: 78.00     Types: Cigarettes    Smokeless tobacco: Never    Tobacco comments:     smokes 1-2 a day   Vaping Use    Vaping Use: Never used   Substance and Sexual Activity    Alcohol use:  Yes     Alcohol/week: 6.0 standard drinks     Types: 6 Cans of beer per week     Comment: per week (24 oz beers)     Drug use: Yes     Types: Marijuana [employer])     Comment: last 12/1    Sexual activity: Yes     Partners: Female   Other Topics Concern    Not on file   Social History Narrative    Not on file     Social Determinants of Health     Financial Resource Strain: Not on file   Food Insecurity: Not on file   Transportation Needs: Not on file   Physical Activity: Not on file   Stress: Not on file   Social Connections: Not on file   Intimate Partner Violence: Not on file   Housing Stability: Not on file     Current Facility-Administered Medications   Medication Dose Route Frequency Provider Last Rate Last Admin    sodium chloride flush 0.9 % injection 5-40 mL  5-40 mL IntraVENous BID Abbey Griffith MD         Current Outpatient Medications   Medication Sig Dispense Refill    predniSONE (DELTASONE) 5 MG tablet       abiraterone acetate (ZYTIGA) 250 MG tablet Take 1,000 mg by mouth daily      oxyCODONE-acetaminophen (PERCOCET) 7.5-325 MG per tablet take 1 tablet by mouth every 6 hours AS NEEDED FOR PAIN      naloxone 4 MG/0.1ML LIQD nasal spray 1 spray by Nasal route as needed for Opioid Reversal 1 each 5    Calcium Carbonate-Vitamin D (OYSTER SHELL CALCIUM/D) 500-200 MG-UNIT TABS Take 1 tablet by mouth daily 30 tablet 3    cyanocobalamin ([employer] VITAMIN B12) 1000 MCG tablet Take 1 tablet by mouth daily 30 tablet 3    nicotine (NICODERM CQ) 21 MG/24HR Place 1 patch onto the skin daily 30 patch 3    bicalutamide (CASODEX) 50 MG chemo tablet Take 1 tablet by mouth daily 30 tablet 11    ondansetron (ZOFRAN) 8 MG tablet take 1 tablet by mouth every 8 hours if needed for nausea and vomiting      Sennosides (SENNA) 8.6 MG CAPS Take 1 capsule by mouth 2 times daily as needed (constipation) 20 capsule 0    dexamethasone (DECADRON) 4 MG tablet Two tablets the day before treatment, one table daily for four days starting the day after chemotherapy 18 tablet 0    NARCAN 4 MG/0.1ML LIQD nasal spray DISPENSED PER STANDING ORDER USE AS DIRECTED PATIENT IS TRAINED OPIOID OVERDOSE RESPONDER      albuterol sulfate HFA (PROVENTIL HFA) 108 (90 Base) MCG/ACT inhaler Inhale 2 puffs into the lungs every 4 hours as needed for Wheezing or Shortness of Breath With spacer (and mask if indicated). Thanks. 1 Inhaler 1     Allergies   Allergen Reactions    Tramadol Other (See Comments)     seizures    Vicodin [Hydrocodone-Acetaminophen] Hives       Nursing Notes Reviewed    Physical Exam:  ED Triage Vitals   Enc Vitals Group      BP 10/11/22 0147 122/86      Heart Rate 10/11/22 0147 96      Resp 10/11/22 0147 18      Temp 10/11/22 0147 97.9 °F (36.6 °C)      Temp src --       SpO2 10/11/22 0147 98 %      Weight 10/11/22 0146 190 lb (86.2 kg)      Height 10/11/22 0146 6' 1\" (1.854 m)      Head Circumference --       Peak Flow --       Pain Score --       Pain Loc --       Pain Edu? --       Excl. in [address]? --      GENERAL APPEARANCE: Awake and alert. Cooperative. No acute distress. HEAD: Normocephalic. Atraumatic. EYES: EOM's grossly intact. Sclera anicteric. ENT: Mucous membranes are moist. Tolerates saliva. No trismus. NECK: Supple. No meningismus. Trachea midline. HEART: RRR. Radial pulses 2+. LUNGS: Respirations unlabored. CTAB  ABDOMEN: Soft. Non-tender. No guarding or rebound. EXTREMITIES: No acute deformities. SKIN: Warm and dry.   NEUROLOGICAL: EOMI, PERRL, symmetric eyebrow raise and nasolabial folds, no hearing deficits, uvula midline, symmetric palatal raise, symmetric shoulder shrug and lateral rotation of head with full strength, normal sensation in all branches of trigeminal nerve. Full strength and sensation in the bilateral upper and lower extremities. DTRs 2+ in bilateral patella distributions. No dysmetria. No slurred speech. No aphasia. No nystagmus. PSYCHIATRIC: Normal mood.     I have reviewed and interpreted all of the currently available lab results from this visit (if applicable):  Results for orders placed or performed during the hospital encounter of 10/11/22   CBC with Auto Differential   Result Value Ref Range    WBC 7.5 4.0 - 10.5 K/CU MM    RBC 4.12 (L) 4.6 - 6.2 M/CU MM    Hemoglobin 10.6 (L) 13.5 - 18.0 GM/DL    Hematocrit 33.1 (L) 42 - 52 %    MCV 80.3 78 - 100 FL    MCH 25.7 (L) 27 - 31 PG    MCHC 32.0 32.0 - 36.0 %    RDW 18.3 (H) 11.7 - 14.9 %    Platelets 522 022 - 275 K/CU MM    MPV 9.6 7.5 - 11.1 FL    Differential Type AUTOMATED DIFFERENTIAL     Segs Relative 67.5 (H) 36 - 66 %    Lymphocytes % 21.9 (L) 24 - 44 %    Monocytes % 7.8 (H) 0 - 4 %    Eosinophils % 2.0 0 - 3 %    Basophils % 0.7 0 - 1 %    Segs Absolute 5.1 K/CU MM    Lymphocytes Absolute 1.7 K/CU MM    Monocytes Absolute 0.6 K/CU MM    Eosinophils Absolute 0.2 K/CU MM    Basophils Absolute 0.1 K/CU MM    Nucleated RBC % 0.0 %    Total Nucleated RBC 0.0 K/CU MM    Total Immature Neutrophil 0.01 K/CU MM    Immature Neutrophil % 0.1 0 - 0.43 %   Comprehensive Metabolic Panel w/ Reflex to MG   Result Value Ref Range    Sodium 134 (L) 135 - 145 MMOL/L    Potassium 4.1 3.5 - 5.1 MMOL/L    Chloride 100 99 - 110 mMol/L    CO2 26 21 - 32 MMOL/L    BUN 17 6 - 23 MG/DL    Creatinine 1.4 (H) 0.9 - 1.3 MG/DL    Glucose 101 (H) 70 - 99 MG/DL    Calcium 10.0 8.3 - 10.6 MG/DL    Albumin 3.8 3.4 - 5.0 GM/DL    Total Protein 8.0 6.4 - 8.2 GM/DL    Total Bilirubin 0.1 0.0 - 1.0 MG/DL    ALT <5 (L) 10 - 40 U/L    AST 10 (L) 15 - 37 IU/L    Alkaline Phosphatase 212 (H) 40 - 129 IU/L    GFR Non- 53 (L) >60 mL/min/1.73m2    GFR African American >60 >60 mL/min/1.73m2    Anion Gap 8 4 - 16   TYPE AND SCREEN   Result Value Ref Range    ABO/Rh A POSITIVE     Antibody Screen NEGATIVE       Radiographs (if obtained):  [] The following radiograph was interpreted by myself in the absence of a radiologist:  [x] Radiologist's Report Reviewed:    EKG (if obtained): (All EKG's are interpreted by myself in the absence of a cardiologist)    MDM:  Plan of care is discussed thoroughly with the patient and family if present. If performed, all imaging and lab work also discussed with patient. All relevant prior results and chart reviewed if available. NIH Stroke Scale  Interval: Baseline  Level of Consciousness (1a): Alert  LOC Questions (1b): Answers both correctly  LOC Commands (1c): Performs both tasks correctly  Best Gaze (2): Normal  Visual (3): No visual loss  Facial Palsy (4): Normal symmetrical movement  Motor Arm, Left (5a): No drift  Motor Arm, Right (5b): No drift  Motor Leg, Left (6a): No drift  Motor Leg, Right (6b): No drift  Limb Ataxia (7): Absent  Sensory (8): Normal  Best Language (9): No aphasia  Dysarthria (10): Normal  Extinction and Inattention (11): No abnormality  Total: 0    Patient presents as above. He is in no acute distress and has normal vital signs. Presents with concerns for acute on chronic anemia. He has had some minor hemoptysis but no other reports of GI bleeding. Complains of feeling cold mostly to his right arm and leg. He has a nonfocal neurologic exam with an NIH stroke scale of 0. Stroke alert is not initiated at this time. CT head is ordered given patient's history of metastases and nondescript neurologic symptoms. Low suspicion for CVA/TIA at this time. Patient is hemodynamically stable. He has good peripheral pulses in all extremities. No evidence of ischemic limb. CT head is without acute abnormality.   Metabolic work-up largely unremarkable except for mild acute renal insufficiency. He is not acutely anemic. CTA chest does not show obvious PE but does show trace left-sided anterior pneumothorax measuring 4 mm. Patient without history of pneumothorax. Patient placed on nonrebreather. He will be admitted to the hospitalist for continued observation, likely repeat imaging in 6 hours. Patient agreeable with this plan of care. Clinical Impression:  1. Secondary spontaneous pneumothorax    2. Paresthesia    3.  Acute renal insufficiency      (Please note that portions of this note may have been completed with a voice recognition program. Efforts were made to edit the dictations but occasionally words are mis-transcribed.)    MD Sarbjit Bennett MD  10/11/22 [id number]

## 2022-10-11 NOTE — ED NOTES
Pt transported to and from CT without complications at this time.         Stephan Castellanos RN  10/11/22 [id number]

## 2022-10-11 NOTE — ED NOTES
ED TO INPATIENT SBAR HANDOFF    Patient Name: Dev Bingham   :  1970  47 y.o. MRN:  [id number]  Preferred Name     ED Room #:  ED18/ED-18  Family/Caregiver Present no   Restraints no   Sitter no   Sepsis Risk Score Sepsis Risk Score: 1.04    Situation  Code Status: Prior No additional code details. Allergies: Tramadol and Vicodin [hydrocodone-acetaminophen]  Weight:   Patient Vitals for the past 96 hrs (Last 3 readings):   Weight   10/11/22 0146 190 lb (86.2 kg)     Arrived from: home  Chief Complaint:   Chief Complaint   Patient presents with    Arm Pain   [facility] Problem/Diagnosis:  Active Problems:    * No active hospital problems. *  Resolved Problems:    * No resolved hospital problems. *    Imaging:   CTA CHEST W CONTRAST   Final Result   1. Suboptimal opacification of the pulmonary arteries. No central pulmonary   embolus. No definite lobar pulmonary embolus. Motion artifact and contrast   bolus timing limits evaluation of the segmental pulmonary arteries. 2. New trace left anterior pneumothorax measuring 4 mm. 3. Otherwise no significant change since 2022. Complete collapse of   the left upper lobe with persistent left perihilar 3.8 cm mass obscured by   the surrounding atelectasis. 4. Trace left pleural effusion   5. Mild infrahilar left lower lobe atelectasis   6. Osseous metastatic disease with diffuse sclerotic osseous metastatic   lesions. No pathologic fracture. CT HEAD WO CONTRAST   Final Result   Motion artifact on the lower images. No convincing evidence for acute   intracranial hemorrhage. No mass effect, midline shift, or focal sulcal   effacement. The ventricles are not dilated. Low lying position of the right mandibular condyle at the temporomandibular   joint suggesting subluxation. Areas of increased sclerosis in the skull concerning for sclerotic metastases.            Abnormal labs:   Abnormal Labs Reviewed   CBC WITH AUTO DIFFERENTIAL - Abnormal; Notable for the following components:       Result Value    RBC 4.12 (*)     Hemoglobin 10.6 (*)     Hematocrit 33.1 (*)     MCH 25.7 (*)     RDW 18.3 (*)     Segs Relative 67.5 (*)     Lymphocytes % 21.9 (*)     Monocytes % 7.8 (*)     All other components within normal limits   COMPREHENSIVE METABOLIC PANEL W/ REFLEX TO MG FOR LOW K - Abnormal; Notable for the following components:    Sodium 134 (*)     Creatinine 1.4 (*)     Glucose 101 (*)     ALT <5 (*)     AST 10 (*)     Alkaline Phosphatase 212 (*)     GFR Non- 53 (*)     All other components within normal limits     Critical values: no     Abnormal Assessment Findings:      Background  Hospital admissions in last 30 days?  no   History:   Past Medical History:   Diagnosis Date    CAD (coronary artery disease) 12/23/2013    cath negative per pt    Cancer ([facility] [address] 06/2021    pt reports he has lung cancer    History of TMJ disorder     Hypertension     Trigeminal neuralgia        Assessment    Vitals/MEWS: MEWS Score: 2  Level of Consciousness: Alert (0)   Vitals:    10/11/22 0332 10/11/22 0432 10/11/22 0502 10/11/22 0532   BP: 116/84 118/80 121/86 102/61   Pulse: 85 87 86 85   Resp: 18 23 20 21   Temp:    98 °F (36.7 °C)   SpO2: 98% 98% 97% 98%   Weight:       Height:         FiO2 (%):    O2 Flow Rate: O2 Device: Non-rebreather mask    Cardiac Rhythm:    Pain Assessment:   [x] Verbal [] [place] Scale  Pain Scale: Pain Assessment  Pain Assessment: 0-10  Pain Level: 9  Pain Location: Arm  Last documented pain score (0-10 scale) Pain Level: 9  Last documented pain medication administered:    Mental Status: oriented and alert  C-SSRS: Risk of Suicide: No Risk  Bedside swallow:    [place] Coma Scale (GCS): Harika Coma Scale  Eye Opening: Spontaneous  Best Verbal Response: Oriented  Best Motor Response: Obeys commands  [place] Coma Scale Score: 15  Active LDA's:   Peripheral IV 10/11/22 Left Forearm (Active)     PO Status: Regular  Pertinent or High Risk Medications/Drips: no   o If Yes, please provide details:    Pending Blood Product Administration: no     Blood Cultures: see ED pt care timeline or ED Event log    Recommendation    Pending orders    Plan for Discharge (if known): Additional Comments: Pt alert and oriented and ambulatory. Pt has cancer with mets and severe pain. Pain mostly locallized to right arm/shoulder. Pt states he is on hospice but no documentation at this time. Pt states he wants to talk to his wife before any possible chest tube but willing to wear oxygen and repeat scan in six hours per MD recommendations.     If any further questions, please call Sending RN at [phone]    Electronically signed by: Electronically signed by Ricardo Rosa RN on 10/11/2022 at 5:43 AM       Ricardo Rosa RN  10/11/22 0542       Ricardo Rosa RN  10/11/22 0543

## 2022-10-11 NOTE — PROGRESS NOTES
SIGNIFICANT EVENT:  RAPID RESPONSE    SUBJECTIVE: Patient in bed and denies fever, chills, unchanged sob, palpitations, chest pain, n/v/d. He is asking why he has people in his room. RAPID RESPONSE was called for Radha Cervantes for -160s    Patient seen and examined in 1101/1101-A. OBJECTIVE:      VITALS  Vitals:    10/11/22 1154 10/11/22 1224 10/11/22 1510 10/11/22 1912   BP:   137/83 (!) 142/75   Pulse:   97 (!) 124   Resp: 15 18 18 19   Temp:   99.5 °F (37.5 °C) (!) 103.2 °F (39.6 °C)   TempSrc:   Oral Oral   SpO2:   100%    Weight:       Height:              PHYSICAL EXAM   GEN Patient is a normocephalic male. A/O x4 and following commands appropriately. HENT PERRL  RESP On 15L O2 via nose mask. SPO2 above 95%. Regular symmetrical chest rise. Diminished lung sounds in bases. CARDIO/VASC Tachycardia on monitor . GI Abd non-distended. Normoactive BS  NEURO No unilateral weakness noted. PSYCH Irritated mood. ASSESSMENT/PLAN:  -Obtaining a stat cxr to re-eval pneumothorax.   -Bolus x1.  -Blood and sputum CX  -Urinalysis reflex cx pending  -Stat zosyn for empiric coverage.  -Wife reports allergy to morphine. Will dc morphine and stat oxy IR prn pain. Patient is critical with high probability of clinical deterioration. About 30minutes of critical care time spent. This time includes time spent at bedside interviewing and examining patient, coordinating care, review of records, discussing with patient and  family at bedside. Due to the immediate potential for life-threatening deterioration due to tachycardia and possible sepsis , I spent 30minutes providing critical care. This time is excluding time spent performing procedures.     Electronically signed by GERDA Torres CNP on 10/11/2022 at 7:35 PM

## 2022-10-11 NOTE — CONSULTS
HEMATOLOGY ONCOLOGY  Consultation Report    REASON FOR CONSULT    Lung cancer, patient request    CHIEF COMPLAINT    Chief Complaint   Patient presents with    Arm Pain    Cold Extremity       HISTORY OF PRESENT ILLNESS   Tyra Casiano is a 47 y.o. male with past medical history significant for CAD, metastatic squamous cell cancer, metastatic prostate cancer who presents with right arm pain which is relieved by position change. He reports he feels tired and like he does when his hemoglobin is low. Review of CBC 10/11/22  WBC 7.5, Hgb 10.6, Hct 33.1, MCV 80.3, Platelets 077  CMP , K+ 4.1, BUN 17, Cr 1.4, glucose 101, ALT <5, AST 10, ALK phos 212    CT head 10/11/22  Impression   Motion artifact on the lower images. No convincing evidence for acute   intracranial hemorrhage. No mass effect, midline shift, or focal sulcal   effacement. The ventricles are not dilated. Low lying position of the right mandibular condyle at the temporomandibular   joint suggesting subluxation. Areas of increased sclerosis in the skull concerning for sclerotic metastases. CT chest 10/11/22  Impression   1. Suboptimal opacification of the pulmonary arteries. No central pulmonary   embolus. No definite lobar pulmonary embolus. Motion artifact and contrast   bolus timing limits evaluation of the segmental pulmonary arteries. 2. New trace left anterior pneumothorax measuring 4 mm. 3. Otherwise no significant change since 08/09/2022. Complete collapse of   the left upper lobe with persistent left perihilar 3.8 cm mass obscured by   the surrounding atelectasis. 4. Trace left pleural effusion   5. Mild infrahilar left lower lobe atelectasis   6. Osseous metastatic disease with diffuse sclerotic osseous metastatic   lesions. No pathologic fracture.                PAST MEDICAL HISTORY    Past Medical History:   Diagnosis Date    CAD (coronary artery disease) 12/23/2013    cath negative per pt    Cancer ([facility] [address] 06/2021    pt reports he has lung cancer    History of TMJ disorder     Hypertension     Trigeminal neuralgia        SURGICAL HISTORY    Past Surgical History:   Procedure Laterality Date    ABDOMEN SURGERY      CARDIAC CATHETERIZATION  08/03/2016    CT BIOPSY PERCUTANEOUS SUPERFICIAL BONE  12/17/2021    CT BIOPSY PERCUTANEOUS SUPERFICIAL BONE 12/17/2021 [facility] CT SCAN    CT NEEDLE BIOPSY LUNG PERCUTANEOUS  7/28/2021    CT NEEDLE BIOPSY LUNG PERCUTANEOUS 7/28/2021 [facility] CT SCAN    PORT SURGERY Right 8/13/2021    MEDIPORT INSERTION performed by Stas Weathers MD at [facility] OR       FAMILY HISTORY    Family History   Problem Relation Age of Onset    High Blood Pressure Mother     High Blood Pressure Sister     Cancer Sister        SOCIAL HISTORY    Social History     Socioeconomic History    Marital status:      Spouse name: None    Number of children: 5    Years of education: None    Highest education level: None   Tobacco Use    Smoking status: Every Day     Packs/day: 2.00     Years: 39.00     Pack years: 78.00     Types: Cigarettes    Smokeless tobacco: Never    Tobacco comments:     smokes 1-2 a day   Vaping Use    Vaping Use: Never used   Substance and Sexual Activity    Alcohol use:  Yes     Alcohol/week: 6.0 standard drinks     Types: 6 Cans of beer per week     Comment: per week (24 oz beers)     Drug use: Yes     Types: Marijuana Jaimes Fogo)     Comment: last 12/1    Sexual activity: Yes     Partners: Female       REVIEW OF SYSTEMS    Constitutional:  Denies fever, chills, loss of appetite, weight loss, tiredness, fatigue or weakness   HEENT:  Denies swelling of neck glands  Respiratory:  Denies cough, shortness of breath or hemoptysis  Cardiovascular:  Denies chest pain, palpitations or swelling   GI:  Denies abdominal pain, nausea, vomiting, constipation or diarrhea   Musculoskeletal:  Denies back pain   Skin:  Denies rash   Neurologic:  Denies headache, focal weakness or sensory changes   All systems negative for allowing me to participate in the care of this very pleasant patient. I have independently evaluated and examined this patient today. I have reviewed radiologic and biochemical tests on this patient. Management Plan is developed mutually with Kelly Boles NP.  I have reviewed above note and agree with assessment and plan    TOMI

## 2022-10-11 NOTE — ED TRIAGE NOTES
Pt arrives to ED c/o right arm pain. Pt states \"it feels like my whole right side is asleep and cold and the last time it felt like this my blood levels were low and I needed a blood transfusion because I was all out of blood. \"

## 2022-10-11 NOTE — H&P
V2.0  History and Physical      Name:  Lalita Small /Age/Sex: 1970  (47 y.o. male)   MRN & CSN:  [id number] & [id number] Encounter Date/Time: 10/11/2022 7:07 AM EDT   Location:  [facility]/1101-A PCP: Cheri Willard MD       Hospital Day: 1    Assessment and Plan:       Hospital Problems             Last Modified POA    * (Principal) Pneumothorax 10/11/2022 Yes     Right arm pain likely 2/2 bony mets  - relieved by positional change  - no associated cardiac or respiratory symptoms  - bony metastatic lesions RUE noted  - long acting pain regimen ordered    Left pneumothorax  - associated tumor and persistently collapsed SHELLEY  - 100% oxygen  - CTS consult     Metastatic lung and prostate cancers  - oncology consulted (follow with Dr. Joy Calderon)  - continue Zytiga, d/c Casodex  - prednisone for bony mets    BRENDA  - left nephrostomy tube in place  - patient uses cocaine recreationally  - 1 liter NS bolus in ED  - encourage po fluids  - nephrology consult    Leaking left nephrostomy bag  - spoke to nurse regarding replacement bag  - placed for obstructive nephropathy related to metastatic disease  - IR consult for tube exchange as it has been in place for months  - urology consulted as well to assess ongoing need for nephrostomy tube  - need to order CT-AP with contrast once BRENDA has resolved    Stage IV metastatic lung cancer  - follows with Dr. Joy Calderon  - patient requesting to be seen by her so will consult    Code status  - had discussion with patient regarding his initial desire to revoke hospice. He was confused about hospice vs code status. Will keep hospice and full code status as is. Disposition:   Current Living situation: home  Expected Disposition: home  Estimated D/C: 1-2 days    Diet ADULT DIET;  Regular   DVT Prophylaxis [x] Lovenox, []  Heparin, [] SCDs, [] Ambulation,  [] Eliquis, [] Xarelto   Code Status Full Code   Surrogate Decision Maker/ POA patient     History from:     patient    History of Present Illness:     Chief Complaint: Pneumothorax  Silvestre Steve is a 47 y.o. male with a pmh of metastatic lung cancer who presents with right arm pain and incidentally found left Pneumothorax. Patient states that the right arm pain comes on suddenly and is relieved by positional change. No associated chest pain or SOB. Imaging with bony metastastasis involving right upper extremity osseous structures. Patient recreationally uses cocaine. Denies IV drug use. Continues to smoke 1 ppd for decades. No further complaints from patient other than nephrostomy bag leaking. No dysuria. Of note patient is noncompliant and has poor follow up. Discussed code status and patient wants to remain full code. Patient is also currently hospice. Review of Systems: Need 10 Elements   Review of Systems    10 point ROS negative except as state in HPI. Objective:   No intake or output data in the 24 hours ending 10/11/22 0707   Vitals:   Vitals:    10/11/22 0502 10/11/22 0532 10/11/22 0630 10/11/22 0636   BP: 121/86 102/61 124/68    Pulse: 86 85 84 78   Resp: 20 21 18    Temp:  98 °F (36.7 °C) 98 °F (36.7 °C)    TempSrc:   Oral    SpO2: 97% 98% 100%    Weight:       Height:           Medications Prior to Admission     Prior to Admission medications    Medication Sig Start Date End Date Taking?  Authorizing Provider   predniSONE (DELTASONE) 5 MG tablet  5/26/22   Historical Provider, MD   abiraterone acetate (ZYTIGA) 250 MG tablet Take 1,000 mg by mouth daily 5/19/22   Historical Provider, MD   oxyCODONE-acetaminophen (PERCOCET) 7.5-325 MG per tablet take 1 tablet by mouth every 6 hours AS NEEDED FOR PAIN 4/15/22   Historical Provider, MD   naloxone 4 MG/0.1ML LIQD nasal spray 1 spray by Nasal route as needed for Opioid Reversal 4/12/22   [place], MD   Calcium Carbonate-Vitamin D (OYSTER SHELL CALCIUM/D) 500-200 MG-UNIT TABS Take 1 tablet by mouth daily 4/1/22 6/2/22  Debbie Phillip MD   cyanocobalamin ([employer] VITAMIN B12) 1000 MCG tablet Take 1 tablet by mouth daily 4/1/22   Santi Ford MD   nicotine (NICODERM CQ) 21 MG/24HR Place 1 patch onto the skin daily 4/1/22   Santi Ford MD   bicalutamide (CASODEX) 50 MG chemo tablet Take 1 tablet by mouth daily 3/29/22   Santi Ford MD   ondansetron (ZOFRAN) 8 MG tablet take 1 tablet by mouth every 8 hours if needed for nausea and vomiting 3/11/22   Historical Provider, MD   Sennosides (SENNA) 8.6 MG CAPS Take 1 capsule by mouth 2 times daily as needed (constipation) 3/11/22   Bridget Krishna DO   dexamethasone (DECADRON) 4 MG tablet Two tablets the day before treatment, one table daily for four days starting the day after chemotherapy 1/14/22   Santi Ford MD   NARCAN 4 MG/0.1ML LIQD nasal spray DISPENSED PER STANDING ORDER USE AS DIRECTED PATIENT IS TRAINED OPIOID OVERDOSE RESPONDER 9/8/21   Historical Provider, MD   albuterol sulfate HFA (PROVENTIL HFA) 108 (90 Base) MCG/ACT inhaler Inhale 2 puffs into the lungs every 4 hours as needed for Wheezing or Shortness of Breath With spacer (and mask if indicated). Thanks. 7/30/21 6/2/22  Carlitos Sagastume DO       Physical Exam: Need 8 Elements   Physical Exam     General: NAD  Eyes: EOMI  ENT: neck supple  Cardiovascular: Regular rate. Respiratory: Clear to auscultation  Gastrointestinal: Soft, non tender  Genitourinary: no suprapubic tenderness, left nephrostomy tube  Musculoskeletal: No edema  Skin: warm, dry  Neuro: Alert. Psych: Mood appropriate. Past Medical History:   PMHx   Past Medical History:   Diagnosis Date    CAD (coronary artery disease) 12/23/2013    cath negative per pt    Cancer ([facility] [address] 06/2021    pt reports he has lung cancer    History of TMJ disorder     Hypertension     Trigeminal neuralgia      PSHX:  has a past surgical history that includes Abdomen surgery; Cardiac catheterization (08/03/2016); CT NEEDLE BIOPSY LUNG PERCUTANEOUS (7/28/2021);  Port Surgery (Right, 8/13/2021); and CT BIOPSY SUPERFICIAL BONE PERCUTANEOUS (12/17/2021). Allergies: Allergies   Allergen Reactions    Tramadol Other (See Comments)     seizures    Vicodin [Hydrocodone-Acetaminophen] Hives     Fam HX:  family history includes Cancer in his sister; High Blood Pressure in his mother and sister. Soc HX:   Social History     Socioeconomic History    Marital status:      Spouse name: None    Number of children: 5    Years of education: None    Highest education level: None   Tobacco Use    Smoking status: Every Day     Packs/day: 2.00     Years: 39.00     Pack years: 78.00     Types: Cigarettes    Smokeless tobacco: Never    Tobacco comments:     smokes 1-2 a day   Vaping Use    Vaping Use: Never used   Substance and Sexual Activity    Alcohol use:  Yes     Alcohol/week: 6.0 standard drinks     Types: 6 Cans of beer per week     Comment: per week (24 oz beers)     Drug use: Yes     Types: Marijuana Fay Forte)     Comment: last 12/1    Sexual activity: Yes     Partners: Female       Medications:   Medications:    sodium chloride flush  5-40 mL IntraVENous 2 times per day    enoxaparin  40 mg SubCUTAneous Daily    abiraterone acetate  1,000 mg Oral Daily    bicalutamide  50 mg Oral Daily      Infusions:    sodium chloride       PRN Meds: sodium chloride flush, 5-40 mL, PRN  sodium chloride, , PRN  ondansetron, 4 mg, Q8H PRN   Or  ondansetron, 4 mg, Q6H PRN  albuterol sulfate HFA, 2 puff, Q4H PRN  senna, 1 tablet, BID PRN        Labs      CBC:   Recent Labs     10/11/22  0200   WBC 7.5   HGB 10.6*        BMP:    Recent Labs     10/11/22  0200   *   K 4.1      CO2 26   BUN 17   CREATININE 1.4*   GLUCOSE 101*     Hepatic:   Recent Labs     10/11/22  0200   AST 10*   ALT <5*   BILITOT 0.1   ALKPHOS 212*     Lipids:   Lab Results   Component Value Date/Time    CHOL 124 12/20/2013 05:22 AM    HDL 53 12/20/2013 05:22 AM    TRIG 52 12/20/2013 05:22 AM     Hemoglobin A1C: No results found for: LABA1C  TSH: No results found for: TSH  Troponin:   Lab Results   Component Value Date/Time    TROPONINT <0.010 08/09/2022 04:53 AM    TROPONINT <0.010 08/09/2022 04:53 AM    TROPONINT <0.010 07/13/2022 03:05 PM     Lactic Acid: No results for input(s): LACTA in the last 72 hours. BNP: No results for input(s): PROBNP in the last 72 hours. UA:  Lab Results   Component Value Date/Time    NITRU NEGATIVE 07/13/2022 08:41 AM    COLORU YELLOW 07/13/2022 08:41 AM    WBCUA 189 07/13/2022 08:41 AM    RBCUA 7 07/13/2022 08:41 AM    MUCUS RARE 07/13/2022 08:41 AM    TRICHOMONAS NONE SEEN 07/13/2022 08:41 AM    BACTERIA MODERATE 07/13/2022 08:41 AM    CLARITYU CLOUDY 07/13/2022 08:41 AM    SPECGRAV 1.020 07/13/2022 08:41 AM    LEUKOCYTESUR MODERATE 07/13/2022 08:41 AM    UROBILINOGEN 0.2 07/13/2022 08:41 AM    BILIRUBINUR NEGATIVE 07/13/2022 08:41 AM    BLOODU SMALL 07/13/2022 08:41 AM    KETUA NEGATIVE 07/13/2022 08:41 AM    AMORPHOUS RARE 07/21/2021 05:24 AM     Urine Cultures: No results found for: Christy Leon  Blood Cultures: No results found for: BC  No results found for: BLOODCULT2  Organism: No results found for: ORG    Imaging/Diagnostics Last 24 Hours   CT HEAD WO CONTRAST    Result Date: 10/11/2022  EXAMINATION: CT OF THE HEAD WITHOUT CONTRAST  10/11/2022 3:48 am TECHNIQUE: CT of the head was performed without the administration of intravenous contrast. Automated exposure control, iterative reconstruction, and/or weight based adjustment of the mA/kV was utilized to reduce the radiation dose to as low as reasonably achievable. COMPARISON: 03/11/2022 HISTORY: ORDERING SYSTEM PROVIDED HISTORY: numbness TECHNOLOGIST PROVIDED HISTORY: Has a \"code stroke\" or \"stroke alert\" been called? ->No Reason for exam:->numbness Decision Support Exception - unselect if not a suspected or confirmed emergency medical condition->Emergency Medical Condition (MA) Reason for Exam:  right sided Arm Pain; Cold Extremity Relevant Medical/Surgical History: hx of bone cancer FINDINGS: BRAIN/VENTRICLES: Motion artifact in the lower images but without convincing evidence for intracranial hemorrhage. There is no extra-axial fluid collection. Small calcifications are present at both globus pallidus. No mass effect, midline shift, or focal sulcal effacement is identified. Evaluation of the gray-white junction is suboptimal but without evidence of territorial infarct. The ventricles are not dilated. ORBITS: The visualized portion of the orbits demonstrate no acute abnormality. SINUSES: The visualized paranasal sinuses and mastoid air cells demonstrate no acute abnormality. SOFT TISSUES/SKULL:  Areas of increased sclerosis within the skull. Low lying position of the right mandibular condyle at the temporomandibular joint. .     Motion artifact on the lower images. No convincing evidence for acute intracranial hemorrhage. No mass effect, midline shift, or focal sulcal effacement. The ventricles are not dilated. Low lying position of the right mandibular condyle at the temporomandibular joint suggesting subluxation. Areas of increased sclerosis in the skull concerning for sclerotic metastases. CTA CHEST W CONTRAST    Result Date: 10/11/2022  EXAMINATION: CTA OF THE CHEST 10/11/2022 4:11 am TECHNIQUE: CTA of the chest was performed after the administration of intravenous contrast.  Multiplanar reformatted images are provided for review. MIP images are provided for review. Automated exposure control, iterative reconstruction, and/or weight based adjustment of the mA/kV was utilized to reduce the radiation dose to as low as reasonably achievable. COMPARISON: 02/08/2022, 08/09/2022 HISTORY: ORDERING SYSTEM PROVIDED HISTORY: hemoptysis TECHNOLOGIST PROVIDED HISTORY: Reason for exam:->hemoptysis Reason for Exam: hemoptysis Relevant Medical/Surgical History: hx of bone cancer FINDINGS: Pulmonary Arteries: Suboptimal opacification of the pulmonary arteries. No central pulmonary embolus.  No definite lobar pulmonary embolus. Motion artifact and contrast bolus timing limits evaluation of the segmental pulmonary arteries. Mediastinum: No thoracic adenopathy. Heart size is normal.  No pericardial effusion. Thoracic aorta is normal caliber. Lungs and pleura: Masslike consolidation in the perihilar region and left upper lobe corresponds to the prior CT, with suspected mass measuring up to 3.8 by 3.2 cm, previously 5.2 x 3 cm. Persistent complete collapse of the left upper lobe. Atelectasis has developed in the infrahilar left lower lobe with trace left pleural effusion. Trace left anterior pneumothorax measures up to 4 mm between the pleural margins. The right lung is clear. No central endobronchial lesion is otherwise present. Upper abdomen: Limited images of the upper abdomen demonstrate no significant abnormality. Bones and soft tissues: Diffuse bony sclerosis compatible with multifocal metastatic disease. The extent of sclerosis and metastatic lesions has significantly progressed with diffuse osseous involvement throughout the bones. No superimposed pathologic fracture is noted. 1. Suboptimal opacification of the pulmonary arteries. No central pulmonary embolus. No definite lobar pulmonary embolus. Motion artifact and contrast bolus timing limits evaluation of the segmental pulmonary arteries. 2. New trace left anterior pneumothorax measuring 4 mm. 3. Otherwise no significant change since 08/09/2022. Complete collapse of the left upper lobe with persistent left perihilar 3.8 cm mass obscured by the surrounding atelectasis. 4. Trace left pleural effusion 5. Mild infrahilar left lower lobe atelectasis 6. Osseous metastatic disease with diffuse sclerotic osseous metastatic lesions. No pathologic fracture. Personally reviewed Lab Studies, Imaging.     Electronically signed by Minerva Wasserman MD on 10/11/2022 at 7:07 AM

## 2022-10-11 NOTE — PROGRESS NOTES
4 Eyes Skin Assessment     NAME:  Rebeka Neville  YOB: 1970  MEDICAL RECORD NUMBER:  [id number]    The patient is being assess for  Admission    I agree that 2 RN's have performed a thorough Head to Toe Skin Assessment on the patient. ALL assessment sites listed below have been assessed. Areas assessed by both nurses:    Head, Face, Ears, Shoulders, Back, Chest, Arms, Elbows, Hands, Sacrum. Buttock, Coccyx, Ischium, and Legs. Feet and Heels        Does the Patient have a Wound?  No noted wound(s)       Twin Prevention initiated:  No   Wound Care Orders initiated:  No    Pressure Injury (Stage 3,4, Unstageable, DTI, NWPT, and Complex wounds) if present place referral/consult order under [de-identified] No    New and Established Ostomies if present place consult order under : No      Nurse 1 eSignature: Electronically signed by Analisa Headley LPN on 19/10/28 at 2:21 AM EDT    **SHARE this note so that the co-signing nurse is able to place an eSignature**    Nurse 2 eSignature: Electronically signed by Lino Busch RN on 10/11/22 at 8:11 AM EDT

## 2022-10-11 NOTE — PROGRESS NOTES
RRT called by previous shift RN at 1916. Pt HR maintaining 120-160, Temp 103.2, RR 19 , /75, MEWS score 5. Orders placed by hospitalist. Pt to remain on this floor at this time.

## 2022-10-12 ENCOUNTER — APPOINTMENT (OUTPATIENT)
Dept: INTERVENTIONAL RADIOLOGY/VASCULAR | Age: 52
DRG: 720 | End: 2022-10-12
Payer: MEDICAID

## 2022-10-12 LAB
ANION GAP SERPL CALCULATED.3IONS-SCNC: 12 MMOL/L (ref 4–16)
BANDED NEUTROPHILS ABSOLUTE COUNT: 2.07 K/CU MM
BANDED NEUTROPHILS RELATIVE PERCENT: 17 % (ref 5–11)
BUN BLDV-MCNC: 21 MG/DL (ref 6–23)
CALCIUM SERPL-MCNC: 8.8 MG/DL (ref 8.3–10.6)
CHLORIDE BLD-SCNC: 96 MMOL/L (ref 99–110)
CO2: 21 MMOL/L (ref 21–32)
CREAT SERPL-MCNC: 1.3 MG/DL (ref 0.9–1.3)
DIFFERENTIAL TYPE: ABNORMAL
DOHLE BODIES: PRESENT
EKG ATRIAL RATE: 127 BPM
EKG DIAGNOSIS: NORMAL
EKG P AXIS: 29 DEGREES
EKG P-R INTERVAL: 142 MS
EKG Q-T INTERVAL: 290 MS
EKG QRS DURATION: 96 MS
EKG QTC CALCULATION (BAZETT): 421 MS
EKG R AXIS: -2 DEGREES
EKG T AXIS: 38 DEGREES
EKG VENTRICULAR RATE: 127 BPM
GFR AFRICAN AMERICAN: >60 ML/MIN/1.73M2
GFR NON-AFRICAN AMERICAN: 58 ML/MIN/1.73M2
GLUCOSE BLD-MCNC: 141 MG/DL (ref 70–99)
GLUCOSE BLD-MCNC: 204 MG/DL (ref 70–99)
HCT VFR BLD CALC: 27.8 % (ref 42–52)
HEMOGLOBIN: 9 GM/DL (ref 13.5–18)
LACTATE: 1.6 MMOL/L (ref 0.4–2)
LACTATE: 2.2 MMOL/L (ref 0.4–2)
LYMPHOCYTES ABSOLUTE: 0.7 K/CU MM
LYMPHOCYTES RELATIVE PERCENT: 6 % (ref 24–44)
MCH RBC QN AUTO: 25.8 PG (ref 27–31)
MCHC RBC AUTO-ENTMCNC: 32.4 % (ref 32–36)
MCV RBC AUTO: 79.7 FL (ref 78–100)
METAMYELOCYTES ABSOLUTE COUNT: 0.12 K/CU MM
METAMYELOCYTES PERCENT: 1 %
MONOCYTES ABSOLUTE: 0.5 K/CU MM
MONOCYTES RELATIVE PERCENT: 4 % (ref 0–4)
PDW BLD-RTO: 18.6 % (ref 11.7–14.9)
PLATELET # BLD: 290 K/CU MM (ref 140–440)
PMV BLD AUTO: 8.6 FL (ref 7.5–11.1)
POTASSIUM SERPL-SCNC: 4 MMOL/L (ref 3.5–5.1)
PSA ULTRASENSITIVE: 31.78 NG/ML (ref 0–4)
RBC # BLD: 3.49 M/CU MM (ref 4.6–6.2)
RBC # BLD: ABNORMAL 10*6/UL
SEGMENTED NEUTROPHILS ABSOLUTE COUNT: 8.8 K/CU MM
SEGMENTED NEUTROPHILS RELATIVE PERCENT: 72 % (ref 36–66)
SODIUM BLD-SCNC: 129 MMOL/L (ref 135–145)
WBC # BLD: 12.2 K/CU MM (ref 4–10.5)

## 2022-10-12 PROCEDURE — 2580000003 HC RX 258: Performed by: INTERNAL MEDICINE

## 2022-10-12 PROCEDURE — 93010 ELECTROCARDIOGRAM REPORT: CPT | Performed by: INTERNAL MEDICINE

## 2022-10-12 PROCEDURE — G0378 HOSPITAL OBSERVATION PER HR: HCPCS

## 2022-10-12 PROCEDURE — APPSS45 APP SPLIT SHARED TIME 31-45 MINUTES

## 2022-10-12 PROCEDURE — 6360000002 HC RX W HCPCS: Performed by: INTERNAL MEDICINE

## 2022-10-12 PROCEDURE — 6370000000 HC RX 637 (ALT 250 FOR IP): Performed by: INTERNAL MEDICINE

## 2022-10-12 PROCEDURE — 50435 EXCHANGE NEPHROSTOMY CATH: CPT

## 2022-10-12 PROCEDURE — 80048 BASIC METABOLIC PNL TOTAL CA: CPT

## 2022-10-12 PROCEDURE — 87040 BLOOD CULTURE FOR BACTERIA: CPT

## 2022-10-12 PROCEDURE — 2500000003 HC RX 250 WO HCPCS

## 2022-10-12 PROCEDURE — 96361 HYDRATE IV INFUSION ADD-ON: CPT

## 2022-10-12 PROCEDURE — 2580000003 HC RX 258: Performed by: NURSE PRACTITIONER

## 2022-10-12 PROCEDURE — 51702 INSERT TEMP BLADDER CATH: CPT

## 2022-10-12 PROCEDURE — 87086 URINE CULTURE/COLONY COUNT: CPT

## 2022-10-12 PROCEDURE — 6370000000 HC RX 637 (ALT 250 FOR IP): Performed by: PHYSICIAN ASSISTANT

## 2022-10-12 PROCEDURE — 6360000002 HC RX W HCPCS: Performed by: NURSE PRACTITIONER

## 2022-10-12 PROCEDURE — 82962 GLUCOSE BLOOD TEST: CPT

## 2022-10-12 PROCEDURE — 2580000003 HC RX 258: Performed by: PHYSICIAN ASSISTANT

## 2022-10-12 PROCEDURE — 94150 VITAL CAPACITY TEST: CPT

## 2022-10-12 PROCEDURE — 94664 DEMO&/EVAL PT USE INHALER: CPT

## 2022-10-12 PROCEDURE — 96366 THER/PROPH/DIAG IV INF ADDON: CPT

## 2022-10-12 PROCEDURE — 2580000003 HC RX 258: Performed by: STUDENT IN AN ORGANIZED HEALTH CARE EDUCATION/TRAINING PROGRAM

## 2022-10-12 PROCEDURE — 6360000002 HC RX W HCPCS: Performed by: STUDENT IN AN ORGANIZED HEALTH CARE EDUCATION/TRAINING PROGRAM

## 2022-10-12 PROCEDURE — 94761 N-INVAS EAR/PLS OXIMETRY MLT: CPT

## 2022-10-12 PROCEDURE — 0T25X0Z CHANGE DRAINAGE DEVICE IN KIDNEY, EXTERNAL APPROACH: ICD-10-PCS | Performed by: RADIOLOGY

## 2022-10-12 PROCEDURE — 36415 COLL VENOUS BLD VENIPUNCTURE: CPT

## 2022-10-12 PROCEDURE — 6370000000 HC RX 637 (ALT 250 FOR IP): Performed by: NURSE PRACTITIONER

## 2022-10-12 PROCEDURE — 99232 SBSQ HOSP IP/OBS MODERATE 35: CPT | Performed by: INTERNAL MEDICINE

## 2022-10-12 PROCEDURE — 85027 COMPLETE CBC AUTOMATED: CPT

## 2022-10-12 PROCEDURE — 96367 TX/PROPH/DG ADDL SEQ IV INF: CPT

## 2022-10-12 PROCEDURE — 85007 BL SMEAR W/DIFF WBC COUNT: CPT

## 2022-10-12 PROCEDURE — 83605 ASSAY OF LACTIC ACID: CPT

## 2022-10-12 PROCEDURE — 6360000004 HC RX CONTRAST MEDICATION

## 2022-10-12 PROCEDURE — 6360000002 HC RX W HCPCS

## 2022-10-12 RX ORDER — TAMSULOSIN HYDROCHLORIDE 0.4 MG/1
0.4 CAPSULE ORAL DAILY
Status: DISCONTINUED | OUTPATIENT
Start: 2022-10-12 | End: 2022-10-13 | Stop reason: HOSPADM

## 2022-10-12 RX ORDER — SODIUM BICARBONATE 650 MG/1
1300 TABLET ORAL 3 TIMES DAILY
Status: DISCONTINUED | OUTPATIENT
Start: 2022-10-12 | End: 2022-10-13 | Stop reason: HOSPADM

## 2022-10-12 RX ORDER — SODIUM CHLORIDE, SODIUM LACTATE, POTASSIUM CHLORIDE, CALCIUM CHLORIDE 600; 310; 30; 20 MG/100ML; MG/100ML; MG/100ML; MG/100ML
INJECTION, SOLUTION INTRAVENOUS CONTINUOUS
Status: DISCONTINUED | OUTPATIENT
Start: 2022-10-12 | End: 2022-10-12

## 2022-10-12 RX ORDER — DEXTROSE MONOHYDRATE 100 MG/ML
INJECTION, SOLUTION INTRAVENOUS CONTINUOUS PRN
Status: DISCONTINUED | OUTPATIENT
Start: 2022-10-12 | End: 2022-10-13 | Stop reason: HOSPADM

## 2022-10-12 RX ORDER — ALPRAZOLAM 0.5 MG/1
0.5 TABLET ORAL 3 TIMES DAILY PRN
COMMUNITY

## 2022-10-12 RX ORDER — SODIUM CHLORIDE 9 MG/ML
INJECTION, SOLUTION INTRAVENOUS CONTINUOUS
Status: DISCONTINUED | OUTPATIENT
Start: 2022-10-12 | End: 2022-10-13 | Stop reason: HOSPADM

## 2022-10-12 RX ORDER — INSULIN LISPRO 100 [IU]/ML
0-4 INJECTION, SOLUTION INTRAVENOUS; SUBCUTANEOUS NIGHTLY
Status: DISCONTINUED | OUTPATIENT
Start: 2022-10-12 | End: 2022-10-13 | Stop reason: HOSPADM

## 2022-10-12 RX ORDER — INSULIN LISPRO 100 [IU]/ML
0-4 INJECTION, SOLUTION INTRAVENOUS; SUBCUTANEOUS
Status: DISCONTINUED | OUTPATIENT
Start: 2022-10-12 | End: 2022-10-13 | Stop reason: HOSPADM

## 2022-10-12 RX ORDER — ACETAMINOPHEN 500 MG
1000 TABLET ORAL EVERY 8 HOURS
Status: DISCONTINUED | OUTPATIENT
Start: 2022-10-12 | End: 2022-10-13 | Stop reason: HOSPADM

## 2022-10-12 RX ORDER — CYCLOBENZAPRINE HCL 10 MG
10 TABLET ORAL 3 TIMES DAILY PRN
COMMUNITY

## 2022-10-12 RX ORDER — DIPHENHYDRAMINE HYDROCHLORIDE, ZINC ACETATE 2; .1 G/100G; G/100G
CREAM TOPICAL 3 TIMES DAILY PRN
Status: DISCONTINUED | OUTPATIENT
Start: 2022-10-12 | End: 2022-10-13 | Stop reason: HOSPADM

## 2022-10-12 RX ORDER — OXYCODONE AND ACETAMINOPHEN 10; 325 MG/1; MG/1
1 TABLET ORAL EVERY 6 HOURS PRN
Status: ON HOLD | COMMUNITY
End: 2022-10-20 | Stop reason: HOSPADM

## 2022-10-12 RX ORDER — 0.9 % SODIUM CHLORIDE 0.9 %
500 INTRAVENOUS SOLUTION INTRAVENOUS ONCE
Status: COMPLETED | OUTPATIENT
Start: 2022-10-12 | End: 2022-10-12

## 2022-10-12 RX ADMIN — SODIUM CHLORIDE, POTASSIUM CHLORIDE, SODIUM LACTATE AND CALCIUM CHLORIDE: 600; 310; 30; 20 INJECTION, SOLUTION INTRAVENOUS at 08:49

## 2022-10-12 RX ADMIN — SODIUM CHLORIDE 500 ML: 9 INJECTION, SOLUTION INTRAVENOUS at 08:53

## 2022-10-12 RX ADMIN — SODIUM BICARBONATE 1300 MG: 650 TABLET ORAL at 21:46

## 2022-10-12 RX ADMIN — SODIUM CHLORIDE, PRESERVATIVE FREE 10 ML: 5 INJECTION INTRAVENOUS at 08:45

## 2022-10-12 RX ADMIN — OXYCODONE HYDROCHLORIDE 10 MG: 10 TABLET ORAL at 21:49

## 2022-10-12 RX ADMIN — PIPERACILLIN AND TAZOBACTAM 3375 MG: 3; .375 INJECTION, POWDER, LYOPHILIZED, FOR SOLUTION INTRAVENOUS at 04:34

## 2022-10-12 RX ADMIN — TAMSULOSIN HYDROCHLORIDE 0.4 MG: 0.4 CAPSULE ORAL at 10:29

## 2022-10-12 RX ADMIN — CEFEPIME HYDROCHLORIDE 2000 MG: 2 INJECTION, POWDER, FOR SOLUTION INTRAVENOUS at 21:46

## 2022-10-12 RX ADMIN — SODIUM BICARBONATE 1300 MG: 650 TABLET ORAL at 13:40

## 2022-10-12 RX ADMIN — SODIUM CHLORIDE: 9 INJECTION, SOLUTION INTRAVENOUS at 12:02

## 2022-10-12 RX ADMIN — PREDNISONE 5 MG: 5 TABLET ORAL at 08:45

## 2022-10-12 RX ADMIN — OXYCODONE HYDROCHLORIDE 10 MG: 10 TABLET ORAL at 13:40

## 2022-10-12 RX ADMIN — ACETAMINOPHEN 1000 MG: 500 TABLET ORAL at 18:17

## 2022-10-12 RX ADMIN — CEFEPIME HYDROCHLORIDE 2000 MG: 2 INJECTION, POWDER, FOR SOLUTION INTRAVENOUS at 09:36

## 2022-10-12 RX ADMIN — ACETAMINOPHEN 1000 MG: 500 TABLET ORAL at 10:29

## 2022-10-12 RX ADMIN — ACETAMINOPHEN 650 MG: 325 TABLET ORAL at 01:39

## 2022-10-12 ASSESSMENT — ENCOUNTER SYMPTOMS
CHEST TIGHTNESS: 0
DIARRHEA: 0
SHORTNESS OF BREATH: 0
VOMITING: 0
ABDOMINAL PAIN: 0
COUGH: 0

## 2022-10-12 ASSESSMENT — PAIN SCALES - GENERAL
PAINLEVEL_OUTOF10: 0
PAINLEVEL_OUTOF10: 8
PAINLEVEL_OUTOF10: 0
PAINLEVEL_OUTOF10: 7

## 2022-10-12 ASSESSMENT — PAIN DESCRIPTION - LOCATION: LOCATION: ARM

## 2022-10-12 ASSESSMENT — PAIN DESCRIPTION - DESCRIPTORS: DESCRIPTORS: ACHING

## 2022-10-12 ASSESSMENT — PAIN DESCRIPTION - ORIENTATION: ORIENTATION: RIGHT

## 2022-10-12 NOTE — PROGRESS NOTES
Nephrology Progress Note        [address], [address] [address]  Phone: [phone]  Office Hours: 8:30AM - 4:30PM  Monday - Friday        10/12/2022 6:55 AM  Subjective:   Admit Date: 10/11/2022  PCP: Atanacio Sandhoff, MD  Interval History:   On room air  Wife at bedside  Tachycardic     Diet: ADULT DIET; Regular      Data:   Scheduled Meds:   sodium chloride flush  5-40 mL IntraVENous 2 times per day    enoxaparin  40 mg SubCUTAneous Daily    abiraterone acetate  1,000 mg Oral Daily    predniSONE  5 mg Oral Daily    piperacillin-tazobactam  3,375 mg IntraVENous Q8H     Continuous Infusions:   sodium chloride       PRN Meds:diphenhydrAMINE-zinc acetate, sodium chloride flush, sodium chloride, ondansetron **OR** ondansetron, albuterol sulfate HFA, senna, acetaminophen, oxyCODONE  No intake/output data recorded.   I/O this shift:  In: -   Out: 1775 [Urine:1775]    Intake/Output Summary (Last 24 hours) at 10/12/2022 0655  Last data filed at 10/12/2022 0251  Gross per 24 hour   Intake --   Output 1775 ml   Net -1775 ml       CBC:   Recent Labs     10/11/22  0200   WBC 7.5   HGB 10.6*          BMP:    Recent Labs     10/11/22  0200   *   K 4.1      CO2 26   BUN 17   CREATININE 1.4*   GLUCOSE 101*     Hepatic:   Recent Labs     10/11/22  0200   AST 10*   ALT <5*   BILITOT 0.1   ALKPHOS 212*       Objective:   Vitals: /64   Pulse (!) 107   Temp 99.5 °F (37.5 °C) (Oral)   Resp 16   Ht 6' 1\" (1.854 m)   Wt 190 lb (86.2 kg)   SpO2 97%   BMI 25.07 kg/m²   General appearance: in no acute distress  HEENT: normocephalic, atraumatic,   Neck: supple, trachea midline  Lungs: breathing comfortably on room air  Heart[de-identified] tachycardic   Extremities: extremities atraumatic, no cyanosis or edema      MEDICAL DECISION MAKING     -BRENDA: cr 1.4 from normal baseline of 0.8//ddx: volume depletion vs ATN vs obstruction from prostate cancer  -Hyponatremia  -Metastatic lung cancer and recurrent pleural effusions  -Metastatic prostate cancer    Suggest:  Stop NS  Continue supportive mgmt  BMP will be helpful  Avoid nephrotoxins please    Thank you              Electronically signed by Everardo Chester DO on 10/12/2022 at 6:55 AM    [facility]  MD Wing Brayan Goyal DO [address]  [place], [place] [address]  PHONE: [phone]  FAX: [phone]

## 2022-10-12 NOTE — PROGRESS NOTES
This nurse called RRT at 1917, d/t change in vitals, -160, temp of 103.3, Mews score of 5, pt appears confused, responses are delayed. Orders placed by hospitalist, pt remaining on floor.

## 2022-10-12 NOTE — OR NURSING
INFORMED CONSENT:  Obtained prior to procedure by Dr. Rebekah Green. Consent placed in chart. ASSESSMENT: Patient alert and oriented and aware of procedure to be done. No distress noted. BARRIER PRECAUTIONS & STERILE TECHNIQUE:               Pt transferred to the table and positioned prone for comfort. Warm blankets given. Pt placed on Cardiac Monitor. Pt prepped and draped in a sterile fashion with chlorhexadine. PAIN/LOCAL ANESTHESIA/SEDATION MANAGEMENT:           Local: Lidocaine 1% given by Dr. Ruddy Baker:           ACCESS TIME: 80 glidewire placed through current 10.2 drain.           US/FLUORO: Fluoro used          WIRE USED: 80 glidewire          CATHETER USED:   10 [place]y M-Drain               CONTRAST/CC: ISOVUE 370--3cc used    STERILE DRESSINGS: biopatch and Island dressing    EBL:      Less than 1 cc    REPORT CALLED TO: Pari Carranza RN 1N

## 2022-10-12 NOTE — PROGRESS NOTES
[facility] [address]   Progress Note  [facility] 0 1 2      Date: 10/12/2022   Patient: Angle Ray   : 1970   DOA: 10/11/2022   MRN: [id number]   ROOM#: [id number]/[id number]     Admit Date: 10/11/2022     Collaborating Urologist on Call at time of admission: Dr. Vito Sicard  CC: Right arm pain  Reason for Consult: Hx of obstructive uropathy in setting of metastatic disease, please evaluate patient for ongoing need for nephrostomy tube. Subjective:     Pain: mild, no nausea and no vomiting,   Bowel Movement/Flatus:   Yes  Voiding: Voiding w dysuria. Left PCN in place, urine clear yellow    Pt resting in bed, states he is feeling well today. Had his PCN exchanged this morning with IR. Objective:    Vitals:    /64   Pulse (!) 107   Temp 99.5 °F (37.5 °C) (Oral)   Resp 16   Ht 6' 1\" (1.854 m)   Wt 190 lb (86.2 kg)   SpO2 97%   BMI 25.07 kg/m²    Temp  Av °F (38.3 °C)  Min: 99.5 °F (37.5 °C)  Max: 103.2 °F (39.6 °C)     Intake/Output Summary (Last 24 hours) at 10/12/2022 0734  Last data filed at 10/12/2022 0251  Gross per 24 hour   Intake --   Output 1775 ml   Net -1775 ml       Physical Exam:  General appearance: alert, appears stated age, cooperative, fatigued, and no distress  Head: Normocephalic, without obvious abnormality, atraumatic  Back:  No CVA tenderness.  Left PCN in place w clear yellow urine output  Abdomen:  Soft, non-tender, non-distended    Labs:   WBC:    Lab Results   Component Value Date/Time    WBC 7.5 10/11/2022 02:00 AM      Hemoglobin/Hematocrit:    Lab Results   Component Value Date/Time    HGB 10.6 10/11/2022 02:00 AM    HCT 33.1 10/11/2022 02:00 AM      BMP:   Lab Results   Component Value Date/Time     10/11/2022 02:00 AM    K 4.1 10/11/2022 02:00 AM     10/11/2022 02:00 AM    CO2 26 10/11/2022 02:00 AM    BUN 17 10/11/2022 02:00 AM    LABALBU 3.8 10/11/2022 02:00 AM    CREATININE 1.4 10/11/2022 02:00 AM    CALCIUM 10.0 10/11/2022 02:00 AM GFRAA >60 10/11/2022 02:00 AM    LABGLOM 53 10/11/2022 02:00 AM      PT/INR:    Lab Results   Component Value Date/Time    PROTIME 13.3 07/03/2022 08:41 AM    INR 1.03 07/03/2022 08:41 AM      PTT:    Lab Results   Component Value Date/Time    APTT 30.0 07/03/2022 08:41 AM     Urine Culture: pending    Imaging:   XR SHOULDER RIGHT (MIN 2 VIEWS)    Result Date: 10/2/2022  EXAMINATION: TWO XRAY VIEWS OF THE RIGHT SHOULDER 10/2/2022 2:04 am COMPARISON: None. HISTORY: ORDERING SYSTEM PROVIDED HISTORY: trauma TECHNOLOGIST PROVIDED HISTORY: Right Shoulder - XR Portable Reason for exam:->trauma Reason for Exam: right shoulder pain, trauma Additional signs and symptoms: right shoulder pain, trauma FINDINGS: Right-sided chest port is seen. Multifocal sclerotic areas are seen within the visualized osseous structures, likely related to metastatic disease. There is no acute fracture, dislocation, or bone destruction. The acromioclavicular joint is unremarkable. There is no significant soft tissue swelling. Sclerotic lesions are seen involving the right shoulder, concerning for metastatic disease. No pathologic fracture. CT HEAD WO CONTRAST    Result Date: 10/11/2022  EXAMINATION: CT OF THE HEAD WITHOUT CONTRAST  10/11/2022 3:48 am TECHNIQUE: CT of the head was performed without the administration of intravenous contrast. Automated exposure control, iterative reconstruction, and/or weight based adjustment of the mA/kV was utilized to reduce the radiation dose to as low as reasonably achievable. COMPARISON: 03/11/2022 HISTORY: ORDERING SYSTEM PROVIDED HISTORY: numbness TECHNOLOGIST PROVIDED HISTORY: Has a \"code stroke\" or \"stroke alert\" been called? ->No Reason for exam:->numbness Decision Support Exception - unselect if not a suspected or confirmed emergency medical condition->Emergency Medical Condition (MA) Reason for Exam:  right sided Arm Pain; Cold Extremity Relevant Medical/Surgical History: hx of bone cancer FINDINGS: BRAIN/VENTRICLES: Motion artifact in the lower images but without convincing evidence for intracranial hemorrhage. There is no extra-axial fluid collection. Small calcifications are present at both globus pallidus. No mass effect, midline shift, or focal sulcal effacement is identified. Evaluation of the gray-white junction is suboptimal but without evidence of territorial infarct. The ventricles are not dilated. ORBITS: The visualized portion of the orbits demonstrate no acute abnormality. SINUSES: The visualized paranasal sinuses and mastoid air cells demonstrate no acute abnormality. SOFT TISSUES/SKULL:  Areas of increased sclerosis within the skull. Low lying position of the right mandibular condyle at the temporomandibular joint. .     Motion artifact on the lower images. No convincing evidence for acute intracranial hemorrhage. No mass effect, midline shift, or focal sulcal effacement. The ventricles are not dilated. Low lying position of the right mandibular condyle at the temporomandibular joint suggesting subluxation. Areas of increased sclerosis in the skull concerning for sclerotic metastases. XR CHEST PORTABLE    Result Date: 10/11/2022  EXAMINATION: ONE XRAY VIEW OF THE CHEST 10/11/2022 8:04 pm COMPARISON: August 9, 2022 HISTORY: ORDERING SYSTEM PROVIDED HISTORY: Hypoxia, pneumothorax TECHNOLOGIST PROVIDED HISTORY: Reason for exam:->Hypoxia, pneumothorax Reason for Exam: Hypoxia, pneumothorax Additional signs and symptoms: na Relevant Medical/Surgical History: hypertension FINDINGS: The heart is of normal size. There is a right-sided chest port with its tip in the superior vena cava. There is airspace opacity in the left upper lobe, likely related to lobar atelectasis and known lung mass. There is no pleural effusion or pneumothorax. There is known diffuse osseous metastasis.      Stable chest Airspace opacity in the left upper lobe, likely related to lobar atelectasis and known lung mass. Known diffuse osseous metastasis. CTA CHEST W CONTRAST    Result Date: 10/11/2022  EXAMINATION: CTA OF THE CHEST 10/11/2022 4:11 am TECHNIQUE: CTA of the chest was performed after the administration of intravenous contrast.  Multiplanar reformatted images are provided for review. MIP images are provided for review. Automated exposure control, iterative reconstruction, and/or weight based adjustment of the mA/kV was utilized to reduce the radiation dose to as low as reasonably achievable. COMPARISON: 02/08/2022, 08/09/2022 HISTORY: ORDERING SYSTEM PROVIDED HISTORY: hemoptysis TECHNOLOGIST PROVIDED HISTORY: Reason for exam:->hemoptysis Reason for Exam: hemoptysis Relevant Medical/Surgical History: hx of bone cancer FINDINGS: Pulmonary Arteries: Suboptimal opacification of the pulmonary arteries. No central pulmonary embolus. No definite lobar pulmonary embolus. Motion artifact and contrast bolus timing limits evaluation of the segmental pulmonary arteries. Mediastinum: No thoracic adenopathy. Heart size is normal.  No pericardial effusion. Thoracic aorta is normal caliber. Lungs and pleura: Masslike consolidation in the perihilar region and left upper lobe corresponds to the prior CT, with suspected mass measuring up to 3.8 by 3.2 cm, previously 5.2 x 3 cm. Persistent complete collapse of the left upper lobe. Atelectasis has developed in the infrahilar left lower lobe with trace left pleural effusion. Trace left anterior pneumothorax measures up to 4 mm between the pleural margins. The right lung is clear. No central endobronchial lesion is otherwise present. Upper abdomen: Limited images of the upper abdomen demonstrate no significant abnormality. Bones and soft tissues: Diffuse bony sclerosis compatible with multifocal metastatic disease. The extent of sclerosis and metastatic lesions has significantly progressed with diffuse osseous involvement throughout the bones.   No superimposed pathologic fracture is noted. 1. Suboptimal opacification of the pulmonary arteries. No central pulmonary embolus. No definite lobar pulmonary embolus. Motion artifact and contrast bolus timing limits evaluation of the segmental pulmonary arteries. 2. New trace left anterior pneumothorax measuring 4 mm. 3. Otherwise no significant change since 08/09/2022. Complete collapse of the left upper lobe with persistent left perihilar 3.8 cm mass obscured by the surrounding atelectasis. 4. Trace left pleural effusion 5. Mild infrahilar left lower lobe atelectasis 6. Osseous metastatic disease with diffuse sclerotic osseous metastatic lesions. No pathologic fracture. Assessment & Plan:      Silvestre Steve is a 47 y.o. male admitted 10/11/2022 for pneumothorax. Pt known to Dr. Thompson Menjivar. 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 S/p left PCN placement at [facility] 4/25/22. Last exchanged 10/12/22 with IR. Cr 1.4 10/11, increased from 0.8 9/23/22              Obtain a PVR bladder scan to ensure proper bladder emptying. UA w mod leuks, trace blood              CT a/p w contrast once BRENDA resolved. Will follow. Patient seen and examined, chart reviewed.      Electronically signed by Emilie Kulkarni PA-C on 10/12/2022 at 7:34 AM

## 2022-10-12 NOTE — PROGRESS NOTES
Lab was called about blood cultures that have not been drawn yet. Pt is going to IR at this time so lab asked to change time for draw after he returns.

## 2022-10-12 NOTE — H&P
Cardiothoracic Surgery Note    Reason for Consult: Left-sided 4 mm pneumothorax in setting of stage IV lung cancer and metastatic prostate cancer  CT Surgeon: Dr. Eduardo Santiago    Date of Consult: 10/12/22    HISTORY OF PRESENT ILLNESS:    The patient is a 47 y.o., -American male  who presented to the hospital with acute on chronic right shoulder pain and chills over his body. During his hospitalization patient was noted to have a left-sided pneumothorax. Patient is not currently complaining of any significant shortness of breath at rest or with exertion, lightheadedness, dizziness, chest pain, lower extremity edema, fever, or chills during the interview. Patient family member was also present in the room during the examination. Discussed with patient finding of the pneumothorax in the setting of his cancer. Upon discussion patient does not desire any more surgery and is hesitant about chest tube placement at this time, especially since he is not feeling short of breath. Patient has known cancer and is being followed up with Dr. Ivory Whitaker as well as urology. Discussed in detail options for the patient, and he is agreeable at this time to wait for any sort of intervention on his lung at this time.       Past Medical History:    Past Medical History:   Diagnosis Date    CAD (coronary artery disease) 12/23/2013    cath negative per pt    Cancer ([address] 06/2021    pt reports he has lung cancer    History of TMJ disorder     Hypertension     Trigeminal neuralgia        Past Surgical History:    Past Surgical History:   Procedure Laterality Date    ABDOMEN SURGERY      CARDIAC CATHETERIZATION  08/03/2016    CT BIOPSY PERCUTANEOUS SUPERFICIAL BONE  12/17/2021    CT BIOPSY PERCUTANEOUS SUPERFICIAL BONE 12/17/2021 [facility] CT SCAN    CT NEEDLE BIOPSY LUNG PERCUTANEOUS  7/28/2021    CT NEEDLE BIOPSY LUNG PERCUTANEOUS 7/28/2021 [facility] CT SCAN    PORT SURGERY Right 8/13/2021    MEDIPORT INSERTION performed by Luretha Cogan MD Mikaela at [facility] OR       Current Medications:   Current Facility-Administered Medications   Medication Dose Route Frequency Provider Last Rate Last Admin    diphenhydrAMINE-zinc acetate cream   Topical TID PRN GERDA Abbott - CNP        0.9 % sodium chloride bolus  500 mL IntraVENous Once Octavia Bermudez PA-C 247.9 mL/hr at 10/12/22 0853 500 mL at 10/12/22 0853    lactated ringers infusion   IntraVENous Continuous Octavia Bermudez PA-C 75 mL/hr at 10/12/22 0849 New Bag at 10/12/22 0849    cefepime (MAXIPIME) 2000 mg IVPB minibag  2,000 mg IntraVENous Q12H Gael Rod MD        tamsulosin (FLOMAX) capsule 0.4 mg  0.4 mg Oral Daily Deacon Jewell PA-C        acetaminophen (TYLENOL) tablet 1,000 mg  1,000 mg Oral Q8H Octavia Bermudez PA-C        sodium chloride flush 0.9 % injection 5-40 mL  5-40 mL IntraVENous 2 times per day Beverly Lopez MD   10 mL at 10/12/22 0845    sodium chloride flush 0.9 % injection 5-40 mL  5-40 mL IntraVENous PRN Beverly Lopez MD        0.9 % sodium chloride infusion   IntraVENous PRN Beverly Lopez MD        enoxaparin (LOVENOX) injection 40 mg  40 mg SubCUTAneous Daily Beverly Lopez MD        ondansetron (ZOFRAN-ODT) disintegrating tablet 4 mg  4 mg Oral Q8H PRN Beverly Lopez MD        Or    ondansetron (ZOFRAN) injection 4 mg  4 mg IntraVENous Q6H PRN Beverly Lopez MD        abiraterone acetate (ZYTIGA) 250 MG tablet TABS 1,000 mg  ++NON FORMULARY++ (Patient Supplied)  1,000 mg Oral Daily Beverly Lopez MD        albuterol sulfate HFA (PROVENTIL;VENTOLIN;PROAIR) 108 (90 Base) MCG/ACT inhaler 2 puff  2 puff Inhalation Q4H PRN Beverly Lopez MD        senna (SENOKOT) tablet 8.6 mg  1 tablet Oral BID PRN Beverly Lopez MD        predniSONE (DELTASONE) tablet 5 mg  5 mg Oral Daily Snow Ames MD   5 mg at 10/12/22 0845    acetaminophen (TYLENOL) tablet 650 mg  650 mg Oral Q4H PRN GERDA Robert - CNP   650 mg at 10/12/22 0139    oxyCODONE HCl (OXY-IR) immediate release tablet 10 mg  10 mg Oral Q4H PRN Yisel Valentino APRN - CNP             Allergies: Allergies   Allergen Reactions    Tramadol Other (See Comments)     seizures    Morphine Hallucinations    Vicodin [Hydrocodone-Acetaminophen] Hives         Social History:   Social History     Socioeconomic History    Marital status:      Spouse name: None    Number of children: 5    Years of education: None    Highest education level: None   Tobacco Use    Smoking status: Every Day     Packs/day: 2.00     Years: 39.00     Pack years: 78.00     Types: Cigarettes    Smokeless tobacco: Never    Tobacco comments:     smokes 1-2 a day   Vaping Use    Vaping Use: Never used   Substance and Sexual Activity    Alcohol use: Yes     Alcohol/week: 6.0 standard drinks     Types: 6 Cans of beer per week     Comment: per week (24 oz beers)     Drug use: Yes     Types: Marijuana [employer])     Comment: last 12/1    Sexual activity: Yes     Partners: Female         Family History:        Problem Relation Age of Onset    High Blood Pressure Mother     High Blood Pressure Sister     Cancer Sister        Review of Systems   Constitutional:  Negative for chills, diaphoresis and fever. HENT: Negative. Eyes:  Negative for visual disturbance. Respiratory:  Negative for cough, chest tightness and shortness of breath. Cardiovascular:  Negative for chest pain, palpitations and leg swelling. Gastrointestinal:  Negative for abdominal pain, diarrhea and vomiting. Endocrine: Negative for cold intolerance and heat intolerance. Musculoskeletal:         Patient having significant right arm/shoulder pain secondary to bone metastasis   Skin:  Negative for pallor and rash. Neurological:  Negative for dizziness, syncope, light-headedness (None at this time but does have occasional lightheadedness with standing) and headaches. Hematological:  Does not bruise/bleed easily.    Psychiatric/Behavioral:  Negative for dysphoric mood. The patient is not nervous/anxious. Physical Exam  Vitals:    10/12/22 0827   BP: (!) 95/55   Pulse: (!) 118   Resp: 21   Temp: 99.5 °F (37.5 °C)   SpO2: 97%       Physical Exam  Constitutional:       General: He is not in acute distress. Appearance: He is not diaphoretic. HENT:      Head: Normocephalic and atraumatic. Right Ear: External ear normal.      Left Ear: External ear normal.      Nose: Nose normal.      Mouth/Throat:      Mouth: Mucous membranes are moist.   Eyes:      Extraocular Movements: Extraocular movements intact. Comments: Pupils equal and round   Neck:      Vascular: No carotid bruit. Cardiovascular:      Rate and Rhythm: Normal rate and regular rhythm. Pulses: Normal pulses. Heart sounds: S1 normal and S2 normal. No murmur heard. No friction rub. No gallop. Pulmonary:      Effort: Pulmonary effort is normal. No respiratory distress. Breath sounds: Rhonchi (Left-sided) present. Comments: Mildly decreased breath sounds left side  Chest:      Chest wall: No tenderness. Abdominal:      Palpations: Abdomen is soft. Tenderness: There is no abdominal tenderness. Musculoskeletal:      Right lower leg: No edema. Left lower leg: No edema. Skin:     General: Skin is warm and dry. Capillary Refill: Capillary refill takes less than 2 seconds. Findings: No rash. Comments: Skin turgor brisk   Neurological:      Mental Status: He is alert and oriented to person, place, and time. Mental status is at baseline. Psychiatric:         Behavior: Behavior is cooperative. Thought Content: Thought content normal.         Judgment: Judgment normal.            Images  CXR: 10/11/2022  Reviewed by me, no significant change from previous studies. No obvious pneumothorax noted on CXR    CTA chest with contrast: 10/11/2022    1. Suboptimal opacification of the pulmonary arteries. No central pulmonary   embolus.  No definite lobar pulmonary embolus. Motion artifact and contrast   bolus timing limits evaluation of the segmental pulmonary arteries. 2. New trace left anterior pneumothorax measuring 4 mm. 3. Otherwise no significant change since 08/09/2022. Complete collapse of   the left upper lobe with persistent left perihilar 3.8 cm mass obscured by   the surrounding atelectasis. 4. Trace left pleural effusion   5. Mild infrahilar left lower lobe atelectasis   6. Osseous metastatic disease with diffuse sclerotic osseous metastatic   lesions. No pathologic fracture. CT head without contrast: 10/11/2022    Motion artifact on the lower images. No convincing evidence for acute   intracranial hemorrhage. No mass effect, midline shift, or focal sulcal   effacement. The ventricles are not dilated. Low lying position of the right mandibular condyle at the temporomandibular   joint suggesting subluxation.        Areas of increased sclerosis in the skull concerning for sclerotic metastases       Labs  BMP:   Lab Results   Component Value Date/Time     10/11/2022 02:00 AM    K 4.1 10/11/2022 02:00 AM     10/11/2022 02:00 AM    CO2 26 10/11/2022 02:00 AM    BUN 17 10/11/2022 02:00 AM    CREATININE 1.4 10/11/2022 02:00 AM      No results found for: GLU  Lab Results   Component Value Date/Time    MG 1.8 04/10/2022 03:45 AM      CBC:   Lab Results   Component Value Date/Time    WBC 12.2 10/12/2022 10:00 AM    HGB 9.0 10/12/2022 10:00 AM    HCT 27.8 10/12/2022 10:00 AM    MCV 79.7 10/12/2022 10:00 AM     10/12/2022 10:00 AM      Coagulation:   Lab Results   Component Value Date/Time    INR 1.03 07/03/2022 08:41 AM    APTT 30.0 07/03/2022 08:41 AM     Cardiac markers:   Lab Results   Component Value Date/Time    CKMB 0.7 12/29/2012 02:36 PM    CKTOTAL 69 07/13/2022 06:44 AM    TROPONINI 0.008 04/26/2014 08:02 AM     No results found for: LABA1C No results found for: ALB    Lab Results   Component Value Date/Time BILITOT 0.1 10/11/2022 02:00 AM    AST 10 10/11/2022 02:00 AM    ALT <5 10/11/2022 02:00 AM    ALKPHOS 212 10/11/2022 02:00 AM      Blood Gas:  Lab Results   Component Value Date/Time    MELIDA 3.2 12/14/2018 03:30 PM     LIVER PROFILE:   Recent Labs     10/11/22  0200   AST 10*   ALT <5*   BILITOT 0.1   ALKPHOS 212*     PT/INR:   No results for input(s): PROTIME, INR in the last 72 hours. APTT:   No results for input(s): APTT in the last 72 hours. BNP:    No results for input(s): BNP in the last 72 hours. Assessment and Plan:    Left-sided pneumothorax  Stage IV metastatic lung cancer  (bone involvement)  Metastatic prostate cancer  Persistently collapsed left upper lobe  -Patient not experiencing any shortness of breath or chest pain at this time.  -No need for chest tube placement at this time.  -Oncology and urology on board      RECOMMENDATIONS:    We will sign off, please reconsult with any questions. Patient seen and examined with Dr. Candy Houser.     Mai Lyons PA-C 10/12/22 10:15 AM

## 2022-10-12 NOTE — PROGRESS NOTES
Comprehensive Nutrition Assessment    Type and Reason for Visit:  Initial, Positive Nutrition Screen (weight loss, poor intake)    Nutrition Recommendations/Plan:   Continue regular diet with fluid restriction as needed   May offer oral nutrition supplement during stay as patient will accept  Will continue to follow up during stay      Malnutrition Assessment:  Malnutrition Status:  Insufficient data (10/12/22 8096)    Context:  Chronic Illness       Nutrition Assessment:    Admit with pneumothorac, nephrostomy tube exchange, hx metastatic lung cancer on hospice at home. NPO this morning, now on regular diet with 2000 ml fluid restriction. Limited po data at this time. Will follow at moderate nutrition risk at this time. Nutrition Related Findings:    asleep in bed on visits, meds noted steroid Wound Type: None       Current Nutrition Intake & Therapies:    Average Meal Intake: Unable to assess  Average Supplements Intake: None Ordered  ADULT DIET; Regular; 2000 ml    Anthropometric Measures:  Height: 6' 1\" (185.4 cm)  Ideal Body Weight (IBW): 184 lbs (84 kg)       Current Body Weight: 190 lb 0.6 oz (86.2 kg), 103.3 % IBW. Weight Source: Stated  Current BMI (kg/m2): 25.1  Usual Body Weight: 205 lb (93 kg) (per hx 2021)  % Weight Change (Calculated): -7.3  Weight Adjustment For: No Adjustment                 BMI Categories: Overweight (BMI 25.0-29. 9)    Estimated Daily Nutrient Needs:  Energy Requirements Based On: Kcal/kg  Weight Used for Energy Requirements: Current  Energy (kcal/day): 7875-6628 (25-30 joseph/kg)  Weight Used for Protein Requirements: Current  Protein (g/day):  (1-1.2 g/kg)     Fluid (ml/day): per order, 2000 ml fluid    Nutrition Diagnosis:   Predicted inadequate energy intake related to pain, other (comment) (reduced appetite in illness) as evidenced by poor intake prior to admission    Nutrition Interventions:   Food and/or Nutrient Delivery: Continue Current Diet, Start Oral Nutrition Supplement  Nutrition Education/Counseling: No recommendation at this time  Coordination of Nutrition Care: Continue to monitor while inpatient       Goals:     Goals: PO intake 75% or greater, by next RD assessment       Nutrition Monitoring and Evaluation:   Behavioral-Environmental Outcomes: None Identified  Food/Nutrient Intake Outcomes: Food and Nutrient Intake  Physical Signs/Symptoms Outcomes: Biochemical Data, Meal Time Behavior, Skin, Nutrition Focused Physical Findings, Weight    Discharge Planning:    Continue current diet     Ivan Sheppard RD, LD  Contact: [phone]

## 2022-10-12 NOTE — CARE COORDINATION
.Chart reviewed. The patient is from home with wife and plans to return. The patient has a PCP and insurance and can afford and take his medications as prescribed. The patient has reliable transportation-his wife drives. The patient states is independent in ADL's. He states he is active with hospice. CM called his wife Kailey Barragan and she states she just wants the patient to come home. She is taking care of hospice and letting know he is here and states she does not need to let this CM know the company name. Larry updated Ming Santiago that if the patient is active with hospice and gets admitted, there can be a potential interruption in care. She voices understanding and states she has addressed that and states as long as he is in comes in for palliative care then there will not be an interruption in care. She does not need anything at this time and The patient denies any other needs at this time.

## 2022-10-12 NOTE — PROGRESS NOTES
Pt is very lethargic, responses are correct but delayed. Speech is slightly more slurred than baseline. Hospitalist notified.

## 2022-10-12 NOTE — PROGRESS NOTES
ONCOLOGY HEMATOLOGY CARE ([facility])  PROGRESS NOTE      10/12/2022  8:40 AM    Patient:    Song Bergman  : 1970   47 y.o. MRN: [id number]  Admitted: 10/11/2022  1:41 AM ATT: Arnulfo Garcia MD   1101/1101-A  AdmitDx: Paresthesia [R20.2]  Pneumothorax [J93.9]  Acute renal insufficiency [N28.9]  Secondary spontaneous pneumothorax [J93.12]  PCP: John Santiago MD    Late entry  Patient was seen and examined today. Nephrostomy exchanged  Pain fairly well controlled. No abdominal pain  No nausea or any emesis  No fever, bleeding. Pain in the right arm    Review of CBC 10/11/22  WBC 7.5, Hgb 10.6, Hct 33.1, MCV 80.3, Platelets 424  CMP , K+ 4.1, BUN 17, Cr 1.4, glucose 101, ALT <5, AST 10, ALK phos 212     CT head 10/11/22  Impression   Motion artifact on the lower images. No convincing evidence for acute   intracranial hemorrhage. No mass effect, midline shift, or focal sulcal   effacement. The ventricles are not dilated. Low lying position of the right mandibular condyle at the temporomandibular   joint suggesting subluxation. Areas of increased sclerosis in the skull concerning for sclerotic metastases. CT chest 10/11/22  Impression   1. Suboptimal opacification of the pulmonary arteries. No central pulmonary   embolus. No definite lobar pulmonary embolus. Motion artifact and contrast   bolus timing limits evaluation of the segmental pulmonary arteries. 2. New trace left anterior pneumothorax measuring 4 mm. 3. Otherwise no significant change since 2022. Complete collapse of   the left upper lobe with persistent left perihilar 3.8 cm mass obscured by   the surrounding atelectasis. 4. Trace left pleural effusion   5. Mild infrahilar left lower lobe atelectasis   6. Osseous metastatic disease with diffuse sclerotic osseous metastatic   lesions. No pathologic fracture.            10/11/22; CXR:  Stable chest       Airspace opacity in the left upper lobe, likely related to lobar atelectasis   and known lung mass. Known diffuse osseous metastasis. 10/12/22: Left PCN exchanged. No evidence of hydronephrosis    10/12/2022 CBC with WBC of 12.2 hemoglobin of 9 hematocrit 27.8 MCV of 79.7 platelets of 835, no nucleated RBCs. BMP with sodium of 129 potassium of 4 BUN of 21 creatinine 1.3  PSA 31.78    XR right humerus pending. Background history:  -----------------------------  H/O noncompliance. Left hilar mass with mediastinal hilar lymphadenopathy. 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 PET scan results from august 2021 noted, bone lesions most probably not metastatic except for one lytic lesion. MRI of the brain with no convincing metastatic lesions. Presented  the case in the tumor board. Decision made to proceed with a prostate biopsy and treat with ADT if malignancy  and in the meantime proceed with staging mediastinoscopy/rebiopsy for further treatment plan. But as pt very very very noncompliant, did not follow up with urology, CTS or for bone biopsy multiple times. Note PSA rising and was 250 on November 16 2021   CT imaging dec 2021 with progressive met disease  Bone biopsy on dec 13 2021 with met squamous cell cancer, consistent with lung primary. PDL1 >50%Renea Shmuel) . Not enough tissue for rest of CARIS, guardant 360 with no other actionable mutation  CT chest jan 2022 with no significant change   Completed Palliative radiation to the pelvic area and also radiation to the left lung dia 10 2022  Discussed the findings, diagnosis and poor prognosis and treatment with carbo/taxol/pembro after completion of radiation. Discussed ae. PORT placed.   Completed OCM  C1D1 carbo/taxol and keytruda started 1/20/22, unfortunately received only one treatment so far sec to being very very noncompliant  CT after C1 with positive response And then treatment held sec to cytopenias/noncompliance  and also admission to [facility]. PET in may 2022 with continued response. Resumed Keytruda alone June 7 2022 and   Plan was  for repeat PET after 3-4C. But then they discussed and established care with hospice  and did not follow  with us after June 2022 until sep 2022. Met prostate cancer: PSA was ~900, started on casodex and received GnRH analogue in feb/march . But BMB biopsy in April 2022 consistent with involvement by prostate cancer. May 2022 PSA was 256. Was Started on Zytiga and prednisone in may 2022 . Was on  lupron. But then looks like he discontinued Zytiga in June 2022    Status post left percutaneous nephrostomy tube placement at [facility]      Constitutional:  Denies fever, chills  HEENT:  Denies swelling of neck glands  Respiratory:  Denies cough, shortness of breath or hemoptysis  Cardiovascular:  Denies chest pain, palpitations or swelling   GI:  Denies abdominal pain, nausea, vomiting, constipation or diarrhea   Musculoskeletal: back pain   Skin:  Denies rash   Neurologic:  Denies headache, focal weakness or sensory changes   PSYCHIATRIC:  Neg depression, personality changes, anxiety.   ALL/IMM:  Neg reactions to drugs other than listed    All systems negative except  for symptoms of HPI and as marked as above    PHYSICAL EXAM :    Vitals: BP (!) 95/55   Pulse (!) 118   Temp 99.5 °F (37.5 °C) (Oral)   Resp 21   Ht 6' 1\" (1.854 m)   Wt 190 lb (86.2 kg)   SpO2 97%   BMI 25.07 kg/m²        CONSTITUTIONAL: Alert and awake  LUNGS: Clear to auscultation bilaterally  CARDIOVASCULAR: s1s2 rrr no murmurs, tachycardia  ABDOMEN: soft ntnd bs pos, left nephrostomy tube in place  NEUROLOGIC: Grossly intact      LABORATORY RESULTS  CBC:   Recent Labs     10/11/22  0200   WBC 7.5   HGB 10.6*        BMP:    Recent Labs     10/11/22  0200   *   K 4.1      CO2 26   BUN 17   CREATININE 1.4*   GLUCOSE 101* Hepatic:   Recent Labs     10/11/22  0200   AST 10*   ALT <5*   BILITOT 0.1   ALKPHOS 212*     INR: No results for input(s): INR in the last 72 hours. 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 History as above received carbo/taxol/keytruda x 1C and then single agent keytruda x 1C. Stable dissease. As above mentioned, he has been very very noncompliant. Will like to resumes Martha Melter as OP as note limited caris with PDL1 >50% for [place] Discussed the findings with him and his daughter and will discuss again    Met Prostate cancer with Bone marrow involvement. Was started on casodex followed by [facility] and then was placed on Zytiga. He then was under hospice care and discontinued all systemic treatments. PSA now ~30  Abiraterone/prednisone resumes. Awaiting CT abdomen and pelvis once renal function normalizes    Left PCN exchanged. Agree , evaluate for any urinary retention. Adequate hydration. Appreciate nephrology input. RUE pain: Most probably sec to met disease. Adequate analgesic regimen. XR pending. May consider palliative radiation as OP if pain uncontrolled with analgesic regimen. Adequate bowel regimen. Anemia; Most probably sec to BM involvement,met disease. Consider SHABBIR    Leukocytosis sec to infection/reactive. Team following. Continue other medical care. This plan was discussed with the patient and not sure whether he comprehends. We will continue to follow the patient. Thank you for allowing us to participate in the care of this patient.      [address]

## 2022-10-12 NOTE — PLAN OF CARE
Problem: Discharge Planning  Goal: Discharge to home or other facility with appropriate resources  10/12/2022 0105 by Saskia Oquendo RN  Outcome: Progressing  10/11/2022 1610 by Wenceslao Garcia RN  Outcome: Progressing     Problem: Pain  Goal: Verbalizes/displays adequate comfort level or baseline comfort level  10/12/2022 0105 by Saskia Oquendo RN  Outcome: Progressing  10/11/2022 1610 by Wenceslao Garcia RN  Outcome: Progressing     Problem: ABCDS Injury Assessment  Goal: Absence of physical injury  Outcome: Progressing     Problem: Safety - Adult  Goal: Free from fall injury  Outcome: Progressing

## 2022-10-12 NOTE — PROGRESS NOTES
V2.0  [facility]      Name:  Maribel Dewitt /Age/Sex: 1970  (47 y.o. male)   MRN & CSN:  [id number] & [id number] Encounter Date/Time: 10/12/2022 12:45 PM EDT    [facility]: Tena [facility] MD       Hospital Day: 2    Assessment and Plan:   Maribel Dewitt is a 47 y.o. male with pmh of metastatic squamous cell carcinoma, metastatic prostate cancer, malignant neoplasm of left lung , bone metastasis, anemia, CAD, cardiomyopathy, history of cocaine use who presents with right arm pain, found to have Pneumothorax      Plan:    Sepsis, possibly urosepsis in the setting of metastatic disease  Febrile, temp max 103F, tachycardia, leukocytosis  No CVA tenderness, unable to obtain CT abdomen and pelvis due  BRENDA  Hx of left-sided hydronephrosis s/t UVJ mass s/p nephrostomy tube placement at [facility] in 2022  Nephrostomy tube exchanged today 10/12/2022 with IR  UA-reviewed-moderate leukocyte esterase  Urine culture pending  Was given Zosyn x3 doses on 10/11/2022, will started cefepime today  Adequate fluid hydration-NS at 75 cc/h  F/u blood culture (drawn at 10 AM this morning), lactic acid  Scheduled Tylenol for fever    Spontaneous small left-sided pneumothorax  In the setting of malignant neoplasm of left lung, possible ruptured bleb vs other  No signs of tension pneumo, hemodynamically stable  Not requiring any oxygen support, mild tachycardia most likely secondary to fever  CTA chest with contrast-no E/O of pulmonary embolus, trace left anterior pneumothorax 4 mm, complete collapse of left upper lobe with persistent left perihilar 3.8 cm mass along with surrounding atelectasis  Encourage incentive spirometry, respiratory treatment as needed  Evaluated by CTS-discussed about possible chest tube placement in case of worsening SOB/chest pain/hypotension tachycardia--family and patient did not want any surgical intervention  CTS signed off-conservative management, monitor      BRENDA possibly secondary to dehydration in the setting of metastatic disease causing obstructive uropathy  Baseline creatinine (0.7-0.8)-- 1.4-->1.3 this admission  Hypovolemic hyponatremia and hypochloremia  Continue fluid hydration  Avoid nephrotoxic agents  Oral sodium bicarb 1300 mg 3 times daily  Nephrology following    Acute on chronic right arm pain-POA  CTA chest showed osseous metastatic disease with diffuse sclerotic metastatic lesions-no E/O of pathological fractures  X-ray-right humerus-pending  Reviewed right shoulder x-ray on 10/2-sclerotic lesions involving right shoulder concerning for metastatic disease  Pain could be possibly from metastatic disease  Pain management, conservative management      Chronic conditions:  Metastatic prostate cancer-Dr. Brian Lopez following, continue Zytiga, obtain CT abdomen and pelvis once BRENDA resolves, f/u PSA  Anemia  CAD        Diet ADULT DIET; Regular; 2000 ml   DVT Prophylaxis [x] Lovenox, []  Heparin, [] SCDs, [] Ambulation,  [] Eliquis, [] Xarelto  [] Coumadin   Code Status Full Code   Disposition From: HOME  Expected Disposition: Home with home hospice  Estimated Date of Discharge: 10/13/22  Patient requires continued admission due to persistent symptoms   Surrogate Decision Maker/ POA Wife Vivian Byrd     Subjective:     Chief Complaint: Arm Pain and Cold Extremity       Candy Gordon is a 47 y.o. male who presents with right arm pain found to have left sided small pneumothorax  Exchanged a left nephrostomy tube today by IR  Started on cefepime for possible sepsis  Follow-up lactic acid, blood cultures           Review of Systems:    Review of Systems  Patient is a 48-year-old male, complains of right arm pain which has been a chronic issue, denies any chest pain, palpitations, SOB, nausea/vomiting/abdominal pain, no CVA tenderness, denies dysuria, frequency or urgency      Objective:      Intake/Output Summary (Last 24 hours) at 10/12/2022 8506  Last data filed at 10/12/2022 1206  Gross per 24 hour   Intake --   Output 2275 ml   Net -2275 ml        Vitals:   Vitals:    10/12/22 1230   BP: 112/70   Pulse: (!) 107   Resp:    Temp: 98.8 °F (37.1 °C)   SpO2:        Physical Exam:     General: Patient is a 44-year-old male, NAD  Eyes: EOMI  ENT: neck supple  Cardiovascular: Tachycardia   respiratory: Clear to auscultation  Gastrointestinal: Soft, non tender  Genitourinary: no suprapubic tenderness, no CVA tenderness  Musculoskeletal: Right shoulder pain +, no edema  Skin: warm, dry  Neuro: Alert, no lateralizing weakness  Psych: Mood appropriate.      Medications:   Medications:    cefepime  2,000 mg IntraVENous Q12H    tamsulosin  0.4 mg Oral Daily    acetaminophen  1,000 mg Oral Q8H    sodium bicarbonate  1,300 mg Oral TID    sodium chloride flush  5-40 mL IntraVENous 2 times per day    enoxaparin  40 mg SubCUTAneous Daily    abiraterone acetate  1,000 mg Oral Daily    predniSONE  5 mg Oral Daily      Infusions:    sodium chloride 75 mL/hr at 10/12/22 1202    sodium chloride       PRN Meds: diphenhydrAMINE-zinc acetate, , TID PRN  sodium chloride flush, 5-40 mL, PRN  sodium chloride, , PRN  ondansetron, 4 mg, Q8H PRN   Or  ondansetron, 4 mg, Q6H PRN  albuterol sulfate HFA, 2 puff, Q4H PRN  senna, 1 tablet, BID PRN  acetaminophen, 650 mg, Q4H PRN  oxyCODONE, 10 mg, Q4H PRN        Labs      Recent Results (from the past 24 hour(s))   POCT Glucose    Collection Time: 10/11/22  7:17 PM   Result Value Ref Range    POC Glucose 140 (H) 70 - 99 MG/DL   EKG 12 Lead    Collection Time: 10/11/22  7:22 PM   Result Value Ref Range    Ventricular Rate 127 BPM    Atrial Rate 127 BPM    P-R Interval 142 ms    QRS Duration 96 ms    Q-T Interval 290 ms    QTc Calculation (Bazett) 421 ms    P Axis 29 degrees    R Axis -2 degrees    T Axis 38 degrees    Diagnosis       Sinus tachycardia  Incomplete right bundle branch block  Voltage criteria for left ventricular hypertrophy  Cannot rule out Septal infarct , age undetermined  Abnormal ECG  When compared with ECG of 02-OCT-2022 01:41,  Vent.  rate has increased BY  45 BPM  Incomplete right bundle branch block is now present     Urinalysis with Reflex to Culture    Collection Time: 10/11/22 11:05 PM    Specimen: Urine   Result Value Ref Range    Color, UA YELLOW YELLOW    Clarity, UA CLEAR CLEAR    Glucose, Urine NEGATIVE NEGATIVE MG/DL    Bilirubin Urine NEGATIVE NEGATIVE MG/DL    Ketones, Urine NEGATIVE NEGATIVE MG/DL    Specific Gravity, UA 1.010 1.001 - 1.035    Blood, Urine TRACE (A) NEGATIVE    pH, Urine 7.0 5.0 - 8.0    Protein, UA TRACE (A) NEGATIVE MG/DL    Urobilinogen, Urine 0.2 0.2 - 1.0 MG/DL    Nitrite Urine, Quantitative NEGATIVE NEGATIVE    Leukocyte Esterase, Urine MODERATE (A) NEGATIVE    RBC, UA 9 (H) 0 - 3 /HPF    WBC, UA <1 0 - 2 /HPF    Bacteria, UA NEGATIVE NEGATIVE /HPF    Squam Epithel, UA 1 /HPF    Mucus, UA RARE (A) NEGATIVE HPF    Trichomonas, UA NONE SEEN NONE SEEN /HPF    non squam epi cells <1 /HPF   CBC with Auto Differential    Collection Time: 10/12/22 10:00 AM   Result Value Ref Range    WBC 12.2 (H) 4.0 - 10.5 K/CU MM    RBC 3.49 (L) 4.6 - 6.2 M/CU MM    Hemoglobin 9.0 (L) 13.5 - 18.0 GM/DL    Hematocrit 27.8 (L) 42 - 52 %    MCV 79.7 78 - 100 FL    MCH 25.8 (L) 27 - 31 PG    MCHC 32.4 32.0 - 36.0 %    RDW 18.6 (H) 11.7 - 14.9 %    Platelets 860 373 - 949 K/CU MM    MPV 8.6 7.5 - 11.1 FL    Metamyelocytes Relative 1 (H) 0.0 %    Bands Relative 17 (H) 5 - 11 %    Segs Relative 72.0 (H) 36 - 66 %    Lymphocytes % 6.0 (L) 24 - 44 %    Monocytes % 4.0 0 - 4 %    Metamyelocytes Absolute 0.12 K/CU MM    Bands Absolute 2.07 K/CU MM    Segs Absolute 8.8 K/CU MM    Lymphocytes Absolute 0.7 K/CU MM    Monocytes Absolute 0.5 K/CU MM    Differential Type MANUAL DIFFERENTIAL     RBC Morphology OCCASIONAL     Dohle Bodies PRESENT    Basic Metabolic Panel    Collection Time: 10/12/22 10:00 AM   Result Value Ref Range    Sodium temporomandibular joint. .     Motion artifact on the lower images. No convincing evidence for acute intracranial hemorrhage. No mass effect, midline shift, or focal sulcal effacement. The ventricles are not dilated. Low lying position of the right mandibular condyle at the temporomandibular joint suggesting subluxation. Areas of increased sclerosis in the skull concerning for sclerotic metastases. XR CHEST PORTABLE    Result Date: 10/11/2022  EXAMINATION: ONE XRAY VIEW OF THE CHEST 10/11/2022 8:04 pm COMPARISON: August 9, 2022 HISTORY: ORDERING SYSTEM PROVIDED HISTORY: Hypoxia, pneumothorax TECHNOLOGIST PROVIDED HISTORY: Reason for exam:->Hypoxia, pneumothorax Reason for Exam: Hypoxia, pneumothorax Additional signs and symptoms: na Relevant Medical/Surgical History: hypertension FINDINGS: The heart is of normal size. There is a right-sided chest port with its tip in the superior vena cava. There is airspace opacity in the left upper lobe, likely related to lobar atelectasis and known lung mass. There is no pleural effusion or pneumothorax. There is known diffuse osseous metastasis. Stable chest Airspace opacity in the left upper lobe, likely related to lobar atelectasis and known lung mass. Known diffuse osseous metastasis. CTA CHEST W CONTRAST    Result Date: 10/11/2022  EXAMINATION: CTA OF THE CHEST 10/11/2022 4:11 am TECHNIQUE: CTA of the chest was performed after the administration of intravenous contrast.  Multiplanar reformatted images are provided for review. MIP images are provided for review. Automated exposure control, iterative reconstruction, and/or weight based adjustment of the mA/kV was utilized to reduce the radiation dose to as low as reasonably achievable.  COMPARISON: 02/08/2022, 08/09/2022 HISTORY: ORDERING SYSTEM PROVIDED HISTORY: hemoptysis TECHNOLOGIST PROVIDED HISTORY: Reason for exam:->hemoptysis Reason for Exam: hemoptysis Relevant Medical/Surgical History: hx of bone 10/12/2022  PROCEDURE: IR NEPHROSTOMY TUBE CHANGE 10/12/2022 HISTORY: ORDERING SYSTEM PROVIDED HISTORY: neph exchange TECHNOLOGIST PROVIDED HISTORY: Reason for exam:->neph exchange TECHNIQUE: Maximum sterile barrier technique including hand hygiene, skin prep and sterile ultrasound technique utilized for procedure. Following informed consent, pause a confirm/time-out patient is placed in prone position on fluoroscopic table. Previously placed right nephrostomy catheter and entry site were prepped and draped in sterile fashion. Antegrade nephrostogram was performed. Guidewires advanced over which previously placed malfunctioning nephrostomy tube was exchanged for new 10.2 Bertha Party external drainage catheter/nephrostomy tube. Catheter tip was curled in right renal pelvis and fixed to the patient's skin. Dressing applied. External drainage bag applied. Patient tolerated procedure well. CONTRAST: 3 cc Isovue 370 SEDATION: None FLUOROSCOPY DOSE AND TYPE OR TIME AND EXPOSURES: 0.6 minutes fluoroscopy time AK: 38 mGy DESCRIPTION OF PROCEDURE: Informed consent was obtained after a detailed explanation of the procedure including risks, benefits, and alternatives. Universal protocol was observed. Sterile gowns, masks, hats and gloves utilized for maximal sterile barrier. As above in technique section FINDINGS: Antegrade nephrostogram: Previously placed left nephrostomy catheter projects in satisfactory position. Contrast opacifies the decompressed left renal pelvis and visualized portion of the ureter. Intraprocedural images confirm exchange of previously placed catheter with pigtail tip of new catheter curled in the right renal pelvis. No complication suggested. No evidence of hydronephrosis or contrast extravasation. Previously placed right nephrostomy catheter exchange for new 10.2 Bertha Party external drainage catheter with tip curled in the left renal pelvis. No complication suggested.        Electronically signed by Raeann Cavanaugh PA-C on 10/12/2022 at 12:45 PM

## 2022-10-13 VITALS
WEIGHT: 190 LBS | RESPIRATION RATE: 18 BRPM | HEIGHT: 73 IN | TEMPERATURE: 98.1 F | OXYGEN SATURATION: 98 % | SYSTOLIC BLOOD PRESSURE: 116 MMHG | DIASTOLIC BLOOD PRESSURE: 77 MMHG | HEART RATE: 102 BPM | BODY MASS INDEX: 25.18 KG/M2

## 2022-10-13 PROBLEM — G89.29 CHRONIC PAIN OF RIGHT UPPER EXTREMITY: Status: ACTIVE | Noted: 2022-10-13

## 2022-10-13 PROBLEM — A41.9 SEPSIS (HCC): Status: ACTIVE | Noted: 2022-10-13

## 2022-10-13 PROBLEM — C34.12 MALIGNANT NEOPLASM OF UPPER LOBE OF LEFT LUNG (HCC): Status: ACTIVE | Noted: 2022-10-13

## 2022-10-13 PROBLEM — D72.829 LEUKOCYTOSIS: Status: ACTIVE | Noted: 2022-10-13

## 2022-10-13 PROBLEM — M79.601 CHRONIC PAIN OF RIGHT UPPER EXTREMITY: Status: ACTIVE | Noted: 2022-10-13

## 2022-10-13 LAB
ANION GAP SERPL CALCULATED.3IONS-SCNC: 11 MMOL/L (ref 4–16)
BASOPHILS ABSOLUTE: 0 K/CU MM
BASOPHILS RELATIVE PERCENT: 0.3 % (ref 0–1)
BUN BLDV-MCNC: 16 MG/DL (ref 6–23)
CALCIUM SERPL-MCNC: 9 MG/DL (ref 8.3–10.6)
CHLORIDE BLD-SCNC: 103 MMOL/L (ref 99–110)
CO2: 23 MMOL/L (ref 21–32)
CREAT SERPL-MCNC: 1 MG/DL (ref 0.9–1.3)
CULTURE: NORMAL
DIFFERENTIAL TYPE: ABNORMAL
EOSINOPHILS ABSOLUTE: 0.1 K/CU MM
EOSINOPHILS RELATIVE PERCENT: 1.3 % (ref 0–3)
GFR AFRICAN AMERICAN: >60 ML/MIN/1.73M2
GFR NON-AFRICAN AMERICAN: >60 ML/MIN/1.73M2
GLUCOSE BLD-MCNC: 102 MG/DL (ref 70–99)
GLUCOSE BLD-MCNC: 139 MG/DL (ref 70–99)
GLUCOSE BLD-MCNC: 164 MG/DL (ref 70–99)
HCT VFR BLD CALC: 28 % (ref 42–52)
HEMOGLOBIN: 8.9 GM/DL (ref 13.5–18)
IMMATURE NEUTROPHIL %: 0.5 % (ref 0–0.43)
LACTATE: 1.3 MMOL/L (ref 0.4–2)
LYMPHOCYTES ABSOLUTE: 1.2 K/CU MM
LYMPHOCYTES RELATIVE PERCENT: 11.3 % (ref 24–44)
Lab: NORMAL
MCH RBC QN AUTO: 25.4 PG (ref 27–31)
MCHC RBC AUTO-ENTMCNC: 31.8 % (ref 32–36)
MCV RBC AUTO: 80 FL (ref 78–100)
MONOCYTES ABSOLUTE: 0.7 K/CU MM
MONOCYTES RELATIVE PERCENT: 6.3 % (ref 0–4)
NUCLEATED RBC %: 0 %
PDW BLD-RTO: 18.3 % (ref 11.7–14.9)
PLATELET # BLD: 337 K/CU MM (ref 140–440)
PMV BLD AUTO: 9.4 FL (ref 7.5–11.1)
POTASSIUM SERPL-SCNC: 3.8 MMOL/L (ref 3.5–5.1)
RBC # BLD: 3.5 M/CU MM (ref 4.6–6.2)
SEGMENTED NEUTROPHILS ABSOLUTE COUNT: 8.8 K/CU MM
SEGMENTED NEUTROPHILS RELATIVE PERCENT: 80.3 % (ref 36–66)
SODIUM BLD-SCNC: 137 MMOL/L (ref 135–145)
SPECIMEN: NORMAL
TOTAL IMMATURE NEUTOROPHIL: 0.06 K/CU MM
TOTAL NUCLEATED RBC: 0 K/CU MM
WBC # BLD: 10.9 K/CU MM (ref 4–10.5)

## 2022-10-13 PROCEDURE — 94761 N-INVAS EAR/PLS OXIMETRY MLT: CPT

## 2022-10-13 PROCEDURE — 2580000003 HC RX 258: Performed by: STUDENT IN AN ORGANIZED HEALTH CARE EDUCATION/TRAINING PROGRAM

## 2022-10-13 PROCEDURE — 85025 COMPLETE CBC W/AUTO DIFF WBC: CPT

## 2022-10-13 PROCEDURE — 82962 GLUCOSE BLOOD TEST: CPT

## 2022-10-13 PROCEDURE — 87040 BLOOD CULTURE FOR BACTERIA: CPT

## 2022-10-13 PROCEDURE — 36415 COLL VENOUS BLD VENIPUNCTURE: CPT

## 2022-10-13 PROCEDURE — 2580000003 HC RX 258: Performed by: INTERNAL MEDICINE

## 2022-10-13 PROCEDURE — 6370000000 HC RX 637 (ALT 250 FOR IP): Performed by: INTERNAL MEDICINE

## 2022-10-13 PROCEDURE — 94150 VITAL CAPACITY TEST: CPT

## 2022-10-13 PROCEDURE — 80048 BASIC METABOLIC PNL TOTAL CA: CPT

## 2022-10-13 PROCEDURE — 83605 ASSAY OF LACTIC ACID: CPT

## 2022-10-13 PROCEDURE — 6370000000 HC RX 637 (ALT 250 FOR IP): Performed by: PHYSICIAN ASSISTANT

## 2022-10-13 PROCEDURE — 6360000002 HC RX W HCPCS: Performed by: INTERNAL MEDICINE

## 2022-10-13 PROCEDURE — 96366 THER/PROPH/DIAG IV INF ADDON: CPT

## 2022-10-13 PROCEDURE — 1200000000 HC SEMI PRIVATE

## 2022-10-13 PROCEDURE — 99232 SBSQ HOSP IP/OBS MODERATE 35: CPT | Performed by: INTERNAL MEDICINE

## 2022-10-13 PROCEDURE — G0378 HOSPITAL OBSERVATION PER HR: HCPCS

## 2022-10-13 PROCEDURE — 51798 US URINE CAPACITY MEASURE: CPT

## 2022-10-13 PROCEDURE — 6370000000 HC RX 637 (ALT 250 FOR IP): Performed by: NURSE PRACTITIONER

## 2022-10-13 RX ORDER — CIPROFLOXACIN 250 MG/1
250 TABLET, FILM COATED ORAL 2 TIMES DAILY
Qty: 6 TABLET | Refills: 0 | Status: SHIPPED | OUTPATIENT
Start: 2022-10-13 | End: 2022-10-16

## 2022-10-13 RX ORDER — TAMSULOSIN HYDROCHLORIDE 0.4 MG/1
0.4 CAPSULE ORAL DAILY
Qty: 30 CAPSULE | Refills: 0 | Status: SHIPPED | OUTPATIENT
Start: 2022-10-14 | End: 2022-11-04

## 2022-10-13 RX ORDER — SODIUM BICARBONATE 650 MG/1
1300 TABLET ORAL 3 TIMES DAILY
Qty: 6 TABLET | Refills: 0 | Status: SHIPPED | OUTPATIENT
Start: 2022-10-13 | End: 2022-10-14

## 2022-10-13 RX ADMIN — ACETAMINOPHEN 1000 MG: 500 TABLET ORAL at 10:50

## 2022-10-13 RX ADMIN — OXYCODONE HYDROCHLORIDE 10 MG: 10 TABLET ORAL at 10:49

## 2022-10-13 RX ADMIN — SODIUM CHLORIDE, PRESERVATIVE FREE 10 ML: 5 INJECTION INTRAVENOUS at 10:51

## 2022-10-13 RX ADMIN — CEFEPIME HYDROCHLORIDE 2000 MG: 2 INJECTION, POWDER, FOR SOLUTION INTRAVENOUS at 10:56

## 2022-10-13 RX ADMIN — TAMSULOSIN HYDROCHLORIDE 0.4 MG: 0.4 CAPSULE ORAL at 10:50

## 2022-10-13 RX ADMIN — PREDNISONE 5 MG: 5 TABLET ORAL at 10:50

## 2022-10-13 RX ADMIN — ACETAMINOPHEN 1000 MG: 500 TABLET ORAL at 01:56

## 2022-10-13 RX ADMIN — SODIUM BICARBONATE 1300 MG: 650 TABLET ORAL at 10:49

## 2022-10-13 ASSESSMENT — PAIN SCALES - GENERAL: PAINLEVEL_OUTOF10: 3

## 2022-10-13 NOTE — DISCHARGE INSTRUCTIONS
Please follow up with Urologist, PCP and Hem/Onc  Continue antibiotic for 3 more days  If you develop burning micturition, painful urination, pain/ leakage at nephrostomy site, please call Urology office  For severe symptoms, go to the nearest ER, keep hydrated

## 2022-10-13 NOTE — PROGRESS NOTES
[facility]   Progress Note  [facility] 0 1 2      Date: 10/13/2022   Patient: Karmen De Los Santos   : 1970   DOA: 10/11/2022   MRN: [id number]   ROOM#: [id number]/[id number]     Admit Date: 10/11/2022     Collaborating Urologist on Call at time of admission: Dr. Pearson Case  CC: Right arm pain  Reason for Consult: Hx of obstructive uropathy in setting of metastatic disease, please evaluate patient for ongoing need for nephrostomy tube. Subjective:     Pain: mild, no nausea and no vomiting,   Bowel Movement/Flatus:   Yes  Voidinfr sampson in place, urine clear yellow. Left PCN in place, urine clear yellow. Pt resting in bed, states he is feeling OK but wants his catheter removed today. Objective:    Vitals:    /77   Pulse 98   Temp 98.2 °F (36.8 °C) (Oral)   Resp 16   Ht 6' 1\" (1.854 m)   Wt 190 lb (86.2 kg)   SpO2 98%   BMI 25.07 kg/m²    Temp  Av.6 °F (37 °C)  Min: 98.1 °F (36.7 °C)  Max: 99.5 °F (37.5 °C)     Intake/Output Summary (Last 24 hours) at 10/13/2022 0719  Last data filed at 10/13/2022 0151  Gross per 24 hour   Intake --   Output 3650 ml   Net -3650 ml       Physical Exam:  General appearance: alert, appears stated age, cooperative, fatigued, and no distress  Head: Normocephalic, without obvious abnormality, atraumatic  Back:  No CVA tenderness. Left PCN in place w clear yellow urine output  Abdomen:  Soft, non-tender, non-distended  : Indwelling catheter with clear yellow urine.     Labs:   WBC:    Lab Results   Component Value Date/Time    WBC 10.9 10/13/2022 05:15 AM      Hemoglobin/Hematocrit:    Lab Results   Component Value Date/Time    HGB 8.9 10/13/2022 05:15 AM    HCT 28.0 10/13/2022 05:15 AM      BMP:   Lab Results   Component Value Date/Time     10/13/2022 05:15 AM    K 3.8 10/13/2022 05:15 AM     10/13/2022 05:15 AM    CO2 23 10/13/2022 05:15 AM    BUN 16 10/13/2022 05:15 AM    LABALBU 3.8 10/11/2022 02:00 AM    CREATININE 1.0 10/13/2022 05:15 AM    CALCIUM 9.0 10/13/2022 05:15 AM    GFRAA >60 10/13/2022 05:15 AM    LABGLOM >60 10/13/2022 05:15 AM      PT/INR:    Lab Results   Component Value Date/Time    PROTIME 13.3 07/03/2022 08:41 AM    INR 1.03 07/03/2022 08:41 AM      PTT:    Lab Results   Component Value Date/Time    APTT 30.0 07/03/2022 08:41 AM     Urine Culture: pending    Imaging:   XR SHOULDER RIGHT (MIN 2 VIEWS)    Result Date: 10/2/2022  EXAMINATION: TWO XRAY VIEWS OF THE RIGHT SHOULDER 10/2/2022 2:04 am COMPARISON: None. HISTORY: ORDERING SYSTEM PROVIDED HISTORY: trauma TECHNOLOGIST PROVIDED HISTORY: Right Shoulder - XR Portable Reason for exam:->trauma Reason for Exam: right shoulder pain, trauma Additional signs and symptoms: right shoulder pain, trauma FINDINGS: Right-sided chest port is seen. Multifocal sclerotic areas are seen within the visualized osseous structures, likely related to metastatic disease. There is no acute fracture, dislocation, or bone destruction. The acromioclavicular joint is unremarkable. There is no significant soft tissue swelling. Sclerotic lesions are seen involving the right shoulder, concerning for metastatic disease. No pathologic fracture. CT HEAD WO CONTRAST    Result Date: 10/11/2022  EXAMINATION: CT OF THE HEAD WITHOUT CONTRAST  10/11/2022 3:48 am TECHNIQUE: CT of the head was performed without the administration of intravenous contrast. Automated exposure control, iterative reconstruction, and/or weight based adjustment of the mA/kV was utilized to reduce the radiation dose to as low as reasonably achievable. COMPARISON: 03/11/2022 HISTORY: ORDERING SYSTEM PROVIDED HISTORY: numbness TECHNOLOGIST PROVIDED HISTORY: Has a \"code stroke\" or \"stroke alert\" been called? ->No Reason for exam:->numbness Decision Support Exception - unselect if not a suspected or confirmed emergency medical condition->Emergency Medical Condition (MA) Reason for Exam:  right sided Arm Pain; Cold Extremity Relevant Medical/Surgical History: hx of bone cancer FINDINGS: BRAIN/VENTRICLES: Motion artifact in the lower images but without convincing evidence for intracranial hemorrhage. There is no extra-axial fluid collection. Small calcifications are present at both globus pallidus. No mass effect, midline shift, or focal sulcal effacement is identified. Evaluation of the gray-white junction is suboptimal but without evidence of territorial infarct. The ventricles are not dilated. ORBITS: The visualized portion of the orbits demonstrate no acute abnormality. SINUSES: The visualized paranasal sinuses and mastoid air cells demonstrate no acute abnormality. SOFT TISSUES/SKULL:  Areas of increased sclerosis within the skull. Low lying position of the right mandibular condyle at the temporomandibular joint. .     Motion artifact on the lower images. No convincing evidence for acute intracranial hemorrhage. No mass effect, midline shift, or focal sulcal effacement. The ventricles are not dilated. Low lying position of the right mandibular condyle at the temporomandibular joint suggesting subluxation. Areas of increased sclerosis in the skull concerning for sclerotic metastases. XR CHEST PORTABLE    Result Date: 10/11/2022  EXAMINATION: ONE XRAY VIEW OF THE CHEST 10/11/2022 8:04 pm COMPARISON: August 9, 2022 HISTORY: ORDERING SYSTEM PROVIDED HISTORY: Hypoxia, pneumothorax TECHNOLOGIST PROVIDED HISTORY: Reason for exam:->Hypoxia, pneumothorax Reason for Exam: Hypoxia, pneumothorax Additional signs and symptoms: na Relevant Medical/Surgical History: hypertension FINDINGS: The heart is of normal size. There is a right-sided chest port with its tip in the superior vena cava. There is airspace opacity in the left upper lobe, likely related to lobar atelectasis and known lung mass. There is no pleural effusion or pneumothorax. There is known diffuse osseous metastasis.      Stable chest Airspace opacity in the left upper lobe, likely related to lobar atelectasis and known lung mass. Known diffuse osseous metastasis. CTA CHEST W CONTRAST    Result Date: 10/11/2022  EXAMINATION: CTA OF THE CHEST 10/11/2022 4:11 am TECHNIQUE: CTA of the chest was performed after the administration of intravenous contrast.  Multiplanar reformatted images are provided for review. MIP images are provided for review. Automated exposure control, iterative reconstruction, and/or weight based adjustment of the mA/kV was utilized to reduce the radiation dose to as low as reasonably achievable. COMPARISON: 02/08/2022, 08/09/2022 HISTORY: ORDERING SYSTEM PROVIDED HISTORY: hemoptysis TECHNOLOGIST PROVIDED HISTORY: Reason for exam:->hemoptysis Reason for Exam: hemoptysis Relevant Medical/Surgical History: hx of bone cancer FINDINGS: Pulmonary Arteries: Suboptimal opacification of the pulmonary arteries. No central pulmonary embolus. No definite lobar pulmonary embolus. Motion artifact and contrast bolus timing limits evaluation of the segmental pulmonary arteries. Mediastinum: No thoracic adenopathy. Heart size is normal.  No pericardial effusion. Thoracic aorta is normal caliber. Lungs and pleura: Masslike consolidation in the perihilar region and left upper lobe corresponds to the prior CT, with suspected mass measuring up to 3.8 by 3.2 cm, previously 5.2 x 3 cm. Persistent complete collapse of the left upper lobe. Atelectasis has developed in the infrahilar left lower lobe with trace left pleural effusion. Trace left anterior pneumothorax measures up to 4 mm between the pleural margins. The right lung is clear. No central endobronchial lesion is otherwise present. Upper abdomen: Limited images of the upper abdomen demonstrate no significant abnormality. Bones and soft tissues: Diffuse bony sclerosis compatible with multifocal metastatic disease.   The extent of sclerosis and metastatic lesions has significantly progressed with diffuse osseous involvement throughout the bones. No superimposed pathologic fracture is noted. 1. Suboptimal opacification of the pulmonary arteries. No central pulmonary embolus. No definite lobar pulmonary embolus. Motion artifact and contrast bolus timing limits evaluation of the segmental pulmonary arteries. 2. New trace left anterior pneumothorax measuring 4 mm. 3. Otherwise no significant change since 08/09/2022. Complete collapse of the left upper lobe with persistent left perihilar 3.8 cm mass obscured by the surrounding atelectasis. 4. Trace left pleural effusion 5. Mild infrahilar left lower lobe atelectasis 6. Osseous metastatic disease with diffuse sclerotic osseous metastatic lesions. No pathologic fracture. Assessment & Plan:      Maribel Dewitt is a 47 y.o. male admitted 10/11/2022 for pneumothorax. Pt known to Dr. Fleet Nageotte. 1) Metastatic Prostate Cancer: S/p bone marrow bx 4/27/22 w pathology showing metastatic prostate cancer. 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 S/p left PCN [facility] at [facility] 4/25/22. Last exchanged 10/12/22 with IR. Cr 1.0, improved from 1.4 10/11/22  UA w mod leuks, trace blood              CT a/p w contrast once BRENDA resolved. 3) Acute Urinary Retention: Doherty placed 10/12/22 d/t PVR of 438cc. Continue Flomax 0.4mg   Doherty removal/voiding trial today per pt request.     Will follow. Patient seen and examined, chart reviewed.      Electronically signed by Ethlyn Ahumada, PA-C on 10/13/2022 at 7:19 AM

## 2022-10-13 NOTE — DISCHARGE SUMMARY
V2.0  Discharge Summary    Name:  Kirstie Franco /Age/Sex: 1970 (47 y.o. male)   Admit Date: 10/11/2022  Discharge Date: 10/13/22    MRN & CSN:  [id number] & [id number] Encounter Date and Time 10/13/22 2:42 PM EDT    Attending:  No att. providers found Discharging Provider: Familia Pittman PA-C       Hospital Course:     Brief HPI: Kirstie Franco is a 47 y.o. male who presented with right arm pain, noted to have small left sided pneumothorax, BRENDA, leaking nephrostomy bag, and possibly sepsis    IR exchanged nephrostomy tube on 10/12  Evaluated by urologist-no further recommendations, follow-up as outpatient  Patient was evaluated by CTS for pneumothorax-hemodynamically stable-no intervention warranted  Evaluated by nephrology for BRENDA-improved with fluid hydration, continue sodium bicarb for 1 more day    Patient has been afebrile for 24 hours, tachycardia resolved, urine culture-no growth, blood cultures pending, lactic acid within normal limits,  Patient has been seen and examined-states he feels much better, denies any chest pain/palpitations//flank pain/suprapubic pressure/nausea/vomiting/abdominal pain    Urology is okay to discharge the patient today  Patient was discharged today in stable condition to home with home hospice  Patient to continue ciprofloxacin 250 mg twice daily for 3 more days    Brief Problem Based Course:   Sepsis, possibly ? UTI in the setting of metastatic disease  Febrile, temp max 103F, tachycardia, leukocytosis  No CVA tenderness, unable to obtain CT abdomen and pelvis due  BRENDA  Hx of left-sided hydronephrosis s/t UVJ mass s/p nephrostomy tube placement at [facility] in [facility]22  Nephrostomy tube exchanged today 10/12/2022 with IR  UA-reviewed-moderate leukocyte esterase  Urine culture -contaminated with skin/urogenital shaista  Was given Zosyn x3 doses on 10/11/2022, will started cefepime 10/12  Adequate fluid hydration-NS at 75 cc/h  F/u blood culture (drawn at 10 AM this morning), lactic acid  Scheduled Tylenol for fever  Discharge on  ciprofloxacin to 250 mg twice daily x3 days      Spontaneous small left-sided pneumothorax  In the setting of malignant neoplasm of left lung, possible ruptured bleb vs other  No signs of tension pneumo, hemodynamically stable  Not requiring any oxygen support, mild tachycardia most likely secondary to fever  CTA chest with contrast-no E/O of pulmonary embolus, trace left anterior pneumothorax 4 mm, complete collapse of left upper lobe with persistent left perihilar 3.8 cm mass along with surrounding atelectasis  Encourage incentive spirometry, respiratory treatment as needed  Evaluated by CTS-discussed about possible chest tube placement in case of worsening SOB/chest pain/hypotension tachycardia--family and patient did not want any surgical intervention  CTS signed off-conservative management, monitor        BRENDA possibly secondary to dehydration in the setting of metastatic disease causing obstructive uropathy  Baseline creatinine (0.7-0.8)-- 1.4-->1.3 this admission  Hypovolemic hyponatremia and hypochloremia  Continue fluid hydration  Avoid nephrotoxic agents  Oral sodium bicarb 1300 mg 3 times daily-6 more doses last dose Friday 9 PM  Evaluated by nephrology while in the hospital     Acute on chronic right arm pain-POA  CTA chest showed osseous metastatic disease with diffuse sclerotic metastatic lesions-no E/O of pathological fractures  X-ray-right humerus-pending  Reviewed right shoulder x-ray on 10/2-sclerotic lesions involving right shoulder concerning for metastatic disease  Pain could be possibly from metastatic disease  Pain management, conservative management        Chronic conditions:  Metastatic prostate cancer-Dr. Melba Harvey following, continue Zytiga, obtain CT abdomen and pelvis once BRENDA resolves, f/u PSA  Anemia  CAD         The patient expressed appropriate understanding of, and agreement with the discharge recommendations, medications, and plan.     Consults this admission:  IP CONSULT TO HOSPITALIST  IP CONSULT TO [facility]  IP CONSULT TO NEPHROLOGY  IP CONSULT TO ONCOLOGY  IP CONSULT TO INTERVENTIONAL RADIOLOGY  IP CONSULT TO UROLOGY    Discharge Diagnosis:   Pneumothorax  BRENDA  Nephrostomy tube exchange  Sepsis    Discharge Instruction:   Follow up appointments: Urology, PCP  Primary care physician: Mylene De Luna MD within 2 weeks  Diet: regular diet   Activity: activity as tolerated  Disposition: Discharged to:   [x]Home, []C, []SNF, []Acute Rehab, []Hospice   Condition on discharge: Stable  Labs and Tests to be Followed up as an outpatient by PCP or Specialist: BMP, CBC    Discharge Medications:        Medication List        START taking these medications      ciprofloxacin 250 MG tablet  Commonly known as: CIPRO  Take 1 tablet by mouth 2 times daily for 3 days     sodium bicarbonate 650 MG tablet  Take 2 tablets by mouth 3 times daily for 3 doses     tamsulosin 0.4 MG capsule  Commonly known as: FLOMAX  Take 1 capsule by mouth daily  Start taking on: October 14, 2022            CHANGE how you take these medications      naloxone 4 MG/0.1ML Liqd nasal spray  1 spray by Nasal route as needed for Opioid Reversal  What changed: Another medication with the same name was removed. Continue taking this medication, and follow the directions you see here. CONTINUE taking these medications      albuterol sulfate  (90 Base) MCG/ACT inhaler  Commonly known as: Proventil HFA  Inhale 2 puffs into the lungs every 4 hours as needed for Wheezing or Shortness of Breath With spacer (and mask if indicated). Thanks.      ALPRAZolam 0.5 MG tablet  Commonly known as: XANAX     cyanocobalamin 1000 MCG tablet  Commonly known as: [employer] VITAMIN B12  Take 1 tablet by mouth daily     cyclobenzaprine 10 MG tablet  Commonly known as: FLEXERIL     dexamethasone 4 MG tablet  Commonly known as: DECADRON  Two tablets the day before treatment, one table daily for four days starting the day after chemotherapy     ondansetron 8 MG tablet  Commonly known as: ZOFRAN     * oxyCODONE-acetaminophen  MG per tablet  Commonly known as: PERCOCET     * oxyCODONE-acetaminophen 7.5-325 MG per tablet  Commonly known as: PERCOCET     Oyster Shell Calcium/D 500-200 MG-UNIT Tabs  Take 1 tablet by mouth daily           * This list has 2 medication(s) that are the same as other medications prescribed for you. Read the directions carefully, and ask your doctor or other care provider to review them with you. STOP taking these medications      nicotine 21 MG/24HR  Commonly known as: Miguel Angel Tony            ASK your doctor about these medications      abiraterone acetate 250 MG tablet  Commonly known as: ZYTIGA     bicalutamide 50 MG chemo tablet  Commonly known as: Casodex  Take 1 tablet by mouth daily     predniSONE 5 MG tablet  Commonly known as: DELTASONE     Senna 8.6 MG Caps  Take 1 capsule by mouth 2 times daily as needed (constipation)               Where to Get Your Medications        These medications were sent to [address] [place] [address] 49, [address] [place] [address]      Phone: [phone]   ciprofloxacin 250 MG tablet  sodium bicarbonate 650 MG tablet  tamsulosin 0.4 MG capsule        Objective Findings at Discharge:   /77   Pulse (!) 102   Temp 98.1 °F (36.7 °C) (Oral)   Resp 18   Ht 6' 1\" (1.854 m)   Wt 190 lb (86.2 kg)   SpO2 98%   BMI 25.07 kg/m²       Physical Exam:   General: NAD  Eyes: EOMI  ENT: neck supple  Cardiovascular: Regular rate. Respiratory: Clear to auscultation  Gastrointestinal: Soft, non tender  Genitourinary: no suprapubic tenderness  Musculoskeletal: No edema  Skin: warm, dry  Neuro: Alert. Psych: Mood appropriate.          Labs and Imaging   CT HEAD WO CONTRAST    Result Date: 10/11/2022  EXAMINATION: CT OF THE HEAD WITHOUT CONTRAST  10/11/2022 3:48 am TECHNIQUE: CT of the head was performed without the administration of intravenous contrast. Automated exposure control, iterative reconstruction, and/or weight based adjustment of the mA/kV was utilized to reduce the radiation dose to as low as reasonably achievable. COMPARISON: 03/11/2022 HISTORY: ORDERING SYSTEM PROVIDED HISTORY: numbness TECHNOLOGIST PROVIDED HISTORY: Has a \"code stroke\" or \"stroke alert\" been called? ->No Reason for exam:->numbness Decision Support Exception - unselect if not a suspected or confirmed emergency medical condition->Emergency Medical Condition (MA) Reason for Exam:  right sided Arm Pain; Cold Extremity Relevant Medical/Surgical History: hx of bone cancer FINDINGS: BRAIN/VENTRICLES: Motion artifact in the lower images but without convincing evidence for intracranial hemorrhage. There is no extra-axial fluid collection. Small calcifications are present at both globus pallidus. No mass effect, midline shift, or focal sulcal effacement is identified. Evaluation of the gray-white junction is suboptimal but without evidence of territorial infarct. The ventricles are not dilated. ORBITS: The visualized portion of the orbits demonstrate no acute abnormality. SINUSES: The visualized paranasal sinuses and mastoid air cells demonstrate no acute abnormality. SOFT TISSUES/SKULL:  Areas of increased sclerosis within the skull. Low lying position of the right mandibular condyle at the temporomandibular joint. .     Motion artifact on the lower images. No convincing evidence for acute intracranial hemorrhage. No mass effect, midline shift, or focal sulcal effacement. The ventricles are not dilated. Low lying position of the right mandibular condyle at the temporomandibular joint suggesting subluxation. Areas of increased sclerosis in the skull concerning for sclerotic metastases.      XR CHEST PORTABLE    Result Date: 10/11/2022  EXAMINATION: ONE XRAY VIEW OF THE CHEST 10/11/2022 8:04 pm COMPARISON: August 9, 2022 HISTORY: ORDERING SYSTEM PROVIDED HISTORY: Hypoxia, pneumothorax TECHNOLOGIST PROVIDED HISTORY: Reason for exam:->Hypoxia, pneumothorax Reason for Exam: Hypoxia, pneumothorax Additional signs and symptoms: na Relevant Medical/Surgical History: hypertension FINDINGS: The heart is of normal size. There is a right-sided chest port with its tip in the superior vena cava. There is airspace opacity in the left upper lobe, likely related to lobar atelectasis and known lung mass. There is no pleural effusion or pneumothorax. There is known diffuse osseous metastasis. Stable chest Airspace opacity in the left upper lobe, likely related to lobar atelectasis and known lung mass. Known diffuse osseous metastasis. CTA CHEST W CONTRAST    Result Date: 10/11/2022  EXAMINATION: CTA OF THE CHEST 10/11/2022 4:11 am TECHNIQUE: CTA of the chest was performed after the administration of intravenous contrast.  Multiplanar reformatted images are provided for review. MIP images are provided for review. Automated exposure control, iterative reconstruction, and/or weight based adjustment of the mA/kV was utilized to reduce the radiation dose to as low as reasonably achievable. COMPARISON: 02/08/2022, 08/09/2022 HISTORY: ORDERING SYSTEM PROVIDED HISTORY: hemoptysis TECHNOLOGIST PROVIDED HISTORY: Reason for exam:->hemoptysis Reason for Exam: hemoptysis Relevant Medical/Surgical History: hx of bone cancer FINDINGS: Pulmonary Arteries: Suboptimal opacification of the pulmonary arteries. No central pulmonary embolus. No definite lobar pulmonary embolus. Motion artifact and contrast bolus timing limits evaluation of the segmental pulmonary arteries. Mediastinum: No thoracic adenopathy. Heart size is normal.  No pericardial effusion. Thoracic aorta is normal caliber. Lungs and pleura:  Masslike consolidation in the perihilar region and left upper lobe corresponds to the prior CT, with suspected mass measuring up to 3.8 by 3.2 cm, previously 5.2 x 3 cm. Persistent complete collapse of the left upper lobe. Atelectasis has developed in the infrahilar left lower lobe with trace left pleural effusion. Trace left anterior pneumothorax measures up to 4 mm between the pleural margins. The right lung is clear. No central endobronchial lesion is otherwise present. Upper abdomen: Limited images of the upper abdomen demonstrate no significant abnormality. Bones and soft tissues: Diffuse bony sclerosis compatible with multifocal metastatic disease. The extent of sclerosis and metastatic lesions has significantly progressed with diffuse osseous involvement throughout the bones. No superimposed pathologic fracture is noted. 1. Suboptimal opacification of the pulmonary arteries. No central pulmonary embolus. No definite lobar pulmonary embolus. Motion artifact and contrast bolus timing limits evaluation of the segmental pulmonary arteries. 2. New trace left anterior pneumothorax measuring 4 mm. 3. Otherwise no significant change since 08/09/2022. Complete collapse of the left upper lobe with persistent left perihilar 3.8 cm mass obscured by the surrounding atelectasis. 4. Trace left pleural effusion 5. Mild infrahilar left lower lobe atelectasis 6. Osseous metastatic disease with diffuse sclerotic osseous metastatic lesions. No pathologic fracture. IR GUIDED NEPHROSTOMY CATH EXCHANGE    Result Date: 10/12/2022  PROCEDURE: IR NEPHROSTOMY TUBE CHANGE 10/12/2022 HISTORY: ORDERING SYSTEM PROVIDED HISTORY: neph exchange TECHNOLOGIST PROVIDED HISTORY: Reason for exam:->neph exchange TECHNIQUE: Maximum sterile barrier technique including hand hygiene, skin prep and sterile ultrasound technique utilized for procedure. Following informed consent, pause a confirm/time-out patient is placed in prone position on fluoroscopic table.   Previously placed right nephrostomy catheter and entry site were prepped and draped in sterile fashion. Antegrade nephrostogram was performed. Guidewires advanced over which previously placed malfunctioning nephrostomy tube was exchanged for new 10.2 [employer] Kristy external drainage catheter/nephrostomy tube. Catheter tip was curled in right renal pelvis and fixed to the patient's skin. Dressing applied. External drainage bag applied. Patient tolerated procedure well. CONTRAST: 3 cc Isovue 370 SEDATION: None FLUOROSCOPY DOSE AND TYPE OR TIME AND EXPOSURES: 0.6 minutes fluoroscopy time AK: 38 mGy DESCRIPTION OF PROCEDURE: Informed consent was obtained after a detailed explanation of the procedure including risks, benefits, and alternatives. Universal protocol was observed. Sterile gowns, masks, hats and gloves utilized for maximal sterile barrier. As above in technique section FINDINGS: Antegrade nephrostogram: Previously placed left nephrostomy catheter projects in satisfactory position. Contrast opacifies the decompressed left renal pelvis and visualized portion of the ureter. Intraprocedural images confirm exchange of previously placed catheter with pigtail tip of new catheter curled in the right renal pelvis. No complication suggested. No evidence of hydronephrosis or contrast extravasation. Previously placed right nephrostomy catheter exchange for new 10.2 [employer] Kristy external drainage catheter with tip curled in the left renal pelvis. No complication suggested.        CBC:   Recent Labs     10/11/22  0200 10/12/22  1000 10/13/22  0515   WBC 7.5 12.2* 10.9*   HGB 10.6* 9.0* 8.9*    290 337     BMP:    Recent Labs     10/11/22  0200 10/12/22  1000 10/13/22  0515   * 129* 137   K 4.1 4.0 3.8    96* 103   CO2 26 21 23   BUN 17 21 16   CREATININE 1.4* 1.3 1.0   GLUCOSE 101* 204* 164*     Hepatic:   Recent Labs     10/11/22  0200   AST 10*   ALT <5*   BILITOT 0.1   ALKPHOS 212*     Lipids:   Lab Results   Component Value Date/Time    CHOL 124 12/20/2013 05:22 AM    HDL 53 12/20/2013 05:22 AM    TRIG 52 12/20/2013 05:22 AM     Hemoglobin A1C: No results found for: LABA1C  TSH: No results found for: TSH  Troponin:   Lab Results   Component Value Date/Time    TROPONINT <0.010 08/09/2022 04:53 AM    TROPONINT <0.010 08/09/2022 04:53 AM    TROPONINT <0.010 07/13/2022 03:05 PM     Lactic Acid: No results for input(s): LACTA in the last 72 hours. BNP: No results for input(s): PROBNP in the last 72 hours.   UA:  Lab Results   Component Value Date/Time    NITRU NEGATIVE 10/11/2022 11:05 PM    COLORU YELLOW 10/11/2022 11:05 PM    WBCUA <1 10/11/2022 11:05 PM    RBCUA 9 10/11/2022 11:05 PM    MUCUS RARE 10/11/2022 11:05 PM    TRICHOMONAS NONE SEEN 10/11/2022 11:05 PM    BACTERIA NEGATIVE 10/11/2022 11:05 PM    CLARITYU CLEAR 10/11/2022 11:05 PM    SPECGRAV 1.010 10/11/2022 11:05 PM    LEUKOCYTESUR MODERATE 10/11/2022 11:05 PM    UROBILINOGEN 0.2 10/11/2022 11:05 PM    BILIRUBINUR NEGATIVE 10/11/2022 11:05 PM    BLOODU TRACE 10/11/2022 11:05 PM    KETUA NEGATIVE 10/11/2022 11:05 PM    AMORPHOUS RARE 07/21/2021 05:24 AM     Urine Cultures: No results found for: LABURIN  Blood Cultures: No results found for: BC  No results found for: BLOODCULT2  Organism: No results found for: ORG    Time Spent Discharging patient 40 minutes    Electronically signed by Jose Ramon Ramirez PA-C on 10/13/2022 at 2:42 PM

## 2022-10-13 NOTE — PROGRESS NOTES
ONCOLOGY HEMATOLOGY CARE ([facility])  PROGRESS NOTE      10/13/2022  8:50 AM    Patient:    Ernestine Moise  : 1970   47 y.o. MRN: [id number]  Admitted: 10/11/2022  1:41 AM ATT: Carlos A Nascimento MD   1101/1101-A  AdmitDx: Paresthesia [R20.2]  Pneumothorax [J93.9]  Acute renal insufficiency [N28.9]  Secondary spontaneous pneumothorax [J93.12]  PCP: Coy Whitt MD    Patient was seen and examined today. Wants catheter removed  \"I dont want hospice\"    Review of CBC 10/11/22  WBC 7.5, Hgb 10.6, Hct 33.1, MCV 80.3, Platelets 286  CMP , K+ 4.1, BUN 17, Cr 1.4, glucose 101, ALT <5, AST 10, ALK phos 212     CT head 10/11/22  Impression   Motion artifact on the lower images. No convincing evidence for acute   intracranial hemorrhage. No mass effect, midline shift, or focal sulcal   effacement. The ventricles are not dilated. Low lying position of the right mandibular condyle at the temporomandibular   joint suggesting subluxation. Areas of increased sclerosis in the skull concerning for sclerotic metastases. CT chest 10/11/22  Impression   1. Suboptimal opacification of the pulmonary arteries. No central pulmonary   embolus. No definite lobar pulmonary embolus. Motion artifact and contrast   bolus timing limits evaluation of the segmental pulmonary arteries. 2. New trace left anterior pneumothorax measuring 4 mm. 3. Otherwise no significant change since 2022. Complete collapse of   the left upper lobe with persistent left perihilar 3.8 cm mass obscured by   the surrounding atelectasis. 4. Trace left pleural effusion   5. Mild infrahilar left lower lobe atelectasis   6. Osseous metastatic disease with diffuse sclerotic osseous metastatic   lesions. No pathologic fracture. 10/11/22; CXR:  Stable chest       Airspace opacity in the left upper lobe, likely related to lobar atelectasis   and known lung mass. Known diffuse osseous metastasis. 10/12/22: Left PCN exchanged. No evidence of hydronephrosis    10/12/2022 CBC with WBC of 12.2 hemoglobin of 9 hematocrit 27.8 MCV of 79.7 platelets of 700, no nucleated RBCs. BMP with sodium of 129 potassium of 4 BUN of 21 creatinine 1.3  PSA 31.78    XR right humerus pending. 10/11/22: PSA 33     Background history:  -----------------------------  H/O noncompliance. Left hilar mass with mediastinal hilar lymphadenopathy. 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 PET scan results from august 2021 noted, bone lesions most probably not metastatic except for one lytic lesion. MRI of the brain with no convincing metastatic lesions. Presented  the case in the tumor board. Decision made to proceed with a prostate biopsy and treat with ADT if malignancy  and in the meantime proceed with staging mediastinoscopy/rebiopsy for further treatment plan. But as pt very very very noncompliant, did not follow up with urology, CTS or for bone biopsy multiple times. Note PSA rising and was 250 on November 16 2021   CT imaging dec 2021 with progressive met disease  Bone biopsy on dec 13 2021 with met squamous cell cancer, consistent with lung primary. PDL1 >50%Primus Rhein) . Not enough tissue for rest of CARIS, guardant 360 with no other actionable mutation  CT chest jan 2022 with no significant change   Completed Palliative radiation to the pelvic area and also radiation to the left lung dia 10 2022  Discussed the findings, diagnosis and poor prognosis and treatment with carbo/taxol/pembro after completion of radiation. Discussed ae. PORT placed.   Completed OCM  C1D1 carbo/taxol and keytruda started 1/20/22, unfortunately received only one treatment so far sec to being very very noncompliant  CT after C1 with positive response   And then treatment held sec to cytopenias/noncompliance  and also admission to [facility]. PET in may 2022 with continued response. Resumed Keytruda alone June 7 2022 and   Plan was  for repeat PET after 3-4C. But then they discussed and established care with hospice  and did not follow  with us after June 2022 until sep 2022. Met prostate cancer: PSA was ~900, started on casodex and received GnRH analogue in feb/march . But BMB biopsy in April 2022 consistent with involvement by prostate cancer. May 2022 PSA was 256. Was Started on Zytiga and prednisone in may 2022 . Was on  lupron. But then looks like he discontinued Zytiga in June 2022    Status post left percutaneous nephrostomy tube placement at [address]      Constitutional:  Denies fever, chills  HEENT:  Denies swelling of neck glands  Respiratory:  Denies cough, shortness of breath or hemoptysis  Cardiovascular:  Denies chest pain, palpitations or swelling   GI:  Denies abdominal pain, nausea, vomiting, constipation or diarrhea   Musculoskeletal: back pain   Skin:  Denies rash   Neurologic:  Denies headache, focal weakness or sensory changes   PSYCHIATRIC:  Neg depression, personality changes, anxiety.   ALL/IMM:  Neg reactions to drugs other than listed    All systems negative except  for symptoms of HPI and as marked as above    PHYSICAL EXAM :    Vitals: /77   Pulse 93   Temp 98.1 °F (36.7 °C) (Oral)   Resp 17   Ht 6' 1\" (1.854 m)   Wt 190 lb (86.2 kg)   SpO2 98%   BMI 25.07 kg/m²        CONSTITUTIONAL: Alert and awake  LUNGS: Clear to auscultation bilaterally  CARDIOVASCULAR: s1s2 rrr no murmurs, tachycardia  ABDOMEN: soft ntnd bs pos, left nephrostomy tube in place  NEUROLOGIC: Grossly intact      LABORATORY RESULTS  CBC:   Recent Labs     10/11/22  0200 10/12/22  1000 10/13/22  0515   WBC 7.5 12.2* 10.9*   HGB 10.6* 9.0* 8.9*    290 337     BMP:    Recent Labs     10/11/22  0200 10/12/22  1000 10/13/22  0515   * 129* 137   K 4.1 4.0 3.8    96* 103   CO2 26 21 23   BUN 17 21 16 CREATININE 1.4* 1.3 1.0   GLUCOSE 101* 204* 164*     Hepatic:   Recent Labs     10/11/22  0200   AST 10*   ALT <5*   BILITOT 0.1   ALKPHOS 212*     INR: No results for input(s): INR in the last 72 hours. 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 History as above received carbo/taxol/keytruda x 1C and then single agent keytruda x 1C. Stable dissease. As above mentioned, he has been very very noncompliant. Will like to resumes Lor[facility] Maiers as OP as note limited caris with PDL1 >50% for [place] Discussed the findings with him and his daughter and will discuss again    Met Prostate cancer with Bone marrow involvement. Was started on casodex followed by [facility] and then was placed on Zytiga. He then was under hospice care and discontinued all systemic treatments. PSA now ~30  Abiraterone/prednisone resumed. Awaiting CT abdomen and pelvis. Left PCN exchanged. Agree Adequate hydration. Appreciate nephrology/urology  input. RUE pain: Most probably sec to met disease. Adequate analgesic regimen. XR pending. May consider palliative radiation as OP if pain uncontrolled with analgesic regimen. Adequate bowel regimen. Anemia; Most probably sec to BM involvement,met disease. Consider SHABBIR as OP    Leukocytosis sec to infection/reactive. Team following. Continue other medical care. This plan was discussed with the patient and not sure whether he comprehends. Also discussed with team    We will continue to follow the patient. Thank you for allowing us to participate in the care of this patient.      [address]

## 2022-10-13 NOTE — PROGRESS NOTES
Pt family in room- and said pt had called to be d/c needing picked up. This RN never notified pt of d/c, or needing ride. Pt had received information of d/c from hospitalist and decided to call family to . Family expressed frustration with this RN, DC papers prepared and given attempted to explain but the process.

## 2022-10-13 NOTE — PROGRESS NOTES
Contacted microbiology to check on blood culture status. Anthony Plummer in [facility] blood cultures were received on 10/12/22 @ 1000. Will notify attending.

## 2022-10-13 NOTE — PROGRESS NOTES
Outpatient Pharmacy Progress Note for Meds-to-Beds    Total number of Prescriptions Filled: 3  The following medications were dispensed to the patient during the discharge process:  Ciprofloxacin  Tamsulosin  Sodium bicarbonate     Additional Documentation:  Patient picked-up the medication(s) in the OP Pharmacy      Thank you for letting us serve your patients.   [address]    [address], [address] [facility]    Phone: [phone]    Fax: [phone]

## 2022-10-14 LAB
CULTURE: NORMAL
GRAM SMEAR: NORMAL
Lab: NORMAL
SPECIMEN: NORMAL

## 2022-10-17 LAB
CULTURE: NORMAL
CULTURE: NORMAL
Lab: NORMAL
Lab: NORMAL
SPECIMEN: NORMAL
SPECIMEN: NORMAL

## 2022-10-18 LAB
CULTURE: NORMAL
CULTURE: NORMAL
Lab: NORMAL
Lab: NORMAL
SPECIMEN: NORMAL
SPECIMEN: NORMAL

## 2022-10-19 ENCOUNTER — HOSPITAL ENCOUNTER (INPATIENT)
Age: 52
LOS: 1 days | Discharge: HOSPICE | DRG: 466 | End: 2022-10-20
Attending: HOSPITALIST | Admitting: HOSPITALIST
Payer: MEDICAID

## 2022-10-19 DIAGNOSIS — M79.601 CHRONIC PAIN OF RIGHT UPPER EXTREMITY: ICD-10-CM

## 2022-10-19 DIAGNOSIS — N99.528 NEPHROSTOMY COMPLICATION (HCC): Primary | ICD-10-CM

## 2022-10-19 DIAGNOSIS — G89.29 CHRONIC PAIN OF RIGHT UPPER EXTREMITY: ICD-10-CM

## 2022-10-19 DIAGNOSIS — C61 PROSTATE CANCER (HCC): ICD-10-CM

## 2022-10-19 DIAGNOSIS — T83.022A NEPHROSTOMY TUBE DISPLACED (HCC): ICD-10-CM

## 2022-10-19 PROCEDURE — 99285 EMERGENCY DEPT VISIT HI MDM: CPT

## 2022-10-19 ASSESSMENT — PAIN DESCRIPTION - DESCRIPTORS: DESCRIPTORS: ACHING

## 2022-10-19 ASSESSMENT — PAIN DESCRIPTION - ORIENTATION: ORIENTATION: RIGHT;LEFT

## 2022-10-19 ASSESSMENT — PAIN DESCRIPTION - LOCATION: LOCATION: BUTTOCKS;FLANK

## 2022-10-19 ASSESSMENT — PAIN DESCRIPTION - PAIN TYPE: TYPE: CHRONIC PAIN

## 2022-10-19 ASSESSMENT — PAIN SCALES - GENERAL: PAINLEVEL_OUTOF10: 7

## 2022-10-20 ENCOUNTER — APPOINTMENT (OUTPATIENT)
Dept: INTERVENTIONAL RADIOLOGY/VASCULAR | Age: 52
DRG: 466 | End: 2022-10-20
Payer: MEDICAID

## 2022-10-20 ENCOUNTER — APPOINTMENT (OUTPATIENT)
Dept: GENERAL RADIOLOGY | Age: 52
DRG: 466 | End: 2022-10-20
Payer: MEDICAID

## 2022-10-20 VITALS
WEIGHT: 190 LBS | TEMPERATURE: 97.7 F | DIASTOLIC BLOOD PRESSURE: 85 MMHG | OXYGEN SATURATION: 97 % | HEART RATE: 95 BPM | SYSTOLIC BLOOD PRESSURE: 122 MMHG | RESPIRATION RATE: 16 BRPM | BODY MASS INDEX: 25.18 KG/M2 | HEIGHT: 73 IN

## 2022-10-20 PROBLEM — T83.022A NEPHROSTOMY TUBE DISPLACED (HCC): Status: ACTIVE | Noted: 2022-10-20

## 2022-10-20 LAB
ANION GAP SERPL CALCULATED.3IONS-SCNC: 10 MMOL/L (ref 4–16)
APTT: 40.6 SECONDS (ref 25.1–37.1)
BACTERIA: NEGATIVE /HPF
BASOPHILS ABSOLUTE: 0 K/CU MM
BASOPHILS RELATIVE PERCENT: 0.4 % (ref 0–1)
BILIRUBIN URINE: NEGATIVE MG/DL
BLOOD, URINE: ABNORMAL
BUN BLDV-MCNC: 12 MG/DL (ref 6–23)
CALCIUM SERPL-MCNC: 9.7 MG/DL (ref 8.3–10.6)
CHLORIDE BLD-SCNC: 97 MMOL/L (ref 99–110)
CLARITY: CLEAR
CO2: 26 MMOL/L (ref 21–32)
COLOR: YELLOW
CREAT SERPL-MCNC: 0.9 MG/DL (ref 0.9–1.3)
DIFFERENTIAL TYPE: ABNORMAL
EOSINOPHILS ABSOLUTE: 0.2 K/CU MM
EOSINOPHILS RELATIVE PERCENT: 2.3 % (ref 0–3)
GFR SERPL CREATININE-BSD FRML MDRD: >60 ML/MIN/1.73M2
GLUCOSE BLD-MCNC: 155 MG/DL (ref 70–99)
GLUCOSE, URINE: NEGATIVE MG/DL
HCT VFR BLD CALC: 28.9 % (ref 42–52)
HEMOGLOBIN: 9.4 GM/DL (ref 13.5–18)
IMMATURE NEUTROPHIL %: 0.5 % (ref 0–0.43)
INR BLD: 0.92 INDEX
KETONES, URINE: NEGATIVE MG/DL
LEUKOCYTE ESTERASE, URINE: NEGATIVE
LYMPHOCYTES ABSOLUTE: 1.6 K/CU MM
LYMPHOCYTES RELATIVE PERCENT: 18.9 % (ref 24–44)
MCH RBC QN AUTO: 25.8 PG (ref 27–31)
MCHC RBC AUTO-ENTMCNC: 32.5 % (ref 32–36)
MCV RBC AUTO: 79.2 FL (ref 78–100)
MONOCYTES ABSOLUTE: 0.4 K/CU MM
MONOCYTES RELATIVE PERCENT: 5.2 % (ref 0–4)
NITRITE URINE, QUANTITATIVE: NEGATIVE
NUCLEATED RBC %: 0 %
PDW BLD-RTO: 18.6 % (ref 11.7–14.9)
PH, URINE: 6 (ref 5–8)
PLATELET # BLD: 399 K/CU MM (ref 140–440)
PMV BLD AUTO: 9.3 FL (ref 7.5–11.1)
POTASSIUM SERPL-SCNC: 3.7 MMOL/L (ref 3.5–5.1)
PROTEIN UA: NEGATIVE MG/DL
PROTHROMBIN TIME: 11.8 SECONDS (ref 11.7–14.5)
RBC # BLD: 3.65 M/CU MM (ref 4.6–6.2)
RBC URINE: 47 /HPF (ref 0–3)
SEGMENTED NEUTROPHILS ABSOLUTE COUNT: 6 K/CU MM
SEGMENTED NEUTROPHILS RELATIVE PERCENT: 72.7 % (ref 36–66)
SODIUM BLD-SCNC: 133 MMOL/L (ref 135–145)
SPECIFIC GRAVITY UA: 1.01 (ref 1–1.03)
SQUAMOUS EPITHELIAL: <1 /HPF
TOTAL IMMATURE NEUTOROPHIL: 0.04 K/CU MM
TOTAL NUCLEATED RBC: 0 K/CU MM
TRICHOMONAS: ABNORMAL /HPF
UROBILINOGEN, URINE: 0.2 MG/DL (ref 0.2–1)
WBC # BLD: 8.3 K/CU MM (ref 4–10.5)
WBC UA: ABNORMAL /HPF (ref 0–2)

## 2022-10-20 PROCEDURE — 6360000002 HC RX W HCPCS: Performed by: RADIOLOGY

## 2022-10-20 PROCEDURE — 50435 EXCHANGE NEPHROSTOMY CATH: CPT

## 2022-10-20 PROCEDURE — 2500000003 HC RX 250 WO HCPCS

## 2022-10-20 PROCEDURE — 74018 RADEX ABDOMEN 1 VIEW: CPT

## 2022-10-20 PROCEDURE — 85025 COMPLETE CBC W/AUTO DIFF WBC: CPT

## 2022-10-20 PROCEDURE — 6360000002 HC RX W HCPCS

## 2022-10-20 PROCEDURE — 2580000003 HC RX 258: Performed by: HOSPITALIST

## 2022-10-20 PROCEDURE — 6370000000 HC RX 637 (ALT 250 FOR IP): Performed by: HOSPITALIST

## 2022-10-20 PROCEDURE — 6360000004 HC RX CONTRAST MEDICATION

## 2022-10-20 PROCEDURE — 94761 N-INVAS EAR/PLS OXIMETRY MLT: CPT

## 2022-10-20 PROCEDURE — 6370000000 HC RX 637 (ALT 250 FOR IP): Performed by: STUDENT IN AN ORGANIZED HEALTH CARE EDUCATION/TRAINING PROGRAM

## 2022-10-20 PROCEDURE — 0T25X0Z CHANGE DRAINAGE DEVICE IN KIDNEY, EXTERNAL APPROACH: ICD-10-PCS | Performed by: RADIOLOGY

## 2022-10-20 PROCEDURE — 80048 BASIC METABOLIC PNL TOTAL CA: CPT

## 2022-10-20 PROCEDURE — 2580000003 HC RX 258

## 2022-10-20 PROCEDURE — 85610 PROTHROMBIN TIME: CPT

## 2022-10-20 PROCEDURE — 1200000000 HC SEMI PRIVATE

## 2022-10-20 PROCEDURE — 36415 COLL VENOUS BLD VENIPUNCTURE: CPT

## 2022-10-20 PROCEDURE — 85730 THROMBOPLASTIN TIME PARTIAL: CPT

## 2022-10-20 PROCEDURE — 6360000004 HC RX CONTRAST MEDICATION: Performed by: STUDENT IN AN ORGANIZED HEALTH CARE EDUCATION/TRAINING PROGRAM

## 2022-10-20 PROCEDURE — 81001 URINALYSIS AUTO W/SCOPE: CPT

## 2022-10-20 RX ORDER — ACETAMINOPHEN 325 MG/1
325 TABLET ORAL EVERY 6 HOURS PRN
Status: DISCONTINUED | OUTPATIENT
Start: 2022-10-20 | End: 2022-10-20 | Stop reason: HOSPADM

## 2022-10-20 RX ORDER — POTASSIUM CHLORIDE 7.45 MG/ML
10 INJECTION INTRAVENOUS PRN
Status: DISCONTINUED | OUTPATIENT
Start: 2022-10-20 | End: 2022-10-20 | Stop reason: HOSPADM

## 2022-10-20 RX ORDER — ACETAMINOPHEN 650 MG/1
650 SUPPOSITORY RECTAL EVERY 6 HOURS PRN
Status: DISCONTINUED | OUTPATIENT
Start: 2022-10-20 | End: 2022-10-20 | Stop reason: HOSPADM

## 2022-10-20 RX ORDER — MIDAZOLAM HYDROCHLORIDE 2 MG/2ML
INJECTION, SOLUTION INTRAMUSCULAR; INTRAVENOUS
Status: COMPLETED | OUTPATIENT
Start: 2022-10-20 | End: 2022-10-20

## 2022-10-20 RX ORDER — OXYCODONE AND ACETAMINOPHEN 7.5; 325 MG/1; MG/1
1 TABLET ORAL ONCE
Status: COMPLETED | OUTPATIENT
Start: 2022-10-20 | End: 2022-10-20

## 2022-10-20 RX ORDER — ONDANSETRON 2 MG/ML
4 INJECTION INTRAMUSCULAR; INTRAVENOUS EVERY 6 HOURS PRN
Status: DISCONTINUED | OUTPATIENT
Start: 2022-10-20 | End: 2022-10-20 | Stop reason: HOSPADM

## 2022-10-20 RX ORDER — ONDANSETRON 4 MG/1
4 TABLET, ORALLY DISINTEGRATING ORAL EVERY 8 HOURS PRN
Status: DISCONTINUED | OUTPATIENT
Start: 2022-10-20 | End: 2022-10-20 | Stop reason: HOSPADM

## 2022-10-20 RX ORDER — POLYETHYLENE GLYCOL 3350 17 G/17G
17 POWDER, FOR SOLUTION ORAL DAILY PRN
Status: DISCONTINUED | OUTPATIENT
Start: 2022-10-20 | End: 2022-10-20 | Stop reason: HOSPADM

## 2022-10-20 RX ORDER — FENTANYL CITRATE 0.05 MG/ML
INJECTION, SOLUTION INTRAMUSCULAR; INTRAVENOUS
Status: COMPLETED | OUTPATIENT
Start: 2022-10-20 | End: 2022-10-20

## 2022-10-20 RX ORDER — OXYCODONE AND ACETAMINOPHEN 10; 325 MG/1; MG/1
1 TABLET ORAL EVERY 6 HOURS PRN
Status: DISCONTINUED | OUTPATIENT
Start: 2022-10-20 | End: 2022-10-20

## 2022-10-20 RX ORDER — CYCLOBENZAPRINE HCL 10 MG
10 TABLET ORAL 3 TIMES DAILY PRN
Status: DISCONTINUED | OUTPATIENT
Start: 2022-10-20 | End: 2022-10-20 | Stop reason: HOSPADM

## 2022-10-20 RX ORDER — OXYCODONE HYDROCHLORIDE 10 MG/1
10 TABLET ORAL EVERY 6 HOURS PRN
Status: DISCONTINUED | OUTPATIENT
Start: 2022-10-20 | End: 2022-10-20 | Stop reason: HOSPADM

## 2022-10-20 RX ORDER — ALPRAZOLAM 0.5 MG/1
0.5 TABLET ORAL 3 TIMES DAILY PRN
Status: DISCONTINUED | OUTPATIENT
Start: 2022-10-20 | End: 2022-10-20 | Stop reason: HOSPADM

## 2022-10-20 RX ORDER — ACETAMINOPHEN 325 MG/1
650 TABLET ORAL EVERY 6 HOURS PRN
Status: DISCONTINUED | OUTPATIENT
Start: 2022-10-20 | End: 2022-10-20 | Stop reason: HOSPADM

## 2022-10-20 RX ORDER — SODIUM CHLORIDE 9 MG/ML
INJECTION, SOLUTION INTRAVENOUS PRN
Status: DISCONTINUED | OUTPATIENT
Start: 2022-10-20 | End: 2022-10-20 | Stop reason: HOSPADM

## 2022-10-20 RX ORDER — NALOXONE HYDROCHLORIDE 4 MG/.1ML
1 SPRAY NASAL PRN
Status: DISCONTINUED | OUTPATIENT
Start: 2022-10-20 | End: 2022-10-20

## 2022-10-20 RX ORDER — SODIUM CHLORIDE 0.9 % (FLUSH) 0.9 %
5-40 SYRINGE (ML) INJECTION EVERY 12 HOURS SCHEDULED
Status: DISCONTINUED | OUTPATIENT
Start: 2022-10-20 | End: 2022-10-20 | Stop reason: HOSPADM

## 2022-10-20 RX ORDER — SENNA PLUS 8.6 MG/1
1 TABLET ORAL 2 TIMES DAILY PRN
Status: DISCONTINUED | OUTPATIENT
Start: 2022-10-20 | End: 2022-10-20 | Stop reason: HOSPADM

## 2022-10-20 RX ORDER — ABIRATERONE ACETATE 250 MG/1
1000 TABLET ORAL DAILY
Status: DISCONTINUED | OUTPATIENT
Start: 2022-10-20 | End: 2022-10-20 | Stop reason: HOSPADM

## 2022-10-20 RX ORDER — SODIUM CHLORIDE 0.9 % (FLUSH) 0.9 %
5-40 SYRINGE (ML) INJECTION PRN
Status: DISCONTINUED | OUTPATIENT
Start: 2022-10-20 | End: 2022-10-20 | Stop reason: HOSPADM

## 2022-10-20 RX ORDER — POTASSIUM CHLORIDE 20 MEQ/1
40 TABLET, EXTENDED RELEASE ORAL PRN
Status: DISCONTINUED | OUTPATIENT
Start: 2022-10-20 | End: 2022-10-20 | Stop reason: HOSPADM

## 2022-10-20 RX ORDER — ALBUTEROL SULFATE 90 UG/1
2 AEROSOL, METERED RESPIRATORY (INHALATION) EVERY 4 HOURS PRN
Status: DISCONTINUED | OUTPATIENT
Start: 2022-10-20 | End: 2022-10-20 | Stop reason: HOSPADM

## 2022-10-20 RX ORDER — OXYCODONE HCL 10 MG/1
10 TABLET, FILM COATED, EXTENDED RELEASE ORAL EVERY 8 HOURS PRN
Qty: 11 EACH | Refills: 0 | Status: SHIPPED | OUTPATIENT
Start: 2022-10-20 | End: 2022-10-25

## 2022-10-20 RX ORDER — ENOXAPARIN SODIUM 100 MG/ML
40 INJECTION SUBCUTANEOUS DAILY
Status: DISCONTINUED | OUTPATIENT
Start: 2022-10-20 | End: 2022-10-20 | Stop reason: HOSPADM

## 2022-10-20 RX ORDER — OXYCODONE AND ACETAMINOPHEN 7.5; 325 MG/1; MG/1
2 TABLET ORAL 3 TIMES DAILY
Status: DISCONTINUED | OUTPATIENT
Start: 2022-10-20 | End: 2022-10-20 | Stop reason: HOSPADM

## 2022-10-20 RX ORDER — LANOLIN ALCOHOL/MO/W.PET/CERES
1000 CREAM (GRAM) TOPICAL DAILY
Status: DISCONTINUED | OUTPATIENT
Start: 2022-10-20 | End: 2022-10-20 | Stop reason: HOSPADM

## 2022-10-20 RX ORDER — TAMSULOSIN HYDROCHLORIDE 0.4 MG/1
0.4 CAPSULE ORAL DAILY
Status: DISCONTINUED | OUTPATIENT
Start: 2022-10-20 | End: 2022-10-20 | Stop reason: HOSPADM

## 2022-10-20 RX ORDER — BICALUTAMIDE 50 MG/1
50 TABLET, FILM COATED ORAL DAILY
Status: DISCONTINUED | OUTPATIENT
Start: 2022-10-20 | End: 2022-10-20 | Stop reason: HOSPADM

## 2022-10-20 RX ORDER — MAGNESIUM SULFATE IN WATER 40 MG/ML
2000 INJECTION, SOLUTION INTRAVENOUS PRN
Status: DISCONTINUED | OUTPATIENT
Start: 2022-10-20 | End: 2022-10-20 | Stop reason: HOSPADM

## 2022-10-20 RX ADMIN — MIDAZOLAM HYDROCHLORIDE 1 MG: 1 INJECTION, SOLUTION INTRAMUSCULAR; INTRAVENOUS at 09:13

## 2022-10-20 RX ADMIN — TAMSULOSIN HYDROCHLORIDE 0.4 MG: 0.4 CAPSULE ORAL at 10:47

## 2022-10-20 RX ADMIN — FENTANYL CITRATE 25 MCG: 50 INJECTION INTRAMUSCULAR; INTRAVENOUS at 09:46

## 2022-10-20 RX ADMIN — OXYCODONE AND ACETAMINOPHEN 2 TABLET: 7.5; 325 TABLET ORAL at 10:47

## 2022-10-20 RX ADMIN — CYCLOBENZAPRINE 10 MG: 10 TABLET, FILM COATED ORAL at 13:10

## 2022-10-20 RX ADMIN — FENTANYL CITRATE 50 MCG: 50 INJECTION INTRAMUSCULAR; INTRAVENOUS at 09:13

## 2022-10-20 RX ADMIN — MIDAZOLAM HYDROCHLORIDE 0.5 MG: 1 INJECTION, SOLUTION INTRAMUSCULAR; INTRAVENOUS at 09:22

## 2022-10-20 RX ADMIN — Medication 1000 MCG: at 10:47

## 2022-10-20 RX ADMIN — OXYCODONE AND ACETAMINOPHEN 2 TABLET: 7.5; 325 TABLET ORAL at 13:50

## 2022-10-20 RX ADMIN — OXYCODONE HYDROCHLORIDE 10 MG: 10 TABLET ORAL at 07:22

## 2022-10-20 RX ADMIN — IOPAMIDOL 20 ML: 755 INJECTION, SOLUTION INTRAVENOUS at 11:59

## 2022-10-20 RX ADMIN — ACETAMINOPHEN 650 MG: 325 TABLET ORAL at 13:10

## 2022-10-20 RX ADMIN — OXYCODONE AND ACETAMINOPHEN 1 TABLET: 7.5; 325 TABLET ORAL at 05:21

## 2022-10-20 RX ADMIN — BICALUTAMIDE 50 MG: 50 TABLET, FILM COATED ORAL at 10:47

## 2022-10-20 RX ADMIN — FENTANYL CITRATE 25 MCG: 50 INJECTION INTRAMUSCULAR; INTRAVENOUS at 09:22

## 2022-10-20 RX ADMIN — SODIUM CHLORIDE, PRESERVATIVE FREE 10 ML: 5 INJECTION INTRAVENOUS at 10:47

## 2022-10-20 RX ADMIN — ALPRAZOLAM 0.5 MG: 0.5 TABLET ORAL at 10:46

## 2022-10-20 RX ADMIN — MIDAZOLAM HYDROCHLORIDE 0.5 MG: 1 INJECTION, SOLUTION INTRAMUSCULAR; INTRAVENOUS at 09:47

## 2022-10-20 RX ADMIN — Medication 1 TABLET: at 10:47

## 2022-10-20 ASSESSMENT — PAIN DESCRIPTION - LOCATION
LOCATION: BACK
LOCATION: ARM
LOCATION: ARM
LOCATION: BACK

## 2022-10-20 ASSESSMENT — PAIN DESCRIPTION - ORIENTATION
ORIENTATION: RIGHT
ORIENTATION: LEFT;MID
ORIENTATION: MID;LEFT
ORIENTATION: RIGHT
ORIENTATION: LEFT

## 2022-10-20 ASSESSMENT — PAIN DESCRIPTION - DESCRIPTORS
DESCRIPTORS: ACHING
DESCRIPTORS: SORE;TENDER;DISCOMFORT
DESCRIPTORS: ACHING
DESCRIPTORS: SORE;TENDER

## 2022-10-20 ASSESSMENT — PAIN SCALES - GENERAL
PAINLEVEL_OUTOF10: 10
PAINLEVEL_OUTOF10: 7
PAINLEVEL_OUTOF10: 3
PAINLEVEL_OUTOF10: 10
PAINLEVEL_OUTOF10: 8
PAINLEVEL_OUTOF10: 10
PAINLEVEL_OUTOF10: 9

## 2022-10-20 ASSESSMENT — PAIN - FUNCTIONAL ASSESSMENT: PAIN_FUNCTIONAL_ASSESSMENT: NONE - DENIES PAIN

## 2022-10-20 NOTE — ED PROVIDER NOTES
Triage Chief Complaint:   Hematuria (Urostomy pulled while dressing change, blood noted in in tube)    [place]:  Today in the ED I had the pleasure of caring for Keara Molina who is a 47 y.o. male that presents to the emergency department. Complaining of hematuria. Context is patient. Has a history of lung, bladder cancer. Has a left-sided nephrostomy tube in place. Patient was getting wound care performed by nurse today. And during dressing change patient's urostomy tube was partially retracted so patient was sent here to the ED for evaluation. Because there was blood coming out of the nephrostomy tube. He denies any abdominal pain flank pain back pain. No lightheadedness or dizziness states he is otherwise baseline status of health. Denies any drainage from urostomy bag since onset hours ago.     ROS:  REVIEW OF SYSTEMS    At least 10 systems reviewed      All other review of systems are negative  See HPI and nursing notes for additional information       Past Medical History:   Diagnosis Date    CAD (coronary artery disease) 12/23/2013    cath negative per pt    Cancer ([address] 06/2021    pt reports he has lung cancer    History of TMJ disorder     Hypertension     Trigeminal neuralgia      Past Surgical History:   Procedure Laterality Date    ABDOMEN SURGERY      CARDIAC CATHETERIZATION  08/03/2016    CT BIOPSY PERCUTANEOUS SUPERFICIAL BONE  12/17/2021    CT BIOPSY PERCUTANEOUS SUPERFICIAL BONE 12/17/2021 [facility] [address] SCAN    CT NEEDLE BIOPSY LUNG PERCUTANEOUS  7/28/2021    CT NEEDLE BIOPSY LUNG PERCUTANEOUS 7/28/2021 [facility] CT SCAN    PORT SURGERY Right 8/13/2021    MEDIPORT INSERTION performed by Chloe Collier MD at [facility] OR     Family History   Problem Relation Age of Onset    High Blood Pressure Mother     High Blood Pressure Sister     Cancer Sister      Social History     Socioeconomic History    Marital status:      Spouse name: Not on file    Number of children: 5    Years of education: Not on file Highest education level: Not on file   Occupational History    Not on file   Tobacco Use    Smoking status: Every Day     Packs/day: 2.00     Years: 39.00     Pack years: 78.00     Types: Cigarettes    Smokeless tobacco: Never    Tobacco comments:     smokes 1-2 a day   Vaping Use    Vaping Use: Never used   Substance and Sexual Activity    Alcohol use: Yes     Alcohol/week: 6.0 standard drinks     Types: 6 Cans of beer per week     Comment: per week (24 oz beers)     Drug use: Yes     Types: Marijuana Austyn Hail)     Comment: last 12/1    Sexual activity: Yes     Partners: Female   Other Topics Concern    Not on file   Social History Narrative    Not on file     Social Determinants of Health     Financial Resource Strain: Not on file   Food Insecurity: Not on file   Transportation Needs: Not on file   Physical Activity: Not on file   Stress: Not on file   Social Connections: Not on file   Intimate Partner Violence: Not on file   Housing Stability: Not on file     No current facility-administered medications for this encounter. Current Outpatient Medications   Medication Sig Dispense Refill    tamsulosin (FLOMAX) 0.4 MG capsule Take 1 capsule by mouth daily 30 capsule 0    oxyCODONE-acetaminophen (PERCOCET)  MG per tablet Take 1 tablet by mouth every 6 hours as needed for Pain (per pt for breakthrough pain). cyclobenzaprine (FLEXERIL) 10 MG tablet Take 10 mg by mouth 3 times daily as needed for Muscle spasms      ALPRAZolam (XANAX) 0.5 MG tablet Take 0.5 mg by mouth 3 times daily as needed for Sleep or Anxiety (Twice Daily PRN). predniSONE (DELTASONE) 5 MG tablet  (Patient not taking: Reported on 10/12/2022)      abiraterone acetate (ZYTIGA) 250 MG tablet Take 1,000 mg by mouth daily (Patient not taking: Reported on 10/12/2022)      oxyCODONE-acetaminophen (PERCOCET) 7.5-325 MG per tablet Take 2 tablets by mouth in the morning, at noon, and at bedtime.  Percocet 15's 3 times daily      naloxone 4 MG/0.1ML LIQD nasal spray 1 spray by Nasal route as needed for Opioid Reversal 1 each 5    Calcium Carbonate-Vitamin D (OYSTER SHELL CALCIUM/D) 500-200 MG-UNIT TABS Take 1 tablet by mouth daily 30 tablet 3    cyanocobalamin ([employer] VITAMIN B12) 1000 MCG tablet Take 1 tablet by mouth daily 30 tablet 3    bicalutamide (CASODEX) 50 MG chemo tablet Take 1 tablet by mouth daily (Patient not taking: Reported on 10/12/2022) 30 tablet 11    ondansetron (ZOFRAN) 8 MG tablet take 1 tablet by mouth every 8 hours if needed for nausea and vomiting      Sennosides (SENNA) 8.6 MG CAPS Take 1 capsule by mouth 2 times daily as needed (constipation) (Patient not taking: Reported on 10/12/2022) 20 capsule 0    dexamethasone (DECADRON) 4 MG tablet Two tablets the day before treatment, one table daily for four days starting the day after chemotherapy 18 tablet 0    albuterol sulfate HFA (PROVENTIL HFA) 108 (90 Base) MCG/ACT inhaler Inhale 2 puffs into the lungs every 4 hours as needed for Wheezing or Shortness of Breath With spacer (and mask if indicated). Thanks. 1 Inhaler 1     Allergies   Allergen Reactions    Tramadol Other (See Comments)     seizures    Morphine Hallucinations    Vicodin [Hydrocodone-Acetaminophen] Hives       Nursing Notes Reviewed    Physical Exam:  ED Triage Vitals [10/19/22 2216]   Enc Vitals Group      BP (!) 139/95      Heart Rate 99      Resp 18      Temp 97.9 °F (36.6 °C)      Temp Source Oral      SpO2 97 %      Weight 190 lb (86.2 kg)      Height 6' 1\" (1.854 m)      Head Circumference       Peak Flow       Pain Score       Pain Loc       Pain Edu? Excl. in [address]? General :Patient is awake alert oriented person place and time no acute distress nontoxic appearing  HEENT: Pupils are equally round and reactive to light extraocular motors are intact conjunctivae clear sclerae white there is no injection no icterus. Nose without any rhinorrhea or epistaxis.    Oral mucosa is moist no exudate buccal mucosa shows no ulcerations. Uvula is midline    Neck: Neck is supple full range of motion trachea midline thyroid nonpalpable  Cardiac: Heart regular rate rhythm no murmurs rubs clicks or gallops  Lungs: Lungs are clear to auscultation there is no wheezing rhonchi or rales. There is no use of accessory muscles no nasal flaring identified. Chest wall: There is no tenderness to palpation over the chest wall or over ribs  Abdomen: Abdomen is soft nontender nondistended. There is no firm or pulsatile masses no rebound rigidity or guarding negative Davis's negative McBurney, no peritoneal signs. Left flank shows patent stoma. Nephrostomy tube in place however clearly retracted roughly 5 to 6 cm. No surrounding erythema or discharge bleeding or crepitus. Suprapubic:  there is no tenderness to palpation over the external bladder   Musculoskeletal: 5 out of 5 strength in all 4 extremities full flexion extension abduction and adduction supination pronation of all extremities and all digits. No obvious muscle atrophy is noted. No focal muscle deficits are appreciated  Dermatology: Skin is warm and dry there is no obvious abscesses lacerations or lesions noted  Psych: Mentation is grossly normal cognition is grossly normal. Affect is appropriate  Neuro: Motor intact sensory intact cranial nerves II through XII are intact level of consciousness is normal cerebellar function is normal reflexes are grossly normal. No evidence of incontinence or loss of bowel or bladder no saddle anesthesia noted Lymphatic: There is no submandibular or cervical adenopathy appreciated.         I have reviewed and interpreted all of the currently available lab results from this visit (if applicable):  Results for orders placed or performed during the hospital encounter of 02/13/67   Basic Metabolic Panel   Result Value Ref Range    Sodium 133 (L) 135 - 145 MMOL/L    Potassium 3.7 3.5 - 5.1 MMOL/L    Chloride 97 (L) 99 - 110 mMol/L    CO2 26 21 - 32 MMOL/L    Anion Gap 10 4 - 16    BUN 12 6 - 23 MG/DL    Creatinine 0.9 0.9 - 1.3 MG/DL    Est, Glom Filt Rate >60 >60 mL/min/1.73m2    Glucose 155 (H) 70 - 99 MG/DL    Calcium 9.7 8.3 - 10.6 MG/DL   Urinalysis   Result Value Ref Range    Color, UA YELLOW YELLOW    Clarity, UA CLEAR CLEAR    Glucose, Urine NEGATIVE NEGATIVE MG/DL    Bilirubin Urine NEGATIVE NEGATIVE MG/DL    Ketones, Urine NEGATIVE NEGATIVE MG/DL    Specific Gravity, UA 1.010 1.001 - 1.035    Blood, Urine LARGE NUMBER OR AMOUNT OF (A) NEGATIVE    pH, Urine 6.0 5.0 - 8.0    Protein, UA NEGATIVE NEGATIVE MG/DL    Urobilinogen, Urine 0.2 0.2 - 1.0 MG/DL    Nitrite Urine, Quantitative NEGATIVE NEGATIVE    Leukocyte Esterase, Urine NEGATIVE NEGATIVE   CBC with Auto Differential   Result Value Ref Range    WBC 8.3 4.0 - 10.5 K/CU MM    RBC 3.65 (L) 4.6 - 6.2 M/CU MM    Hemoglobin 9.4 (L) 13.5 - 18.0 GM/DL    Hematocrit 28.9 (L) 42 - 52 %    MCV 79.2 78 - 100 FL    MCH 25.8 (L) 27 - 31 PG    MCHC 32.5 32.0 - 36.0 %    RDW 18.6 (H) 11.7 - 14.9 %    Platelets 017 702 - 161 K/CU MM    MPV 9.3 7.5 - 11.1 FL    Differential Type AUTOMATED DIFFERENTIAL     Segs Relative 72.7 (H) 36 - 66 %    Lymphocytes % 18.9 (L) 24 - 44 %    Monocytes % 5.2 (H) 0 - 4 %    Eosinophils % 2.3 0 - 3 %    Basophils % 0.4 0 - 1 %    Segs Absolute 6.0 K/CU MM    Lymphocytes Absolute 1.6 K/CU MM    Monocytes Absolute 0.4 K/CU MM    Eosinophils Absolute 0.2 K/CU MM    Basophils Absolute 0.0 K/CU MM    Nucleated RBC % 0.0 %    Total Nucleated RBC 0.0 K/CU MM    Total Immature Neutrophil 0.04 K/CU MM    Immature Neutrophil % 0.5 (H) 0 - 0.43 %   Microscopic Urinalysis   Result Value Ref Range    RBC, UA 47 (H) 0 - 3 /HPF    WBC, UA NONE SEEN 0 - 2 /HPF    Bacteria, UA NEGATIVE NEGATIVE /HPF    Squam Epithel, UA <1 /HPF    Trichomonas, UA NONE SEEN NONE SEEN /HPF      Radiographs (if obtained):  [] The following radiograph was interpreted by myself in the absence of a radiologist:   [] Radiologist's Report Reviewed:  XR ABDOMEN (KUB) (SINGLE AP VIEW)   Final Result   1. Left nephrostomy tube tip projects over the expected location of the left   kidney. 2. No radiographically apparent urolithiasis. EKG (if obtained):   Please See Note of attending physician for EKG interpretation. Chart review shows recent radiograph(s):  XR SHOULDER RIGHT (MIN 2 VIEWS)    Result Date: 10/2/2022  EXAMINATION: TWO XRAY VIEWS OF THE RIGHT SHOULDER 10/2/2022 2:04 am COMPARISON: None. HISTORY: ORDERING SYSTEM PROVIDED HISTORY: trauma TECHNOLOGIST PROVIDED HISTORY: Right Shoulder - XR Portable Reason for exam:->trauma Reason for Exam: right shoulder pain, trauma Additional signs and symptoms: right shoulder pain, trauma FINDINGS: Right-sided chest port is seen. Multifocal sclerotic areas are seen within the visualized osseous structures, likely related to metastatic disease. There is no acute fracture, dislocation, or bone destruction. The acromioclavicular joint is unremarkable. There is no significant soft tissue swelling. Sclerotic lesions are seen involving the right shoulder, concerning for metastatic disease. No pathologic fracture. CT HEAD WO CONTRAST    Result Date: 10/11/2022  EXAMINATION: CT OF THE HEAD WITHOUT CONTRAST  10/11/2022 3:48 am TECHNIQUE: CT of the head was performed without the administration of intravenous contrast. Automated exposure control, iterative reconstruction, and/or weight based adjustment of the mA/kV was utilized to reduce the radiation dose to as low as reasonably achievable. COMPARISON: 03/11/2022 HISTORY: ORDERING SYSTEM PROVIDED HISTORY: numbness TECHNOLOGIST PROVIDED HISTORY: Has a \"code stroke\" or \"stroke alert\" been called? ->No Reason for exam:->numbness Decision Support Exception - unselect if not a suspected or confirmed emergency medical condition->Emergency Medical Condition (MA) Reason for Exam:  right sided Arm Pain; Cold Extremity Relevant Medical/Surgical History: hx of bone cancer FINDINGS: BRAIN/VENTRICLES: Motion artifact in the lower images but without convincing evidence for intracranial hemorrhage. There is no extra-axial fluid collection. Small calcifications are present at both globus pallidus. No mass effect, midline shift, or focal sulcal effacement is identified. Evaluation of the gray-white junction is suboptimal but without evidence of territorial infarct. The ventricles are not dilated. ORBITS: The visualized portion of the orbits demonstrate no acute abnormality. SINUSES: The visualized paranasal sinuses and mastoid air cells demonstrate no acute abnormality. SOFT TISSUES/SKULL:  Areas of increased sclerosis within the skull. Low lying position of the right mandibular condyle at the temporomandibular joint. .     Motion artifact on the lower images. No convincing evidence for acute intracranial hemorrhage. No mass effect, midline shift, or focal sulcal effacement. The ventricles are not dilated. Low lying position of the right mandibular condyle at the temporomandibular joint suggesting subluxation. Areas of increased sclerosis in the skull concerning for sclerotic metastases. XR CHEST PORTABLE    Result Date: 10/11/2022  EXAMINATION: ONE XRAY VIEW OF THE CHEST 10/11/2022 8:04 pm COMPARISON: August 9, 2022 HISTORY: ORDERING SYSTEM PROVIDED HISTORY: Hypoxia, pneumothorax TECHNOLOGIST PROVIDED HISTORY: Reason for exam:->Hypoxia, pneumothorax Reason for Exam: Hypoxia, pneumothorax Additional signs and symptoms: na Relevant Medical/Surgical History: hypertension FINDINGS: The heart is of normal size. There is a right-sided chest port with its tip in the superior vena cava. There is airspace opacity in the left upper lobe, likely related to lobar atelectasis and known lung mass. There is no pleural effusion or pneumothorax. There is known diffuse osseous metastasis.      Stable chest Airspace opacity in the left upper lobe, likely related to lobar atelectasis and known lung mass. Known diffuse osseous metastasis. CTA CHEST W CONTRAST    Result Date: 10/11/2022  EXAMINATION: CTA OF THE CHEST 10/11/2022 4:11 am TECHNIQUE: CTA of the chest was performed after the administration of intravenous contrast.  Multiplanar reformatted images are provided for review. MIP images are provided for review. Automated exposure control, iterative reconstruction, and/or weight based adjustment of the mA/kV was utilized to reduce the radiation dose to as low as reasonably achievable. COMPARISON: 02/08/2022, 08/09/2022 HISTORY: ORDERING SYSTEM PROVIDED HISTORY: hemoptysis TECHNOLOGIST PROVIDED HISTORY: Reason for exam:->hemoptysis Reason for Exam: hemoptysis Relevant Medical/Surgical History: hx of bone cancer FINDINGS: Pulmonary Arteries: Suboptimal opacification of the pulmonary arteries. No central pulmonary embolus. No definite lobar pulmonary embolus. Motion artifact and contrast bolus timing limits evaluation of the segmental pulmonary arteries. Mediastinum: No thoracic adenopathy. Heart size is normal.  No pericardial effusion. Thoracic aorta is normal caliber. Lungs and pleura: Masslike consolidation in the perihilar region and left upper lobe corresponds to the prior CT, with suspected mass measuring up to 3.8 by 3.2 cm, previously 5.2 x 3 cm. Persistent complete collapse of the left upper lobe. Atelectasis has developed in the infrahilar left lower lobe with trace left pleural effusion. Trace left anterior pneumothorax measures up to 4 mm between the pleural margins. The right lung is clear. No central endobronchial lesion is otherwise present. Upper abdomen: Limited images of the upper abdomen demonstrate no significant abnormality. Bones and soft tissues: Diffuse bony sclerosis compatible with multifocal metastatic disease.   The extent of sclerosis and metastatic lesions has significantly progressed with diffuse osseous involvement throughout the bones. No superimposed pathologic fracture is noted. 1. Suboptimal opacification of the pulmonary arteries. No central pulmonary embolus. No definite lobar pulmonary embolus. Motion artifact and contrast bolus timing limits evaluation of the segmental pulmonary arteries. 2. New trace left anterior pneumothorax measuring 4 mm. 3. Otherwise no significant change since 08/09/2022. Complete collapse of the left upper lobe with persistent left perihilar 3.8 cm mass obscured by the surrounding atelectasis. 4. Trace left pleural effusion 5. Mild infrahilar left lower lobe atelectasis 6. Osseous metastatic disease with diffuse sclerotic osseous metastatic lesions. No pathologic fracture. IR GUIDED NEPHROSTOMY CATH EXCHANGE    Result Date: 10/12/2022  PROCEDURE: IR NEPHROSTOMY TUBE CHANGE 10/12/2022 HISTORY: ORDERING SYSTEM PROVIDED HISTORY: neph exchange TECHNOLOGIST PROVIDED HISTORY: Reason for exam:->neph exchange TECHNIQUE: Maximum sterile barrier technique including hand hygiene, skin prep and sterile ultrasound technique utilized for procedure. Following informed consent, pause a confirm/time-out patient is placed in prone position on fluoroscopic table. Previously placed right nephrostomy catheter and entry site were prepped and draped in sterile fashion. Antegrade nephrostogram was performed. Guidewires advanced over which previously placed malfunctioning nephrostomy tube was exchanged for new 10.2 [address] external drainage catheter/nephrostomy tube. Catheter tip was curled in right renal pelvis and fixed to the patient's skin. Dressing applied. External drainage bag applied. Patient tolerated procedure well. CONTRAST: 3 cc Isovue 370 SEDATION: None FLUOROSCOPY DOSE AND TYPE OR TIME AND EXPOSURES: 0.6 minutes fluoroscopy time AK: 38 mGy DESCRIPTION OF PROCEDURE: Informed consent was obtained after a detailed explanation of the procedure including risks, benefits, and alternatives. Universal protocol was observed. Sterile gowns, masks, hats and gloves utilized for maximal sterile barrier. As above in technique section FINDINGS: Antegrade nephrostogram: Previously placed left nephrostomy catheter projects in satisfactory position. Contrast opacifies the decompressed left renal pelvis and visualized portion of the ureter. Intraprocedural images confirm exchange of previously placed catheter with pigtail tip of new catheter curled in the right renal pelvis. No complication suggested. No evidence of hydronephrosis or contrast extravasation. Previously placed right nephrostomy catheter exchange for new [address] external drainage catheter with tip curled in the left renal pelvis. No complication suggested. MDM:     Interventions given this visit: No orders of the defined types were placed in this encounter. Patient presents today to the emergency department with nephrostomy tube complication. Good renal function. Urinalysis reveals no evidence of infection. Physical examination does reveal stoma patent. With nephrostomy retracted nearly 6 to 7 cm. X-ray does reveal nephrostomy tube is in place near the kidney. However I did speak to on-call urologist Dr. Sona Garcia who did advise trying to flush said tube. Our charge nurse did try to flush the tube. The tube flushes successfully. However immediately comes out of the stoma. Consistent with the tube not being in place. For this reason patient will be in admitted to the hospital as patient will require interventional radiology evaluation. And likely tube replacement. Patient is otherwise stable and comfortable. Worsening symptoms occur. I independently managed patient today in the ED.   BP (!) 145/92   Pulse 99   Temp 97.9 °F (36.6 °C) (Oral)   Resp 18   Ht 6' 1\" (1.854 m)   Wt 190 lb (86.2 kg)   SpO2 100%   BMI 25.07 kg/m²       Clinical Impression:  1.  Nephrostomy complication ([address]        Disposition referral (if applicable):  No follow-up provider specified. Disposition medications (if applicable):  New Prescriptions    No medications on file         Comment: Please note this report has been produced using speech recognition software and may contain errors related to that system including errors in grammar, punctuation, and spelling, as well as words and phrases that may be inappropriate. If there are any questions or concerns please feel free to contact the dictating provider for clarification.       Hammad Rich, [phone] [place] [address]  10/20/22 2251

## 2022-10-20 NOTE — DISCHARGE INSTRUCTIONS
Nephrostomy Tube Care: Care Instructions  Your Care Instructions     A nephrostomy tube is a thin catheter placed into your kidney to drain urine. You may have one tube in a kidney or two tubes, one in each kidney. The urine collects in a bag attached to the tube. In most cases, the bag is attached to your leg. Sometimes the catheter tube has a valve that lets you drain the urine into the toilet or other container. You may need a nephrostomy tube if you have a blockage or a hole in your urinary tract. The blockage may be caused by a kidney stone, infection, scar tissue, or a tumor. If you have only one tube, you still need to urinate. Your other kidney will still produce urine that will drain into your bladder. Having a nephrostomy tube in for a long time increases the risk of getting an infection. Nephrostomy tube care focuses on preventing infection. Follow-up care is a key part of your treatment and safety. Be sure to make and go to all appointments, and call your doctor if you are having problems. It's also a good idea to know your test results and keep a list of the medicines you take. How can you care for yourself at home? Wash your hands before you handle the nephrostomy tube. Clean the area around the tube with soap and water every day. Keep the drainage bag lower than your kidney to keep urine from backing up. If you have been told you can reuse your bag, you can clean the bag after removing it from the tube. Use another container to collect your urine while you clean the bag. To clean the bag, fill it with 2 parts vinegar to 3 parts water, and let it stand for 20 minutes. Then empty it out, and let it air dry. Empty the drainage bag before it is completely full or every 2 to 3 hours. Do not swim or take baths while you have a nephrostomy tube. You can shower after wrapping the end of the nephrostomy tube with plastic wrap.   Change the dressing around the nephrostomy tube about every 3 days or when it gets wet or dirty. A nurse will teach you how to change the dressing. When should you call for help? Call your doctor now or seek immediate medical care if:    You have new or worse symptoms of a kidney infection. These may include:  Pain or burning when you urinate. A frequent need to urinate without being able to pass much urine. Pain in the flank, which is just below the rib cage and above the waist on either side of the back. Blood in the urine. A fever. You are vomiting or nauseated. Your tube leaks. Urine does not collect in the drainage bag. You have pain or bleeding around the tube. Watch closely for changes in your health, and be sure to contact your doctor if:    You do not get better as expected. Current as of: 2022               Content Version: 13.4  © 0500-7905 [employer]. Care instructions adapted under license by [facility] [place] ([facility]). If you have questions about a medical condition or this instruction, always ask your healthcare professional. [place] [address] any warranty or liability for your use of this information. oxycodone  Pronunciation:  freda LISA done  Brand:  Oxaydo, OxyCONTIN, Oxyfast, OxyIR, Roxicodone, Xtampza ER  What is the most important information I should know about oxycodone? MISUSE OF OPIOID MEDICINE CAN CAUSE ADDICTION, OVERDOSE, OR DEATH. Keep the medication in a place where others cannot get to it. Taking opioid medicine during pregnancy may cause life-threatening withdrawal symptoms in the . Fatal side effects can occur if you use opioid medicine with alcohol, or with other drugs that cause drowsiness or slow your breathing. What is oxycodone? Oxycodone is an opioid pain medication used to treat moderate to severe pain. The extended-release form of oxycodone is for around-the-clock treatment of pain and should not  be used on an as-needed basis for pain.   Oxycodone may also be used for purposes not listed in this medication guide. What should I discuss with my healthcare provider before using oxycodone? You should not use oxycodone if you are allergic to it, or if you have:  severe asthma or breathing problems; or  a blockage in your stomach or intestines. You should not use oxycodone unless you are already using a similar opioid medicine and are tolerant to it. Most brands of oxycodone are not approved for use in people under 18. OxyContin should not be given to a child younger than 6years old. Tell your doctor if you have ever had:  breathing problems, sleep apnea;  a head injury, or seizures;  drug or alcohol addiction, or mental illness;  liver or kidney disease;  urination problems; or  problems with your gallbladder, pancreas, or thyroid. If you use opioid medicine while you are pregnant, your baby could become dependent on the drug. This can cause life-threatening withdrawal symptoms in the baby after it is born. Babies born dependent on opioids may need medical treatment for several weeks. Ask a doctor before using opioid medicine if you are breastfeeding. Tell your doctor if you notice severe drowsiness or slow breathing in the nursing baby. How should I use oxycodone? Follow the directions on your prescription label and read all medication guides. Never use oxycodone in larger amounts, or for longer than prescribed. Tell your doctor if you feel an increased urge to take more of this medicine. Never share opioid medicine with another person, especially someone with a history of drug abuse or addiction. MISUSE CAN CAUSE ADDICTION, OVERDOSE, OR DEATH. Keep the medication in a place where others cannot get to it. Selling or giving away opioid medicine is against the law. Stop taking all other around-the-clock opioid pain medicines when you start taking extended-release oxycodone. Take oxycodone with food.   Swallow the capsule or tablet whole to avoid exposure to a potentially fatal overdose. Do not crush, chew, break, open, or dissolve. If you cannot swallow a capsule whole, open it and sprinkle the medicine into a spoonful of pudding or applesauce. Swallow the mixture right away without chewing. Do not save it for later use. Never crush or break an oxycodone pill to inhale the powder or mix it into a liquid to inject the drug into your vein. This can cause in death. Measure liquid medicine carefully. Use the dosing syringe provided, or use a medicine dose-measuring device (not a kitchen spoon). You should not stop using oxycodone suddenly. Follow your doctor's instructions about tapering your dose. Store at room temperature, away from heat, moisture, and light. Keep track of your medicine. Oxycodone is a drug of abuse and you should be aware if anyone is using your medicine improperly or without a prescription. Do not keep leftover opioid medication. Just one dose can cause death in someone using this medicine accidentally or improperly. Ask your pharmacist where to locate a drug take-back disposal program. If there is no take-back program, flush the unused medicine down the toilet. What happens if I miss a dose? Since oxycodone is used for pain, you are not likely to miss a dose. Skip any missed dose if it is almost time for your next dose. Do not use two doses at one time. What happens if I overdose? Seek emergency medical attention or call the Poison Help line at 1-741.347.3320. An opioid overdose can be fatal, especially in a child or other person using the medicine without a prescription. Overdose symptoms may include severe drowsiness, pinpoint pupils, slow breathing, or no breathing. Your doctor may recommend you get naloxone (a medicine to reverse an opioid overdose) and keep it with you at all times. A person caring for you can give the naloxone if you stop breathing or don't wake up.  Your caregiver must still get emergency medical help and may need to perform CPR (cardiopulmonary resuscitation) on you while waiting for help to arrive. Anyone can buy naloxone from a pharmacy or local health department. Make sure any person caring for you knows where you keep naloxone and how to use it. What should I avoid while using oxycodone? Do not drink alcohol. Dangerous side effects or death could occur. Avoid driving or operating machinery until you know how oxycodone will affect you. Dizziness or severe drowsiness can cause falls or other accidents. Avoid medication errors. Always check the brand and strength of oxycodone you get from the pharmacy. What are the possible side effects of oxycodone? Get emergency medical help if you have signs of an allergic reaction: hives; difficult breathing; swelling of your face, lips, tongue, or throat. Opioid medicine can slow or stop your breathing, and death may occur. A person caring for you should give naloxone and/or seek emergency medical attention if you have slow breathing with long pauses, blue colored lips, or if you are hard to wake up. Call your doctor at once if you have:  noisy breathing, sighing, shallow breathing, breathing that stops during sleep;  a slow heart rate or weak pulse;  a light-headed feeling, like you might pass out;  confusion, unusual thoughts or behavior;  seizure (convulsions);  low cortisol levels -- nausea, vomiting, loss of appetite, dizziness, worsening tiredness or weakness; or  high levels of serotonin in the body --agitation, hallucinations, fever, sweating, shivering, fast heart rate, muscle stiffness, twitching, loss of coordination, nausea, vomiting, diarrhea. Serious breathing problems may be more likely in older adults and in those who are debilitated or have wasting syndrome or chronic breathing disorders. Common side effects may include:  drowsiness, headache, dizziness, tiredness; or  constipation, stomach pain, nausea, vomiting.   This is not a complete list of side effects and others may occur. Call your doctor for medical advice about side effects. You may report side effects to FDA at 4-652-AVV-8882. What other drugs will affect oxycodone? You may have breathing problems or withdrawal symptoms if you start or stop taking certain other medicines. Tell your doctor if you also use an antibiotic, antifungal medication, heart or blood pressure medication, seizure medication, or medicine to treat HIV or hepatitis C. Opioid medication can interact with many other drugs and cause dangerous side effects or death. Be sure your doctor knows if you also use:  cold or allergy medicines, bronchodilator asthma/COPD medication, or a diuretic (\"water pill\");  medicines for motion sickness, irritable bowel syndrome, or overactive bladder;  other opioids --opioid pain medicine or prescription cough medicine;  a sedative like Valium --diazepam, alprazolam, lorazepam, Xanax, Klonopin, Versed, and others;  drugs that make you sleepy or slow your breathing --a sleeping pill, muscle relaxer, medicine to treat mood disorders or mental illness; or  drugs that affect serotonin levels in your body --a stimulant, or medicine for depression, Parkinson's disease, migraine headaches, serious infections, or nausea and vomiting. This list is not complete and many other drugs may affect oxycodone. This includes prescription and over-the-counter medicines, vitamins, and herbal products. Not all possible drug interactions are listed here. Where can I get more information? Your pharmacist can provide more information about oxycodone. Remember, keep this and all other medicines out of the reach of children, never share your medicines with others, and use this medication only for the indication prescribed. Every effort has been made to ensure that the information provided by [address] is accurate, up-to-date, and complete, but no guarantee is made to that effect.  Drug information contained herein may be time sensitive. [employer] information has been compiled for use by healthcare practitioners and consumers in the [place] and therefore [employer] does not warrant that uses outside of the [place] are appropriate, unless specifically indicated otherwise. [facility]'s drug information does not endorse drugs, diagnose patients or recommend therapy. [facility] drug information is an informational resource designed to assist licensed healthcare practitioners in caring for their patients and/or to serve consumers viewing this service as a supplement to, and not a substitute for, the expertise, skill, knowledge and judgment of healthcare practitioners. The absence of a warning for a given drug or drug combination in no way should be construed to indicate that the drug or drug combination is safe, effective or appropriate for any given patient. [facility] does not assume any responsibility for any aspect of healthcare administered with the aid of information [facility] provides. The information contained herein is not intended to cover all possible uses, directions, precautions, warnings, drug interactions, allergic reactions, or adverse effects. If you have questions about the drugs you are taking, check with your doctor, nurse or pharmacist.  Copyright 0104-5794 [address]. Version: 14.02. Revision date: 1/28/2021. Care instructions adapted under license by [facility] ([facility]). If you have questions about a medical condition or this instruction, always ask your healthcare professional. [place] [address] any warranty or liability for your use of this information.

## 2022-10-20 NOTE — ED TRIAGE NOTES
Pt states has stage 4 kidney CA with a left side urostomy. States wife was changing dressing & accidentally pulled on ostomy, blood noted in tube. Pt states ostomy was just replaced last week.

## 2022-10-20 NOTE — DISCHARGE SUMMARY
Discharge Medications:        Medication List        START taking these medications      oxyCODONE 10 MG extended release tablet  Commonly known as: OXYCONTIN  Take 1 tablet by mouth every 8 hours as needed for Pain for up to 5 days. CONTINUE taking these medications      albuterol sulfate  (90 Base) MCG/ACT inhaler  Commonly known as: Proventil HFA  Inhale 2 puffs into the lungs every 4 hours as needed for Wheezing or Shortness of Breath With spacer (and mask if indicated). Thanks.      ALPRAZolam 0.5 MG tablet  Commonly known as: XANAX     cyanocobalamin 1000 MCG tablet  Commonly known as: [employer] VITAMIN B12  Take 1 tablet by mouth daily     cyclobenzaprine 10 MG tablet  Commonly known as: FLEXERIL     naloxone 4 MG/0.1ML Liqd nasal spray  1 spray by Nasal route as needed for Opioid Reversal     ondansetron 8 MG tablet  Commonly known as: ZOFRAN     Oyster Shell Calcium/D 500-200 MG-UNIT Tabs  Take 1 tablet by mouth daily     tamsulosin 0.4 MG capsule  Commonly known as: FLOMAX  Take 1 capsule by mouth daily            STOP taking these medications      dexamethasone 4 MG tablet  Commonly known as: DECADRON     oxyCODONE-acetaminophen  MG per tablet  Commonly known as: PERCOCET     oxyCODONE-acetaminophen 7.5-325 MG per tablet  Commonly known as: PERCOCET     predniSONE 5 MG tablet  Commonly known as: Conner Agee your doctor about these medications      abiraterone acetate 250 MG tablet  Commonly known as: ZYTIGA     bicalutamide 50 MG chemo tablet  Commonly known as: Casodex  Take 1 tablet by mouth daily     Senna 8.6 MG Caps  Take 1 capsule by mouth 2 times daily as needed (constipation)               Where to Get Your Medications        You can get these medications from any pharmacy    Bring a paper prescription for each of these medications  oxyCODONE 10 MG extended release tablet        Objective Findings at Discharge:   /85   Pulse 95   Temp 97.7 °F (36.5 °C) (Oral)   Resp 16   Ht 6' 1\" (1.854 m)   Wt 190 lb (86.2 kg)   SpO2 97%   BMI 25.07 kg/m²       Physical Exam:   General: NAD  Eyes: EOMI  ENT: neck supple  Cardiovascular: Regular rate. Respiratory: Clear to auscultation  Gastrointestinal: Nephrostomy drain in place. No bleeding / soakage around the tube. Genitourinary: no suprapubic tenderness  Musculoskeletal: No edema  Skin: warm, dry  Neuro: Alert. Psych: Mood appropriate. Labs and Imaging   XR ABDOMEN (KUB) (SINGLE AP VIEW)    Result Date: 10/20/2022  EXAMINATION: ONE SUPINE XRAY VIEW(S) OF THE ABDOMEN 10/20/2022 1:45 am COMPARISON: 03/11/2022 HISTORY: ORDERING SYSTEM PROVIDED HISTORY: kub TECHNOLOGIST PROVIDED HISTORY: Abd KUB Reason for exam:->kub Reason for Exam: hematuria FINDINGS: New left nephrostomy tube in place with the tip projecting over the expected location of the left kidney. No radiographically apparent urolithiasis. Nonobstructive bowel gas pattern. No acute osseous abnormality. Stable diffuse sclerotic appearance of the visualized osseous structures. 1. Left nephrostomy tube tip projects over the expected location of the left kidney. 2. No radiographically apparent urolithiasis. IR GUIDED NEPHROSTOMY CATH EXCHANGE    Result Date: 10/20/2022  PROCEDURE: IR NEPHROSTOMY TUBE CHANGE MODERATE CONSCIOUS SEDATION 10/20/2022 HISTORY: ORDERING SYSTEM PROVIDED HISTORY: Nephrostomy tube displacement, needs a new one TECHNOLOGIST PROVIDED HISTORY: Reason for exam:->Nephrostomy tube displacement, needs a new one CONTRAST: 20 cc of Isovue 370 contrast was utilized. SEDATION: Moderate sedation was ordered and supervised by the attending with physician face-to-face monitoring. Medications were provided and recorded by Radiology nurses. The patient received 2.0 mg of Versed and 100 mcg fentanyl intravenously for sedation and pain control. Sedation was provided by Huma Jolly RN. Sedation time: 37 minutes.  FLUOROSCOPY DOSE AND TYPE OR TIME AND EXPOSURES: 9.4 minutes of fluoroscopic time was utilized.  mGy. Number of images: DESCRIPTION OF PROCEDURE: Informed consent was obtained after a detailed explanation of the procedure including risks, benefits, and alternatives. Universal protocol was observed. Sterile gowns, masks, hats and gloves utilized for maximal sterile barrier. Contrast was attempted be injected into the patient's pre-existing left-sided 10 Montenegrin nephrostomy catheter. There is no filling of the renal collecting system and the catheter appeared to be kinked. It also appeared as though the catheter was withdrawn out of the renal collecting system into the nephrostomy tract completely. Was decided to therefore to place a new left-sided percutaneous nephrostomy. Using real-time sonographic guidance a 22 gauge Chiba needle was advanced into the left renal pelvis. Contrast and air was injected to opacify the posterior calices. This demonstrates left-sided hydroureteronephrosis with no filling of the bladder due to an obstruction of the distal ureter at the ureterovesical junction. Attempts to access the midpole and lower pole posterior calices were not successful. It was therefore necessary to access the upper pole calyx. This was done using a 21 gauge Accustick needle using real-time fluoroscopic guidance. Due to excessive angulation of the 0.018 inch Accustick wire it was necessary to use a new 0.018 inch cope Mandril guidewire to access the renal pelvis and ureter from the upper pole calyx through the 21 gauge Accustick needle. The Accustick sheath was then able to be advanced over the guidewire into the proximal ureter. Additional contrast was injected through the [address] needle to distend the renal pelvis to allow loop formation of the nephrostomy catheter. 8 and 10 Montenegrin fascial dilators were used to dilate the tract over a J-tip 0.035 inch guidewire.   A 10 Montenegrin locking pigtail nephrostomy catheter was then placed over the guidewire and looped within the renal pelvis. Contrast was injected to confirm that the nephrostomy catheter loop was within the renal pelvis. Catheter was then sutured in place. The pre-existing 10 [place] Islander nephrostomy catheter was removed at the end of the procedure. The patient tolerated the entire procedure well without immediate complications. FINDINGS: Pre-existing left-sided 8 [place] Island[place] nephrostomy catheter is kinked in its looped portion and withdrawn out of the left renal collecting system into the tract. Antegrade left pyelogram reveals left-sided hydroureteronephrosis with an obstruction at the ureterovesical junction. Successful ultrasound and fluoroscopically guided placement of a new 10 [place] Island[place] nephrostomy catheter via an upper pole access. Pre-existing left-sided 10 [place] Island[place] nephrostomy catheter is kinked in its looped portion and withdrawn out of the left renal collecting system into the tract. Antegrade left pyelogram reveals left-sided hydroureteronephrosis with an obstruction at the ureterovesical junction. Successful ultrasound and fluoroscopically guided placement of a new 10 [place] nephrostomy catheter via an upper pole access. CBC:   Recent Labs     10/20/22  0005   WBC 8.3   HGB 9.4*        BMP:    Recent Labs     10/20/22  0005   *   K 3.7   CL 97*   CO2 26   BUN 12   CREATININE 0.9   GLUCOSE 155*     Hepatic: No results for input(s): AST, ALT, ALB, BILITOT, ALKPHOS in the last 72 hours.   Lipids:   Lab Results   Component Value Date/Time    CHOL 124 12/20/2013 05:22 AM    HDL 53 12/20/2013 05:22 AM    TRIG 52 12/20/2013 05:22 AM     Hemoglobin A1C: No results found for: LABA1C  TSH: No results found for: TSH  Troponin:   Lab Results   Component Value Date/Time    TROPONINT <0.010 08/09/2022 04:53 AM    TROPONINT <0.010 08/09/2022 04:53 AM    TROPONINT <0.010 07/13/2022 03:05 PM     Lactic Acid: No results for input(s): LACTA in the

## 2022-10-20 NOTE — H&P
History and Physical      Name:  Radha Smalls /Age/Sex: 1970  (47 y.o. male)   MRN & CSN:  [id number] & [id number] Admission Date/Time: 10/19/2022 10:07 PM   Location:  ED[facility]16 PCP: Trena Mena MD       Hospital Day: 2    Assessment and Plan:   Radha Smalls is a 47 y.o.  male  who presents with Nephrostomy tube displaced ([address]    Nephrostomy tube displacement  -X-ray reports nephrostomy tube in the correct position but no urine emanating from the tube even after flushing the tube. -Urology recommends IR consultation. Metastatic prostate cancer  -Patient on Zytiga and follows up with Dr. Devi Tan    History of COPD  -Continue bronchodilators as before    Diet No diet orders on file   DVT Prophylaxis [x] Lovenox, []  Heparin, [] SCDs, [] Ambulation   GI Prophylaxis [] PPI,  [] H2 Blocker,  [] Carafate,  [] Diet/Tube Feeds   Code Status Prior   Disposition Patient requires continued admission due to displaced nephrostomy tube   MDM [] Low, [] Moderate,[x]  High  Patient's risk as above due to displaced nephrostomy tube     History of Present Illness:     Chief Complaint: Nephrostomy tube displaced ([address]  Radha Smalls is a 47 y.o.  male  who presents with displacement of his nephrostomy tube. Patient has metastatic prostate cancer and lung cancer. .  Patient follows up with Dr. Darshan Abernathy. Patient has had no output from his nephrostomy tube this morning. ER consulted urology and they were advised to flush the tube with stated blood without any success. Patient otherwise denies any fever chills rigors. He denies any chest pain abdominal pain dysuria hematuria melena hematochezia. In the ER his serum chemistries revealed sodium 133 potassium 4.7 chloride 97 bicarb 26 blood urea 12 creatinine 0.9 anion gap 10 glucose random 155 serum calcium 9.7. WBC 8.3 hemoglobin 9.4 hematocrit 28.9 and platelet count of 816.   UA shows yellow clear urine with large blood negative for leukocyte esterase and negative for nitrites. Patient will be admitted to the hospital service. Continue home meds as appropriate. Consult IR for tube replacement. Hopefully this could be a short stay and patient can be discharged after IR places and new tube. Further therapy would depend hospital course the patient overall prognosis [place]. Ten point ROS reviewed negative, unless as noted above    Objective:   No intake or output data in the 24 hours ending 10/20/22 0432   Vitals:   Vitals:    10/20/22 0007   BP: (!) 145/92   Pulse:    Resp:    Temp:    SpO2:      Physical Exam:   GEN Awake male, sitting upright in bed in no apparent distress. Appears given age. EYES Pupils are equally round. No scleral erythema, discharge, or conjunctivitis. HENT Mucous membranes are moist. Oral pharynx without exudates, no evidence of thrush. NECK Supple, no apparent thyromegaly or masses. RESP Clear to auscultation, no wheezes, rales or rhonchi. Symmetric chest movement while on room air. CARDIO/VASC S1/S2 auscultated. Regular rate without appreciable murmurs, rubs, or gallops. No JVD or carotid bruits. Peripheral pulses equal bilaterally and palpable. No peripheral edema. GI Abdomen is soft without significant tenderness, masses, or guarding. Bowel sounds are normoactive. Rectal exam deferred.  No costovertebral angle tenderness. Normal appearing external genitalia. Doherty catheter is not present. HEME/LYMPH No palpable cervical lymphadenopathy and no hepatosplenomegaly. No petechiae or ecchymoses. MSK No gross joint deformities. SKIN Normal coloration, warm, dry. NEURO Cranial nerves appear grossly intact, normal speech, no lateralizing weakness. PSYCH Awake, alert, oriented x 4. Affect appropriate.     Past Medical History:      Past Medical History:   Diagnosis Date    CAD (coronary artery disease) 12/23/2013    cath negative per pt    Cancer ([facility] [address] 06/2021    pt reports he has lung cancer    History of TMJ disorder Hypertension     Trigeminal neuralgia      PSHX:  has a past surgical history that includes Abdomen surgery; Cardiac catheterization (08/03/2016); CT NEEDLE BIOPSY LUNG PERCUTANEOUS (7/28/2021); Port Surgery (Right, 8/13/2021); and CT BIOPSY SUPERFICIAL BONE PERCUTANEOUS (12/17/2021). Allergies: Allergies   Allergen Reactions    Tramadol Other (See Comments)     seizures    Morphine Hallucinations    Vicodin [Hydrocodone-Acetaminophen] Hives       FAM HX: family history includes Cancer in his sister; High Blood Pressure in his mother and sister. Soc HX:   Social History     Socioeconomic History    Marital status:      Spouse name: None    Number of children: 5    Years of education: None    Highest education level: None   Tobacco Use    Smoking status: Every Day     Packs/day: 2.00     Years: 39.00     Pack years: 78.00     Types: Cigarettes    Smokeless tobacco: Never    Tobacco comments:     smokes 1-2 a day   Vaping Use    Vaping Use: Never used   Substance and Sexual Activity    Alcohol use:  Yes     Alcohol/week: 6.0 standard drinks     Types: 6 Cans of beer per week     Comment: per week (24 oz beers)     Drug use: Yes     Types: Marijuana (Weed)     Comment: last 12/1    Sexual activity: Yes     Partners: Female       Data:   CBC with Differential:    Lab Results   Component Value Date/Time    WBC 8.3 10/20/2022 12:05 AM    RBC 3.65 10/20/2022 12:05 AM    HGB 9.4 10/20/2022 12:05 AM    HCT 28.9 10/20/2022 12:05 AM     10/20/2022 12:05 AM    MCV 79.2 10/20/2022 12:05 AM    MCH 25.8 10/20/2022 12:05 AM    MCHC 32.5 10/20/2022 12:05 AM    RDW 18.6 10/20/2022 12:05 AM    NRBC 1 04/09/2022 06:20 AM    SEGSPCT 72.7 10/20/2022 12:05 AM    BANDSPCT 17 10/12/2022 10:00 AM    LYMPHOPCT 18.9 10/20/2022 12:05 AM    MONOPCT 5.2 10/20/2022 12:05 AM    MYELOPCT 2 03/12/2022 03:03 PM    BASOPCT 0.4 10/20/2022 12:05 AM    MONOSABS 0.4 10/20/2022 12:05 AM    LYMPHSABS 1.6 10/20/2022 12:05 AM    EOSABS 0.2 10/20/2022 12:05 AM    BASOSABS 0.0 10/20/2022 12:05 AM    DIFFTYPE AUTOMATED DIFFERENTIAL 10/20/2022 12:05 AM       CMP:     Lab Results   Component Value Date/Time     10/20/2022 12:05 AM    K 3.7 10/20/2022 12:05 AM    CL 97 10/20/2022 12:05 AM    CO2 26 10/20/2022 12:05 AM    BUN 12 10/20/2022 12:05 AM    CREATININE 0.9 10/20/2022 12:05 AM    GFRAA >60 10/13/2022 05:15 AM    LABGLOM >60 10/20/2022 12:05 AM    GLUCOSE 155 10/20/2022 12:05 AM    PROT 8.0 10/11/2022 02:00 AM    PROT 7.6 12/29/2012 02:45 AM    LABALBU 3.8 10/11/2022 02:00 AM    CALCIUM 9.7 10/20/2022 12:05 AM    BILITOT 0.1 10/11/2022 02:00 AM    ALKPHOS 212 10/11/2022 02:00 AM    AST 10 10/11/2022 02:00 AM    ALT <5 10/11/2022 02:00 AM       Troponin:  Lab Results   Component Value Date/Time    TROPONINT <0.010 08/09/2022 04:53 AM    TROPONINT <0.010 08/09/2022 04:53 AM       U/A:    Lab Results   Component Value Date/Time    COLORU YELLOW 10/20/2022 02:48 AM    PROTEINU NEGATIVE 10/20/2022 02:48 AM    WBCUA NONE SEEN 10/20/2022 02:48 AM    RBCUA 47 10/20/2022 02:48 AM    MUCUS RARE 10/11/2022 11:05 PM    TRICHOMONAS NONE SEEN 10/20/2022 02:48 AM    BACTERIA NEGATIVE 10/20/2022 02:48 AM    CLARITYU CLEAR 10/20/2022 02:48 AM    SPECGRAV 1.010 10/20/2022 02:48 AM    LEUKOCYTESUR NEGATIVE 10/20/2022 02:48 AM    UROBILINOGEN 0.2 10/20/2022 02:48 AM    BILIRUBINUR NEGATIVE 10/20/2022 02:48 AM    BLOODU LARGE NUMBER OR AMOUNT OF 10/20/2022 02:48 AM    AMORPHOUS RARE 07/21/2021 05:24 AM       Urine Culture:  No components found for: KRISSY    Radiology results:  XR ABDOMEN (KUB) (SINGLE AP VIEW)   Final Result   1. Left nephrostomy tube tip projects over the expected location of the left   kidney. 2. No radiographically apparent urolithiasis.                Medications:   Medications:    Infusions:   PRN Meds:       Electronically signed by Jing Severino MD on 10/20/2022 at 4:32 AM

## 2022-10-20 NOTE — FLOWSHEET NOTE
Pt unhooked himself from Tele sitting on couch. IV removed and dressing applied. Discharge instructions given. Pt states he is no longer bleeding with urination. Fluid in Nephrostomy is cherry red. Pt states he wants to go home. Pt has Hospice at home and will f/u with Dr. Keyon Arnold.

## 2022-10-20 NOTE — CARE COORDINATION
Pt noted for possible readmission. Pt was recently admitted 10/11-10/13 right arm pain, noted to have small left sided pneumothorax, BRENDA, leaking nephrostomy bag, and possibly sepsis. Pt and his wife requested pt be d/c back to hospice care. Pt has stage 4 cancer and per chart review pt is currently on hospice care with [facility] since June 2022. Pt has PCP and insurance. Pt is independent in ADL's. Pt lives with spouse who helps care for his needs also if needed. Pt returns today with hematuria. With pt currently on hospice care there are no CM needs that hospice will not or does not already tae care of.

## 2022-10-20 NOTE — OR NURSING
2650 Patient arrived to IR for a left percutaneous nephrostomy tube replacement  Dr Jose Roberto Dodd  Procedure start: 2046 0128  Sedation medication given by Barbara Saunders  15cc urine removed  0921  Sedation medication given by Pallavi Zee RN  15cc urine removed  0.018 [place] Glidewire  1294  Sedation medication given by Pallavi Zee RN  10 Fr pigtail drain catheter with uresil bag  9417  Procedure end  Several attempts to give Report to RN no  answer, will attempt in 5 min

## 2022-10-20 NOTE — ED NOTES
unsuccessful attempt at flushing urostomy tube.       Araceli Perez RN  10/20/22 [id number] RN  10/20/22 5218

## 2022-10-20 NOTE — PROGRESS NOTES
49yo AA male pt admitted from ER. A/O x  4, impulsive. Presented to ER for left flank pain, nephrostomy tube in place, small amount of blood in tubing, states he had a Urology appt today. Also c/o RUE severe pain 10/10, denies injury, states he takes Percocet 30 at home, given 10 in ER @ [phone], pt screaming in  pain, then fell asleep without intervention. RCW PAC, not access. RFA PIV.

## 2022-10-20 NOTE — ED NOTES
Pt states wife was changing dressing to urostomy & accidentally pulled tubing & suture out. Suture no longer attached to pt & tubing pulled out about 1-2 inch. Pt states after it happened they called EMS & blood was noted in tubing.       Miladys Marin RN  10/19/22 0953

## 2022-10-20 NOTE — PRE SEDATION
Sedation Pre-Procedure Note    Patient Name: Radha Cervantes   YOB: 1970  Room/Bed: 3030/3030-A  Medical Record Number: [id number]  Date: 10/20/2022   Time: 1:12 PM       Indication:  Left nephrostomy replacement    Consent: I have discussed with the patient and/or the patient representative the indication, alternatives, and the possible risks and/or complications of the planned procedure and the anesthesia methods. The patient and/or patient representative appear to understand and agree to proceed. Vital Signs:   Vitals:    10/20/22 0940   BP: (!) 150/115   Pulse:    Resp:    Temp:    SpO2:        Past Medical History:   has a past medical history of CAD (coronary artery disease), Cancer ([facility] [address] History of TMJ disorder, Hypertension, and Trigeminal neuralgia. Past Surgical History:   has a past surgical history that includes Abdomen surgery; Cardiac catheterization (08/03/2016); CT NEEDLE BIOPSY LUNG PERCUTANEOUS (7/28/2021); Port Surgery (Right, 8/13/2021); and CT BIOPSY SUPERFICIAL BONE PERCUTANEOUS (12/17/2021). Medications:   Scheduled Meds:    bicalutamide  50 mg Oral Daily    cyanocobalamin  1,000 mcg Oral Daily    oxyCODONE-acetaminophen  2 tablet Oral TID    abiraterone acetate  1,000 mg Oral Daily    tamsulosin  0.4 mg Oral Daily    calcium-cholecalciferol  1 tablet Oral Daily    sodium chloride flush  5-40 mL IntraVENous 2 times per day    enoxaparin  40 mg SubCUTAneous Daily     Continuous Infusions:    sodium chloride       PRN Meds: senna, cyclobenzaprine, ALPRAZolam, albuterol sulfate HFA, sodium chloride flush, sodium chloride, ondansetron **OR** ondansetron, polyethylene glycol, acetaminophen **OR** acetaminophen, potassium chloride **OR** potassium alternative oral replacement **OR** potassium chloride, magnesium sulfate, oxyCODONE **AND** acetaminophen  Home Meds:   Prior to Admission medications    Medication Sig Start Date End Date Taking?  Authorizing Provider   tamsulosin (FLOMAX) 0.4 MG capsule Take 1 capsule by mouth daily 10/14/22   Octavia Diehl PA-C   oxyCODONE-acetaminophen (PERCOCET)  MG per tablet Take 1 tablet by mouth every 6 hours as needed for Pain (per pt for breakthrough pain). Historical Provider, MD   cyclobenzaprine (FLEXERIL) 10 MG tablet Take 10 mg by mouth 3 times daily as needed for Muscle spasms    Historical Provider, MD   ALPRAZolam (XANAX) 0.5 MG tablet Take 0.5 mg by mouth 3 times daily as needed for Sleep or Anxiety (Twice Daily PRN). Historical Provider, MD   predniSONE (DELTASONE) 5 MG tablet  5/26/22   Historical Provider, MD   abiraterone acetate (ZYTIGA) 250 MG tablet Take 1,000 mg by mouth daily  Patient not taking: Reported on 10/12/2022 5/19/22   Historical Provider, MD   oxyCODONE-acetaminophen (PERCOCET) 7.5-325 MG per tablet Take 2 tablets by mouth in the morning, at noon, and at bedtime.  Percocet 15's 3 times daily 4/15/22   Historical Provider, MD   naloxone 4 MG/0.1ML LIQD nasal spray 1 spray by Nasal route as needed for Opioid Reversal 4/12/22   Rodrigo Walton MD   Calcium Carbonate-Vitamin D (OYSTER SHELL CALCIUM/D) 500-200 MG-UNIT TABS Take 1 tablet by mouth daily 4/1/22 6/2/22  Geroldine Cockayne, MD   cyanocobalamin ([employer] VITAMIN B12) 1000 MCG tablet Take 1 tablet by mouth daily 4/1/22   Geroldine Cockayne, MD   bicalutamide (CASODEX) 50 MG chemo tablet Take 1 tablet by mouth daily  Patient not taking: Reported on 10/12/2022 3/29/22   Geroldine Cockayne, MD   ondansetron (ZOFRAN) 8 MG tablet take 1 tablet by mouth every 8 hours if needed for nausea and vomiting 3/11/22   Historical Provider, MD   Sennosides (SENNA) 8.6 MG CAPS Take 1 capsule by mouth 2 times daily as needed (constipation)  Patient not taking: Reported on 10/12/2022 3/11/22   Bridget Richter DO   dexamethasone (DECADRON) 4 MG tablet Two tablets the day before treatment, one table daily for four days starting the day after chemotherapy 1/14/22   Geroldine Cockayne, MD albuterol sulfate HFA (PROVENTIL HFA) 108 (90 Base) MCG/ACT inhaler Inhale 2 puffs into the lungs every 4 hours as needed for Wheezing or Shortness of Breath With spacer (and mask if indicated). Thanks. 7/30/21 6/2/22  Cierra Rudolph DO     Coumadin Use Last 7 Days:  no  Antiplatelet drug therapy use last 7 days: no  Other anticoagulant use last 7 days: no  Additional Medication Information:  none        Pre-Sedation Documentation and Exam:   Vital signs have been reviewed (see flow sheet for vitals).     Mallampati Airway Assessment:  normal    Prior History of Anesthesia Complications:   none    ASA Classification:  Class 2 - A normal healthy patient with mild systemic disease    Sedation/ Anesthesia Plan:   intravenous sedation    Medications Planned:   midazolam (Versed) intravenously and fentanyl intravenously    Patient is an appropriate candidate for plan of sedation: yes    Electronically signed by Kassandra Osborne MD on 10/20/2022 at 1:12 PM

## 2022-10-20 NOTE — FLOWSHEET NOTE
Pt had me call wife and see what time she would be picking him up- She stated she is on her way here. Will call when she arrives.

## 2022-10-20 NOTE — FLOWSHEET NOTE
Called IR for report- Did not receive any phone calls- called and got report. [facility] not available.

## 2022-10-20 NOTE — CONSULTS
[facility] [address]   Consult Note  [facility] 1 2 3 4 5  Date: 10/20/2022   Patient: Javad Herzog   : 1970   DOA: 10/19/2022   MRN: [id number]   ROOM#: 3030/3030-A     Reason for Consult: Displaced nephrostomy tube  Requesting Physician: Samm Rivas PA-C  Collaborating Urologist on Call at time of admission: Dr. Karla Ann: Displaced nephrostomy tube    History Obtained From:  patient, electronic medical record    HISTORY OF PRESENT ILLNESS:                The patient is a 47 y.o. male with significant past medical history of CAD, prostate cancer, lung cancer, recent urine retention during hospitalization 10/11 requiring Doherty placement, hypertension, and trigeminal neuralgia who presented with displacement of left PCN during dressing change on 10/19. Left PCN was placed 2022 at [facility] due to left UVJ mass likely related to metastatic disease. PCN last exchanged 10/12/2022 by IR. Patient was having dressing changed yesterday when tube was displaced several centimeters and began draining blood. KUB did show nephrostomy tube tip in the expected location of the left kidney, however only a small amount of blood and no urine were draining from the PCN. Attempts to gently irrigate the left PCN in the ER were unsuccessful. Recommended admission and IR consult for further management of PCN. No leukocytosis, creatinine normal.  UA large blood, negative leuks or nitrites. By the time of my exam, the patient has been taken down and successfully had replacement of the PCN. Currently draining light red urine. Patient is somnolent and minimally interactive but denying any specific complaints beyond mild left CVA tenderness from his procedure.     Past Medical History:        Diagnosis Date    CAD (coronary artery disease) 2013    cath negative per pt    Cancer ([address] 2021    pt reports he has lung cancer    History of TMJ disorder     Hypertension Trigeminal neuralgia      Past Surgical History:        Procedure Laterality Date    ABDOMEN SURGERY      CARDIAC CATHETERIZATION  08/03/2016    CT BIOPSY PERCUTANEOUS SUPERFICIAL BONE  12/17/2021    CT BIOPSY PERCUTANEOUS SUPERFICIAL BONE 12/17/2021 [address] [place] [address] CT SCAN    CT NEEDLE BIOPSY LUNG PERCUTANEOUS  7/28/2021    CT NEEDLE BIOPSY LUNG PERCUTANEOUS 7/28/2021 [address] [facility] CT SCAN    PORT SURGERY Right 8/13/2021    MEDIPORT INSERTION performed by Mabel Carrillo MD at [address] [facility] OR     Current Medications:   Current Facility-Administered Medications: senna (SENOKOT) tablet 8.6 mg, 1 tablet, Oral, BID PRN  bicalutamide (CASODEX) chemo tablet 50 mg, 50 mg, Oral, Daily  vitamin B-12 (CYANOCOBALAMIN) tablet 1,000 mcg, 1,000 mcg, Oral, Daily  oxyCODONE-acetaminophen (PERCOCET) 7.5-325 MG per tablet 2 tablet, 2 tablet, Oral, TID  abiraterone acetate (ZYTIGA) 250 MG tablet TABS 1,000 mg (Patient Supplied), 1,000 mg, Oral, Daily  cyclobenzaprine (FLEXERIL) tablet 10 mg, 10 mg, Oral, TID PRN  ALPRAZolam (XANAX) tablet 0.5 mg, 0.5 mg, Oral, TID PRN  tamsulosin (FLOMAX) capsule 0.4 mg, 0.4 mg, Oral, Daily  albuterol sulfate HFA (PROVENTIL;VENTOLIN;PROAIR) 108 (90 Base) MCG/ACT inhaler 2 puff, 2 puff, Inhalation, Q4H PRN  calcium-cholecalciferol 500-200 MG-UNIT per tablet 1 tablet, 1 tablet, Oral, Daily  sodium chloride flush 0.9 % injection 5-40 mL, 5-40 mL, IntraVENous, 2 times per day  sodium chloride flush 0.9 % injection 5-40 mL, 5-40 mL, IntraVENous, PRN  0.9 % sodium chloride infusion, , IntraVENous, PRN  enoxaparin (LOVENOX) injection 40 mg, 40 mg, SubCUTAneous, Daily  ondansetron (ZOFRAN-ODT) disintegrating tablet 4 mg, 4 mg, Oral, Q8H PRN **OR** ondansetron (ZOFRAN) injection 4 mg, 4 mg, IntraVENous, Q6H PRN  polyethylene glycol (GLYCOLAX) packet 17 g, 17 g, Oral, Daily PRN  acetaminophen (TYLENOL) tablet 650 mg, 650 mg, Oral, Q6H PRN **OR** acetaminophen (TYLENOL) suppository 650 mg, 650 mg, Rectal, Q6H PRN  potassium chloride (KLOR-CON M) extended release tablet 40 mEq, 40 mEq, Oral, PRN **OR** potassium bicarb-citric acid (EFFER-K) effervescent tablet 40 mEq, 40 mEq, Oral, PRN **OR** potassium chloride 10 mEq/100 mL IVPB (Peripheral Line), 10 mEq, IntraVENous, PRN  magnesium sulfate 2000 mg in 50 mL IVPB premix, 2,000 mg, IntraVENous, PRN  oxyCODONE HCl (OXY-IR) immediate release tablet 10 mg, 10 mg, Oral, Q6H PRN **AND** acetaminophen (TYLENOL) tablet 325 mg, 325 mg, Oral, Q6H PRN    Allergies:  Tramadol, Morphine, and Vicodin [hydrocodone-acetaminophen]    Social History:   TOBACCO:   reports that he has been smoking cigarettes. He has a 78.00 pack-year smoking history. He has never used smokeless tobacco.  ETOH:   reports current alcohol use of about 6.0 standard drinks per week. DRUGS:   reports current drug use. Drug: Marijuana Burnell Goldberg). Family History:       Problem Relation Age of Onset    High Blood Pressure Mother     High Blood Pressure Sister     Cancer Sister        REVIEW OF SYSTEMS:     CONSTITUTIONAL:  negative for  fevers and chills  RESPIRATORY:  negative for  dyspnea and wheezing  CARDIOVASCULAR:  negative for  chest pain  GASTROINTESTINAL:  negative for nausea, vomiting, and abdominal pain  GENITOURINARY:  positive for hematuria    PHYSICAL EXAM:      VITALS:  BP (!) 131/91   Pulse 97   Temp 98.4 °F (36.9 °C) (Oral)   Resp 16   Ht 6' 1\" (1.854 m)   Wt 190 lb (86.2 kg)   SpO2 97%   BMI 25.07 kg/m²      TEMPERATURE:  Current - Temp: 98.4 °F (36.9 °C); Max - Temp  Av.3 °F (36.8 °C)  Min: 97.9 °F (36.6 °C)  Max: 98.5 °F (36.9 °C)  24HR BLOOD PRESSURE RANGE:  Systolic (07AUJ), HGC:422 , Min:130 , JXL:002   ; Diastolic (14PPF), TZK:48, Min:89, Max:97    8HR INTAKE OUTPUT:  No intake/output data recorded.   URINARY CATHETER OUTPUT (Doherty):     DRAIN/TUBE OUTPUT:        General appearance: appears stated age, cooperative, no distress, and fatigued/minimally interactive  Head: Normocephalic, without obvious abnormality, atraumatic  Back:  Mild left CVA tenderness around PCN, fresh dressing in place  Abdomen:  Soft, nontender, nondistended    DATA:    WBC:    Lab Results   Component Value Date/Time    WBC 8.3 10/20/2022 12:05 AM     Hemoglobin/Hematocrit:    Lab Results   Component Value Date/Time    HGB 9.4 10/20/2022 12:05 AM    HCT 28.9 10/20/2022 12:05 AM     BMP:    Lab Results   Component Value Date/Time     10/20/2022 12:05 AM    K 3.7 10/20/2022 12:05 AM    CL 97 10/20/2022 12:05 AM    CO2 26 10/20/2022 12:05 AM    BUN 12 10/20/2022 12:05 AM    LABALBU 3.8 10/11/2022 02:00 AM    CREATININE 0.9 10/20/2022 12:05 AM    CALCIUM 9.7 10/20/2022 12:05 AM    GFRAA >60 10/13/2022 05:15 AM    LABGLOM >60 10/20/2022 12:05 AM     PT/INR:    Lab Results   Component Value Date/Time    PROTIME 11.8 10/20/2022 07:55 AM    INR 0.92 10/20/2022 07:55 AM     Imaging:  XR SHOULDER RIGHT (MIN 2 VIEWS)    Result Date: 10/2/2022  EXAMINATION: TWO XRAY VIEWS OF THE RIGHT SHOULDER 10/2/2022 2:04 am COMPARISON: None. HISTORY: ORDERING SYSTEM PROVIDED HISTORY: trauma TECHNOLOGIST PROVIDED HISTORY: Right Shoulder - XR Portable Reason for exam:->trauma Reason for Exam: right shoulder pain, trauma Additional signs and symptoms: right shoulder pain, trauma FINDINGS: Right-sided chest port is seen. Multifocal sclerotic areas are seen within the visualized osseous structures, likely related to metastatic disease. There is no acute fracture, dislocation, or bone destruction. The acromioclavicular joint is unremarkable. There is no significant soft tissue swelling. Sclerotic lesions are seen involving the right shoulder, concerning for metastatic disease. No pathologic fracture.      XR ABDOMEN (KUB) (SINGLE AP VIEW)    Result Date: 10/20/2022  EXAMINATION: ONE SUPINE XRAY VIEW(S) OF THE ABDOMEN 10/20/2022 1:45 am COMPARISON: 03/11/2022 HISTORY: ORDERING SYSTEM PROVIDED HISTORY: kub TECHNOLOGIST PROVIDED HISTORY: Abd KUB Reason for exam:->kub Reason for Exam: hematuria FINDINGS: New left nephrostomy tube in place with the tip projecting over the expected location of the left kidney. No radiographically apparent urolithiasis. Nonobstructive bowel gas pattern. No acute osseous abnormality. Stable diffuse sclerotic appearance of the visualized osseous structures. 1. Left nephrostomy tube tip projects over the expected location of the left kidney. 2. No radiographically apparent urolithiasis. CT HEAD WO CONTRAST    Result Date: 10/11/2022  EXAMINATION: CT OF THE HEAD WITHOUT CONTRAST  10/11/2022 3:48 am TECHNIQUE: CT of the head was performed without the administration of intravenous contrast. Automated exposure control, iterative reconstruction, and/or weight based adjustment of the mA/kV was utilized to reduce the radiation dose to as low as reasonably achievable. COMPARISON: 03/11/2022 HISTORY: ORDERING SYSTEM PROVIDED HISTORY: numbness TECHNOLOGIST PROVIDED HISTORY: Has a \"code stroke\" or \"stroke alert\" been called? ->No Reason for exam:->numbness Decision Support Exception - unselect if not a suspected or confirmed emergency medical condition->Emergency Medical Condition (MA) Reason for Exam:  right sided Arm Pain; Cold Extremity Relevant Medical/Surgical History: hx of bone cancer FINDINGS: BRAIN/VENTRICLES: Motion artifact in the lower images but without convincing evidence for intracranial hemorrhage. There is no extra-axial fluid collection. Small calcifications are present at both globus pallidus. No mass effect, midline shift, or focal sulcal effacement is identified. Evaluation of the gray-white junction is suboptimal but without evidence of territorial infarct. The ventricles are not dilated. ORBITS: The visualized portion of the orbits demonstrate no acute abnormality. SINUSES: The visualized paranasal sinuses and mastoid air cells demonstrate no acute abnormality.  SOFT TISSUES/SKULL:  Areas of increased sclerosis within the skull. Low lying position of the right mandibular condyle at the temporomandibular joint. .     Motion artifact on the lower images. No convincing evidence for acute intracranial hemorrhage. No mass effect, midline shift, or focal sulcal effacement. The ventricles are not dilated. Low lying position of the right mandibular condyle at the temporomandibular joint suggesting subluxation. Areas of increased sclerosis in the skull concerning for sclerotic metastases. XR CHEST PORTABLE    Result Date: 10/11/2022  EXAMINATION: ONE XRAY VIEW OF THE CHEST 10/11/2022 8:04 pm COMPARISON: August 9, 2022 HISTORY: ORDERING SYSTEM PROVIDED HISTORY: Hypoxia, pneumothorax TECHNOLOGIST PROVIDED HISTORY: Reason for exam:->Hypoxia, pneumothorax Reason for Exam: Hypoxia, pneumothorax Additional signs and symptoms: na Relevant Medical/Surgical History: hypertension FINDINGS: The heart is of normal size. There is a right-sided chest port with its tip in the superior vena cava. There is airspace opacity in the left upper lobe, likely related to lobar atelectasis and known lung mass. There is no pleural effusion or pneumothorax. There is known diffuse osseous metastasis. Stable chest Airspace opacity in the left upper lobe, likely related to lobar atelectasis and known lung mass. Known diffuse osseous metastasis. CTA CHEST W CONTRAST    Result Date: 10/11/2022  EXAMINATION: CTA OF THE CHEST 10/11/2022 4:11 am TECHNIQUE: CTA of the chest was performed after the administration of intravenous contrast.  Multiplanar reformatted images are provided for review. MIP images are provided for review. Automated exposure control, iterative reconstruction, and/or weight based adjustment of the mA/kV was utilized to reduce the radiation dose to as low as reasonably achievable.  COMPARISON: 02/08/2022, 08/09/2022 HISTORY: ORDERING SYSTEM PROVIDED HISTORY: hemoptysis TECHNOLOGIST PROVIDED HISTORY: Reason for exam:->hemoptysis Reason for Exam: hemoptysis Relevant Medical/Surgical History: hx of bone cancer FINDINGS: Pulmonary Arteries: Suboptimal opacification of the pulmonary arteries. No central pulmonary embolus. No definite lobar pulmonary embolus. Motion artifact and contrast bolus timing limits evaluation of the segmental pulmonary arteries. Mediastinum: No thoracic adenopathy. Heart size is normal.  No pericardial effusion. Thoracic aorta is normal caliber. Lungs and pleura: Masslike consolidation in the perihilar region and left upper lobe corresponds to the prior CT, with suspected mass measuring up to 3.8 by 3.2 cm, previously 5.2 x 3 cm. Persistent complete collapse of the left upper lobe. Atelectasis has developed in the infrahilar left lower lobe with trace left pleural effusion. Trace left anterior pneumothorax measures up to 4 mm between the pleural margins. The right lung is clear. No central endobronchial lesion is otherwise present. Upper abdomen: Limited images of the upper abdomen demonstrate no significant abnormality. Bones and soft tissues: Diffuse bony sclerosis compatible with multifocal metastatic disease. The extent of sclerosis and metastatic lesions has significantly progressed with diffuse osseous involvement throughout the bones. No superimposed pathologic fracture is noted. 1. Suboptimal opacification of the pulmonary arteries. No central pulmonary embolus. No definite lobar pulmonary embolus. Motion artifact and contrast bolus timing limits evaluation of the segmental pulmonary arteries. 2. New trace left anterior pneumothorax measuring 4 mm. 3. Otherwise no significant change since 08/09/2022. Complete collapse of the left upper lobe with persistent left perihilar 3.8 cm mass obscured by the surrounding atelectasis. 4. Trace left pleural effusion 5. Mild infrahilar left lower lobe atelectasis 6. Osseous metastatic disease with diffuse sclerotic osseous metastatic lesions.   No pathologic fracture. IR GUIDED NEPHROSTOMY CATH EXCHANGE    Result Date: 10/12/2022  PROCEDURE: IR NEPHROSTOMY TUBE CHANGE 10/12/2022 HISTORY: ORDERING SYSTEM PROVIDED HISTORY: neph exchange TECHNOLOGIST PROVIDED HISTORY: Reason for exam:->neph exchange TECHNIQUE: Maximum sterile barrier technique including hand hygiene, skin prep and sterile ultrasound technique utilized for procedure. Following informed consent, pause a confirm/time-out patient is placed in prone position on fluoroscopic table. Previously placed right nephrostomy catheter and entry site were prepped and draped in sterile fashion. Antegrade nephrostogram was performed. Guidewires advanced over which previously placed malfunctioning nephrostomy tube was exchanged for new 10.2 [place]y external drainage catheter/nephrostomy tube. Catheter tip was curled in right renal pelvis and fixed to the patient's skin. Dressing applied. External drainage bag applied. Patient tolerated procedure well. CONTRAST: 3 cc Isovue 370 SEDATION: None FLUOROSCOPY DOSE AND TYPE OR TIME AND EXPOSURES: 0.6 minutes fluoroscopy time AK: 38 mGy DESCRIPTION OF PROCEDURE: Informed consent was obtained after a detailed explanation of the procedure including risks, benefits, and alternatives. Universal protocol was observed. Sterile gowns, masks, hats and gloves utilized for maximal sterile barrier. As above in technique section FINDINGS: Antegrade nephrostogram: Previously placed left nephrostomy catheter projects in satisfactory position. Contrast opacifies the decompressed left renal pelvis and visualized portion of the ureter. Intraprocedural images confirm exchange of previously placed catheter with pigtail tip of new catheter curled in the right renal pelvis. No complication suggested. No evidence of hydronephrosis or contrast extravasation.   Previously placed right nephrostomy catheter exchange for new 10.2 [place] external drainage catheter with tip curled in the left renal pelvis. No complication suggested. Assessment & Plan:      Keara Molina is a 47 y.o. male admitted 10/19/2022 for displaced PCN. 1) Metastatic Prostate Cancer: S/p bone marrow bx 4/27/22 w pathology showing metastatic prostate cancer. Casodex started 4/25/22; currently on Zytiga              PSA 31.8 10/11/22; 256 5/19/22; 1,235 4/22/22              Follow-up with urology/oncology as previously scheduled  2) Left Hydronephrosis: secondary to UVJ mass on CT a/p 3/2022, likely related to metastatic disease. S/p left PCN placement at [facility] 4/25/22. Last exchanged 10/12/22 with IR. IR consulted for PCN exchange this morning              Cr 0.9              CT a/p w contrast would be beneficial for further evaluation  3) Acute Urinary Retention: Doherty placed 10/12/22 d/t PVR of 438cc. Removed prior to recent discharge, reports voiding well at this time. Continue Flomax 0.4mg    Patient stable from a  standpoint. Will sign off. Please call with any questions. Continue outpatient follow-up as previously scheduled. Patient seen and examined, chart reviewed.      Electronically signed by ROBB Munoz on 10/20/2022 at 8:47 AM

## 2022-10-20 NOTE — ED NOTES
ED TO INPATIENT SBAR HANDOFF    Patient Name: Maribel Dewitt   :  1970  47 y.o. MRN:  [id number]  Preferred Name  Marii Perez  ED Room #:  ED16/ED-16  Family/Caregiver Present no   Restraints no   Sitter no   Sepsis Risk Score Sepsis Risk Score: 0.66    Situation  Code Status: Prior Full. Allergies: Tramadol, Morphine, and Vicodin [hydrocodone-acetaminophen]  Weight: Patient Vitals for the past 96 hrs (Last 3 readings):   Weight   10/19/22 2216 190 lb (86.2 kg)     Arrived from: home  Chief Complaint:   Chief Complaint   Patient presents with    Hematuria     Urostomy pulled while dressing change, blood noted in in tube     Hospital Problem/Diagnosis:  Principal Problem:    Nephrostomy tube displaced ([address]  Resolved Problems:    * No resolved hospital problems. *    Imaging:   XR ABDOMEN (KUB) (SINGLE AP VIEW)   Final Result   1. Left nephrostomy tube tip projects over the expected location of the left   kidney. 2. No radiographically apparent urolithiasis.            Abnormal labs:   Abnormal Labs Reviewed   BASIC METABOLIC PANEL - Abnormal; Notable for the following components:       Result Value    Sodium 133 (*)     Chloride 97 (*)     Glucose 155 (*)     All other components within normal limits   URINALYSIS - Abnormal; Notable for the following components:    Blood, Urine LARGE NUMBER OR AMOUNT OF (*)     All other components within normal limits   CBC WITH AUTO DIFFERENTIAL - Abnormal; Notable for the following components:    RBC 3.65 (*)     Hemoglobin 9.4 (*)     Hematocrit 28.9 (*)     MCH 25.8 (*)     RDW 18.6 (*)     Segs Relative 72.7 (*)     Lymphocytes % 18.9 (*)     Monocytes % 5.2 (*)     Immature Neutrophil % 0.5 (*)     All other components within normal limits   MICROSCOPIC URINALYSIS - Abnormal; Notable for the following components:    RBC, UA 47 (*)     All other components within normal limits     Critical values: no     Abnormal Assessment Findings: Urostomy tube out of place. Background  History:   Past Medical History:   Diagnosis Date    CAD (coronary artery disease) 12/23/2013    cath negative per pt    Cancer ([facility] 06/2021    pt reports he has lung cancer    History of TMJ disorder     Hypertension     Trigeminal neuralgia        Assessment    Vitals/MEWS: MEWS Score: 1  Level of Consciousness: Alert (0)   Vitals:    10/20/22 0001 10/20/22 0002 10/20/22 0007 10/20/22 0443   BP: (!) 139/95 (!) 130/97 (!) 145/92 (!) 135/94   Pulse:    88   Resp:    16   Temp:    98.5 °F (36.9 °C)   TempSrc:    Oral   SpO2:    98%   Weight:       Height:         FiO2 (%):   O2 Flow Rate: O2 Device: None (Room air)    Cardiac Rhythm:    Pain Assessment:  [x] Verbal [] Steven Alfonso Scale  Pain Scale: Pain Assessment  Pain Assessment: None - Denies Pain  Pain Level: 7  Pain Location: Buttocks, Flank  Pain Orientation: Right, Left  Pain Descriptors: Aching  Pain Type: Chronic pain  Last documented pain score (0-10 scale) Pain Level: 7  Last documented pain medication administered:   Mental Status: alert  C-SSRS: Risk of Suicide: No Risk  Bedside swallow:    Bud Coma Scale (GCS): Harika Coma Scale  Eye Opening: Spontaneous  Best Verbal Response: Oriented  Best Motor Response: Obeys commands  Harika Coma Scale Score: 15  Active LDA's:   Peripheral IV 10/20/22 Right Forearm (Active)   Site Assessment Clean, dry & intact 10/20/22 0006   Line Status Blood return noted;Normal saline locked; Flushed 10/20/22 0006   Phlebitis Assessment No symptoms 10/20/22 0006   Infiltration Assessment 0 10/20/22 0006   Dressing Status Clean, dry & intact; New dressing applied 10/20/22 0006   Dressing Type Transparent 10/20/22 0006     PO Status: Regular  Pertinent or High Risk Medications/Drips: no   o If Yes, please provide details:   Pending Blood Product Administration: no     You may also review the ED PT Care Timeline found under the Summary Nursing Index tab.     Recommendation    Pending orders none  Plan

## 2022-10-21 ENCOUNTER — OFFICE VISIT (OUTPATIENT)
Dept: ONCOLOGY | Age: 52
End: 2022-10-21
Payer: MEDICAID

## 2022-10-21 ENCOUNTER — HOSPITAL ENCOUNTER (OUTPATIENT)
Dept: INFUSION THERAPY | Age: 52
Discharge: HOME | End: 2022-10-21
Payer: MEDICAID

## 2022-10-21 VITALS
BODY MASS INDEX: 25.84 KG/M2 | TEMPERATURE: 97.5 F | RESPIRATION RATE: 16 BRPM | OXYGEN SATURATION: 97 % | WEIGHT: 195 LBS | HEIGHT: 73 IN | SYSTOLIC BLOOD PRESSURE: 139 MMHG | DIASTOLIC BLOOD PRESSURE: 88 MMHG | HEART RATE: 112 BPM

## 2022-10-21 DIAGNOSIS — D64.9 ANEMIA, UNSPECIFIED TYPE: ICD-10-CM

## 2022-10-21 DIAGNOSIS — R53.83 OTHER FATIGUE: ICD-10-CM

## 2022-10-21 DIAGNOSIS — M89.8X9 BONE PAIN: Primary | ICD-10-CM

## 2022-10-21 DIAGNOSIS — C79.51 SECONDARY MALIGNANT NEOPLASM OF BONE (HCC): ICD-10-CM

## 2022-10-21 DIAGNOSIS — M89.9 BONE LESION: ICD-10-CM

## 2022-10-21 DIAGNOSIS — C34.82 MALIGNANT NEOPLASM OF OVERLAPPING SITES OF LEFT LUNG (HCC): ICD-10-CM

## 2022-10-21 PROCEDURE — 99214 OFFICE O/P EST MOD 30 MIN: CPT | Performed by: INTERNAL MEDICINE

## 2022-10-21 PROCEDURE — 99211 OFF/OP EST MAY X REQ PHY/QHP: CPT

## 2022-10-21 NOTE — PROGRESS NOTES
MA Rooming Questions  Patient: Janell Gimenez  MRN: [id number]    Date: 10/21/2022        1. Do you have any new issues? yes - Pt c/o pain and numbness in right arm         2. Do you need any refills on medications?    no    3. Have you had any imaging done since your last visit?   no    4. Have you been hospitalized or seen in the emergency room since your last visit here?   no    5. Did the patient have a depression screening completed today?  No    No data recorded     PHQ-9 Given to (if applicable):               PHQ-9 Score (if applicable):                     [] Positive     []  Negative              Does question #9 need addressed (if applicable)                     [] Yes    []  No               Bruno Stout MA

## 2022-10-21 NOTE — PROGRESS NOTES
Patient Name:  Erasmo Pina  Patient :  1970  Patient MRN:  [id number]     Primary Oncologist: Lisbet Ribera MD  Referring Provider: Hannah Pinto MD     Date of Service: 10/21/2022        Chief Complaint:    Chief Complaint   Patient presents with    Follow-up       No diagnosis found. HPI:   21: Initial [facility] visit:Hima Lainez is a 45 y.o. male who presents to [facility] with report of dysuria and flank pain. Reports these symptoms started a few weeks ago. He ultimately came to the ED due to worsening back pain. He reports ongoing issues with frequent urge to urinate and notes some incontinence. He denies hematuria. He was noted to have acute pyelonephritis and was admitted and started on IV Rocephin.      21 CT chest     Impression   1. Left hilar neoplasm extending into the left upper lobe measuring 3.2 x 5.9   x 5.3 cm obstructing the left upper lobe bronchus with probable minimal   adjacent infiltrates versus lymphangitic spread of tumor. PET-CT/biopsy is   recommended. 2. Mediastinal lymphadenopathy. 3. Prominent left supraclavicular lymph nodes. 4. No osseous metastatic disease. 21 Ct abdomen and pelvis  Impression   1. Circumferential thickening of the bladder wall is noted which could be   related to cystitis. Correlate with urinalysis. 2. There is left retroperitoneal lymphadenopathy. This could be reactive or   neoplastic. This can be further evaluated with PET-CT. 3. There is minimal stranding within the retroperitoneal on the left. This   is uncertain etiology however could be related to acute pyelonephritis. 4. Interval development of multiple sclerotic lesions within the spine and   pelvis, concerning for metastatic osseous disease. 5. Prostate gland is enlarged. Correlate with PSA. .30      He denies past history of cancer. He smokes 2-3 ppd and drinks 10-12 beers daily.  He reports unknown malignancy in his sister who  at 39. No other known family history of cancer. Due to lung mass and concern for metastatic prostate cancer we were called to evaluate. 8/2/21:  Final Pathologic Diagnosis:   Needle biopsy of lung, clinically left lung mass:        SQUAMOUS CELL CARCINOMA IN SITU.     8/19/21:PET  1. Left perihilar mass likely represents primary lung cancer. Correlate   with biopsy results. The opacity more peripheral in the left lower lobe has   relatively low level activity and likely represents an area of post   obstructive pneumonia adjacent to the mass. 2.  Metabolically active left AP window lymph node concerning for metastatic   disease. 3.  The prostate is enlarged and has increased FDG activity which could be   due to prostatitis or prostate cancer. Correlate with PSA levels. 4.  No increased metabolic activity associated with retroperitoneal lymph   nodes. This is unlikely related to the lung process given the significant   difference in metabolic activity; however, if there is prostate cancer, this   could potentially be metastatic disease from the prostate cancer, which can   have variable levels of uptake on FDG PET scans. 5.  No metabolic activity associated with multiple sclerotic lesions. Again   this is unlikely related to the lung process, but if there is prostate   cancer, this could represent metastatic disease from prostate cancer with   poor FDG avidity. Otherwise it could also represent large bone islands. 6.  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 There is a focal area of intense activity associated with a new lucent lesion   within the background of Paget's disease concerning for metastatic disease. Given the FDG activity, it is likely related to the lung process. 8/20/21: MRI brain  1.  There is a punctate focus of restricted diffusion within the right frontal   lobe without associated enhancement, mass effect or midline shift. This   could represent a punctate acute infarct. However, given history, an early   metastatic focus cannot be entirely excluded. 2. Scattered foci of susceptibility are seen within the cerebral hemispheres   bilaterally, which were not visualized on the prior exam.  Findings could   represent areas of remote microhemorrhage or perhaps treated metastases. 3. No abnormal enhancement within the brain. 4. Minimal global parenchymal volume loss with minimal chronic microvascular   ischemic changes. 12/2/21: CT chest, abdomen and pelvis:  Chest CT: Interval increased size of the left perihilar mass, with increased   adjacent obstructive pneumonitis and atelectasis. Stable to minimally to decreased mediastinal lymphadenopathy. Interval increased bony metastatic disease. Abdomen and pelvis CT: Stable retroperitoneal and pelvic lymphadenopathy. Interval increased bony metastatic disease. 12/17/21:Final Pathologic Diagnosis:   Bone, posterior ileum, needle core biopsy:   -     METASTATIC SQUAMOUS CELL CARCINOMA. PACO  PDL1 22c3 >50%(keytruda)  PTEN positive  Alk neg    CARIS, not enough tissue for rest of the testing    Gaurdant with no actionable mutation otherwise     12/27/21: Started XRT to the left pelvis  Added chest radiation dia 3  Completed dia 10 2022      Started carbo/taxol/keytruda jan 20 2022 2/8/22: CT chest:  1. Interval decrease in size of left suprahilar mass extending along the   left upper lobe bronchi extension into the left main pulmonary artery. There   is interval decrease in associated ground-glass opacity in the left upper   lobe. Findings suggest response to therapy. 2.  Interval decrease in mediastinal lymphadenopathy. 3.  Diffuse osseous metastatic disease. Feb/march 2022 he received casodex and GnRH analogue     3/11/22: CT head: No acute abn    3/26/22: Ct abdomen and pelvis:  1.  Interval development of a mild degree of left hydronephrosis and   hydroureter, to the level of the left UVJ. An approximate 7.3 x 5 cm soft   tissue mass is present along the left posterior margin of the urinary   bladder, and contiguous with the prostate gland, resulting in obstructing of   the ureteral orifice. Soft tissue mass is most consistent with primary   prostate carcinoma, in light of the diffuse blastic metastatic disease. 2. Interval increase in confluent adenopathy at the level of the aortic   bifurcation, consistent with progression of metastatic disease   3. Diffuse osseous blastic metastatic disease, with interval increase in size   of the lytic metastatic lesion involving the proximal left iliac bone     3/22/22:     He was admitted at [facility] in April (4/22 - 4/30). He had debulking of his primary tumor via bronchoscopy during admit. BMB at [facility] consistent with met prostate cancer  - Sampson prior to admission; exchanged in ED 4/22. Urine cx neg  - CT A/P:  Soft tissue mass along the left UVJ/posterior lateral left bladder wall with moderate left hydroureteronephrosis and diffuse urinary bladder  thickening. UVJ mass is likely a metastatic deposit involving the bladder and possibly the left prostate/seminal vesicle. - 4/25L neph tube placed by IR; sampson removed. Also treated for PNA. 5/16/2022 Imaging   PET - local:   1. Decreased size and uptake of the left perihilar mass. Adjacent   consolidation extending into the left lower lobe likely represents   post-treatment and post-obstructive atelectasis. Resolution of the   hypermetabolic prevascular lymph node. 2. Decreased uptake involving the lytic lesion along the left sacroiliac   joint. Diffuse sclerosis throughout the skeleton without significant uptake   is unchanged and may represent treated metastatic disease.     May 19 2022 CBC with hb 6.9, platelets 89, psa 323, testerone <7    May 31 2022 started Zytiga and prednisone    June 7 2022 resumed C1D1 Bailey Cords    6/9/22: Started SHABBIR    But then he did not follow up    Presented to the ER in august 2022  with chest pain     August 9 2022 CT chest:  1. Artifact limits evaluation of the distal pulmonary arteries. No evidence   of central pulmonary embolism or right heart strain. 2. New from 05/16/2022, there is now dense consolidation anteriorly in the   left upper lobe, which may represent postobstructive pneumonia or progression   of disease. More patchy consolidation in the perihilar left lung is slightly   increased and could represent pneumonia or post radiation change. 3. Trace left pleural effusion. 4. Findings are concerning for progression of osseous metastatic disease. Nondisplaced pathologic fractures in the posterior right 4th and 7th ribs   appear subacute all although are new from prior exam.       Was [facility] to [facility] October 2022 with right arm pain, noted to have small left sided pneumothorax, BRENDA, leaking nephrostomy bag, and possibly sepsis  Nephrostomy tube exchanged, treated for UTI    Review of CBC 10/11/22  WBC 7.5, Hgb 10.6, Hct 33.1, MCV 80.3, Platelets 684  CMP , K+ 4.1, BUN 17, Cr 1.4, glucose 101, ALT <5, AST 10, ALK phos 212     CT head 10/11/22  Impression   Motion artifact on the lower images. No convincing evidence for acute   intracranial hemorrhage. No mass effect, midline shift, or focal sulcal   effacement. The ventricles are not dilated. Low lying position of the right mandibular condyle at the temporomandibular   joint suggesting subluxation. Areas of increased sclerosis in the skull concerning for sclerotic metastases. CT chest 10/11/22  Impression   1. Suboptimal opacification of the pulmonary arteries. No central pulmonary   embolus. No definite lobar pulmonary embolus. Motion artifact and contrast   bolus timing limits evaluation of the segmental pulmonary arteries. 2. New trace left anterior pneumothorax measuring 4 mm.    3. Otherwise no significant change since 2022. Complete collapse of   the left upper lobe with persistent left perihilar 3.8 cm mass obscured by   the surrounding atelectasis. 4. Trace left pleural effusion   5. Mild infrahilar left lower lobe atelectasis   6. Osseous metastatic disease with diffuse sclerotic osseous metastatic   lesions. No pathologic fracture. 10/11/22; CXR:  Stable chest       Airspace opacity in the left upper lobe, likely related to lobar atelectasis   and known lung mass. Known diffuse osseous metastasis. 10/12/22: Left PCN exchanged. No evidence of hydronephrosis     10/12/2022 CBC with WBC of 12.2 hemoglobin of 9 hematocrit 27.8 MCV of 79.7 platelets of 265, no nucleated RBCs. BMP with sodium of 129 potassium of 4 BUN of 21 creatinine 1.3  PSA 31.78     XR right humerus was ordered     10/11/22: PSA 35     Was discharged on Zytiga and prednisone     Past Medical History:     BPH, HTN, COPD                                                            Past Surgery History:    None per his wife                                                                        Social History:   Lives with wife. Cut down smoking to 2-4 cigarettes per day prior to which he was smoking 2 to 3 packs/day for the past 40 years. Also reported drinking alcohol 10-12 beers per day. Denied any other illicit drug abuse.                                                                                                    Family History:    He reported that his sister  at the age of 39 with  metastatic cancer, he was not sure what the primary was                                                                                              Allergies   Allergen Reactions    Tramadol Other (See Comments)     seizures    Morphine Hallucinations    Vicodin [Hydrocodone-Acetaminophen] Hives       Current Outpatient Medications on File Prior to Visit   Medication Sig Dispense Refill    oxyCODONE (OXYCONTIN) 10 MG extended release tablet Take 1 tablet by mouth every 8 hours as needed for Pain for up to 5 days. 11 each 0    tamsulosin (FLOMAX) 0.4 MG capsule Take 1 capsule by mouth daily 30 capsule 0    cyclobenzaprine (FLEXERIL) 10 MG tablet Take 10 mg by mouth 3 times daily as needed for Muscle spasms      ALPRAZolam (XANAX) 0.5 MG tablet Take 0.5 mg by mouth 3 times daily as needed for Sleep or Anxiety (Twice Daily PRN). abiraterone acetate (ZYTIGA) 250 MG tablet Take 1,000 mg by mouth daily      naloxone 4 MG/0.1ML LIQD nasal spray 1 spray by Nasal route as needed for Opioid Reversal 1 each 5    cyanocobalamin ([employer] VITAMIN B12) 1000 MCG tablet Take 1 tablet by mouth daily 30 tablet 3    bicalutamide (CASODEX) 50 MG chemo tablet Take 1 tablet by mouth daily 30 tablet 11    ondansetron (ZOFRAN) 8 MG tablet take 1 tablet by mouth every 8 hours if needed for nausea and vomiting      Sennosides (SENNA) 8.6 MG CAPS Take 1 capsule by mouth 2 times daily as needed (constipation) 20 capsule 0    Calcium Carbonate-Vitamin D (OYSTER SHELL CALCIUM/D) 500-200 MG-UNIT TABS Take 1 tablet by mouth daily 30 tablet 3    albuterol sulfate HFA (PROVENTIL HFA) 108 (90 Base) MCG/ACT inhaler Inhale 2 puffs into the lungs every 4 hours as needed for Wheezing or Shortness of Breath With spacer (and mask if indicated). Thanks. 1 Inhaler 1     No current facility-administered medications on file prior to visit. Interval History: 10/21/22: He arrived alone to the clinic today. Continues to have right arm pain. And numbness as well. Hard to find a position which is comfortable. Right buttock hurts. Not with hospice.      Review of Systems:  As per the interval history, rest of the review of system negative     Vital Signs: /88 (Site: Left Upper Arm, Position: Sitting, Cuff Size: Medium Adult)   Pulse (!) 112   Temp 97.5 °F (36.4 °C) (Infrared)   Resp 16   Ht 6' 1\" (1.854 m)   Wt 195 lb (88.5 kg)   SpO2 97%   BMI 25.73 kg/m²      CONSTITUTIONAL:somnolent again  LUNGS: coarse bs   CARDIOVASCULAR: s1s2 rrr no murmurs, tachycardia  ABDOMEN: soft ntnd bs pos, left nephrostomy tube in place   NEUROLOGIC: somnolent      Labs:  Hematology:  Lab Results   Component Value Date    WBC 8.3 10/20/2022    RBC 3.65 (L) 10/20/2022    HGB 9.4 (L) 10/20/2022    HCT 28.9 (L) 10/20/2022    MCV 79.2 10/20/2022    MCH 25.8 (L) 10/20/2022    MCHC 32.5 10/20/2022    RDW 18.6 (H) 10/20/2022     10/20/2022    MPV 9.3 10/20/2022    BANDSPCT 17 (H) 10/12/2022    SEGSPCT 72.7 (H) 10/20/2022    EOSRELPCT 2.3 10/20/2022    BASOPCT 0.4 10/20/2022    LYMPHOPCT 18.9 (L) 10/20/2022    MONOPCT 5.2 (H) 10/20/2022    BANDABS 2.07 10/12/2022    SEGSABS 6.0 10/20/2022    EOSABS 0.2 10/20/2022    BASOSABS 0.0 10/20/2022    LYMPHSABS 1.6 10/20/2022    MONOSABS 0.4 10/20/2022    DIFFTYPE AUTOMATED DIFFERENTIAL 10/20/2022    ANISOCYTOSIS 1+ 04/09/2022    POLYCHROM 1+ 04/09/2022    PLTM DECREASED 04/09/2022     Lab Results   Component Value Date    ESR 47 (H) 03/22/2022     Chemistry:  Lab Results   Component Value Date     (L) 10/20/2022    K 3.7 10/20/2022    CL 97 (L) 10/20/2022    CO2 26 10/20/2022    BUN 12 10/20/2022    CREATININE 0.9 10/20/2022    GLUCOSE 155 (H) 10/20/2022    CALCIUM 9.7 10/20/2022    PROT 8.0 10/11/2022    LABALBU 3.8 10/11/2022    BILITOT 0.1 10/11/2022    ALKPHOS 212 (H) 10/11/2022    AST 10 (L) 10/11/2022    ALT <5 (L) 10/11/2022    LABGLOM >60 10/20/2022    GFRAA >60 10/13/2022    MG 1.8 04/10/2022    POCCA 1.18 04/01/2022    POCGLU 102 (H) 10/13/2022     Lab Results   Component Value Date     (H) 03/22/2022     No components found for: LD  Lab Results   Component Value Date    TSHHS 1.360 07/13/2022    T4FREE 0.97 07/03/2022     Immunology:  Lab Results   Component Value Date    PROT 8.0 10/11/2022    SPEP  03/22/2022     INTERPRETATION - Decreased albumin and total prorein. No definitive monoclonal bands are seen. SAF    SPEP INTERPRETATION - No monoclonal bands are seen. SAF 03/22/2022    ALBUMINELP 2.8 (L) 03/22/2022    LABALPH 0.4 03/22/2022    LABALPH 1.1 03/22/2022    LABBETA 1.1 03/22/2022    GAMGLOB 0.8 03/22/2022     No results found for: Shilpa Po, KLFLCR  No results found for: B2M  Coagulation Panel:  Lab Results   Component Value Date    PROTIME 11.8 10/20/2022    INR 0.92 10/20/2022    APTT 40.6 (H) 10/20/2022    DDIMER 908 (H) 08/09/2022     Anemia Panel:  Lab Results   Component Value Date    AAMJEIRM98 373.0 03/22/2022    FOLATE 4.0 03/22/2022     Tumor Markers:  Lab Results   Component Value Date    CEA 7.7 07/23/2021    .0 (H) 03/22/2022        Observations:  ECOG:  No data recorded       Assessment & Plan:   H/O noncompliance. Left hilar mass with mediastinal hilar lymphadenopathy. 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 PET scan results from august 2021 noted, bone lesions most probably not metastatic except for one lytic lesion. MRI of the brain with no convincing metastatic lesions. Presented  the case in the tumor board. Decision made to proceed with a prostate biopsy and treat with ADT if malignancy  and in the meantime proceed with staging mediastinoscopy/rebiopsy for further treatment plan. But as pt very very very noncompliant, did not follow up with urology, CTS or for bone biopsy multiple times. Note PSA rising and was 250 on November 16 2021   CT imaging dec 2021 with progressive met disease  Bone biopsy on dec 13 2021 with met squamous cell cancer, consistent with lung primary. PDL1 >50%Odean Haw) .    Not enough tissue for rest of CARIS, guardant 360 with no other actionable mutation  CT chest jan 2022 with no significant change   Completed Palliative radiation to the pelvic area and also radiation to the left lung dia 10 2022  Discussed the findings, diagnosis and poor prognosis and treatment with carbo/taxol/pembro after completion of radiation. Discussed ae. PORT placed. Completed OCM  C1D1 carbo/taxol and keytruda started 1/20/22, unfortunately received only one treatment so far sec to being very very noncompliant  CT after C1 with positive response   And then treatment held sec to cytopenias/noncompliance  and also admission to [facility]. PET in may 2022 with continued response. Resumed Keytruda alone June 7 2022 and   Plan was  for repeat PET after 3-4C. But then they discussed and established care with hospice  and did not follow  with us after June 2022 until sep 2022. CT chest in October 2022 with persistent left perihilar mass  with complete collapse   He is not sure whether he would like to continue with [employer] ([place] Met prostate cancer: PSA was ~900, started on casodex and received GnRH analogue in feb/march . But BMB biopsy in April 2022 consistent with involvement by prostate cancer. May 2022 PSA was 256. Was Started on Zytiga and prednisone in may 2022 . Was on  lupron. But then looks like he discontinued Zytiga in June 2022 and ?restarted in October 2022. PSA in October 2022 was 33. Recommend that he continues the same dose     Left nephrostomy , changed recently    Anemia: Sec to BMB involvement with prostate cancer. Note Hb better and stable, mateusz transfusion requirements. Right buttock pain and also right humeral pain. Continue current analgesic regimen and also adequate bowel regimen. Discussed above findings and overall very poor prognosis and plan with his partner and I am not really sure whether she understands as well     Smoking and alcohol cessation    Note he has he has been very noncompliant with appointments in the past.    Please do not hesitate to contact us if you need further information.     Return to clinic  2 weeks or earlier if new Sx.     Enrrique Pleaez

## 2022-10-27 ENCOUNTER — HOSPITAL ENCOUNTER (OUTPATIENT)
Dept: NUCLEAR MEDICINE | Age: 52
Discharge: HOME | End: 2022-10-27
Payer: MEDICAID

## 2022-10-27 DIAGNOSIS — M89.8X9 BONE PAIN: ICD-10-CM

## 2022-10-27 PROCEDURE — 3430000000 HC RX DIAGNOSTIC RADIOPHARMACEUTICAL: Performed by: INTERNAL MEDICINE

## 2022-10-27 PROCEDURE — 78306 BONE IMAGING WHOLE BODY: CPT | Performed by: INTERNAL MEDICINE

## 2022-10-27 PROCEDURE — A9503 TC99M MEDRONATE: HCPCS | Performed by: INTERNAL MEDICINE

## 2022-10-27 RX ORDER — TC 99M MEDRONATE 20 MG/10ML
28.2 INJECTION, POWDER, LYOPHILIZED, FOR SOLUTION INTRAVENOUS
Status: COMPLETED | OUTPATIENT
Start: 2022-10-27 | End: 2022-10-27

## 2022-10-27 RX ADMIN — TC 99M MEDRONATE 28.2 MILLICURIE: 20 INJECTION, POWDER, LYOPHILIZED, FOR SOLUTION INTRAVENOUS at 10:52

## 2022-10-31 ENCOUNTER — TELEPHONE (OUTPATIENT)
Dept: ONCOLOGY | Age: 52
End: 2022-10-31

## 2022-10-31 NOTE — TELEPHONE ENCOUNTER
Call placed to patient to evaluate patients complaints. Patient states he has been using more pain medication then usual, this office is not prescribing pain medication, patient instructed to reach out to provider to discuss pain medication needs. Patient is currently under hospice care and patients wife states she reached out to hospice nurse regarding needing something for constipation. Patient has follow up appointment this upcoming Friday 11/4/2022 at 1500. Patient and his wife aware of appointment. No further needs at this time.

## 2022-10-31 NOTE — TELEPHONE ENCOUNTER
Patient is having increased pain from the waist down. Also has not been able to have a bowel movement. In three days.     Please advise [phone]

## 2022-11-04 ENCOUNTER — OFFICE VISIT (OUTPATIENT)
Dept: ONCOLOGY | Age: 52
End: 2022-11-04
Payer: MEDICAID

## 2022-11-04 ENCOUNTER — HOSPITAL ENCOUNTER (OUTPATIENT)
Dept: INFUSION THERAPY | Age: 52
Discharge: HOME | End: 2022-11-04
Payer: MEDICAID

## 2022-11-04 VITALS
HEART RATE: 106 BPM | DIASTOLIC BLOOD PRESSURE: 90 MMHG | TEMPERATURE: 97.6 F | WEIGHT: 193.4 LBS | BODY MASS INDEX: 25.63 KG/M2 | OXYGEN SATURATION: 99 % | HEIGHT: 73 IN | SYSTOLIC BLOOD PRESSURE: 126 MMHG

## 2022-11-04 DIAGNOSIS — M89.8X9 BONE PAIN: Primary | ICD-10-CM

## 2022-11-04 DIAGNOSIS — C79.51 SECONDARY MALIGNANT NEOPLASM OF BONE (HCC): ICD-10-CM

## 2022-11-04 DIAGNOSIS — R53.83 OTHER FATIGUE: ICD-10-CM

## 2022-11-04 DIAGNOSIS — D64.9 ANEMIA, UNSPECIFIED TYPE: ICD-10-CM

## 2022-11-04 DIAGNOSIS — C34.82 MALIGNANT NEOPLASM OF OVERLAPPING SITES OF LEFT LUNG (HCC): ICD-10-CM

## 2022-11-04 PROCEDURE — 99214 OFFICE O/P EST MOD 30 MIN: CPT | Performed by: INTERNAL MEDICINE

## 2022-11-04 PROCEDURE — 99211 OFF/OP EST MAY X REQ PHY/QHP: CPT

## 2022-11-04 RX ORDER — OXYCODONE HCL 20 MG/1
20 TABLET, FILM COATED, EXTENDED RELEASE ORAL EVERY 12 HOURS
COMMUNITY

## 2022-11-04 RX ORDER — OXYCODONE HCL 10 MG/1
10 TABLET, FILM COATED, EXTENDED RELEASE ORAL EVERY 12 HOURS
COMMUNITY

## 2022-11-04 RX ORDER — PREDNISONE 20 MG/1
20 TABLET ORAL DAILY
COMMUNITY

## 2022-11-04 NOTE — PROGRESS NOTES
Physician Progress Note      PATIENT:               Robyn Arzate  CSN #:                  [id number]  :                       1970  ADMIT DATE:       10/11/2022 1:41 AM  DISCH DATE:        10/13/2022 2:01 PM  RESPONDING  PROVIDER #:        Shabana Covarrubias DO          QUERY TEXT:    Pt admitted with sepsis. Pt noted to have \"BRENDA: cr 1.5 from normal baseline of   0.8//ddx: volume depletion vs ATN\" documented on 10/11 progress note. If   possible, please document in the progress notes and discharge summary if you   are evaluating and/or treating any of the following: The medical record reflects the following:  Risk Factors: HTN  Clinical Indicators: creat 1.4 on 10/11, 1.0 on 10/13  Treatment: labs, IVF    MAGDY Zamarripa, RN  Clinical   [phone]  Options provided:  -- ATN confirmed  -- ATN ruled out after further study  -- Other - I will add my own diagnosis  -- Disagree - Not applicable / Not valid  -- Disagree - Clinically unable to determine / Unknown  -- Refer to Clinical Documentation Reviewer    PROVIDER RESPONSE TEXT:    ATN was ruled out after further study.     Query created by: Otf Grayson on 11/3/2022 11:05 AM      Electronically signed by:  Giacomo Chacon DO 2022 3:36 PM

## 2022-11-04 NOTE — PROGRESS NOTES
Patient Name:  Janell Gimenez  Patient :  1970  Patient MRN:  [id number]     Primary Oncologist: Joie Pinto MD  Referring Provider: Radha Guzmán MD     Date of Service: 2022        Chief Complaint:    Chief Complaint   Patient presents with    Follow-up       Encounter Diagnoses   Name Primary? Bone pain Yes    Secondary malignant neoplasm of bone ([address]     Malignant neoplasm of overlapping sites of left lung (HCC)     Anemia, unspecified type     Other fatigue           HPI:   21: Initial [facility] visit:Hima Martel is a 45 y.o. male who presents to [facility] with report of dysuria and flank pain. Reports these symptoms started a few weeks ago. He ultimately came to the ED due to worsening back pain. He reports ongoing issues with frequent urge to urinate and notes some incontinence. He denies hematuria. He was noted to have acute pyelonephritis and was admitted and started on IV Rocephin.      21 CT chest     Impression   1. Left hilar neoplasm extending into the left upper lobe measuring 3.2 x 5.9   x 5.3 cm obstructing the left upper lobe bronchus with probable minimal   adjacent infiltrates versus lymphangitic spread of tumor. PET-CT/biopsy is   recommended. 2. Mediastinal lymphadenopathy. 3. Prominent left supraclavicular lymph nodes. 4. No osseous metastatic disease. 21 Ct abdomen and pelvis  Impression   1. Circumferential thickening of the bladder wall is noted which could be   related to cystitis. Correlate with urinalysis. 2. There is left retroperitoneal lymphadenopathy. This could be reactive or   neoplastic. This can be further evaluated with PET-CT. 3. There is minimal stranding within the retroperitoneal on the left. This   is uncertain etiology however could be related to acute pyelonephritis. 4. Interval development of multiple sclerotic lesions within the spine and   pelvis, concerning for metastatic osseous disease.    5. Prostate gland is enlarged. Correlate with PSA. .30      He denies past history of cancer. He smokes 2-3 ppd and drinks 10-12 beers daily. He reports unknown malignancy in his sister who  at 39. No other known family history of cancer. Due to lung mass and concern for metastatic prostate cancer we were called to evaluate. 21:  Final Pathologic Diagnosis:   Needle biopsy of lung, clinically left lung mass:        SQUAMOUS CELL CARCINOMA IN SITU.     21:PET  1. Left perihilar mass likely represents primary lung cancer. Correlate   with biopsy results. The opacity more peripheral in the left lower lobe has   relatively low level activity and likely represents an area of post   obstructive pneumonia adjacent to the mass. 2.  Metabolically active left AP window lymph node concerning for metastatic   disease. 3.  The prostate is enlarged and has increased FDG activity which could be   due to prostatitis or prostate cancer. Correlate with PSA levels. 4.  No increased metabolic activity associated with retroperitoneal lymph   nodes. This is unlikely related to the lung process given the significant   difference in metabolic activity; however, if there is prostate cancer, this   could potentially be metastatic disease from the prostate cancer, which can   have variable levels of uptake on FDG PET scans. 5.  No metabolic activity associated with multiple sclerotic lesions. Again   this is unlikely related to the lung process, but if there is prostate   cancer, this could represent metastatic disease from prostate cancer with   poor FDG avidity. Otherwise it could also represent large bone islands. 6.  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 There is a focal area of intense activity associated with a new lucent lesion   within the background of Paget's disease concerning for metastatic disease.    Given the FDG activity, it is likely related to the lung process. 8/20/21: MRI brain  1. There is a punctate focus of restricted diffusion within the right frontal   lobe without associated enhancement, mass effect or midline shift. This   could represent a punctate acute infarct. However, given history, an early   metastatic focus cannot be entirely excluded. 2. Scattered foci of susceptibility are seen within the cerebral hemispheres   bilaterally, which were not visualized on the prior exam.  Findings could   represent areas of remote microhemorrhage or perhaps treated metastases. 3. No abnormal enhancement within the brain. 4. Minimal global parenchymal volume loss with minimal chronic microvascular   ischemic changes. 12/2/21: CT chest, abdomen and pelvis:  Chest CT: Interval increased size of the left perihilar mass, with increased   adjacent obstructive pneumonitis and atelectasis. Stable to minimally to decreased mediastinal lymphadenopathy. Interval increased bony metastatic disease. Abdomen and pelvis CT: Stable retroperitoneal and pelvic lymphadenopathy. Interval increased bony metastatic disease. 12/17/21:Final Pathologic Diagnosis:   Bone, posterior ileum, needle core biopsy:   -     METASTATIC SQUAMOUS CELL CARCINOMA. PACO  PDL1 22c3 >50%(keytruda)  PTEN positive  Alk neg    CARIS, not enough tissue for rest of the testing    G[place]ant with no actionable mutation otherwise     12/27/21: Started XRT to the left pelvis  Added chest radiation dia 3  Completed dia 10 2022      Started carbo/taxol/keytruda jan 20 2022 2/8/22: CT chest:  1. Interval decrease in size of left suprahilar mass extending along the   left upper lobe bronchi extension into the left main pulmonary artery. There   is interval decrease in associated ground-glass opacity in the left upper   lobe. Findings suggest response to therapy. 2.  Interval decrease in mediastinal lymphadenopathy.        3.  Diffuse osseous metastatic disease. Feb/march 2022 he received casodex and GnRH analogue     3/11/22: CT head: No acute abn    3/26/22: Ct abdomen and pelvis:  1. Interval development of a mild degree of left hydronephrosis and   hydroureter, to the level of the left UVJ. An approximate 7.3 x 5 cm soft   tissue mass is present along the left posterior margin of the urinary   bladder, and contiguous with the prostate gland, resulting in obstructing of   the ureteral orifice. Soft tissue mass is most consistent with primary   prostate carcinoma, in light of the diffuse blastic metastatic disease. 2. Interval increase in confluent adenopathy at the level of the aortic   bifurcation, consistent with progression of metastatic disease   3. Diffuse osseous blastic metastatic disease, with interval increase in size   of the lytic metastatic lesion involving the proximal left iliac bone     3/22/22:     He was admitted at [facility] in April (4/22 - 4/30). He had debulking of his primary tumor via bronchoscopy during admit. BMB at [facility] consistent with met prostate cancer  - Sampson prior to admission; exchanged in ED 4/22. Urine cx neg  - CT A/P:  Soft tissue mass along the left UVJ/posterior lateral left bladder wall with moderate left hydroureteronephrosis and diffuse urinary bladder  thickening. UVJ mass is likely a metastatic deposit involving the bladder and possibly the left prostate/seminal vesicle. - 4/25L neph tube placed by IR; sampson removed. Also treated for PNA. 5/16/2022 Imaging   PET - local:   1. Decreased size and uptake of the left perihilar mass. Adjacent   consolidation extending into the left lower lobe likely represents   post-treatment and post-obstructive atelectasis. Resolution of the   hypermetabolic prevascular lymph node. 2. Decreased uptake involving the lytic lesion along the left sacroiliac   joint.  Diffuse sclerosis throughout the skeleton without significant uptake   is unchanged and may represent treated metastatic disease. May 19 2022 CBC with hb 6.9, platelets 89, psa 658, testerone <7    May 31 2022 started Zytiga and prednisone    June 7 2022 resumed C1D1  Con Ruff    6/9/22: Started SHABBIR    But then he did not follow up    Presented to the ER in august 2022  with chest pain     August 9 2022 CT chest:  1. Artifact limits evaluation of the distal pulmonary arteries. No evidence   of central pulmonary embolism or right heart strain. 2. New from 05/16/2022, there is now dense consolidation anteriorly in the   left upper lobe, which may represent postobstructive pneumonia or progression   of disease. More patchy consolidation in the perihilar left lung is slightly   increased and could represent pneumonia or post radiation change. 3. Trace left pleural effusion. 4. Findings are concerning for progression of osseous metastatic disease. Nondisplaced pathologic fractures in the posterior right 4th and 7th ribs   appear subacute all although are new from prior exam.       Was admitted to [facility] October 2022 with right arm pain, noted to have small left sided pneumothorax, BRENDA, leaking nephrostomy bag, and possibly sepsis  Nephrostomy tube exchanged, treated for UTI    Review of CBC 10/11/22  WBC 7.5, Hgb 10.6, Hct 33.1, MCV 80.3, Platelets 703  CMP , K+ 4.1, BUN 17, Cr 1.4, glucose 101, ALT <5, AST 10, ALK phos 212     CT head 10/11/22  Impression   Motion artifact on the lower images. No convincing evidence for acute   intracranial hemorrhage. No mass effect, midline shift, or focal sulcal   effacement. The ventricles are not dilated. Low lying position of the right mandibular condyle at the temporomandibular   joint suggesting subluxation. Areas of increased sclerosis in the skull concerning for sclerotic metastases. CT chest 10/11/22  Impression   1. Suboptimal opacification of the pulmonary arteries. No central pulmonary   embolus.  No definite lobar pulmonary embolus. Motion artifact and contrast   bolus timing limits evaluation of the segmental pulmonary arteries. 2. New trace left anterior pneumothorax measuring 4 mm. 3. Otherwise no significant change since 2022. Complete collapse of   the left upper lobe with persistent left perihilar 3.8 cm mass obscured by   the surrounding atelectasis. 4. Trace left pleural effusion   5. Mild infrahilar left lower lobe atelectasis   6. Osseous metastatic disease with diffuse sclerotic osseous metastatic   lesions. No pathologic fracture. 10/11/22; CXR:  Stable chest       Airspace opacity in the left upper lobe, likely related to lobar atelectasis   and known lung mass. Known diffuse osseous metastasis. 10/12/22: Left PCN exchanged. No evidence of hydronephrosis     10/12/2022 CBC with WBC of 12.2 hemoglobin of 9 hematocrit 27.8 MCV of 79.7 platelets of 636, no nucleated RBCs. BMP with sodium of 129 potassium of 4 BUN of 21 creatinine 1.3  PSA 31.78     XR right humerus was ordered     10/11/22: PSA 35     Was discharged on Zytiga and prednisone    10/11/22: Bone scan:  Multifocal osseous metastatic disease. The degree of metastatic disease is   underestimated on bone scan as compared to that seen on CT. Past Medical History:     BPH, HTN, COPD                                                            Past Surgery History:    None per his wife                                                                        Social History:   Lives with wife. Cut down smoking to 2-4 cigarettes per day prior to which he was smoking 2 to 3 packs/day for the past 40 years. Also reported drinking alcohol 10-12 beers per day. Denied any other illicit drug abuse.                                                                                                    Family History:    He reported that his sister  at the age of 39 with  metastatic cancer, he was not sure what the primary was Allergies   Allergen Reactions    Tramadol Other (See Comments)     seizures    Morphine Hallucinations    Vicodin [Hydrocodone-Acetaminophen] Hives       Current Outpatient Medications on File Prior to Visit   Medication Sig Dispense Refill    oxyCODONE (OXYCONTIN) 20 MG extended release tablet Take 20 mg by mouth in the morning and 20 mg in the evening. oxyCODONE (OXYCONTIN) 10 MG extended release tablet Take 10 mg by mouth in the morning and 10 mg in the evening. predniSONE (DELTASONE) 20 MG tablet Take 20 mg by mouth daily      cyclobenzaprine (FLEXERIL) 10 MG tablet Take 10 mg by mouth 3 times daily as needed for Muscle spasms      ALPRAZolam (XANAX) 0.5 MG tablet Take 0.5 mg by mouth 3 times daily as needed for Sleep or Anxiety (Twice Daily PRN). naloxone 4 MG/0.1ML LIQD nasal spray 1 spray by Nasal route as needed for Opioid Reversal 1 each 5    Calcium Carbonate-Vitamin D (OYSTER SHELL CALCIUM/D) 500-200 MG-UNIT TABS Take 1 tablet by mouth daily 30 tablet 3    cyanocobalamin ([employer] VITAMIN B12) 1000 MCG tablet Take 1 tablet by mouth daily 30 tablet 3    ondansetron (ZOFRAN) 8 MG tablet take 1 tablet by mouth every 8 hours if needed for nausea and vomiting      Sennosides (SENNA) 8.6 MG CAPS Take 1 capsule by mouth 2 times daily as needed (constipation) 20 capsule 0     No current facility-administered medications on file prior to visit. Interval History: 11/4/22:  Reported that he is in constant pain. He and his attendant were constantly arguing with each other. He denied any bleeding. But difficulty urinating, lower abd pain, sob on exertion, fatigue. No fever. He is not sure whether he is taking Zytiga.      Review of Systems:  As per the interval history, rest of the review of system negative     Vital Signs: BP (!) 126/90 (Site: Right Upper Arm, Position: Sitting, Cuff Size: Medium Adult)   Pulse (!) 106 Temp 97.6 °F (36.4 °C) (Infrared)   Ht 6' 1\" (1.854 m)   Wt 193 lb 6.4 oz (87.7 kg)   SpO2 99%   BMI 25.52 kg/m²      CONSTITUTIONAL:alert and awake   LUNGS: coarse bs   CARDIOVASCULAR: s1s2 rrr no murmurs, tachycardia  ABDOMEN: soft ntnd bs pos, left nephrostomy tube in place   NEUROLOGIC: GI     Labs:  Hematology:  Lab Results   Component Value Date    WBC 8.3 10/20/2022    RBC 3.65 (L) 10/20/2022    HGB 9.4 (L) 10/20/2022    HCT 28.9 (L) 10/20/2022    MCV 79.2 10/20/2022    MCH 25.8 (L) 10/20/2022    MCHC 32.5 10/20/2022    RDW 18.6 (H) 10/20/2022     10/20/2022    MPV 9.3 10/20/2022    BANDSPCT 17 (H) 10/12/2022    SEGSPCT 72.7 (H) 10/20/2022    EOSRELPCT 2.3 10/20/2022    BASOPCT 0.4 10/20/2022    LYMPHOPCT 18.9 (L) 10/20/2022    MONOPCT 5.2 (H) 10/20/2022    BANDABS 2.07 10/12/2022    SEGSABS 6.0 10/20/2022    EOSABS 0.2 10/20/2022    BASOSABS 0.0 10/20/2022    LYMPHSABS 1.6 10/20/2022    MONOSABS 0.4 10/20/2022    DIFFTYPE AUTOMATED DIFFERENTIAL 10/20/2022    ANISOCYTOSIS 1+ 04/09/2022    POLYCHROM 1+ 04/09/2022    PLTM DECREASED 04/09/2022     Lab Results   Component Value Date    ESR 47 (H) 03/22/2022     Chemistry:  Lab Results   Component Value Date     (L) 10/20/2022    K 3.7 10/20/2022    CL 97 (L) 10/20/2022    CO2 26 10/20/2022    BUN 12 10/20/2022    CREATININE 0.9 10/20/2022    GLUCOSE 155 (H) 10/20/2022    CALCIUM 9.7 10/20/2022    PROT 8.0 10/11/2022    LABALBU 3.8 10/11/2022    BILITOT 0.1 10/11/2022    ALKPHOS 212 (H) 10/11/2022    AST 10 (L) 10/11/2022    ALT <5 (L) 10/11/2022    LABGLOM >60 10/20/2022    GFRAA >60 10/13/2022    MG 1.8 04/10/2022    POCCA 1.18 04/01/2022    POCGLU 102 (H) 10/13/2022     Lab Results   Component Value Date     (H) 03/22/2022     No components found for: LD  Lab Results   Component Value Date    TSHHS 1.360 07/13/2022    T4FREE 0.97 07/03/2022     Immunology:  Lab Results   Component Value Date    PROT 8.0 10/11/2022    SPEP  03/22/2022 INTERPRETATION - Decreased albumin and total prorein. No definitive monoclonal bands are seen. SAF    SPEP INTERPRETATION - No monoclonal bands are seen. SAF 03/22/2022    ALBUMINELP 2.8 (L) 03/22/2022    LABALPH 0.4 03/22/2022    LABALPH 1.1 03/22/2022    LABBETA 1.1 03/22/2022    GAMGLOB 0.8 03/22/2022     No results found for: nAyi Vijay, KLFLCR  No results found for: B2M  Coagulation Panel:  Lab Results   Component Value Date    PROTIME 11.8 10/20/2022    INR 0.92 10/20/2022    APTT 40.6 (H) 10/20/2022    DDIMER 908 (H) 08/09/2022     Anemia Panel:  Lab Results   Component Value Date    FPXRYTCE93 373.0 03/22/2022    FOLATE 4.0 03/22/2022     Tumor Markers:  Lab Results   Component Value Date    CEA 7.7 07/23/2021    .0 (H) 03/22/2022        Observations:  ECOG:  No data recorded       Assessment & Plan:   H/O noncompliance. Left hilar mass with mediastinal hilar lymphadenopathy. 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 PET scan results from august 2021 noted, bone lesions most probably not metastatic except for one lytic lesion. MRI of the brain with no convincing metastatic lesions. Presented  the case in the tumor board. Decision made to proceed with a prostate biopsy and treat with ADT if malignancy  and in the meantime proceed with staging mediastinoscopy/rebiopsy for further treatment plan. But as pt very very very noncompliant, did not follow up with urology, CTS or for bone biopsy multiple times. Note PSA rising and was 250 on November 16 2021   CT imaging dec 2021 with progressive met disease  Bone biopsy on dec 13 2021 with met squamous cell cancer, consistent with lung primary. PDL1 >50%Leata Morning) .    Not enough tissue for rest of CARIS, guardant 360 with no other actionable mutation  CT chest jan 2022 with no significant change   Completed Palliative radiation to the pelvic area and also radiation to the left lung dia 10 2022  Discussed the findings, diagnosis and poor prognosis and treatment with carbo/taxol/pembro after completion of radiation. Discussed ae. PORT placed. Completed OCM  C1D1 carbo/taxol and keytruda started 1/20/22, unfortunately received only one treatment so far sec to being very very noncompliant  CT after C1 with positive response   And then treatment held sec to cytopenias/noncompliance  and also admission to [facility] [employer] [facility]. PET in may 2022 with continued response. Resumed Keytruda alone June 7 2022 and   Plan was  for repeat PET after 3-4C. But then they discussed and established care with hospice  and did not follow  with us after June 2022 until sep 2022. CT chest in October 2022 with persistent left perihilar mass  with complete collapse   He is not sure whether he would like to continue with [facility] I am not sure whether he is still with Hospice  Will call dragan with [place] hospice  PET scan ordered    Met prostate cancer: PSA was ~900, started on casodex and received GnRH analogue in feb/march . But BMB biopsy in April 2022 consistent with involvement by prostate cancer. May 2022 PSA was 256. Was Started on Zytiga and prednisone in may 2022 . Was on  lupron. But then looks like he discontinued Zytiga in June 2022 and ?restarted in October 2022. PSA in October 2022 was 33. Recommend that he continues the same dose but not sure whether he has been. Asked him several times to bring medications with him  PET ordered and as above will discuss with hospice     Left nephrostomy , changed oct 2022     Anemia: Sec to BMB involvement with prostate cancer. Note Hb better and stable, no transfusion requirements. Right buttock pain and also right humeral pain. Continue current analgesic regimen and also adequate bowel regimen.  Bone scan as above     Discussed above findings and overall very poor prognosis and plan with [facility] but I am really not sure how much they comprehend at all. They argue a lot    Smoking and alcohol cessation    Note he has he has been very noncompliant with appointments and follow ups     Please do not hesitate to contact us if you need further information.     Return to clinic after PET/CT    TOMI

## 2022-11-04 NOTE — PROGRESS NOTES
MA Rooming Questions  Patient: Marychuy Gee  MRN: [id number]    Date: 11/4/2022        1. Do you have any new issues?   no         2. Do you need any refills on medications? yes - calcium with D, B-12    3. Have you had any imaging done since your last visit? yes - bone scan    4. Have you been hospitalized or seen in the emergency room since your last visit here?   no    5. Did the patient have a depression screening completed today?  No    No data recorded     PHQ-9 Given to (if applicable):               PHQ-9 Score (if applicable):                     [] Positive     []  Negative              Does question #9 need addressed (if applicable)                     [] Yes    []  No               Vinny Ruvalcaba CMA not applicable

## 2022-11-09 ENCOUNTER — HOSPITAL ENCOUNTER (EMERGENCY)
Age: 52
Discharge: HOME | End: 2022-11-09
Payer: MEDICAID

## 2022-11-09 VITALS
WEIGHT: 193 LBS | HEIGHT: 73 IN | OXYGEN SATURATION: 98 % | TEMPERATURE: 98.3 F | RESPIRATION RATE: 18 BRPM | DIASTOLIC BLOOD PRESSURE: 75 MMHG | BODY MASS INDEX: 25.58 KG/M2 | HEART RATE: 87 BPM | SYSTOLIC BLOOD PRESSURE: 109 MMHG

## 2022-11-09 DIAGNOSIS — T83.098A MALFUNCTION OF NEPHROSTOMY TUBE (HCC): Primary | ICD-10-CM

## 2022-11-09 PROCEDURE — 99283 EMERGENCY DEPT VISIT LOW MDM: CPT | Performed by: PHYSICIAN ASSISTANT

## 2022-11-09 NOTE — CARE COORDINATION
Pt noted for possible readmission. Pt recently admitted 10/11-10/13 pneumothorax. Pt was d/c home with hospice. Pt was admitted 10/19-10/20 for no output from his nephrostomy tube. Pt d/c home. Pt had an ED visit at [facility] on 11/7 for drug induced constipation. Pt was treated and d/c home. Pt has stage 4 cancer and per chart review pt is currently on hospice care with [facility] since June 2022. Pt has PCP and insurance. Pt is independent in ADL's. Pt lives with spouse who helps care for his needs also if needed. Pt returns today with complaints broken urinary catheter. With pt currently on hospice care there are no CM needs that hospice will not or does not already tae care of. Pt was treated and d/c.

## 2022-11-09 NOTE — ED NOTES
Pt presents to ED c/o nephrology tube disconnection. The piece that connects to him broke off. Tube seems to be in place and draining appropriately.  Denies pain at this time     Susan [facility]acariasLatrobe Hospital  11/09/22 1826

## 2022-11-10 NOTE — ED NOTES
Broken stop cock removed from urostomy bag. Bag attached to drainage tube & draining clear yellow urine.      Kannan Galicia RN  11/09/22 2042

## 2022-11-10 NOTE — ED PROVIDER NOTES
Triage Chief Complaint:   Urinary Catheter (Reports broken nephrostomy catheter)    [place]:  Today in the ED I had the pleasure of caring for Otis Ramires who is a 47 y.o. male that presents to the emergency department. For nephrostomy catheter dysfunction. Context is patient states he was walking with his walker. He notes his nephrostomy bag came loose. And urine spilled on the floor so he came here for evaluation. Denies any associated pain. .    ROS:  REVIEW OF SYSTEMS    At least  06 systems reviewed      All other review of systems are negative  See HPI and nursing notes for additional information       Past Medical History:   Diagnosis Date    CAD (coronary artery disease) 12/23/2013    cath negative per pt    Cancer ([address] 06/2021    pt reports he has lung cancer    History of TMJ disorder     Hypertension     Trigeminal neuralgia      Past Surgical History:   Procedure Laterality Date    ABDOMEN SURGERY      CARDIAC CATHETERIZATION  08/03/2016    CT BIOPSY PERCUTANEOUS SUPERFICIAL BONE  12/17/2021    CT BIOPSY PERCUTANEOUS SUPERFICIAL BONE 12/17/2021 [facility] CT SCAN    CT NEEDLE BIOPSY LUNG PERCUTANEOUS  7/28/2021    CT NEEDLE BIOPSY LUNG PERCUTANEOUS 7/28/2021 [facility] CT SCAN    PORT SURGERY Right 8/13/2021    MEDIPORT INSERTION performed by Juancho Doherty MD at [facility] OR     Family History   Problem Relation Age of Onset    High Blood Pressure Mother     High Blood Pressure Sister     Cancer Sister      Social History     Socioeconomic History    Marital status:      Spouse name: Not on file    Number of children: 5    Years of education: Not on file    Highest education level: Not on file   Occupational History    Not on file   Tobacco Use    Smoking status: Every Day     Packs/day: 2.00     Years: 39.00     Pack years: 78.00     Types: Cigarettes    Smokeless tobacco: Never    Tobacco comments:     smokes 1-2 a day   Vaping Use    Vaping Use: Never used   Substance and Sexual Activity    Alcohol use: Yes     Alcohol/week: 6.0 standard drinks     Types: 6 Cans of beer per week     Comment: per week (24 oz beers)     Drug use: Yes     Types: Marijuana [place])     Comment: last 12/1    Sexual activity: Yes     Partners: Female   Other Topics Concern    Not on file   Social History Narrative    Not on file     Social Determinants of Health     Financial Resource Strain: Not on file   Food Insecurity: Not on file   Transportation Needs: Not on file   Physical Activity: Not on file   Stress: Not on file   Social Connections: Not on file   Intimate Partner Violence: Not on file   Housing Stability: Not on file     No current facility-administered medications for this encounter. Current Outpatient Medications   Medication Sig Dispense Refill    oxyCODONE (OXYCONTIN) 20 MG extended release tablet Take 20 mg by mouth in the morning and 20 mg in the evening. oxyCODONE (OXYCONTIN) 10 MG extended release tablet Take 10 mg by mouth in the morning and 10 mg in the evening. predniSONE (DELTASONE) 20 MG tablet Take 20 mg by mouth daily      cyclobenzaprine (FLEXERIL) 10 MG tablet Take 10 mg by mouth 3 times daily as needed for Muscle spasms      ALPRAZolam (XANAX) 0.5 MG tablet Take 0.5 mg by mouth 3 times daily as needed for Sleep or Anxiety (Twice Daily PRN).       naloxone 4 MG/0.1ML LIQD nasal spray 1 spray by Nasal route as needed for Opioid Reversal 1 each 5    Calcium Carbonate-Vitamin D (OYSTER SHELL CALCIUM/D) 500-200 MG-UNIT TABS Take 1 tablet by mouth daily 30 tablet 3    cyanocobalamin ([employer] VITAMIN B12) 1000 MCG tablet Take 1 tablet by mouth daily 30 tablet 3    ondansetron (ZOFRAN) 8 MG tablet take 1 tablet by mouth every 8 hours if needed for nausea and vomiting      Sennosides (SENNA) 8.6 MG CAPS Take 1 capsule by mouth 2 times daily as needed (constipation) 20 capsule 0     Allergies   Allergen Reactions    Tramadol Other (See Comments)     seizures    Morphine Hallucinations    Vicodin [Hydrocodone-Acetaminophen] Hives       Nursing Notes Reviewed    Physical Exam:  ED Triage Vitals [11/09/22 1650]   Enc Vitals Group      BP (!) 114/101      Heart Rate (!) 129      Resp 18      Temp 98.3 °F (36.8 °C)      Temp Source Oral      SpO2 97 %      Weight 193 lb (87.5 kg)      Height 6' 1\" (1.854 m)      Head Circumference       Peak Flow       Pain Score       Pain Loc       Pain Edu? Excl. in [address]? General :Patient is awake alert oriented person place and time no acute distress nontoxic appearing  HEENT: Pupils are equally round and reactive to light extraocular motors are intact conjunctivae clear sclerae white there is no injection no icterus. Nose without any rhinorrhea or epistaxis. Oral mucosa is moist no exudate buccal mucosa shows no ulcerations. Uvula is midline    Neck: Neck is supple full range of motion trachea midline thyroid nonpalpable  Cardiac: Heart regular rate rhythm no murmurs rubs clicks or gallops  Lungs: Lungs are clear to auscultation there is no wheezing rhonchi or rales. There is no use of accessory muscles no nasal flaring identified. Chest wall: There is no tenderness to palpation over the chest wall or over ribs  Abdomen: Abdomen is soft nontender nondistended. There is no firm or pulsatile masses no rebound rigidity or guarding negative Davis's negative McBurney, no peritoneal signs. Left-sided nephrostomy tube noted. In place. Actively draining urine.  endOf the catheter bag. Suprapubic:  there is no tenderness to palpation over the external bladder   Musculoskeletal: 5 out of 5 strength in all 4 extremities full flexion extension abduction and adduction supination pronation of all extremities and all digits. No obvious muscle atrophy is noted.  No focal muscle deficits are appreciated  Dermatology: Skin is warm and dry there is no obvious abscesses lacerations or lesions noted  Psych: Mentation is grossly normal cognition is grossly normal. Affect is appropriate  Neuro: Motor intact sensory intact cranial nerves II through XII are intact level of consciousness is normal cerebellar function is normal reflexes are grossly normal. No evidence of incontinence or loss of bowel or bladder no saddle anesthesia noted Lymphatic: There is no submandibular or cervical adenopathy appreciated. I have reviewed and interpreted all of the currently available lab results from this visit (if applicable):  No results found for this visit on 11/09/22. Radiographs (if obtained):  [] The following radiograph was interpreted by myself in the absence of a radiologist:   [] Radiologist's Report Reviewed:  No orders to display       EKG (if obtained):   Please See Note of attending physician for EKG interpretation. Chart review shows recent radiograph(s):  XR ABDOMEN (KUB) (SINGLE AP VIEW)    Result Date: 10/20/2022  EXAMINATION: ONE SUPINE XRAY VIEW(S) OF THE ABDOMEN 10/20/2022 1:45 am COMPARISON: 03/11/2022 HISTORY: ORDERING SYSTEM PROVIDED HISTORY: kub TECHNOLOGIST PROVIDED HISTORY: Abd KUB Reason for exam:->kub Reason for Exam: hematuria FINDINGS: New left nephrostomy tube in place with the tip projecting over the expected location of the left kidney. No radiographically apparent urolithiasis. Nonobstructive bowel gas pattern. No acute osseous abnormality. Stable diffuse sclerotic appearance of the visualized osseous structures. 1. Left nephrostomy tube tip projects over the expected location of the left kidney. 2. No radiographically apparent urolithiasis. CT HEAD WO CONTRAST    Result Date: 10/11/2022  EXAMINATION: CT OF THE HEAD WITHOUT CONTRAST  10/11/2022 3:48 am TECHNIQUE: CT of the head was performed without the administration of intravenous contrast. Automated exposure control, iterative reconstruction, and/or weight based adjustment of the mA/kV was utilized to reduce the radiation dose to as low as reasonably achievable.  COMPARISON: 03/11/2022 HISTORY: ORDERING SYSTEM PROVIDED HISTORY: numbness TECHNOLOGIST PROVIDED HISTORY: Has a \"code stroke\" or \"stroke alert\" been called? ->No Reason for exam:->numbness Decision Support Exception - unselect if not a suspected or confirmed emergency medical condition->Emergency Medical Condition (MA) Reason for Exam:  right sided Arm Pain; Cold Extremity Relevant Medical/Surgical History: hx of bone cancer FINDINGS: BRAIN/VENTRICLES: Motion artifact in the lower images but without convincing evidence for intracranial hemorrhage. There is no extra-axial fluid collection. Small calcifications are present at both globus pallidus. No mass effect, midline shift, or focal sulcal effacement is identified. Evaluation of the gray-white junction is suboptimal but without evidence of territorial infarct. The ventricles are not dilated. ORBITS: The visualized portion of the orbits demonstrate no acute abnormality. SINUSES: The visualized paranasal sinuses and mastoid air cells demonstrate no acute abnormality. SOFT TISSUES/SKULL:  Areas of increased sclerosis within the skull. Low lying position of the right mandibular condyle at the temporomandibular joint. .     Motion artifact on the lower images. No convincing evidence for acute intracranial hemorrhage. No mass effect, midline shift, or focal sulcal effacement. The ventricles are not dilated. Low lying position of the right mandibular condyle at the temporomandibular joint suggesting subluxation. Areas of increased sclerosis in the skull concerning for sclerotic metastases.      XR CHEST PORTABLE    Result Date: 10/11/2022  EXAMINATION: ONE XRAY VIEW OF THE CHEST 10/11/2022 8:04 pm COMPARISON: August 9, 2022 HISTORY: ORDERING SYSTEM PROVIDED HISTORY: Hypoxia, pneumothorax TECHNOLOGIST PROVIDED HISTORY: Reason for exam:->Hypoxia, pneumothorax Reason for Exam: Hypoxia, pneumothorax Additional signs and symptoms: na Relevant Medical/Surgical History: hypertension FINDINGS: The heart is of normal size. There is a right-sided chest port with its tip in the superior vena cava. There is airspace opacity in the left upper lobe, likely related to lobar atelectasis and known lung mass. There is no pleural effusion or pneumothorax. There is known diffuse osseous metastasis. Stable chest Airspace opacity in the left upper lobe, likely related to lobar atelectasis and known lung mass. Known diffuse osseous metastasis. CTA CHEST W CONTRAST    Result Date: 10/11/2022  EXAMINATION: CTA OF THE CHEST 10/11/2022 4:11 am TECHNIQUE: CTA of the chest was performed after the administration of intravenous contrast.  Multiplanar reformatted images are provided for review. MIP images are provided for review. Automated exposure control, iterative reconstruction, and/or weight based adjustment of the mA/kV was utilized to reduce the radiation dose to as low as reasonably achievable. COMPARISON: 02/08/2022, 08/09/2022 HISTORY: ORDERING SYSTEM PROVIDED HISTORY: hemoptysis TECHNOLOGIST PROVIDED HISTORY: Reason for exam:->hemoptysis Reason for Exam: hemoptysis Relevant Medical/Surgical History: hx of bone cancer FINDINGS: Pulmonary Arteries: Suboptimal opacification of the pulmonary arteries. No central pulmonary embolus. No definite lobar pulmonary embolus. Motion artifact and contrast bolus timing limits evaluation of the segmental pulmonary arteries. Mediastinum: No thoracic adenopathy. Heart size is normal.  No pericardial effusion. Thoracic aorta is normal caliber. Lungs and pleura: Masslike consolidation in the perihilar region and left upper lobe corresponds to the prior CT, with suspected mass measuring up to 3.8 by 3.2 cm, previously 5.2 x 3 cm. Persistent complete collapse of the left upper lobe. Atelectasis has developed in the infrahilar left lower lobe with trace left pleural effusion. Trace left anterior pneumothorax measures up to 4 mm between the pleural margins.  The right lung is clear. No central endobronchial lesion is otherwise present. Upper abdomen: Limited images of the upper abdomen demonstrate no significant abnormality. Bones and soft tissues: Diffuse bony sclerosis compatible with multifocal metastatic disease. The extent of sclerosis and metastatic lesions has significantly progressed with diffuse osseous involvement throughout the bones. No superimposed pathologic fracture is noted. 1. Suboptimal opacification of the pulmonary arteries. No central pulmonary embolus. No definite lobar pulmonary embolus. Motion artifact and contrast bolus timing limits evaluation of the segmental pulmonary arteries. 2. New trace left anterior pneumothorax measuring 4 mm. 3. Otherwise no significant change since 08/09/2022. Complete collapse of the left upper lobe with persistent left perihilar 3.8 cm mass obscured by the surrounding atelectasis. 4. Trace left pleural effusion 5. Mild infrahilar left lower lobe atelectasis 6. Osseous metastatic disease with diffuse sclerotic osseous metastatic lesions. No pathologic fracture. NM BONE SCAN WHOLE BODY    Result Date: 10/27/2022  EXAMINATION: WHOLE BODY BONE SCAN  10/27/2022 TECHNIQUE: The patient was injected intravenously with 28.2 mCi of 99 mTc MDP and scintigraphy of the entire skeleton was performed approximately three hours later. COMPARISON: CT chest dated 10/11/2022 HISTORY: ORDERING SYSTEM PROVIDED HISTORY: Bone pain TECHNOLOGIST PROVIDED HISTORY: Reason for Exam: Prostate CA FINDINGS: Diffuse activity is seen throughout the spine, with discrete lesions more noticeable on recent chest CT. Mild diffuse sternal activity is seen, without focality. Heterogeneous activity is seen within the central pelvis and within the femurs. Curvilinear activity is seen within the left frontal skull. Focal activity is seen at approximate posterior left 10th rib and posterior right 8th rib.   Increased activity is seen within the proximal humeri. Physiologic activity is seen within the kidneys and urinary bladder. Multifocal osseous metastatic disease. The degree of metastatic disease is underestimated on bone scan as compared to that seen on CT. IR GUIDED NEPHROSTOMY CATH EXCHANGE    Result Date: 10/20/2022  PROCEDURE: IR NEPHROSTOMY TUBE CHANGE MODERATE CONSCIOUS SEDATION 10/20/2022 HISTORY: ORDERING SYSTEM PROVIDED HISTORY: Nephrostomy tube displacement, needs a new one TECHNOLOGIST PROVIDED HISTORY: Reason for exam:->Nephrostomy tube displacement, needs a new one CONTRAST: 20 cc of Isovue 370 contrast was utilized. SEDATION: Moderate sedation was ordered and supervised by the attending with physician face-to-face monitoring. Medications were provided and recorded by Radiology nurses. The patient received 2.0 mg of Versed and 100 mcg fentanyl intravenously for sedation and pain control. Sedation was provided by Hue Mei RN. Sedation time: 37 minutes. FLUOROSCOPY DOSE AND TYPE OR TIME AND EXPOSURES: 9.4 minutes of fluoroscopic time was utilized.  mGy. Number of images: DESCRIPTION OF PROCEDURE: Informed consent was obtained after a detailed explanation of the procedure including risks, benefits, and alternatives. Universal protocol was observed. Sterile gowns, masks, hats and gloves utilized for maximal sterile barrier. Contrast was attempted be injected into the patient's pre-existing left-sided 10 Italian nephrostomy catheter. There is no filling of the renal collecting system and the catheter appeared to be kinked. It also appeared as though the catheter was withdrawn out of the renal collecting system into the nephrostomy tract completely. Was decided to therefore to place a new left-sided percutaneous nephrostomy. Using real-time sonographic guidance a 22 gauge Chiba needle was advanced into the left renal pelvis. Contrast and air was injected to opacify the posterior calices.   This demonstrates left-sided hydroureteronephrosis with no filling of the bladder due to an obstruction of the distal ureter at the ureterovesical junction. Attempts to access the midpole and lower pole posterior calices were not successful. It was therefore necessary to access the upper pole calyx. This was done using a 21 gauge Accustick needle using real-time fluoroscopic guidance. Due to excessive angulation of the 0.018 inch Accustick wire it was necessary to use a new 0.018 inch cope Mandril guidewire to access the renal pelvis and ureter from the upper pole calyx through the 21 gauge Accustick needle. The Accustick sheath was then able to be advanced over the guidewire into the proximal ureter. Additional contrast was injected through the [address] [place] [address] Street needle to distend the renal pelvis to allow loop formation of the nephrostomy catheter. 8 and 10 [place] fascial dilators were used to dilate the tract over a J-tip 0.035 inch guidewire. A 10 [place] locking pigtail nephrostomy catheter was then placed over the guidewire and looped within the renal pelvis. Contrast was injected to confirm that the nephrostomy catheter loop was within the renal pelvis. Catheter was then sutured in place. The pre-existing 10 [place] nephrostomy catheter was removed at the end of the procedure. The patient tolerated the entire procedure well without immediate complications. FINDINGS: Pre-existing left-sided 8 [place] nephrostomy catheter is kinked in its looped portion and withdrawn out of the left renal collecting system into the tract. Antegrade left pyelogram reveals left-sided hydroureteronephrosis with an obstruction at the ureterovesical junction. Successful ultrasound and fluoroscopically guided placement of a new 10 [place] nephrostomy catheter via an upper pole access. Pre-existing left-sided 10 [place] nephrostomy catheter is kinked in its looped portion and withdrawn out of the left renal collecting system into the tract.   Antegrade left pyelogram reveals left-sided hydroureteronephrosis with an obstruction at the ureterovesical junction. Successful ultrasound and fluoroscopically guided placement of a new 10 [place] nephrostomy catheter via an upper pole access. IR GUIDED NEPHROSTOMY CATH EXCHANGE    Result Date: 10/12/2022  PROCEDURE: IR NEPHROSTOMY TUBE CHANGE 10/12/2022 HISTORY: ORDERING SYSTEM PROVIDED HISTORY: neph exchange TECHNOLOGIST PROVIDED HISTORY: Reason for exam:->neph exchange TECHNIQUE: Maximum sterile barrier technique including hand hygiene, skin prep and sterile ultrasound technique utilized for procedure. Following informed consent, pause a confirm/time-out patient is placed in prone position on fluoroscopic table. Previously placed right nephrostomy catheter and entry site were prepped and draped in sterile fashion. Antegrade nephrostogram was performed. Guidewires advanced over which previously placed malfunctioning nephrostomy tube was exchanged for new 10.2 [place] external drainage catheter/nephrostomy tube. Catheter tip was curled in right renal pelvis and fixed to the patient's skin. Dressing applied. External drainage bag applied. Patient tolerated procedure well. CONTRAST: 3 cc Isovue 370 SEDATION: None FLUOROSCOPY DOSE AND TYPE OR TIME AND EXPOSURES: 0.6 minutes fluoroscopy time AK: 38 mGy DESCRIPTION OF PROCEDURE: Informed consent was obtained after a detailed explanation of the procedure including risks, benefits, and alternatives. Universal protocol was observed. Sterile gowns, masks, hats and gloves utilized for maximal sterile barrier. As above in technique section FINDINGS: Antegrade nephrostogram: Previously placed left nephrostomy catheter projects in satisfactory position. Contrast opacifies the decompressed left renal pelvis and visualized portion of the ureter.   Intraprocedural images confirm exchange of previously placed catheter with pigtail tip of new catheter curled in the right renal pelvis. No complication suggested. No evidence of hydronephrosis or contrast extravasation. Previously placed right nephrostomy catheter exchange for new 10.2 [place] Kristy external drainage catheter with tip curled in the left renal pelvis. No complication suggested. MDM:     Interventions given this visit: No orders of the defined types were placed in this encounter. Vital signs no constitutional symptoms patient presents today to the emergency department with nephrostomy tube complication. The nephrostomy is draining urine actively. The complication came secondary to patient's nephrostomy bag being faulty. Bag was replaced. No more leakage. Actively draining urine is noted. Patient will be discharged. I independently managed patient today in the ED. /75   Pulse 87   Temp 98.3 °F (36.8 °C) (Oral)   Resp 18   Ht 6' 1\" (1.854 m)   Wt 193 lb (87.5 kg)   SpO2 98%   BMI 25.46 kg/m²       Clinical Impression:  1. Malfunction of nephrostomy tube [facility])        Disposition referral (if applicable): Marianne Low MD  [facility] [address]  [id number]  Nicole Desai  [phone]    In 2 days    Disposition medications (if applicable):  New Prescriptions    No medications on file         Comment: Please note this report has been produced using speech recognition software and may contain errors related to that system including errors in grammar, punctuation, and spelling, as well as words and phrases that may be inappropriate. If there are any questions or concerns please feel free to contact the dictating provider for clarification.       Alexander De La Garza, [address], [place]  11/09/22 2023

## 2022-11-13 ENCOUNTER — APPOINTMENT (OUTPATIENT)
Dept: GENERAL RADIOLOGY | Age: 52
End: 2022-11-13
Payer: MEDICAID

## 2022-11-13 ENCOUNTER — HOSPITAL ENCOUNTER (EMERGENCY)
Age: 52
Discharge: HOME | End: 2022-11-13
Payer: MEDICAID

## 2022-11-13 VITALS
SYSTOLIC BLOOD PRESSURE: 127 MMHG | HEART RATE: 93 BPM | RESPIRATION RATE: 21 BRPM | DIASTOLIC BLOOD PRESSURE: 89 MMHG | OXYGEN SATURATION: 98 % | TEMPERATURE: 98 F

## 2022-11-13 DIAGNOSIS — M90.611: Primary | ICD-10-CM

## 2022-11-13 PROCEDURE — 73030 X-RAY EXAM OF SHOULDER: CPT

## 2022-11-13 PROCEDURE — 99283 EMERGENCY DEPT VISIT LOW MDM: CPT

## 2022-11-13 PROCEDURE — 6370000000 HC RX 637 (ALT 250 FOR IP): Performed by: NURSE PRACTITIONER

## 2022-11-13 RX ORDER — OXYCODONE HYDROCHLORIDE 5 MG/1
20 TABLET ORAL ONCE
Status: COMPLETED | OUTPATIENT
Start: 2022-11-13 | End: 2022-11-13

## 2022-11-13 RX ADMIN — OXYCODONE HYDROCHLORIDE 20 MG: 5 TABLET ORAL at 08:38

## 2022-11-13 NOTE — ED PROVIDER NOTES
[address] ENCOUNTER        Pt Name: Karla Garzon  MRN: [id number]  [place] 1970  Date of evaluation: 11/13/2022  Provider: Isabelle Bumpers, APRN - ROYCE  PCP: Bea Harp MD    LIZZY. I have evaluated this patient. My supervising physician was available for consultation. Triage CHIEF COMPLAINT       Chief Complaint   Patient presents with    Shoulder Pain     R collar bone pain         HISTORY OF PRESENT ILLNESS      Chief Complaint: Right shoulder pain    Karla Garzon is a 47 y.o. male who presents for evaluation of right shoulder pain that started overnight. Patient states that he was in bed and reached across his chest to a nightstand with his left arm and felt and heard a loud pop in his lateral collarbone/shoulder area and he has since had consistent pain in this area. He reports range of motion is decreased secondary to pain. Patient's history is significant for metastatic stage IV prostate cancer with mets to lungs and bones. Patient is not sure of of the extent of his bony metastasis. He is presently undergoing prostate radiation but not chemotherapy. Nursing Notes were all reviewed and agreed with or any disagreements were addressed in the HPI.     REVIEW OF SYSTEMS     Constitutional:   Denies fever, chills, weight loss or weakness   Cardiovascular:   Denies chest pain, palpitations   Respiratory:  Denies shortness of breath    GI:   Denies nausea, vomiting  Musculoskeletal:   See HPI   skin:   Denies rash  Neurologic:   Denies  sensory changes   PAST MEDICAL HISTORY     Past Medical History:   Diagnosis Date    CAD (coronary artery disease) 12/23/2013    cath negative per pt    Cancer ([address] 06/2021    pt reports he has lung cancer    History of TMJ disorder     Hypertension     Trigeminal neuralgia        SURGICAL HISTORY     Past Surgical History:   Procedure Laterality Date    ABDOMEN SURGERY      CARDIAC CATHETERIZATION  08/03/2016    CT BIOPSY PERCUTANEOUS SUPERFICIAL BONE  12/17/2021    CT BIOPSY PERCUTANEOUS SUPERFICIAL BONE 12/17/2021 [facility] CT SCAN    CT NEEDLE BIOPSY LUNG PERCUTANEOUS  7/28/2021    CT NEEDLE BIOPSY LUNG PERCUTANEOUS 7/28/2021 [facility] CT SCAN    PORT SURGERY Right 8/13/2021    MEDIPORT INSERTION performed by Mayte Serrano MD at [address] [place] [address]       Previous Medications    ALPRAZOLAM (XANAX) 0.5 MG TABLET    Take 0.5 mg by mouth 3 times daily as needed for Sleep or Anxiety (Twice Daily PRN). CALCIUM CARBONATE-VITAMIN D (OYSTER SHELL CALCIUM/D) 500-200 MG-UNIT TABS    Take 1 tablet by mouth daily    CYANOCOBALAMIN ([employer]S VITAMIN B12) 1000 MCG TABLET    Take 1 tablet by mouth daily    CYCLOBENZAPRINE (FLEXERIL) 10 MG TABLET    Take 10 mg by mouth 3 times daily as needed for Muscle spasms    NALOXONE 4 MG/0.1ML LIQD NASAL SPRAY    1 spray by Nasal route as needed for Opioid Reversal    ONDANSETRON (ZOFRAN) 8 MG TABLET    take 1 tablet by mouth every 8 hours if needed for nausea and vomiting    OXYCODONE (OXYCONTIN) 10 MG EXTENDED RELEASE TABLET    Take 10 mg by mouth in the morning and 10 mg in the evening. OXYCODONE (OXYCONTIN) 20 MG EXTENDED RELEASE TABLET    Take 20 mg by mouth in the morning and 20 mg in the evening.     PREDNISONE (DELTASONE) 20 MG TABLET    Take 20 mg by mouth daily    SENNOSIDES (SENNA) 8.6 MG CAPS    Take 1 capsule by mouth 2 times daily as needed (constipation)       ALLERGIES     Tramadol, Morphine, and Vicodin [hydrocodone-acetaminophen]    FAMILYHISTORY       Family History   Problem Relation Age of Onset    High Blood Pressure Mother     High Blood Pressure Sister     Cancer Sister         SOCIAL HISTORY       Social History     Socioeconomic History    Marital status:      Spouse name: None    Number of children: 5    Years of education: None    Highest education level: None   Tobacco Use    Smoking status: Every Day Packs/day: 2.00     Years: 39.00     Pack years: 78.00     Types: Cigarettes    Smokeless tobacco: Never    Tobacco comments:     smokes 1-2 a day   Vaping Use    Vaping Use: Never used   Substance and Sexual Activity    Alcohol use: Yes     Alcohol/week: 6.0 standard drinks     Types: 6 Cans of beer per week     Comment: per week (24 oz beers)     Drug use: Yes     Types: Marijuana Fay Forte)     Comment: last 12/1    Sexual activity: Yes     Partners: Female       SCREENINGS    Curtis Coma Scale  Eye Opening: Spontaneous  Best Verbal Response: Oriented  Best Motor Response: Obeys commands  Harika Coma Scale Score: 15      PHYSICAL EXAM       ED Triage Vitals [11/13/22 0821]   BP Temp Temp src Heart Rate Resp SpO2 Height Weight   (!) 152/99 98 °F (36.7 °C) -- 90 16 99 % -- --      Constitutional:  Well developed, Well nourished. No distress  HENT:  Normocephalic, Atraumatic  Neck/Lymphatics: supple  Cardiovascular:   RRR,  no murmurs/rubs/gallops. Respiratory:   Nonlabored breathing. Normal breath sounds, No wheezing  Musculoskeletal:  Right clavicle is tender at the [facility] without an other bony tenderness of the shoulder. ROM of the right shoulder is reduced secondary to pain.  strength 5/5 bilateral.    Integument:   Warm, Dry, No rashes. Neurologic:  Alert & oriented , No focal deficits noted. Sensation intact. Psychiatric:  Affect normal, Mood normal.     DIAGNOSTIC RESULTS   LABS:    Labs Reviewed - No data to display    When ordered, only abnormal lab results are displayed. All other labs were within normal range or not returned as of this dictation. EKG: When ordered, EKG's are interpreted by the Emergency Department Physician in the absence of a cardiologist.  Please see their note for interpretation of EKG.     RADIOLOGY:   Non-plain film images such as CT, Ultrasound and MRI are read by the radiologist. Plain radiographic images are visualized and preliminarily interpreted by the  ED Provider with the below findings:    Interpretation [facility] Radiologist below, if available at the time of this note:    XR SHOULDER RIGHT (MIN 2 VIEWS)   Final Result   Diffuse bony metastases with a focal destructive lesion in the distal clavicle           No results found. PROCEDURES   Unless otherwise noted below, none         CRITICAL CARE   CRITICAL CARE NOTE:  N/A    CONSULTS:  None      EMERGENCY DEPARTMENT COURSE and MDM:   Vitals:    Vitals:    11/13/22 0821 11/13/22 0932   BP: (!) 152/99 127/89   Pulse: 90 93   Resp: 16 21   Temp: 98 °F (36.7 °C)    SpO2: 99%        Patient was given thefollowing medications:  Medications   oxyCODONE (ROXICODONE) immediate release tablet 20 mg (20 mg Oral Given 11/13/22 5734)         Is this patient to be included in the SEP-1 Core Measure due to severe sepsis or septic shock? No   Exclusion criteria - the patient is NOT to be included for SEP-1 Core Measure due to: Infection is not suspected    MDM:  Patient presents as above. Emergent etiologies considered. Patient seen and examined. Work-up initiated secondary to presentation, physical exam findings, vital signs and medical chart review. In brief, patient presented for evaluation of right shoulder pain that started suddenly this morning after reaching across to his nightstand. An x-ray was performed and did reveal extensive metastatic bony disease in the clavicle with a destructive lesion at the distal end near the [facility] joint. Advised patient and wife of results. Put patient in a sling. He is on hospice at home and pain being managed by them with fentanyl and oxycodone. Encouraged him to continue taking the medications as prescribed and follow-up with oncology as previously directed. CLINICAL IMPRESSION      1.  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  Efforts were made to edit the dictations but occasionally words are mis-transcribed.)    GERDA Parra CNP (electronically signed)             GERDA Peña CNP  11/13/22 9956

## 2022-11-21 ENCOUNTER — HOSPITAL ENCOUNTER (EMERGENCY)
Age: 52
Discharge: HOME | End: 2022-11-22
Attending: EMERGENCY MEDICINE
Payer: MEDICAID

## 2022-11-21 VITALS
HEART RATE: 98 BPM | SYSTOLIC BLOOD PRESSURE: 105 MMHG | DIASTOLIC BLOOD PRESSURE: 76 MMHG | OXYGEN SATURATION: 97 % | TEMPERATURE: 98.4 F | RESPIRATION RATE: 16 BRPM

## 2022-11-21 DIAGNOSIS — K59.03 DRUG-INDUCED CONSTIPATION: Primary | ICD-10-CM

## 2022-11-21 DIAGNOSIS — N39.0 ACUTE UTI: ICD-10-CM

## 2022-11-21 LAB
ALBUMIN SERPL-MCNC: 4 GM/DL (ref 3.4–5)
ALP BLD-CCNC: 199 IU/L (ref 40–128)
ALT SERPL-CCNC: <5 U/L (ref 10–40)
ANION GAP SERPL CALCULATED.3IONS-SCNC: 12 MMOL/L (ref 4–16)
AST SERPL-CCNC: 13 IU/L (ref 15–37)
BACTERIA: NEGATIVE /HPF
BASOPHILS ABSOLUTE: 0 K/CU MM
BASOPHILS RELATIVE PERCENT: 0.4 % (ref 0–1)
BILIRUB SERPL-MCNC: 0.2 MG/DL (ref 0–1)
BILIRUBIN URINE: NEGATIVE MG/DL
BLOOD, URINE: ABNORMAL
BUN BLDV-MCNC: 15 MG/DL (ref 6–23)
CALCIUM SERPL-MCNC: 10.9 MG/DL (ref 8.3–10.6)
CHLORIDE BLD-SCNC: 93 MMOL/L (ref 99–110)
CLARITY: CLEAR
CO2: 27 MMOL/L (ref 21–32)
COLOR: YELLOW
CREAT SERPL-MCNC: 0.7 MG/DL (ref 0.9–1.3)
DIFFERENTIAL TYPE: ABNORMAL
EOSINOPHILS ABSOLUTE: 0.1 K/CU MM
EOSINOPHILS RELATIVE PERCENT: 0.6 % (ref 0–3)
GFR SERPL CREATININE-BSD FRML MDRD: >60 ML/MIN/1.73M2
GLUCOSE BLD-MCNC: 122 MG/DL (ref 70–99)
GLUCOSE, URINE: NEGATIVE MG/DL
HCT VFR BLD CALC: 31.9 % (ref 42–52)
HEMOGLOBIN: 10.4 GM/DL (ref 13.5–18)
IMMATURE NEUTROPHIL %: 0.4 % (ref 0–0.43)
KETONES, URINE: NEGATIVE MG/DL
LEUKOCYTE ESTERASE, URINE: ABNORMAL
LYMPHOCYTES ABSOLUTE: 1.3 K/CU MM
LYMPHOCYTES RELATIVE PERCENT: 15.6 % (ref 24–44)
MCH RBC QN AUTO: 24.2 PG (ref 27–31)
MCHC RBC AUTO-ENTMCNC: 32.6 % (ref 32–36)
MCV RBC AUTO: 74.2 FL (ref 78–100)
MONOCYTES ABSOLUTE: 0.7 K/CU MM
MONOCYTES RELATIVE PERCENT: 9 % (ref 0–4)
NITRITE URINE, QUANTITATIVE: POSITIVE
NUCLEATED RBC %: 0 %
PDW BLD-RTO: 19.1 % (ref 11.7–14.9)
PH, URINE: 7 (ref 5–8)
PLATELET # BLD: 355 K/CU MM (ref 140–440)
PMV BLD AUTO: 8.7 FL (ref 7.5–11.1)
POTASSIUM SERPL-SCNC: 4.5 MMOL/L (ref 3.5–5.1)
PROTEIN UA: NEGATIVE MG/DL
RBC # BLD: 4.3 M/CU MM (ref 4.6–6.2)
RBC URINE: 7 /HPF (ref 0–3)
SEGMENTED NEUTROPHILS ABSOLUTE COUNT: 6 K/CU MM
SEGMENTED NEUTROPHILS RELATIVE PERCENT: 74 % (ref 36–66)
SODIUM BLD-SCNC: 132 MMOL/L (ref 135–145)
SPECIFIC GRAVITY UA: 1.01 (ref 1–1.03)
TOTAL IMMATURE NEUTOROPHIL: 0.03 K/CU MM
TOTAL NUCLEATED RBC: 0 K/CU MM
TOTAL PROTEIN: 8.6 GM/DL (ref 6.4–8.2)
TRICHOMONAS: ABNORMAL /HPF
UROBILINOGEN, URINE: 0.2 MG/DL (ref 0.2–1)
WBC # BLD: 8.1 K/CU MM (ref 4–10.5)
WBC UA: 4 /HPF (ref 0–2)

## 2022-11-21 PROCEDURE — 81001 URINALYSIS AUTO W/SCOPE: CPT

## 2022-11-21 PROCEDURE — 87186 SC STD MICRODIL/AGAR DIL: CPT

## 2022-11-21 PROCEDURE — 96365 THER/PROPH/DIAG IV INF INIT: CPT

## 2022-11-21 PROCEDURE — 6360000002 HC RX W HCPCS: Performed by: EMERGENCY MEDICINE

## 2022-11-21 PROCEDURE — 80053 COMPREHEN METABOLIC PANEL: CPT

## 2022-11-21 PROCEDURE — 87086 URINE CULTURE/COLONY COUNT: CPT

## 2022-11-21 PROCEDURE — 6370000000 HC RX 637 (ALT 250 FOR IP): Performed by: EMERGENCY MEDICINE

## 2022-11-21 PROCEDURE — 99284 EMERGENCY DEPT VISIT MOD MDM: CPT

## 2022-11-21 PROCEDURE — 2580000003 HC RX 258: Performed by: EMERGENCY MEDICINE

## 2022-11-21 PROCEDURE — 85025 COMPLETE CBC W/AUTO DIFF WBC: CPT

## 2022-11-21 PROCEDURE — 87077 CULTURE AEROBIC IDENTIFY: CPT

## 2022-11-21 RX ORDER — OXYCODONE HCL 10 MG/1
20 TABLET, FILM COATED, EXTENDED RELEASE ORAL ONCE
Status: COMPLETED | OUTPATIENT
Start: 2022-11-21 | End: 2022-11-21

## 2022-11-21 RX ORDER — POLYETHYLENE GLYCOL 3350 17 G/17G
17 POWDER, FOR SOLUTION ORAL DAILY PRN
Qty: 527 G | Refills: 1 | Status: SHIPPED | OUTPATIENT
Start: 2022-11-21 | End: 2022-12-21

## 2022-11-21 RX ORDER — SODIUM PHOSPHATE, DIBASIC AND SODIUM PHOSPHATE, MONOBASIC 7; 19 G/133ML; G/133ML
1 ENEMA RECTAL
Status: COMPLETED | OUTPATIENT
Start: 2022-11-21 | End: 2022-11-21

## 2022-11-21 RX ORDER — CEPHALEXIN 500 MG/1
500 CAPSULE ORAL 4 TIMES DAILY
Qty: 40 CAPSULE | Refills: 0 | Status: SHIPPED | OUTPATIENT
Start: 2022-11-21 | End: 2022-12-01

## 2022-11-21 RX ORDER — 0.9 % SODIUM CHLORIDE 0.9 %
1000 INTRAVENOUS SOLUTION INTRAVENOUS ONCE
Status: COMPLETED | OUTPATIENT
Start: 2022-11-21 | End: 2022-11-22

## 2022-11-21 RX ADMIN — SODIUM PHOSPHATE, DIBASIC AND SODIUM PHOSPHATE, MONOBASIC 1 ENEMA: 7; 19 ENEMA RECTAL at 21:51

## 2022-11-21 RX ADMIN — OXYCODONE HYDROCHLORIDE 20 MG: 10 TABLET, FILM COATED, EXTENDED RELEASE ORAL at 22:54

## 2022-11-21 RX ADMIN — SODIUM CHLORIDE 1000 ML: 9 INJECTION, SOLUTION INTRAVENOUS at 23:20

## 2022-11-21 RX ADMIN — CEFTRIAXONE 1000 MG: 1 INJECTION, POWDER, FOR SOLUTION INTRAMUSCULAR; INTRAVENOUS at 22:33

## 2022-11-21 ASSESSMENT — PAIN - FUNCTIONAL ASSESSMENT: PAIN_FUNCTIONAL_ASSESSMENT: 0-10

## 2022-11-21 ASSESSMENT — PAIN SCALES - GENERAL: PAINLEVEL_OUTOF10: 6

## 2022-11-22 NOTE — ED PROVIDER NOTES
Emergency Department Encounter  Location: [address] [place] [address] EMERGENCY DEPARTMENT    Patient: Enedina Tse  MRN: [id number]  : 1970  Date of evaluation: 2022  ED Provider: Radha Booth DO    Chief Complaint:    Constipation (X 10 days. On pain medication for prostate cancer. )    [place]:  Enedina Tse is a 47 y.o. male that presents to the emergency department with concern for constipation. Patient has a history of both prostate and lung cancer by his report. He states that on the pain medications, he has frequent issues with constipation. He has tried a suppository at home. Despite this, has not had a bowel movement for 10 days. Patient also describes that he has had some urinary hesitancy over the past few days. No dysuria or hematuria. No fever, chills, vomiting. No falls or syncopal events are reported. ROS:  At least 10 systems reviewed and are acutely negative unless otherwise noted in the HPI.     Past Medical History:   Diagnosis Date    CAD (coronary artery disease) 2013    cath negative per pt    Cancer ([address] 2021    pt reports he has lung cancer    History of TMJ disorder     Hypertension     Trigeminal neuralgia      Past Surgical History:   Procedure Laterality Date    ABDOMEN SURGERY      CARDIAC CATHETERIZATION  2016    CT BIOPSY PERCUTANEOUS SUPERFICIAL BONE  2021    CT BIOPSY PERCUTANEOUS SUPERFICIAL BONE 2021 [address] [place] [address] CT SCAN    CT NEEDLE BIOPSY LUNG PERCUTANEOUS  2021    CT NEEDLE BIOPSY LUNG PERCUTANEOUS 2021 [facility] CT SCAN    PORT SURGERY Right 2021    MEDIPORT INSERTION performed by De Mena MD at [facility] OR     Family History   Problem Relation Age of Onset    High Blood Pressure Mother     High Blood Pressure Sister     Cancer Sister      Social History     Socioeconomic History    Marital status:      Spouse name: Not on file    Number of children: 5    Years of education: Not on file Highest education level: Not on file   Occupational History    Not on file   Tobacco Use    Smoking status: Every Day     Packs/day: 2.00     Years: 39.00     Pack years: 78.00     Types: Cigarettes    Smokeless tobacco: Never    Tobacco comments:     smokes 1-2 a day   Vaping Use    Vaping Use: Never used   Substance and Sexual Activity    Alcohol use: Yes     Alcohol/week: 6.0 standard drinks     Types: 6 Cans of beer per week     Comment: per week (24 oz beers)     Drug use: Yes     Types: Marijuana Donne Artemio)     Comment: last 12/1    Sexual activity: Yes     Partners: Female   Other Topics Concern    Not on file   Social History Narrative    Not on file     Social Determinants of Health     Financial Resource Strain: Not on file   Food Insecurity: Not on file   Transportation Needs: Not on file   Physical Activity: Not on file   Stress: Not on file   Social Connections: Not on file   Intimate Partner Violence: Not on file   Housing Stability: Not on file     Current Facility-Administered Medications   Medication Dose Route Frequency Provider Last Rate Last Admin    0.9 % sodium chloride bolus  1,000 mL IntraVENous Once Fadi Mercado, DO 1,000 mL/hr at 11/21/22 2320 1,000 mL at 11/21/22 2320     Current Outpatient Medications   Medication Sig Dispense Refill    cephALEXin (KEFLEX) 500 MG capsule Take 1 capsule by mouth 4 times daily for 10 days 40 capsule 0    polyethylene glycol (MIRALAX) 17 g packet Take 17 g by mouth daily as needed for Other (Constipation) 527 g 1    oxyCODONE (OXYCONTIN) 20 MG extended release tablet Take 20 mg by mouth in the morning and 20 mg in the evening. oxyCODONE (OXYCONTIN) 10 MG extended release tablet Take 10 mg by mouth in the morning and 10 mg in the evening.       predniSONE (DELTASONE) 20 MG tablet Take 20 mg by mouth daily      cyclobenzaprine (FLEXERIL) 10 MG tablet Take 10 mg by mouth 3 times daily as needed for Muscle spasms      ALPRAZolam (XANAX) 0.5 MG tablet Take 0.5 mg by mouth 3 times daily as needed for Sleep or Anxiety (Twice Daily PRN). naloxone 4 MG/0.1ML LIQD nasal spray 1 spray by Nasal route as needed for Opioid Reversal 1 each 5    Calcium Carbonate-Vitamin D (OYSTER SHELL CALCIUM/D) 500-200 MG-UNIT TABS Take 1 tablet by mouth daily 30 tablet 3    cyanocobalamin (CVS VITAMIN B12) 1000 MCG tablet Take 1 tablet by mouth daily 30 tablet 3    ondansetron (ZOFRAN) 8 MG tablet take 1 tablet by mouth every 8 hours if needed for nausea and vomiting      Sennosides (SENNA) 8.6 MG CAPS Take 1 capsule by mouth 2 times daily as needed (constipation) 20 capsule 0     Allergies   Allergen Reactions    Tramadol Other (See Comments)     seizures    Morphine Hallucinations    Vicodin [Hydrocodone-Acetaminophen] Hives       Nursing Notes Reviewed    Physical Exam:  ED Triage Vitals [11/21/22 9708]   Enc Vitals Group      BP (!) 132/90      Heart Rate (!) 101      Resp 18      Temp 98.3 °F (36.8 °C)      Temp src       SpO2 94 %      Weight       Height       Head Circumference       Peak Flow       Pain Score       Pain Loc       Pain Edu? Excl. in HOSP [facility]? GENERAL APPEARANCE: Awake and alert. Cooperative. Appears uncomfortable but not toxic. HEAD: Normocephalic. Atraumatic. EYES: EOM's grossly intact. Sclera anicteric. ENT: Tolerates saliva. No trismus. NECK: Supple. Trachea midline. CARDIO: RRR. Radial pulse 2+. LUNGS: Respirations unlabored. CTAB. ABDOMEN: Soft. Non-distended. Non-tender. Rectal exam with large amount of firm stool in the rectal vault. EXTREMITIES: No acute deformities. No lower extremity tenderness, edema or asymmetry. SKIN: Warm and dry. NEUROLOGICAL: No gross facial drooping. Moves all 4 extremities spontaneously. PSYCHIATRIC: Normal mood.      Labs:  Results for orders placed or performed during the hospital encounter of 11/21/22   Urinalysis   Result Value Ref Range    Color, UA YELLOW YELLOW    Clarity, UA CLEAR CLEAR    Glucose, Urine NEGATIVE NEGATIVE MG/DL    Bilirubin Urine NEGATIVE NEGATIVE MG/DL    Ketones, Urine NEGATIVE NEGATIVE MG/DL    Specific Gravity, UA 1.010 1.001 - 1.035    Blood, Urine TRACE (A) NEGATIVE    pH, Urine 7.0 5.0 - 8.0    Protein, UA NEGATIVE NEGATIVE MG/DL    Urobilinogen, Urine 0.2 0.2 - 1.0 MG/DL    Nitrite Urine, Quantitative POSITIVE (A) NEGATIVE    Leukocyte Esterase, Urine TRACE (A) NEGATIVE   Comprehensive Metabolic Panel w/ Reflex to MG   Result Value Ref Range    Sodium 132 (L) 135 - 145 MMOL/L    Potassium 4.5 3.5 - 5.1 MMOL/L    Chloride 93 (L) 99 - 110 mMol/L    CO2 27 21 - 32 MMOL/L    BUN 15 6 - 23 MG/DL    Creatinine 0.7 (L) 0.9 - 1.3 MG/DL    Est, Glom Filt Rate >60 >60 mL/min/1.73m2    Glucose 122 (H) 70 - 99 MG/DL    Calcium 10.9 (H) 8.3 - 10.6 MG/DL    Albumin 4.0 3.4 - 5.0 GM/DL    Total Protein 8.6 (H) 6.4 - 8.2 GM/DL    Total Bilirubin 0.2 0.0 - 1.0 MG/DL    ALT <5 (L) 10 - 40 U/L    AST 13 (L) 15 - 37 IU/L    Alkaline Phosphatase 199 (H) 40 - 128 IU/L    Anion Gap 12 4 - 16   Microscopic Urinalysis   Result Value Ref Range    RBC, UA 7 (H) 0 - 3 /HPF    WBC, UA 4 (H) 0 - 2 /HPF    Bacteria, UA NEGATIVE NEGATIVE /HPF    Trichomonas, UA NONE SEEN NONE SEEN /HPF   CBC with Auto Differential   Result Value Ref Range    WBC 8.1 4.0 - 10.5 K/CU MM    RBC 4.30 (L) 4.6 - 6.2 M/CU MM    Hemoglobin 10.4 (L) 13.5 - 18.0 GM/DL    Hematocrit 31.9 (L) 42 - 52 %    MCV 74.2 (L) 78 - 100 FL    MCH 24.2 (L) 27 - 31 PG    MCHC 32.6 32.0 - 36.0 %    RDW 19.1 (H) 11.7 - 14.9 %    Platelets 652 601 - 789 K/CU MM    MPV 8.7 7.5 - 11.1 FL    Differential Type AUTOMATED DIFFERENTIAL     Segs Relative 74.0 (H) 36 - 66 %    Lymphocytes % 15.6 (L) 24 - 44 %    Monocytes % 9.0 (H) 0 - 4 %    Eosinophils % 0.6 0 - 3 %    Basophils % 0.4 0 - 1 %    Segs Absolute 6.0 K/CU MM    Lymphocytes Absolute 1.3 K/CU MM    Monocytes Absolute 0.7 K/CU MM    Eosinophils Absolute 0.1 K/CU MM    Basophils Absolute 0.0 K/CU MM Nucleated RBC % 0.0 %    Total Nucleated RBC 0.0 K/CU MM    Total Immature Neutrophil 0.03 K/CU MM    Immature Neutrophil % 0.4 0 - 0.43 %       ED Course and MDM:  The patient was given a fleets enema here in the emergency department. Following this, he was able to pass a large amount of stool and indicates some improvement in his symptoms. Patient's urinalysis is concerning for infection. He is given Rocephin and fluids here. Chemistries without acute electrolyte derangement or renal dysfunction. No significant leukocytosis. I think patient is appropriate for outpatient management. He is encouraged to hydrate well and increase his fiber intake. Given a course of MiraLAX. He is also placed on 10-day course of antibiotics for the urinary tract infection. Patient is given instructions regarding symptomatic care at home as well as return precautions. To call PCP for follow up in 2-3 days. Patient verbalizes understanding of all instructions and is comfortable with the plan of care. Final Impression:  1. Drug-induced constipation    2. Acute UTI      DISPOSITION Decision To Discharge 11/21/2022 11:26:28 PM      Patient referred to:   Erich Rivera MD  [facility] [address]  [id number]  [address] [address]  [phone]    Schedule an appointment as soon as possible for a visit in 2 days      [facility] Emergency Department  [address] [id number] [phone]    If symptoms worsen  Discharge medications:  New Prescriptions    CEPHALEXIN (KEFLEX) 500 MG CAPSULE    Take 1 capsule by mouth 4 times daily for 10 days    POLYETHYLENE GLYCOL (MIRALAX) 17 G PACKET    Take 17 g by mouth daily as needed for Other (Constipation)     (Please note that portions of this note may have been completed with a voice recognition program. Efforts were made to edit the dictations but occasionally words are mis-transcribed.)    Tammie Cabello DO Yanely Sommer,   11/21/22 9226

## 2022-11-23 ENCOUNTER — HOSPITAL ENCOUNTER (EMERGENCY)
Age: 52
Discharge: HOME | End: 2022-11-23
Attending: EMERGENCY MEDICINE
Payer: MEDICAID

## 2022-11-23 VITALS
DIASTOLIC BLOOD PRESSURE: 79 MMHG | RESPIRATION RATE: 16 BRPM | HEIGHT: 73 IN | OXYGEN SATURATION: 99 % | TEMPERATURE: 98.2 F | BODY MASS INDEX: 25.58 KG/M2 | WEIGHT: 193 LBS | SYSTOLIC BLOOD PRESSURE: 113 MMHG | HEART RATE: 98 BPM

## 2022-11-23 DIAGNOSIS — G89.29 CHRONIC LEFT SHOULDER PAIN: Primary | ICD-10-CM

## 2022-11-23 DIAGNOSIS — M25.512 CHRONIC LEFT SHOULDER PAIN: Primary | ICD-10-CM

## 2022-11-23 PROCEDURE — 99283 EMERGENCY DEPT VISIT LOW MDM: CPT

## 2022-11-23 PROCEDURE — 6370000000 HC RX 637 (ALT 250 FOR IP): Performed by: EMERGENCY MEDICINE

## 2022-11-23 RX ORDER — OXYCODONE HCL 10 MG/1
10 TABLET, FILM COATED, EXTENDED RELEASE ORAL ONCE
Status: COMPLETED | OUTPATIENT
Start: 2022-11-23 | End: 2022-11-23

## 2022-11-23 RX ADMIN — OXYCODONE HYDROCHLORIDE 10 MG: 10 TABLET, FILM COATED, EXTENDED RELEASE ORAL at 09:56

## 2022-11-23 ASSESSMENT — PAIN - FUNCTIONAL ASSESSMENT: PAIN_FUNCTIONAL_ASSESSMENT: 0-10

## 2022-11-23 ASSESSMENT — PAIN DESCRIPTION - LOCATION: LOCATION: ARM

## 2022-11-23 ASSESSMENT — PAIN SCALES - GENERAL: PAINLEVEL_OUTOF10: 10

## 2022-11-23 ASSESSMENT — PAIN DESCRIPTION - ORIENTATION: ORIENTATION: RIGHT;LEFT

## 2022-11-23 NOTE — DISCHARGE INSTRUCTIONS
Follow-up with primary care physician for reevaluation. Call for an appointment  Continue home pain medication as prescribed and directed  Discontinue fentanyl patch if having adverse reaction/side effects.   Return to the emergency department immediate increased pain fever chills nausea vomiting dizzy lightheadedness or worsening symptoms

## 2022-11-23 NOTE — ED PROVIDER NOTES
Emergency Department Encounter    Patient: Javad Herzog  MRN: [id number]  : 1970  Date of Evaluation: 2022  ED Provider:  Cyndi Washington DO    Triage Chief Complaint:   Arm Pain (Bilateral - no known injury. Chronic cancer pain.)    [place]:  Javad Herzog is a 47 y.o. male that presents to the emergency department complaining of arm pain. Patient states he has been using these fentanyl patches and he states is causing pain in the back of his arm or shoulder area. He states no falls injuries trauma heavy lifting pulling or straining. He states he recently started using the patches. Denies any numbness or tingling weakness in extremities. Patient states he took the patch off this morning. Patient states he would like something for pain. Patient also wants evaluation of his left nephrostomy tube as well. He states he wants to check to make sure the stitches has not come out. He states he is draining fine. Patient denies any chest pain shortness of nausea vomiting diarrhea abdominal pain fever chills cough sore throat runny nose earache. Patient here for evaluation.   ROS - see HPI, below listed is current ROS at time of my eval:  General:  No fevers, no chills, no weakness  Eyes:  No recent vison changes, no discharge  ENT:  No sore throat, no nasal congestion, no hearing changes  Cardiovascular:  No chest pain, no palpitations  Respiratory:  No shortness of breath, no cough, no wheezing  Gastrointestinal:  No pain, no nausea, no vomiting, no diarrhea  Musculoskeletal: Bilateral shoulder pain, no muscle pain, no joint pain  Skin:  No rash, no pruritis, no easy bruising  Neurologic:  No speech problems, no headache, no extremity numbness, no extremity tingling, no extremity weakness  Psychiatric:  No anxiety  Genitourinary:  No dysuria, no hematuria  Endocrine:  No unexpected weight gain, no unexpected weight loss  Extremities:  no edema, no pain    Past Medical History:   Diagnosis Date CAD (coronary artery disease) 12/23/2013    cath negative per pt    Cancer ([address] 06/2021    pt reports he has lung cancer    History of TMJ disorder     Hypertension     Trigeminal neuralgia      Past Surgical History:   Procedure Laterality Date    ABDOMEN SURGERY      CARDIAC CATHETERIZATION  08/03/2016    CT BIOPSY PERCUTANEOUS SUPERFICIAL BONE  12/17/2021    CT BIOPSY PERCUTANEOUS SUPERFICIAL BONE 12/17/2021 [facility] CT SCAN    CT NEEDLE BIOPSY LUNG PERCUTANEOUS  7/28/2021    CT NEEDLE BIOPSY LUNG PERCUTANEOUS 7/28/2021 [facility] CT SCAN    PORT SURGERY Right 8/13/2021    MEDIPORT INSERTION performed by Princess Cho MD at [facility] OR     Family History   Problem Relation Age of Onset    High Blood Pressure Mother     High Blood Pressure Sister     Cancer Sister      Social History     Socioeconomic History    Marital status:      Spouse name: Not on file    Number of children: 5    Years of education: Not on file    Highest education level: Not on file   Occupational History    Not on file   Tobacco Use    Smoking status: Every Day     Packs/day: 2.00     Years: 39.00     Pack years: 78.00     Types: Cigarettes    Smokeless tobacco: Never    Tobacco comments:     smokes 1-2 a day   Vaping Use    Vaping Use: Never used   Substance and Sexual Activity    Alcohol use:  Yes     Alcohol/week: 6.0 standard drinks     Types: 6 Cans of beer per week     Comment: per week (24 oz beers)     Drug use: Yes     Types: Marijuana Tricia Pleas)     Comment: last 12/1    Sexual activity: Yes     Partners: Female   Other Topics Concern    Not on file   Social History Narrative    Not on file     Social Determinants of Health     Financial Resource Strain: Not on file   Food Insecurity: Not on file   Transportation Needs: Not on file   Physical Activity: Not on file   Stress: Not on file   Social Connections: Not on file   Intimate Partner Violence: Not on file   Housing Stability: Not on file     Current Facility-Administered Medications Medication Dose Route Frequency Provider Last Rate Last Admin    oxyCODONE (OXYCONTIN) extended release tablet 10 mg  10 mg Oral Once Tiffany Cheyenne Scheuermann,          Current Outpatient Medications   Medication Sig Dispense Refill    cephALEXin (KEFLEX) 500 MG capsule Take 1 capsule by mouth 4 times daily for 10 days 40 capsule 0    polyethylene glycol (MIRALAX) 17 g packet Take 17 g by mouth daily as needed for Other (Constipation) 527 g 1    oxyCODONE (OXYCONTIN) 20 MG extended release tablet Take 20 mg by mouth in the morning and 20 mg in the evening. oxyCODONE (OXYCONTIN) 10 MG extended release tablet Take 10 mg by mouth in the morning and 10 mg in the evening. predniSONE (DELTASONE) 20 MG tablet Take 20 mg by mouth daily      cyclobenzaprine (FLEXERIL) 10 MG tablet Take 10 mg by mouth 3 times daily as needed for Muscle spasms      ALPRAZolam (XANAX) 0.5 MG tablet Take 0.5 mg by mouth 3 times daily as needed for Sleep or Anxiety (Twice Daily PRN).       naloxone 4 MG/0.1ML LIQD nasal spray 1 spray by Nasal route as needed for Opioid Reversal 1 each 5    Calcium Carbonate-Vitamin D (OYSTER SHELL CALCIUM/D) 500-200 MG-UNIT TABS Take 1 tablet by mouth daily 30 tablet 3    cyanocobalamin ([employer] VITAMIN B12) 1000 MCG tablet Take 1 tablet by mouth daily 30 tablet 3    ondansetron (ZOFRAN) 8 MG tablet take 1 tablet by mouth every 8 hours if needed for nausea and vomiting      Sennosides (SENNA) 8.6 MG CAPS Take 1 capsule by mouth 2 times daily as needed (constipation) 20 capsule 0     Allergies   Allergen Reactions    Tramadol Other (See Comments)     seizures    Morphine Hallucinations    Vicodin [Hydrocodone-Acetaminophen] Hives       Nursing Notes Reviewed    Physical Exam:  Triage VS:    ED Triage Vitals [11/23/22 0754]   Enc Vitals Group      /79      Heart Rate 98      Resp 16      Temp 98.2 °F (36.8 °C)      Temp Source Oral      SpO2 99 %      Weight 193 lb (87.5 kg)      Height 6' 1\" (1.854 m) Head Circumference       Peak Flow       Pain Score       Pain Loc       Pain Edu? Excl. in [address]? My pulse ox interpretation is - normal    General appearance:  No acute distress. Skin:  Warm. Dry. Eye:  Extraocular movements intact. Ears, nose, mouth and throat:  Oral mucosa moist   Neck:  Trachea midline. Extremity: Left upper extremity full range of motion of the left shoulder left elbow left wrist, intact left radial pulse normal capillary refill each of the digits of the left hand, 5 out of 5  strength of upper extremity no swelling pus drainage discharge left upper extremity, no swelling. Right upper extremity full range of motion, right shoulder, right elbow right wrist full range of motion no obvious deformity intact right radial pulse normal capillary refill each of the digits of the right hand, no swelling pus drainage discharge cellulitis of the right upper extremity, normal ROM     Heart:  Regular rate and rhythm, normal S1 & S2, no extra heart sounds. Perfusion:  intact  Respiratory:  Lungs clear to auscultation bilaterally. Respirations nonlabored. Abdominal:  Normal bowel sounds. Soft. Nontender. Non distended. Back: Left nephrostomy tube intact no pus drainage discharge no signs of infection nontender palpation, no CVA tenderness to palpation     Neurological:  Alert and oriented times 3. No focal neuro deficits. Psychiatric:  Appropriate    I have reviewed and interpreted all of the currently available lab results from this visit (if applicable):  No results found for this visit on 11/23/22. Radiographs (if obtained):  Radiologist's Report Reviewed:  No results found. EKG (if obtained): (All EKG's are interpreted by myself in the absence of a cardiologist)      MDM:  Patient presents emergency department complaining of using fentanyl patches that were prescribed by his hospitalist and then called in some pain.   He states no falls injury or trauma. Patient on physical exam good  strength of bilateral upper extremities full range of motion intact pulses of bilateral upper extremities no signs of infection. Patient states he thinks is related to the fentanyl patch that he started putting in the back of his shoulders and arms for pain. I discussed with patient discontinuethe patch until he follows up with his hospice physician. Patient requesting a pain pill states he is on oxycodone at home. He ordered 10 mg p.o. Patient also wants me to evaluate the nephrostomy tube to make sure it still intact. I did evaluate the stitches are intact it is draining properly no signs of infection. Dressing will be changed prior to discharge. Patient is to follow-up with his primary care physician in 2 days for reevaluation. He is return immediately worsening symptoms. Patient discontinue the fentanyl patch if they will call in the adverse effects. He is to continue home medication as prescribed and directed. Patient is return immediately for any worsening symptoms. All questions answered. Clinical Impression:  1. Chronic left shoulder pain      Disposition referral (if applicable): Truman Veronica MD  [place] [id number]  [id number] [phone]    Schedule an appointment as soon as possible for a visit in 2 days  If symptoms worsen and re-evaluation.  call for an appointment    [facility] Emergency Department  [place] [id number] [phone]  Go in 1 day  If symptoms worsen    Disposition medications (if applicable):  New Prescriptions    No medications on file     ED Provider Disposition Time  DISPOSITION Decision To Discharge 11/23/2022 09:21:43 AM      Comment: Please note this report has been produced using speech recognition software and may contain errors related to that system including errors in grammar, punctuation, and spelling, as well as words and phrases that may be inappropriate. Efforts were made to edit the dictations.         Roge Drake DO  11/23/22 6832

## 2022-11-28 ENCOUNTER — HOSPITAL ENCOUNTER (OUTPATIENT)
Dept: INFUSION THERAPY | Age: 52
Discharge: HOME | End: 2022-11-28
Payer: MEDICAID

## 2022-11-28 ENCOUNTER — OFFICE VISIT (OUTPATIENT)
Dept: ONCOLOGY | Age: 52
End: 2022-11-28
Payer: MEDICAID

## 2022-11-28 ENCOUNTER — CLINICAL DOCUMENTATION (OUTPATIENT)
Dept: ONCOLOGY | Age: 52
End: 2022-11-28

## 2022-11-28 VITALS
BODY MASS INDEX: 25.46 KG/M2 | HEIGHT: 73 IN | HEART RATE: 97 BPM | TEMPERATURE: 97.7 F | RESPIRATION RATE: 18 BRPM | DIASTOLIC BLOOD PRESSURE: 88 MMHG | OXYGEN SATURATION: 96 % | SYSTOLIC BLOOD PRESSURE: 130 MMHG

## 2022-11-28 DIAGNOSIS — C34.82 MALIGNANT NEOPLASM OF OVERLAPPING SITES OF LEFT LUNG (HCC): ICD-10-CM

## 2022-11-28 DIAGNOSIS — C79.51 SECONDARY MALIGNANT NEOPLASM OF BONE (HCC): Primary | ICD-10-CM

## 2022-11-28 DIAGNOSIS — D64.9 ANEMIA, UNSPECIFIED TYPE: ICD-10-CM

## 2022-11-28 PROCEDURE — 99211 OFF/OP EST MAY X REQ PHY/QHP: CPT

## 2022-11-28 PROCEDURE — 99213 OFFICE O/P EST LOW 20 MIN: CPT | Performed by: INTERNAL MEDICINE

## 2022-11-28 NOTE — PROGRESS NOTES
Patient Name:  Karlo [place]  Patient :  1970  Patient MRN:  [id number]     Primary Oncologist: Geroldine Cockayne, MD  Referring Provider: Miles Mendez MD     Date of Service: 2022        Chief Complaint:    Chief Complaint   Patient presents with    Follow-up       Encounter Diagnoses   Name Primary? Secondary malignant neoplasm of bone ([facility] [address] Yes    Malignant neoplasm of overlapping sites of left lung ([facility] [address]     Anemia, unspecified type           HPI:   21: Initial [facility] visit:Hima Steve is a 45 y.o. male who presents to [facility] with report of dysuria and flank pain. Reports these symptoms started a few weeks ago. He ultimately came to the ED due to worsening back pain. He reports ongoing issues with frequent urge to urinate and notes some incontinence. He denies hematuria. He was noted to have acute pyelonephritis and was admitted and started on IV Rocephin.      21 CT chest     Impression   1. Left hilar neoplasm extending into the left upper lobe measuring 3.2 x 5.9   x 5.3 cm obstructing the left upper lobe bronchus with probable minimal   adjacent infiltrates versus lymphangitic spread of tumor. PET-CT/biopsy is   recommended. 2. Mediastinal lymphadenopathy. 3. Prominent left supraclavicular lymph nodes. 4. No osseous metastatic disease. 21 Ct abdomen and pelvis  Impression   1. Circumferential thickening of the bladder wall is noted which could be   related to cystitis. Correlate with urinalysis. 2. There is left retroperitoneal lymphadenopathy. This could be reactive or   neoplastic. This can be further evaluated with PET-CT. 3. There is minimal stranding within the retroperitoneal on the left. This   is uncertain etiology however could be related to acute pyelonephritis. 4. Interval development of multiple sclerotic lesions within the spine and   pelvis, concerning for metastatic osseous disease. 5. Prostate gland is enlarged.   Correlate with PSA.      .30      He denies past history of cancer. He smokes 2-3 ppd and drinks 10-12 beers daily. He reports unknown malignancy in his sister who  at 39. No other known family history of cancer. Due to lung mass and concern for metastatic prostate cancer we were called to evaluate. 21:  Final Pathologic Diagnosis:   Needle biopsy of lung, clinically left lung mass:        SQUAMOUS CELL CARCINOMA IN SITU.     21:PET  1. Left perihilar mass likely represents primary lung cancer. Correlate   with biopsy results. The opacity more peripheral in the left lower lobe has   relatively low level activity and likely represents an area of post   obstructive pneumonia adjacent to the mass. 2.  Metabolically active left AP window lymph node concerning for metastatic   disease. 3.  The prostate is enlarged and has increased FDG activity which could be   due to prostatitis or prostate cancer. Correlate with PSA levels. 4.  No increased metabolic activity associated with retroperitoneal lymph   nodes. This is unlikely related to the lung process given the significant   difference in metabolic activity; however, if there is prostate cancer, this   could potentially be metastatic disease from the prostate cancer, which can   have variable levels of uptake on FDG PET scans. 5.  No metabolic activity associated with multiple sclerotic lesions. Again   this is unlikely related to the lung process, but if there is prostate   cancer, this could represent metastatic disease from prostate cancer with   poor FDG avidity. Otherwise it could also represent large bone islands. 6.  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 There is a focal area of intense activity associated with a new lucent lesion   within the background of Paget's disease concerning for metastatic disease. Given the FDG activity, it is likely related to the lung process. 8/20/21: MRI brain  1. There is a punctate focus of restricted diffusion within the right frontal   lobe without associated enhancement, mass effect or midline shift. This   could represent a punctate acute infarct. However, given history, an early   metastatic focus cannot be entirely excluded. 2. Scattered foci of susceptibility are seen within the cerebral hemispheres   bilaterally, which were not visualized on the prior exam.  Findings could   represent areas of remote microhemorrhage or perhaps treated metastases. 3. No abnormal enhancement within the brain. 4. Minimal global parenchymal volume loss with minimal chronic microvascular   ischemic changes. 12/2/21: CT chest, abdomen and pelvis:  Chest CT: Interval increased size of the left perihilar mass, with increased   adjacent obstructive pneumonitis and atelectasis. Stable to minimally to decreased mediastinal lymphadenopathy. Interval increased bony metastatic disease. Abdomen and pelvis CT: Stable retroperitoneal and pelvic lymphadenopathy. Interval increased bony metastatic disease. 12/17/21:Final Pathologic Diagnosis:   Bone, posterior ileum, needle core biopsy:   -     METASTATIC SQUAMOUS CELL CARCINOMA. PAOC  PDL1 22c3 >50%(keytruda)  PTEN positive  Alk neg    CARIS, not enough tissue for rest of the testing    Gaurdant with no actionable mutation otherwise     12/27/21: Started XRT to the left pelvis  Added chest radiation dia 3  Completed dia 10 2022      Started carbo/taxol/keytruda jan 20 2022 2/8/22: CT chest:  1. Interval decrease in size of left suprahilar mass extending along the   left upper lobe bronchi extension into the left main pulmonary artery. There   is interval decrease in associated ground-glass opacity in the left upper   lobe. Findings suggest response to therapy. 2.  Interval decrease in mediastinal lymphadenopathy. 3.  Diffuse osseous metastatic disease. Feb/march 2022 he received casodex and GnRH analogue     3/11/22: CT head: No acute abn    3/26/22: Ct abdomen and pelvis:  1. Interval development of a mild degree of left hydronephrosis and   hydroureter, to the level of the left UVJ. An approximate 7.3 x 5 cm soft   tissue mass is present along the left posterior margin of the urinary   bladder, and contiguous with the prostate gland, resulting in obstructing of   the ureteral orifice. Soft tissue mass is most consistent with primary   prostate carcinoma, in light of the diffuse blastic metastatic disease. 2. Interval increase in confluent adenopathy at the level of the aortic   bifurcation, consistent with progression of metastatic disease   3. Diffuse osseous blastic metastatic disease, with interval increase in size   of the lytic metastatic lesion involving the proximal left iliac bone     3/22/22:     He was admitted at [facility] in April (4/22 - 4/30). He had debulking of his primary tumor via bronchoscopy during admit. BMB at [facility] consistent with met prostate cancer  - Sampson prior to admission; exchanged in ED 4/22. Urine cx neg  - CT A/P:  Soft tissue mass along the left UVJ/posterior lateral left bladder wall with moderate left hydroureteronephrosis and diffuse urinary bladder  thickening. UVJ mass is likely a metastatic deposit involving the bladder and possibly the left prostate/seminal vesicle. - 4/25L neph tube placed by IR; sampson removed. Also treated for PNA. 5/16/2022 Imaging   PET - local:   1. Decreased size and uptake of the left perihilar mass. Adjacent   consolidation extending into the left lower lobe likely represents   post-treatment and post-obstructive atelectasis. Resolution of the   hypermetabolic prevascular lymph node. 2. Decreased uptake involving the lytic lesion along the left sacroiliac   joint.  Diffuse sclerosis throughout the skeleton without significant uptake   is unchanged and may represent treated metastatic disease. May 19 2022 CBC with hb 6.9, platelets 89, psa 773, testerone <7    May 31 2022 started Zytiga and prednisone    June 7 2022 resumed C1D1  Laura Stockton    6/9/22: Started SHABBIR    But then he did not follow up    Presented to the ER in august 2022  with chest pain     August 9 2022 CT chest:  1. Artifact limits evaluation of the distal pulmonary arteries. No evidence   of central pulmonary embolism or right heart strain. 2. New from 05/16/2022, there is now dense consolidation anteriorly in the   left upper lobe, which may represent postobstructive pneumonia or progression   of disease. More patchy consolidation in the perihilar left lung is slightly   increased and could represent pneumonia or post radiation change. 3. Trace left pleural effusion. 4. Findings are concerning for progression of osseous metastatic disease. Nondisplaced pathologic fractures in the posterior right 4th and 7th ribs   appear subacute all although are new from prior exam.       Was admitted to [facility] October 2022 with right arm pain, noted to have small left sided pneumothorax, BRENDA, leaking nephrostomy bag, and possibly sepsis  Nephrostomy tube exchanged, treated for UTI    Review of CBC 10/11/22  WBC 7.5, Hgb 10.6, Hct 33.1, MCV 80.3, Platelets 541  CMP , K+ 4.1, BUN 17, Cr 1.4, glucose 101, ALT <5, AST 10, ALK phos 212     CT head 10/11/22  Impression   Motion artifact on the lower images. No convincing evidence for acute   intracranial hemorrhage. No mass effect, midline shift, or focal sulcal   effacement. The ventricles are not dilated. Low lying position of the right mandibular condyle at the temporomandibular   joint suggesting subluxation. Areas of increased sclerosis in the skull concerning for sclerotic metastases. CT chest 10/11/22  Impression   1. Suboptimal opacification of the pulmonary arteries. No central pulmonary   embolus. No definite lobar pulmonary embolus.   Motion artifact and contrast   bolus timing limits evaluation of the segmental pulmonary arteries. 2. New trace left anterior pneumothorax measuring 4 mm. 3. Otherwise no significant change since 08/09/2022. Complete collapse of   the left upper lobe with persistent left perihilar 3.8 cm mass obscured by   the surrounding atelectasis. 4. Trace left pleural effusion   5. Mild infrahilar left lower lobe atelectasis   6. Osseous metastatic disease with diffuse sclerotic osseous metastatic   lesions. No pathologic fracture. 10/11/22; CXR:  Stable chest       Airspace opacity in the left upper lobe, likely related to lobar atelectasis   and known lung mass. Known diffuse osseous metastasis. 10/12/22: Left PCN exchanged. No evidence of hydronephrosis     10/12/2022 CBC with WBC of 12.2 hemoglobin of 9 hematocrit 27.8 MCV of 79.7 platelets of 636, no nucleated RBCs. BMP with sodium of 129 potassium of 4 BUN of 21 creatinine 1.3  PSA 31.78     XR right humerus was ordered     10/11/22: PSA 35     Was discharged on Zytiga and prednisone but not taking it    10/27/22: Bone scan:  Multifocal osseous metastatic disease. The degree of metastatic disease is   underestimated on bone scan as compared to that seen on CT.     11/13/22: XR right shoulder:Diffuse bony metastases with a focal destructive lesion in the distal clavicle    Past Medical History:     BPH, HTN, COPD                                                            Past Surgery History:    None per his wife                                                                        Social History:   Lives with wife. Cut down smoking to 2-4 cigarettes per day prior to which he was smoking 2 to 3 packs/day for the past 40 years. Also reported drinking alcohol 10-12 beers per day. Denied any other illicit drug abuse.                                                                                                    Family History:    He reported that his sister  at the age of 39 with  metastatic cancer, he was not sure what the primary was                                                                                              Allergies   Allergen Reactions    Tramadol Other (See Comments)     seizures    Morphine Hallucinations    Vicodin [Hydrocodone-Acetaminophen] Hives       Current Outpatient Medications on File Prior to Visit   Medication Sig Dispense Refill    cephALEXin (KEFLEX) 500 MG capsule Take 1 capsule by mouth 4 times daily for 10 days 40 capsule 0    polyethylene glycol (MIRALAX) 17 g packet Take 17 g by mouth daily as needed for Other (Constipation) 527 g 1    oxyCODONE (OXYCONTIN) 20 MG extended release tablet Take 20 mg by mouth in the morning and 20 mg in the evening. oxyCODONE (OXYCONTIN) 10 MG extended release tablet Take 10 mg by mouth in the morning and 10 mg in the evening. predniSONE (DELTASONE) 20 MG tablet Take 20 mg by mouth daily      cyclobenzaprine (FLEXERIL) 10 MG tablet Take 10 mg by mouth 3 times daily as needed for Muscle spasms      ALPRAZolam (XANAX) 0.5 MG tablet Take 0.5 mg by mouth 3 times daily as needed for Sleep or Anxiety (Twice Daily PRN). naloxone 4 MG/0.1ML LIQD nasal spray 1 spray by Nasal route as needed for Opioid Reversal 1 each 5    Calcium Carbonate-Vitamin D (OYSTER SHELL CALCIUM/D) 500-200 MG-UNIT TABS Take 1 tablet by mouth daily 30 tablet 3    cyanocobalamin ([employer] VITAMIN B12) 1000 MCG tablet Take 1 tablet by mouth daily 30 tablet 3    ondansetron (ZOFRAN) 8 MG tablet take 1 tablet by mouth every 8 hours if needed for nausea and vomiting      Sennosides (SENNA) 8.6 MG CAPS Take 1 capsule by mouth 2 times daily as needed (constipation) 20 capsule 0     No current facility-administered medications on file prior to visit. Interval History: 22:  Reported that he is in constant pain. Cant sit and cannot ambulateHe denied any bleeding.  But difficulty urinating, lower abd pain, sob on exertion, fatigue. No fever.      Review of Systems:  As per the interval history, rest of the review of system negative     Vital Signs: /88 (Site: Left Lower Arm, Position: Supine, Cuff Size: Medium Adult)   Pulse 97   Temp 97.7 °F (36.5 °C) (Infrared)   Resp 18   Ht 6' 1\" (1.854 m)   SpO2 96%   BMI 25.46 kg/m²      CONSTITUTIONAL:alert and awake   LUNGS: coarse bs   CARDIOVASCULAR: s1s2 rrr no murmurs, tachycardia  ABDOMEN: soft ntnd bs pos, left nephrostomy tube in place   NEUROLOGIC: GI     Labs:  Hematology:  Lab Results   Component Value Date    WBC 8.1 11/21/2022    RBC 4.30 (L) 11/21/2022    HGB 10.4 (L) 11/21/2022    HCT 31.9 (L) 11/21/2022    MCV 74.2 (L) 11/21/2022    MCH 24.2 (L) 11/21/2022    MCHC 32.6 11/21/2022    RDW 19.1 (H) 11/21/2022     11/21/2022    MPV 8.7 11/21/2022    BANDSPCT 17 (H) 10/12/2022    SEGSPCT 74.0 (H) 11/21/2022    EOSRELPCT 0.6 11/21/2022    BASOPCT 0.4 11/21/2022    LYMPHOPCT 15.6 (L) 11/21/2022    MONOPCT 9.0 (H) 11/21/2022    BANDABS 2.07 10/12/2022    SEGSABS 6.0 11/21/2022    EOSABS 0.1 11/21/2022    BASOSABS 0.0 11/21/2022    LYMPHSABS 1.3 11/21/2022    MONOSABS 0.7 11/21/2022    DIFFTYPE AUTOMATED DIFFERENTIAL 11/21/2022    ANISOCYTOSIS 1+ 04/09/2022    POLYCHROM 1+ 04/09/2022    PLTM DECREASED 04/09/2022     Lab Results   Component Value Date    ESR 47 (H) 03/22/2022     Chemistry:  Lab Results   Component Value Date     (L) 11/21/2022    K 4.5 11/21/2022    CL 93 (L) 11/21/2022    CO2 27 11/21/2022    BUN 15 11/21/2022    CREATININE 0.7 (L) 11/21/2022    GLUCOSE 122 (H) 11/21/2022    CALCIUM 10.9 (H) 11/21/2022    PROT 8.6 (H) 11/21/2022    LABALBU 4.0 11/21/2022    BILITOT 0.2 11/21/2022    ALKPHOS 199 (H) 11/21/2022    AST 13 (L) 11/21/2022    ALT <5 (L) 11/21/2022    LABGLOM >60 11/21/2022    GFRAA >60 10/13/2022    MG 1.8 04/10/2022    POCCA 1.18 04/01/2022    POCGLU 102 (H) 10/13/2022     Lab Results   Component Value Date     (H) 03/22/2022     No components found for: LD  Lab Results   Component Value Date    TSHHS 1.360 07/13/2022    T4FREE 0.97 07/03/2022     Immunology:  Lab Results   Component Value Date    PROT 8.6 (H) 11/21/2022    SPEP  03/22/2022     INTERPRETATION - Decreased albumin and total prorein. No definitive monoclonal bands are seen. SAF    SPEP INTERPRETATION - No monoclonal bands are seen. SAF 03/22/2022    ALBUMINELP 2.8 (L) 03/22/2022    LABALPH 0.4 03/22/2022    LABALPH 1.1 03/22/2022    LABBETA 1.1 03/22/2022    GAMGLOB 0.8 03/22/2022     No results found for: KIM PatelLCR  No results found for: B2M  Coagulation Panel:  Lab Results   Component Value Date    PROTIME 11.8 10/20/2022    INR 0.92 10/20/2022    APTT 40.6 (H) 10/20/2022    DDIMER 908 (H) 08/09/2022     Anemia Panel:  Lab Results   Component Value Date    QNWLOVYZ27 373.0 03/22/2022    FOLATE 4.0 03/22/2022     Tumor Markers:  Lab Results   Component Value Date    CEA 7.7 07/23/2021    .0 (H) 03/22/2022        Observations:  ECOG:  No data recorded       Assessment & Plan:   H/O noncompliance. Left hilar mass with mediastinal hilar lymphadenopathy. 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 PET scan results from august 2021 noted, bone lesions most probably not metastatic except for one lytic lesion. MRI of the brain with no convincing metastatic lesions. Presented  the case in the tumor board. Decision made to proceed with a prostate biopsy and treat with ADT if malignancy  and in the meantime proceed with staging mediastinoscopy/rebiopsy for further treatment plan. But as pt very very very noncompliant, did not follow up with urology, CTS or for bone biopsy multiple times.    Note PSA rising and was 250 on November 16 2021   CT imaging dec 2021 with progressive met disease  Bone biopsy on dec 13 2021 with met squamous cell cancer, consistent with lung primary. PDL1 >50%Demetria Handler) . Not enough tissue for rest of CARIS, guardant 360 with no other actionable mutation  CT chest jan 2022 with no significant change   Completed Palliative radiation to the pelvic area and also radiation to the left lung dia 10 2022  Discussed the findings, diagnosis and poor prognosis and treatment with carbo/taxol/pembro after completion of radiation. Discussed ae. PORT placed. Completed OCM  C1D1 carbo/taxol and keytruda started 1/20/22, unfortunately received only one treatment so far sec to being very very noncompliant  CT after C1 with positive response   And then treatment held sec to cytopenias/noncompliance  and also admission to [facility]. PET in may 2022 with continued response. Resumed Keytruda alone June 7 2022 and   Plan was  for repeat PET after 3-4C. But then they discussed and established care with hospice  and did not follow  with us after June 2022 until sep 2022. CT chest in October 2022 with persistent left perihilar mass  with complete collapse   He is not sure whether he would like to continue with [facility] PET was ordered but not done  He is with hospice and discussed with them, will not approve any treatment until he is     Met prostate cancer: PSA was ~900, started on casodex and received GnRH analogue in feb/march . But BMB biopsy in April 2022 consistent with involvement by prostate cancer. May 2022 PSA was 256. Was Started on Zytiga and prednisone in may 2022 . Was on  lupron. But then looks like he discontinued Zytiga in June 2022 and ?restarted in October 2022. PSA in October 2022 was 33. Recommend that he continues the same dose but not sure whether he has been. Asked him several times to bring medications with him  PET ordered and as above discussed with hospice    Left nephrostomy , changed oct 2022     Anemia: Sec to BMB involvement with prostate cancer. Note Hb better and stable, no transfusion requirements. Right buttock pain and also left humeral pain. Continue adequate analgesic regimen and also adequate bowel regimen. Bone scan as above, best supportive care. Discussed above findings and overall very poor prognosis and plan with bith but I am really not sure how much they comprehend at all. They argue a lot    Smoking and alcohol cessation    Note he has he has been very noncompliant with appointments and follow ups     Please do not hesitate to contact us if you need further information.     Return to clinic PRN    TOMI

## 2022-11-28 NOTE — PROGRESS NOTES
MA Rooming Questions  Patient: Sara Guerrero  MRN: [id number]    Date: 11/28/2022        1. Do you have any new issues? yes - Pt c/o pain in buttocks area         2. Do you need any refills on medications?    no    3. Have you had any imaging done since your last visit? Yes - CT 11/7    4. Have you been hospitalized or seen in the emergency room since your last visit here?   no    5. Did the patient have a depression screening completed today?  No    No data recorded     PHQ-9 Given to (if applicable):               PHQ-9 Score (if applicable):                     [] Positive     []  Negative              Does question #9 need addressed (if applicable)                     [] Yes    []  No               Zeferino Holliday MA

## 2022-12-09 ENCOUNTER — HOSPITAL ENCOUNTER (EMERGENCY)
Age: 52
Discharge: HOME | End: 2022-12-09
Attending: EMERGENCY MEDICINE
Payer: MEDICAID

## 2022-12-09 VITALS
DIASTOLIC BLOOD PRESSURE: 84 MMHG | HEART RATE: 103 BPM | RESPIRATION RATE: 19 BRPM | SYSTOLIC BLOOD PRESSURE: 120 MMHG | OXYGEN SATURATION: 97 %

## 2022-12-09 DIAGNOSIS — M62.838 SPASM OF MUSCLE: ICD-10-CM

## 2022-12-09 DIAGNOSIS — C61 PROSTATE CANCER (HCC): ICD-10-CM

## 2022-12-09 DIAGNOSIS — R33.9 URINARY RETENTION: Primary | ICD-10-CM

## 2022-12-09 LAB
BACTERIA: NEGATIVE /HPF
BILIRUBIN URINE: NEGATIVE MG/DL
BLOOD, URINE: ABNORMAL
CLARITY: CLEAR
COLOR: YELLOW
GLUCOSE, URINE: NEGATIVE MG/DL
KETONES, URINE: NEGATIVE MG/DL
LEUKOCYTE ESTERASE, URINE: ABNORMAL
NITRITE URINE, QUANTITATIVE: NEGATIVE
PH, URINE: 6 (ref 5–8)
PROTEIN UA: NEGATIVE MG/DL
RBC URINE: <1 /HPF (ref 0–3)
SPECIFIC GRAVITY UA: <1.005 (ref 1–1.03)
UROBILINOGEN, URINE: 0.2 MG/DL (ref 0.2–1)
WBC UA: NORMAL /HPF (ref 0–2)

## 2022-12-09 PROCEDURE — 51798 US URINE CAPACITY MEASURE: CPT

## 2022-12-09 PROCEDURE — 99283 EMERGENCY DEPT VISIT LOW MDM: CPT | Performed by: EMERGENCY MEDICINE

## 2022-12-09 PROCEDURE — 6370000000 HC RX 637 (ALT 250 FOR IP): Performed by: EMERGENCY MEDICINE

## 2022-12-09 PROCEDURE — 51702 INSERT TEMP BLADDER CATH: CPT

## 2022-12-09 PROCEDURE — 81001 URINALYSIS AUTO W/SCOPE: CPT

## 2022-12-09 RX ORDER — METHOCARBAMOL 500 MG/1
500 TABLET, FILM COATED ORAL ONCE
Status: COMPLETED | OUTPATIENT
Start: 2022-12-09 | End: 2022-12-09

## 2022-12-09 RX ADMIN — METHOCARBAMOL 500 MG: 500 TABLET ORAL at 06:53

## 2022-12-09 NOTE — ED PROVIDER NOTES
As physician-in-triage, I performed a medical screening history and physical exam on this patient. HISTORY OF PRESENT ILLNESS  Sara Guerrero is a 47 y.o. male with acute urinary retention that started overnight. States that he has had some hesitancy over the past few days. History of prostate cancer with left UVJ mass and left nephrostomy tube. PHYSICAL EXAM  There were no vitals taken for this visit. On exam, the patient appears in no acute distress. Speech is clear. Breathing is unlabored. Moves all extremities.        Tino Ceballos MD  12/09/22 1443

## 2022-12-09 NOTE — ED PROVIDER NOTES
Emergency Department Encounter    Patient: Kirstie Franco  MRN: [id number]  : 1970  Date of Evaluation: 2022  ED Provider:  Madan Villela MD    Triage Chief Complaint:   Urinary Retention (Pt states \"he hasn't be able to urinate for a hour\". )    [place]:  Kirstie Franco is a 47 y.o. male that presents with complaint of inability to urinate over night. He has a history of prostate cancer, recent urine retention during hospitalization 10/11 requiring Doherty placement for the urology consult note from  by ROBB Mohan. 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 It was exchanged on  by IR while here in the hospital, he has not had any issues there. However he has not been able to urinate since last night. He is having some right lower back muscle spasm, denies specific flank pain. No dysuria or hematuria. No fevers or nausea or vomiting. ROS - see HPI, below listed is current ROS at time of my eval:  10 systems reviewed and negative except as above.      Past Medical History:   Diagnosis Date    CAD (coronary artery disease) 2013    cath negative per pt    Cancer ([address] 2021    pt reports he has lung cancer    History of TMJ disorder     Hypertension     Trigeminal neuralgia      Past Surgical History:   Procedure Laterality Date    ABDOMEN SURGERY      CARDIAC CATHETERIZATION  2016    CT BIOPSY PERCUTANEOUS SUPERFICIAL BONE  2021    CT BIOPSY PERCUTANEOUS SUPERFICIAL BONE 2021 [facility] CT SCAN    CT NEEDLE BIOPSY LUNG PERCUTANEOUS  2021    CT NEEDLE BIOPSY LUNG PERCUTANEOUS 2021 [facility] CT SCAN    PORT SURGERY Right 2021    MEDIPORT INSERTION performed by Mark Dia MD at [facility] OR     Family History   Problem Relation Age of Onset    High Blood Pressure Mother     High Blood Pressure Sister     Cancer Sister      Social History     Socioeconomic History Marital status:      Spouse name: Not on file    Number of children: 5    Years of education: Not on file    Highest education level: Not on file   Occupational History    Not on file   Tobacco Use    Smoking status: Every Day     Packs/day: 2.00     Years: 39.00     Pack years: 78.00     Types: Cigarettes    Smokeless tobacco: Never    Tobacco comments:     smokes 1-2 a day   Vaping Use    Vaping Use: Never used   Substance and Sexual Activity    Alcohol use: Yes     Alcohol/week: 6.0 standard drinks     Types: 6 Cans of beer per week     Comment: per week (24 oz beers)     Drug use: Yes     Types: Marijuana [place]a [place]ge)     Comment: last 12/1    Sexual activity: Yes     Partners: Female   Other Topics Concern    Not on file   Social History Narrative    Not on file     Social Determinants of Health     Financial Resource Strain: Not on file   Food Insecurity: Not on file   Transportation Needs: Not on file   Physical Activity: Not on file   Stress: Not on file   Social Connections: Not on file   Intimate Partner Violence: Not on file   Housing Stability: Not on file     No current facility-administered medications for this encounter. Current Outpatient Medications   Medication Sig Dispense Refill    polyethylene glycol (MIRALAX) 17 g packet Take 17 g by mouth daily as needed for Other (Constipation) 527 g 1    oxyCODONE (OXYCONTIN) 20 MG extended release tablet Take 20 mg by mouth in the morning and 20 mg in the evening. oxyCODONE (OXYCONTIN) 10 MG extended release tablet Take 10 mg by mouth in the morning and 10 mg in the evening. predniSONE (DELTASONE) 20 MG tablet Take 20 mg by mouth daily      cyclobenzaprine (FLEXERIL) 10 MG tablet Take 10 mg by mouth 3 times daily as needed for Muscle spasms      ALPRAZolam (XANAX) 0.5 MG tablet Take 0.5 mg by mouth 3 times daily as needed for Sleep or Anxiety (Twice Daily PRN).       naloxone 4 MG/0.1ML LIQD nasal spray 1 spray by Nasal route as needed for Opioid Reversal 1 each 5    Calcium Carbonate-Vitamin D (OYSTER SHELL CALCIUM/D) 500-200 MG-UNIT TABS Take 1 tablet by mouth daily 30 tablet 3    cyanocobalamin ([employer] VITAMIN B12) 1000 MCG tablet Take 1 tablet by mouth daily 30 tablet 3    ondansetron (ZOFRAN) 8 MG tablet take 1 tablet by mouth every 8 hours if needed for nausea and vomiting      Sennosides (SENNA) 8.6 MG CAPS Take 1 capsule by mouth 2 times daily as needed (constipation) 20 capsule 0     Allergies   Allergen Reactions    Tramadol Other (See Comments)     seizures    Morphine Hallucinations    Vicodin [Hydrocodone-Acetaminophen] Hives       Nursing Notes Reviewed    Physical Exam:  ED Triage Vitals   Enc Vitals Group      BP 12/09/22 0551 (!) 128/96      Heart Rate 12/09/22 0600 (!) 109      Resp 12/09/22 0600 19      Temp --       Temp src --       SpO2 12/09/22 0551 99 %      Weight --       Height --       Head Circumference --       Peak Flow --       Pain Score --       Pain Loc --       Pain Edu? --       Excl. in [address]? --            My pulse ox interpretation is - normal    General appearance:  No acute distress. Skin:  Warm. Dry. Eye:  Extraocular movements intact. Ears, nose, mouth and throat:  Oral mucosa moist   Neck:  Trachea midline. Extremity:  No swelling. Normal ROM     Heart:  Regular rate and rhythm, normal S1 & S2, no extra heart sounds. Perfusion:  intact  Respiratory:  Lungs clear to auscultation bilaterally. Respirations nonlabored. Abdominal:  Soft. Nontender. Non distended. Back: no CVA tenderness, right lumbar paraspinal muscles he has some tenderness and spasm. Nephrostomy tube in place at left side.   Neurological:  Alert and oriented           Psychiatric:  Appropriate    I have reviewed and interpreted all of the currently available lab results from this visit (if applicable):  Results for orders placed or performed during the hospital encounter of 12/09/22   Urinalysis   Result Value Ref Range Color, UA YELLOW YELLOW    Clarity, UA CLEAR CLEAR    Glucose, Urine NEGATIVE NEGATIVE MG/DL    Bilirubin Urine NEGATIVE NEGATIVE MG/DL    Ketones, Urine NEGATIVE NEGATIVE MG/DL    Specific Gravity, UA <1.005 1.001 - 1.035    Blood, Urine TRACE (A) NEGATIVE    pH, Urine 6.0 5.0 - 8.0    Protein, UA NEGATIVE NEGATIVE MG/DL    Urobilinogen, Urine 0.2 0.2 - 1.0 MG/DL    Nitrite Urine, Quantitative NEGATIVE NEGATIVE    Leukocyte Esterase, Urine SMALL NUMBER OR AMOUNT OBSERVED (A) NEGATIVE   Microscopic Urinalysis   Result Value Ref Range    RBC, UA <1 0 - 3 /HPF    WBC, UA NONE SEEN 0 - 2 /HPF    Bacteria, UA NEGATIVE NEGATIVE /HPF      Radiographs (if obtained):  Radiologist's Report Reviewed:  No results found. EKG (if obtained): (All EKG's are interpreted by myself in the absence of a cardiologist)      MDM:  58-year-old male presents with inability to urinate since last night  He has a history of prostate cancer, recent urine retention during hospitalization 10/11 requiring Doherty placement for the urology consult note from 1020 by ROBB Mohan. 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 It was exchanged on 12 October by IR while here in the hospital, he has not had any issues there. However he has not been able to urinate since last night. He is having some right lower back muscle spasm, denies specific flank pain. No dysuria or hematuria. No fevers or nausea or vomiting. His urinalysis shows no white blood cells or bacteria, I am not concerned for infection, his urine actually looks very clear, no clots. Do suspect this is related to his prostate cancer and BPH that he had previously been diagnosed with.   His bladder scan was greater than 999 mL, we placed a Doherty with more than a liter out, he has had significant relief, given a dose of Robaxin orally for his back spasm, he is appropriate for discharge home with his urology team to follow-up. He is comfortable with this, discharged in stable condition, he does tell me that he is looking to transition to hospice. He can call our  at any time if he requires any help. Clinical Impression:  1. Urinary retention    2. Prostate cancer ([address]    3. Spasm of muscle      Disposition referral (if applicable): José Miguel Vanessa MD  [facility] [address]  [id number]  [place] [address]  [phone]          [facility]        Disposition medications (if applicable):  New Prescriptions    No medications on file     ED Provider Disposition Time  DISPOSITION Decision To Discharge 12/09/2022 07:52:49 AM      Comment: Please note this report has been produced using speech recognition software and may contain errors related to that system including errors in grammar, punctuation, and spelling, as well as words and phrases that may be inappropriate. Efforts were made to edit the dictations.        Adriana Denson MD  12/09/22 0800

## 2022-12-14 ENCOUNTER — TELEPHONE (OUTPATIENT)
Dept: ONCOLOGY | Age: 52
End: 2022-12-14

## 2022-12-14 NOTE — TELEPHONE ENCOUNTER
Patient and his wife called requesting appointment with Dr. Micheal Lott, patient and his wife state that patient wants to discharge from Hospice and try treatment again, patient informed he needs to make sure he has what medications he needs from hospice until he can get an appointment to see Dr. Micheal Lott. Patient and his wife voices understanding. Patient transferred to the  to schedule appointment, appointment made for 12/22/2022. Dr. Micheal Lott updated on patients wishes.

## 2022-12-22 ENCOUNTER — OFFICE VISIT (OUTPATIENT)
Dept: ONCOLOGY | Age: 52
End: 2022-12-22
Payer: MEDICAID

## 2022-12-22 ENCOUNTER — HOSPITAL ENCOUNTER (OUTPATIENT)
Dept: INFUSION THERAPY | Age: 52
Discharge: HOME | End: 2022-12-22
Payer: MEDICAID

## 2022-12-22 VITALS
BODY MASS INDEX: 25.46 KG/M2 | OXYGEN SATURATION: 96 % | HEIGHT: 73 IN | RESPIRATION RATE: 16 BRPM | TEMPERATURE: 97.5 F | SYSTOLIC BLOOD PRESSURE: 111 MMHG | DIASTOLIC BLOOD PRESSURE: 69 MMHG | HEART RATE: 110 BPM

## 2022-12-22 DIAGNOSIS — C34.82 MALIGNANT NEOPLASM OF OVERLAPPING SITES OF LEFT LUNG (HCC): ICD-10-CM

## 2022-12-22 DIAGNOSIS — D64.9 ANEMIA, UNSPECIFIED TYPE: ICD-10-CM

## 2022-12-22 DIAGNOSIS — C61 PROSTATE CANCER (HCC): ICD-10-CM

## 2022-12-22 DIAGNOSIS — C79.51 SECONDARY MALIGNANT NEOPLASM OF BONE (HCC): Primary | ICD-10-CM

## 2022-12-22 DIAGNOSIS — M89.8X9 BONE PAIN: ICD-10-CM

## 2022-12-22 LAB
ALBUMIN SERPL-MCNC: 3.5 GM/DL (ref 3.4–5)
ALP BLD-CCNC: 200 IU/L (ref 40–129)
ALT SERPL-CCNC: 8 U/L (ref 10–40)
ANION GAP SERPL CALCULATED.3IONS-SCNC: 12 MMOL/L (ref 4–16)
AST SERPL-CCNC: 12 IU/L (ref 15–37)
BASOPHILS ABSOLUTE: 0 K/CU MM
BASOPHILS RELATIVE PERCENT: 0.2 % (ref 0–1)
BILIRUB SERPL-MCNC: 0.1 MG/DL (ref 0–1)
BUN BLDV-MCNC: 10 MG/DL (ref 6–23)
CALCIUM SERPL-MCNC: 10.8 MG/DL (ref 8.3–10.6)
CHLORIDE BLD-SCNC: 95 MMOL/L (ref 99–110)
CO2: 29 MMOL/L (ref 21–32)
CREAT SERPL-MCNC: 0.8 MG/DL (ref 0.9–1.3)
DIFFERENTIAL TYPE: ABNORMAL
EOSINOPHILS ABSOLUTE: 0.1 K/CU MM
EOSINOPHILS RELATIVE PERCENT: 0.8 % (ref 0–3)
FERRITIN: 488 NG/ML (ref 30–400)
FOLATE: 3.9 NG/ML (ref 3.1–17.5)
GFR SERPL CREATININE-BSD FRML MDRD: >60 ML/MIN/1.73M2
GLUCOSE BLD-MCNC: 134 MG/DL (ref 70–99)
HCT VFR BLD CALC: 30.2 % (ref 42–52)
HEMOGLOBIN: 10 GM/DL (ref 13.5–18)
IRON: 39 UG/DL (ref 59–158)
LYMPHOCYTES ABSOLUTE: 2.4 K/CU MM
LYMPHOCYTES RELATIVE PERCENT: 21.7 % (ref 24–44)
MCH RBC QN AUTO: 24.6 PG (ref 27–31)
MCHC RBC AUTO-ENTMCNC: 33.1 % (ref 32–36)
MCV RBC AUTO: 74.2 FL (ref 78–100)
MONOCYTES ABSOLUTE: 0.8 K/CU MM
MONOCYTES RELATIVE PERCENT: 6.8 % (ref 0–4)
PCT TRANSFERRIN: 22 % (ref 10–44)
PDW BLD-RTO: 18.6 % (ref 11.7–14.9)
PLATELET # BLD: 391 K/CU MM (ref 140–440)
PMV BLD AUTO: 8.6 FL (ref 7.5–11.1)
POTASSIUM SERPL-SCNC: 3.7 MMOL/L (ref 3.5–5.1)
RBC # BLD: 4.07 M/CU MM (ref 4.6–6.2)
SEGMENTED NEUTROPHILS ABSOLUTE COUNT: 7.9 K/CU MM
SEGMENTED NEUTROPHILS RELATIVE PERCENT: 70.5 % (ref 36–66)
SODIUM BLD-SCNC: 136 MMOL/L (ref 135–145)
TOTAL IRON BINDING CAPACITY: 174 UG/DL (ref 250–450)
TOTAL PROTEIN: 7.2 GM/DL (ref 6.4–8.2)
UNSATURATED IRON BINDING CAPACITY: 135 UG/DL (ref 110–370)
VITAMIN B-12: 1093 PG/ML (ref 211–911)
WBC # BLD: 11.3 K/CU MM (ref 4–10.5)

## 2022-12-22 PROCEDURE — 85025 COMPLETE CBC W/AUTO DIFF WBC: CPT

## 2022-12-22 PROCEDURE — 84154 ASSAY OF PSA FREE: CPT

## 2022-12-22 PROCEDURE — 80053 COMPREHEN METABOLIC PANEL: CPT

## 2022-12-22 PROCEDURE — 2580000003 HC RX 258: Performed by: INTERNAL MEDICINE

## 2022-12-22 PROCEDURE — 36591 DRAW BLOOD OFF VENOUS DEVICE: CPT

## 2022-12-22 PROCEDURE — 82607 VITAMIN B-12: CPT

## 2022-12-22 PROCEDURE — 6360000002 HC RX W HCPCS: Performed by: INTERNAL MEDICINE

## 2022-12-22 PROCEDURE — 82728 ASSAY OF FERRITIN: CPT

## 2022-12-22 PROCEDURE — 99211 OFF/OP EST MAY X REQ PHY/QHP: CPT

## 2022-12-22 PROCEDURE — 82746 ASSAY OF FOLIC ACID SERUM: CPT

## 2022-12-22 PROCEDURE — 83550 IRON BINDING TEST: CPT

## 2022-12-22 PROCEDURE — 83540 ASSAY OF IRON: CPT

## 2022-12-22 PROCEDURE — 84153 ASSAY OF PSA TOTAL: CPT

## 2022-12-22 PROCEDURE — 99214 OFFICE O/P EST MOD 30 MIN: CPT | Performed by: INTERNAL MEDICINE

## 2022-12-22 RX ORDER — SODIUM CHLORIDE 0.9 % (FLUSH) 0.9 %
5-40 SYRINGE (ML) INJECTION PRN
Status: DISCONTINUED | OUTPATIENT
Start: 2022-12-22 | End: 2022-12-23 | Stop reason: HOSPADM

## 2022-12-22 RX ORDER — HEPARIN SODIUM (PORCINE) LOCK FLUSH IV SOLN 100 UNIT/ML 100 UNIT/ML
500 SOLUTION INTRAVENOUS PRN
OUTPATIENT
Start: 2022-12-22

## 2022-12-22 RX ORDER — HEPARIN SODIUM (PORCINE) LOCK FLUSH IV SOLN 100 UNIT/ML 100 UNIT/ML
500 SOLUTION INTRAVENOUS PRN
Status: DISCONTINUED | OUTPATIENT
Start: 2022-12-22 | End: 2022-12-23 | Stop reason: HOSPADM

## 2022-12-22 RX ORDER — SODIUM CHLORIDE 0.9 % (FLUSH) 0.9 %
5-40 SYRINGE (ML) INJECTION PRN
OUTPATIENT
Start: 2022-12-22

## 2022-12-22 RX ORDER — SODIUM CHLORIDE 9 MG/ML
25 INJECTION, SOLUTION INTRAVENOUS PRN
OUTPATIENT
Start: 2022-12-22

## 2022-12-22 RX ADMIN — SODIUM CHLORIDE, PRESERVATIVE FREE 20 ML: 5 INJECTION INTRAVENOUS at 12:00

## 2022-12-22 RX ADMIN — HEPARIN 500 UNITS: 100 SYRINGE at 12:00

## 2022-12-22 NOTE — PROGRESS NOTES
MA Rooming Questions  Patient: Arelis Leal  MRN: [id number]    Date: 12/22/2022        1. Do you have any new issues?   no         2. Do you need any refills on medications?    no    3. Have you had any imaging done since your last visit?   no    4. Have you been hospitalized or seen in the emergency room since your last visit here?   yes - ER 12/9    5. Did the patient have a depression screening completed today?  No    No data recorded     PHQ-9 Given to (if applicable):               PHQ-9 Score (if applicable):                     [] Positive     []  Negative              Does question #9 need addressed (if applicable)                     [] Yes    []  No               Raffy Espinal MA

## 2022-12-22 NOTE — PROGRESS NOTES
Patient arrived to treatment suite for port draw. Right chest mediport accessed and flushed with normal saline, good blood return noted. Labs drawn. Flushed and heparin locked, band-aid applied. Patient tolerated well. Left treatment suite ambulatory. Discharge instructions provided. RTC 01/03 for OV with Dr. Rigo Solares.

## 2022-12-22 NOTE — PROGRESS NOTES
Patient Name:  Karlo Spurgeon  Patient :  1970  Patient MRN:  [id number]     Primary Oncologist: Geroldine Cockayne, MD  Referring Provider: Miles Mendez MD     Date of Service: 2022        Chief Complaint:    Chief Complaint   Patient presents with    Follow-up       Encounter Diagnoses   Name Primary? Secondary malignant neoplasm of bone (HCC) Yes    Malignant neoplasm of overlapping sites of left lung (HCC)     Anemia, unspecified type     Bone pain     Prostate cancer ([facility] [address]           HPI:   21: Initial [facility] visit:Hima Steve is a 45 y.o. male who presents to [facility] with report of dysuria and flank pain. Reports these symptoms started a few weeks ago. He ultimately came to the ED due to worsening back pain. He reports ongoing issues with frequent urge to urinate and notes some incontinence. He denies hematuria. He was noted to have acute pyelonephritis and was admitted and started on IV Rocephin.      21 CT chest     Impression   1. Left hilar neoplasm extending into the left upper lobe measuring 3.2 x 5.9   x 5.3 cm obstructing the left upper lobe bronchus with probable minimal   adjacent infiltrates versus lymphangitic spread of tumor. PET-CT/biopsy is   recommended. 2. Mediastinal lymphadenopathy. 3. Prominent left supraclavicular lymph nodes. 4. No osseous metastatic disease. 21 Ct abdomen and pelvis  Impression   1. Circumferential thickening of the bladder wall is noted which could be   related to cystitis. Correlate with urinalysis. 2. There is left retroperitoneal lymphadenopathy. This could be reactive or   neoplastic. This can be further evaluated with PET-CT. 3. There is minimal stranding within the retroperitoneal on the left. This   is uncertain etiology however could be related to acute pyelonephritis. 4. Interval development of multiple sclerotic lesions within the spine and   pelvis, concerning for metastatic osseous disease.    5. Prostate gland is enlarged. Correlate with PSA. .30      He denies past history of cancer. He smokes 2-3 ppd and drinks 10-12 beers daily. He reports unknown malignancy in his sister who  at 39. No other known family history of cancer. Due to lung mass and concern for metastatic prostate cancer we were called to evaluate. 21:  Final Pathologic Diagnosis:   Needle biopsy of lung, clinically left lung mass:        SQUAMOUS CELL CARCINOMA IN SITU.     21:PET  1. Left perihilar mass likely represents primary lung cancer. Correlate   with biopsy results. The opacity more peripheral in the left lower lobe has   relatively low level activity and likely represents an area of post   obstructive pneumonia adjacent to the mass. 2.  Metabolically active left AP window lymph node concerning for metastatic   disease. 3.  The prostate is enlarged and has increased FDG activity which could be   due to prostatitis or prostate cancer. Correlate with PSA levels. 4.  No increased metabolic activity associated with retroperitoneal lymph   nodes. This is unlikely related to the lung process given the significant   difference in metabolic activity; however, if there is prostate cancer, this   could potentially be metastatic disease from the prostate cancer, which can   have variable levels of uptake on FDG PET scans. 5.  No metabolic activity associated with multiple sclerotic lesions. Again   this is unlikely related to the lung process, but if there is prostate   cancer, this could represent metastatic disease from prostate cancer with   poor FDG avidity. Otherwise it could also represent large bone islands. 6.  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 There is a focal area of intense activity associated with a new lucent lesion   within the background of Paget's disease concerning for metastatic disease.    Given the FDG activity, it is likely related to the lung process. 8/20/21: MRI brain  1. There is a punctate focus of restricted diffusion within the right frontal   lobe without associated enhancement, mass effect or midline shift. This   could represent a punctate acute infarct. However, given history, an early   metastatic focus cannot be entirely excluded. 2. Scattered foci of susceptibility are seen within the cerebral hemispheres   bilaterally, which were not visualized on the prior exam.  Findings could   represent areas of remote microhemorrhage or perhaps treated metastases. 3. No abnormal enhancement within the brain. 4. Minimal global parenchymal volume loss with minimal chronic microvascular   ischemic changes. 12/2/21: CT chest, abdomen and pelvis:  Chest CT: Interval increased size of the left perihilar mass, with increased   adjacent obstructive pneumonitis and atelectasis. Stable to minimally to decreased mediastinal lymphadenopathy. Interval increased bony metastatic disease. Abdomen and pelvis CT: Stable retroperitoneal and pelvic lymphadenopathy. Interval increased bony metastatic disease. 12/17/21:Final Pathologic Diagnosis:   Bone, posterior ileum, needle core biopsy:   -     METASTATIC SQUAMOUS CELL CARCINOMA. PACO  PDL1 22c3 >50%(keytruda)  PTEN positive  Alk neg    CARIS, not enough tissue for rest of the testing    Gaurdant with no actionable mutation otherwise     12/27/21: Started XRT to the left pelvis  Added chest radiation dia 3  Completed dia 10 2022      Started carbo/taxol/keytruda jan 20 2022 2/8/22: CT chest:  1. Interval decrease in size of left suprahilar mass extending along the   left upper lobe bronchi extension into the left main pulmonary artery. There   is interval decrease in associated ground-glass opacity in the left upper   lobe. Findings suggest response to therapy. 2.  Interval decrease in mediastinal lymphadenopathy. 3.  Diffuse osseous metastatic disease. Feb/march 2022 he received casodex and GnRH analogue     3/11/22: CT head: No acute abn    3/26/22: Ct abdomen and pelvis:  1. Interval development of a mild degree of left hydronephrosis and   hydroureter, to the level of the left UVJ. An approximate 7.3 x 5 cm soft   tissue mass is present along the left posterior margin of the urinary   bladder, and contiguous with the prostate gland, resulting in obstructing of   the ureteral orifice. Soft tissue mass is most consistent with primary   prostate carcinoma, in light of the diffuse blastic metastatic disease. 2. Interval increase in confluent adenopathy at the level of the aortic   bifurcation, consistent with progression of metastatic disease   3. Diffuse osseous blastic metastatic disease, with interval increase in size   of the lytic metastatic lesion involving the proximal left iliac bone     3/22/22:     He was admitted at [facility] in April (4/22 - 4/30). He had debulking of his primary tumor via bronchoscopy during admit. BMB at [facility] consistent with met prostate cancer  - Sampson prior to admission; exchanged in ED 4/22. Urine cx neg  - CT A/P:  Soft tissue mass along the left UVJ/posterior lateral left bladder wall with moderate left hydroureteronephrosis and diffuse urinary bladder  thickening. UVJ mass is likely a metastatic deposit involving the bladder and possibly the left prostate/seminal vesicle. - 4/25L neph tube placed by IR; sampson removed. Also treated for PNA. 5/16/2022 Imaging   PET - local:   1. Decreased size and uptake of the left perihilar mass. Adjacent   consolidation extending into the left lower lobe likely represents   post-treatment and post-obstructive atelectasis. Resolution of the   hypermetabolic prevascular lymph node. 2. Decreased uptake involving the lytic lesion along the left sacroiliac   joint.  Diffuse sclerosis throughout the skeleton without significant uptake   is unchanged and may represent treated metastatic disease. May 19 2022 CBC with hb 6.9, platelets 89, psa 713, testerone <7    May 31 2022 started Zytiga and prednisone    June 7 2022 resumed C1D1  Heather Richa    6/9/22: Started SHABBIR    But then he did not follow up    Presented to the ER in august 2022  with chest pain     August 9 2022 CT chest:  1. Artifact limits evaluation of the distal pulmonary arteries. No evidence   of central pulmonary embolism or right heart strain. 2. New from 05/16/2022, there is now dense consolidation anteriorly in the   left upper lobe, which may represent postobstructive pneumonia or progression   of disease. More patchy consolidation in the perihilar left lung is slightly   increased and could represent pneumonia or post radiation change. 3. Trace left pleural effusion. 4. Findings are concerning for progression of osseous metastatic disease. Nondisplaced pathologic fractures in the posterior right 4th and 7th ribs   appear subacute all although are new from prior exam.       Was admitted to [facility] October 2022 with right arm pain, noted to have small left sided pneumothorax, BRENDA, leaking nephrostomy bag, and possibly sepsis  Nephrostomy tube exchanged, treated for UTI    Review of CBC 10/11/22  WBC 7.5, Hgb 10.6, Hct 33.1, MCV 80.3, Platelets 578  CMP , K+ 4.1, BUN 17, Cr 1.4, glucose 101, ALT <5, AST 10, ALK phos 212     CT head 10/11/22  Impression   Motion artifact on the lower images. No convincing evidence for acute   intracranial hemorrhage. No mass effect, midline shift, or focal sulcal   effacement. The ventricles are not dilated. Low lying position of the right mandibular condyle at the temporomandibular   joint suggesting subluxation. Areas of increased sclerosis in the skull concerning for sclerotic metastases. CT chest 10/11/22  Impression   1. Suboptimal opacification of the pulmonary arteries. No central pulmonary   embolus.  No definite lobar pulmonary embolus. Motion artifact and contrast   bolus timing limits evaluation of the segmental pulmonary arteries. 2. New trace left anterior pneumothorax measuring 4 mm. 3. Otherwise no significant change since 08/09/2022. Complete collapse of   the left upper lobe with persistent left perihilar 3.8 cm mass obscured by   the surrounding atelectasis. 4. Trace left pleural effusion   5. Mild infrahilar left lower lobe atelectasis   6. Osseous metastatic disease with diffuse sclerotic osseous metastatic   lesions. No pathologic fracture. 10/11/22; CXR:  Stable chest       Airspace opacity in the left upper lobe, likely related to lobar atelectasis   and known lung mass. Known diffuse osseous metastasis. 10/12/22: Left PCN exchanged. No evidence of hydronephrosis     10/12/2022 CBC with WBC of 12.2 hemoglobin of 9 hematocrit 27.8 MCV of 79.7 platelets of 831, no nucleated RBCs. BMP with sodium of 129 potassium of 4 BUN of 21 creatinine 1.3  PSA 31.78     XR right humerus was ordered     10/11/22: PSA 35     Was discharged on Zytiga and prednisone but not taking it    10/27/22: Bone scan:  Multifocal osseous metastatic disease. The degree of metastatic disease is   underestimated on bone scan as compared to that seen on CT.     11/13/22: XR right shoulder:Diffuse bony metastases with a focal destructive lesion in the distal clavicle    Past Medical History:     BPH, HTN, COPD                                                            Past Surgery History:    None per his wife                                                                        Social History:   Lives with wife. Cut down smoking to 2-4 cigarettes per day prior to which he was smoking 2 to 3 packs/day for the past 40 years. Also reported drinking alcohol 10-12 beers per day. Denied any other illicit drug abuse. Family History:    He reported that his sister  at the age of 39 with  metastatic cancer, he was not sure what the primary was                                                                                              Allergies   Allergen Reactions    Tramadol Other (See Comments)     seizures    Morphine Hallucinations    Vicodin [Hydrocodone-Acetaminophen] Hives       Current Outpatient Medications on File Prior to Visit   Medication Sig Dispense Refill    oxyCODONE (OXYCONTIN) 20 MG extended release tablet Take 20 mg by mouth in the morning and 20 mg in the evening. oxyCODONE (OXYCONTIN) 10 MG extended release tablet Take 10 mg by mouth in the morning and 10 mg in the evening. predniSONE (DELTASONE) 20 MG tablet Take 20 mg by mouth daily      cyclobenzaprine (FLEXERIL) 10 MG tablet Take 10 mg by mouth 3 times daily as needed for Muscle spasms      ALPRAZolam (XANAX) 0.5 MG tablet Take 0.5 mg by mouth 3 times daily as needed for Sleep or Anxiety (Twice Daily PRN). naloxone 4 MG/0.1ML LIQD nasal spray 1 spray by Nasal route as needed for Opioid Reversal 1 each 5    cyanocobalamin ([employer] VITAMIN B12) 1000 MCG tablet Take 1 tablet by mouth daily 30 tablet 3    ondansetron (ZOFRAN) 8 MG tablet take 1 tablet by mouth every 8 hours if needed for nausea and vomiting      Sennosides (SENNA) 8.6 MG CAPS Take 1 capsule by mouth 2 times daily as needed (constipation) 20 capsule 0    Calcium Carbonate-Vitamin D (OYSTER SHELL CALCIUM/D) 500-200 MG-UNIT TABS Take 1 tablet by mouth daily 30 tablet 3     No current facility-administered medications on file prior to visit. Interval History: 22:    He arrived with his sister and daughter to the clinic today. Reported that he continues to feel tired and sleepy all the time. Appetite is fair. No major weight loss.      Review of Systems:  As per the interval history, rest of the review of system negative     Vital Signs: /69 (Site: Left Upper Arm, Position: Sitting, Cuff Size: Medium Adult)   Pulse (!) 110   Temp 97.5 °F (36.4 °C) (Infrared)   Resp 16   Ht 6' 1\" (1.854 m)   SpO2 96%   BMI 25.46 kg/m²      CONSTITUTIONAL:somnolent at times.    LUNGS: coarse bs   CARDIOVASCULAR: s1s2 rrr no murmurs, tachycardia  ABDOMEN: soft ntnd bs pos, left nephrostomy tube in place   NEUROLOGIC: GI     Labs:  Hematology:  Lab Results   Component Value Date    WBC 8.1 11/21/2022    RBC 4.30 (L) 11/21/2022    HGB 10.4 (L) 11/21/2022    HCT 31.9 (L) 11/21/2022    MCV 74.2 (L) 11/21/2022    MCH 24.2 (L) 11/21/2022    MCHC 32.6 11/21/2022    RDW 19.1 (H) 11/21/2022     11/21/2022    MPV 8.7 11/21/2022    BANDSPCT 17 (H) 10/12/2022    SEGSPCT 74.0 (H) 11/21/2022    EOSRELPCT 0.6 11/21/2022    BASOPCT 0.4 11/21/2022    LYMPHOPCT 15.6 (L) 11/21/2022    MONOPCT 9.0 (H) 11/21/2022    BANDABS 2.07 10/12/2022    SEGSABS 6.0 11/21/2022    EOSABS 0.1 11/21/2022    BASOSABS 0.0 11/21/2022    LYMPHSABS 1.3 11/21/2022    MONOSABS 0.7 11/21/2022    DIFFTYPE AUTOMATED DIFFERENTIAL 11/21/2022    ANISOCYTOSIS 1+ 04/09/2022    POLYCHROM 1+ 04/09/2022    PLTM DECREASED 04/09/2022     Lab Results   Component Value Date    ESR 47 (H) 03/22/2022     Chemistry:  Lab Results   Component Value Date     (L) 11/21/2022    K 4.5 11/21/2022    CL 93 (L) 11/21/2022    CO2 27 11/21/2022    BUN 15 11/21/2022    CREATININE 0.7 (L) 11/21/2022    GLUCOSE 122 (H) 11/21/2022    CALCIUM 10.9 (H) 11/21/2022    PROT 8.6 (H) 11/21/2022    LABALBU 4.0 11/21/2022    BILITOT 0.2 11/21/2022    ALKPHOS 199 (H) 11/21/2022    AST 13 (L) 11/21/2022    ALT <5 (L) 11/21/2022    LABGLOM >60 11/21/2022    GFRAA >60 10/13/2022    MG 1.8 04/10/2022    POCCA 1.18 04/01/2022    POCGLU 102 (H) 10/13/2022     Lab Results   Component Value Date     (H) 03/22/2022     No components found for: LD  Lab Results   Component Value Date    TSHHS 1.360 07/13/2022    T4FREE 0.97 07/03/2022     Immunology:  Lab Results   Component Value Date    PROT 8.6 (H) 11/21/2022    SPEP  03/22/2022     INTERPRETATION - Decreased albumin and total prorein. No definitive monoclonal bands are seen. SAF    SPEP INTERPRETATION - No monoclonal bands are seen. SAF 03/22/2022    ALBUMINELP 2.8 (L) 03/22/2022    LABALPH 0.4 03/22/2022    LABALPH 1.1 03/22/2022    LABBETA 1.1 03/22/2022    GAMGLOB 0.8 03/22/2022     No results found for: David Montez, KLFLCR  No results found for: B2M  Coagulation Panel:  Lab Results   Component Value Date    PROTIME 11.8 10/20/2022    INR 0.92 10/20/2022    APTT 40.6 (H) 10/20/2022    DDIMER 908 (H) 08/09/2022     Anemia Panel:  Lab Results   Component Value Date    LAVADLPU85 373.0 03/22/2022    FOLATE 4.0 03/22/2022     Tumor Markers:  Lab Results   Component Value Date    CEA 7.7 07/23/2021    .0 (H) 03/22/2022        Observations:  ECOG:  No data recorded       Assessment & Plan:   H/O noncompliance. Left hilar mass with mediastinal hilar lymphadenopathy. 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 PET scan results from august 2021 noted, bone lesions most probably not metastatic except for one lytic lesion. MRI of the brain with no convincing metastatic lesions. Presented  the case in the tumor board. Decision made to proceed with a prostate biopsy and treat with ADT if malignancy  and in the meantime proceed with staging mediastinoscopy/rebiopsy for further treatment plan. But as pt very very very noncompliant, did not follow up with urology, CTS or for bone biopsy multiple times. Note PSA rising and was 250 on November 16 2021   CT imaging dec 2021 with progressive met disease  Bone biopsy on dec 13 2021 with met squamous cell cancer, consistent with lung primary. PDL1 >50%Almita Lent) .    Not enough tissue for rest of CARIS, guardant 360 with no other actionable mutation  CT chest jan 2022 with no significant change   Completed Palliative radiation to the pelvic area and also radiation to the left lung dia 10 2022  Discussed the findings, diagnosis and poor prognosis and treatment with carbo/taxol/pembro after completion of radiation. Discussed ae. PORT placed. Completed OCM  C1D1 carbo/taxol and keytruda started 1/20/22, unfortunately received only one treatment so far sec to being very very noncompliant  CT after C1 with positive response   And then treatment held sec to cytopenias/noncompliance  and also admission to [facility]. PET in may 2022 with continued response. Resumed Keytruda alone June 7 2022 and   Plan was  for repeat PET after 3-4C. But then they discussed and established care with hospice  and did not follow  with us after June 2022 until sep 2022. CT chest in October 2022 with persistent left perihilar mass  with complete collapse   Then he was in the office in nov 2022 again and he was not sure whether he would like to continue with hospice and was not sure what medications he was taking. PET was ordered but not done. Visit here on dec 22 [id number] he wishes to receive treatment  PET and labs ordered. Further plan pending that. Met prostate cancer: PSA was ~900, started on casodex and received GnRH analogue in feb/march . But BMB biopsy in April 2022 consistent with involvement by prostate cancer. May 2022 PSA was 256. Was Started on Zytiga and prednisone in may 2022 . Was on  lupron. But then looks like he discontinued Zytiga in June 2022 and ?restarted in October 2022, not even sure he is taking them . PSA in October 2022 was 33. PET and labs ordered    Left nephrostomy , changed oct 2022. Has a sampson catheter. Follow with urology     Anemia: Sec to BMB involvement with prostate cancer. CBC pending    Right buttock pain and also left humeral pain. Continue adequate analgesic regimen and also adequate bowel regimen. Bone scan as above, best supportive care. Discussed above findings and overall very poor prognosis and plan with bith but I am really not sure how much they comprehend at all. They argue a lot    Smoking and alcohol cessation    Note he has he has been very noncompliant with appointments and follow ups     Please do not hesitate to contact us if you need further information.     Return to clinic after PET    TOMI

## 2022-12-26 LAB
PROSTATE SPECIFIC ANTIGEN FREE: 36.7 NG/ML
PROSTATE SPECIFIC ANTIGEN PERCENT FREE: 25 %
PROSTATE SPECIFIC ANTIGEN: 144 NG/ML (ref 0–4)

## 2022-12-27 ENCOUNTER — TELEPHONE (OUTPATIENT)
Dept: ONCOLOGY | Age: 52
End: 2022-12-27

## 2022-12-27 NOTE — TELEPHONE ENCOUNTER
Daughter called given PET scan time and prep to be done on 1/5 at [facility] arrival at 12:30 PM.                          ;

## 2022-12-28 ENCOUNTER — HOSPITAL ENCOUNTER (EMERGENCY)
Age: 52
Discharge: HOME | End: 2022-12-28
Attending: EMERGENCY MEDICINE
Payer: MEDICAID

## 2022-12-28 VITALS
RESPIRATION RATE: 16 BRPM | SYSTOLIC BLOOD PRESSURE: 94 MMHG | TEMPERATURE: 98.4 F | OXYGEN SATURATION: 99 % | DIASTOLIC BLOOD PRESSURE: 77 MMHG | HEART RATE: 99 BPM

## 2022-12-28 DIAGNOSIS — T83.9XXA COMPLICATION OF FOLEY CATHETER, INITIAL ENCOUNTER (HCC): Primary | ICD-10-CM

## 2022-12-28 PROCEDURE — 99283 EMERGENCY DEPT VISIT LOW MDM: CPT

## 2022-12-28 PROCEDURE — 6360000002 HC RX W HCPCS: Performed by: EMERGENCY MEDICINE

## 2022-12-28 PROCEDURE — 6370000000 HC RX 637 (ALT 250 FOR IP): Performed by: EMERGENCY MEDICINE

## 2022-12-28 RX ORDER — OXYCODONE HYDROCHLORIDE AND ACETAMINOPHEN 5; 325 MG/1; MG/1
1 TABLET ORAL ONCE
Status: COMPLETED | OUTPATIENT
Start: 2022-12-28 | End: 2022-12-28

## 2022-12-28 RX ORDER — FENTANYL CITRATE 50 UG/ML
50 INJECTION, SOLUTION INTRAMUSCULAR; INTRAVENOUS ONCE
Status: DISCONTINUED | OUTPATIENT
Start: 2022-12-28 | End: 2022-12-28 | Stop reason: HOSPADM

## 2022-12-28 RX ADMIN — OXYCODONE AND ACETAMINOPHEN 1 TABLET: 5; 325 TABLET ORAL at 02:20

## 2022-12-28 ASSESSMENT — PAIN - FUNCTIONAL ASSESSMENT: PAIN_FUNCTIONAL_ASSESSMENT: NONE - DENIES PAIN

## 2022-12-28 NOTE — ED NOTES
Pt yelling from room again. Pt assessed. Pt has both side rails down and states he wont put them up because he's tired of waiting. Pt also has monitor off. Pt updated that we are waiting on transportation still. Pt asked to use the phone to call daughter. Pt provided phone at this time.       Salena Bella RN  12/28/22 [id number]

## 2022-12-28 NOTE — ED NOTES
D/c instructions reviewed with pt. All questions answered.  Pt d/c with [facility] at this time to home     [facility]  12/28/22 [id number]

## 2022-12-28 NOTE — ED TRIAGE NOTES
Patient brought in by squad with complaint that a piece of his catheter is missing. Patient also states he is experiencing pain from his hips to his toes.

## 2022-12-28 NOTE — ED NOTES
Pt yelling from room. Went in to assess pt and he states he wants to go home. Pt updated that transportation is set up and were waiting on them.       Cale Iqbal RN  12/28/22 [id number]

## 2022-12-28 NOTE — ED PROVIDER NOTES
[address] ENCOUNTER      Pt Name: Karla Garzon  MRN: [id number]  [place] 1970  Date of evaluation: 12/28/2022  Provider: Gill Concepcion MD  Insurance:  Payor: MEDICAID [place] / Plan: Jia Brock DEPT OF JOB / Product Type: *No Product type* /   Current Code Status   Code Status: Prior     CHIEF COMPLAINT       Chief Complaint   Patient presents with    Urinary Catheter     Patient states missing a piece of his catheter    Leg Pain     Patient states he has pain from his hips down to his toes         HISTORY OF PRESENT ILLNESS    HPI    Nursing Notes were reviewed. This is a 47 y.o. male who presents to the emergency department with concerns for missing a piece of his left urinary catheter. The patient has no new subjective complaints. He says that he has ongoing pain on the left side of his body radiating down his left leg. When questioned in detail, the patient says that his pain is not new. He has multiple areas of pain secondary to his metastatic neoplastic disease. He denies abdominal pain. He says that the Doherty catheter is functioning well, however he is concerned because there is a piece missing and is worried about dysfunction later. He denies recent fevers. He denies new chest pain or difficulty breathing. REVIEW OF SYSTEMS       Review of Systems    8 Systems were reviewed with this patient and all were negative with exception of those noted in the history of present illness above.       PAST MEDICAL HISTORY     Past Medical History:   Diagnosis Date    CAD (coronary artery disease) 12/23/2013    cath negative per pt    Cancer ([facility] [address] 06/2021    pt reports he has lung cancer    History of TMJ disorder     Hypertension     Trigeminal neuralgia          SURGICAL HISTORY       Past Surgical History:   Procedure Laterality Date    ABDOMEN SURGERY      CARDIAC CATHETERIZATION  08/03/2016    CT BIOPSY PERCUTANEOUS SUPERFICIAL BONE  12/17/2021    CT BIOPSY PERCUTANEOUS SUPERFICIAL BONE 12/17/2021 [facility] CT SCAN    CT NEEDLE BIOPSY LUNG PERCUTANEOUS  7/28/2021    CT NEEDLE BIOPSY LUNG PERCUTANEOUS 7/28/2021 [facility] CT SCAN    PORT SURGERY Right 8/13/2021    MEDIPORT INSERTION performed by Velma Jamil MD at [address]       Previous Medications    ALPRAZOLAM (XANAX) 0.5 MG TABLET    Take 0.5 mg by mouth 3 times daily as needed for Sleep or Anxiety (Twice Daily PRN). CALCIUM CARBONATE-VITAMIN D (OYSTER SHELL CALCIUM/D) 500-200 MG-UNIT TABS    Take 1 tablet by mouth daily    CYANOCOBALAMIN ([employer] VITAMIN B12) 1000 MCG TABLET    Take 1 tablet by mouth daily    CYCLOBENZAPRINE (FLEXERIL) 10 MG TABLET    Take 10 mg by mouth 3 times daily as needed for Muscle spasms    NALOXONE 4 MG/0.1ML LIQD NASAL SPRAY    1 spray by Nasal route as needed for Opioid Reversal    ONDANSETRON (ZOFRAN) 8 MG TABLET    take 1 tablet by mouth every 8 hours if needed for nausea and vomiting    OXYCODONE (OXYCONTIN) 10 MG EXTENDED RELEASE TABLET    Take 10 mg by mouth in the morning and 10 mg in the evening. OXYCODONE (OXYCONTIN) 20 MG EXTENDED RELEASE TABLET    Take 20 mg by mouth in the morning and 20 mg in the evening.     PREDNISONE (DELTASONE) 20 MG TABLET    Take 20 mg by mouth daily    SENNOSIDES (SENNA) 8.6 MG CAPS    Take 1 capsule by mouth 2 times daily as needed (constipation)       ALLERGIES     Tramadol, Morphine, and Vicodin [hydrocodone-acetaminophen]    FAMILY HISTORY       Family History   Problem Relation Age of Onset    High Blood Pressure Mother     High Blood Pressure Sister     Cancer Sister           SOCIAL HISTORY       Social History     Socioeconomic History    Marital status:      Spouse name: None    Number of children: 5    Years of education: None    Highest education level: None   Tobacco Use    Smoking status: Every Day     Packs/day: 2.00     Years: 39.00     Pack years: 78.00 Types: Cigarettes    Smokeless tobacco: Never    Tobacco comments:     smokes 1-2 a day   Vaping Use    Vaping Use: Never used   Substance and Sexual Activity    Alcohol use: Not Currently     Alcohol/week: 6.0 standard drinks     Types: 6 Cans of beer per week     Comment: per week (24 oz beers)     Drug use: Yes     Types: Marijuana Tricia Pleas)     Comment: last 12/1    Sexual activity: Yes     Partners: Female       PHYSICAL EXAM       ED Triage Vitals [12/28/22 0013]   BP Temp Temp Source Heart Rate Resp SpO2 Height Weight   111/81 98.6 °F (37 °C) Oral (!) 120 16 97 % -- --       Physical Exam  Vitals and nursing note reviewed. Constitutional:       Appearance: Normal appearance. He is not diaphoretic. HENT:      Head: Normocephalic and atraumatic. Nose: Nose normal.      Mouth/Throat:      Mouth: Mucous membranes are moist.   Eyes:      Extraocular Movements: Extraocular movements intact. Cardiovascular:      Rate and Rhythm: Normal rate. Pulses: Normal pulses. Heart sounds: Normal heart sounds. Pulmonary:      Effort: Pulmonary effort is normal.   Abdominal:      Palpations: Abdomen is soft. Tenderness: There is no abdominal tenderness. Genitourinary:     Comments: Doherty catheter in place with no leakage  Musculoskeletal:         General: Normal range of motion. Skin:     General: Skin is warm and dry. Capillary Refill: Capillary refill takes less than 2 seconds. Neurological:      General: No focal deficit present. Mental Status: He is alert. Vitals:    Vitals:    12/28/22 0013   BP: 111/81   Pulse: (!) 120   Resp: 16   Temp: 98.6 °F (37 °C)   TempSrc: Oral   SpO2: 97%       DIAGNOSTIC RESULTS       No orders to display       LABS:  Labs Reviewed - No data to display    All other labs were within normal range or not returned as of this dictation.     MEDICAL DECISION MAKING     Medications   fentaNYL (SUBLIMAZE) injection 50 mcg (50 mcg IntraMUSCular Not Given 12/28/22 0103)                                   [facility] Assessment  BP: 111/81  Heart Rate: (!) 120                 MDM  Patient has no subjective complaints on my evaluation. On initial triage, the patient was tachycardic however he had a normal heart rate of 84 on my evaluation. The patient has chronic pain secondary to his metastatic neoplastic disease. He was given analgesia here in the emergency department. The patient is amenable to discharge after the Doherty catheter piece was replaced. He will be discharged home with instructions to follow-up with his primary care physician and outpatient providers    Clinical Diagnoses Addressed  1. Complication of Doherty catheter, initial encounter [facility])            CONSULTS:  None    PROCEDURES:  Unless otherwise noted below, none     Procedures      FINAL IMPRESSION      1. Complication of Doherty catheter, initial encounter [facility])          DISPOSITION/PLAN   DISPOSITION Decision To Discharge 12/28/2022 01:45:36 AM      PATIENT REFERRED TO:  Juma Rogers MD  [facility] [address]  [id number]  [facility]  [phone]    Call in 1 day        DISCHARGE MEDICATIONS:  New Prescriptions    No medications on file     Controlled Substances Monitoring:     No flowsheet data found.     Cj Gomes MD (electronically signed)  Attending Emergency Physician            Cj Gomes MD  12/28/22 8696

## 2023-01-04 ENCOUNTER — HOSPITAL ENCOUNTER (OUTPATIENT)
Age: 53
Setting detail: OBSERVATION
Discharge: HOME | End: 2023-01-06
Attending: EMERGENCY MEDICINE
Payer: MEDICAID

## 2023-01-04 DIAGNOSIS — R10.9 FLANK PAIN: Primary | ICD-10-CM

## 2023-01-04 DIAGNOSIS — T83.511A URINARY TRACT INFECTION ASSOCIATED WITH INDWELLING URETHRAL CATHETER, INITIAL ENCOUNTER (HCC): ICD-10-CM

## 2023-01-04 DIAGNOSIS — N39.0 URINARY TRACT INFECTION ASSOCIATED WITH INDWELLING URETHRAL CATHETER, INITIAL ENCOUNTER (HCC): ICD-10-CM

## 2023-01-04 PROCEDURE — 83605 ASSAY OF LACTIC ACID: CPT

## 2023-01-04 PROCEDURE — 80053 COMPREHEN METABOLIC PANEL: CPT

## 2023-01-04 PROCEDURE — 81001 URINALYSIS AUTO W/SCOPE: CPT

## 2023-01-04 PROCEDURE — 87086 URINE CULTURE/COLONY COUNT: CPT

## 2023-01-04 PROCEDURE — 99285 EMERGENCY DEPT VISIT HI MDM: CPT

## 2023-01-04 PROCEDURE — 85025 COMPLETE CBC W/AUTO DIFF WBC: CPT

## 2023-01-04 PROCEDURE — 87040 BLOOD CULTURE FOR BACTERIA: CPT

## 2023-01-04 PROCEDURE — 51798 US URINE CAPACITY MEASURE: CPT

## 2023-01-04 PROCEDURE — 81003 URINALYSIS AUTO W/O SCOPE: CPT

## 2023-01-04 RX ORDER — FENTANYL CITRATE 50 UG/ML
50 INJECTION, SOLUTION INTRAMUSCULAR; INTRAVENOUS ONCE
Status: DISCONTINUED | OUTPATIENT
Start: 2023-01-04 | End: 2023-01-05

## 2023-01-04 ASSESSMENT — PAIN DESCRIPTION - LOCATION: LOCATION: FLANK

## 2023-01-04 ASSESSMENT — PAIN DESCRIPTION - ORIENTATION: ORIENTATION: LEFT

## 2023-01-04 ASSESSMENT — PAIN - FUNCTIONAL ASSESSMENT: PAIN_FUNCTIONAL_ASSESSMENT: 0-10

## 2023-01-05 ENCOUNTER — APPOINTMENT (OUTPATIENT)
Dept: CT IMAGING | Age: 53
End: 2023-01-05
Payer: MEDICAID

## 2023-01-05 ENCOUNTER — APPOINTMENT (OUTPATIENT)
Dept: INTERVENTIONAL RADIOLOGY/VASCULAR | Age: 53
End: 2023-01-05
Payer: MEDICAID

## 2023-01-05 PROBLEM — E44.0 MODERATE MALNUTRITION (HCC): Chronic | Status: ACTIVE | Noted: 2023-01-05

## 2023-01-05 LAB
ALBUMIN SERPL-MCNC: 3.2 GM/DL (ref 3.4–5)
ALP BLD-CCNC: 176 IU/L (ref 40–129)
ALT SERPL-CCNC: 8 U/L (ref 10–40)
ANION GAP SERPL CALCULATED.3IONS-SCNC: 9 MMOL/L (ref 4–16)
AST SERPL-CCNC: 15 IU/L (ref 15–37)
BACTERIA: NEGATIVE /HPF
BASOPHILS ABSOLUTE: 0 K/CU MM
BASOPHILS RELATIVE PERCENT: 0.3 % (ref 0–1)
BILIRUB SERPL-MCNC: 0.3 MG/DL (ref 0–1)
BILIRUBIN URINE: NEGATIVE
BLOOD, URINE: NORMAL
BUN BLDV-MCNC: 14 MG/DL (ref 6–23)
CALCIUM SERPL-MCNC: 11.7 MG/DL (ref 8.3–10.6)
CHLORIDE BLD-SCNC: 97 MMOL/L (ref 99–110)
CLARITY: CLEAR
CO2: 28 MMOL/L (ref 21–32)
COLOR: YELLOW
CREAT SERPL-MCNC: 0.7 MG/DL (ref 0.9–1.3)
DIFFERENTIAL TYPE: ABNORMAL
EOSINOPHILS ABSOLUTE: 0.2 K/CU MM
EOSINOPHILS RELATIVE PERCENT: 1.9 % (ref 0–3)
GFR SERPL CREATININE-BSD FRML MDRD: >60 ML/MIN/1.73M2
GLUCOSE BLD-MCNC: 128 MG/DL (ref 70–99)
GLUCOSE, URINE: NEGATIVE MG/DL
HCT VFR BLD CALC: 30.3 % (ref 42–52)
HCT VFR BLD CALC: 34 % (ref 42–52)
HEMOGLOBIN: 10.6 GM/DL (ref 13.5–18)
HEMOGLOBIN: 9.7 GM/DL (ref 13.5–18)
IMMATURE NEUTROPHIL %: 0.3 % (ref 0–0.43)
KETONES, URINE: NEGATIVE MG/DL
LACTIC ACID, SEPSIS: 0.8 MMOL/L (ref 0.5–1.9)
LACTIC ACID, SEPSIS: 1.2 MMOL/L (ref 0.5–1.9)
LEUKOCYTE ESTERASE, URINE: NORMAL
LYMPHOCYTES ABSOLUTE: 2.4 K/CU MM
LYMPHOCYTES RELATIVE PERCENT: 18.8 % (ref 24–44)
MCH RBC QN AUTO: 24.1 PG (ref 27–31)
MCH RBC QN AUTO: 24.3 PG (ref 27–31)
MCHC RBC AUTO-ENTMCNC: 31.2 % (ref 32–36)
MCHC RBC AUTO-ENTMCNC: 32 % (ref 32–36)
MCV RBC AUTO: 75.8 FL (ref 78–100)
MCV RBC AUTO: 77.3 FL (ref 78–100)
MONOCYTES ABSOLUTE: 0.9 K/CU MM
MONOCYTES RELATIVE PERCENT: 6.8 % (ref 0–4)
MUCUS: NORMAL HPF
NITRITE URINE, QUANTITATIVE: NEGATIVE
NUCLEATED RBC %: 0 %
PDW BLD-RTO: 18 % (ref 11.7–14.9)
PDW BLD-RTO: 18.1 % (ref 11.7–14.9)
PH, URINE: 5.5
PLATELET # BLD: 401 K/CU MM (ref 140–440)
PLATELET # BLD: 464 K/CU MM (ref 140–440)
PMV BLD AUTO: 8.5 FL (ref 7.5–11.1)
PMV BLD AUTO: 8.6 FL (ref 7.5–11.1)
POTASSIUM SERPL-SCNC: 4.1 MMOL/L (ref 3.5–5.1)
PROTEIN UA: NORMAL MG/DL
RBC # BLD: 4 M/CU MM (ref 4.6–6.2)
RBC # BLD: 4.4 M/CU MM (ref 4.6–6.2)
RBC URINE: 18 /HPF
SEGMENTED NEUTROPHILS ABSOLUTE COUNT: 9 K/CU MM
SEGMENTED NEUTROPHILS RELATIVE PERCENT: 71.9 % (ref 36–66)
SODIUM BLD-SCNC: 134 MMOL/L (ref 135–145)
SPECIFIC GRAVITY UA: 1.01
TOTAL IMMATURE NEUTOROPHIL: 0.04 K/CU MM
TOTAL NUCLEATED RBC: 0 K/CU MM
TOTAL PROTEIN: 7.7 GM/DL (ref 6.4–8.2)
TRICHOMONAS: NORMAL /HPF
UROBILINOGEN, URINE: 0.2 MG/DL
WBC # BLD: 12 K/CU MM (ref 4–10.5)
WBC # BLD: 12.5 K/CU MM (ref 4–10.5)
WBC CLUMP: NORMAL /HPF
WBC UA: 1005 /HPF

## 2023-01-05 PROCEDURE — 6360000002 HC RX W HCPCS: Performed by: STUDENT IN AN ORGANIZED HEALTH CARE EDUCATION/TRAINING PROGRAM

## 2023-01-05 PROCEDURE — 6370000000 HC RX 637 (ALT 250 FOR IP): Performed by: STUDENT IN AN ORGANIZED HEALTH CARE EDUCATION/TRAINING PROGRAM

## 2023-01-05 PROCEDURE — 6360000002 HC RX W HCPCS: Performed by: EMERGENCY MEDICINE

## 2023-01-05 PROCEDURE — 87040 BLOOD CULTURE FOR BACTERIA: CPT

## 2023-01-05 PROCEDURE — 2709999900 IR GUIDED NEPHROSTOMY CATH PLACEMENT

## 2023-01-05 PROCEDURE — 96366 THER/PROPH/DIAG IV INF ADDON: CPT

## 2023-01-05 PROCEDURE — 6360000004 HC RX CONTRAST MEDICATION

## 2023-01-05 PROCEDURE — 87086 URINE CULTURE/COLONY COUNT: CPT

## 2023-01-05 PROCEDURE — 36415 COLL VENOUS BLD VENIPUNCTURE: CPT

## 2023-01-05 PROCEDURE — 2580000003 HC RX 258: Performed by: EMERGENCY MEDICINE

## 2023-01-05 PROCEDURE — G0378 HOSPITAL OBSERVATION PER HR: HCPCS

## 2023-01-05 PROCEDURE — 74176 CT ABD & PELVIS W/O CONTRAST: CPT

## 2023-01-05 PROCEDURE — 85027 COMPLETE CBC AUTOMATED: CPT

## 2023-01-05 PROCEDURE — 80053 COMPREHEN METABOLIC PANEL: CPT

## 2023-01-05 PROCEDURE — 6360000004 HC RX CONTRAST MEDICATION: Performed by: STUDENT IN AN ORGANIZED HEALTH CARE EDUCATION/TRAINING PROGRAM

## 2023-01-05 PROCEDURE — 94761 N-INVAS EAR/PLS OXIMETRY MLT: CPT

## 2023-01-05 PROCEDURE — 83605 ASSAY OF LACTIC ACID: CPT

## 2023-01-05 PROCEDURE — 2580000003 HC RX 258: Performed by: STUDENT IN AN ORGANIZED HEALTH CARE EDUCATION/TRAINING PROGRAM

## 2023-01-05 PROCEDURE — 76705 ECHO EXAM OF ABDOMEN: CPT

## 2023-01-05 PROCEDURE — 96365 THER/PROPH/DIAG IV INF INIT: CPT

## 2023-01-05 PROCEDURE — 96361 HYDRATE IV INFUSION ADD-ON: CPT

## 2023-01-05 RX ORDER — ALPRAZOLAM 0.5 MG/1
1 TABLET ORAL 3 TIMES DAILY PRN
Status: DISCONTINUED | OUTPATIENT
Start: 2023-01-05 | End: 2023-01-06 | Stop reason: HOSPADM

## 2023-01-05 RX ORDER — SODIUM CHLORIDE 0.9 % (FLUSH) 0.9 %
5-40 SYRINGE (ML) INJECTION EVERY 12 HOURS SCHEDULED
Status: DISCONTINUED | OUTPATIENT
Start: 2023-01-05 | End: 2023-01-06 | Stop reason: HOSPADM

## 2023-01-05 RX ORDER — ACETAMINOPHEN 325 MG/1
650 TABLET ORAL EVERY 6 HOURS PRN
Status: DISCONTINUED | OUTPATIENT
Start: 2023-01-05 | End: 2023-01-05

## 2023-01-05 RX ORDER — POLYETHYLENE GLYCOL 3350 17 G/17G
17 POWDER, FOR SOLUTION ORAL DAILY PRN
Status: DISCONTINUED | OUTPATIENT
Start: 2023-01-05 | End: 2023-01-06 | Stop reason: HOSPADM

## 2023-01-05 RX ORDER — OXYCODONE HYDROCHLORIDE AND ACETAMINOPHEN 5; 325 MG/1; MG/1
2 TABLET ORAL EVERY 4 HOURS PRN
Status: DISCONTINUED | OUTPATIENT
Start: 2023-01-05 | End: 2023-01-05

## 2023-01-05 RX ORDER — OXYCODONE HYDROCHLORIDE 10 MG/1
20 TABLET ORAL EVERY 6 HOURS PRN
Status: DISCONTINUED | OUTPATIENT
Start: 2023-01-05 | End: 2023-01-06 | Stop reason: HOSPADM

## 2023-01-05 RX ORDER — FENTANYL 75 UG/H
1 PATCH TRANSDERMAL
Status: DISCONTINUED | OUTPATIENT
Start: 2023-01-05 | End: 2023-01-06 | Stop reason: HOSPADM

## 2023-01-05 RX ORDER — ACETAMINOPHEN 650 MG/1
650 SUPPOSITORY RECTAL EVERY 6 HOURS PRN
Status: DISCONTINUED | OUTPATIENT
Start: 2023-01-05 | End: 2023-01-05

## 2023-01-05 RX ORDER — ONDANSETRON 4 MG/1
4 TABLET, ORALLY DISINTEGRATING ORAL EVERY 8 HOURS PRN
Status: DISCONTINUED | OUTPATIENT
Start: 2023-01-05 | End: 2023-01-06 | Stop reason: HOSPADM

## 2023-01-05 RX ORDER — SODIUM CHLORIDE 9 MG/ML
INJECTION, SOLUTION INTRAVENOUS CONTINUOUS
Status: DISCONTINUED | OUTPATIENT
Start: 2023-01-05 | End: 2023-01-06 | Stop reason: HOSPADM

## 2023-01-05 RX ORDER — OXYCODONE HCL 10 MG/1
10 TABLET, FILM COATED, EXTENDED RELEASE ORAL EVERY 12 HOURS
Status: DISCONTINUED | OUTPATIENT
Start: 2023-01-05 | End: 2023-01-05

## 2023-01-05 RX ORDER — SODIUM CHLORIDE 9 MG/ML
INJECTION, SOLUTION INTRAVENOUS PRN
Status: DISCONTINUED | OUTPATIENT
Start: 2023-01-05 | End: 2023-01-06 | Stop reason: HOSPADM

## 2023-01-05 RX ORDER — PREDNISONE 20 MG/1
20 TABLET ORAL DAILY
Status: DISCONTINUED | OUTPATIENT
Start: 2023-01-05 | End: 2023-01-06 | Stop reason: HOSPADM

## 2023-01-05 RX ORDER — OXYCODONE HYDROCHLORIDE AND ACETAMINOPHEN 5; 325 MG/1; MG/1
2 TABLET ORAL EVERY 4 HOURS PRN
Status: DISCONTINUED | OUTPATIENT
Start: 2023-01-05 | End: 2023-01-06 | Stop reason: HOSPADM

## 2023-01-05 RX ORDER — SODIUM CHLORIDE 0.9 % (FLUSH) 0.9 %
5-40 SYRINGE (ML) INJECTION PRN
Status: DISCONTINUED | OUTPATIENT
Start: 2023-01-05 | End: 2023-01-06 | Stop reason: HOSPADM

## 2023-01-05 RX ORDER — ONDANSETRON 2 MG/ML
4 INJECTION INTRAMUSCULAR; INTRAVENOUS EVERY 6 HOURS PRN
Status: DISCONTINUED | OUTPATIENT
Start: 2023-01-05 | End: 2023-01-06 | Stop reason: HOSPADM

## 2023-01-05 RX ORDER — ENOXAPARIN SODIUM 100 MG/ML
40 INJECTION SUBCUTANEOUS DAILY
Status: DISCONTINUED | OUTPATIENT
Start: 2023-01-05 | End: 2023-01-06 | Stop reason: HOSPADM

## 2023-01-05 RX ORDER — GABAPENTIN 300 MG/1
600 CAPSULE ORAL 3 TIMES DAILY
Status: DISCONTINUED | OUTPATIENT
Start: 2023-01-05 | End: 2023-01-06 | Stop reason: HOSPADM

## 2023-01-05 RX ORDER — ALPRAZOLAM 0.5 MG/1
1 TABLET ORAL 2 TIMES DAILY PRN
Status: DISCONTINUED | OUTPATIENT
Start: 2023-01-05 | End: 2023-01-05

## 2023-01-05 RX ADMIN — OXYCODONE AND ACETAMINOPHEN 2 TABLET: 5; 325 TABLET ORAL at 17:39

## 2023-01-05 RX ADMIN — ALPRAZOLAM 1 MG: 0.5 TABLET ORAL at 21:14

## 2023-01-05 RX ADMIN — SODIUM CHLORIDE, PRESERVATIVE FREE 10 ML: 5 INJECTION INTRAVENOUS at 10:08

## 2023-01-05 RX ADMIN — CEFTRIAXONE SODIUM 1000 MG: 1 INJECTION, POWDER, FOR SOLUTION INTRAMUSCULAR; INTRAVENOUS at 06:33

## 2023-01-05 RX ADMIN — OXYCODONE AND ACETAMINOPHEN 2 TABLET: 5; 325 TABLET ORAL at 10:06

## 2023-01-05 RX ADMIN — CEFTRIAXONE SODIUM 1000 MG: 1 INJECTION, POWDER, FOR SOLUTION INTRAMUSCULAR; INTRAVENOUS at 03:11

## 2023-01-05 RX ADMIN — GABAPENTIN 600 MG: 300 CAPSULE ORAL at 21:13

## 2023-01-05 RX ADMIN — OXYCODONE HYDROCHLORIDE 20 MG: 10 TABLET ORAL at 13:00

## 2023-01-05 RX ADMIN — GABAPENTIN 600 MG: 300 CAPSULE ORAL at 10:06

## 2023-01-05 RX ADMIN — ALPRAZOLAM 1 MG: 0.5 TABLET ORAL at 10:06

## 2023-01-05 RX ADMIN — OXYCODONE HYDROCHLORIDE 20 MG: 10 TABLET ORAL at 21:13

## 2023-01-05 RX ADMIN — PREDNISONE 20 MG: 20 TABLET ORAL at 10:06

## 2023-01-05 RX ADMIN — SODIUM CHLORIDE: 9 INJECTION, SOLUTION INTRAVENOUS at 03:10

## 2023-01-05 RX ADMIN — IOPAMIDOL 30 ML: 755 INJECTION, SOLUTION INTRAVENOUS at 14:56

## 2023-01-05 RX ADMIN — GABAPENTIN 600 MG: 300 CAPSULE ORAL at 13:00

## 2023-01-05 RX ADMIN — SODIUM CHLORIDE: 9 INJECTION, SOLUTION INTRAVENOUS at 19:53

## 2023-01-05 ASSESSMENT — ENCOUNTER SYMPTOMS
COUGH: 0
SINUS PAIN: 0
CHEST TIGHTNESS: 0
COLOR CHANGE: 1
SHORTNESS OF BREATH: 0
NAUSEA: 0
SINUS PRESSURE: 0
BACK PAIN: 1
BLOOD IN STOOL: 0
VOMITING: 0
EYE DISCHARGE: 0
VOICE CHANGE: 0
ABDOMINAL PAIN: 1
DIARRHEA: 0
EYE PAIN: 0

## 2023-01-05 ASSESSMENT — LIFESTYLE VARIABLES
HOW MANY STANDARD DRINKS CONTAINING ALCOHOL DO YOU HAVE ON A TYPICAL DAY: PATIENT DOES NOT DRINK
HOW OFTEN DO YOU HAVE A DRINK CONTAINING ALCOHOL: NEVER

## 2023-01-05 ASSESSMENT — PAIN - FUNCTIONAL ASSESSMENT
PAIN_FUNCTIONAL_ASSESSMENT: PREVENTS OR INTERFERES SOME ACTIVE ACTIVITIES AND ADLS

## 2023-01-05 ASSESSMENT — PAIN SCALES - GENERAL
PAINLEVEL_OUTOF10: 2
PAINLEVEL_OUTOF10: 10
PAINLEVEL_OUTOF10: 10
PAINLEVEL_OUTOF10: 7
PAINLEVEL_OUTOF10: 8

## 2023-01-05 ASSESSMENT — PAIN DESCRIPTION - LOCATION
LOCATION: SHOULDER;LEG
LOCATION: BACK
LOCATION: GENERALIZED
LOCATION: GENERALIZED

## 2023-01-05 ASSESSMENT — PAIN DESCRIPTION - DESCRIPTORS
DESCRIPTORS: ACHING
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
## 2023-01-05 NOTE — H&P
V2.0  History and Physical      Name:  Rebekah Frost /Age/Sex: 1970  (47 y.o. male)   MRN & CSN:  [id number] & [id number] Encounter Date/Time: 2023 3:19 AM EST   Location:  [place] [address] PCP: Brendan May MD       Hospital Day: 2    Assessment and Plan:   Rebekah Frost is a 47 y.o. male with a pmh of metastatic prostate cancer, nephrostomy tube placement in 2022 at [facility] with exchange done in October by IR because of severe urinary retention, who presents with Nephrostomy tube displaced [facility])    Hospital Problems             Last Modified POA    * (Principal) Nephrostomy tube displaced ([address] 2023 Yes       Nephrostomy tube displacement: Nephrostomy tube is out. No active drainage. Urology has been consulted IR has been consulted for repeat placement. Urology believes that patient is high risk for repeat hydronephrosis and would benefit from placement of nephrostomy tube again. N.p.o. since admission for likely intervention in the morning. Appropriate pain regimen in place. Avoid nephrotoxic agents. Patient is DNR CC and hospice. Would be going back to hospice after intervention for nephrostomy tube placement. Metastatic prostate cancer complicating urinary retention with ultimate nephrostomy tube placement in 2022 at [facility]    Disposition:   Current Living situation: Hospice  Expected Disposition: Hospice  Estimated D/C: 2 to 3 days    Diet No diet orders on file   DVT Prophylaxis [] Lovenox, []  Heparin, [] SCDs, [] Ambulation,  [] Eliquis, [] Xarelto   Code Status Prior   Surrogate Decision Maker/ POA      History from:     patient    History of Present Illness:     Chief Complaint: Nephrostomy tube displaced [facility])    The patient has a history of metastatic prostate cancer with chronic obstruction at the left ureterovesical junction secondary to the metastatic process. The patient follows up with oncologist Dr. Rajendra Mg and he lives in [facility].   Patient also has a chronic indwelling catheter Doherty as well. Patient is a poor historian but the patient and daughter told us that the nephrostomy tube has been slowly coming out and Bernis Fabry came out couple of days ago. Because of the concern for urine retention the patient was brought to the ER. Family is concerned that the nephrostomy tube needs to be replaced. Urology was contacted by the ER who recommended the patient to be admitted to the hospital for the nephrostomy tube repeat placement for which IR and urology has been consulted. Review of Systems: Need 10 Elements   Review of Systems   Constitutional:  Positive for activity change, appetite change, fatigue and unexpected weight change. Negative for chills and fever. HENT:  Negative for ear pain, hearing loss, sinus pressure, sinus pain and voice change. Eyes:  Negative for pain, discharge and visual disturbance. Respiratory:  Negative for cough, chest tightness and shortness of breath. Cardiovascular:  Positive for leg swelling. Negative for chest pain and palpitations. Gastrointestinal:  Positive for abdominal pain. Negative for blood in stool, diarrhea, nausea and vomiting. Genitourinary:  Positive for flank pain. Negative for dysuria and frequency. Musculoskeletal:  Positive for arthralgias, back pain and myalgias. Skin:  Positive for color change, pallor and wound. Neurological:  Positive for weakness. Negative for dizziness, light-headedness and headaches. Hematological:  Bruises/bleeds easily. Psychiatric/Behavioral:  Positive for behavioral problems and sleep disturbance.       Objective:   No intake or output data in the 24 hours ending 01/05/23 0319   Vitals:   Vitals:    01/05/23 0131 01/05/23 0201 01/05/23 0231 01/05/23 0301   BP: 105/73 107/66 (!) 92/59 105/66   Pulse: (!) 106 (!) 104 (!) 103 (!) 105   Resp: 11 12 13 14   Temp:    98.4 °F (36.9 °C)   TempSrc:    Oral   SpO2: 100%  100% 100%       Medications Prior to Admission Prior to Admission medications    Medication Sig Start Date End Date Taking? Authorizing Provider   oxyCODONE (OXYCONTIN) 20 MG extended release tablet Take 20 mg by mouth in the morning and 20 mg in the evening. Historical Provider, MD   oxyCODONE (OXYCONTIN) 10 MG extended release tablet Take 10 mg by mouth in the morning and 10 mg in the evening. Historical Provider, MD   predniSONE (DELTASONE) 20 MG tablet Take 20 mg by mouth daily    Historical Provider, MD   cyclobenzaprine (FLEXERIL) 10 MG tablet Take 10 mg by mouth 3 times daily as needed for Muscle spasms    Historical Provider, MD   ALPRAZolam (XANAX) 0.5 MG tablet Take 0.5 mg by mouth 3 times daily as needed for Sleep or Anxiety (Twice Daily PRN). Historical Provider, MD   naloxone 4 MG/0.1ML LIQD nasal spray 1 spray by Nasal route as needed for Opioid Reversal 4/12/22   Trudi Wilkes MD   Calcium Carbonate-Vitamin D (OYSTER SHELL CALCIUM/D) 500-200 MG-UNIT TABS Take 1 tablet by mouth daily 4/1/22 11/4/22  Jaxson Barry MD   cyanocobalamin ([employer] VITAMIN B12) 1000 MCG tablet Take 1 tablet by mouth daily 4/1/22   Jaxson Barry MD   ondansetron (ZOFRAN) 8 MG tablet take 1 tablet by mouth every 8 hours if needed for nausea and vomiting 3/11/22   Historical Provider, MD   Sennosides (SENNA) 8.6 MG CAPS Take 1 capsule by mouth 2 times daily as needed (constipation) 3/11/22   Bridget Walker, DO       Physical Exam: Need 8 Elements   Physical Exam  Vitals reviewed. Constitutional:       Appearance: Normal appearance. He is normal weight. HENT:      Head: Normocephalic. Nose: Nose normal.      Mouth/Throat:      Mouth: Mucous membranes are moist.   Eyes:      Conjunctiva/sclera: Conjunctivae normal.      Pupils: Pupils are equal, round, and reactive to light. Cardiovascular:      Rate and Rhythm: Regular rhythm. Tachycardia present. Pulses: Normal pulses. Heart sounds: Normal heart sounds. No murmur heard.   Pulmonary: Effort: Pulmonary effort is normal.      Breath sounds: Normal breath sounds. No wheezing, rhonchi or rales. Abdominal:      General: Abdomen is flat. Bowel sounds are normal. There is distension. Palpations: Abdomen is soft. Tenderness: There is no abdominal tenderness. There is left CVA tenderness. Musculoskeletal:         General: Signs of injury present. No deformity. Normal range of motion. Cervical back: Normal range of motion and neck supple. Right lower leg: Edema present. Left lower leg: Edema present. Skin:     Coloration: Skin is not jaundiced or pale. Neurological:      General: No focal deficit present. Mental Status: He is alert and oriented to person, place, and time. Mental status is at baseline. Psychiatric:         Mood and Affect: Mood normal.          Past History:   PMHx   Past Medical History:   Diagnosis Date    CAD (coronary artery disease) 12/23/2013    cath negative per pt    Cancer ([facility] [address] 06/2021    pt reports he has lung cancer    History of TMJ disorder     Hypertension     Trigeminal neuralgia         PSHX:  has a past surgical history that includes Abdomen surgery; Cardiac catheterization (08/03/2016); CT NEEDLE BIOPSY LUNG PERCUTANEOUS (7/28/2021); Port Surgery (Right, 8/13/2021); and CT BIOPSY SUPERFICIAL BONE PERCUTANEOUS (12/17/2021). Fam HX: family history includes Cancer in his sister; High Blood Pressure in his mother and sister.     Soc HX:   Social History     Socioeconomic History    Marital status:      Spouse name: None    Number of children: 11    Years of education: None    Highest education level: None   Tobacco Use    Smoking status: Every Day     Packs/day: 2.00     Years: 39.00     Pack years: 78.00     Types: Cigarettes    Smokeless tobacco: Never    Tobacco comments:     smokes 1-2 a day   Vaping Use    Vaping Use: Never used   Substance and Sexual Activity    Alcohol use: Not Currently     Alcohol/week: 6.0 standard drinks     Types: 6 Cans of beer per week     Comment: per week (24 oz beers)     Drug use: Yes     Types: Marijuana Darby Coil)     Comment: last 12/1    Sexual activity: Yes     Partners: Female       Allergies: Allergies: Allergies   Allergen Reactions    Tramadol Other (See Comments)     seizures    Morphine Hallucinations    Vicodin [Hydrocodone-Acetaminophen] Hives       Medications:   Medications:    cefTRIAXone (ROCEPHIN) IV  1,000 mg IntraVENous Once    fentanNYL  50 mcg IntraVENous Once      Infusions:    sodium chloride 100 mL/hr at 01/05/23 0310     PRN Meds:      Labs      CBC:   Recent Labs     01/04/23 2330   WBC 12.5*   HGB 9.7*        BMP:    Recent Labs     01/04/23 2330   *   K 4.1   CL 97*   CO2 28   BUN 14   CREATININE 0.7*   GLUCOSE 128*     Hepatic:   Recent Labs     01/04/23 2330   AST 15   ALT 8*   BILITOT 0.3   ALKPHOS 176*     Lipids:   Lab Results   Component Value Date/Time    CHOL 124 12/20/2013 05:22 AM    HDL 53 12/20/2013 05:22 AM    TRIG 52 12/20/2013 05:22 AM     Hemoglobin A1C: No results found for: LABA1C  TSH: No results found for: TSH  Troponin:   Lab Results   Component Value Date/Time    TROPONINT <0.010 08/09/2022 04:53 AM    TROPONINT <0.010 08/09/2022 04:53 AM    TROPONINT <0.010 07/13/2022 03:05 PM     Lactic Acid: No results for input(s): LACTA in the last 72 hours. BNP: No results for input(s): PROBNP in the last 72 hours.   UA:  Lab Results   Component Value Date/Time    NITRU NEGATIVE 01/04/2023 11:53 PM    COLORU YELLOW 01/04/2023 11:53 PM    WBCUA 1005 01/04/2023 11:53 PM    RBCUA 18 01/04/2023 11:53 PM    MUCUS RARE 01/04/2023 11:53 PM    TRICHOMONAS NONE SEEN 01/04/2023 11:53 PM    BACTERIA NEGATIVE 01/04/2023 11:53 PM    CLARITYU CLEAR 01/04/2023 11:53 PM    SPECGRAV 1.015 01/04/2023 11:53 PM    LEUKOCYTESUR LARGE NUMBER OR AMOUNT OBSERVED 01/04/2023 11:53 PM    UROBILINOGEN 0.2 01/04/2023 11:53 PM    BILIRUBINUR NEGATIVE 01/04/2023 11:53 PM    BLOODU MODERATE NUMBER OR AMOUNT OBSERVED 01/04/2023 11:53 PM    KETUA NEGATIVE 01/04/2023 11:53 PM    AMORPHOUS RARE 07/21/2021 05:24 AM     Urine Cultures: No results found for: Marisol Christianson  Blood Cultures: No results found for: BC  No results found for: BLOODCULT2  Organism: No results found for: ORG    Imaging/Diagnostics Last 24 Hours   CT ABDOMEN PELVIS WO CONTRAST Additional Contrast? None    Result Date: 1/5/2023  EXAMINATION: CT OF THE ABDOMEN AND PELVIS WITHOUT CONTRAST 1/5/2023 12:27 am TECHNIQUE: CT of the abdomen and pelvis was performed without the administration of intravenous contrast. Multiplanar reformatted images are provided for review. Automated exposure control, iterative reconstruction, and/or weight based adjustment of the mA/kV was utilized to reduce the radiation dose to as low as reasonably achievable. COMPARISON: 03/26/2022 HISTORY: ORDERING SYSTEM PROVIDED HISTORY: left sided flank pain; has h/o left sided uvj obstruction 2/2 mass, had nephrostomy tube that fell out TECHNOLOGIST PROVIDED HISTORY: Reason for exam:->left sided flank pain; has h/o left sided uvj obstruction 2/2 mass, had nephrostomy tube that fell out Additional Contrast?->None Decision Support Exception - unselect if not a suspected or confirmed emergency medical condition->Emergency Medical Condition (MA) Reason for Exam: left sided flank pain; has h/o left sided uvj obstruction 2/2 mass, had nephrostomy tube that fell out. .. FINDINGS: Lower Chest: There are opacities in the posterior right lower lobe which appear more than just atelectasis. Trace amount of pleural effusion along the margin of the right lung and major fissure. Organs: Lack of IV contrast reduces evaluation of the organs and vasculature. No new mass or surface nodularity is appreciated at the liver. The spleen is not enlarged. There is no pancreatic calcification, ductal dilatation, or focal fluid collection.   The adrenal glands are unremarkable. No right renal calculus, hydronephrosis, or proximal hydroureter. Difficulty tracking the right ureter all the way to the urinary bladder. There is mild edema of the left kidney with a tiny focus of gas in the interpolar region. There is a soft tissue tract from the left back skin surface to the kidney likely representing the tract of prior nephrostomy tube. There is distension of the left renal pelvis with thickened wall. Proximal left ureter has a small amount of fluid but not overtly dilated. GI/Bowel: The stomach, small bowel, and colon are not dilated. Diffuse constipation is seen throughout the colon down to the rectum. The colonic wall does not appear thickened. There is no pneumoperitoneum or significant ascites. Pelvis: The urinary bladder has a Doherty catheter in lumen. The bladder wall does appear diffusely thickened with a somewhat asymmetric area in the left lateral to inferolateral margin. Prostate is acceptable. Peritoneum/Retroperitoneum: There is no abdominal aortic aneurysm. There is some mildly increased soft tissue attenuation in the retroperitoneum along the distal aorta and cava near the bifurcation the iliac arteries. Also along the left iliac and psoas margin more than right side. Bones/Soft Tissues: Diffuse sclerotic areas are again noted within the pelvic bones. Progressive worsening of lytic areas and bone loss throughout the sacrum and sacroiliac joint margins. The left side is worse than the right. Replacement by soft tissue attenuating mass at the expected lytic bone and some continuation anteriorly beyond the bony margin into the presacral space and edge of the retroperitoneum. Some involvement into the edge of the left gluteus muscles and left iliacus muscle. Posterior extension along the paraspinal muscles at the lower sacrum and coccygeal junction. 1.  No left ureteral stent or nephrostomy tube at this time.   A tract of previous nephrostomy tubes is noted at the posterior edge of the kidney and the inferolateral edge. There is no perinephric hematoma. 2.  Small amount of fluid in the left renal pelvis with a thickened wall but without overt dilatation of the left ureter at this time. 3.  Diffuse thickening of the urinary bladder wall but with some asymmetry along the left lateral and inferolateral margin. Doherty catheter in the lumen. 4.  Diffuse metastatic disease in the bones with extensive areas of sclerosis and mixed lucencies. There is progressive lytic lesion and bone loss throughout the sacrum and into the left hemipelvis more than the right side. Replacement with soft tissue extending beyond the bony margins into the presacral, muscular, and edge of the retroperitoneum margins. 5.  Opacities in the right lower lung appear to be more than just atelectasis and could have pneumonitis or developing infection.        Personally reviewed Lab Studies, Imaging    Electronically signed by Raphael Jacobo MD, MD on 1/5/2023 at 3:19 AM

## 2023-01-05 NOTE — PROCEDURES
PROCEDURE PERFORMED: Left nephrostomy tube Ordered, Not placed at this time    INFORMED CONSENT:  Obtained prior to procedure  Consent placed in chart. ARRIVED TO ROOM: [place] [address] TECHNIQUE:               Pt transferred to the table and positioned for comfort. Warm blankets given. Pt placed on Cardiac/Vital Monitor. Pt prepped and draped in a sterile fashion with chlorhexadine. TIME OUT:  0325     Staff present: Christian Nieto RN    PROCEDURE STARTED: 3731    PAIN/LOCAL ANESTHESIA/SEDATION MANAGEMENT:           Local: Lidocaine 1% given by          INTRAOPERATIVE:           ACCESS TIME: 1425          US/FLUORO: Both 4 US Images   Pt does not have hydronephrosis at this time. Dr. Auorra Scanlon suggest renal ultrasound tomorrow to evaluate for hydronephrosis and to facilitate/ necessitate new nephrostomy tube replacement      STERILE DRESSINGS: Guaze and tegaderm    EBL:      <1    PROCEDURE ENDED: 1435    LEFT THE ROOM: 1445    REPORT CALLED TO: Shala DODSON

## 2023-01-05 NOTE — CARE COORDINATION
This RN CM to the bedside to discuss hospice with patient. Patient out of room at this time. Family in room. Family states pt is with [facility]. They state pt may want to change to [address]. This RN will speak with patient when he is back in his room. Family wanted to know if pt is also getting a PET scan today. This RN CM discussed this with pt's nurse, Shala , she was already working on communicating with the physician on this so she can update the pt and family. 12 - I spoke with patient now that he is back in his room, patient would like to stay with [facility] at this time. CM will continue to follow for discharge needs.

## 2023-01-05 NOTE — PROGRESS NOTES
New admit from ED, pt alert and oriented with some confusion. Two person transfer to bed. Doheryt catheter upon admission. Fentanyl patch to upper right shoulder blade. Skin assessed. Family at bedside.

## 2023-01-05 NOTE — CONSULTS
[facility] [address]   Consult Note  [facility] 1 2 3 4 5    Date: 2023   Patient: Viral Terry   : 1970   DOA: 2023   MRN: [id number]   ROOM#: [id number]     Reason for Consult: Nephrostomy tube fell out, hydronephrosis  Requesting Physician:  Dr. Julio Lew and Dr. Bobo Levy  Collaborating Urologist on Call at time of admission: Dr. Janeth Tsai:  Nephrostomy tube fell out    History Obtained From:  patient, electronic medical record    HISTORY OF PRESENT ILLNESS:                The patient is a 47 y.o. male with significant past medical history of CAD, metastatic squamous cell cancer, metastatic prostate cancer, left hydronephrosis s/p PCN placement 22 and HTN who presented with a dislodged nephrostomy tube which occurred several days ago. He was admitted for reinsertion of PCN with IR. The pt also has a chronic indwelling sampson catheter which was last exchanged by a home health nurse yesterday by report. His family also states that a PET scan was to be performed today at noon. ED Provider's HPI : 69-year-old male presents with a dislodged left nephrostomy tube. 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 He is followed by oncologist Dr. Giovanni Stewart. He is currently in hospice [facility] He has had in a left-sided nephrostomy tube due to chronic obstruction at that kidney since . The nephrostomy tube was initially put in [facility] and was replaced by IR at [facility] in 2022. He is not currently established with a urologist.  He does currently have an indwelling Sampson catheter. It is unclear when it was placed. He is a bit of a difficult historian in the emergency department. We did have patient's daughter at bedside available.   They report that they think that the nephrostomy tube got caught on something while he was lying down and it came out. No report of any bleeding or drainage at the site. He does have chronic flank pain. Is present to the emergency department complaining of flank pain. Family is concerned that the nephrostomy tube needs to be replaced.     Past Medical History:        Diagnosis Date    CAD (coronary artery disease) 12/23/2013    cath negative per pt    Cancer ([address] 06/2021    pt reports he has lung cancer    History of TMJ disorder     Hypertension     Trigeminal neuralgia      Past Surgical History:        Procedure Laterality Date    ABDOMEN SURGERY      CARDIAC CATHETERIZATION  08/03/2016    CT BIOPSY PERCUTANEOUS SUPERFICIAL BONE  12/17/2021    CT BIOPSY PERCUTANEOUS SUPERFICIAL BONE 12/17/2021 [facility] CT SCAN    CT NEEDLE BIOPSY LUNG PERCUTANEOUS  7/28/2021    CT NEEDLE BIOPSY LUNG PERCUTANEOUS 7/28/2021 [address] [place] [address] CT SCAN    PORT SURGERY Right 8/13/2021    MEDIPORT INSERTION performed by Abad Trevino MD at [facility] OR     Current Medications:   Current Facility-Administered Medications: 0.9 % sodium chloride infusion, , IntraVENous, Continuous  predniSONE (DELTASONE) tablet 20 mg, 20 mg, Oral, Daily  sodium chloride flush 0.9 % injection 5-40 mL, 5-40 mL, IntraVENous, 2 times per day  sodium chloride flush 0.9 % injection 5-40 mL, 5-40 mL, IntraVENous, PRN  0.9 % sodium chloride infusion, , IntraVENous, PRN  enoxaparin (LOVENOX) injection 40 mg, 40 mg, SubCUTAneous, Daily  ondansetron (ZOFRAN-ODT) disintegrating tablet 4 mg, 4 mg, Oral, Q8H PRN **OR** ondansetron (ZOFRAN) injection 4 mg, 4 mg, IntraVENous, Q6H PRN  polyethylene glycol (GLYCOLAX) packet 17 g, 17 g, Oral, Daily PRN  [START ON 1/6/2023] cefTRIAXone (ROCEPHIN) 2,000 mg in dextrose 5 % 50 mL IVPB mini-bag, 2,000 mg, IntraVENous, Q24H  oxyCODONE HCl (OXY-IR) immediate release tablet 20 mg, 20 mg, Oral, Q6H PRN  ALPRAZolam (XANAX) tablet 1 mg, 1 mg, Oral, TID PRN  gabapentin (NEURONTIN) capsule 600 mg, 600 mg, Oral, TID  fentaNYL (DURAGESIC) 75 MCG/HR 1 patch, 1 patch, TransDERmal, Q72H  oxyCODONE-acetaminophen (PERCOCET) 5-325 MG per tablet 2 tablet, 2 tablet, Oral, Q4H PRN    Allergies:  Tramadol, Morphine, and Vicodin [hydrocodone-acetaminophen]    Social History:   TOBACCO:   reports that he has been smoking cigarettes. He has a 78.00 pack-year smoking history. He has never used smokeless tobacco.  ETOH:   reports that he does not currently use alcohol after a past usage of about 6.0 standard drinks per week. DRUGS:   reports current drug use. Drug: Marijuana Charmayne Stai). Family History:       Problem Relation Age of Onset    High Blood Pressure Mother     High Blood Pressure Sister     Cancer Sister        REVIEW OF SYSTEMS:     CONSTITUTIONAL:  negative  RESPIRATORY:  negative  CARDIOVASCULAR:  negative  GASTROINTESTINAL:  negative  GENITOURINARY:  positive for penile pain with catheter    PHYSICAL EXAM:      VITALS:  /77   Pulse (!) 110   Temp 97.7 °F (36.5 °C) (Oral)   Resp 16   SpO2 94%      TEMPERATURE:  Current - Temp: 97.7 °F (36.5 °C);  Max - Temp  Av.1 °F (36.7 °C)  Min: 97.7 °F (36.5 °C)  Max: 98.4 °F (36.9 °C)  24HR BLOOD PRESSURE RANGE:  Systolic (11HKD), IAW:411 , Min:92 , AUO:604   ; Diastolic (25SIP), WHL:17, Min:59, Max:77    Physical Exam:  General appearance: alert, appears stated age, cooperative, no distress, and mildly confused  Head: Normocephalic, without obvious abnormality, atraumatic  Back:  No CVA tenderness  Abdomen:  Soft, non-tender, non-distended  : Indwelling catheter with hazy yellow urine    DATA:    WBC:    Lab Results   Component Value Date/Time    WBC 12.5 2023 11:30 PM     Hemoglobin/Hematocrit:    Lab Results   Component Value Date/Time    HGB 9.7 2023 11:30 PM    HCT 30.3 2023 11:30 PM     BMP:    Lab Results   Component Value Date/Time     2023 11:30 PM    K 4.1 2023 11:30 PM    CL 97 2023 11:30 PM    CO2 28 2023 11:30 PM    BUN 14 2023 11:30 PM    LABALBU 3.2 01/04/2023 11:30 PM    CREATININE 0.7 01/04/2023 11:30 PM    CALCIUM 11.7 01/04/2023 11:30 PM    GFRAA >60 10/13/2022 05:15 AM    LABGLOM >60 01/04/2023 11:30 PM     PT/INR:    Lab Results   Component Value Date/Time    PROTIME 11.8 10/20/2022 07:55 AM    INR 0.92 10/20/2022 07:55 AM     Urine Culture: pending    Imaging:  CT ABDOMEN PELVIS WO CONTRAST Additional Contrast? None    Result Date: 1/5/2023  EXAMINATION: CT OF THE ABDOMEN AND PELVIS WITHOUT CONTRAST 1/5/2023 12:27 am TECHNIQUE: CT of the abdomen and pelvis was performed without the administration of intravenous contrast. Multiplanar reformatted images are provided for review. Automated exposure control, iterative reconstruction, and/or weight based adjustment of the mA/kV was utilized to reduce the radiation dose to as low as reasonably achievable. COMPARISON: 03/26/2022 HISTORY: ORDERING SYSTEM PROVIDED HISTORY: left sided flank pain; has h/o left sided uvj obstruction 2/2 mass, had nephrostomy tube that fell out TECHNOLOGIST PROVIDED HISTORY: Reason for exam:->left sided flank pain; has h/o left sided uvj obstruction 2/2 mass, had nephrostomy tube that fell out Additional Contrast?->None Decision Support Exception - unselect if not a suspected or confirmed emergency medical condition->Emergency Medical Condition (MA) Reason for Exam: left sided flank pain; has h/o left sided uvj obstruction 2/2 mass, had nephrostomy tube that fell out. .. FINDINGS: Lower Chest: There are opacities in the posterior right lower lobe which appear more than just atelectasis. Trace amount of pleural effusion along the margin of the right lung and major fissure. Organs: Lack of IV contrast reduces evaluation of the organs and vasculature. No new mass or surface nodularity is appreciated at the liver. The spleen is not enlarged. There is no pancreatic calcification, ductal dilatation, or focal fluid collection. The adrenal glands are unremarkable. No right renal calculus, hydronephrosis, or proximal hydroureter. Difficulty tracking the right ureter all the way to the urinary bladder. There is mild edema of the left kidney with a tiny focus of gas in the interpolar region. There is a soft tissue tract from the left back skin surface to the kidney likely representing the tract of prior nephrostomy tube. There is distension of the left renal pelvis with thickened wall. Proximal left ureter has a small amount of fluid but not overtly dilated. GI/Bowel: The stomach, small bowel, and colon are not dilated. Diffuse constipation is seen throughout the colon down to the rectum. The colonic wall does not appear thickened. There is no pneumoperitoneum or significant ascites. Pelvis: The urinary bladder has a Doherty catheter in lumen. The bladder wall does appear diffusely thickened with a somewhat asymmetric area in the left lateral to inferolateral margin. Prostate is acceptable. Peritoneum/Retroperitoneum: There is no abdominal aortic aneurysm. There is some mildly increased soft tissue attenuation in the retroperitoneum along the distal aorta and cava near the bifurcation the iliac arteries. Also along the left iliac and psoas margin more than right side. Bones/Soft Tissues: Diffuse sclerotic areas are again noted within the pelvic bones. Progressive worsening of lytic areas and bone loss throughout the sacrum and sacroiliac joint margins. The left side is worse than the right. Replacement by soft tissue attenuating mass at the expected lytic bone and some continuation anteriorly beyond the bony margin into the presacral space and edge of the retroperitoneum. Some involvement into the edge of the left gluteus muscles and left iliacus muscle. Posterior extension along the paraspinal muscles at the lower sacrum and coccygeal junction. 1.  No left ureteral stent or nephrostomy tube at this time.   A tract of previous nephrostomy tubes is noted at the posterior edge of the kidney and the inferolateral edge. There is no perinephric hematoma. 2.  Small amount of fluid in the left renal pelvis with a thickened wall but without overt dilatation of the left ureter at this time. 3.  Diffuse thickening of the urinary bladder wall but with some asymmetry along the left lateral and inferolateral margin. Doherty catheter in the lumen. 4.  Diffuse metastatic disease in the bones with extensive areas of sclerosis and mixed lucencies. There is progressive lytic lesion and bone loss throughout the sacrum and into the left hemipelvis more than the right side. Replacement with soft tissue extending beyond the bony margins into the presacral, muscular, and edge of the retroperitoneum margins. 5.  Opacities in the right lower lung appear to be more than just atelectasis and could have pneumonitis or developing infection. Assessment & Plan:      Shivani Mendoza is a 47y.o. year old male admitted 1/4/2023 for pneumothorax. Pt known to Dr. Shan Tillman. Currently DNR-CC and in hospice [facility] 1) Metastatic Prostate Cancer: S/p bone marrow bx 4/27/22 w pathology showing metastatic prostate cancer. Casodex started 4/25/22; Supposedly on Zytiga but is non-compliant with medications.  12/22/22; 31.8 10/11/22; 256 5/19/22; 1,235 4/22/22              Follows with Dr. Мария Gaxiola. Was to have a PET scan as outpatient today. Oncology consulted. 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 S/p left PCN placement at [facility] 4/25/22. Last exchanged 10/20/22 with IR. The PCN fell out several days ago. CT a/p 1/5/23: No left ureteral stent or nephrostomy tube at this time. A tract of previous nephrostomy tubes is noted at the posterior edge of the kidney and the inferolateral edge. There is no perinephric hematoma. Cr 0.7  IR consulted for reinsertion of left PCN.   3) Urinary Retention: Managed with chronic sampson catheter, last exchanged 1/4/23 w home health, per family report. UA w large leuks, mod blood; urine cx pending. On IV Rocephin. Continue sampson. Recommend qmonthly catheter exchanges. Will follow. Patient seen and examined, chart reviewed.      Electronically signed by Patience Grant PA-C on 1/5/2023 at 8:59 AM

## 2023-01-05 NOTE — PROGRESS NOTES
Patients son approached this nurse about inpatient hospice, directed son to discuss healthcare needs with patients POA. Will notify next shift to contact case management for further questions.

## 2023-01-05 NOTE — PROGRESS NOTES
Comprehensive Nutrition Assessment    Type and Reason for Visit:  Initial, Positive Nutrition Screen (weight loss, poor appetite)    Nutrition Recommendations/Plan:   Advance diet as medically able per physician   Recommend No Added Salt diet and begin Diabetic oral nutrition supplement BID   Monitor GI status, fluids, diet advancement, meal behavior, glucose, labs, and POC      Malnutrition Assessment:  Malnutrition Status: Moderate malnutrition (01/05/23 1400)    Context:  Chronic Illness     Findings of the 6 clinical characteristics of malnutrition:  Energy Intake:  Mild decrease in energy intake (Comment)  Weight Loss:  Unable to assess     Body Fat Loss:  Mild body fat loss Orbital   Muscle Mass Loss:   (Moderate muscle wasting) Thigh (quadraceps), Calf (gastrocnemius), Clavicles (pectoralis & deltoids), Temples (temporalis)  Fluid Accumulation:  Unable to assess     Strength:  Not Performed    Nutrition Assessment:    Admitted with Dislodged Nephrostomy tube, Metastatic Prostate Cancer. Hx of hospice care prior to stay, CAD, LVH, Anemia, Thrombocytopenia. Pt NPO at this time, s/s planned IR guided nephrostomy. Pt complains of hunger at visit, dtr and wife at bedside. Denies any N/V/P and chewing/swallowing issues. Wants cake. Discussed unable to provide until after procedure. Pt would like oral nutrition supplement started once diet advances. Dtr reports unable to recall wt loss but able to visually see changes in pt's appearance. Limited current wt taken. Observed moderate wasting noted. Follow as high nutrition risk.     Nutrition Related Findings:    Na 134, Cr 0.7, Glu 128, Alk Phos 176, ALT 8, H/H improving Wound Type: None (nephrostomy tube)       Current Nutrition Intake & Therapies:    Average Meal Intake: NPO  Average Supplements Intake: NPO  Diet NPO    Anthropometric Measures:  Height: 6' 1\" (185.4 cm)  Ideal Body Weight (IBW): 184 lbs (84 kg)    Admission Body Weight:  (stated from office wt)  Current Body Weight:  (no weight taken),   IBW. Weight Source: Other (Comment)  Current BMI (kg/m2):    Usual Body Weight: 198 lb (89.8 kg) (1/2022)     Weight Adjustment For: No Adjustment                 BMI Categories: Overweight (BMI 25.0-29. 9) (based on most recent wt taken)    Estimated Daily Nutrient Needs:  Energy Requirements Based On: Formula  Weight Used for Energy Requirements: Other (Comment) (last office visit wt)  Energy (kcal/day): 5133-6665 ([place] [address] Jeor x 1.1-1.3 SF x 1.1 AF)  Weight Used for Protein Requirements: Ideal  Protein (g/day): 100-125 (1.2-1.5 g/kg)  Method Used for Fluid Requirements: Standard Renal  Fluid (ml/day): fluids per nephrology    Nutrition Diagnosis: In context of chronic illness, Moderate malnutrition related to renal dysfunction, catabolic illness (metastatic cancer) as evidenced by poor intake prior to admission, moderate muscle loss, mild loss of subcutaneous fat    Nutrition Interventions:   Food and/or Nutrient Delivery: Start Oral Diet, Start Oral Nutrition Supplement  Nutrition Education/Counseling: No recommendation at this time  Coordination of Nutrition Care: Continue to monitor while inpatient       Goals:     Goals: Initiate PO diet, by next RD assessment, within 2 days       Nutrition Monitoring and Evaluation:   Behavioral-Environmental Outcomes: None Identified  Food/Nutrient Intake Outcomes: Diet Advancement/Tolerance  Physical Signs/Symptoms Outcomes: Biochemical Data, GI Status, Meal Time Behavior, Fluid Status or Edema, Weight, Skin, Nutrition Focused Physical Findings    Discharge Planning:     Too soon to determine     Oscar Dia RD, LD  Contact: [phone]

## 2023-01-05 NOTE — PROGRESS NOTES
4 Eyes Skin Assessment     NAME:  Rik Sheppard  YOB: 1970  MEDICAL RECORD NUMBER:  [id number]    The patient is being assessed for  Admission    I agree that One RN have performed a thorough Head to Toe Skin Assessment on the patient. ALL assessment sites listed below have been assessed. Areas assessed by both nurses:    Head, Face, Ears, Shoulders, Back, Chest, Arms, Elbows, Hands, Sacrum. Buttock, Coccyx, Ischium, and Legs. Feet and Heels  Skin is dry and warm        Does the Patient have a Wound? Yes wound(s) were present on assessment. LDA wound assessment was Initiated and completed by RN       Twin Prevention initiated by RN: Yes   Wound Care Orders initiated by RN: No    Pressure Injury (Stage 3,4, Unstageable, DTI, NWPT, and Complex wounds) if present place referral order by RN under : Yes  Pt has a small wound with stitches attached from a nephostomy tube being pulled out.      New and Established Ostomies, if present place, referral order under : No      Nurse 1 eSignature: Electronically signed by Katelin Willson RN on 1/5/23 at 6:07 AM EST    **SHARE this note so that the co-signing nurse is able to place an eSignature**    Nurse 2 eSignature: Electronically signed by Ginger Matthews RN on 1/5/23 at 6:12 AM EST

## 2023-01-05 NOTE — ED NOTES
Daughter reporting checking on patient tonight and pt stated \"It came out my back. \" Unsure of how nephrostomy removed but states \"I think it might gotten caught in the pillows and then he rolled and got caught maybe. \"     Vanessa Barr RN  01/04/23 2079

## 2023-01-05 NOTE — CARE COORDINATION
Case Management Assessment  Initial Evaluation    Date/Time of Evaluation: 1/5/2023 2:07 PM  Assessment Completed by: Jones Rose RN    If patient is discharged prior to next notation, then this note serves as note for discharge by case management. Patient Name: Hans Coronel                   YOB: 1970  Diagnosis: Flank pain [R10.9]  Nephrostomy tube displaced ([facility] Bernard Flight  Urinary tract infection associated with indwelling urethral catheter, initial encounter ([facility] [Z21.410R, N39.0]                   Date / Time: 1/4/2023 10:58 PM    Patient Admission Status: Observation   Readmission Risk (Low < 19, Mod (19-27), High > 27): Readmission Risk Score: 20.2    Current PCP: Shanti De Luna MD  PCP verified by CM? Chart Reviewed: Yes      History Provided by:    Patient Orientation:      Patient Cognition:      Hospitalization in the last 30 days (Readmission):  No    If yes, Readmission Assessment in  Navigator will be completed.     Advance Directives:      Code Status: DNR-CC   Patient's Primary Decision Maker is: Legal Next of Kin    Primary Decision MakerArty Dorina - [phone]    Secondary Decision Maker: Author Diallo - Child - [phone]    Secondary Decision Maker: Imelda Jha - Child - [phone]    Discharge Planning:    Patient lives with: Family Members Type of Home: House  Primary Care Giver:    Patient Support Systems include: Family Members   Current Financial resources:    Current community resources:    Current services prior to admission: [facility], Home Care            Current DME:              Type of Home Care services:  Hospice, Aide Services, Nursing Services    ADLS  Prior functional level:  family assistance  Current functional level:  assistance     PT AM-PAC:   /24  OT AM-PAC:   /24    Family can provide assistance at DC:  yes  Would you like Case Management to discuss the discharge plan with any other family members/significant others, and if so, who? Plans to Return to Present Housing:  yes  Other Identified Issues/Barriers to RETURNING to current housing: yes  Potential Assistance needed at discharge:              Potential DME:    Patient expects to discharge to: [address] [place] [address] for transportation at discharge:  Family    Financial    Payor: [address] / Plan: [address] / Product Type: *No Product type* /     Does insurance require precert for SNF: Yes    Potential assistance Purchasing Medications:    Meds-to-Beds request: Yes      [facility] [address] [phone] Ros Giraldo [phone]  [facility] [address]  Phone: [phone] Fax: [phone]      Notes:    Factors facilitating achievement of predicted outcomes: Family support    Barriers to discharge: Impulsivity    Additional Case Management Notes: Plan is to return home with current Hospice agency. The Plan for Transition of Care is related to the following treatment goals of Flank pain [R10.9]  Nephrostomy tube displaced ([address] [T83.022A]  Urinary tract infection associated with indwelling urethral catheter, initial encounter ([address] [J48.630I, I62.5]    IF APPLICABLE: The Patient and/or patient representative Dorla Kawasaki and his family were provided with a choice of provider and agrees with the discharge plan. Freedom of choice list with basic dialogue that supports the patient's individualized plan of care/goals and shares the quality data associated with the providers was provided to:     Patient Representative Name:       The Patient and/or Patient Representative Agree with the Discharge Plan?       Joseline Wilde RN  Case Management Department  Ph: [phone] Fax:

## 2023-01-05 NOTE — ED NOTES
Pt refused fentanyl administration stating, \"I got the patch on right now. \" Daughter notified this RN that patient is due for 20mg oxycodone and 1mg xanax. Bakari Arita MD notified.      Mariluz Mi RN  01/04/23 9711

## 2023-01-05 NOTE — PROGRESS NOTES
Upon giving bedside report this nurse observed patients right chest port dislodged. Dry dressing applied. POA contacted, offered to place peripheral IV, but POA declined.

## 2023-01-05 NOTE — PLAN OF CARE
Problem: Discharge Planning  Goal: Discharge to home or other facility with appropriate resources  Outcome: Progressing  Flowsheets (Taken 1/5/2023 1134)  Discharge to home or other facility with appropriate resources:   Identify barriers to discharge with patient and caregiver   Arrange for needed discharge resources and transportation as appropriate   Identify discharge learning needs (meds, wound care, etc)   Refer to discharge planning if patient needs post-hospital services based on physician order or complex needs related to functional status, cognitive ability or social support system     Problem: Pain  Goal: Verbalizes/displays adequate comfort level or baseline comfort level  Outcome: Progressing  Flowsheets (Taken 1/5/2023 1004)  Verbalizes/displays adequate comfort level or baseline comfort level:   Encourage patient to monitor pain and request assistance   Assess pain using appropriate pain scale   Administer analgesics based on type and severity of pain and evaluate response   Implement non-pharmacological measures as appropriate and evaluate response   Consider cultural and social influences on pain and pain management   Notify Licensed Independent Practitioner if interventions unsuccessful or patient reports new pain     Problem: Safety - Adult  Goal: Free from fall injury  Outcome: Progressing  Flowsheets (Taken 1/5/2023 1155)  Free From Fall Injury: Instruct family/caregiver on patient safety     Problem: Skin/Tissue Integrity  Goal: Absence of new skin breakdown  Description: 1. Monitor for areas of redness and/or skin breakdown  2. Assess vascular access sites hourly  3. Every 4-6 hours minimum:  Change oxygen saturation probe site  4. Every 4-6 hours:  If on nasal continuous positive airway pressure, respiratory therapy assess nares and determine need for appliance change or resting period.   Outcome: Progressing

## 2023-01-05 NOTE — ED NOTES
ED TO INPATIENT SBAR HANDOFF    Patient Name: Shivani Mendoza   :  1970  47 y.o. MRN:  [id number]  Preferred Name    ED Room #:  TR04/04TR-04  Family/Caregiver Present no   Restraints no   Sitter no   Sepsis Risk Score Sepsis Risk Score: 3.28    Situation  Code Status: Prior No additional code details. Allergies: Tramadol, Morphine, and Vicodin [hydrocodone-acetaminophen]  Weight: No data found. Arrived from: home  Chief Complaint:   Chief Complaint   Patient presents with    Other     Nephro tube out. Stage 4 kidney cancer      Hospital Problem/Diagnosis:  Principal Problem:    Nephrostomy tube displaced ([address]  Resolved Problems:    * No resolved hospital problems. *    Imaging:   CT ABDOMEN PELVIS WO CONTRAST Additional Contrast? None   Final Result   1. No left ureteral stent or nephrostomy tube at this time. A tract of   previous nephrostomy tubes is noted at the posterior edge of the kidney and   the inferolateral edge. There is no perinephric hematoma. 2.  Small amount of fluid in the left renal pelvis with a thickened wall but   without overt dilatation of the left ureter at this time. 3.  Diffuse thickening of the urinary bladder wall but with some asymmetry   along the left lateral and inferolateral margin. Doherty catheter in the lumen. 4.  Diffuse metastatic disease in the bones with extensive areas of sclerosis   and mixed lucencies. There is progressive lytic lesion and bone loss   throughout the sacrum and into the left hemipelvis more than the right side. Replacement with soft tissue extending beyond the bony margins into the   presacral, muscular, and edge of the retroperitoneum margins. 5.  Opacities in the right lower lung appear to be more than just atelectasis   and could have pneumonitis or developing infection.            Abnormal labs:   Abnormal Labs Reviewed   COMPREHENSIVE METABOLIC PANEL - Abnormal; Notable for the following components:       Result Value    Sodium 134 (*)     Chloride 97 (*)     Creatinine 0.7 (*)     Glucose 128 (*)     Calcium 11.7 (*)     Albumin 3.2 (*)     ALT 8 (*)     Alkaline Phosphatase 176 (*)     All other components within normal limits   CBC WITH AUTO DIFFERENTIAL - Abnormal; Notable for the following components:    WBC 12.5 (*)     RBC 4.00 (*)     Hemoglobin 9.7 (*)     Hematocrit 30.3 (*)     MCV 75.8 (*)     MCH 24.3 (*)     RDW 18.0 (*)     Segs Relative 71.9 (*)     Lymphocytes % 18.8 (*)     Monocytes % 6.8 (*)     All other components within normal limits     Critical values: no     Abnormal Assessment Findings: hx bone cancer, nephrostomy tube removed    Background  History:   Past Medical History:   Diagnosis Date    CAD (coronary artery disease) 12/23/2013    cath negative per pt    Cancer ([facility] [address] 06/2021    pt reports he has lung cancer    History of TMJ disorder     Hypertension     Trigeminal neuralgia        Assessment    Vitals/MEWS: MEWS Score: 1  Level of Consciousness: Alert (0)   Vitals:    01/05/23 0131 01/05/23 0201 01/05/23 0231 01/05/23 0301   BP: 105/73 107/66 (!) 92/59 105/66   Pulse: (!) 106 (!) 104 (!) 103 (!) 105   Resp: 11 12 13 14   Temp:    98.4 °F (36.9 °C)   TempSrc:    Oral   SpO2: 100%  100% 100%     FiO2 (%):   O2 Flow Rate: O2 Device: None (Room air)    Cardiac Rhythm:    Pain Assessment: 10/10 [] Verbal [] Maricel Camper Scale  Pain Scale: Pain Assessment  Pain Assessment: 0-10  Pain Location: Flank  Pain Orientation: Left  Last documented pain score (0-10 scale)    Last documented pain medication administered:   Mental Status: oriented, alert, coherent, logical, thought processes intact, and able to concentrate and follow conversation  NIH Score:    C-SSRS: Risk of Suicide: No Risk  Bedside swallow:    [place] Coma Scale (GCS): Harika Coma Scale  Eye Opening: Spontaneous  Best Verbal Response: Oriented  Best Motor Response: Obeys commands  [place] Coma Scale Score: 15  Active LDA's:    PO Status: Regular  Pertinent or High Risk Medications/Drips: no   If Yes, please provide details:   Pending Blood Product Administration: no     You may also review the ED PT Care Timeline found under the Summary Nursing Index tab. Recommendation    Pending orders   Plan for Discharge (if known):    Additional Comments:    If any further questions, please call Sending RN at [phone]    Electronically signed by: Electronically signed by Daisy Valencia RN on 1/5/2023 at 3:04 AM      Daisy Valencia RN  01/05/23 4883

## 2023-01-06 ENCOUNTER — APPOINTMENT (OUTPATIENT)
Dept: CT IMAGING | Age: 53
End: 2023-01-06
Payer: MEDICAID

## 2023-01-06 VITALS
TEMPERATURE: 98.6 F | HEIGHT: 73 IN | OXYGEN SATURATION: 97 % | HEART RATE: 110 BPM | BODY MASS INDEX: 25.46 KG/M2 | SYSTOLIC BLOOD PRESSURE: 128 MMHG | RESPIRATION RATE: 18 BRPM | DIASTOLIC BLOOD PRESSURE: 82 MMHG

## 2023-01-06 PROBLEM — R10.9 FLANK PAIN: Status: ACTIVE | Noted: 2023-01-06

## 2023-01-06 LAB
ALBUMIN SERPL-MCNC: 3.1 GM/DL (ref 3.4–5)
ALP BLD-CCNC: 193 IU/L (ref 40–128)
ALT SERPL-CCNC: 10 U/L (ref 10–40)
ANION GAP SERPL CALCULATED.3IONS-SCNC: 11 MMOL/L (ref 4–16)
AST SERPL-CCNC: 16 IU/L (ref 15–37)
BASOPHILS ABSOLUTE: 0 K/CU MM
BASOPHILS RELATIVE PERCENT: 0.3 % (ref 0–1)
BILIRUB SERPL-MCNC: 0.2 MG/DL (ref 0–1)
BUN BLDV-MCNC: 11 MG/DL (ref 6–23)
CALCIUM SERPL-MCNC: 11.5 MG/DL (ref 8.3–10.6)
CHLORIDE BLD-SCNC: 100 MMOL/L (ref 99–110)
CO2: 26 MMOL/L (ref 21–32)
CREAT SERPL-MCNC: 0.5 MG/DL (ref 0.9–1.3)
CULTURE: NORMAL
DIFFERENTIAL TYPE: ABNORMAL
EOSINOPHILS ABSOLUTE: 0.2 K/CU MM
EOSINOPHILS RELATIVE PERCENT: 1.6 % (ref 0–3)
GFR SERPL CREATININE-BSD FRML MDRD: >60 ML/MIN/1.73M2
GLUCOSE BLD-MCNC: 123 MG/DL (ref 70–99)
HCT VFR BLD CALC: 29.1 % (ref 42–52)
HEMOGLOBIN: 9.4 GM/DL (ref 13.5–18)
IMMATURE NEUTROPHIL %: 0.3 % (ref 0–0.43)
LYMPHOCYTES ABSOLUTE: 2.5 K/CU MM
LYMPHOCYTES RELATIVE PERCENT: 25 % (ref 24–44)
Lab: NORMAL
MCH RBC QN AUTO: 24.4 PG (ref 27–31)
MCHC RBC AUTO-ENTMCNC: 32.3 % (ref 32–36)
MCV RBC AUTO: 75.6 FL (ref 78–100)
MONOCYTES ABSOLUTE: 0.7 K/CU MM
MONOCYTES RELATIVE PERCENT: 7.1 % (ref 0–4)
NUCLEATED RBC %: 0 %
PDW BLD-RTO: 17.6 % (ref 11.7–14.9)
PLATELET # BLD: 360 K/CU MM (ref 140–440)
PMV BLD AUTO: 8.3 FL (ref 7.5–11.1)
POTASSIUM SERPL-SCNC: 3.9 MMOL/L (ref 3.5–5.1)
RBC # BLD: 3.85 M/CU MM (ref 4.6–6.2)
SEGMENTED NEUTROPHILS ABSOLUTE COUNT: 6.6 K/CU MM
SEGMENTED NEUTROPHILS RELATIVE PERCENT: 65.7 % (ref 36–66)
SODIUM BLD-SCNC: 137 MMOL/L (ref 135–145)
SPECIMEN: NORMAL
TOTAL IMMATURE NEUTOROPHIL: 0.03 K/CU MM
TOTAL NUCLEATED RBC: 0 K/CU MM
TOTAL PROTEIN: 6.7 GM/DL (ref 6.4–8.2)
WBC # BLD: 10.1 K/CU MM (ref 4–10.5)

## 2023-01-06 PROCEDURE — 6360000002 HC RX W HCPCS: Performed by: STUDENT IN AN ORGANIZED HEALTH CARE EDUCATION/TRAINING PROGRAM

## 2023-01-06 PROCEDURE — 85025 COMPLETE CBC W/AUTO DIFF WBC: CPT

## 2023-01-06 PROCEDURE — 6370000000 HC RX 637 (ALT 250 FOR IP): Performed by: STUDENT IN AN ORGANIZED HEALTH CARE EDUCATION/TRAINING PROGRAM

## 2023-01-06 PROCEDURE — 2580000003 HC RX 258: Performed by: EMERGENCY MEDICINE

## 2023-01-06 PROCEDURE — 74176 CT ABD & PELVIS W/O CONTRAST: CPT

## 2023-01-06 PROCEDURE — 96366 THER/PROPH/DIAG IV INF ADDON: CPT

## 2023-01-06 PROCEDURE — 96361 HYDRATE IV INFUSION ADD-ON: CPT

## 2023-01-06 PROCEDURE — 94761 N-INVAS EAR/PLS OXIMETRY MLT: CPT

## 2023-01-06 PROCEDURE — 80053 COMPREHEN METABOLIC PANEL: CPT

## 2023-01-06 PROCEDURE — G0378 HOSPITAL OBSERVATION PER HR: HCPCS

## 2023-01-06 PROCEDURE — 99222 1ST HOSP IP/OBS MODERATE 55: CPT | Performed by: INTERNAL MEDICINE

## 2023-01-06 PROCEDURE — 51702 INSERT TEMP BLADDER CATH: CPT

## 2023-01-06 PROCEDURE — 2580000003 HC RX 258: Performed by: STUDENT IN AN ORGANIZED HEALTH CARE EDUCATION/TRAINING PROGRAM

## 2023-01-06 PROCEDURE — 96372 THER/PROPH/DIAG INJ SC/IM: CPT

## 2023-01-06 RX ORDER — GABAPENTIN 600 MG/1
600 TABLET ORAL 3 TIMES DAILY
COMMUNITY

## 2023-01-06 RX ORDER — FENTANYL 75 UG/H
1 PATCH TRANSDERMAL
COMMUNITY

## 2023-01-06 RX ORDER — CEPHALEXIN 500 MG/1
500 CAPSULE ORAL 2 TIMES DAILY
Qty: 6 CAPSULE | Refills: 0 | Status: SHIPPED | OUTPATIENT
Start: 2023-01-06 | End: 2023-01-09

## 2023-01-06 RX ORDER — POLYETHYLENE GLYCOL 3350 17 G/17G
17 POWDER, FOR SOLUTION ORAL DAILY PRN
Qty: 527 G | Refills: 1 | Status: SHIPPED | OUTPATIENT
Start: 2023-01-06 | End: 2023-02-05

## 2023-01-06 RX ORDER — HEPARIN SODIUM (PORCINE) LOCK FLUSH IV SOLN 100 UNIT/ML 100 UNIT/ML
500 SOLUTION INTRAVENOUS PRN
Status: DISCONTINUED | OUTPATIENT
Start: 2023-01-06 | End: 2023-01-06 | Stop reason: HOSPADM

## 2023-01-06 RX ADMIN — OXYCODONE HYDROCHLORIDE 20 MG: 10 TABLET ORAL at 07:24

## 2023-01-06 RX ADMIN — SODIUM CHLORIDE, PRESERVATIVE FREE 10 ML: 5 INJECTION INTRAVENOUS at 08:47

## 2023-01-06 RX ADMIN — OXYCODONE AND ACETAMINOPHEN 2 TABLET: 5; 325 TABLET ORAL at 18:04

## 2023-01-06 RX ADMIN — OXYCODONE AND ACETAMINOPHEN 2 TABLET: 5; 325 TABLET ORAL at 02:37

## 2023-01-06 RX ADMIN — OXYCODONE HYDROCHLORIDE 20 MG: 10 TABLET ORAL at 15:28

## 2023-01-06 RX ADMIN — GABAPENTIN 600 MG: 300 CAPSULE ORAL at 13:02

## 2023-01-06 RX ADMIN — OXYCODONE AND ACETAMINOPHEN 2 TABLET: 5; 325 TABLET ORAL at 08:46

## 2023-01-06 RX ADMIN — OXYCODONE AND ACETAMINOPHEN 2 TABLET: 5; 325 TABLET ORAL at 13:02

## 2023-01-06 RX ADMIN — CEFTRIAXONE SODIUM 2000 MG: 2 INJECTION, POWDER, FOR SOLUTION INTRAMUSCULAR; INTRAVENOUS at 05:23

## 2023-01-06 RX ADMIN — ALPRAZOLAM 1 MG: 0.5 TABLET ORAL at 15:28

## 2023-01-06 RX ADMIN — ENOXAPARIN SODIUM 40 MG: 100 INJECTION SUBCUTANEOUS at 08:46

## 2023-01-06 RX ADMIN — HEPARIN SODIUM (PORCINE) LOCK FLUSH IV SOLN 100 UNIT/ML 500 UNITS: 100 SOLUTION at 17:55

## 2023-01-06 RX ADMIN — ALPRAZOLAM 1 MG: 0.5 TABLET ORAL at 07:24

## 2023-01-06 RX ADMIN — SODIUM CHLORIDE: 9 INJECTION, SOLUTION INTRAVENOUS at 15:18

## 2023-01-06 RX ADMIN — GABAPENTIN 600 MG: 300 CAPSULE ORAL at 08:46

## 2023-01-06 RX ADMIN — PREDNISONE 20 MG: 20 TABLET ORAL at 08:46

## 2023-01-06 ASSESSMENT — PAIN DESCRIPTION - LOCATION
LOCATION: ABDOMEN
LOCATION: GENERALIZED
LOCATION: OTHER (COMMENT)
LOCATION: OTHER (COMMENT)

## 2023-01-06 ASSESSMENT — PAIN SCALES - GENERAL
PAINLEVEL_OUTOF10: 9
PAINLEVEL_OUTOF10: 10
PAINLEVEL_OUTOF10: 10
PAINLEVEL_OUTOF10: 9
PAINLEVEL_OUTOF10: 3

## 2023-01-06 ASSESSMENT — PAIN DESCRIPTION - DESCRIPTORS
DESCRIPTORS: ACHING

## 2023-01-06 ASSESSMENT — PAIN DESCRIPTION - ORIENTATION
ORIENTATION: INNER;OUTER
ORIENTATION: MID

## 2023-01-06 NOTE — PROGRESS NOTES
[facility]   Progress Note  [facility] 0 1 2      Date: 2023   Patient: Shane Macedo   : 1970   DOA: 2023   MRN: [id number]   ROOM#: [id number]     Admit Date: 2023     Collaborating Urologist on Call at time of admission: Dr. Susana Mares  CC: Nephrostomy tube fell out  Reason for Consult: Nephrostomy tube fell out, hydronephrosis    Subjective:     Pain: none, no nausea and no vomiting,   Bowel Movement/Flatus:   Yes  Voiding:  Indwelling catheter with hazy yellow urine     Pt resting in bed, denies any pain.     Objective:    Vitals:    BP (!) 132/99   Pulse (!) 114   Temp 98.6 °F (37 °C) (Oral)   Resp 18   Ht 6' 1\" (1.854 m)   SpO2 97%   BMI 25.46 kg/m²    Temp  Av.6 °F (37 °C)  Min: 98.5 °F (36.9 °C)  Max: 98.8 °F (37.1 °C)     Intake/Output Summary (Last 24 hours) at 2023 1244  Last data filed at 2023 0529  Gross per 24 hour   Intake --   Output 600 ml   Net -600 ml     Physical Exam:  General appearance: alert, appears stated age, cooperative, no distress, and mildly confused  Head: Normocephalic, without obvious abnormality, atraumatic  Back:  No CVA tenderness  Abdomen:  Soft, non-tender, non-distended  : Indwelling catheter with hazy yellow urine    Labs:   WBC:    Lab Results   Component Value Date/Time    WBC 10.1 2023 06:27 AM      Hemoglobin/Hematocrit:    Lab Results   Component Value Date/Time    HGB 9.4 2023 06:27 AM    HCT 29.1 2023 06:27 AM      BMP:   Lab Results   Component Value Date/Time     2023 06:27 AM    K 3.9 2023 06:27 AM     2023 06:27 AM    CO2 26 2023 06:27 AM    BUN 11 2023 06:27 AM    LABALBU 3.1 2023 06:27 AM    CREATININE 0.5 2023 06:27 AM    CALCIUM 11.5 2023 06:27 AM    GFRAA >60 10/13/2022 05:15 AM    LABGLOM >60 2023 06:27 AM      PT/INR:    Lab Results   Component Value Date/Time    PROTIME 11.8 10/20/2022 07:55 AM    INR 0.92 10/20/2022 07:55 AM      PTT:    Lab Results   Component Value Date/Time    APTT 40.6 10/20/2022 07:55 AM     Urine Culture: pending    Imaging:   CT ABDOMEN PELVIS WO CONTRAST Additional Contrast? None    Result Date: 1/5/2023  EXAMINATION: CT OF THE ABDOMEN AND PELVIS WITHOUT CONTRAST 1/5/2023 12:27 am TECHNIQUE: CT of the abdomen and pelvis was performed without the administration of intravenous contrast. Multiplanar reformatted images are provided for review. Automated exposure control, iterative reconstruction, and/or weight based adjustment of the mA/kV was utilized to reduce the radiation dose to as low as reasonably achievable. COMPARISON: 03/26/2022 HISTORY: ORDERING SYSTEM PROVIDED HISTORY: left sided flank pain; has h/o left sided uvj obstruction 2/2 mass, had nephrostomy tube that fell out TECHNOLOGIST PROVIDED HISTORY: Reason for exam:->left sided flank pain; has h/o left sided uvj obstruction 2/2 mass, had nephrostomy tube that fell out Additional Contrast?->None Decision Support Exception - unselect if not a suspected or confirmed emergency medical condition->Emergency Medical Condition (MA) Reason for Exam: left sided flank pain; has h/o left sided uvj obstruction 2/2 mass, had nephrostomy tube that fell out. .. FINDINGS: Lower Chest: There are opacities in the posterior right lower lobe which appear more than just atelectasis. Trace amount of pleural effusion along the margin of the right lung and major fissure. Organs: Lack of IV contrast reduces evaluation of the organs and vasculature. No new mass or surface nodularity is appreciated at the liver. The spleen is not enlarged. There is no pancreatic calcification, ductal dilatation, or focal fluid collection. The adrenal glands are unremarkable. No right renal calculus, hydronephrosis, or proximal hydroureter. Difficulty tracking the right ureter all the way to the urinary bladder.  There is mild edema of the left kidney with a tiny focus of gas in the interpolar region. There is a soft tissue tract from the left back skin surface to the kidney likely representing the tract of prior nephrostomy tube. There is distension of the left renal pelvis with thickened wall. Proximal left ureter has a small amount of fluid but not overtly dilated. GI/Bowel: The stomach, small bowel, and colon are not dilated. Diffuse constipation is seen throughout the colon down to the rectum. The colonic wall does not appear thickened. There is no pneumoperitoneum or significant ascites. Pelvis: The urinary bladder has a Doherty catheter in lumen. The bladder wall does appear diffusely thickened with a somewhat asymmetric area in the left lateral to inferolateral margin. Prostate is acceptable. Peritoneum/Retroperitoneum: There is no abdominal aortic aneurysm. There is some mildly increased soft tissue attenuation in the retroperitoneum along the distal aorta and cava near the bifurcation the iliac arteries. Also along the left iliac and psoas margin more than right side. Bones/Soft Tissues: Diffuse sclerotic areas are again noted within the pelvic bones. Progressive worsening of lytic areas and bone loss throughout the sacrum and sacroiliac joint margins. The left side is worse than the right. Replacement by soft tissue attenuating mass at the expected lytic bone and some continuation anteriorly beyond the bony margin into the presacral space and edge of the retroperitoneum. Some involvement into the edge of the left gluteus muscles and left iliacus muscle. Posterior extension along the paraspinal muscles at the lower sacrum and coccygeal junction. 1.  No left ureteral stent or nephrostomy tube at this time. A tract of previous nephrostomy tubes is noted at the posterior edge of the kidney and the inferolateral edge. There is no perinephric hematoma.  2.  Small amount of fluid in the left renal pelvis with a thickened wall but without overt dilatation of the left ureter at this time. 3.  Diffuse thickening of the urinary bladder wall but with some asymmetry along the left lateral and inferolateral margin. Doherty catheter in the lumen. 4.  Diffuse metastatic disease in the bones with extensive areas of sclerosis and mixed lucencies. There is progressive lytic lesion and bone loss throughout the sacrum and into the left hemipelvis more than the right side. Replacement with soft tissue extending beyond the bony margins into the presacral, muscular, and edge of the retroperitoneum margins. 5.  Opacities in the right lower lung appear to be more than just atelectasis and could have pneumonitis or developing infection. IR GUIDED NEPHROSTOMY CATH PLACEMENT    Result Date: 1/5/2023  PROCEDURE: IR attempted CHG NEPHROSTMY CATH PROC Limited ultrasound left kidney 1/5/2023 HISTORY: ORDERING SYSTEM PROVIDED HISTORY: nephrostomy tube came out TECHNOLOGIST PROVIDED HISTORY: nephrostomy tube came out See IP consult Reason for exam:->nephrostomy tube came out TECHNIQUE: Following informed consent, pause a confirm/time-out patient is placed in prone position on fluoroscopic table. Attempted recanalization of previously utilized left nephrostomy tube tract was unsuccessful. Sonographic evaluation of the left kidney was made in anticipation of new puncture site for placement of left nephrostomy tube. CONTRAST: None SEDATION: None FLUOROSCOPY DOSE AND TYPE OR TIME AND EXPOSURES: 1.1 minute fluoroscopy time Air kerma: 30 mGy DESCRIPTION OF PROCEDURE: Informed consent was obtained after a detailed explanation of the procedure including risks, benefits, and alternatives. Universal protocol was observed. Sterile gowns, masks, hats and gloves utilized for maximal sterile barrier. As above in technique section FINDINGS: Fluoroscopic images demonstrate attempted, unsuccessful recanalization of left nephrostomy tube tract.  Graham CARIAS sonographic evaluation of the left kidney in anticipation of left nephrostomy tube placement with new puncture demonstrates no definite hydronephrosis. New nephrostomy tube placement deferred at this time pending evaluation/confirmation of impaired left renal drainage. Unsuccessful attempted recanalization of previously established left nephrostomy tube tract. Preliminary sonographic evaluation of the left kidney made in anticipation of new nephrostomy tube placement demonstrates no definite hydronephrosis. New nephrostomy tube placement deferred at this time. Repeat renal ultrasound suggested in 1-2 days to evaluate recurrence of left hydronephrosis and need for new nephrostomy tube placement. Assessment & Plan:      Eliezer Navarro is a 47 y.o. male admitted 1/4/2023 for pneumothorax. Pt known to Dr. Alma Pace. Currently DNR-CC and in hospice [facility] 1) Metastatic Prostate Cancer: S/p bone marrow bx 4/27/22 w pathology showing metastatic prostate cancer. Casodex started 4/25/22; Supposedly on Zytiga but is non-compliant with medications.  12/22/22; 31.8 10/11/22; 256 5/19/22; 1,235 4/22/22              Follows with Dr. Francisco Parker. Was to have a PET scan as outpatient today. Oncology consulted. 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 S/p left PCN placement at [facility] 4/25/22. Last exchanged 10/20/22 with IR. The PCN fell out several days ago. CT a/p 1/5/23: No left ureteral stent or nephrostomy tube at this time. A tract of previous nephrostomy tubes is noted at the posterior edge of the kidney and the inferolateral edge. There is no perinephric hematoma. Cr 0.7  IR consulted for reinsertion of left PCN. JAN ordered to evaluate for hydronephrosis and necessity of reinsertion. 3) Urinary Retention: Managed with chronic sampson catheter, last exchanged 1/4/23 w home health, per family report. UA w large leuks, mod blood; urine cx pending.  On IV Rocephin. Continue sampson. Recommend qmonthly catheter exchanges. Will follow. Patient seen and examined, chart reviewed.      Electronically signed by Bello Esquivel PA-C on 1/6/2023 at 12:44 PM

## 2023-01-06 NOTE — CONSULTS
Hematology/Oncology Consult Note    Patient Name:  Parisa Martinez  Patient :  1970  Patient MRN:  [id number]     Primary Oncologist: Dr Magdalena Elizabeth  PCP: Handy Clement MD     Date of Service: 2023      Reason for Consult: Prostate and Lung cancer, known to Dr Steven Lenz     Chief Complaint:    Chief Complaint   Patient presents with    Other     Nephro tube out. Stage 4 kidney cancer      Principal Problem:    Nephrostomy tube displaced [facility])  Active Problems: Moderate malnutrition ([address]  Resolved Problems:    * No resolved hospital problems. *      HPI:   Parisa Martinez is a 47 y.o. male with a history of metastatic prostate cancer and metastatic lung cancer enrolled in hospice who presented with dislodged L nephrostomy tube. IR attempted to recanalize and was unable. No hydronephrosis was appreciated when attempt for new entry point and IR aborted procedure recommending repeat US in 1-2 days. Oncology is consulted to comment on his cancer diagnosis and plan. Pt has been enrolled in hospice but at recent visit at the end of 2022 wished to revoke hospice and receive treatment again. New baseline PET was ordered to establish overall disease status before returning to treatment. CT A/P on 2023 noted diffuse bony metastatic disease with progression throughout pelivs and sacrum with replacement by soft tissue mass and posterior extension in paraspinal muscles. Urinary bladder is diffusely thickened with some asymmetry. No recent CT chest for comparison. On exam pt is a poor historian. He is restless and complaining of significant back and pelvic pain despite recent medication administration. He urinates via sampson with no change in urine. He denies fever/chills. Appetite has been poor with weight loss. Generalized weakness and malaise. No N/V.  ROS limited by patient condition. He continues to state he wishes to return to treatment.     Past Medical History:   Diagnosis Date    CAD (coronary artery disease) 12/23/2013    cath negative per pt    Cancer ([address] 06/2021    pt reports he has lung cancer    History of TMJ disorder     Hypertension     Trigeminal neuralgia        Past Surgical History:   Procedure Laterality Date    ABDOMEN SURGERY      CARDIAC CATHETERIZATION  08/03/2016    CT BIOPSY PERCUTANEOUS SUPERFICIAL BONE  12/17/2021    CT BIOPSY PERCUTANEOUS SUPERFICIAL BONE 12/17/2021 [facility] CT SCAN    CT NEEDLE BIOPSY LUNG PERCUTANEOUS  7/28/2021    CT NEEDLE BIOPSY LUNG PERCUTANEOUS 7/28/2021 [facility] CT SCAN    IR NEPHROSTOMY CATHETER PLACEMENT  1/5/2023    IR NEPHROSTOMY CATHETER PLACEMENT 1/5/2023 [facility] SPECIAL PROCEDURES    PORT SURGERY Right 8/13/2021    MEDIPORT INSERTION performed by Chet Curiel MD at [facility] OR                                                                                Social History     Socioeconomic History    Marital status:      Spouse name: Not on file    Number of children: 5    Years of education: Not on file    Highest education level: Not on file   Occupational History    Not on file   Tobacco Use    Smoking status: Every Day     Packs/day: 2.00     Years: 39.00     Pack years: 78.00     Types: Cigarettes    Smokeless tobacco: Never    Tobacco comments:     smokes 1-2 a day   Vaping Use    Vaping Use: Never used   Substance and Sexual Activity    Alcohol use: Not Currently     Alcohol/week: 6.0 standard drinks     Types: 6 Cans of beer per week     Comment: per week (24 oz beers)     Drug use: Yes     Types: Marijuana Elza Manan)     Comment: last 12/1    Sexual activity: Yes     Partners: Female   Other Topics Concern    Not on file   Social History Narrative    Not on file     Social Determinants of Health     Financial Resource Strain: Not on file   Food Insecurity: Not on file   Transportation Needs: Not on file   Physical Activity: Not on file   Stress: Not on file   Social Connections: Not on file   Intimate Partner Violence: Not on file   Housing Stability: Not on file                                                                                    Family History   Problem Relation Age of Onset    High Blood Pressure Mother     High Blood Pressure Sister     Cancer Sister                                                                                                Allergies   Allergen Reactions    Tramadol Other (See Comments)     seizures    Morphine Hallucinations    Vicodin [Hydrocodone-Acetaminophen] Hives       No current facility-administered medications on file prior to encounter. Current Outpatient Medications on File Prior to Encounter   Medication Sig Dispense Refill    oxyCODONE (OXYCONTIN) 20 MG extended release tablet Take 20 mg by mouth in the morning and 20 mg in the evening. oxyCODONE (OXYCONTIN) 10 MG extended release tablet Take 10 mg by mouth in the morning and 10 mg in the evening. predniSONE (DELTASONE) 20 MG tablet Take 20 mg by mouth daily      cyclobenzaprine (FLEXERIL) 10 MG tablet Take 10 mg by mouth 3 times daily as needed for Muscle spasms      ALPRAZolam (XANAX) 0.5 MG tablet Take 0.5 mg by mouth 3 times daily as needed for Sleep or Anxiety (Twice Daily PRN). naloxone 4 MG/0.1ML LIQD nasal spray 1 spray by Nasal route as needed for Opioid Reversal 1 each 5    Calcium Carbonate-Vitamin D (OYSTER SHELL CALCIUM/D) 500-200 MG-UNIT TABS Take 1 tablet by mouth daily 30 tablet 3    cyanocobalamin ([employer] VITAMIN B12) 1000 MCG tablet Take 1 tablet by mouth daily 30 tablet 3    ondansetron (ZOFRAN) 8 MG tablet take 1 tablet by mouth every 8 hours if needed for nausea and vomiting      Sennosides (SENNA) 8.6 MG CAPS Take 1 capsule by mouth 2 times daily as needed (constipation) 20 capsule 0        Review of Systems:  Review of Systems   Constitutional:  Positive for appetite change, fatigue and unexpected weight change. Negative for chills and fever. HENT:   Negative for mouth sores, nosebleeds and trouble swallowing. Eyes:  Negative for eye problems and icterus. Respiratory:  Negative for cough and shortness of breath. Cardiovascular:  Positive for leg swelling. Negative for chest pain and palpitations. Gastrointestinal:  Positive for abdominal distention and abdominal pain. Negative for constipation, diarrhea, nausea and vomiting. Genitourinary:  Positive for pelvic pain. Negative for hematuria. Musculoskeletal:  Positive for arthralgias, back pain, flank pain and myalgias. Negative for gait problem. Skin:  Negative for rash and wound. Neurological:  Negative for extremity weakness, gait problem and headaches. Hematological:  Negative for adenopathy. Bruises/bleeds easily. Psychiatric/Behavioral:  Positive for confusion and sleep disturbance. Vital Signs: BP (!) 132/99   Pulse (!) 114   Temp 98.6 °F (37 °C) (Oral)   Resp 18   Ht 6' 1\" (1.854 m)   SpO2 97%   BMI 25.46 kg/m²      Physical Exam:  CONSTITUTIONAL: awake, alert, restless, appears in pain, chronically ill appearing  EYES: EOM grossly intact, +conjunctival pallor, no scleral icterus  ENT: Normocephalic, without obvious abnormality, atraumatic  NECK: supple, symmetrical, no jugular venous distension  HEMATOLOGIC/LYMPHATIC: no cervical, supraclavicular or axillary lymphadenopathy   LUNGS: CTA bilaterally, no wheezes/rhonchi/rales, unlabored on RA   CARDIOVASCULAR: tachycardic with regular rhythm, normal S1 and S2, no murmur noted  ABDOMEN: +distended but soft, +L CVA tenderness, +lower abdomen TTP, NABS  : sampson draining clear yellow urine  MUSCULOSKELETAL: +tenderness along lower spine and paraspinal muscles, full range of motion noted, tone is normal  NEUROLOGIC: awake, alert, oriented to self, place, and year. Speech anxious. Very poor insight. Motor skills grossly intact. SKIN: warm and dry, no jaundice, no bruising or petechiae.   EXTREMITIES: +1 BLE peripheral edema, no clubbing or cyanosis     Labs:    Lab Results Component Value Date    WBC 10.1 01/06/2023    HGB 9.4 (L) 01/06/2023    HCT 29.1 (L) 01/06/2023    MCV 75.6 (L) 01/06/2023     01/06/2023    LYMPHOPCT 25.0 01/06/2023    RBC 3.85 (L) 01/06/2023    MCH 24.4 (L) 01/06/2023    MCHC 32.3 01/06/2023    RDW 17.6 (H) 01/06/2023           Lab Results   Component Value Date    INR 0.92 10/20/2022    PROTIME 11.8 10/20/2022     Lab Results   Component Value Date     01/06/2023    K 3.9 01/06/2023     01/06/2023    CO2 26 01/06/2023    BUN 11 01/06/2023    CREATININE 0.5 (L) 01/06/2023    GLUCOSE 123 (H) 01/06/2023    CALCIUM 11.5 (H) 01/06/2023    LABALBU 3.1 (L) 01/06/2023    BILITOT 0.2 01/06/2023    ALKPHOS 193 (H) 01/06/2023    AST 16 01/06/2023    ALT 10 01/06/2023    LABGLOM >60 01/06/2023    GFRAA >60 10/13/2022    MG 1.8 04/10/2022    POCCA 1.18 04/01/2022    POCGLU 102 (H) 10/13/2022     Lab Results   Component Value Date    ALKPHOS 193 (H) 01/06/2023    AST 16 01/06/2023     No results found for: URICACID  Lab Results   Component Value Date     (H) 03/22/2022     Lab Results   Component Value Date    IRON 39 (L) 12/22/2022    TIBC 174 (L) 12/22/2022    FERRITIN 488 (H) 12/22/2022       Imaging:  IR GUIDED NEPHROSTOMY CATH PLACEMENT   Final Result   Unsuccessful attempted recanalization of previously established left   nephrostomy tube tract. Preliminary sonographic evaluation of the left   kidney made in anticipation of new nephrostomy tube placement demonstrates no   definite hydronephrosis. New nephrostomy tube placement deferred at this   time. Repeat renal ultrasound suggested in 1-2 days to evaluate recurrence   of left hydronephrosis and need for new nephrostomy tube placement. CT ABDOMEN PELVIS WO CONTRAST Additional Contrast? None   Final Result   1. No left ureteral stent or nephrostomy tube at this time.   A tract of   previous nephrostomy tubes is noted at the posterior edge of the kidney and   the inferolateral edge.  There is no perinephric hematoma. 2.  Small amount of fluid in the left renal pelvis with a thickened wall but   without overt dilatation of the left ureter at this time. 3.  Diffuse thickening of the urinary bladder wall but with some asymmetry   along the left lateral and inferolateral margin. Doherty catheter in the lumen. 4.  Diffuse metastatic disease in the bones with extensive areas of sclerosis   and mixed lucencies. There is progressive lytic lesion and bone loss   throughout the sacrum and into the left hemipelvis more than the right side. Replacement with soft tissue extending beyond the bony margins into the   presacral, muscular, and edge of the retroperitoneum margins. 5.  Opacities in the right lower lung appear to be more than just atelectasis   and could have pneumonitis or developing infection. CT ABDOMEN PELVIS WO CONTRAST Additional Contrast? None    (Results Pending)         Assessment/Plan:    Metastatic Prostate Cancer  Metastatic Squamous Cell Lung Cancer  -SCC with lung primary biopsy proven metastatic to bone as well as Prostate Ca with biopsy proven bone marrow involvement  -did have positive response to therapy (carbo/pem/pem) initially however ongoing pattern of extreme non-compliance. Unclear for what period of time he took Zytiga however PSA did decline. Ultimately enrolled in hospice in June 2022.   -pt recently requested to restart treatment and leave hospice. -CT A/P 1/5/2022 did demonstrate progression of bony disease. -PET/CT pending to establish current level of disease. If goals remain to revoke hospice would recommend to reschedule PET prior to clinic visit. No new inpatient recommendations at this time. L Hydronephrosis History  -secondary to UVJ mass, pt had nephrostomy placed at [facility] in April 2022. -nephrostomy dislodged several days prior to presentation, IR unable to recanalize.   Declined to reinsert at new point as hydronephrosis not present at that time. -repeat imaging to eval for recurrent hydronephrosis pending, appreciate urology following. Anemia  -multifactorial with chronic disease and known bone marrow infiltration   -stable at baseline 9.5-10.5    Assessment and plan of care was developed in coordination with attending Dr. Kala Rivera PA-C    Thank you for allowing me to participate in the care of this very pleasant patient. Labs, rationale, and plan of care all discussed in depth with patient and questions and concerns addressed. Pt seen and examined  Reviewed images  Not sure what he wants at this point  Will follow him in the office if he wishes to proceed with systemic treatment  Recommend adequate analgesic and bowel regimen    I have independently evaluated and examined this patient today. I have reviewed radiologic and biochemical tests on this patient. Management Plan is developed mutually Lucio Bennett PA-C.  I have reviewed above note and agree with assessment and plan  TOMI
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
## 2023-01-06 NOTE — PROGRESS NOTES
H&P note from today for further details. Patient presented with dislodgment of left nephrostomy tube. IR was consulted-unable to reinsert the nephrostomy tube. Recommended to repeat ultrasound in a.m. to assess for hydronephrosis. Modified pain regimen after reviewing PDMP. As needed Dilaudid added to the regimen.

## 2023-01-06 NOTE — DISCHARGE SUMMARY
V2.0  Discharge Summary    Name:  Eliezer Navarro /Age/Sex: 1970 (47 y.o. male)   Admit Date: 2023  Discharge Date: 23    MRN & CSN:  [id number] & [id number] Encounter Date and Time 23 4:27 PM EST    Attending:  Sarah Coelho MD Discharging Provider: Sarah Coelho MD       Hospital Course:     Brief HPI:  47 y.o. male with significant past medical history of CAD, metastatic squamous cell cancer, metastatic prostate cancer, left hydronephrosis s/p PCN placement 22 and HTN who presented with a dislodged nephrostomy tube which occurred several days ago. He was admitted for reinsertion of PCN with IR. Patient was active with home hospice and per patient and wife they only came to the ER to replace the nephrostomy tube. Brief Problem Based Course:     Displaced left nephrostomy tube  History of left-sided hydronephrosis  Secondary to UVJ mass, pt had nephrostomy placed at [facility] in 2022. Nephrostomy dislodged several days prior to presentation, IR unable to recanalize. Due to absence of hydronephrosis the procedure was aborted and repeat renal ultrasound was ordered in 1 to 2 days for further evaluation. During this hospital stay patient became extremely agitated and frustrated, stated he does not want the nephrostomy tube. Wife who was present at bedside agreed with what the patient said and stated they would like to go home. I discussed with urology who recommended getting a repeat CT abdomen, which was performed which did not reveal any hydronephrosis. Patient was advised to follow-up with urology in 2 weeks and get a repeat renal ultrasound. Concern for urinary tract infection, patient was getting IV Rocephin while inpatient-wife and patient agreeable to take Keflex for 3 days.     Urinary retention with chronic Doherty catheter  Continue Doherty catheter  Educated on necessary Doherty's catheter care and was advised to follow-up with urologist in the outpatient setting. Metastatic prostate cancer  Metastatic squamous cell lung cancer  Hospice patient  Active with [facility] since June 2022  At some point in time was getting treated but per chart review from oncology patient was noncompliant and he ultimately chose hospice. Apparently patient had expressed that he would like to be treated and revoked hospice in December 2022, hence a PET scan was ordered. If patient and wife wants to pursue treatment they can reschedule the PET scan by contacting the oncologist's office. Oncology was consulted and did not have any new inpatient recommendations. Upon my discussion and  discussion with patient and his wife, they want to remain active with [facility] upon discharge and are not thinking about revoking this decision. Continue home pain regimen  Bowel regimen recommended  Follow-up with home hospice recommendations    The patient expressed appropriate understanding of, and agreement with the discharge recommendations, medications, and plan. Consults this admission:  IP CONSULT TO UROLOGY  IP CONSULT TO UROLOGY  IP CONSULT TO INTERVENTIONAL RADIOLOGY  IP CONSULT TO CASE MANAGEMENT  IP CONSULT TO IV TEAM  IP CONSULT TO ONCOLOGY    Subjective: Patient was seen and evaluated at bedside earlier this morning. Wife was sleeping on the couch. Patient was alert oriented, reported severe back and hip pain. Requested RN to give Dilaudid.   No significant acute overnight events noticed    Discharge Diagnosis:   Nephrostomy tube displaced [facility])  Urinary retention with chronic Doherty catheter  Metastatic prostate cancer  Metastatic squamous cell cancer  Hospice patient  Discharge Instruction:   Follow up appointments: Primary care physician, oncologist, urologist  Primary care physician: Brendan May MD within 2 weeks  Diet: regular diet   Activity: activity as tolerated  Disposition: Discharged to:   [x]Home, []HHC, []SNF, []Acute Rehab, [x]Hospice Condition on discharge: Stable  Labs and Tests to be Followed up as an outpatient by PCP or Specialist: None    Discharge Medications:        Medication List        START taking these medications      cephALEXin 500 MG capsule  Commonly known as: KEFLEX  Take 1 capsule by mouth 2 times daily for 3 days     polyethylene glycol 17 g packet  Commonly known as: GLYCOLAX  Take 17 g by mouth daily as needed for Constipation            CHANGE how you take these medications      oxyCODONE 20 MG extended release tablet  Commonly known as: OXYCONTIN  What changed: Another medication with the same name was removed. Continue taking this medication, and follow the directions you see here.             CONTINUE taking these medications      ALPRAZolam 0.5 MG tablet  Commonly known as: XANAX     cyanocobalamin 1000 MCG tablet  Commonly known as: [employer] VITAMIN B12  Take 1 tablet by mouth daily     cyclobenzaprine 10 MG tablet  Commonly known as: FLEXERIL     fentaNYL 75 MCG/HR  Commonly known as: DURAGESIC     gabapentin 600 MG tablet  Commonly known as: NEURONTIN     naloxone 4 MG/0.1ML Liqd nasal spray  1 spray by Nasal route as needed for Opioid Reversal     ondansetron 8 MG tablet  Commonly known as: ZOFRAN     Oyster Shell Calcium/D 500-200 MG-UNIT Tabs  Take 1 tablet by mouth daily     predniSONE 20 MG tablet  Commonly known as: DELTASONE     Senna 8.6 MG Caps  Take 1 capsule by mouth 2 times daily as needed (constipation)               Where to Get Your Medications        These medications were sent to [address] [place] [address], [facility] [phone]  [address] [place] [id number]      Phone: [phone]   cephALEXin 500 MG capsule  polyethylene glycol 17 g packet        Objective Findings at Discharge:   /82   Pulse (!) 110   Temp 98.6 °F (37 °C) (Oral)   Resp 18   Ht 6' 1\" (1.854 m)   SpO2 97%   BMI 25.46 kg/m²       Physical Exam: Physical Exam  Vitals reviewed. Constitutional:       Appearance: Normal appearance. He is normal weight. HENT:      Head: Normocephalic and atraumatic. Nose: Nose normal.      Mouth/Throat:      Mouth: Mucous membranes are dry. Pharynx: Oropharynx is clear. Eyes:      General: No scleral icterus. Conjunctiva/sclera: Conjunctivae normal.   Cardiovascular:      Rate and Rhythm: Normal rate and regular rhythm. Pulses: Normal pulses. Heart sounds: Normal heart sounds. No murmur heard. Pulmonary:      Effort: Pulmonary effort is normal.      Breath sounds: Normal breath sounds. No wheezing, rhonchi or rales. Abdominal:      General: Abdomen is flat. Bowel sounds are normal. There is no distension. Palpations: Abdomen is soft. Tenderness: There is abdominal tenderness. Musculoskeletal:         General: Tenderness present. No deformity. Cervical back: Neck supple. Tenderness present. No rigidity. Right lower leg: No edema. Left lower leg: No edema. Skin:     Coloration: Skin is not jaundiced or pale. Findings: Bruising present. Neurological:      General: No focal deficit present. Mental Status: He is alert. Mental status is at baseline. Labs and Imaging   CT ABDOMEN PELVIS WO CONTRAST Additional Contrast? None    Result Date: 1/5/2023  EXAMINATION: CT OF THE ABDOMEN AND PELVIS WITHOUT CONTRAST 1/5/2023 12:27 am TECHNIQUE: CT of the abdomen and pelvis was performed without the administration of intravenous contrast. Multiplanar reformatted images are provided for review. Automated exposure control, iterative reconstruction, and/or weight based adjustment of the mA/kV was utilized to reduce the radiation dose to as low as reasonably achievable.  COMPARISON: 03/26/2022 HISTORY: ORDERING SYSTEM PROVIDED HISTORY: left sided flank pain; has h/o left sided uvj obstruction 2/2 mass, had nephrostomy tube that fell out TECHNOLOGIST PROVIDED HISTORY: Reason for exam:->left sided flank pain; has h/o left sided uvj obstruction 2/2 mass, had nephrostomy tube that fell out Additional Contrast?->None Decision Support Exception - unselect if not a suspected or confirmed emergency medical condition->Emergency Medical Condition (MA) Reason for Exam: left sided flank pain; has h/o left sided uvj obstruction 2/2 mass, had nephrostomy tube that fell out. .. FINDINGS: Lower Chest: There are opacities in the posterior right lower lobe which appear more than just atelectasis. Trace amount of pleural effusion along the margin of the right lung and major fissure. Organs: Lack of IV contrast reduces evaluation of the organs and vasculature. No new mass or surface nodularity is appreciated at the liver. The spleen is not enlarged. There is no pancreatic calcification, ductal dilatation, or focal fluid collection. The adrenal glands are unremarkable. No right renal calculus, hydronephrosis, or proximal hydroureter. Difficulty tracking the right ureter all the way to the urinary bladder. There is mild edema of the left kidney with a tiny focus of gas in the interpolar region. There is a soft tissue tract from the left back skin surface to the kidney likely representing the tract of prior nephrostomy tube. There is distension of the left renal pelvis with thickened wall. Proximal left ureter has a small amount of fluid but not overtly dilated. GI/Bowel: The stomach, small bowel, and colon are not dilated. Diffuse constipation is seen throughout the colon down to the rectum. The colonic wall does not appear thickened. There is no pneumoperitoneum or significant ascites. Pelvis: The urinary bladder has a Doherty catheter in lumen. The bladder wall does appear diffusely thickened with a somewhat asymmetric area in the left lateral to inferolateral margin. Prostate is acceptable. Peritoneum/Retroperitoneum: There is no abdominal aortic aneurysm.   There is some mildly increased soft tissue attenuation in the retroperitoneum along the distal aorta and cava near the bifurcation the iliac arteries. Also along the left iliac and psoas margin more than right side. Bones/Soft Tissues: Diffuse sclerotic areas are again noted within the pelvic bones. Progressive worsening of lytic areas and bone loss throughout the sacrum and sacroiliac joint margins. The left side is worse than the right. Replacement by soft tissue attenuating mass at the expected lytic bone and some continuation anteriorly beyond the bony margin into the presacral space and edge of the retroperitoneum. Some involvement into the edge of the left gluteus muscles and left iliacus muscle. Posterior extension along the paraspinal muscles at the lower sacrum and coccygeal junction. 1.  No left ureteral stent or nephrostomy tube at this time. A tract of previous nephrostomy tubes is noted at the posterior edge of the kidney and the inferolateral edge. There is no perinephric hematoma. 2.  Small amount of fluid in the left renal pelvis with a thickened wall but without overt dilatation of the left ureter at this time. 3.  Diffuse thickening of the urinary bladder wall but with some asymmetry along the left lateral and inferolateral margin. Doherty catheter in the lumen. 4.  Diffuse metastatic disease in the bones with extensive areas of sclerosis and mixed lucencies. There is progressive lytic lesion and bone loss throughout the sacrum and into the left hemipelvis more than the right side. Replacement with soft tissue extending beyond the bony margins into the presacral, muscular, and edge of the retroperitoneum margins. 5.  Opacities in the right lower lung appear to be more than just atelectasis and could have pneumonitis or developing infection.      IR GUIDED NEPHROSTOMY CATH PLACEMENT    Result Date: 1/5/2023  PROCEDURE: IR attempted CHG NEPHROSTMY CATH PROC Limited ultrasound left kidney 1/5/2023 HISTORY: ORDERING SYSTEM PROVIDED HISTORY: nephrostomy tube came out TECHNOLOGIST PROVIDED HISTORY: nephrostomy tube came out See IP consult Reason for exam:->nephrostomy tube came out TECHNIQUE: Following informed consent, pause a confirm/time-out patient is placed in prone position on fluoroscopic table. Attempted recanalization of previously utilized left nephrostomy tube tract was unsuccessful. Sonographic evaluation of the left kidney was made in anticipation of new puncture site for placement of left nephrostomy tube. CONTRAST: None SEDATION: None FLUOROSCOPY DOSE AND TYPE OR TIME AND EXPOSURES: 1.1 minute fluoroscopy time Air kerma: 30 mGy DESCRIPTION OF PROCEDURE: Informed consent was obtained after a detailed explanation of the procedure including risks, benefits, and alternatives. Universal protocol was observed. Sterile gowns, masks, hats and gloves utilized for maximal sterile barrier. As above in technique section FINDINGS: Fluoroscopic images demonstrate attempted, unsuccessful recanalization of left nephrostomy tube tract. Lunar E sonographic evaluation of the left kidney in anticipation of left nephrostomy tube placement with new puncture demonstrates no definite hydronephrosis. New nephrostomy tube placement deferred at this time pending evaluation/confirmation of impaired left renal drainage. Unsuccessful attempted recanalization of previously established left nephrostomy tube tract. Preliminary sonographic evaluation of the left kidney made in anticipation of new nephrostomy tube placement demonstrates no definite hydronephrosis. New nephrostomy tube placement deferred at this time. Repeat renal ultrasound suggested in 1-2 days to evaluate recurrence of left hydronephrosis and need for new nephrostomy tube placement.         CBC:   Recent Labs     01/04/23  2330 01/05/23  0924 01/06/23  0627   WBC 12.5* 12.0* 10.1   HGB 9.7* 10.6* 9.4*    464* 360     BMP:    Recent Labs     01/04/23  2330 01/06/23 0627   * 137   K 4.1 3.9   CL 97* 100   CO2 28 26   BUN 14 11   CREATININE 0.7* 0.5*   GLUCOSE 128* 123*     Hepatic:   Recent Labs     01/04/23  2330 01/06/23 0627   AST 15 16   ALT 8* 10   BILITOT 0.3 0.2   ALKPHOS 176* 193*     Lipids:   Lab Results   Component Value Date/Time    CHOL 124 12/20/2013 05:22 AM    HDL 53 12/20/2013 05:22 AM    TRIG 52 12/20/2013 05:22 AM     Hemoglobin A1C: No results found for: LABA1C  TSH: No results found for: TSH  Troponin:   Lab Results   Component Value Date/Time    TROPONINT <0.010 08/09/2022 04:53 AM    TROPONINT <0.010 08/09/2022 04:53 AM    TROPONINT <0.010 07/13/2022 03:05 PM     Lactic Acid: No results for input(s): LACTA in the last 72 hours. BNP: No results for input(s): PROBNP in the last 72 hours.   UA:  Lab Results   Component Value Date/Time    NITRU NEGATIVE 01/04/2023 11:53 PM    COLORU YELLOW 01/04/2023 11:53 PM    WBCUA 1005 01/04/2023 11:53 PM    RBCUA 18 01/04/2023 11:53 PM    MUCUS RARE 01/04/2023 11:53 PM    TRICHOMONAS NONE SEEN 01/04/2023 11:53 PM    BACTERIA NEGATIVE 01/04/2023 11:53 PM    CLARITYU CLEAR 01/04/2023 11:53 PM    SPECGRAV 1.015 01/04/2023 11:53 PM    LEUKOCYTESUR LARGE NUMBER OR AMOUNT OBSERVED 01/04/2023 11:53 PM    UROBILINOGEN 0.2 01/04/2023 11:53 PM    BILIRUBINUR NEGATIVE 01/04/2023 11:53 PM    BLOODU MODERATE NUMBER OR AMOUNT OBSERVED 01/04/2023 11:53 PM    [place] NEGATIVE 01/04/2023 11:53 PM    AMORPHOUS RARE 07/21/2021 05:24 AM     Urine Cultures: No results found for: LABURIN  Blood Cultures: No results found for: BC  No results found for: BLOODCULT2  Organism: No results found for: ORG    Time Spent Discharging patient 35 minutes    Electronically signed by Waneta Bernheim, MD on 1/6/2023 at 4:27 PM

## 2023-01-07 LAB
CULTURE: NORMAL
Lab: NORMAL
SPECIMEN: NORMAL

## 2023-01-09 LAB
CULTURE: NORMAL
CULTURE: NORMAL
Lab: NORMAL
Lab: NORMAL
SPECIMEN: NORMAL
SPECIMEN: NORMAL

## 2023-01-10 ENCOUNTER — TELEPHONE (OUTPATIENT)
Dept: ONCOLOGY | Age: 53
End: 2023-01-10

## 2023-01-10 LAB
CULTURE: NORMAL
CULTURE: NORMAL
Lab: NORMAL
Lab: NORMAL
SPECIMEN: NORMAL
SPECIMEN: NORMAL

## 2023-01-10 NOTE — TELEPHONE ENCOUNTER
Daughter Alejandro Dalton called given rescheduled PET scan time and prep to be done at [facility] on 1/13 arrival at 200 PM.

## 2023-01-12 ENCOUNTER — CLINICAL DOCUMENTATION (OUTPATIENT)
Dept: RADIATION ONCOLOGY | Age: 53
End: 2023-01-12

## 2023-01-12 NOTE — CARE COORDINATION
LSW was phoned by Pt's spouse regarding transportation for Pt to a PET scan scheduled for tomorrow. Pt reportedly has limited mobility and is unable to sit on his own. The PET scan was ordered by Pt's oncologist but Pt is active with [facility]. Since Pt is on Hospice, LSW recommended Spouse contact them to see about available transportation options.

## 2023-01-13 ENCOUNTER — HOSPITAL ENCOUNTER (OUTPATIENT)
Dept: PET IMAGING | Age: 53
Discharge: HOME | End: 2023-01-13
Payer: MEDICAID

## 2023-01-13 DIAGNOSIS — C79.51 SECONDARY MALIGNANT NEOPLASM OF BONE (HCC): ICD-10-CM

## 2023-01-13 DIAGNOSIS — M89.8X9 BONE PAIN: ICD-10-CM

## 2023-01-13 DIAGNOSIS — C34.82 MALIGNANT NEOPLASM OF OVERLAPPING SITES OF LEFT LUNG (HCC): ICD-10-CM

## 2023-01-13 DIAGNOSIS — C61 PROSTATE CANCER (HCC): ICD-10-CM

## 2023-01-13 DIAGNOSIS — D64.9 ANEMIA, UNSPECIFIED TYPE: ICD-10-CM

## 2023-01-13 PROCEDURE — 2580000003 HC RX 258: Performed by: INTERNAL MEDICINE

## 2023-01-13 PROCEDURE — 3430000000 HC RX DIAGNOSTIC RADIOPHARMACEUTICAL: Performed by: INTERNAL MEDICINE

## 2023-01-13 PROCEDURE — 78815 PET IMAGE W/CT SKULL-THIGH: CPT

## 2023-01-13 PROCEDURE — A9552 F18 FDG: HCPCS | Performed by: INTERNAL MEDICINE

## 2023-01-13 RX ORDER — SODIUM CHLORIDE 0.9 % (FLUSH) 0.9 %
10 SYRINGE (ML) INJECTION PRN
Status: COMPLETED | OUTPATIENT
Start: 2023-01-13 | End: 2023-01-13

## 2023-01-13 RX ORDER — FLUDEOXYGLUCOSE F 18 200 MCI/ML
10.85 INJECTION, SOLUTION INTRAVENOUS
Status: COMPLETED | OUTPATIENT
Start: 2023-01-13 | End: 2023-01-13

## 2023-01-13 RX ADMIN — SODIUM CHLORIDE, PRESERVATIVE FREE 10 ML: 5 INJECTION INTRAVENOUS at 13:25

## 2023-01-13 RX ADMIN — FLUDEOXYGLUCOSE F 18 10.85 MILLICURIE: 200 INJECTION, SOLUTION INTRAVENOUS at 13:25

## 2023-01-16 ENCOUNTER — HOSPITAL ENCOUNTER (INPATIENT)
Age: 53
LOS: 4 days | Discharge: HOME | DRG: 466 | End: 2023-01-20
Attending: EMERGENCY MEDICINE | Admitting: INTERNAL MEDICINE
Payer: MEDICAID

## 2023-01-16 DIAGNOSIS — T83.511A URINARY TRACT INFECTION ASSOCIATED WITH INDWELLING URETHRAL CATHETER, INITIAL ENCOUNTER (HCC): Primary | ICD-10-CM

## 2023-01-16 DIAGNOSIS — C79.51 SECONDARY MALIGNANT NEOPLASM OF BONE (HCC): ICD-10-CM

## 2023-01-16 DIAGNOSIS — N39.0 URINARY TRACT INFECTION ASSOCIATED WITH INDWELLING URETHRAL CATHETER, INITIAL ENCOUNTER (HCC): Primary | ICD-10-CM

## 2023-01-16 DIAGNOSIS — C61 PROSTATE CANCER (HCC): ICD-10-CM

## 2023-01-16 DIAGNOSIS — G89.29 CHRONIC INTRACTABLE PAIN: ICD-10-CM

## 2023-01-16 LAB
ALBUMIN SERPL-MCNC: 3.7 GM/DL (ref 3.4–5)
ALP BLD-CCNC: 205 IU/L (ref 40–129)
ALT SERPL-CCNC: 9 U/L (ref 10–40)
ANION GAP SERPL CALCULATED.3IONS-SCNC: 11 MMOL/L (ref 4–16)
AST SERPL-CCNC: 18 IU/L (ref 15–37)
BACTERIA: NEGATIVE /HPF
BASOPHILS ABSOLUTE: 0.1 K/CU MM
BASOPHILS RELATIVE PERCENT: 0.4 % (ref 0–1)
BILIRUB SERPL-MCNC: 0.2 MG/DL (ref 0–1)
BILIRUBIN URINE: NEGATIVE MG/DL
BLOOD, URINE: ABNORMAL
BUN BLDV-MCNC: 10 MG/DL (ref 6–23)
CALCIUM SERPL-MCNC: 13.1 MG/DL (ref 8.3–10.6)
CHLORIDE BLD-SCNC: 95 MMOL/L (ref 99–110)
CLARITY: ABNORMAL
CO2: 28 MMOL/L (ref 21–32)
COLOR: YELLOW
CREAT SERPL-MCNC: 0.7 MG/DL (ref 0.9–1.3)
DIFFERENTIAL TYPE: ABNORMAL
EOSINOPHILS ABSOLUTE: 0.1 K/CU MM
EOSINOPHILS RELATIVE PERCENT: 0.8 % (ref 0–3)
GFR SERPL CREATININE-BSD FRML MDRD: >60 ML/MIN/1.73M2
GLUCOSE BLD-MCNC: 110 MG/DL (ref 70–99)
GLUCOSE, URINE: NEGATIVE MG/DL
HCT VFR BLD CALC: 35.4 % (ref 42–52)
HEMOGLOBIN: 11.4 GM/DL (ref 13.5–18)
IMMATURE NEUTROPHIL %: 0.4 % (ref 0–0.43)
KETONES, URINE: NEGATIVE MG/DL
LEUKOCYTE ESTERASE, URINE: ABNORMAL
LIPASE: 21 IU/L (ref 13–60)
LYMPHOCYTES ABSOLUTE: 1.9 K/CU MM
LYMPHOCYTES RELATIVE PERCENT: 15.4 % (ref 24–44)
MCH RBC QN AUTO: 24.2 PG (ref 27–31)
MCHC RBC AUTO-ENTMCNC: 32.2 % (ref 32–36)
MCV RBC AUTO: 75 FL (ref 78–100)
MONOCYTES ABSOLUTE: 0.8 K/CU MM
MONOCYTES RELATIVE PERCENT: 6.1 % (ref 0–4)
NITRITE URINE, QUANTITATIVE: NEGATIVE
NUCLEATED RBC %: 0 %
PDW BLD-RTO: 17.4 % (ref 11.7–14.9)
PH, URINE: 6 (ref 5–8)
PLATELET # BLD: 547 K/CU MM (ref 140–440)
PMV BLD AUTO: 8.9 FL (ref 7.5–11.1)
POTASSIUM SERPL-SCNC: 4.9 MMOL/L (ref 3.5–5.1)
PROTEIN UA: 30 MG/DL
RBC # BLD: 4.72 M/CU MM (ref 4.6–6.2)
RBC URINE: 23 /HPF (ref 0–3)
SEGMENTED NEUTROPHILS ABSOLUTE COUNT: 9.6 K/CU MM
SEGMENTED NEUTROPHILS RELATIVE PERCENT: 76.9 % (ref 36–66)
SODIUM BLD-SCNC: 134 MMOL/L (ref 135–145)
SPECIFIC GRAVITY UA: 1.02 (ref 1–1.03)
TOTAL IMMATURE NEUTOROPHIL: 0.05 K/CU MM
TOTAL NUCLEATED RBC: 0 K/CU MM
TOTAL PROTEIN: 8.8 GM/DL (ref 6.4–8.2)
TRICHOMONAS: ABNORMAL /HPF
UROBILINOGEN, URINE: NORMAL MG/DL (ref 0.2–1)
WBC # BLD: 12.4 K/CU MM (ref 4–10.5)
WBC CLUMP: ABNORMAL /HPF
WBC UA: 1900 /HPF (ref 0–2)

## 2023-01-16 PROCEDURE — 6360000002 HC RX W HCPCS: Performed by: EMERGENCY MEDICINE

## 2023-01-16 PROCEDURE — 87181 SC STD AGAR DILUTION PER AGT: CPT

## 2023-01-16 PROCEDURE — 96372 THER/PROPH/DIAG INJ SC/IM: CPT

## 2023-01-16 PROCEDURE — 6370000000 HC RX 637 (ALT 250 FOR IP): Performed by: EMERGENCY MEDICINE

## 2023-01-16 PROCEDURE — 83690 ASSAY OF LIPASE: CPT

## 2023-01-16 PROCEDURE — 87086 URINE CULTURE/COLONY COUNT: CPT

## 2023-01-16 PROCEDURE — 85025 COMPLETE CBC W/AUTO DIFF WBC: CPT

## 2023-01-16 PROCEDURE — 1200000000 HC SEMI PRIVATE

## 2023-01-16 PROCEDURE — 81003 URINALYSIS AUTO W/O SCOPE: CPT

## 2023-01-16 PROCEDURE — 87077 CULTURE AEROBIC IDENTIFY: CPT

## 2023-01-16 PROCEDURE — 87186 SC STD MICRODIL/AGAR DIL: CPT

## 2023-01-16 PROCEDURE — 99285 EMERGENCY DEPT VISIT HI MDM: CPT

## 2023-01-16 PROCEDURE — 80053 COMPREHEN METABOLIC PANEL: CPT

## 2023-01-16 RX ORDER — TAMSULOSIN HYDROCHLORIDE 0.4 MG/1
0.4 CAPSULE ORAL DAILY
COMMUNITY

## 2023-01-16 RX ORDER — OXYCODONE AND ACETAMINOPHEN 10; 325 MG/1; MG/1
2 TABLET ORAL EVERY 6 HOURS PRN
COMMUNITY

## 2023-01-16 RX ORDER — DULOXETIN HYDROCHLORIDE 60 MG/1
60 CAPSULE, DELAYED RELEASE ORAL DAILY
COMMUNITY

## 2023-01-16 RX ORDER — ONDANSETRON 4 MG/1
4 TABLET, ORALLY DISINTEGRATING ORAL ONCE
Status: COMPLETED | OUTPATIENT
Start: 2023-01-16 | End: 2023-01-17

## 2023-01-16 RX ORDER — LIDOCAINE AND PRILOCAINE 25; 25 MG/G; MG/G
CREAM TOPICAL ONCE
Status: COMPLETED | OUTPATIENT
Start: 2023-01-16 | End: 2023-01-16

## 2023-01-16 RX ORDER — KETOROLAC TROMETHAMINE 30 MG/ML
30 INJECTION, SOLUTION INTRAMUSCULAR; INTRAVENOUS ONCE
Status: COMPLETED | OUTPATIENT
Start: 2023-01-17 | End: 2023-01-17

## 2023-01-16 RX ORDER — 0.9 % SODIUM CHLORIDE 0.9 %
1000 INTRAVENOUS SOLUTION INTRAVENOUS ONCE
Status: DISCONTINUED | OUTPATIENT
Start: 2023-01-16 | End: 2023-01-20 | Stop reason: HOSPADM

## 2023-01-16 RX ORDER — 0.9 % SODIUM CHLORIDE 0.9 %
1000 INTRAVENOUS SOLUTION INTRAVENOUS ONCE
Status: COMPLETED | OUTPATIENT
Start: 2023-01-16 | End: 2023-01-17

## 2023-01-16 RX ORDER — ONDANSETRON 2 MG/ML
4 INJECTION INTRAMUSCULAR; INTRAVENOUS EVERY 6 HOURS PRN
Status: DISCONTINUED | OUTPATIENT
Start: 2023-01-16 | End: 2023-01-16

## 2023-01-16 RX ADMIN — LIDOCAINE AND PRILOCAINE: 25; 25 CREAM TOPICAL at 21:09

## 2023-01-16 RX ADMIN — GLYCERIN 2 G: 2 SUPPOSITORY RECTAL at 22:05

## 2023-01-16 RX ADMIN — HYDROMORPHONE HYDROCHLORIDE 1 MG: 1 INJECTION, SOLUTION INTRAMUSCULAR; INTRAVENOUS; SUBCUTANEOUS at 22:01

## 2023-01-16 ASSESSMENT — PAIN SCALES - GENERAL: PAINLEVEL_OUTOF10: 10

## 2023-01-16 ASSESSMENT — PAIN DESCRIPTION - DESCRIPTORS: DESCRIPTORS: THROBBING

## 2023-01-16 ASSESSMENT — PAIN DESCRIPTION - LOCATION: LOCATION: COCCYX

## 2023-01-17 ENCOUNTER — CLINICAL DOCUMENTATION (OUTPATIENT)
Dept: ONCOLOGY | Age: 53
End: 2023-01-17

## 2023-01-17 PROBLEM — T83.511A URINARY TRACT INFECTION ASSOCIATED WITH INDWELLING URETHRAL CATHETER (HCC): Status: ACTIVE | Noted: 2023-01-16

## 2023-01-17 LAB
ANION GAP SERPL CALCULATED.3IONS-SCNC: 8 MMOL/L (ref 4–16)
BASOPHILS ABSOLUTE: 0 K/CU MM
BASOPHILS RELATIVE PERCENT: 0.3 % (ref 0–1)
BUN BLDV-MCNC: 11 MG/DL (ref 6–23)
CALCIUM SERPL-MCNC: 11.8 MG/DL (ref 8.3–10.6)
CHLORIDE BLD-SCNC: 99 MMOL/L (ref 99–110)
CO2: 25 MMOL/L (ref 21–32)
CREAT SERPL-MCNC: 0.6 MG/DL (ref 0.9–1.3)
DIFFERENTIAL TYPE: ABNORMAL
EOSINOPHILS ABSOLUTE: 0.1 K/CU MM
EOSINOPHILS RELATIVE PERCENT: 0.8 % (ref 0–3)
GFR SERPL CREATININE-BSD FRML MDRD: >60 ML/MIN/1.73M2
GLUCOSE BLD-MCNC: 118 MG/DL (ref 70–99)
HCT VFR BLD CALC: 26.3 % (ref 42–52)
HEMOGLOBIN: 8.3 GM/DL (ref 13.5–18)
IMMATURE NEUTROPHIL %: 0.3 % (ref 0–0.43)
LYMPHOCYTES ABSOLUTE: 1.6 K/CU MM
LYMPHOCYTES RELATIVE PERCENT: 16.6 % (ref 24–44)
MCH RBC QN AUTO: 24.1 PG (ref 27–31)
MCHC RBC AUTO-ENTMCNC: 31.6 % (ref 32–36)
MCV RBC AUTO: 76.5 FL (ref 78–100)
MONOCYTES ABSOLUTE: 0.8 K/CU MM
MONOCYTES RELATIVE PERCENT: 8 % (ref 0–4)
NUCLEATED RBC %: 0 %
PDW BLD-RTO: 17.3 % (ref 11.7–14.9)
PLATELET # BLD: 413 K/CU MM (ref 140–440)
PMV BLD AUTO: 8.8 FL (ref 7.5–11.1)
POTASSIUM SERPL-SCNC: 4.1 MMOL/L (ref 3.5–5.1)
RBC # BLD: 3.44 M/CU MM (ref 4.6–6.2)
SEGMENTED NEUTROPHILS ABSOLUTE COUNT: 7.2 K/CU MM
SEGMENTED NEUTROPHILS RELATIVE PERCENT: 74 % (ref 36–66)
SODIUM BLD-SCNC: 132 MMOL/L (ref 135–145)
TOTAL IMMATURE NEUTOROPHIL: 0.03 K/CU MM
TOTAL NUCLEATED RBC: 0 K/CU MM
WBC # BLD: 9.7 K/CU MM (ref 4–10.5)

## 2023-01-17 PROCEDURE — 85025 COMPLETE CBC W/AUTO DIFF WBC: CPT

## 2023-01-17 PROCEDURE — 6360000002 HC RX W HCPCS: Performed by: EMERGENCY MEDICINE

## 2023-01-17 PROCEDURE — 99222 1ST HOSP IP/OBS MODERATE 55: CPT | Performed by: INTERNAL MEDICINE

## 2023-01-17 PROCEDURE — 2580000003 HC RX 258: Performed by: INTERNAL MEDICINE

## 2023-01-17 PROCEDURE — 6360000002 HC RX W HCPCS: Performed by: INTERNAL MEDICINE

## 2023-01-17 PROCEDURE — 1200000000 HC SEMI PRIVATE

## 2023-01-17 PROCEDURE — 6370000000 HC RX 637 (ALT 250 FOR IP): Performed by: EMERGENCY MEDICINE

## 2023-01-17 PROCEDURE — 6370000000 HC RX 637 (ALT 250 FOR IP): Performed by: INTERNAL MEDICINE

## 2023-01-17 PROCEDURE — 80048 BASIC METABOLIC PNL TOTAL CA: CPT

## 2023-01-17 PROCEDURE — 2580000003 HC RX 258: Performed by: EMERGENCY MEDICINE

## 2023-01-17 PROCEDURE — 6360000002 HC RX W HCPCS: Performed by: NURSE PRACTITIONER

## 2023-01-17 RX ORDER — POLYETHYLENE GLYCOL 3350 17 G/17G
17 POWDER, FOR SOLUTION ORAL DAILY PRN
Status: DISCONTINUED | OUTPATIENT
Start: 2023-01-17 | End: 2023-01-20 | Stop reason: HOSPADM

## 2023-01-17 RX ORDER — SODIUM CHLORIDE 9 MG/ML
INJECTION, SOLUTION INTRAVENOUS CONTINUOUS
Status: DISCONTINUED | OUTPATIENT
Start: 2023-01-17 | End: 2023-01-20 | Stop reason: HOSPADM

## 2023-01-17 RX ORDER — GABAPENTIN 300 MG/1
600 CAPSULE ORAL 3 TIMES DAILY
Status: DISCONTINUED | OUTPATIENT
Start: 2023-01-17 | End: 2023-01-20 | Stop reason: HOSPADM

## 2023-01-17 RX ORDER — ONDANSETRON 2 MG/ML
4 INJECTION INTRAMUSCULAR; INTRAVENOUS EVERY 6 HOURS PRN
Status: DISCONTINUED | OUTPATIENT
Start: 2023-01-17 | End: 2023-01-20 | Stop reason: HOSPADM

## 2023-01-17 RX ORDER — NALOXONE HYDROCHLORIDE 1 MG/ML
4 INJECTION INTRAMUSCULAR; INTRAVENOUS; SUBCUTANEOUS PRN
Status: DISCONTINUED | OUTPATIENT
Start: 2023-01-17 | End: 2023-01-20 | Stop reason: HOSPADM

## 2023-01-17 RX ORDER — OXYCODONE HYDROCHLORIDE 10 MG/1
20 TABLET ORAL EVERY 6 HOURS PRN
Status: DISCONTINUED | OUTPATIENT
Start: 2023-01-17 | End: 2023-01-19

## 2023-01-17 RX ORDER — ACETAMINOPHEN 325 MG/1
650 TABLET ORAL EVERY 6 HOURS PRN
Status: DISCONTINUED | OUTPATIENT
Start: 2023-01-17 | End: 2023-01-19

## 2023-01-17 RX ORDER — ALPRAZOLAM 0.5 MG/1
1 TABLET ORAL 3 TIMES DAILY
Status: DISCONTINUED | OUTPATIENT
Start: 2023-01-17 | End: 2023-01-17

## 2023-01-17 RX ORDER — FENTANYL 75 UG/H
1 PATCH TRANSDERMAL
Status: DISCONTINUED | OUTPATIENT
Start: 2023-01-17 | End: 2023-01-20 | Stop reason: HOSPADM

## 2023-01-17 RX ORDER — ONDANSETRON 4 MG/1
4 TABLET, ORALLY DISINTEGRATING ORAL EVERY 8 HOURS PRN
Status: DISCONTINUED | OUTPATIENT
Start: 2023-01-17 | End: 2023-01-20 | Stop reason: HOSPADM

## 2023-01-17 RX ORDER — DULOXETIN HYDROCHLORIDE 30 MG/1
60 CAPSULE, DELAYED RELEASE ORAL DAILY
Status: DISCONTINUED | OUTPATIENT
Start: 2023-01-17 | End: 2023-01-20 | Stop reason: HOSPADM

## 2023-01-17 RX ORDER — PREDNISONE 20 MG/1
20 TABLET ORAL DAILY
Status: DISCONTINUED | OUTPATIENT
Start: 2023-01-17 | End: 2023-01-20 | Stop reason: HOSPADM

## 2023-01-17 RX ORDER — ENOXAPARIN SODIUM 100 MG/ML
40 INJECTION SUBCUTANEOUS DAILY
Status: DISCONTINUED | OUTPATIENT
Start: 2023-01-17 | End: 2023-01-20 | Stop reason: HOSPADM

## 2023-01-17 RX ORDER — TAMSULOSIN HYDROCHLORIDE 0.4 MG/1
0.4 CAPSULE ORAL DAILY
Status: DISCONTINUED | OUTPATIENT
Start: 2023-01-17 | End: 2023-01-20 | Stop reason: HOSPADM

## 2023-01-17 RX ORDER — SODIUM CHLORIDE 0.9 % (FLUSH) 0.9 %
5-40 SYRINGE (ML) INJECTION EVERY 12 HOURS SCHEDULED
Status: DISCONTINUED | OUTPATIENT
Start: 2023-01-17 | End: 2023-01-20 | Stop reason: HOSPADM

## 2023-01-17 RX ORDER — ACETAMINOPHEN 325 MG/1
650 TABLET ORAL EVERY 6 HOURS PRN
Status: DISCONTINUED | OUTPATIENT
Start: 2023-01-17 | End: 2023-01-18

## 2023-01-17 RX ORDER — SODIUM CHLORIDE 9 MG/ML
INJECTION, SOLUTION INTRAVENOUS PRN
Status: DISCONTINUED | OUTPATIENT
Start: 2023-01-17 | End: 2023-01-20 | Stop reason: HOSPADM

## 2023-01-17 RX ORDER — ALPRAZOLAM 0.5 MG/1
0.5 TABLET ORAL 3 TIMES DAILY PRN
Status: DISCONTINUED | OUTPATIENT
Start: 2023-01-17 | End: 2023-01-17

## 2023-01-17 RX ORDER — NALOXONE HYDROCHLORIDE 4 MG/.1ML
1 SPRAY NASAL PRN
Status: DISCONTINUED | OUTPATIENT
Start: 2023-01-17 | End: 2023-01-17

## 2023-01-17 RX ORDER — SENNA AND DOCUSATE SODIUM 50; 8.6 MG/1; MG/1
2 TABLET, FILM COATED ORAL 2 TIMES DAILY
Status: DISCONTINUED | OUTPATIENT
Start: 2023-01-17 | End: 2023-01-20 | Stop reason: HOSPADM

## 2023-01-17 RX ORDER — SODIUM CHLORIDE 0.9 % (FLUSH) 0.9 %
5-40 SYRINGE (ML) INJECTION PRN
Status: DISCONTINUED | OUTPATIENT
Start: 2023-01-17 | End: 2023-01-20 | Stop reason: HOSPADM

## 2023-01-17 RX ORDER — LANOLIN ALCOHOL/MO/W.PET/CERES
1000 CREAM (GRAM) TOPICAL DAILY
Status: DISCONTINUED | OUTPATIENT
Start: 2023-01-17 | End: 2023-01-20 | Stop reason: HOSPADM

## 2023-01-17 RX ORDER — OXYCODONE HCL 20 MG/1
20 TABLET, FILM COATED, EXTENDED RELEASE ORAL EVERY 12 HOURS
Status: DISCONTINUED | OUTPATIENT
Start: 2023-01-17 | End: 2023-01-17

## 2023-01-17 RX ORDER — ALPRAZOLAM 0.5 MG/1
1 TABLET ORAL 3 TIMES DAILY
Status: DISCONTINUED | OUTPATIENT
Start: 2023-01-17 | End: 2023-01-20 | Stop reason: HOSPADM

## 2023-01-17 RX ORDER — OXYCODONE AND ACETAMINOPHEN 10; 325 MG/1; MG/1
2 TABLET ORAL EVERY 6 HOURS PRN
Status: DISCONTINUED | OUTPATIENT
Start: 2023-01-17 | End: 2023-01-17 | Stop reason: CLARIF

## 2023-01-17 RX ORDER — ACETAMINOPHEN 650 MG/1
650 SUPPOSITORY RECTAL EVERY 6 HOURS PRN
Status: DISCONTINUED | OUTPATIENT
Start: 2023-01-17 | End: 2023-01-18

## 2023-01-17 RX ADMIN — HYDROMORPHONE HYDROCHLORIDE 1 MG: 1 INJECTION, SOLUTION INTRAMUSCULAR; INTRAVENOUS; SUBCUTANEOUS at 18:35

## 2023-01-17 RX ADMIN — SODIUM CHLORIDE: 9 INJECTION, SOLUTION INTRAVENOUS at 01:13

## 2023-01-17 RX ADMIN — ONDANSETRON 4 MG: 4 TABLET, ORALLY DISINTEGRATING ORAL at 02:38

## 2023-01-17 RX ADMIN — CEFTRIAXONE SODIUM 1000 MG: 1 INJECTION, POWDER, FOR SOLUTION INTRAMUSCULAR; INTRAVENOUS at 00:08

## 2023-01-17 RX ADMIN — SODIUM CHLORIDE: 9 INJECTION, SOLUTION INTRAVENOUS at 10:19

## 2023-01-17 RX ADMIN — SENNOSIDES AND DOCUSATE SODIUM 2 TABLET: 50; 8.6 TABLET ORAL at 02:42

## 2023-01-17 RX ADMIN — HYDROMORPHONE HYDROCHLORIDE 1 MG: 1 INJECTION, SOLUTION INTRAMUSCULAR; INTRAVENOUS; SUBCUTANEOUS at 22:50

## 2023-01-17 RX ADMIN — SODIUM CHLORIDE: 9 INJECTION, SOLUTION INTRAVENOUS at 20:33

## 2023-01-17 RX ADMIN — HYDROMORPHONE HYDROCHLORIDE 1 MG: 1 INJECTION, SOLUTION INTRAMUSCULAR; INTRAVENOUS; SUBCUTANEOUS at 09:30

## 2023-01-17 RX ADMIN — SENNOSIDES AND DOCUSATE SODIUM 2 TABLET: 50; 8.6 TABLET ORAL at 09:41

## 2023-01-17 RX ADMIN — GABAPENTIN 600 MG: 300 CAPSULE ORAL at 14:53

## 2023-01-17 RX ADMIN — HYDROMORPHONE HYDROCHLORIDE 0.5 MG: 1 INJECTION, SOLUTION INTRAMUSCULAR; INTRAVENOUS; SUBCUTANEOUS at 06:18

## 2023-01-17 RX ADMIN — ENOXAPARIN SODIUM 40 MG: 100 INJECTION SUBCUTANEOUS at 09:30

## 2023-01-17 RX ADMIN — CEFEPIME HYDROCHLORIDE 2000 MG: 2 INJECTION, POWDER, FOR SOLUTION INTRAVENOUS at 01:17

## 2023-01-17 RX ADMIN — SODIUM CHLORIDE 1000 ML: 9 INJECTION, SOLUTION INTRAVENOUS at 02:09

## 2023-01-17 RX ADMIN — SODIUM CHLORIDE: 9 INJECTION, SOLUTION INTRAVENOUS at 04:30

## 2023-01-17 RX ADMIN — CYANOCOBALAMIN TAB 1000 MCG 1000 MCG: 1000 TAB at 09:41

## 2023-01-17 RX ADMIN — SODIUM CHLORIDE, PRESERVATIVE FREE 10 ML: 5 INJECTION INTRAVENOUS at 09:31

## 2023-01-17 RX ADMIN — DULOXETINE 60 MG: 30 CAPSULE, DELAYED RELEASE ORAL at 09:29

## 2023-01-17 RX ADMIN — TAMSULOSIN HYDROCHLORIDE 0.4 MG: 0.4 CAPSULE ORAL at 09:29

## 2023-01-17 RX ADMIN — ALPRAZOLAM 1 MG: 0.5 TABLET ORAL at 09:29

## 2023-01-17 RX ADMIN — ACETAMINOPHEN 650 MG: 325 TABLET ORAL at 11:22

## 2023-01-17 RX ADMIN — HYDROMORPHONE HYDROCHLORIDE 1 MG: 1 INJECTION, SOLUTION INTRAMUSCULAR; INTRAVENOUS; SUBCUTANEOUS at 14:26

## 2023-01-17 RX ADMIN — ALPRAZOLAM 1 MG: 0.5 TABLET ORAL at 14:53

## 2023-01-17 RX ADMIN — HYDROMORPHONE HYDROCHLORIDE 0.5 MG: 1 INJECTION, SOLUTION INTRAMUSCULAR; INTRAVENOUS; SUBCUTANEOUS at 02:21

## 2023-01-17 RX ADMIN — SENNOSIDES AND DOCUSATE SODIUM 2 TABLET: 50; 8.6 TABLET ORAL at 20:21

## 2023-01-17 RX ADMIN — OXYCODONE HYDROCHLORIDE 20 MG: 10 TABLET ORAL at 11:22

## 2023-01-17 RX ADMIN — PREDNISONE 20 MG: 20 TABLET ORAL at 09:29

## 2023-01-17 RX ADMIN — CEFEPIME HYDROCHLORIDE 2000 MG: 2 INJECTION, POWDER, FOR SOLUTION INTRAVENOUS at 09:31

## 2023-01-17 RX ADMIN — ACETAMINOPHEN 650 MG: 325 TABLET ORAL at 20:21

## 2023-01-17 RX ADMIN — GABAPENTIN 600 MG: 300 CAPSULE ORAL at 20:21

## 2023-01-17 RX ADMIN — GABAPENTIN 600 MG: 300 CAPSULE ORAL at 09:29

## 2023-01-17 RX ADMIN — KETOROLAC TROMETHAMINE 30 MG: 30 INJECTION, SOLUTION INTRAMUSCULAR; INTRAVENOUS at 00:08

## 2023-01-17 RX ADMIN — OXYCODONE HYDROCHLORIDE 20 MG: 10 TABLET ORAL at 20:22

## 2023-01-17 RX ADMIN — CEFEPIME HYDROCHLORIDE 2000 MG: 2 INJECTION, POWDER, FOR SOLUTION INTRAVENOUS at 18:46

## 2023-01-17 RX ADMIN — SODIUM CHLORIDE: 9 INJECTION, SOLUTION INTRAVENOUS at 12:25

## 2023-01-17 RX ADMIN — ALPRAZOLAM 1 MG: 0.5 TABLET ORAL at 02:39

## 2023-01-17 RX ADMIN — ALPRAZOLAM 1 MG: 0.5 TABLET ORAL at 20:21

## 2023-01-17 RX ADMIN — OXYCODONE HYDROCHLORIDE 20 MG: 10 TABLET ORAL at 03:11

## 2023-01-17 ASSESSMENT — PAIN DESCRIPTION - LOCATION
LOCATION: ABDOMEN
LOCATION: ABDOMEN
LOCATION: BUTTOCKS
LOCATION: BACK

## 2023-01-17 ASSESSMENT — PAIN DESCRIPTION - ORIENTATION
ORIENTATION: RIGHT

## 2023-01-17 ASSESSMENT — PAIN SCALES - GENERAL
PAINLEVEL_OUTOF10: 6
PAINLEVEL_OUTOF10: 6
PAINLEVEL_OUTOF10: 10

## 2023-01-17 ASSESSMENT — PAIN DESCRIPTION - DESCRIPTORS
DESCRIPTORS: ACHING;DISCOMFORT
DESCRIPTORS: ACHING
DESCRIPTORS: ACHING

## 2023-01-17 NOTE — ED NOTES
Port access delayed due to wife asking for numbing medication to access port.  Doctor notified of request.      Letty Riley RN  01/16/23 2054

## 2023-01-17 NOTE — PROGRESS NOTES
4 Eyes Skin Assessment     NAME:  Luzmaria Daley  YOB: 1970  MEDICAL RECORD NUMBER:  [id number]    The patient is being assess for  Admission    I agree that 2 RN's have performed a thorough Head to Toe Skin Assessment on the patient. ALL assessment sites listed below have been assessed. Areas assessed by both nurses:    Head, Face, Ears, Shoulders, Back, Chest, Arms, Elbows, Hands, Sacrum. Buttock, Coccyx, Ischium, and Legs. Feet and Heels        Does the Patient have a Wound?  No noted wound(s)       Twin Prevention initiated:  Yes   Wound Care Orders initiated:  NA    Pressure Injury (Stage 3,4, Unstageable, DTI, NWPT, and Complex wounds) if present place consult order under [de-identified] No    New and Established Ostomies if present place consult order under : No      Nurse 1 eSignature: Electronically signed by Jameel Rust RN on 1/17/23 at 1:05 AM EST    **SHARE this note so that the co-signing nurse is able to place an eSignature**    Nurse 2 eSignature: Electronically signed by Franky Sandoval RN on 1/17/23 at 1:26 AM EST

## 2023-01-17 NOTE — H&P
History and Physical      Name:  Gayle Jean-Baptiste /Age/Sex: 1970  (46 y.o. male)   MRN & CSN:  [id number] & [id number] Encounter Date/Time: 2023 11:25 PM EST   Location:  ED16/ED-16 PCP: Berry Lao MD       Hospital Day: 1    Assessment and Plan:     #. Complicated UTI  -Patient has a chronic indwelling Doherty catheter, recently exchanged 2023 as per wife at bedside.  -Patient had left percutaneous nephrostomy tube placed at [facility] 2022). Patient had a recent admission for dislodged nephrostomy tube and IR could not recanalize. Patient did not want nephrostomy tube reinserted. -UA-cloudy, moderate blood, large leukocyte esterase, nitrate negative, WBC 1900, many WBC clumps, bacteria negative  -Patient has mild leukocytosis, tachycardia  -Urine culture sent from ER  -Check procalcitonin  -Continue cefepime-has urine cultures-2022 grew Acinetobacter    #. Intractable pain secondary to bone mets  -Patient still groaning and moaning in pain  -Got Dilaudid in ER, gave a dose of Toradol  -Continue home medications, IV Dilaudid as needed.  -As per documentation patient is currently under hospice since 2022. Wife at bedside reported that patient is a full code and does not have a DNR. #.  Hypercalcemia  -Calcium 13.1  -Patient received 1 L fluid bolus in ER  -Ordered 1 more bolus  -Continue IV fluids at 150 cc/h  -Monitor with repeat BMP    #. Squamous cell carcinoma-overlapping sites of left lung  -Completed palliative radiation to the left lung and pelvic area  -Bone biopsy on dec 13 2021 with met squamous cell cancer, consistent with lung primary.  -He was admitted at [facility] in April ( - ). He had debulking of his primary tumor via bronchoscopy during admit.      #.  Prostate cancer with bone mets  -BMB at [facility] consistent with metastatic prostate cancer  -Patient had PET scan-2023-reported marked progression of osseous metastatic disease with multiple new hypermetabolic lesions, compression deformity of T1, increased size and mildly increased uptake of the mass in the medial left upper lobe abutting the mediastinum. #.  History of urinary retention and has chronic indwelling Doherty catheter    #. Nonischemic cardiomyopathy  -LHC-8/2016-normal coronaries  -Echo-8/2016-EF 55-60%, aortic stenosis, trace mitral regurgitation, trace tricuspid regurgitation. #.  Chronic microcytic anemia    #. History of cocaine abuse as per EMR-12/2012  -UDS + for cocaine-recently 7/13/2022 and cannabinoids-9/2020    #. Chronic pain  -Patient is on oxycodone IR 20 mg twice daily, Percocet , 2 tabs every 6 hours as needed, gabapentin, duloxetine, fentanyl patch, prednisone unable to perform as patient is not answering any questions. .    #.  Anxiety disorder-on alprazolam 1 mg 3 times daily. Disposition:   Current Living situation: home    Diet Regular   DVT Prophylaxis [] Lovenox, []  Heparin, [] SCDs, [] Ambulation,  [] Eliquis, [] Xarelto   Code Status Prior   Surrogate Decision Maker/ POA      History from:   EMR, patient. History of Present Illness:     Chief Complaint: Complicated UTI (urinary tract infection)  Kenny Oquendo is a 46 y.o. male with nonischemic cardiomyopathy, history of squamous cell carcinoma of left lung, prostate cancer with bony metastasis, chronic urinary retention presented to ER with complaints of intractable pain. Patient is currently awake, alert, not answering any questions, tossing in bed, in pain. Most of the information was provided by patient's wife at bedside. According to her patient has been in constant pain, crying, has decreased appetite, poor oral intake. She denied patient having any fever, chills, cough, chest pain, denied any abdomen pain, denied any urinary complaints, denied any constipation or diarrhea. Patient has a chronic indwelling Doherty catheter and it was exchanged last Thursday has per wife.   At presentation patient was noted to have /70, , RR 18, temp 98.3, saturating 97% on room air. Lab work significant for sodium 134, chloride 95, random glucose 110, calcium 13.1, , WBC 12.4, hemoglobin 11.4, platelets 815. UA-cloudy, moderate blood, proteinuria, large leukocyte esterase, nitrate negative, WBC 1900, WBC clumps many, bacteria negative. Urine culture sent from ER and patient received IV ceftriaxone. Review of Systems: Need 10 Elements     Unable to perform as patient is not answering any questions. Objective:   No intake or output data in the 24 hours ending 01/16/23 2325   Vitals:   Vitals:    01/16/23 1900 01/16/23 2200 01/16/23 2201 01/16/23 2230   BP: 102/71 112/74 112/74 100/64   Pulse:   85    Resp:   18    Temp:       TempSrc:       SpO2:   98%        Medications Prior to Admission   Reviewed medications with patient's wife. Prior to Admission medications    Medication Sig Start Date End Date Taking? Authorizing Provider   gabapentin (NEURONTIN) 600 MG tablet Take 600 mg by mouth 3 times daily. Historical Provider, MD   fentaNYL (DURAGESIC) 75 MCG/HR Place 1 patch onto the skin every 72 hours. Historical Provider, MD   polyethylene glycol (GLYCOLAX) 17 g packet Take 17 g by mouth daily as needed for Constipation 1/6/23 2/5/23  Ady Breen MD   oxyCODONE (OXYCONTIN) 20 MG extended release tablet Take 20 mg by mouth in the morning and 20 mg in the evening. Historical Provider, MD   predniSONE (DELTASONE) 20 MG tablet Take 20 mg by mouth daily    Historical Provider, MD   cyclobenzaprine (FLEXERIL) 10 MG tablet Take 10 mg by mouth 3 times daily as needed for Muscle spasms    Historical Provider, MD   ALPRAZolam (XANAX) 0.5 MG tablet Take 0.5 mg by mouth 3 times daily as needed for Sleep or Anxiety (Twice Daily PRN).     Historical Provider, MD   naloxone 4 MG/0.1ML LIQD nasal spray 1 spray by Nasal route as needed for Opioid Reversal 4/12/22   Tin Rossi MD   Calcium Carbonate-Vitamin D (OYSTER SHELL CALCIUM/D) 500-200 MG-UNIT TABS Take 1 tablet by mouth daily 4/1/22 11/4/22  Long Valdivia MD   cyanocobalamin ([employer] VITAMIN B12) 1000 MCG tablet Take 1 tablet by mouth daily 4/1/22   Long Valdivia MD   ondansetron (ZOFRAN) 8 MG tablet take 1 tablet by mouth every 8 hours if needed for nausea and vomiting 3/11/22   Historical Provider, MD   Sennosides (SENNA) 8.6 MG CAPS Take 1 capsule by mouth 2 times daily as needed (constipation) 3/11/22   Tiffany Beryle Joiner, DO       Physical Exam: Need 8 Elements   Physical Exam     GEN  -Awake, alert, NAD.   EYES   -PERRL. HENT  -MM are dry. RESP  -LS CTA equal bilat, no wheezes, rales or rhonchi. Symmetric chest movement. No respiratory distress noted. C/V  -S1/S2 auscultated, tachycardia without appreciable M/R/G. No JVD or carotid bruits. Peripheral pulses equal bilaterally and palpable. No peripheral edema. No reproducible chest wall tenderness. GI  -Abdomen is soft, non-distended, no significant tenderness. No masses or guarding. + BS in all quadrants. Rectal exam deferred.   -No CVA tenderness. Doherty catheter is not present. MS  -B/L extremities - No gross joint deformities. No swelling, intact sensation symmetrical.   SKIN  -Normal coloration, warm, dry. NEURO  -awake, alert, orientation could not be assessed as patient is not answering any questions, tossing in bed, no focal deficits noted. Past Medical History:   Reviewed patient's past medical, surgical, social, family history and allergies. PMHx   Past Medical History:   Diagnosis Date    CAD (coronary artery disease) 12/23/2013    cath negative per pt    Cancer ([facility] [address] 06/2021    pt reports he has lung cancer    History of TMJ disorder     Hypertension     Trigeminal neuralgia      PSHX:  has a past surgical history that includes Abdomen surgery; Cardiac catheterization (08/03/2016); CT NEEDLE BIOPSY LUNG PERCUTANEOUS (7/28/2021);  Port Surgery (Right, 8/13/2021); CT BIOPSY SUPERFICIAL BONE PERCUTANEOUS (12/17/2021); and IR GUIDED NEPHROSTOMY CATH PLACEMENT (1/5/2023). Allergies: Allergies   Allergen Reactions    Tramadol Other (See Comments)     seizures    Morphine Hallucinations    Vicodin [Hydrocodone-Acetaminophen] Hives     Fam HX: family history includes Cancer in his sister; High Blood Pressure in his mother and sister.   Soc HX:   Social History     Socioeconomic History    Marital status:     Number of children: 5   Tobacco Use    Smoking status: Every Day     Packs/day: 2.00     Years: 39.00     Pack years: 78.00     Types: Cigarettes    Smokeless tobacco: Never    Tobacco comments:     smokes 1-2 a day   Vaping Use    Vaping Use: Never used   Substance and Sexual Activity    Alcohol use: Not Currently     Alcohol/week: 6.0 standard drinks     Types: 6 Cans of beer per week     Comment: per week (24 oz beers)     Drug use: Yes     Types: Marijuana Veryl Morin)     Comment: last 12/1    Sexual activity: Yes     Partners: Female       Medications:   Medications:    sodium chloride  1,000 mL IntraVENous Once    ondansetron  4 mg Oral Once    cefTRIAXone (ROCEPHIN) IV  1,000 mg IntraVENous Once      Infusions:   PRN Meds:      Labs      CBC:   Recent Labs     01/16/23 1926   WBC 12.4*   HGB 11.4*   *     BMP:    Recent Labs     01/16/23 1926   *   K 4.9   CL 95*   CO2 28   BUN 10   CREATININE 0.7*   GLUCOSE 110*     Hepatic:   Recent Labs     01/16/23 1926   AST 18   ALT 9*   BILITOT 0.2   ALKPHOS 205*     Lipids:   Lab Results   Component Value Date/Time    CHOL 124 12/20/2013 05:22 AM    HDL 53 12/20/2013 05:22 AM    TRIG 52 12/20/2013 05:22 AM     Hemoglobin A1C: No results found for: LABA1C  TSH: No results found for: TSH  Troponin:   Lab Results   Component Value Date/Time    TROPONINT <0.010 08/09/2022 04:53 AM    TROPONINT <0.010 08/09/2022 04:53 AM    TROPONINT <0.010 07/13/2022 03:05 PM     Lactic Acid: No results for input(s): LACTA in the last 72 hours.  BNP: No results for input(s): PROBNP in the last 72 hours. UA:  Lab Results   Component Value Date/Time    NITRU NEGATIVE 01/16/2023 09:22 PM    COLORU YELLOW 01/16/2023 09:22 PM    [address] 01/16/2023 09:22 PM    RBCUA 23 01/16/2023 09:22 PM    MUCUS RARE 01/04/2023 11:53 PM    TRICHOMONAS NONE SEEN 01/16/2023 09:22 PM    BACTERIA NEGATIVE 01/16/2023 09:22 PM    CLARITYU CLOUDY 01/16/2023 09:22 PM    Halina Peyton 1.020 01/16/2023 09:22 PM    LEUKOCYTESUR LARGE 01/16/2023 09:22 PM    UROBILINOGEN NORMAL 01/16/2023 09:22 PM    BILIRUBINUR NEGATIVE 01/16/2023 09:22 PM    BLOODU MODERATE 01/16/2023 09:22 PM    KETUA NEGATIVE 01/16/2023 09:22 PM    AMORPHOUS RARE 07/21/2021 05:24 AM     Urine Cultures: No results found for: Qamar Pimentel  Blood Cultures: No results found for: BC  No results found for: BLOODCULT2  Organism: No results found for: ORG    Imaging/Diagnostics Last 24 Hours   PET CT SKULL BASE TO MID THIGH    Result Date: 1/14/2023  EXAMINATION: WHOLE BODY PET/CT 1/13/2023 TECHNIQUE: Following intravenous administration of 10.85 mCi of F18-FDG, PET imaging was acquired from the base of the head to the mid thighs. Computed tomography was used for purposes of attenuation correction and anatomic localization. Fusion imaging was utilized for interpretation. Uptake time 55 mins. Glucose level 137 mg/dl. COMPARISON: Abdomen/pelvis CT on 01/06/2023, bone scan on 10/27/2022, chest CT on 10/11/2022, PET-CT on 05/16/2022 HISTORY: Metastatic left upper lobe lung cancer, follow-up. FINDINGS: HEAD/NECK: No suspicious FDG uptake. CHEST: Increased size and mildly increased uptake of the mass in the medial left upper lobe abutting the mediastinum which demonstrates maximum SUV of 7.5 with central necrosis. Adjacent left superior mediastinal lymph node with maximum SUV of 3.2. New hypermetabolic right axillary lymph node with maximum SUV of 18.7. ABDOMEN/PELVIS: No abnormal uptake within the solid abdominal organs.   New hypermetabolic periportal and portacaval lymph nodes with maximum SUV of 5.5. New hypermetabolic bilateral retroperitoneal lymph nodes with maximum SUV of 6.3 and several left common iliac lymph nodes with maximum SUV of 5.3. BONES/SOFT TISSUE: New intense uptake associated with a destructive lesion of the superior left scapula, maximum SUV of 10.6. New destructive lesions of the upper thoracic spine with maximum SUV of 15.8 and severe pathologic compression deformity of T1. Significantly enlarged lytic lesions of the sacrum and bilateral iliac bones with maximum SUV of 16.5. Additional new areas of intense uptake include the inferior left scapula and right posterior elements of T10-T11. Diffuse sclerotic osseous metastatic disease within the remainder of the skeleton without significant uptake is compatible with treated disease. INCIDENTAL CT FINDINGS: Right chest MediPort. Emphysema. Mild gynecomastia. Mild atherosclerotic disease. Doherty catheter within the urinary bladder. Enlarged prostate with stable urinary bladder wall thickening that could be due to chronic outlet obstruction. 1. Marked progression of osseous metastatic disease with multiple new hypermetabolic lesions as described above. Most notable are severe pathologic compression deformity of T1 and markedly enlarged lytic lesions of the sacrum and bilateral iliac bones. 2. Increased size and mildly increased uptake of the mass in the medial left upper lobe abutting the mediastinum. 3. Hypermetabolic lymphadenopathy in the chest, abdomen, and pelvis which is new from prior PET-CT on 05/16/2022 and progressed from more recent CTs later in 2022. Personally reviewed Lab Studies, Imaging, and discussed case with ED physician.     Electronically signed by Paddy Leigh MD on 1/16/2023 at 11:25 PM

## 2023-01-17 NOTE — PROGRESS NOTES
V2.0  [facility] Hospitalist Progress Note      Name:  Beth Rhodes /Age/Sex: 1970  (46 y.o. male)   MRN & CSN:  [id number] & [id number] Encounter Date/Time: 2023 11:14 AM EST    Location:  [address] [place] [address]-A PCP: Manjit Durand MD       Hospital Day: 2    Assessment and Plan:   Beth Rhodes is a 46 y.o. male with UTI      Plan: Patient exhibiting bizarre vocalizations upon my entry into room. Continue treating hypercalcemia of malignancy and re-evalutae mentation once normalized. #.  Complicated UTI  -Patient has a chronic indwelling Doherty catheter, recently exchanged 2023 as per wife at bedside.  -Patient had left percutaneous nephrostomy tube placed at [facility] 2022). Patient had a recent admission for dislodged nephrostomy tube and IR could not recanalize. Patient did not want nephrostomy tube reinserted. -UA-cloudy, moderate blood, large leukocyte esterase, nitrate negative, WBC 1900, many WBC clumps, bacteria negative  -Patient has mild leukocytosis, tachycardia  -Urine culture sent from ER  -Check procalcitonin  -Continue cefepime-has urine cultures-2022 grew Acinetobacter     #. Intractable pain secondary to bone mets  -Patient still groaning and moaning in pain  -Got Dilaudid in ER, gave a dose of Toradol  -Continue home medications, IV Dilaudid as needed.  -As per documentation patient is currently under hospice since 2022. Wife at bedside reported that patient is a full code and does not have a DNR. #.  Hypercalcemia  -Calcium 13.1  -Patient received 1 L fluid bolus in ER  -Ordered 1 more bolus  -Continue IV fluids at 150 cc/h  -Monitor with repeat BMP     #. Squamous cell carcinoma-overlapping sites of left lung  -Completed palliative radiation to the left lung and pelvic area  -Bone biopsy on dec 13 2021 with met squamous cell cancer, consistent with lung primary.  -He was admitted at [facility] in April ( - ).  He had debulking of his primary tumor via bronchoscopy during admit. #.  Prostate cancer with bone mets  -BMB at [facility] consistent with metastatic prostate cancer  -Patient had PET scan-1/13/2023-reported marked progression of osseous metastatic disease with multiple new hypermetabolic lesions, compression deformity of T1, increased size and mildly increased uptake of the mass in the medial left upper lobe abutting the mediastinum. #.  History of urinary retention and has chronic indwelling Doherty catheter     #. Nonischemic cardiomyopathy  -LHC-8/2016-normal coronaries  -Echo-8/2016-EF 55-60%, aortic stenosis, trace mitral regurgitation, trace tricuspid regurgitation. #.  Chronic microcytic anemia     #. History of cocaine abuse as per EMR-12/2012  -UDS + for cocaine-recently 7/13/2022 and cannabinoids-9/2020     #. Chronic pain  -Patient is on oxycodone IR 20 mg twice daily, Percocet , 2 tabs every 6 hours as needed, gabapentin, duloxetine, fentanyl patch, prednisone unable to perform as patient is not answering any questions. .     #.  Anxiety disorder-on alprazolam 1 mg 3 times daily. Ppx:  Dispo:     Subjective:     Chief Complaint: Other (Family wants his labs checked out. Hx cancer)       Patient exhibiting bizarre vocalizations upon my entry into room. Continue treating hypercalcemia of malignancy and re-evalutae mentation once normalized. Review of Systems:    Review of Systems    10 point ROS negative except as stated above in \"subjective\" section    Objective: Intake/Output Summary (Last 24 hours) at 1/17/2023 1114  Last data filed at 1/17/2023 0656  Gross per 24 hour   Intake --   Output 600 ml   Net -600 ml        Vitals:   Vitals:    01/17/23 0900   BP: 117/86   Pulse: (!) 111   Resp: 20   Temp: 97.6 °F (36.4 °C)   SpO2:        Physical Exam:     General: NAD  Eyes: EOMI  ENT: neck supple  Cardiovascular: Regular rate.   Respiratory: Clear to auscultation  Gastrointestinal: Soft, non tender  Genitourinary: no suprapubic tenderness  Musculoskeletal: No edema  Skin: warm, dry  Neuro: Alert. Psych: Mood appropriate.      Medications:   Medications:    sodium chloride flush  5-40 mL IntraVENous 2 times per day    enoxaparin  40 mg SubCUTAneous Daily    cyanocobalamin  1,000 mcg Oral Daily    fentaNYL  1 patch TransDERmal Q72H    gabapentin  600 mg Oral TID    predniSONE  20 mg Oral Daily    cefepime  2,000 mg IntraVENous q8h    sennosides-docusate sodium  2 tablet Oral BID    DULoxetine  60 mg Oral Daily    tamsulosin  0.4 mg Oral Daily    ALPRAZolam  1 mg Oral TID    sodium chloride  1,000 mL IntraVENous Once      Infusions:    sodium chloride      sodium chloride 150 mL/hr at 01/17/23 1019     PRN Meds: sodium chloride flush, 5-40 mL, PRN  sodium chloride, , PRN  ondansetron, 4 mg, Q8H PRN   Or  ondansetron, 4 mg, Q6H PRN  polyethylene glycol, 17 g, Daily PRN  acetaminophen, 650 mg, Q6H PRN   Or  acetaminophen, 650 mg, Q6H PRN  naloxone, 4 mg, PRN  oxyCODONE, 20 mg, Q6H PRN   And  acetaminophen, 650 mg, Q6H PRN  HYDROmorphone, 1 mg, Q4H PRN        Labs      Recent Results (from the past 24 hour(s))   CBC with Diff    Collection Time: 01/16/23  7:26 PM   Result Value Ref Range    WBC 12.4 (H) 4.0 - 10.5 K/CU MM    RBC 4.72 4.6 - 6.2 M/CU MM    Hemoglobin 11.4 (L) 13.5 - 18.0 GM/DL    Hematocrit 35.4 (L) 42 - 52 %    MCV 75.0 (L) 78 - 100 FL    MCH 24.2 (L) 27 - 31 PG    MCHC 32.2 32.0 - 36.0 %    RDW 17.4 (H) 11.7 - 14.9 %    Platelets 651 (H) 711 - 440 K/CU MM    MPV 8.9 7.5 - 11.1 FL    Differential Type AUTOMATED DIFFERENTIAL     Segs Relative 76.9 (H) 36 - 66 %    Lymphocytes % 15.4 (L) 24 - 44 %    Monocytes % 6.1 (H) 0 - 4 %    Eosinophils % 0.8 0 - 3 %    Basophils % 0.4 0 - 1 %    Segs Absolute 9.6 K/CU MM    Lymphocytes Absolute 1.9 K/CU MM    Monocytes Absolute 0.8 K/CU MM    Eosinophils Absolute 0.1 K/CU MM    Basophils Absolute 0.1 K/CU MM    Nucleated RBC % 0.0 %    Total Nucleated RBC 0.0 K/CU MM    Total Immature Neutrophil 0.05 K/CU MM    Immature Neutrophil % 0.4 0 - 0.43 %   CMP    Collection Time: 01/16/23  7:26 PM   Result Value Ref Range    Sodium 134 (L) 135 - 145 MMOL/L    Potassium 4.9 3.5 - 5.1 MMOL/L    Chloride 95 (L) 99 - 110 mMol/L    CO2 28 21 - 32 MMOL/L    BUN 10 6 - 23 MG/DL    Creatinine 0.7 (L) 0.9 - 1.3 MG/DL    Est, Glom Filt Rate >60 >60 mL/min/1.73m2    Glucose 110 (H) 70 - 99 MG/DL    Calcium 13.1 (H) 8.3 - 10.6 MG/DL    Albumin 3.7 3.4 - 5.0 GM/DL    Total Protein 8.8 (H) 6.4 - 8.2 GM/DL    Total Bilirubin 0.2 0.0 - 1.0 MG/DL    ALT 9 (L) 10 - 40 U/L    AST 18 15 - 37 IU/L    Alkaline Phosphatase 205 (H) 40 - 129 IU/L    Anion Gap 11 4 - 16   Lipase    Collection Time: 01/16/23  7:26 PM   Result Value Ref Range    Lipase 21 13 - 60 IU/L   Urinalysis with Reflex to Culture    Collection Time: 01/16/23  9:22 PM    Specimen: Urine   Result Value Ref Range    Color, UA YELLOW YELLOW    Clarity, UA CLOUDY (A) CLEAR    Glucose, Urine NEGATIVE NEGATIVE MG/DL    Bilirubin Urine NEGATIVE NEGATIVE MG/DL    Ketones, Urine NEGATIVE NEGATIVE MG/DL    Specific Gravity, UA 1.020 1.001 - 1.035    Blood, Urine MODERATE (A) NEGATIVE    pH, Urine 6.0 5.0 - 8.0    Protein, UA 30 (A) NEGATIVE MG/DL    Urobilinogen, Urine NORMAL 0.2 - 1.0 MG/DL    Nitrite Urine, Quantitative NEGATIVE NEGATIVE    Leukocyte Esterase, Urine LARGE (A) NEGATIVE    RBC, UA 23 (H) 0 - 3 /HPF    WBC, UA 1900 (H) 0 - 2 /HPF    WBC Clumps, UA MANY /HPF    Bacteria, UA NEGATIVE NEGATIVE /HPF    Trichomonas, UA NONE SEEN NONE SEEN /HPF   Basic Metabolic Panel w/ Reflex to MG    Collection Time: 01/17/23  5:10 AM   Result Value Ref Range    Sodium 132 (L) 135 - 145 MMOL/L    Potassium 4.1 3.5 - 5.1 MMOL/L    Chloride 99 99 - 110 mMol/L    CO2 25 21 - 32 MMOL/L    Anion Gap 8 4 - 16    BUN 11 6 - 23 MG/DL    Creatinine 0.6 (L) 0.9 - 1.3 MG/DL    Est, Glom Filt Rate >60 >60 mL/min/1.73m2    Glucose 118 (H) 70 - 99 MG/DL    Calcium 11.8 (H) 8.3 - 10.6 MG/DL   CBC with Auto Differential    Collection Time: 01/17/23  5:10 AM   Result Value Ref Range    WBC 9.7 4.0 - 10.5 K/CU MM    RBC 3.44 (L) 4.6 - 6.2 M/CU MM    Hemoglobin 8.3 (L) 13.5 - 18.0 GM/DL    Hematocrit 26.3 (L) 42 - 52 %    MCV 76.5 (L) 78 - 100 FL    MCH 24.1 (L) 27 - 31 PG    MCHC 31.6 (L) 32.0 - 36.0 %    RDW 17.3 (H) 11.7 - 14.9 %    Platelets 872 720 - 594 K/CU MM    MPV 8.8 7.5 - 11.1 FL    Differential Type AUTOMATED DIFFERENTIAL     Segs Relative 74.0 (H) 36 - 66 %    Lymphocytes % 16.6 (L) 24 - 44 %    Monocytes % 8.0 (H) 0 - 4 %    Eosinophils % 0.8 0 - 3 %    Basophils % 0.3 0 - 1 %    Segs Absolute 7.2 K/CU MM    Lymphocytes Absolute 1.6 K/CU MM    Monocytes Absolute 0.8 K/CU MM    Eosinophils Absolute 0.1 K/CU MM    Basophils Absolute 0.0 K/CU MM    Nucleated RBC % 0.0 %    Total Nucleated RBC 0.0 K/CU MM    Total Immature Neutrophil 0.03 K/CU MM    Immature Neutrophil % 0.3 0 - 0.43 %        Imaging/Diagnostics Last 24 Hours   No results found.     Electronically signed by Deon Best MD on 1/17/2023 at 11:14 AM

## 2023-01-17 NOTE — ED NOTES
ED TO INPATIENT SBAR HANDOFF    Patient Name: Matt Coyle   :  1970  46 y.o. MRN:  [id number]  Preferred Name  Elodia Kelsey  ED Room #:  ED16/ED-16  Family/Caregiver Present yes   Restraints no   Sitter no   Sepsis Risk Score Sepsis Risk Score: 1.48    Situation  Code Status: Prior    Allergies: Tramadol, Morphine, and Vicodin [hydrocodone-acetaminophen]  Weight: No data found. Arrived from: home  Chief Complaint:   Chief Complaint   Patient presents with    Other     Family wants his labs checked out. Hx cancer     Hospital Problem/Diagnosis:  Principal Problem:    Complicated UTI (urinary tract infection)  Resolved Problems:    * No resolved hospital problems.  *    Imaging:   No orders to display     Abnormal labs:   Abnormal Labs Reviewed   CBC WITH AUTO DIFFERENTIAL - Abnormal; Notable for the following components:       Result Value    WBC 12.4 (*)     Hemoglobin 11.4 (*)     Hematocrit 35.4 (*)     MCV 75.0 (*)     MCH 24.2 (*)     RDW 17.4 (*)     Platelets 060 (*)     Segs Relative 76.9 (*)     Lymphocytes % 15.4 (*)     Monocytes % 6.1 (*)     All other components within normal limits   COMPREHENSIVE METABOLIC PANEL - Abnormal; Notable for the following components:    Sodium 134 (*)     Chloride 95 (*)     Creatinine 0.7 (*)     Glucose 110 (*)     Calcium 13.1 (*)     Total Protein 8.8 (*)     ALT 9 (*)     Alkaline Phosphatase 205 (*)     All other components within normal limits   URINALYSIS WITH REFLEX TO CULTURE - Abnormal; Notable for the following components:    Clarity, UA CLOUDY (*)     Blood, Urine MODERATE (*)     Protein, UA 30 (*)     Leukocyte Esterase, Urine LARGE (*)     RBC, UA 23 (*)     WBC, UA 1900 (*)     All other components within normal limits       Abnormal Assessment Findings: abnormal labs, pain, UTI    Background  History:   Past Medical History:   Diagnosis Date    CAD (coronary artery disease) 2013    cath negative per pt    Cancer ([facility] [address] 2021    pt reports he has lung cancer    History of TMJ disorder     Hypertension     Trigeminal neuralgia        Assessment    Vitals/MEWS: MEWS Score: 2  Level of Consciousness: Alert (0)   Vitals:    01/16/23 2200 01/16/23 2201 01/16/23 2230 01/16/23 2345   BP: 112/74 112/74 100/64 96/60   Pulse:  85  87   Resp:  18  18   Temp:    98.4 °F (36.9 °C)   TempSrc:    Oral   SpO2:  98%  99%     O2 Flow Rate: O2 Device: None (Room air)    Cardiac Rhythm: '  Pain Assessment: 4 [] Verbal [] Joretta Ripper Scale  Pain Scale: Pain Assessment  Pain Level: 10  Pain Location: Coccyx  Pain Descriptors: Throbbing  Last documented pain score (0-10 scale) Pain Level: 10  Last documented pain medication administered: 2201  Mental Status: disoriented  NIH Score:    C-SSRS: Risk of Suicide: No Risk  Bedside swallow:    Purchase Coma Scale (GCS): Purchase Coma Scale  Eye Opening: Spontaneous  Best Verbal Response: Incomprehensible speech  Best Motor Response: Obeys commands  Harika Coma Scale Score: 12  Active LDA's:      Pertinent or High Risk Medications/Drips: no   If Yes, please provide details: NA  Pending Blood Product Administration: no     You may also review the ED PT Care Timeline found under the Summary Nursing Index tab. Recommendation    Pending orders NA  Plan for Discharge (if known):    Additional Comments: NA   If any further questions, please call Sending RN at [phone]    Electronically signed by: Electronically signed by Marixa Harris RN on 1/17/2023 at 12:02 AM       Marixa Harris RN  01/16/23 19 [facility] [place]  01/17/23 0002

## 2023-01-17 NOTE — CONSULTS
Consult completed. Patient has a port and wife gave permission to access. Port being access by the ED RN.

## 2023-01-17 NOTE — ED NOTES
This nurse accessed patients port at this time flushed and blood return. Patient tolerated well. Wife at bedside. Primary nurse notified.       James Ventura RN  01/16/23 1854

## 2023-01-17 NOTE — CONSULTS
HEMATOLOGY ONCOLOGY  Consultation Report    REASON FOR CONSULT    H/o prostate cancer, lung cancer, hypercalcemia    CHIEF COMPLAINT    Chief Complaint   Patient presents with    Other     Family wants his labs checked out. Hx cancer       HISTORY OF PRESENT ILLNESS   Sigrid Jeffries is a 46 y.o. male with history of metastatic prostate cancer metastatic lung cancer enrolled in hospice who presented with intractable pain. He endorses poor appetite, poor oral intake. Has chronic indwelling Doherty catheter last exchanged Thursday January 12, 2023. Review of CT A/P on 1/5/2023 notes diffuse bony metastatic disease with progression throughout pelvis and sacrum with replacement by soft tissue mass and posterior extension and paraspinal muscles. Most recently he had restaging PET CT scan January 13, 2023 which showed marked progression of osseous metastatic disease with multiple new hypermetabolic lesions of the superior left scapula, upper thoracic spine, with severe pathologic compression deformity of T1. Significantly enlarged lytic lesions of the sacrum and bilateral iliac bones. New areas of intense uptake in the inferior left scapula and right posterior elements of T10-T11. Note increased size and mildly increased uptake of the mass in the medial left upper lobe abutting the mediastinum. Note hypermetabolic lymphadenopathy in the chest abdomen and pelvis which is new from previous PET 5/16/2022 in progress from more recent CT later in 2022. PSA 12/22/2022 was 144.0. Last known reading prior to that was 976.0 on 3/22/2022. On exam he is restless and calling out in pain. Family member asleep on bed at bedside. He is unable to provide any significant history. He has a Doherty in place. He denies fever, chills, night sweats. Endorses poor appetite. He appears to have lost significant weight since seeing him last time. Endorses generalized weakness and malaise. No nausea, vomiting.   Review of systems limited by patient condition.      PAST MEDICAL HISTORY    Past Medical History:   Diagnosis Date    CAD (coronary artery disease) 12/23/2013    cath negative per pt    Cancer ([address] 06/2021    pt reports he has lung cancer    History of TMJ disorder     Hypertension     Trigeminal neuralgia        SURGICAL HISTORY    Past Surgical History:   Procedure Laterality Date    ABDOMEN SURGERY      CARDIAC CATHETERIZATION  08/03/2016    CT BIOPSY PERCUTANEOUS SUPERFICIAL BONE  12/17/2021    CT BIOPSY PERCUTANEOUS SUPERFICIAL BONE 12/17/2021 [facility] [address] SCAN    CT NEEDLE BIOPSY LUNG PERCUTANEOUS  7/28/2021    CT NEEDLE BIOPSY LUNG PERCUTANEOUS 7/28/2021 [address] [facility] [address] SCAN    IR NEPHROSTOMY CATHETER PLACEMENT  1/5/2023    IR NEPHROSTOMY CATHETER PLACEMENT 1/5/2023 [facility]    PORT SURGERY Right 8/13/2021    MEDIPORT INSERTION performed by Jo Guerra MD at [facility] OR       FAMILY HISTORY    Family History   Problem Relation Age of Onset    High Blood Pressure Mother     High Blood Pressure Sister     Cancer Sister        SOCIAL HISTORY    Social History     Socioeconomic History    Marital status:     Number of children: 5   Tobacco Use    Smoking status: Every Day     Packs/day: 2.00     Years: 39.00     Pack years: 78.00     Types: Cigarettes    Smokeless tobacco: Never    Tobacco comments:     smokes 1-2 a day   Vaping Use    Vaping Use: Never used   Substance and Sexual Activity    Alcohol use: Not Currently     Alcohol/week: 6.0 standard drinks     Types: 6 Cans of beer per week     Comment: per week (24 oz beers)     Drug use: Yes     Types: Marijuana Curlie Opal)     Comment: last 12/1    Sexual activity: Yes     Partners: Female       REVIEW OF SYSTEMS    Constitutional:  Denies fever, chills, loss of appetite, weight loss, tiredness, fatigue or weakness   HEENT:  Denies swelling of neck glands  Respiratory:  Denies cough, shortness of breath or hemoptysis  Cardiovascular:  Denies chest pain, palpitations or swelling   GI:  Denies abdominal pain, nausea, vomiting, constipation or diarrhea   Musculoskeletal:  back pain   Skin:  Denies rash   Neurologic:  Denies headache, focal weakness or sensory changes   All systems negative except as marked. PHYSICAL EXAM    Vitals: /86   Pulse (!) 111   Temp 97.6 °F (36.4 °C) (Oral)   Resp 20   SpO2 96%   CONSTITUTIONAL: awake, distressed (in pain) cachectic, chronically ill appearing  EYES: EOM intact, sclera clear  ENT: Normocephalic, without obvious abnormality, atraumatic  NECK: supple, symmetrical  HEMATOLOGIC/LYMPHATIC: no cervical, supraclavicular or axillary lymphadenopathy   LUNGS: no increased work of breathing and clear to auscultation   CARDIOVASCULAR: tachycardic, normal S1 and S2, no murmur noted  ABDOMEN: normal bowel sounds x 4, soft,      MUSCULOSKELETAL: +tenderness to lower spine full range of motion noted, tone is normal  NEUROLOGIC: awake, alert, oriented to name, moaning in pain  SKIN:warm, dry, no jaundice   EXTREMITIES: no LE edema, no cyanosis, no clubbing    LABORATORY RESULTS  CBC:   Recent Labs     01/16/23 1926 01/17/23  0510   WBC 12.4* 9.7   HGB 11.4* 8.3*   * 413     BMP:    Recent Labs     01/16/23 1926 01/17/23  0510   * 132*   K 4.9 4.1   CL 95* 99   CO2 28 25   BUN 10 11   CREATININE 0.7* 0.6*   GLUCOSE 110* 118*     Hepatic:   Recent Labs     01/16/23 1926   AST 18   ALT 9*   BILITOT 0.2   ALKPHOS 205*     ASSESSMENT/RECOMMENDATION    Metastatic prostate cancer-Unclear for what period of time he took Zytiga however PSA did decline. consider initiating Zytiga +5 mg prednisone twice daily if patient agreeable    Metastatic squamous cell lung cancer  - SCC with lung primary biopsy-proven metastatic to bone as well as prostate cancer with biopsy-proven bone marrow involvement  Did have positive response to therapy-carbo platinum/pembrolizumab/pemetrexed-initially however ongoing pattern of extreme noncompliance. Recently restarted with hospice, unable to fully discussed with patient due to intractable pain. However should patient decide to pursue treatment we would offer Keytruda. Anemia  Hemoglobin 12.4. He has known bone marrow infiltration. Baseline hemoglobin 9.5-10.5. Pain:  Fentanyl patch  Increased dilaudid to 1 mg q4 hours PRN along with Oxy IR 20 mg   Recommend adequate bowel regimen    UTI- on antibiotics    We will follow the patient. Thank you for allowing me to participate in the care of this very pleasant patient. Chart reviewed  Pt seen  PET scan reviewed  Wanted to discuss care plan as above mentioned but was unable to as seemed to be in pain and was not cooperative and did not seem to comprehend  Tried to wake up attendee but she would not open her eyes  Adequqte pain control  Met prostate and met lung   Have family meeting in the am.   I have independently evaluated and examined this patient today. I have reviewed radiologic and biochemical tests on this patient. Management Plan is developed mutually with Radha Man NP.  I have reviewed above note and agree with assessment and plan  TOMI

## 2023-01-17 NOTE — ED PROVIDER NOTES
Emergency Department Encounter    Patient: Cyndi Otero  MRN: [id number]  : 1970  Date of Evaluation: 2023  ED Provider:  Bella Brooks DO    Triage Chief Complaint:   Other (Family wants his labs checked out. Hx cancer)    [place]:  Cyndi Otero is a 46 y.o. male that presents to the emergency department with his wife for evaluation of patient not acting right in severe pain and concerning on his labs checked. Wife states he has history of prostate cancer that spread everywhere. She states he had a PET scan this past Friday 3 days ago which has gotten worse. She states he is on multiple pain medications at home including morphine, oxycodone, fentanyl patches. She states abdominal pain is getting worse and medicines not helping. She states she also concern for UTI had a urinary tract infection last week nephrostomy tube was removed and the Doherty catheter was placed. Patient unable to give any history of present illness. Patient writhing in pain. Wife gives the history. ROS - see HPI, below listed is current ROS at time of my eval:  Review of system obtained from wife at the bedside due to patient inability to give any history of present illness due to cancer metastasis and intractable pain.     Past Medical History:   Diagnosis Date    CAD (coronary artery disease) 2013    cath negative per pt    Cancer ([address] 2021    pt reports he has lung cancer    History of TMJ disorder     Hypertension     Trigeminal neuralgia      Past Surgical History:   Procedure Laterality Date    ABDOMEN SURGERY      CARDIAC CATHETERIZATION  2016    CT BIOPSY PERCUTANEOUS SUPERFICIAL BONE  2021    CT BIOPSY PERCUTANEOUS SUPERFICIAL BONE 2021 [facility] [address] SCAN    CT NEEDLE BIOPSY LUNG PERCUTANEOUS  2021    CT NEEDLE BIOPSY LUNG PERCUTANEOUS 2021 [facility] CT SCAN    IR NEPHROSTOMY CATHETER PLACEMENT  2023    IR NEPHROSTOMY CATHETER PLACEMENT 2023 [facility] PORT SURGERY Right 8/13/2021    MEDIPORT INSERTION performed by Prudence Trujillo MD at [facility] OR     Family History   Problem Relation Age of Onset    High Blood Pressure Mother     High Blood Pressure Sister     Cancer Sister      Social History     Socioeconomic History    Marital status:      Spouse name: Not on file    Number of children: 5    Years of education: Not on file    Highest education level: Not on file   Occupational History    Not on file   Tobacco Use    Smoking status: Every Day     Packs/day: 2.00     Years: 39.00     Pack years: 78.00     Types: Cigarettes    Smokeless tobacco: Never    Tobacco comments:     smokes 1-2 a day   Vaping Use    Vaping Use: Never used   Substance and Sexual Activity    Alcohol use: Not Currently     Alcohol/week: 6.0 standard drinks     Types: 6 Cans of beer per week     Comment: per week (24 oz beers)     Drug use: Yes     Types: Marijuana Sabrina Awkward)     Comment: last 12/1    Sexual activity: Yes     Partners: Female   Other Topics Concern    Not on file   Social History Narrative    Not on file     Social Determinants of Health     Financial Resource Strain: Not on file   Food Insecurity: Not on file   Transportation Needs: Not on file   Physical Activity: Not on file   Stress: Not on file   Social Connections: Not on file   Intimate Partner Violence: Not on file   Housing Stability: Not on file     Current Facility-Administered Medications   Medication Dose Route Frequency Provider Last Rate Last Admin    0.9 % sodium chloride bolus  1,000 mL IntraVENous Once Bridget Quispe,         ondansetron (ZOFRAN-ODT) disintegrating tablet 4 mg  4 mg Oral Once Bridget Quispe DO        cefTRIAXone (ROCEPHIN) 1,000 mg in dextrose 5 % 50 mL IVPB mini-bag  1,000 mg IntraVENous Once Bridget Blank, DO         Current Outpatient Medications   Medication Sig Dispense Refill    gabapentin (NEURONTIN) 600 MG tablet Take 600 mg by mouth 3 times daily.       fentaNYL (DURAGESIC) 75 MCG/HR Place 1 patch onto the skin every 72 hours. polyethylene glycol (GLYCOLAX) 17 g packet Take 17 g by mouth daily as needed for Constipation 527 g 1    oxyCODONE (OXYCONTIN) 20 MG extended release tablet Take 20 mg by mouth in the morning and 20 mg in the evening. predniSONE (DELTASONE) 20 MG tablet Take 20 mg by mouth daily      cyclobenzaprine (FLEXERIL) 10 MG tablet Take 10 mg by mouth 3 times daily as needed for Muscle spasms      ALPRAZolam (XANAX) 0.5 MG tablet Take 0.5 mg by mouth 3 times daily as needed for Sleep or Anxiety (Twice Daily PRN). naloxone 4 MG/0.1ML LIQD nasal spray 1 spray by Nasal route as needed for Opioid Reversal 1 each 5    Calcium Carbonate-Vitamin D (OYSTER SHELL CALCIUM/D) 500-200 MG-UNIT TABS Take 1 tablet by mouth daily 30 tablet 3    cyanocobalamin ([employer] VITAMIN B12) 1000 MCG tablet Take 1 tablet by mouth daily 30 tablet 3    ondansetron (ZOFRAN) 8 MG tablet take 1 tablet by mouth every 8 hours if needed for nausea and vomiting      Sennosides (SENNA) 8.6 MG CAPS Take 1 capsule by mouth 2 times daily as needed (constipation) 20 capsule 0     Allergies   Allergen Reactions    Tramadol Other (See Comments)     seizures    Morphine Hallucinations    Vicodin [Hydrocodone-Acetaminophen] Hives       Nursing Notes Reviewed    Physical Exam:  Triage VS:    ED Triage Vitals [01/16/23 1829]   Enc Vitals Group      /70      Heart Rate (!) 120      Resp 18      Temp 98.3 °F (36.8 °C)      Temp Source Oral      SpO2 97 %      Weight       Height       Head Circumference       Peak Flow       Pain Score       Pain Loc       Pain Edu? Excl. in [address]? BP 96/60   Pulse 87   Temp 98.4 °F (36.9 °C) (Oral)   Resp 18   SpO2 99%       My pulse ox interpretation is - normal    General appearance:  No acute distress. Writhing in pain,  Skin:  Warm. Dry. Eye:  Extraocular movements intact.      Ears, nose, mouth and throat:  Oral mucosa slightly dry  Neck: Trachea midline. Extremity:  No swelling. Normal ROM     Heart: Sinus tachycardia, normal S1 & S2, no extra heart sounds. Perfusion:  intact  Respiratory: Port to the right anterior chest, lungs clear to auscultation bilaterally. Respirations nonlabored. Abdominal:  Normal bowel sounds. Soft. Nontender. Non distended. Back: Severe pain to the sacrum region no signs of infection.   No CVA tenderness to palpation     Neurological:  Alert and oriented x1, writhing in pain, unable to perform full neurologic exam due to patient intractable pain writhing             I have reviewed and interpreted all of the currently available lab results from this visit (if applicable):  Results for orders placed or performed during the hospital encounter of 01/16/23   CBC with Diff   Result Value Ref Range    WBC 12.4 (H) 4.0 - 10.5 K/CU MM    RBC 4.72 4.6 - 6.2 M/CU MM    Hemoglobin 11.4 (L) 13.5 - 18.0 GM/DL    Hematocrit 35.4 (L) 42 - 52 %    MCV 75.0 (L) 78 - 100 FL    MCH 24.2 (L) 27 - 31 PG    MCHC 32.2 32.0 - 36.0 %    RDW 17.4 (H) 11.7 - 14.9 %    Platelets 042 (H) 288 - 440 K/CU MM    MPV 8.9 7.5 - 11.1 FL    Differential Type AUTOMATED DIFFERENTIAL     Segs Relative 76.9 (H) 36 - 66 %    Lymphocytes % 15.4 (L) 24 - 44 %    Monocytes % 6.1 (H) 0 - 4 %    Eosinophils % 0.8 0 - 3 %    Basophils % 0.4 0 - 1 %    Segs Absolute 9.6 K/CU MM    Lymphocytes Absolute 1.9 K/CU MM    Monocytes Absolute 0.8 K/CU MM    Eosinophils Absolute 0.1 K/CU MM    Basophils Absolute 0.1 K/CU MM    Nucleated RBC % 0.0 %    Total Nucleated RBC 0.0 K/CU MM    Total Immature Neutrophil 0.05 K/CU MM    Immature Neutrophil % 0.4 0 - 0.43 %   CMP   Result Value Ref Range    Sodium 134 (L) 135 - 145 MMOL/L    Potassium 4.9 3.5 - 5.1 MMOL/L    Chloride 95 (L) 99 - 110 mMol/L    CO2 28 21 - 32 MMOL/L    BUN 10 6 - 23 MG/DL    Creatinine 0.7 (L) 0.9 - 1.3 MG/DL    Est, Glom Filt Rate >60 >60 mL/min/1.73m2    Glucose 110 (H) 70 - 99 MG/DL Calcium 13.1 (H) 8.3 - 10.6 MG/DL    Albumin 3.7 3.4 - 5.0 GM/DL    Total Protein 8.8 (H) 6.4 - 8.2 GM/DL    Total Bilirubin 0.2 0.0 - 1.0 MG/DL    ALT 9 (L) 10 - 40 U/L    AST 18 15 - 37 IU/L    Alkaline Phosphatase 205 (H) 40 - 129 IU/L    Anion Gap 11 4 - 16   Lipase   Result Value Ref Range    Lipase 21 13 - 60 IU/L   Urinalysis with Reflex to Culture    Specimen: Urine   Result Value Ref Range    Color, UA YELLOW YELLOW    Clarity, UA CLOUDY (A) CLEAR    Glucose, Urine NEGATIVE NEGATIVE MG/DL    Bilirubin Urine NEGATIVE NEGATIVE MG/DL    Ketones, Urine NEGATIVE NEGATIVE MG/DL    Specific Gravity, UA 1.020 1.001 - 1.035    Blood, Urine MODERATE (A) NEGATIVE    pH, Urine 6.0 5.0 - 8.0    Protein, UA 30 (A) NEGATIVE MG/DL    Urobilinogen, Urine NORMAL 0.2 - 1.0 MG/DL    Nitrite Urine, Quantitative NEGATIVE NEGATIVE    Leukocyte Esterase, Urine LARGE (A) NEGATIVE    RBC, UA 23 (H) 0 - 3 /HPF    WBC, UA 1900 (H) 0 - 2 /HPF    WBC Clumps, UA MANY /HPF    Bacteria, UA NEGATIVE NEGATIVE /HPF    Trichomonas, UA NONE SEEN NONE SEEN /HPF      Radiographs (if obtained):  Radiologist's Report Reviewed:  No results found.     EKG (if obtained): (All EKG's are interpreted by myself in the absence of a cardiologist)    Medications   0.9 % sodium chloride bolus (has no administration in time range)   ondansetron (ZOFRAN-ODT) disintegrating tablet 4 mg (has no administration in time range)   cefTRIAXone (ROCEPHIN) 1,000 mg in dextrose 5 % 50 mL IVPB mini-bag (has no administration in time range)   0.9 % sodium chloride bolus (has no administration in time range)   ketorolac (TORADOL) injection 30 mg (has no administration in time range)   lidocaine-prilocaine (EMLA) cream ( Topical Given by Other 1/16/23 2109)   glycerin (ADULT) suppository 2 g (2 g Rectal Given 1/16/23 2205)   HYDROmorphone (DILAUDID) injection 1 mg (1 mg IntraMUSCular Given 1/16/23 2201)       MDM:  Patient is a 50-year-old male with history of prostate cancer with metastasis to the bone, right right anterior port, chronic pain, coronary artery disease, LVH, cardiomyopathy, malnutrition who presents to the emergency department with wife who states patient is in severe pain he has cancer she wants his labs checked he is not acting right. She states he had a UTI last week. Patient wife gives a history due to his chronic disease and pain. Wife states patient got a PET scan Friday 3 days ago and he is in severe pain. She states last couple days he not been verbalizing or talking just been mostly crying and moaning out. She states he is on fentanyl oxycodone Percocets morphine and Xanax and prednisone and they have increase the gabapentin he is still in severe pain. States the cancer spread to his bone and pain is intractable. She states he also had a UTI last week had a nephrostomy tube they did not replace it they put a Doherty catheter and she feels he may have a UTI again. She states he just not acting right is not himself. Brought in for evaluation. Patient nonverbal just moaning and writhing around in pain. I did order patient Dilaudid Zofran and IV fluids. Nursing unable to access the port. I did order IM Dilaudid 1 mg and Zofran ODT and ordered Emla cream to the right anterior port in order for her to be accessed. Nursing staff did get some blood from patient peripheral veins. Laboratory studies show elevated white blood cell count of 12.4. Patient chronic anemia hemoglobin 11.4, platelets elevated 151. Patient sodium slightly low 134. Patient on potassium kidney function. Patient elevated calcium level 13.1. Patient glucose 110. Patient normal liver enzymes elevated alk phos at 205. Normal lipase. Urinalysis obtained from patient's Doherty catheter and shows large leukocytes 23 red blood cells, 1900 white blood cells many clumps and cloudy. Patient ordered Rocephin 1 g IV. Patient blood pressure 100/64 he did get some IV fluids ordered earlier. Patient and family updated on labs and urinalysis findings. Patient will need admitted for further evaluation treatment and management. Hospitalist consulted will be admitting patient to their service. Clinical Impression:  1. Urinary tract infection associated with indwelling urethral catheter, initial encounter ([facility] [address]    2. Prostate cancer ([facility] [address]    3. Secondary malignant neoplasm of bone ([facility] [address]    4. Chronic intractable pain          ED Provider Disposition Time  DISPOSITION Decision To Admit 01/16/2023 10:59:21 PM      Comment: Please note this report has been produced using speech recognition software and may contain errors related to that system including errors in grammar, punctuation, and spelling, as well as words and phrases that may be inappropriate. Efforts were made to edit the dictations.         Divine Hannon DO  01/17/23 0005

## 2023-01-18 PROBLEM — C34.90 MALIGNANT NEOPLASM OF LUNG (HCC): Status: ACTIVE | Noted: 2023-01-18

## 2023-01-18 LAB
ALBUMIN SERPL-MCNC: 2.6 GM/DL (ref 3.4–5)
ALP BLD-CCNC: 140 IU/L (ref 40–128)
ALT SERPL-CCNC: 6 U/L (ref 10–40)
ANION GAP SERPL CALCULATED.3IONS-SCNC: 8 MMOL/L (ref 4–16)
AST SERPL-CCNC: 12 IU/L (ref 15–37)
BILIRUB SERPL-MCNC: 0.2 MG/DL (ref 0–1)
BUN BLDV-MCNC: 7 MG/DL (ref 6–23)
CALCIUM SERPL-MCNC: 11.8 MG/DL (ref 8.3–10.6)
CHLORIDE BLD-SCNC: 100 MMOL/L (ref 99–110)
CO2: 25 MMOL/L (ref 21–32)
CREAT SERPL-MCNC: 0.4 MG/DL (ref 0.9–1.3)
GFR SERPL CREATININE-BSD FRML MDRD: >60 ML/MIN/1.73M2
GLUCOSE BLD-MCNC: 110 MG/DL (ref 70–99)
POTASSIUM SERPL-SCNC: 3.7 MMOL/L (ref 3.5–5.1)
SODIUM BLD-SCNC: 133 MMOL/L (ref 135–145)
TOTAL PROTEIN: 5.9 GM/DL (ref 6.4–8.2)

## 2023-01-18 PROCEDURE — 6360000002 HC RX W HCPCS: Performed by: NURSE PRACTITIONER

## 2023-01-18 PROCEDURE — 1200000000 HC SEMI PRIVATE

## 2023-01-18 PROCEDURE — 94761 N-INVAS EAR/PLS OXIMETRY MLT: CPT

## 2023-01-18 PROCEDURE — 99232 SBSQ HOSP IP/OBS MODERATE 35: CPT | Performed by: INTERNAL MEDICINE

## 2023-01-18 PROCEDURE — 6370000000 HC RX 637 (ALT 250 FOR IP): Performed by: INTERNAL MEDICINE

## 2023-01-18 PROCEDURE — 6360000002 HC RX W HCPCS: Performed by: INTERNAL MEDICINE

## 2023-01-18 PROCEDURE — 2580000003 HC RX 258: Performed by: INTERNAL MEDICINE

## 2023-01-18 PROCEDURE — 80053 COMPREHEN METABOLIC PANEL: CPT

## 2023-01-18 RX ADMIN — SODIUM CHLORIDE: 9 INJECTION, SOLUTION INTRAVENOUS at 13:50

## 2023-01-18 RX ADMIN — SODIUM CHLORIDE, PRESERVATIVE FREE 10 ML: 5 INJECTION INTRAVENOUS at 08:23

## 2023-01-18 RX ADMIN — CEFEPIME HYDROCHLORIDE 2000 MG: 2 INJECTION, POWDER, FOR SOLUTION INTRAVENOUS at 02:38

## 2023-01-18 RX ADMIN — HYDROMORPHONE HYDROCHLORIDE 1 MG: 1 INJECTION, SOLUTION INTRAMUSCULAR; INTRAVENOUS; SUBCUTANEOUS at 04:02

## 2023-01-18 RX ADMIN — SODIUM CHLORIDE: 9 INJECTION, SOLUTION INTRAVENOUS at 23:35

## 2023-01-18 RX ADMIN — OXYCODONE HYDROCHLORIDE 20 MG: 10 TABLET ORAL at 05:14

## 2023-01-18 RX ADMIN — HYDROMORPHONE HYDROCHLORIDE 1 MG: 1 INJECTION, SOLUTION INTRAMUSCULAR; INTRAVENOUS; SUBCUTANEOUS at 16:34

## 2023-01-18 RX ADMIN — GABAPENTIN 600 MG: 300 CAPSULE ORAL at 09:29

## 2023-01-18 RX ADMIN — ENOXAPARIN SODIUM 40 MG: 100 INJECTION SUBCUTANEOUS at 09:30

## 2023-01-18 RX ADMIN — PREDNISONE 20 MG: 20 TABLET ORAL at 09:28

## 2023-01-18 RX ADMIN — SENNOSIDES AND DOCUSATE SODIUM 2 TABLET: 50; 8.6 TABLET ORAL at 21:10

## 2023-01-18 RX ADMIN — GABAPENTIN 600 MG: 300 CAPSULE ORAL at 21:10

## 2023-01-18 RX ADMIN — DULOXETINE 60 MG: 30 CAPSULE, DELAYED RELEASE ORAL at 09:29

## 2023-01-18 RX ADMIN — ACETAMINOPHEN 650 MG: 325 TABLET ORAL at 05:14

## 2023-01-18 RX ADMIN — ALPRAZOLAM 1 MG: 0.5 TABLET ORAL at 09:28

## 2023-01-18 RX ADMIN — SENNOSIDES AND DOCUSATE SODIUM 2 TABLET: 50; 8.6 TABLET ORAL at 09:29

## 2023-01-18 RX ADMIN — CEFEPIME HYDROCHLORIDE 2000 MG: 2 INJECTION, POWDER, FOR SOLUTION INTRAVENOUS at 09:40

## 2023-01-18 RX ADMIN — ALPRAZOLAM 1 MG: 0.5 TABLET ORAL at 21:10

## 2023-01-18 RX ADMIN — HYDROMORPHONE HYDROCHLORIDE 1 MG: 1 INJECTION, SOLUTION INTRAMUSCULAR; INTRAVENOUS; SUBCUTANEOUS at 08:22

## 2023-01-18 RX ADMIN — ACETAMINOPHEN 650 MG: 325 TABLET ORAL at 17:36

## 2023-01-18 RX ADMIN — CYANOCOBALAMIN TAB 1000 MCG 1000 MCG: 1000 TAB at 09:29

## 2023-01-18 RX ADMIN — TAMSULOSIN HYDROCHLORIDE 0.4 MG: 0.4 CAPSULE ORAL at 09:29

## 2023-01-18 RX ADMIN — CEFEPIME HYDROCHLORIDE 2000 MG: 2 INJECTION, POWDER, FOR SOLUTION INTRAVENOUS at 17:36

## 2023-01-18 RX ADMIN — OXYCODONE HYDROCHLORIDE 20 MG: 10 TABLET ORAL at 17:36

## 2023-01-18 RX ADMIN — ALPRAZOLAM 1 MG: 0.5 TABLET ORAL at 13:46

## 2023-01-18 RX ADMIN — GABAPENTIN 600 MG: 300 CAPSULE ORAL at 13:46

## 2023-01-18 RX ADMIN — HYDROMORPHONE HYDROCHLORIDE 1 MG: 1 INJECTION, SOLUTION INTRAMUSCULAR; INTRAVENOUS; SUBCUTANEOUS at 21:10

## 2023-01-18 ASSESSMENT — PAIN SCALES - GENERAL
PAINLEVEL_OUTOF10: 0
PAINLEVEL_OUTOF10: 0
PAINLEVEL_OUTOF10: 10
PAINLEVEL_OUTOF10: 7

## 2023-01-18 ASSESSMENT — PAIN DESCRIPTION - LOCATION: LOCATION: OTHER (COMMENT)

## 2023-01-18 ASSESSMENT — PAIN SCALES - WONG BAKER
WONGBAKER_NUMERICALRESPONSE: 0
WONGBAKER_NUMERICALRESPONSE: 2

## 2023-01-18 ASSESSMENT — PAIN - FUNCTIONAL ASSESSMENT
PAIN_FUNCTIONAL_ASSESSMENT: ACTIVITIES ARE NOT PREVENTED
PAIN_FUNCTIONAL_ASSESSMENT: ACTIVITIES ARE NOT PREVENTED

## 2023-01-18 NOTE — PROGRESS NOTES
V2.0  [facility] Hospitalist Progress Note      Name:  Gay Mejia /Age/Sex: 1970  (46 y.o. male)   MRN & CSN:  [id number] & [id number] Encounter Date/Time: 2023 11:14 AM EST    Location:  [address] [place] [address]-A PCP: Jill Cochran MD       Hospital Day: 3    Assessment and Plan:   Gay Mejia is a 46 y.o. male with UTI      Plan: Patient appearing much more comfortable and cooperative today. Being fed pudding. NAD. Family present and aware of plan. Will need to wait for urine culture prior to de-escalation of abx. #.  Complicated UTI  -Patient has a chronic indwelling Doherty catheter, recently exchanged 2023 as per wife at bedside.  -Patient had left percutaneous nephrostomy tube placed at [facility] 2022). Patient had a recent admission for dislodged nephrostomy tube and IR could not recanalize. Patient did not want nephrostomy tube reinserted. -UA-cloudy, moderate blood, large leukocyte esterase, nitrate negative, WBC 1900, many WBC clumps, bacteria negative  -Patient has mild leukocytosis, tachycardia  -Urine culture sent from ER  -Check procalcitonin  -Continue cefepime-has urine cultures-2022 grew Acinetobacter     #. Intractable pain secondary to bone mets  -Patient still groaning and moaning in pain  -Got Dilaudid in ER, gave a dose of Toradol  -Continue home medications, IV Dilaudid as needed.  -As per documentation patient is currently under hospice since 2022. Wife at bedside reported that patient is a full code and does not have a DNR. #.  Hypercalcemia  -Calcium 13.1  -Patient received 1 L fluid bolus in ER  -Ordered 1 more bolus  -Continue IV fluids at 150 cc/h  -Monitor with repeat BMP     #. Squamous cell carcinoma-overlapping sites of left lung  -Completed palliative radiation to the left lung and pelvic area  -Bone biopsy on dec 13 2021 with met squamous cell cancer, consistent with lung primary.  -He was admitted at [facility] in April ( - ).  He had debulking of his primary tumor via bronchoscopy during admit. #.  Prostate cancer with bone mets  -BMB at [facility] consistent with metastatic prostate cancer  -Patient had PET scan-1/13/2023-reported marked progression of osseous metastatic disease with multiple new hypermetabolic lesions, compression deformity of T1, increased size and mildly increased uptake of the mass in the medial left upper lobe abutting the mediastinum. #.  History of urinary retention and has chronic indwelling Doherty catheter     #. Nonischemic cardiomyopathy  -LHC-8/2016-normal coronaries  -Echo-8/2016-EF 55-60%, aortic stenosis, trace mitral regurgitation, trace tricuspid regurgitation. #.  Chronic microcytic anemia     #. History of cocaine abuse as per EMR-12/2012  -UDS + for cocaine-recently 7/13/2022 and cannabinoids-9/2020     #. Chronic pain  -Patient is on oxycodone IR 20 mg twice daily, Percocet , 2 tabs every 6 hours as needed, gabapentin, duloxetine, fentanyl patch, prednisone unable to perform as patient is not answering any questions. .     #.  Anxiety disorder-on alprazolam 1 mg 3 times daily. Ppx:  Dispo:     Subjective:     Chief Complaint: Other (Family wants his labs checked out. Hx cancer)       Patient mentation improved. F/u calcium level. F/u urine culture. Review of Systems:    Review of Systems    10 point ROS negative except as stated above in \"subjective\" section    Objective: Intake/Output Summary (Last 24 hours) at 1/18/2023 1014  Last data filed at 1/18/2023 0620  Gross per 24 hour   Intake --   Output 1600 ml   Net -1600 ml          Vitals:   Vitals:    01/18/23 0817   BP: 131/82   Pulse: (!) 102   Resp: 18   Temp: 98.2 °F (36.8 °C)   SpO2: 98%       Physical Exam:     General: NAD  Eyes: EOMI  ENT: neck supple  Cardiovascular: Regular rate.   Respiratory: Clear to auscultation  Gastrointestinal: Soft, non tender  Genitourinary: no suprapubic tenderness  Musculoskeletal: No edema  Skin: warm, dry  Neuro: Alert. Psych: Mood appropriate. Medications:   Medications:    sodium chloride flush  5-40 mL IntraVENous 2 times per day    enoxaparin  40 mg SubCUTAneous Daily    cyanocobalamin  1,000 mcg Oral Daily    fentaNYL  1 patch TransDERmal Q72H    gabapentin  600 mg Oral TID    predniSONE  20 mg Oral Daily    cefepime  2,000 mg IntraVENous q8h    sennosides-docusate sodium  2 tablet Oral BID    DULoxetine  60 mg Oral Daily    tamsulosin  0.4 mg Oral Daily    ALPRAZolam  1 mg Oral TID    sodium chloride  1,000 mL IntraVENous Once      Infusions:    sodium chloride      sodium chloride 150 mL/hr at 01/17/23 2033     PRN Meds: sodium chloride flush, 5-40 mL, PRN  sodium chloride, , PRN  ondansetron, 4 mg, Q8H PRN   Or  ondansetron, 4 mg, Q6H PRN  polyethylene glycol, 17 g, Daily PRN  naloxone, 4 mg, PRN  oxyCODONE, 20 mg, Q6H PRN   And  acetaminophen, 650 mg, Q6H PRN  HYDROmorphone, 1 mg, Q4H PRN      Labs      No results found for this or any previous visit (from the past 24 hour(s)). Imaging/Diagnostics Last 24 Hours   No results found.     Electronically signed by Shelton Newton MD on 1/18/2023 at 10:14 AM

## 2023-01-18 NOTE — CARE COORDINATION
Met c pt's spouse at bedside to continue discharge planning- she confirmed that all the family was present for meeting that took place this am with Dr. Shanice Sands. She states after discussion they agreed to try treatment at this time and put hospice services on hold. If pt does not respond to treatment then they will consider transitioning back to hospice services (but not with [facility] but likely with [facility]). Upon discharge current goals is for spouse to take pt back home, she would like [address] [place] [address] as they were already working on getting aides arranged with them through his waiver services. Pt currently has hospital bed/ home 02/ nebulizer/ w/c and shower chair through [facility]. I did advise that once she signs off hospice services the provider will likely come and remove his equipment therefore we will need to ensure hospital bed/ home 02 and nebulizer's are ordered through his insurance upon discharge. She has no pref for DME provider. Pt is active with [facility]. This CM requested orders for [address] eval/ wheelchair/ BSC.

## 2023-01-18 NOTE — PROGRESS NOTES
HEMATOLOGY ONCOLOGY  Progress note     REASON FOR CONSULT    H/o prostate cancer, lung cancer, hypercalcemia    CHIEF COMPLAINT    Chief Complaint   Patient presents with    Other     Family wants his labs checked out. Hx cancer       HISTORY OF PRESENT ILLNESS   Barbara Mendez is a 46 y.o. male with history of metastatic prostate cancer metastatic lung cancer enrolled in hospice who presented with intractable pain. He endorses poor appetite, poor oral intake. Has chronic indwelling Doherty catheter last exchanged Thursday January 12, 2023. Review of CT A/P on 1/5/2023 notes diffuse bony metastatic disease with progression throughout pelvis and sacrum with replacement by soft tissue mass and posterior extension and paraspinal muscles. Most recently he had restaging PET CT scan January 13, 2023 which showed marked progression of osseous metastatic disease with multiple new hypermetabolic lesions of the superior left scapula, upper thoracic spine, with severe pathologic compression deformity of T1. Significantly enlarged lytic lesions of the sacrum and bilateral iliac bones. New areas of intense uptake in the inferior left scapula and right posterior elements of T10-T11. Note increased size and mildly increased uptake of the mass in the medial left upper lobe abutting the mediastinum. Note hypermetabolic lymphadenopathy in the chest abdomen and pelvis which is new from previous PET 5/16/2022 in progress from more recent CT later in 2022. PSA 12/22/2022 was 144.0. Last known reading prior to that was 976.0 on 3/22/2022. On exam he is restless and calling out in pain. Family member asleep on bed at bedside. He is unable to provide any significant history. He has a Doherty in place. He denies fever, chills, night sweats. Endorses poor appetite. He appears to have lost significant weight since seeing him last time. Endorses generalized weakness and malaise. No nausea, vomiting.   Review of systems limited by patient condition.     1/18/23: Pt seen and examined  Pain better controlled   More alert today  No fever  No bleeding       PAST MEDICAL HISTORY    Past Medical History:   Diagnosis Date    CAD (coronary artery disease) 12/23/2013    cath negative per pt    Cancer ([address] 06/2021    pt reports he has lung cancer    History of TMJ disorder     Hypertension     Trigeminal neuralgia        SURGICAL HISTORY    Past Surgical History:   Procedure Laterality Date    ABDOMEN SURGERY      CARDIAC CATHETERIZATION  08/03/2016    CT BIOPSY PERCUTANEOUS SUPERFICIAL BONE  12/17/2021    CT BIOPSY PERCUTANEOUS SUPERFICIAL BONE 12/17/2021 [facility] CT SCAN    CT NEEDLE BIOPSY LUNG PERCUTANEOUS  7/28/2021    CT NEEDLE BIOPSY LUNG PERCUTANEOUS 7/28/2021 [facility] CT SCAN    IR NEPHROSTOMY CATHETER PLACEMENT  1/5/2023    IR NEPHROSTOMY CATHETER PLACEMENT 1/5/2023 [facility] SPECIAL PROCEDURES    PORT SURGERY Right 8/13/2021    MEDIPORT INSERTION performed by Zenon Perla MD at [facility] OR       FAMILY HISTORY    Family History   Problem Relation Age of Onset    High Blood Pressure Mother     High Blood Pressure Sister     Cancer Sister        SOCIAL HISTORY    Social History     Socioeconomic History    Marital status:     Number of children: 5   Tobacco Use    Smoking status: Every Day     Packs/day: 2.00     Years: 39.00     Pack years: 78.00     Types: Cigarettes    Smokeless tobacco: Never    Tobacco comments:     smokes 1-2 a day   Vaping Use    Vaping Use: Never used   Substance and Sexual Activity    Alcohol use: Not Currently     Alcohol/week: 6.0 standard drinks     Types: 6 Cans of beer per week     Comment: per week (24 oz beers)     Drug use: Yes     Types: Marijuana Cynthia November)     Comment: last 12/1    Sexual activity: Yes     Partners: Female       REVIEW OF SYSTEMS    Constitutional:  Denies fever, chills, loss of appetite, weight loss, tiredness, fatigue or weakness   HEENT:  Denies swelling of neck glands  Respiratory: Denies cough, shortness of breath or hemoptysis  Cardiovascular:  Denies chest pain, palpitations or swelling   GI:  Denies abdominal pain, nausea, vomiting, constipation or diarrhea   Musculoskeletal:  back pain   Skin:  Denies rash   Neurologic:  Denies headache, focal weakness or sensory changes   All systems negative except as marked. PHYSICAL EXAM    Vitals: /82   Pulse (!) 102   Temp 98.2 °F (36.8 °C) (Oral)   Resp 18   SpO2 98%   CONSTITUTIONAL: awake, distressed (in pain) cachectic, chronically ill appearing  EYES: EOM intact, sclera clear  ENT: Normocephalic, without obvious abnormality, atraumatic  NECK: supple, symmetrical  HEMATOLOGIC/LYMPHATIC: no cervical, supraclavicular or axillary lymphadenopathy   LUNGS: no increased work of breathing and clear to auscultation   CARDIOVASCULAR: tachycardic, normal S1 and S2, no murmur noted  ABDOMEN: normal bowel sounds x 4, soft,      MUSCULOSKELETAL: +tenderness to lower spine full range of motion noted, tone is normal  NEUROLOGIC: awake, alert, oriented to name, moaning in pain  SKIN:warm, dry, no jaundice   EXTREMITIES: no LE edema, no cyanosis, no clubbing    LABORATORY RESULTS  CBC:   Recent Labs     01/16/23 1926 01/17/23  0510   WBC 12.4* 9.7   HGB 11.4* 8.3*   * 413     BMP:    Recent Labs     01/16/23 1926 01/17/23  0510 01/18/23  1130   * 132* 133*   K 4.9 4.1 3.7   CL 95* 99 100   CO2 28 25 25   BUN 10 11 7   CREATININE 0.7* 0.6* 0.4*   GLUCOSE 110* 118* 110*     Hepatic:   Recent Labs     01/16/23 1926 01/18/23  1130   AST 18 12*   ALT 9* 6*   BILITOT 0.2 0.2   ALKPHOS 205* 140*     ASSESSMENT/RECOMMENDATION    Metastatic prostate cancer-PET scan results noted. Unclear for what period of time he took Zytiga however PSA did decline.   consider initiating Zytiga +5 mg prednisone twice daily i    Metastatic squamous cell lung cancer  - SCC with lung primary biopsy-proven metastatic to bone as well as prostate cancer with biopsy-proven bone marrow involvement  Did have positive response to therapy-carbo platinum/pembrolizumab/pemetrexed x 1 followed by Neville Aschoff x 1-initially however ongoing pattern of extreme noncompliance. Was with hospice and recently revoked as he wishes to proceed with treatment. Discussed Single agent CPI. Discussed adverse effects    Hypercalcemia: today 11. 8. continue fluids. Monitor for now. Anemia  Hemoglobin 8.3. He has known bone marrow infiltration. Baseline hemoglobin 9.5-10.5. Pain:  Fentanyl patch  Increased dilaudid to 1 mg q4 hours PRN along with Oxy IR 20 mg prn  Recommend adequate bowel regimen    UTI- on antibiotics    Urinaryretention: has indwelling floey. Recommend follow up with urology as OP. We will follow the patient. Thank you for allowing me to participate in the care of this very pleasant patient. Discussed the plan with daughter, partner, Son, Early [place].  Answered all questions      TOMI

## 2023-01-18 NOTE — PROGRESS NOTES
Physician Progress Note      PATIENT:               Custer Bosworth  CSN #:                  [id number]  :                       1970  ADMIT DATE:       2023 6:21 PM  [address] [place] DATE:  RESPONDING  PROVIDER #:        Allen Storey MD          QUERY TEXT:    Hospitalists,    Pt admitted with UTI and has CAUTI documented by the ED physician. Pt noted   to have chronic indwelling urinary catheter If possible, please document in   the progress notes and discharge summary if you are evaluating and/or treating   any of the following: The medical record reflects the following:  Risk Factors: chronic indwelling urinary cath  Clinical Indicators: CAUTI documented by ED physician, UTI w/ current   indwelling urinary cath  Treatment: labs, IVF, atb    Thank you,  Tonia Edmondson RN CDS  [phone]  Options provided:  -- UTI due to chronic indwelling urinary catheter  -- UTI not due to indwelling urinary catheter  -- Other - I will add my own diagnosis  -- Disagree - Not applicable / Not valid  -- Disagree - Clinically unable to determine / Unknown  -- Refer to Clinical Documentation Reviewer    PROVIDER RESPONSE TEXT:    UTI is due to the chronic indwelling urinary catheter.     Query created by: Carlitos Esquivel on 2023 9:23 AM      Electronically signed by:  Allen Storey MD 2023 7:28 AM

## 2023-01-19 PROCEDURE — 6370000000 HC RX 637 (ALT 250 FOR IP): Performed by: INTERNAL MEDICINE

## 2023-01-19 PROCEDURE — 94761 N-INVAS EAR/PLS OXIMETRY MLT: CPT

## 2023-01-19 PROCEDURE — APPSS60 APP SPLIT SHARED TIME 46-60 MINUTES: Performed by: NURSE PRACTITIONER

## 2023-01-19 PROCEDURE — 6370000000 HC RX 637 (ALT 250 FOR IP): Performed by: SURGERY

## 2023-01-19 PROCEDURE — 1200000000 HC SEMI PRIVATE

## 2023-01-19 PROCEDURE — 2580000003 HC RX 258: Performed by: NURSE PRACTITIONER

## 2023-01-19 PROCEDURE — 6360000002 HC RX W HCPCS: Performed by: INTERNAL MEDICINE

## 2023-01-19 PROCEDURE — 6360000002 HC RX W HCPCS: Performed by: NURSE PRACTITIONER

## 2023-01-19 PROCEDURE — 2580000003 HC RX 258: Performed by: INTERNAL MEDICINE

## 2023-01-19 RX ORDER — OXYCODONE HYDROCHLORIDE 10 MG/1
20 TABLET ORAL EVERY 6 HOURS
Status: DISCONTINUED | OUTPATIENT
Start: 2023-01-19 | End: 2023-01-20 | Stop reason: HOSPADM

## 2023-01-19 RX ORDER — ACETAMINOPHEN 325 MG/1
650 TABLET ORAL EVERY 6 HOURS PRN
Status: DISCONTINUED | OUTPATIENT
Start: 2023-01-19 | End: 2023-01-20 | Stop reason: HOSPADM

## 2023-01-19 RX ORDER — ONDANSETRON 2 MG/ML
8 INJECTION INTRAMUSCULAR; INTRAVENOUS
OUTPATIENT
Start: 2023-01-26

## 2023-01-19 RX ORDER — DIPHENHYDRAMINE HYDROCHLORIDE 50 MG/ML
50 INJECTION INTRAMUSCULAR; INTRAVENOUS
OUTPATIENT
Start: 2023-01-26

## 2023-01-19 RX ORDER — EPINEPHRINE 1 MG/ML
0.3 INJECTION, SOLUTION, CONCENTRATE INTRAVENOUS PRN
OUTPATIENT
Start: 2023-01-26

## 2023-01-19 RX ORDER — SODIUM CHLORIDE 9 MG/ML
INJECTION, SOLUTION INTRAVENOUS CONTINUOUS
OUTPATIENT
Start: 2023-01-26

## 2023-01-19 RX ORDER — ALBUTEROL SULFATE 90 UG/1
4 AEROSOL, METERED RESPIRATORY (INHALATION) PRN
OUTPATIENT
Start: 2023-01-26

## 2023-01-19 RX ORDER — ACETAMINOPHEN 325 MG/1
650 TABLET ORAL
OUTPATIENT
Start: 2023-01-26

## 2023-01-19 RX ADMIN — ALPRAZOLAM 1 MG: 0.5 TABLET ORAL at 14:21

## 2023-01-19 RX ADMIN — MEROPENEM 1000 MG: 1 INJECTION, POWDER, FOR SOLUTION INTRAVENOUS at 20:13

## 2023-01-19 RX ADMIN — SODIUM CHLORIDE, PRESERVATIVE FREE 10 ML: 5 INJECTION INTRAVENOUS at 08:23

## 2023-01-19 RX ADMIN — PREDNISONE 20 MG: 20 TABLET ORAL at 08:15

## 2023-01-19 RX ADMIN — SENNOSIDES AND DOCUSATE SODIUM 2 TABLET: 50; 8.6 TABLET ORAL at 08:13

## 2023-01-19 RX ADMIN — SODIUM CHLORIDE: 9 INJECTION, SOLUTION INTRAVENOUS at 15:59

## 2023-01-19 RX ADMIN — CYANOCOBALAMIN TAB 1000 MCG 1000 MCG: 1000 TAB at 08:15

## 2023-01-19 RX ADMIN — OXYCODONE HYDROCHLORIDE 20 MG: 10 TABLET ORAL at 16:26

## 2023-01-19 RX ADMIN — ALPRAZOLAM 1 MG: 0.5 TABLET ORAL at 08:16

## 2023-01-19 RX ADMIN — TAMSULOSIN HYDROCHLORIDE 0.4 MG: 0.4 CAPSULE ORAL at 08:13

## 2023-01-19 RX ADMIN — GABAPENTIN 600 MG: 300 CAPSULE ORAL at 20:10

## 2023-01-19 RX ADMIN — ACETAMINOPHEN 650 MG: 325 TABLET ORAL at 03:54

## 2023-01-19 RX ADMIN — CEFEPIME HYDROCHLORIDE 2000 MG: 2 INJECTION, POWDER, FOR SOLUTION INTRAVENOUS at 01:37

## 2023-01-19 RX ADMIN — SODIUM CHLORIDE, PRESERVATIVE FREE 10 ML: 5 INJECTION INTRAVENOUS at 20:11

## 2023-01-19 RX ADMIN — CEFEPIME HYDROCHLORIDE 2000 MG: 2 INJECTION, POWDER, FOR SOLUTION INTRAVENOUS at 10:22

## 2023-01-19 RX ADMIN — SODIUM CHLORIDE: 9 INJECTION, SOLUTION INTRAVENOUS at 08:11

## 2023-01-19 RX ADMIN — MEROPENEM 1000 MG: 1 INJECTION, POWDER, FOR SOLUTION INTRAVENOUS at 14:21

## 2023-01-19 RX ADMIN — GABAPENTIN 600 MG: 300 CAPSULE ORAL at 08:16

## 2023-01-19 RX ADMIN — SENNOSIDES AND DOCUSATE SODIUM 2 TABLET: 50; 8.6 TABLET ORAL at 20:10

## 2023-01-19 RX ADMIN — ALPRAZOLAM 1 MG: 0.5 TABLET ORAL at 20:10

## 2023-01-19 RX ADMIN — GABAPENTIN 600 MG: 300 CAPSULE ORAL at 14:16

## 2023-01-19 RX ADMIN — OXYCODONE HYDROCHLORIDE 20 MG: 10 TABLET ORAL at 23:23

## 2023-01-19 RX ADMIN — OXYCODONE HYDROCHLORIDE 20 MG: 10 TABLET ORAL at 10:22

## 2023-01-19 RX ADMIN — HYDROMORPHONE HYDROCHLORIDE 1 MG: 1 INJECTION, SOLUTION INTRAMUSCULAR; INTRAVENOUS; SUBCUTANEOUS at 03:22

## 2023-01-19 RX ADMIN — DULOXETINE 60 MG: 30 CAPSULE, DELAYED RELEASE ORAL at 08:16

## 2023-01-19 RX ADMIN — OXYCODONE HYDROCHLORIDE 20 MG: 10 TABLET ORAL at 03:54

## 2023-01-19 RX ADMIN — HYDROMORPHONE HYDROCHLORIDE 1 MG: 1 INJECTION, SOLUTION INTRAMUSCULAR; INTRAVENOUS; SUBCUTANEOUS at 06:51

## 2023-01-19 ASSESSMENT — PAIN SCALES - GENERAL
PAINLEVEL_OUTOF10: 0
PAINLEVEL_OUTOF10: 0
PAINLEVEL_OUTOF10: 8
PAINLEVEL_OUTOF10: 0
PAINLEVEL_OUTOF10: 7
PAINLEVEL_OUTOF10: 0
PAINLEVEL_OUTOF10: 0

## 2023-01-19 ASSESSMENT — PAIN SCALES - WONG BAKER
WONGBAKER_NUMERICALRESPONSE: 0

## 2023-01-19 ASSESSMENT — PAIN - FUNCTIONAL ASSESSMENT: PAIN_FUNCTIONAL_ASSESSMENT: ACTIVITIES ARE NOT PREVENTED
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
## 2023-01-19 NOTE — CARE COORDINATION
Discussed pt in IDR/ plan to hold discharge until ID makes rec'ds. I called [address] referral provided/ faxed info/ Roni Caputo order to [phone]. I confirmed that they will be providing pt's aide services, they can provide [place] [id number] nursing but if pt needs IV Abx she may not have the nursing capacity to do that. Per pt's wife/dghtr they can perform IV Abx at home. CM to cont to follow as pt progresses. Sent PS to Dr. Nikia Gonzalez to get documentation for DME/ order for [facility]. He will complete.

## 2023-01-19 NOTE — PROGRESS NOTES
V2.0  [facility] [place] Note      Name:  Rhianna Bartholomew /Age/Sex: 1970  (46 y.o. male)   MRN & CSN:  [id number] & [id number] Encounter Date/Time: 2023 11:14 AM EST    Location:  [address] [place] [address]-A PCP: Nick Gan MD       Hospital Day: 4    Assessment and Plan:   Rhianna Bartholomew is a 46 y.o. male with UTI      Plan: P. Aeruginosa/E. faecalis on urine culture, awaiting ID recs prior to discharge. #.  Complicated UTI  -Patient has a chronic indwelling Doherty catheter, recently exchanged 2023 as per wife at bedside.  -Patient had left percutaneous nephrostomy tube placed at [facility] 2022). Patient had a recent admission for dislodged nephrostomy tube and IR could not recanalize. Patient did not want nephrostomy tube reinserted. -UA-cloudy, moderate blood, large leukocyte esterase, nitrate negative, WBC 1900, many WBC clumps, bacteria negative  -Patient has mild leukocytosis, tachycardia  -Urine culture sent from ER  -Check procalcitonin  -Continue cefepime-has urine cultures-2022 grew Acinetobacter     #. Intractable pain secondary to bone mets  -Patient still groaning and moaning in pain  -Got Dilaudid in ER, gave a dose of Toradol  -Continue home medications, IV Dilaudid as needed.  -As per documentation patient is currently under hospice since 2022. Wife at bedside reported that patient is a full code and does not have a DNR. #.  Hypercalcemia  -Calcium 13.1  -Patient received 1 L fluid bolus in ER  -Ordered 1 more bolus  -Continue IV fluids at 150 cc/h  -Monitor with repeat BMP     #. Squamous cell carcinoma-overlapping sites of left lung  -Completed palliative radiation to the left lung and pelvic area  -Bone biopsy on dec 13 2021 with met squamous cell cancer, consistent with lung primary.  -He was admitted at [facility] in April ( - ). He had debulking of his primary tumor via bronchoscopy during admit.       #.  Prostate cancer with bone mets  -BMB at [facility] consistent with metastatic prostate cancer  -Patient had PET scan-1/13/2023-reported marked progression of osseous metastatic disease with multiple new hypermetabolic lesions, compression deformity of T1, increased size and mildly increased uptake of the mass in the medial left upper lobe abutting the mediastinum. #.  History of urinary retention and has chronic indwelling Doherty catheter     #. Nonischemic cardiomyopathy  -LHC-8/2016-normal coronaries  -Echo-8/2016-EF 55-60%, aortic stenosis, trace mitral regurgitation, trace tricuspid regurgitation. #.  Chronic microcytic anemia     #. History of cocaine abuse as per EMR-12/2012  -UDS + for cocaine-recently 7/13/2022 and cannabinoids-9/2020     #. Chronic pain  -Patient is on oxycodone IR 20 mg twice daily, Percocet , 2 tabs every 6 hours as needed, gabapentin, duloxetine, fentanyl patch, prednisone unable to perform as patient is not answering any questions. .     #.  Anxiety disorder-on alprazolam 1 mg 3 times daily. Ppx:  Dispo:     Subjective:     Chief Complaint: Other (Family wants his labs checked out. Hx cancer)       Patient mentation improved. F/u calcium level. F/u urine culture. Review of Systems:    Review of Systems    10 point ROS negative except as stated above in \"subjective\" section    Objective: Intake/Output Summary (Last 24 hours) at 1/19/2023 1008  Last data filed at 1/19/2023 0149  Gross per 24 hour   Intake --   Output 2950 ml   Net -2950 ml          Vitals:   Vitals:    01/19/23 0800   BP: 116/84   Pulse: (!) 102   Resp: 18   Temp: 97.6 °F (36.4 °C)   SpO2: 97%       Physical Exam:     General: NAD  Eyes: EOMI  ENT: neck supple  Cardiovascular: Regular rate. Respiratory: Clear to auscultation  Gastrointestinal: Soft, non tender  Genitourinary: no suprapubic tenderness  Musculoskeletal: No edema  Skin: warm, dry  Neuro: Alert. Psych: Mood appropriate.      Medications:   Medications:    oxyCODONE  20 mg Oral Q6H    sodium chloride flush  5-40 mL IntraVENous 2 times per day    enoxaparin  40 mg SubCUTAneous Daily    cyanocobalamin  1,000 mcg Oral Daily    fentaNYL  1 patch TransDERmal Q72H    gabapentin  600 mg Oral TID    predniSONE  20 mg Oral Daily    cefepime  2,000 mg IntraVENous q8h    sennosides-docusate sodium  2 tablet Oral BID    DULoxetine  60 mg Oral Daily    tamsulosin  0.4 mg Oral Daily    ALPRAZolam  1 mg Oral TID    sodium chloride  1,000 mL IntraVENous Once      Infusions:    sodium chloride      sodium chloride 150 mL/hr at 01/19/23 0811     PRN Meds: acetaminophen, 650 mg, Q6H PRN  sodium chloride flush, 5-40 mL, PRN  sodium chloride, , PRN  ondansetron, 4 mg, Q8H PRN   Or  ondansetron, 4 mg, Q6H PRN  polyethylene glycol, 17 g, Daily PRN  naloxone, 4 mg, PRN  HYDROmorphone, 1 mg, Q4H PRN      Labs      Recent Results (from the past 24 hour(s))   Comprehensive Metabolic Panel    Collection Time: 01/18/23 11:30 AM   Result Value Ref Range    Sodium 133 (L) 135 - 145 MMOL/L    Potassium 3.7 3.5 - 5.1 MMOL/L    Chloride 100 99 - 110 mMol/L    CO2 25 21 - 32 MMOL/L    BUN 7 6 - 23 MG/DL    Creatinine 0.4 (L) 0.9 - 1.3 MG/DL    Est, Glom Filt Rate >60 >60 mL/min/1.73m2    Glucose 110 (H) 70 - 99 MG/DL    Calcium 11.8 (H) 8.3 - 10.6 MG/DL    Albumin 2.6 (L) 3.4 - 5.0 GM/DL    Total Protein 5.9 (L) 6.4 - 8.2 GM/DL    Total Bilirubin 0.2 0.0 - 1.0 MG/DL    ALT 6 (L) 10 - 40 U/L    AST 12 (L) 15 - 37 IU/L    Alkaline Phosphatase 140 (H) 40 - 128 IU/L    Anion Gap 8 4 - 16          Imaging/Diagnostics Last 24 Hours   No results found.     Electronically signed by Meredith Macias MD on 1/19/2023 at 10:08 AM

## 2023-01-19 NOTE — PROGRESS NOTES
Patient's wife would like to make sure patient receiving pain medication as scheduled standard time like treating as hospice patient , wife wanted patient as comfortable as much as possible.

## 2023-01-20 VITALS
HEART RATE: 103 BPM | RESPIRATION RATE: 14 BRPM | DIASTOLIC BLOOD PRESSURE: 82 MMHG | TEMPERATURE: 97.3 F | SYSTOLIC BLOOD PRESSURE: 122 MMHG | OXYGEN SATURATION: 98 %

## 2023-01-20 LAB
C-REACTIVE PROTEIN, HIGH SENSITIVITY: 73.1 MG/L
CULTURE: ABNORMAL
Lab: ABNORMAL
PROCALCITONIN: 0.1
SPECIMEN: ABNORMAL

## 2023-01-20 PROCEDURE — 2580000003 HC RX 258: Performed by: NURSE PRACTITIONER

## 2023-01-20 PROCEDURE — 6360000002 HC RX W HCPCS: Performed by: INTERNAL MEDICINE

## 2023-01-20 PROCEDURE — 99231 SBSQ HOSP IP/OBS SF/LOW 25: CPT | Performed by: NURSE PRACTITIONER

## 2023-01-20 PROCEDURE — 94761 N-INVAS EAR/PLS OXIMETRY MLT: CPT

## 2023-01-20 PROCEDURE — 6360000002 HC RX W HCPCS: Performed by: NURSE PRACTITIONER

## 2023-01-20 PROCEDURE — 2580000003 HC RX 258: Performed by: INTERNAL MEDICINE

## 2023-01-20 PROCEDURE — 6370000000 HC RX 637 (ALT 250 FOR IP): Performed by: SURGERY

## 2023-01-20 PROCEDURE — 51702 INSERT TEMP BLADDER CATH: CPT

## 2023-01-20 PROCEDURE — 99231 SBSQ HOSP IP/OBS SF/LOW 25: CPT | Performed by: INTERNAL MEDICINE

## 2023-01-20 PROCEDURE — 6370000000 HC RX 637 (ALT 250 FOR IP): Performed by: INTERNAL MEDICINE

## 2023-01-20 PROCEDURE — 84145 PROCALCITONIN (PCT): CPT

## 2023-01-20 PROCEDURE — 86140 C-REACTIVE PROTEIN: CPT

## 2023-01-20 RX ORDER — NALOXONE HYDROCHLORIDE 1 MG/ML
4 INJECTION INTRAMUSCULAR; INTRAVENOUS; SUBCUTANEOUS PRN
Qty: 2 ML | Refills: 3 | Status: SHIPPED | OUTPATIENT
Start: 2023-01-20

## 2023-01-20 RX ADMIN — ENOXAPARIN SODIUM 40 MG: 100 INJECTION SUBCUTANEOUS at 11:15

## 2023-01-20 RX ADMIN — ALPRAZOLAM 1 MG: 0.5 TABLET ORAL at 16:44

## 2023-01-20 RX ADMIN — DULOXETINE 60 MG: 30 CAPSULE, DELAYED RELEASE ORAL at 11:13

## 2023-01-20 RX ADMIN — CYANOCOBALAMIN TAB 1000 MCG 1000 MCG: 1000 TAB at 11:16

## 2023-01-20 RX ADMIN — HYDROMORPHONE HYDROCHLORIDE 1 MG: 1 INJECTION, SOLUTION INTRAMUSCULAR; INTRAVENOUS; SUBCUTANEOUS at 00:25

## 2023-01-20 RX ADMIN — OXYCODONE HYDROCHLORIDE 20 MG: 10 TABLET ORAL at 04:07

## 2023-01-20 RX ADMIN — SENNOSIDES AND DOCUSATE SODIUM 2 TABLET: 50; 8.6 TABLET ORAL at 11:15

## 2023-01-20 RX ADMIN — ALPRAZOLAM 1 MG: 0.5 TABLET ORAL at 11:13

## 2023-01-20 RX ADMIN — SODIUM CHLORIDE: 9 INJECTION, SOLUTION INTRAVENOUS at 02:10

## 2023-01-20 RX ADMIN — GABAPENTIN 600 MG: 300 CAPSULE ORAL at 11:14

## 2023-01-20 RX ADMIN — HYDROMORPHONE HYDROCHLORIDE 1 MG: 1 INJECTION, SOLUTION INTRAMUSCULAR; INTRAVENOUS; SUBCUTANEOUS at 05:03

## 2023-01-20 RX ADMIN — OXYCODONE HYDROCHLORIDE 20 MG: 10 TABLET ORAL at 16:44

## 2023-01-20 RX ADMIN — GABAPENTIN 600 MG: 300 CAPSULE ORAL at 16:44

## 2023-01-20 RX ADMIN — MEROPENEM 1000 MG: 1 INJECTION, POWDER, FOR SOLUTION INTRAVENOUS at 17:03

## 2023-01-20 RX ADMIN — PREDNISONE 20 MG: 20 TABLET ORAL at 11:16

## 2023-01-20 RX ADMIN — TAMSULOSIN HYDROCHLORIDE 0.4 MG: 0.4 CAPSULE ORAL at 11:13

## 2023-01-20 RX ADMIN — MEROPENEM 1000 MG: 1 INJECTION, POWDER, FOR SOLUTION INTRAVENOUS at 04:11

## 2023-01-20 RX ADMIN — OXYCODONE HYDROCHLORIDE 20 MG: 10 TABLET ORAL at 11:14

## 2023-01-20 RX ADMIN — SODIUM CHLORIDE: 9 INJECTION, SOLUTION INTRAVENOUS at 11:32

## 2023-01-20 ASSESSMENT — PAIN DESCRIPTION - ORIENTATION: ORIENTATION: OTHER (COMMENT)

## 2023-01-20 ASSESSMENT — PAIN DESCRIPTION - DESCRIPTORS: DESCRIPTORS: PATIENT UNABLE TO DESCRIBE

## 2023-01-20 ASSESSMENT — PAIN SCALES - WONG BAKER
WONGBAKER_NUMERICALRESPONSE: 0
WONGBAKER_NUMERICALRESPONSE: 10

## 2023-01-20 ASSESSMENT — PAIN - FUNCTIONAL ASSESSMENT: PAIN_FUNCTIONAL_ASSESSMENT: PREVENTS OR INTERFERES WITH ALL ACTIVE AND SOME PASSIVE ACTIVITIES

## 2023-01-20 ASSESSMENT — PAIN SCALES - GENERAL
PAINLEVEL_OUTOF10: 0
PAINLEVEL_OUTOF10: 8
PAINLEVEL_OUTOF10: 0
PAINLEVEL_OUTOF10: 0

## 2023-01-20 ASSESSMENT — PAIN DESCRIPTION - LOCATION
LOCATION: GENERALIZED
LOCATION: GENERALIZED
LOCATION: OTHER (COMMENT)

## 2023-01-20 ASSESSMENT — PAIN DESCRIPTION - PAIN TYPE: TYPE: CHRONIC PAIN

## 2023-01-20 NOTE — DISCHARGE INSTRUCTIONS
Follow up with Prema Liu regarding: STACEY QUINTANILLA - REGI Bed/ Wheelchair/ Bedside Commode  Address: [address], [place] [address] Phone: [phone]

## 2023-01-20 NOTE — PROGRESS NOTES
V2.0  [facility] [place] Hospitalist Progress Note      Name:  Kimberlyn Paulino /Age/Sex: 1970  (46 y.o. male)   MRN & CSN:  [id number] & [id number] Encounter Date/Time: 2023 11:14 AM EST    Location:  [address] [place] [address]-A PCP: Bertram Overton MD       Hospital Day: 5    Assessment and Plan:   Kimberlyn Paulino is a 46 y.o. male with UTI      Plan: P. Aeruginosa/E. faecalis on urine culture, awaiting ID recs prior to discharge. #.  Complicated UTI  -Patient has a chronic indwelling Doherty catheter, recently exchanged 2023 as per wife at bedside.  -Patient had left percutaneous nephrostomy tube placed at [facility] 2022). Patient had a recent admission for dislodged nephrostomy tube and IR could not recanalize. Patient did not want nephrostomy tube reinserted. -UA-cloudy, moderate blood, large leukocyte esterase, nitrate negative, WBC 1900, many WBC clumps, bacteria negative  -Patient has mild leukocytosis, tachycardia  -Urine culture sent from ER  -Check procalcitonin  -Continue cefepime-has urine cultures-2022 grew Acinetobacter     #. Intractable pain secondary to bone mets  -Patient still groaning and moaning in pain  -Got Dilaudid in ER, gave a dose of Toradol  -Continue home medications, IV Dilaudid as needed.  -As per documentation patient is currently under hospice since 2022. Wife at bedside reported that patient is a full code and does not have a DNR. #.  Hypercalcemia  -Calcium 13.1  -Patient received 1 L fluid bolus in ER  -Ordered 1 more bolus  -Continue IV fluids at 150 cc/h  -Monitor with repeat BMP     #. Squamous cell carcinoma-overlapping sites of left lung  -Completed palliative radiation to the left lung and pelvic area  -Bone biopsy on dec 13 2021 with met squamous cell cancer, consistent with lung primary.  -He was admitted at [facility] in April ( - ). He had debulking of his primary tumor via bronchoscopy during admit.       #.  Prostate cancer with bone mets  -BMB at [facility] consistent with metastatic prostate cancer  -Patient had PET scan-1/13/2023-reported marked progression of osseous metastatic disease with multiple new hypermetabolic lesions, compression deformity of T1, increased size and mildly increased uptake of the mass in the medial left upper lobe abutting the mediastinum. #.  History of urinary retention and has chronic indwelling Doherty catheter     #. Nonischemic cardiomyopathy  -LHC-8/2016-normal coronaries  -Echo-8/2016-EF 55-60%, aortic stenosis, trace mitral regurgitation, trace tricuspid regurgitation. #.  Chronic microcytic anemia     #. History of cocaine abuse as per EMR-12/2012  -UDS + for cocaine-recently 7/13/2022 and cannabinoids-9/2020     #. Chronic pain  -Patient is on oxycodone IR 20 mg twice daily, Percocet , 2 tabs every 6 hours as needed, gabapentin, duloxetine, fentanyl patch, prednisone unable to perform as patient is not answering any questions. .     #.  Anxiety disorder-on alprazolam 1 mg 3 times daily. Ppx:  Dispo:     Subjective:     Chief Complaint: Other (Family wants his labs checked out. Hx cancer)     Sensitivities have returned. Awaiting ID recs for final abx regimen prior to discharge. No complaints from patient. Family frustrated about pain regimen not matching home regimen. Review of Systems:    Review of Systems    10 point ROS negative except as stated above in \"subjective\" section    Objective: Intake/Output Summary (Last 24 hours) at 1/20/2023 1126  Last data filed at 1/20/2023 0600  Gross per 24 hour   Intake 83233.29 ml   Output 3000 ml   Net 7783.29 ml          Vitals:   Vitals:    01/20/23 1114   BP:    Pulse:    Resp: 16   Temp:    SpO2:        Physical Exam:     General: NAD  Eyes: EOMI  ENT: neck supple  Cardiovascular: Regular rate. Respiratory: Clear to auscultation  Gastrointestinal: Soft, non tender  Genitourinary: no suprapubic tenderness  Musculoskeletal: No edema  Skin: warm, dry  Neuro: Alert.   Psych: Mood appropriate. Medications:   Medications:    oxyCODONE  20 mg Oral Q6H    meropenem  1,000 mg IntraVENous Q8H    sodium chloride flush  5-40 mL IntraVENous 2 times per day    enoxaparin  40 mg SubCUTAneous Daily    cyanocobalamin  1,000 mcg Oral Daily    fentaNYL  1 patch TransDERmal Q72H    gabapentin  600 mg Oral TID    predniSONE  20 mg Oral Daily    sennosides-docusate sodium  2 tablet Oral BID    DULoxetine  60 mg Oral Daily    tamsulosin  0.4 mg Oral Daily    ALPRAZolam  1 mg Oral TID    sodium chloride  1,000 mL IntraVENous Once      Infusions:    sodium chloride      sodium chloride 150 mL/hr at 01/20/23 0329     PRN Meds: acetaminophen, 650 mg, Q6H PRN  sodium chloride flush, 5-40 mL, PRN  sodium chloride, , PRN  ondansetron, 4 mg, Q8H PRN   Or  ondansetron, 4 mg, Q6H PRN  polyethylene glycol, 17 g, Daily PRN  naloxone, 4 mg, PRN      Labs      Recent Results (from the past 24 hour(s))   C-Reactive Protein    Collection Time: 01/20/23  4:10 AM   Result Value Ref Range    CRP High Sensitivity 73.1 (H) <5.0 mg/L          Imaging/Diagnostics Last 24 Hours   No results found.     Electronically signed by Valerie Burns MD on 1/20/2023 at 11:26 AM

## 2023-01-20 NOTE — PROGRESS NOTES
HEMATOLOGY ONCOLOGY  Progress note     REASON FOR CONSULT    H/o prostate cancer, lung cancer, hypercalcemia    CHIEF COMPLAINT    Chief Complaint   Patient presents with    Other     Family wants his labs checked out. Hx cancer       HISTORY OF PRESENT ILLNESS   Any Moran is a 46 y.o. male with history of metastatic prostate cancer metastatic lung cancer enrolled in hospice who presented with intractable pain. He endorses poor appetite, poor oral intake. Has chronic indwelling Doherty catheter last exchanged Thursday January 12, 2023. Review of CT A/P on 1/5/2023 notes diffuse bony metastatic disease with progression throughout pelvis and sacrum with replacement by soft tissue mass and posterior extension and paraspinal muscles. Most recently he had restaging PET CT scan January 13, 2023 which showed marked progression of osseous metastatic disease with multiple new hypermetabolic lesions of the superior left scapula, upper thoracic spine, with severe pathologic compression deformity of T1. Significantly enlarged lytic lesions of the sacrum and bilateral iliac bones. New areas of intense uptake in the inferior left scapula and right posterior elements of T10-T11. Note increased size and mildly increased uptake of the mass in the medial left upper lobe abutting the mediastinum. Note hypermetabolic lymphadenopathy in the chest abdomen and pelvis which is new from previous PET 5/16/2022 in progress from more recent CT later in 2022. PSA 12/22/2022 was 144.0. Last known reading prior to that was 976.0 on 3/22/2022. On exam he is restless and calling out in pain. Family member asleep on bed at bedside. He is unable to provide any significant history. He has a Doherty in place. He denies fever, chills, night sweats. Endorses poor appetite. He appears to have lost significant weight since seeing him last time. Endorses generalized weakness and malaise. No nausea, vomiting.   Review of systems limited by patient condition. 1/20/23: Pt seen and examined in the morning. He is resting comfortably. States pain is under better control and he is able to rest.  ID is following, awaiting urine culture sensitivities. Afebrile and HDS, no s/s of bleeding.        PAST MEDICAL HISTORY    Past Medical History:   Diagnosis Date    CAD (coronary artery disease) 12/23/2013    cath negative per pt    Cancer ([address] 06/2021    pt reports he has lung cancer    History of TMJ disorder     Hypertension     Trigeminal neuralgia        SURGICAL HISTORY    Past Surgical History:   Procedure Laterality Date    ABDOMEN SURGERY      CARDIAC CATHETERIZATION  08/03/2016    CT BIOPSY PERCUTANEOUS SUPERFICIAL BONE  12/17/2021    CT BIOPSY PERCUTANEOUS SUPERFICIAL BONE 12/17/2021 [facility] CT SCAN    CT NEEDLE BIOPSY LUNG PERCUTANEOUS  7/28/2021    CT NEEDLE BIOPSY LUNG PERCUTANEOUS 7/28/2021 [facility] CT SCAN    IR NEPHROSTOMY CATHETER PLACEMENT  1/5/2023    IR NEPHROSTOMY CATHETER PLACEMENT 1/5/2023 [facility] SPECIAL PROCEDURES    PORT SURGERY Right 8/13/2021    MEDIPORT INSERTION performed by Prudence Trujillo MD at [facility] OR       FAMILY HISTORY    Family History   Problem Relation Age of Onset    High Blood Pressure Mother     High Blood Pressure Sister     Cancer Sister        SOCIAL HISTORY    Social History     Socioeconomic History    Marital status:     Number of children: 5   Tobacco Use    Smoking status: Every Day     Packs/day: 2.00     Years: 39.00     Pack years: 78.00     Types: Cigarettes    Smokeless tobacco: Never    Tobacco comments:     smokes 1-2 a day   Vaping Use    Vaping Use: Never used   Substance and Sexual Activity    Alcohol use: Not Currently     Alcohol/week: 6.0 standard drinks     Types: 6 Cans of beer per week     Comment: per week (24 oz beers)     Drug use: Yes     Types: Marijuana Sabrina Awkward)     Comment: last 12/1    Sexual activity: Yes     Partners: Female       REVIEW OF SYSTEMS    As noted in HPI, 10 point ROS otherwise negative    PHYSICAL EXAM    Vitals: /85   Pulse (!) 103   Temp 97.4 °F (36.3 °C) (Axillary)   Resp 16   SpO2 96%   CONSTITUTIONAL: sleepy but easily roused, resting comfortably, chronically ill appearing  EYES: EOM intact, sclera clear  ENT: Normocephalic, without obvious abnormality, atraumatic  NECK: supple, symmetrical  HEMATOLOGIC/LYMPHATIC: no cervical, supraclavicular or axillary lymphadenopathy   LUNGS: no increased work of breathing and clear to auscultation   CARDIOVASCULAR: tachycardic, normal S1 and S2, no murmur noted  ABDOMEN: normal bowel sounds x 4, soft  : Doherty with clear yellow urine  MUSCULOSKELETAL: +tenderness to lower spine full range of motion noted, tone is normal  NEUROLOGIC: oriented to self and place, moving extremities equally  SKIN:warm, dry, no jaundice   EXTREMITIES: no LE edema, no cyanosis, no clubbing    LABORATORY RESULTS  CBC:   No results for input(s): WBC, HGB, PLT in the last 72 hours. BMP:    Recent Labs     01/18/23  1130   *   K 3.7      CO2 25   BUN 7   CREATININE 0.4*   GLUCOSE 110*       Hepatic:   Recent Labs     01/18/23  1130   AST 12*   ALT 6*   BILITOT 0.2   ALKPHOS 140*       ASSESSMENT/RECOMMENDATION    Metastatic prostate cancer-PET scan results noted. Unclear for what period of time he took Zytiga however PSA did decline. consider initiating Zytiga +5 mg prednisone twice daily i    Metastatic squamous cell lung cancer  - SCC with lung primary biopsy-proven metastatic to bone as well as prostate cancer with biopsy-proven bone marrow involvement  Did have positive response to therapy-carbo platinum/pembrolizumab/pemetrexed x 1 followed by Yvonne Trinh x 1-initially however ongoing pattern of extreme noncompliance. Was with hospice and recently revoked as he wishes to proceed with treatment. Discussed Single agent CPI. Discussed adverse effects. Will plan fo FU in clinic on discharge    Hypercalcemia: 1/18 11. 8. continue fluids. Monitor for now. No labs for today    Anemia  Hemoglobin 8.3. He has known bone marrow infiltration. Baseline hemoglobin 9.5-10.5. Pain:  Fentanyl patch  Increased dilaudid to 1 mg q4 hours PRN along with Oxy IR 20 mg prn  Recommend adequate bowel regimen    UTI- on antibiotics, ID now following    Urinary retention: has indwelling sampson. Recommend follow up with urology as OP. We will follow the patient. Thank you for allowing me to participate in the care of this very pleasant patient. Plan of care was developed with with attending physician Dr Sonia Hernandez PA-C    Portions of this note are copied forward from previous clinic note. Interval history, ROS, physical exam, assessment and plan has been reviewed and updated for accuracy by this provider. I have independently evaluated and examined this patient today. I have reviewed radiologic and biochemical tests on this patient. Management Plan is developed mutually with Cindi Nolasco PA-C.  I have reviewed above note and agree with assessment and plan    TOMI none

## 2023-01-20 NOTE — DISCHARGE SUMMARY
V2.0  Discharge Summary    Name:  Ashlie Powell /Age/Sex: 1970 (46 y.o. male)   Admit Date: 2023  Discharge Date: 23    MRN & CSN:  [id number] & [id number] Encounter Date and Time 23 12:40 PM EST    Attending:  Jai Rivero MD Discharging Provider: Jai Rivero MD       Ashlie Powell was evaluated today and a DME order was entered for a lightweight wheelchair because he requires this to successfully complete daily living tasks of eating, bathing, toileting, personal cares, ambulating, grooming, hygiene, dressing upper body, dressing lower body, meal preparation and taking own medications. A lightweight wheelchair is necessary due to the patient's impaired ambulation and mobility restrictions. The patient would not be able to resolve these daily living tasks using a cane or walker. The patient can self-propel a lightweight wheelchair safely in their home and can maneuver within their home with adequate access. The patient cannot self-propel in a standard wheelchair. The need for this equipment was discussed with the patient and he understands, is in agreement, and has not expressed an unwillingness to use the wheelchair. Ashlie Powell was evaluated today and a DME order was entered for a semi-electric hospital bed because he requires assistance for positioning needs not possible in an ordinary bed, complexity of body positioning needs. Patient requires frequent and immediate positioning changes for relief of pain and care needs related to medical diagnoses. Patient needs variability of bed height requirements to perform patient transfers and for eating, bathing, toileting and personal cares. Current body  . The need for this equipment was discussed with the patient and he understands and is in agreement. Ashlie Powell requires a bedside commode due to being confined to a single room, and is physically incapable of utilizing regular toilet facilities.   Current body weight is  193 lb. Discharge Diagnosess:     Complicated UTI  Intractable pain secondary to bone mets  Hypercalcemia  Squamous cell carcinoma-overlapping sites of left lung  Prostate cancer with bone mets  History of urinary retention and has chronic indwelling Doherty catheter  Nonischemic cardiomyopathy  Chronic microcytic anemia  Chronic pain  History of cocaine abuse   Anxiety disorder      Admission HPI, hospital course, and consultants:     Admission HPI:  \"Hima Carter is a 46 y.o. male with nonischemic cardiomyopathy, history of squamous cell carcinoma of left lung, prostate cancer with bony metastasis, chronic urinary retention presented to ER with complaints of intractable pain. Patient is currently awake, alert, not answering any questions, tossing in bed, in pain. Most of the information was provided by patient's wife at bedside. According to her patient has been in constant pain, crying, has decreased appetite, poor oral intake. She denied patient having any fever, chills, cough, chest pain, denied any abdomen pain, denied any urinary complaints, denied any constipation or diarrhea. Patient has a chronic indwelling Doherty catheter and it was exchanged last Thursday has per wife. At presentation patient was noted to have /70, , RR 18, temp 98.3, saturating 97% on room air. Lab work significant for sodium 134, chloride 95, random glucose 110, calcium 13.1, , WBC 12.4, hemoglobin 11.4, platelets 124. UA-cloudy, moderate blood, proteinuria, large leukocyte esterase, nitrate negative, WBC 1900, WBC clumps many, bacteria negative. Urine culture sent from ER and patient received IV ceftriaxone. [facility] course:  #. Complicated UTI  -Patient has a chronic indwelling Doherty catheter, recently exchanged 1/12/2023 as per wife at bedside.  -Patient had left percutaneous nephrostomy tube placed at [address] [place] [address] 4/2022).   Patient had a recent admission for dislodged nephrostomy tube and IR could not recanalize. Patient did not want nephrostomy tube reinserted. -UA-cloudy, moderate blood, large leukocyte esterase, nitrate negative, WBC 1900, many WBC clumps, bacteria negative  -Patient has mild leukocytosis, tachycardia  -Urine culture sent from ER  -Check procalcitonin  -Continue cefepime-has urine cultures-11/21/2022 grew Acinetobacter     #. Intractable pain secondary to bone mets  -Patient still groaning and moaning in pain  -Got Dilaudid in ER, gave a dose of Toradol  -Continue home medications, IV Dilaudid as needed.  -As per documentation patient is currently under hospice since 6/2022. Wife at bedside reported that patient is a full code and does not have a DNR. #.  Hypercalcemia  -Calcium 13.1  -Patient received 1 L fluid bolus in ER  -Ordered 1 more bolus  -Continue IV fluids at 150 cc/h  -Monitor with repeat BMP     #. Squamous cell carcinoma-overlapping sites of left lung  -Completed palliative radiation to the left lung and pelvic area  -Bone biopsy on dec 13 2021 with met squamous cell cancer, consistent with lung primary.  -He was admitted at [facility] in April (4/22 - 4/30). He had debulking of his primary tumor via bronchoscopy during admit. #.  Prostate cancer with bone mets  -BMB at [facility] consistent with metastatic prostate cancer  -Patient had PET scan-1/13/2023-reported marked progression of osseous metastatic disease with multiple new hypermetabolic lesions, compression deformity of T1, increased size and mildly increased uptake of the mass in the medial left upper lobe abutting the mediastinum. #.  History of urinary retention and has chronic indwelling Doherty catheter     #. Nonischemic cardiomyopathy  -LHC-8/2016-normal coronaries  -Echo-8/2016-EF 55-60%, aortic stenosis, trace mitral regurgitation, trace tricuspid regurgitation. #.  Chronic microcytic anemia     #.   History of cocaine abuse as per EMR-12/2012  -UDS + for cocaine-recently 7/13/2022 and cannabinoids-9/2020 #.  Chronic pain  -Patient is on oxycodone IR 20 mg twice daily, Percocet , 2 tabs every 6 hours as needed, gabapentin, duloxetine, fentanyl patch, prednisone unable to perform as patient is not answering any questions. .     #.  Anxiety disorder-on alprazolam 1 mg 3 times daily. The patient expressed appropriate understanding of, and agreement with the discharge recommendations, medications, and plan. Consults this admission:  IP CONSULT TO IV TEAM  IP CONSULT TO ONCOLOGY  IP CONSULT TO HOSPICE  IP CONSULT TO HOME CARE NEEDS  IP CONSULT TO INFECTIOUS DISEASES    Discharge Instructions: Follow up appointments:  infectious disease, oncology, urology  Primary care physician: Manjit Durand MD within 2 weeks  Diet: regular diet   Activity: activity as tolerated  Disposition: Discharged to:   [x]Home, [x][facility], []SNF, []Acute Rehab, []Hospice   Condition on discharge: Stable  Labs and Tests to be Followed up as an outpatient by PCP or Specialist: none    Discharge Medications:        Medication List        CHANGE how you take these medications      * naloxone 4 MG/0.1ML Liqd nasal spray  1 spray by Nasal route as needed for Opioid Reversal  What changed: Another medication with the same name was added. Make sure you understand how and when to take each. * naloxone 2 MG/2ML injection  Commonly known as: NARCAN  4 mLs by Nasal route as needed (if patient is unresponsive or stops breathing)  What changed: You were already taking a medication with the same name, and this prescription was added. Make sure you understand how and when to take each. * This list has 2 medication(s) that are the same as other medications prescribed for you. Read the directions carefully, and ask your doctor or other care provider to review them with you.                 CONTINUE taking these medications      ALPRAZolam 0.5 MG tablet  Commonly known as: XANAX     cyanocobalamin 1000 MCG tablet  Commonly known as: [employer] VITAMIN B12  Take 1 tablet by mouth daily     cyclobenzaprine 10 MG tablet  Commonly known as: FLEXERIL     DULoxetine 60 MG extended release capsule  Commonly known as: CYMBALTA     fentaNYL 75 MCG/HR  Commonly known as: DURAGESIC     gabapentin 600 MG tablet  Commonly known as: NEURONTIN     ondansetron 8 MG tablet  Commonly known as: ZOFRAN     oxyCODONE 20 MG extended release tablet  Commonly known as: OXYCONTIN     oxyCODONE-acetaminophen  MG per tablet  Commonly known as: PERCOCET     Oyster Shell Calcium/D 500-200 MG-UNIT Tabs  Take 1 tablet by mouth daily     polyethylene glycol 17 g packet  Commonly known as: GLYCOLAX  Take 17 g by mouth daily as needed for Constipation     predniSONE 20 MG tablet  Commonly known as: DELTASONE     Senna 8.6 MG Caps  Take 1 capsule by mouth 2 times daily as needed (constipation)     tamsulosin 0.4 MG capsule  Commonly known as: FLOMAX               Where to Get Your Medications        These medications were sent to [address] [facility]      Phone: [phone]   naloxone 2 MG/2ML injection        Objective Findings at Discharge:   /85   Pulse (!) 103   Temp 97.4 °F (36.3 °C) (Axillary)   Resp 16   SpO2 96%       Physical Exam:   General: NAD  Eyes: EOMI  ENT: neck supple  Cardiovascular: Regular rate. Respiratory: Clear to auscultation  Gastrointestinal: Soft, non tender  Genitourinary: no suprapubic tenderness  Musculoskeletal: No edema  Skin: warm, dry  Neuro: Alert. Psych: Mood appropriate. Labs and Imaging   PET CT SKULL BASE TO MID THIGH    Result Date: 1/14/2023  EXAMINATION: WHOLE BODY PET/CT 1/13/2023 TECHNIQUE: Following intravenous administration of 10.85 mCi of F18-FDG, PET imaging was acquired from the base of the head to the mid thighs.   Computed tomography was used for purposes of attenuation correction and anatomic localization. Fusion imaging was utilized for interpretation. Uptake time 55 mins. Glucose level 137 mg/dl. COMPARISON: Abdomen/pelvis CT on 01/06/2023, bone scan on 10/27/2022, chest CT on 10/11/2022, PET-CT on 05/16/2022 HISTORY: Metastatic left upper lobe lung cancer, follow-up. FINDINGS: HEAD/NECK: No suspicious FDG uptake. CHEST: Increased size and mildly increased uptake of the mass in the medial left upper lobe abutting the mediastinum which demonstrates maximum SUV of 7.5 with central necrosis. Adjacent left superior mediastinal lymph node with maximum SUV of 3.2. New hypermetabolic right axillary lymph node with maximum SUV of 18.7. ABDOMEN/PELVIS: No abnormal uptake within the solid abdominal organs. New hypermetabolic periportal and portacaval lymph nodes with maximum SUV of 5.5. New hypermetabolic bilateral retroperitoneal lymph nodes with maximum SUV of 6.3 and several left common iliac lymph nodes with maximum SUV of 5.3. BONES/SOFT TISSUE: New intense uptake associated with a destructive lesion of the superior left scapula, maximum SUV of 10.6. New destructive lesions of the upper thoracic spine with maximum SUV of 15.8 and severe pathologic compression deformity of T1. Significantly enlarged lytic lesions of the sacrum and bilateral iliac bones with maximum SUV of 16.5. Additional new areas of intense uptake include the inferior left scapula and right posterior elements of T10-T11. Diffuse sclerotic osseous metastatic disease within the remainder of the skeleton without significant uptake is compatible with treated disease. INCIDENTAL CT FINDINGS: Right chest MediPort. Emphysema. Mild gynecomastia. Mild atherosclerotic disease. Doherty catheter within the urinary bladder. Enlarged prostate with stable urinary bladder wall thickening that could be due to chronic outlet obstruction.      1. Marked progression of osseous metastatic disease with multiple new hypermetabolic lesions as described above. Most notable are severe pathologic compression deformity of T1 and markedly enlarged lytic lesions of the sacrum and bilateral iliac bones. 2. Increased size and mildly increased uptake of the mass in the medial left upper lobe abutting the mediastinum. 3. Hypermetabolic lymphadenopathy in the chest, abdomen, and pelvis which is new from prior PET-CT on 05/16/2022 and progressed from more recent CTs later in 2022. CBC: No results for input(s): WBC, HGB, PLT in the last 72 hours. BMP:    Recent Labs     01/18/23  1130   *   K 3.7      CO2 25   BUN 7   CREATININE 0.4*   GLUCOSE 110*     Hepatic:   Recent Labs     01/18/23  1130   AST 12*   ALT 6*   BILITOT 0.2   ALKPHOS 140*     Lipids:   Lab Results   Component Value Date/Time    CHOL 124 12/20/2013 05:22 AM    HDL 53 12/20/2013 05:22 AM    TRIG 52 12/20/2013 05:22 AM     Hemoglobin A1C: No results found for: LABA1C  TSH: No results found for: TSH  Troponin:   Lab Results   Component Value Date/Time    TROPONINT <0.010 08/09/2022 04:53 AM    TROPONINT <0.010 08/09/2022 04:53 AM    TROPONINT <0.010 07/13/2022 03:05 PM     Lactic Acid: No results for input(s): LACTA in the last 72 hours. BNP: No results for input(s): PROBNP in the last 72 hours.   UA:  Lab Results   Component Value Date/Time    NITRU NEGATIVE 01/16/2023 09:22 PM    COLORU YELLOW 01/16/2023 09:22 PM    [address] 01/16/2023 09:22 PM    RBCUA 23 01/16/2023 09:22 PM    MUCUS RARE 01/04/2023 11:53 PM    TRICHOMONAS NONE SEEN 01/16/2023 09:22 PM    BACTERIA NEGATIVE 01/16/2023 09:22 PM    CLARITYU CLOUDY 01/16/2023 09:22 PM    SPECGRAV 1.020 01/16/2023 09:22 PM    LEUKOCYTESUR LARGE 01/16/2023 09:22 PM    UROBILINOGEN NORMAL 01/16/2023 09:22 PM    BILIRUBINUR NEGATIVE 01/16/2023 09:22 PM    BLOODU MODERATE 01/16/2023 09:22 PM    KETUA NEGATIVE 01/16/2023 09:22 PM    AMORPHOUS RARE 07/21/2021 05:24 AM     Urine Cultures: No results found for: Se [place]ed Cultures: No results found for: BC  No results found for: BLOODCULT2  Organism: No results found for: ORG    Time Spent Discharging patient 45 minutes    Electronically signed by Deon Best MD on 1/20/2023 at 12:40 PM

## 2023-01-20 NOTE — PROGRESS NOTES
Outpatient Pharmacy Progress Note for Meds-to-Beds    Total number of Prescriptions Filled: 1  The following medications were dispensed to the patient during the discharge process:  Narcan    Additional Documentation:  Medication(s) were delivered to the patient's room prior to discharge      Thank you for letting us serve your patients.   [address] [place]    [address], [address] [facility]    Phone: [phone]    Fax: [phone]

## 2023-01-20 NOTE — CARE COORDINATION
Pt on discharge from [facility]- spoke with pt's spouse as she was heading out to her own doctor's appointment. She states she was not expecting pt to be discharged today. She states she is leaving the building and when she returns she will address the discharge. TC to [id number] [place] [address], spoke with Lucrecia Hernandez, verified they can have RN open case and start the IV Abx at home as long as family can continue services. I verified with pt's spouse that they can administer IV Abx with limited nursing assistance. Faxed AVS/ HHC order to [phone]. TC to Edie Velasquez with [facility], verified they can provide IV Abx- faxed Roni [address] order and Script to [phone]- received call back from Edie Velasquez, all is arranged and med will be delivered this pm.     Referral sent to [facility] for DME, pt's spouse to follow up once hospice removes DME which is not currently scheduled. (Provided hard copies of orders and documentation)    Met c pt's spouse/ finalized discharge- IMM letter signed and placed copy in soft chart/ copy to spouse. Confirmed with spouse that pt will need transport home via stretcher/  [facility]. Jayesh [phone].       Discharging Nurse: Upon discharge please notify [facility]  Phone: [phone]
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
## 2023-01-21 PROBLEM — Z16.12 EXTENDED SPECTRUM BETA LACTAMASE (ESBL) RESISTANCE: Status: ACTIVE | Noted: 2023-01-21

## 2023-01-22 ENCOUNTER — HOSPITAL ENCOUNTER (EMERGENCY)
Age: 53
Discharge: HOME | End: 2023-01-22
Attending: EMERGENCY MEDICINE
Payer: MEDICAID

## 2023-01-22 VITALS
TEMPERATURE: 99.2 F | SYSTOLIC BLOOD PRESSURE: 109 MMHG | DIASTOLIC BLOOD PRESSURE: 75 MMHG | HEART RATE: 99 BPM | RESPIRATION RATE: 14 BRPM | OXYGEN SATURATION: 96 %

## 2023-01-22 DIAGNOSIS — Z76.89 ENCOUNTER FOR ASSESSMENT OF FOLEY CATHETER: Primary | ICD-10-CM

## 2023-01-22 PROCEDURE — 99283 EMERGENCY DEPT VISIT LOW MDM: CPT

## 2023-01-22 ASSESSMENT — PAIN - FUNCTIONAL ASSESSMENT: PAIN_FUNCTIONAL_ASSESSMENT: NONE - DENIES PAIN

## 2023-01-22 ASSESSMENT — PAIN SCALES - GENERAL: PAINLEVEL_OUTOF10: 0

## 2023-01-22 NOTE — ED TRIAGE NOTES
Patient's family sent patient for catheter check. EMS report family saying patient's urinary catheter was pulled today and they want placement checked. Urine still draining.

## 2023-01-22 NOTE — DISCHARGE INSTRUCTIONS
Follow-up with primary care physician for reevaluation. Call for an appointment  Return to the emergency department immediately pain swelling bleeding fever chills nausea vomiting or any worsening symptoms.

## 2023-01-22 NOTE — ED PROVIDER NOTES
Emergency Department Encounter    Patient: Matilde Murillo  MRN: [id number]  : 1970  Date of Evaluation: 2023  ED Provider:  Ryan Klein DO    Triage Chief Complaint:   Urinary Catheter    [place]:  Matilde Murillo is a 46 y.o. male that presents to the emergency department via EMS from home for Doherty catheter evaluation. Per report family states patient urinary catheter may have been pulled out today they want it to be checked. Patient arrives unable to give any full history of present illness. Patient has a family member at the bedside states they think patient may have pulled his Doherty catheter out. He has been complaining of pain all day today with a Doherty catheter. He brought here for evaluation since patient cannot fully state the problem.     ROS - see HPI, below listed is current ROS at time of my eval:  General:  No fevers, no chills, no weakness  Eyes:  No recent vison changes, no discharge  ENT:  No sore throat, no nasal congestion, no hearing changes  Cardiovascular:  No chest pain, no palpitations  Respiratory:  No shortness of breath, no cough, no wheezing  Gastrointestinal:  No pain, no nausea, no vomiting, no diarrhea  Musculoskeletal:  No muscle pain, no joint pain  Skin:  No rash, no pruritis, no easy bruising  Neurologic:  No speech problems, no headache, no extremity numbness, no extremity tingling, no extremity weakness  Psychiatric:  No anxiety  Genitourinary:  No dysuria, no hematuria  Endocrine:  No unexpected weight gain, no unexpected weight loss  Extremities:  no edema, no pain    Past Medical History:   Diagnosis Date    CAD (coronary artery disease) 2013    cath negative per pt    Cancer ([address] 2021    pt reports he has lung cancer    History of TMJ disorder     Hypertension     Trigeminal neuralgia      Past Surgical History:   Procedure Laterality Date    ABDOMEN SURGERY      CARDIAC CATHETERIZATION  2016    CT BIOPSY PERCUTANEOUS SUPERFICIAL BONE 12/17/2021    CT BIOPSY PERCUTANEOUS SUPERFICIAL BONE 12/17/2021 [facility] CT SCAN    CT NEEDLE BIOPSY LUNG PERCUTANEOUS  7/28/2021    CT NEEDLE BIOPSY LUNG PERCUTANEOUS 7/28/2021 [facility] CT SCAN    IR NEPHROSTOMY CATHETER PLACEMENT  1/5/2023    IR NEPHROSTOMY CATHETER PLACEMENT 1/5/2023 [facility]    PORT SURGERY Right 8/13/2021    MEDIPORT INSERTION performed by Jose Manuel Betancourt MD at [facility] OR     Family History   Problem Relation Age of Onset    High Blood Pressure Mother     High Blood Pressure Sister     Cancer Sister      Social History     Socioeconomic History    Marital status:      Spouse name: Not on file    Number of children: 5    Years of education: Not on file    Highest education level: Not on file   Occupational History    Not on file   Tobacco Use    Smoking status: Every Day     Packs/day: 2.00     Years: 39.00     Pack years: 78.00     Types: Cigarettes    Smokeless tobacco: Never    Tobacco comments:     smokes 1-2 a day   Vaping Use    Vaping Use: Never used   Substance and Sexual Activity    Alcohol use: Not Currently     Alcohol/week: 6.0 standard drinks     Types: 6 Cans of beer per week     Comment: per week (24 oz beers)     Drug use: Yes     Types: Marijuana [employer])     Comment: last 12/1    Sexual activity: Yes     Partners: Female   Other Topics Concern    Not on file   Social History Narrative    Not on file     Social Determinants of Health     Financial Resource Strain: Not on file   Food Insecurity: Not on file   Transportation Needs: Not on file   Physical Activity: Not on file   Stress: Not on file   Social Connections: Not on file   Intimate Partner Violence: Not on file   Housing Stability: Not on file     No current facility-administered medications for this encounter.      Current Outpatient Medications   Medication Sig Dispense Refill    naloxone (NARCAN) 2 MG/2ML injection 4 mLs by Nasal route as needed (if patient is unresponsive or stops breathing) 2 mL 3 DULoxetine (CYMBALTA) 60 MG extended release capsule Take 60 mg by mouth daily      tamsulosin (FLOMAX) 0.4 MG capsule Take 0.4 mg by mouth daily      oxyCODONE-acetaminophen (PERCOCET)  MG per tablet Take 2 tablets by mouth every 6 hours as needed for Pain.      gabapentin (NEURONTIN) 600 MG tablet Take 600 mg by mouth 3 times daily. fentaNYL (DURAGESIC) 75 MCG/HR Place 1 patch onto the skin every 72 hours. polyethylene glycol (GLYCOLAX) 17 g packet Take 17 g by mouth daily as needed for Constipation 527 g 1    oxyCODONE (OXYCONTIN) 20 MG extended release tablet Take 20 mg by mouth in the morning and 20 mg in the evening. predniSONE (DELTASONE) 20 MG tablet Take 20 mg by mouth daily      cyclobenzaprine (FLEXERIL) 10 MG tablet Take 10 mg by mouth 3 times daily as needed for Muscle spasms      ALPRAZolam (XANAX) 0.5 MG tablet Take 0.5 mg by mouth 3 times daily as needed for Sleep or Anxiety (Twice Daily PRN).       naloxone 4 MG/0.1ML LIQD nasal spray 1 spray by Nasal route as needed for Opioid Reversal 1 each 5    Calcium Carbonate-Vitamin D (OYSTER SHELL CALCIUM/D) 500-200 MG-UNIT TABS Take 1 tablet by mouth daily 30 tablet 3    cyanocobalamin ([employer] VITAMIN B12) 1000 MCG tablet Take 1 tablet by mouth daily 30 tablet 3    ondansetron (ZOFRAN) 8 MG tablet take 1 tablet by mouth every 8 hours if needed for nausea and vomiting      Sennosides (SENNA) 8.6 MG CAPS Take 1 capsule by mouth 2 times daily as needed (constipation) 20 capsule 0     Allergies   Allergen Reactions    Tramadol Other (See Comments)     seizures    Morphine Hallucinations    Vicodin [Hydrocodone-Acetaminophen] Hives       Nursing Notes Reviewed    Physical Exam:  Triage VS:    ED Triage Vitals [01/22/23 1718]   Enc Vitals Group      /75      Heart Rate 99      Resp 14      Temp 99.2 °F (37.3 °C)      Temp Source Oral      SpO2 96 %      Weight       Height       Head Circumference       Peak Flow       Pain Score Pain Loc       Pain Edu? Excl. in [address]? My pulse ox interpretation is - normal    General appearance:  No acute distress. Skin:  Warm. Dry. Eye:  Extraocular movements intact. Ears, nose, mouth and throat:  Oral mucosa moist   Neck:  Trachea midline. Extremity:  No swelling. Normal ROM     Heart:  Regular rate and rhythm, normal S1 & S2, no extra heart sounds. Perfusion:  intact  Respiratory:  Lungs clear to auscultation bilaterally. Respirations nonlabored. Abdominal:  Normal bowel sounds. Soft. Nontender. Non distended. Doherty catheter noted, no bleeding from the urethral, some pain with movement of the catheter,  Back:  No CVA tenderness to palpation     Neurological:  Alert and oriented times 3. No focal neuro deficits. Psychiatric:  Appropriate    I have reviewed and interpreted all of the currently available lab results from this visit (if applicable):  No results found for this visit on 01/22/23. Radiographs (if obtained):  Radiologist's Report Reviewed:  No results found. EKG (if obtained): (All EKG's are interpreted by myself in the absence of a cardiologist)      MDM:  Patient is a 48-year-old male with chronic Doherty catheter. Patient with a history of prostate cancer metastasis to other areas of the body who presents to the emergency department from home for evaluation of Doherty catheter. Per EMS report and family members at bedside patient did not complain of pain with the Doherty catheter. They feel patient may have pulled the Doherty catheter out. He sent patient into the emergency department to have the Doherty cath evaluated. Differential diagnosis includes dislodgment of Doherty catheter, urinary tract infection, urethral rupture. Doherty catheter balloon was deflated Doherty catheter was advanced for, balloon was reinflated. Urine still draining appropriately patient states no pain at this time patient ready for discharge. No complications.   Patient to follow-up with his primary care physician in 1 to 2 days as well as his urologist.  Patient is return immediately worsening symptoms. Patient is to continue current antibiotics as prescribed and directed. Clinical Impression:  1. Encounter for assessment of Doherty catheter      Disposition referral (if applicable): Manuel Hess MD  [facility] [id number]  [place] [address]  [phone]    Schedule an appointment as soon as possible for a visit in 2 days  If symptoms worsen    [address] [facility] Emergency Department  [facility] [id number]  [phone]  Go in 1 day  If symptoms worsen    Disposition medications (if applicable):  New Prescriptions    No medications on file     ED Provider Disposition Time  DISPOSITION Decision To Discharge 01/22/2023 05:55:57 PM      Comment: Please note this report has been produced using speech recognition software and may contain errors related to that system including errors in grammar, punctuation, and spelling, as well as words and phrases that may be inappropriate. Efforts were made to edit the dictations.         Fawad Mcqueen DO  01/24/23 1426

## 2023-01-22 NOTE — ED NOTES
Instructed family to follow up with patient's PCP. Patient's wife requesting transport for patient because he is non-ambulatory.       Kristen Tavares RN  01/22/23 8716

## 2023-01-23 ENCOUNTER — TELEPHONE (OUTPATIENT)
Dept: ONCOLOGY | Age: 53
End: 2023-01-23

## 2023-01-23 NOTE — TELEPHONE ENCOUNTER
[facility] called with FYI that patient has transferred back to [address], house doctor will follow.

## 2023-01-26 ENCOUNTER — HOSPITAL ENCOUNTER (OUTPATIENT)
Dept: INFUSION THERAPY | Age: 53
Discharge: HOME | End: 2023-01-26
Payer: MEDICAID

## 2023-01-26 ENCOUNTER — CLINICAL DOCUMENTATION (OUTPATIENT)
Dept: ONCOLOGY | Age: 53
End: 2023-01-26

## 2023-01-26 ENCOUNTER — OFFICE VISIT (OUTPATIENT)
Dept: ONCOLOGY | Age: 53
End: 2023-01-26
Payer: MEDICAID

## 2023-01-26 VITALS
TEMPERATURE: 97.7 F | HEART RATE: 112 BPM | OXYGEN SATURATION: 97 % | RESPIRATION RATE: 16 BRPM | HEIGHT: 73 IN | BODY MASS INDEX: 25.46 KG/M2

## 2023-01-26 DIAGNOSIS — C79.51 SECONDARY MALIGNANT NEOPLASM OF BONE (HCC): Primary | ICD-10-CM

## 2023-01-26 DIAGNOSIS — C34.82 MALIGNANT NEOPLASM OF OVERLAPPING SITES OF LEFT LUNG (HCC): ICD-10-CM

## 2023-01-26 DIAGNOSIS — C61 PROSTATE CANCER (HCC): ICD-10-CM

## 2023-01-26 DIAGNOSIS — M89.8X9 BONE PAIN: ICD-10-CM

## 2023-01-26 PROCEDURE — 99211 OFF/OP EST MAY X REQ PHY/QHP: CPT

## 2023-01-26 PROCEDURE — 99215 OFFICE O/P EST HI 40 MIN: CPT | Performed by: NURSE PRACTITIONER

## 2023-01-26 NOTE — PROGRESS NOTES
This nurse called [facility] per the request of Jeffery Zimmerman NP to obtain clarification on the dosage and frequency of the patient's pain medication. Per [address] the patient is not currently under their care however he may be under the care of the palliative team. This nurse was transferred to speak with them, however there was no answer. This nurse left a  requesting a joseph back, this RN's direct number provided.

## 2023-01-26 NOTE — PROGRESS NOTES
Message received from clinical staff requesting this RN to discuss with [address] if patient would be eligible on hospice to receive Immunotherapy and palliative radiation. This RN called [facility] and their staff stated they have attempted to call patient multiple times but have not gotten ahold of him to see if patient was signing with them. This RN called patient to clarify, patients wife answered the phone, patients daughter and patient also present. Family states they have not signed with hospice and do not know where that information came from. This RN tried to clarify a referral was placed but they have not signed according to [facility]. Patients daughter expresses aggravation stating he should not have been signed up. Again this RN clarified they have not signed with hospice yet according to [address]. Patient and his SO informed if they want to sign with hospice and see if hospice will cover patient getting treatment with immunotherapy and palliative radiation then per [address] they need to have a consult meeting to discuss treatment options. A hospice doctor would have to sign off on the plan and we can move forward with treatment. If hospice will not cover immunotherapy and palliative radiation then patient would not sign with hospice and purse treatment without hospice involvement. Patient and his SO voice understanding. Patient and his SO state they will call [address] to set up consult meeting and call this RN back after consult meeting. Patient and his wife have this RN direct contact information.

## 2023-01-26 NOTE — PROGRESS NOTES
Patient Name:  Iris Ackerman  Patient :  1970  Patient MRN:  [id number]     Primary Oncologist: Mukesh Clark MD  Referring Provider: Susan Mahmood MD     Date of Service: 2023        Chief Complaint:    Chief Complaint   Patient presents with    Follow-up       Encounter Diagnoses   Name Primary? Secondary malignant neoplasm of bone (HCC) Yes    Malignant neoplasm of overlapping sites of left lung [facility])     Bone pain     Prostate cancer ([facility] [address]           HPI:   21: Initial [facility] visit:Hima Grimes is a 45 y.o. male who presents to [facility] with report of dysuria and flank pain. Reports these symptoms started a few weeks ago. He ultimately came to the ED due to worsening back pain. He reports ongoing issues with frequent urge to urinate and notes some incontinence. He denies hematuria. He was noted to have acute pyelonephritis and was admitted and started on IV Rocephin.      21 CT chest     Impression   1. Left hilar neoplasm extending into the left upper lobe measuring 3.2 x 5.9   x 5.3 cm obstructing the left upper lobe bronchus with probable minimal   adjacent infiltrates versus lymphangitic spread of tumor. PET-CT/biopsy is   recommended. 2. Mediastinal lymphadenopathy. 3. Prominent left supraclavicular lymph nodes. 4. No osseous metastatic disease. 21 Ct abdomen and pelvis  Impression   1. Circumferential thickening of the bladder wall is noted which could be   related to cystitis. Correlate with urinalysis. 2. There is left retroperitoneal lymphadenopathy. This could be reactive or   neoplastic. This can be further evaluated with PET-CT. 3. There is minimal stranding within the retroperitoneal on the left. This   is uncertain etiology however could be related to acute pyelonephritis. 4. Interval development of multiple sclerotic lesions within the spine and   pelvis, concerning for metastatic osseous disease. 5. Prostate gland is enlarged. Correlate with PSA. .30      He denies past history of cancer. He smokes 2-3 ppd and drinks 10-12 beers daily. He reports unknown malignancy in his sister who  at 39. No other known family history of cancer. Due to lung mass and concern for metastatic prostate cancer we were called to evaluate. 21:  Final Pathologic Diagnosis:   Needle biopsy of lung, clinically left lung mass:        SQUAMOUS CELL CARCINOMA IN SITU.     21:PET  1. Left perihilar mass likely represents primary lung cancer. Correlate   with biopsy results. The opacity more peripheral in the left lower lobe has   relatively low level activity and likely represents an area of post   obstructive pneumonia adjacent to the mass. 2.  Metabolically active left AP window lymph node concerning for metastatic   disease. 3.  The prostate is enlarged and has increased FDG activity which could be   due to prostatitis or prostate cancer. Correlate with PSA levels. 4.  No increased metabolic activity associated with retroperitoneal lymph   nodes. This is unlikely related to the lung process given the significant   difference in metabolic activity; however, if there is prostate cancer, this   could potentially be metastatic disease from the prostate cancer, which can   have variable levels of uptake on FDG PET scans. 5.  No metabolic activity associated with multiple sclerotic lesions. Again   this is unlikely related to the lung process, but if there is prostate   cancer, this could represent metastatic disease from prostate cancer with   poor FDG avidity. Otherwise it could also represent large bone islands. 6.  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 There is a focal area of intense activity associated with a new lucent lesion   within the background of Paget's disease concerning for metastatic disease.    Given the FDG activity, it is likely related to the lung process. 8/20/21: MRI brain  1. There is a punctate focus of restricted diffusion within the right frontal   lobe without associated enhancement, mass effect or midline shift. This   could represent a punctate acute infarct. However, given history, an early   metastatic focus cannot be entirely excluded. 2. Scattered foci of susceptibility are seen within the cerebral hemispheres   bilaterally, which were not visualized on the prior exam.  Findings could   represent areas of remote microhemorrhage or perhaps treated metastases. 3. No abnormal enhancement within the brain. 4. Minimal global parenchymal volume loss with minimal chronic microvascular   ischemic changes. 12/2/21: CT chest, abdomen and pelvis:  Chest CT: Interval increased size of the left perihilar mass, with increased   adjacent obstructive pneumonitis and atelectasis. Stable to minimally to decreased mediastinal lymphadenopathy. Interval increased bony metastatic disease. Abdomen and pelvis CT: Stable retroperitoneal and pelvic lymphadenopathy. Interval increased bony metastatic disease. 12/17/21:Final Pathologic Diagnosis:   Bone, posterior ileum, needle core biopsy:   -     METASTATIC SQUAMOUS CELL CARCINOMA. PACO  PDL1 22c3 >50%(keytruda)  PTEN positive  Alk neg    CARIS, not enough tissue for rest of the testing    Gaurdant with no actionable mutation otherwise     12/27/21: Started XRT to the left pelvis  Added chest radiation dia 3  Completed dia 10 2022      Started carbo/taxol/keytruda jan 20 2022 2/8/22: CT chest:  1. Interval decrease in size of left suprahilar mass extending along the   left upper lobe bronchi extension into the left main pulmonary artery. There   is interval decrease in associated ground-glass opacity in the left upper   lobe. Findings suggest response to therapy. 2.  Interval decrease in mediastinal lymphadenopathy.        3.  Diffuse osseous metastatic disease. Feb/march 2022 he received casodex and GnRH analogue     3/11/22: CT head: No acute abn    3/26/22: Ct abdomen and pelvis:  1. Interval development of a mild degree of left hydronephrosis and   hydroureter, to the level of the left UVJ. An approximate 7.3 x 5 cm soft   tissue mass is present along the left posterior margin of the urinary   bladder, and contiguous with the prostate gland, resulting in obstructing of   the ureteral orifice. Soft tissue mass is most consistent with primary   prostate carcinoma, in light of the diffuse blastic metastatic disease. 2. Interval increase in confluent adenopathy at the level of the aortic   bifurcation, consistent with progression of metastatic disease   3. Diffuse osseous blastic metastatic disease, with interval increase in size   of the lytic metastatic lesion involving the proximal left iliac bone     3/22/22:     He was admitted at [facility] in April (4/22 - 4/30). He had debulking of his primary tumor via bronchoscopy during admit. BMB at [facility] consistent with met prostate cancer  - Sampson prior to admission; exchanged in ED 4/22. Urine cx neg  - CT A/P:  Soft tissue mass along the left UVJ/posterior lateral left bladder wall with moderate left hydroureteronephrosis and diffuse urinary bladder  thickening. UVJ mass is likely a metastatic deposit involving the bladder and possibly the left prostate/seminal vesicle. - 4/25L neph tube placed by IR; sampson removed. Also treated for PNA. 5/16/2022 Imaging   PET - local:   1. Decreased size and uptake of the left perihilar mass. Adjacent   consolidation extending into the left lower lobe likely represents   post-treatment and post-obstructive atelectasis. Resolution of the   hypermetabolic prevascular lymph node. 2. Decreased uptake involving the lytic lesion along the left sacroiliac   joint.  Diffuse sclerosis throughout the skeleton without significant uptake   is unchanged and may represent treated metastatic disease.    May 19 2022 CBC with hb 6.9, platelets 89, psa 256, testerone <7    May 31 2022 started Zytiga and prednisone    June 7 2022 resumed C1D1  keytruda    6/9/22: Started SHABBIR    But then he did not follow up    Presented to the ER in august 2022  with chest pain     August 9 2022 CT chest:  1. Artifact limits evaluation of the distal pulmonary arteries.  No evidence   of central pulmonary embolism or right heart strain.   2. New from 05/16/2022, there is now dense consolidation anteriorly in the   left upper lobe, which may represent postobstructive pneumonia or progression   of disease.  More patchy consolidation in the perihilar left lung is slightly   increased and could represent pneumonia or post radiation change.   3. Trace left pleural effusion.   4. Findings are concerning for progression of osseous metastatic disease.   Nondisplaced pathologic fractures in the posterior right 4th and 7th ribs   appear subacute all although are new from prior exam.       Was admitted to [facility] October 2022 with right arm pain, noted to have small left sided pneumothorax, BRENDA, leaking nephrostomy bag, and possibly sepsis  Nephrostomy tube exchanged, treated for UTI    Review of CBC 10/11/22  WBC 7.5, Hgb 10.6, Hct 33.1, MCV 80.3, Platelets 411  CMP , K+ 4.1, BUN 17, Cr 1.4, glucose 101, ALT <5, AST 10, ALK phos 212     CT head 10/11/22  Impression   Motion artifact on the lower images.  No convincing evidence for acute   intracranial hemorrhage.  No mass effect, midline shift, or focal sulcal   effacement.  The ventricles are not dilated.       Low lying position of the right mandibular condyle at the temporomandibular   joint suggesting subluxation.       Areas of increased sclerosis in the skull concerning for sclerotic metastases.      CT chest 10/11/22  Impression   1. Suboptimal opacification of the pulmonary arteries.  No central pulmonary   embolus. No definite lobar pulmonary embolus.  Motion  artifact and contrast   bolus timing limits evaluation of the segmental pulmonary arteries. 2. New trace left anterior pneumothorax measuring 4 mm. 3. Otherwise no significant change since 08/09/2022. Complete collapse of   the left upper lobe with persistent left perihilar 3.8 cm mass obscured by   the surrounding atelectasis. 4. Trace left pleural effusion   5. Mild infrahilar left lower lobe atelectasis   6. Osseous metastatic disease with diffuse sclerotic osseous metastatic   lesions. No pathologic fracture. 10/11/22; CXR:  Stable chest       Airspace opacity in the left upper lobe, likely related to lobar atelectasis   and known lung mass. Known diffuse osseous metastasis. 10/12/22: Left PCN exchanged. No evidence of hydronephrosis     10/12/2022 CBC with WBC of 12.2 hemoglobin of 9 hematocrit 27.8 MCV of 79.7 platelets of 347, no nucleated RBCs. BMP with sodium of 129 potassium of 4 BUN of 21 creatinine 1.3  PSA 31.78     XR right humerus was ordered     10/11/22: PSA 35     Was discharged on Zytiga and prednisone but not taking it    10/27/22: Bone scan:  Multifocal osseous metastatic disease. The degree of metastatic disease is   underestimated on bone scan as compared to that seen on CT.     11/13/22: XR right shoulder:Diffuse bony metastases with a focal destructive lesion in the distal clavicle      1/5/23 CT abdomen/pelvis  1. No left ureteral stent or nephrostomy tube at this time. A tract of   previous nephrostomy tubes is noted at the posterior edge of the kidney and   the inferolateral edge. There is no perinephric hematoma. 2.  Small amount of fluid in the left renal pelvis with a thickened wall but   without overt dilatation of the left ureter at this time. 3.  Diffuse thickening of the urinary bladder wall but with some asymmetry   along the left lateral and inferolateral margin. Doherty catheter in the lumen.        4.  Diffuse metastatic disease in the bones with extensive areas of sclerosis   and mixed lucencies. There is progressive lytic lesion and bone loss   throughout the sacrum and into the left hemipelvis more than the right side. Replacement with soft tissue extending beyond the bony margins into the   presacral, muscular, and edge of the retroperitoneum margins. 5.  Opacities in the right lower lung appear to be more than just atelectasis   and could have pneumonitis or developing infection. 1/6/23 CT abdomen pelvis  1. No hydronephrosis. 2. Doherty catheter in place. Mild circumferential urinary bladder wall  thickening as can be seen with cystitis. Recommend correlation with  urinalysis. 3. Severe diffuse osseous metastases with extensive replacement of the sacrum  and posterior iliac bones left worse than right and adjacent soft tissue  extension. 4. Retroperitoneal lymphadenopathy mildly worsened from 03/26/2022.  5. Moderately increased stool throughout the colon compatible with  constipation. 1/13/23 PET CT scan  Impression   1. Marked progression of osseous metastatic disease with multiple new   hypermetabolic lesions as described above. Most notable are severe   pathologic compression deformity of T1 and markedly enlarged lytic lesions of   the sacrum and bilateral iliac bones. 2. Increased size and mildly increased uptake of the mass in the medial left   upper lobe abutting the mediastinum. 3. Hypermetabolic lymphadenopathy in the chest, abdomen, and pelvis which is   new from prior PET-CT on 05/16/2022 and progressed from more recent CTs later   in 2022. Past Medical History:     BPH, HTN, COPD                                                            Past Surgery History:    None per his wife                                                                        Social History:   Lives with wife. Cut down smoking to 2-4 cigarettes per day prior to which he was smoking 2 to 3 packs/day for the past 40 years. Also reported drinking alcohol 10-12 beers per day. Denied any other illicit drug abuse. Family History:    He reported that his sister  at the age of 39 with  metastatic cancer, he was not sure what the primary was                                                                                              Allergies   Allergen Reactions    Tramadol Other (See Comments)     seizures    Morphine Hallucinations    Vicodin [Hydrocodone-Acetaminophen] Hives       Current Outpatient Medications on File Prior to Visit   Medication Sig Dispense Refill    naloxone (NARCAN) 2 MG/2ML injection 4 mLs by Nasal route as needed (if patient is unresponsive or stops breathing) 2 mL 3    DULoxetine (CYMBALTA) 60 MG extended release capsule Take 60 mg by mouth daily      tamsulosin (FLOMAX) 0.4 MG capsule Take 0.4 mg by mouth daily      oxyCODONE-acetaminophen (PERCOCET)  MG per tablet Take 2 tablets by mouth every 6 hours as needed for Pain.      gabapentin (NEURONTIN) 600 MG tablet Take 600 mg by mouth 3 times daily. fentaNYL (DURAGESIC) 75 MCG/HR Place 1 patch onto the skin every 72 hours. polyethylene glycol (GLYCOLAX) 17 g packet Take 17 g by mouth daily as needed for Constipation 527 g 1    oxyCODONE (OXYCONTIN) 20 MG extended release tablet Take 20 mg by mouth in the morning and 20 mg in the evening. predniSONE (DELTASONE) 20 MG tablet Take 20 mg by mouth daily      cyclobenzaprine (FLEXERIL) 10 MG tablet Take 10 mg by mouth 3 times daily as needed for Muscle spasms      ALPRAZolam (XANAX) 0.5 MG tablet Take 0.5 mg by mouth 3 times daily as needed for Sleep or Anxiety (Twice Daily PRN).       naloxone 4 MG/0.1ML LIQD nasal spray 1 spray by Nasal route as needed for Opioid Reversal 1 each 5    Calcium Carbonate-Vitamin D (OYSTER SHELL CALCIUM/D) 500-200 MG-UNIT TABS Take 1 tablet by mouth daily 30 tablet 3    cyanocobalamin ([employer] VITAMIN B12) 1000 MCG tablet Take 1 tablet by mouth daily 30 tablet 3    ondansetron (ZOFRAN) 8 MG tablet take 1 tablet by mouth every 8 hours if needed for nausea and vomiting      Sennosides (SENNA) 8.6 MG CAPS Take 1 capsule by mouth 2 times daily as needed (constipation) 20 capsule 0     No current facility-administered medications on file prior to visit.     Interval History: 1/26/23:    Was meant to have OV with Dr. Cruz today who is out of office. Patient presents with significant other and daughter today. He is lethargic, responds to raised voice, responses are minimal, he affirms ongoing pain to sacral area, sampson catheter is in place with cloudy yellow urine. Remains on IV antibiotics for UTI to complete two weeks first week of February. He reportedly signed off of hospice 1/24. Was also seen in ED 1/24/23 for sampson catheter issues. Nephrostomy tube was DC in hospital.    They present to request refills of pain medications. Significant other has provided print out from pharmacy of reported present pain regimen. Dr Lin did join the visit to evaluate patient and discuss pain medication refill. In order to clarify pain management regimen he requested confirmation from hospice.    I did call [facility] hospice to confirm pain regimen. Director of the program, and nursing manager did join call where they did report patient regimen which matched OARRS report BUT they reported that even three days after the patient's family picking up his medications, the pill counts were off by more than 14. They reported concerns about diversion of controlled substances from the patients significant other. They report that the patient was given the option to transition to fentanyl patch and roxicodone 1 mg q 1-2 hours or they could not continue with hospice care.     I have previously discussed his case with radiation oncology who is agreeable to see patient for palliative radiation.  Review of Systems:  As per the interval history, rest of the review of system negative     Vital Signs: Pulse (!) 112   Temp 97.7 °F (36.5 °C) (Infrared)   Resp 16   Ht 6' 1\" (1.854 m)   SpO2 97%   BMI 25.46 kg/m²      CONSTITUTIONAL: difficult to rouse, wheelchair, full assist   LUNGS: coarse bs   CARDIOVASCULAR: s1s2 rrr no murmurs, tachycardia  ABDOMEN: soft ntnd bs pos  NEUROLOGIC: cranial nerves grossly intact     Labs:  Hematology:  Lab Results   Component Value Date    WBC 9.7 01/17/2023    RBC 3.44 (L) 01/17/2023    HGB 8.3 (L) 01/17/2023    HCT 26.3 (L) 01/17/2023    MCV 76.5 (L) 01/17/2023    MCH 24.1 (L) 01/17/2023    MCHC 31.6 (L) 01/17/2023    RDW 17.3 (H) 01/17/2023     01/17/2023    MPV 8.8 01/17/2023    BANDSPCT 17 (H) 10/12/2022    SEGSPCT 74.0 (H) 01/17/2023    EOSRELPCT 0.8 01/17/2023    BASOPCT 0.3 01/17/2023    LYMPHOPCT 16.6 (L) 01/17/2023    MONOPCT 8.0 (H) 01/17/2023    BANDABS 2.07 10/12/2022    SEGSABS 7.2 01/17/2023    EOSABS 0.1 01/17/2023    BASOSABS 0.0 01/17/2023    LYMPHSABS 1.6 01/17/2023    MONOSABS 0.8 01/17/2023    DIFFTYPE AUTOMATED DIFFERENTIAL 01/17/2023    ANISOCYTOSIS 1+ 04/09/2022    POLYCHROM 1+ 04/09/2022    PLTM DECREASED 04/09/2022     Lab Results   Component Value Date    ESR 47 (H) 03/22/2022     Chemistry:  Lab Results   Component Value Date     (L) 01/18/2023    K 3.7 01/18/2023     01/18/2023    CO2 25 01/18/2023    BUN 7 01/18/2023    CREATININE 0.4 (L) 01/18/2023    GLUCOSE 110 (H) 01/18/2023    CALCIUM 11.8 (H) 01/18/2023    PROT 5.9 (L) 01/18/2023    LABALBU 2.6 (L) 01/18/2023    BILITOT 0.2 01/18/2023    ALKPHOS 140 (H) 01/18/2023    AST 12 (L) 01/18/2023    ALT 6 (L) 01/18/2023    LABGLOM >60 01/18/2023    GFRAA >60 10/13/2022    MG 1.8 04/10/2022    POCCA 1.18 04/01/2022    POCGLU 102 (H) 10/13/2022     Lab Results   Component Value Date     (H) 03/22/2022     No components found for: LD  Lab Results   Component Value Date TSHHS 1.360 07/13/2022    T4FREE 0.97 07/03/2022     Immunology:  Lab Results   Component Value Date    PROT 5.9 (L) 01/18/2023    SPEP  03/22/2022     INTERPRETATION - Decreased albumin and total prorein. No definitive monoclonal bands are seen. SAF    SPEP INTERPRETATION - No monoclonal bands are seen. SAF 03/22/2022    ALBUMINELP 2.8 (L) 03/22/2022    LABALPH 0.4 03/22/2022    LABALPH 1.1 03/22/2022    LABBETA 1.1 03/22/2022    GAMGLOB 0.8 03/22/2022     No results found for: Ameya Links, KLFLCR  No results found for: B2M  Coagulation Panel:  Lab Results   Component Value Date    PROTIME 11.8 10/20/2022    INR 0.92 10/20/2022    APTT 40.6 (H) 10/20/2022    DDIMER 908 (H) 08/09/2022     Anemia Panel:  Lab Results   Component Value Date    OFJLXVED81 1093 (H) 12/22/2022    FOLATE 3.9 12/22/2022     Tumor Markers:  Lab Results   Component Value Date    CEA 7.7 07/23/2021    .0 (H) 12/22/2022        Observations:  ECOG:  No data recorded       Assessment & Plan:   H/O noncompliance. Left hilar mass with mediastinal hilar lymphadenopathy. 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 PET scan results from august 2021 noted, bone lesions most probably not metastatic except for one lytic lesion. MRI of the brain with no convincing metastatic lesions. Presented  the case in the tumor board. Decision made to proceed with a prostate biopsy and treat with ADT if malignancy  and in the meantime proceed with staging mediastinoscopy/rebiopsy for further treatment plan. But as pt very very very noncompliant, did not follow up with urology, CTS or for bone biopsy multiple times. Note PSA rising and was 250 on November 16 2021   CT imaging dec 2021 with progressive met disease  Bone biopsy on dec 13 2021 with met squamous cell cancer, consistent with lung primary. PDL1 >50%Jason Quale) .    Not enough tissue for rest of CARIS, guardant 360 with no other actionable mutation  CT chest jan 2022 with no significant change   Completed Palliative radiation to the pelvic area and also radiation to the left lung dia 10 2022  Discussed the findings, diagnosis and poor prognosis and treatment with carbo/taxol/pembro after completion of radiation. Discussed ae. PORT placed. Completed OCM  C1D1 carbo/taxol and keytruda started 1/20/22, unfortunately received only one treatment so far sec to being very very noncompliant  CT after C1 with positive response   And then treatment held sec to cytopenias/noncompliance  and also admission to [facility]. PET in may 2022 with continued response. Resumed Keytruda alone June 7 2022 and   Plan was  for repeat PET after 3-4C. But then they discussed and established care with hospice  and did not follow  with us after June 2022 until sep 2022. CT chest in October 2022 with persistent left perihilar mass  with complete collapse   Then he was in the office in nov 2022 again and he was not sure whether he would like to continue with hospice and was not sure what medications he was taking. PET was ordered but not done. Visit here on dec 22 [id number] he wishes to receive treatment  1/13/23 PET CT with progression of osseous metastatic disease, progression of SHELLEY mass, new lymphadenopathy  -Hospitalized 1/17/23 with hypercalcemia, pain    Met prostate cancer: PSA was ~900, started on casodex and received GnRH analogue in feb/march . But BMB biopsy in April 2022 consistent with involvement by prostate cancer. May 2022 PSA was 256. Was Started on Zytiga and prednisone in may 2022 . Was on  lupron. But then looks like he discontinued Zytiga in June 2022 and ?restarted in October 2022, not even sure he is taking them . PSA in October 2022 was 33. PET with progression    Left nephrostomy , changed oct 2022. DC 1/23. Has a sampson catheter.  Follow with urology     Anemia: Sec to BMB involvement with prostate cancer. Right buttock pain and also left humeral pain. Continue adequate analgesic regimen and also adequate bowel regimen. Bone scan as above, best supportive care. -discussed with radiation oncology who are willing to evaluate for palliative radiation     Discussed above findings and overall very poor prognosis and plan with bith but I am really not sure how much they comprehend at all. They argue a lot    Cancer related pain: presented for refill of medications, due to concerns regarding diversion we informed patient and significant other we would refill fentanyl and roxicodone. This regimen is what was recommended by hospice for adequate pain relief and assistance with medication compliance. They report they do not want Roxicodone. Rx for fentanyl per Dr. Justine Sue. Smoking and alcohol cessation    Note he has he has been very noncompliant with appointments and follow ups     Please do not hesitate to contact us if you need further information. RTC 3 weeks for evaluation of initiation of keytruda, completion of radiation, completion of IV antibiotics    Greater than 60 minutes were spent in counseling and coordination of care.

## 2023-01-27 ENCOUNTER — TELEPHONE (OUTPATIENT)
Dept: ONCOLOGY | Age: 53
End: 2023-01-27

## 2023-01-27 DIAGNOSIS — C79.51 SECONDARY MALIGNANT NEOPLASM OF BONE (HCC): Primary | ICD-10-CM

## 2023-01-27 RX ORDER — FENTANYL 75 UG/H
1 PATCH TRANSDERMAL
Qty: 5 PATCH | Refills: 0 | Status: SHIPPED | OUTPATIENT
Start: 2023-01-27 | End: 2023-02-11

## 2023-01-27 NOTE — TELEPHONE ENCOUNTER
Left voice mail for patient regarding plan of care and medication refill. He is asked to return our call.

## 2023-01-27 NOTE — TELEPHONE ENCOUNTER
Late entry:    Significant other called x 2 this morning requesting pain medication refills. Did call Kacie Campbell back and left message on VM. Significant other returned call. I spoke with the patient on speaker, Lung Navigator present for call. I did obtain consent to continue conversation by Kacie Campbell with his significant other. I discussed with the patient and his significant other, that I spoke with hospice and they informed me that med counts were short often enough, with concern for diversion, that the patient was given the option of changing his medication regimen to fentanyl 75 mcg and Roxanol 1 mg q1-2 or discharging from hospice. (see note dated 1/26/23)   I informed them we could offer same medication regimen Hospice recommended. Significant other very upset at changes to medication regimen. \"You want him to withdrawal. Why would believe a company we don't want to use over us. \"  I reiterated that we recommend he rejoin hospice and pursue palliative radiation. I informed we could revisit administration of Gatito Gazella after radiation is complete as immunotherapy is not administered during radiation. Kacie Campbell is agreeable to Fentanyl 75 mcg patches. Expressed he does not want the Roxanol. The significant other expressed displeasure with the updated medication regimen and disconnected the call. Fentanyl patch 15 day supply to be Rx to pharmacy of record per Dr. Gold Mei.

## 2023-01-30 ENCOUNTER — TELEPHONE (OUTPATIENT)
Dept: ONCOLOGY | Age: 53
End: 2023-01-30

## 2023-01-30 NOTE — TELEPHONE ENCOUNTER
Nurse from [address] [place] [address] called and informed this RN she spoke with patient and patients wife and per patients wife they do not want to pursue treatment, declining radiation and immunotherapy at this time. This RN updated radiation and med onc physicians. Appointment for immunotherapy cancelled as well as referral to Dr. Shell. Mariela Akbar

## 2023-01-30 NOTE — TELEPHONE ENCOUNTER
Call received from nurse at [facility]. Per Lata, patient has decided to sign with [facility]. Order received from Dr. Pam Workman for admission. This RN inquired if patient was still wanting to pursue immunotherapy and palliative radiation. Lata unsure, will follow up with patients  and call this RN back. This RN direct contact information given for call back.

## 2023-01-31 ENCOUNTER — APPOINTMENT (OUTPATIENT)
Dept: GENERAL RADIOLOGY | Age: 53
End: 2023-01-31
Payer: MEDICAID

## 2023-01-31 ENCOUNTER — APPOINTMENT (OUTPATIENT)
Dept: CT IMAGING | Age: 53
End: 2023-01-31
Payer: MEDICAID

## 2023-01-31 ENCOUNTER — HOSPITAL ENCOUNTER (INPATIENT)
Age: 53
LOS: 16 days | Discharge: HOSPICE | End: 2023-02-16
Attending: EMERGENCY MEDICINE | Admitting: INTERNAL MEDICINE
Payer: MEDICAID

## 2023-01-31 DIAGNOSIS — C80.1 CANCER (HCC): ICD-10-CM

## 2023-01-31 DIAGNOSIS — A41.9 SEPSIS WITHOUT ACUTE ORGAN DYSFUNCTION, DUE TO UNSPECIFIED ORGANISM (HCC): Primary | ICD-10-CM

## 2023-01-31 DIAGNOSIS — D64.9 ANEMIA, UNSPECIFIED TYPE: ICD-10-CM

## 2023-01-31 LAB
ANION GAP SERPL CALCULATED.3IONS-SCNC: 9 MMOL/L (ref 4–16)
BACTERIA: NEGATIVE /HPF
BASOPHILS ABSOLUTE: 0.1 K/CU MM
BASOPHILS RELATIVE PERCENT: 0.4 % (ref 0–1)
BILIRUBIN URINE: NEGATIVE MG/DL
BLOOD, URINE: ABNORMAL
BUN BLDV-MCNC: 9 MG/DL (ref 6–23)
CALCIUM SERPL-MCNC: 10.6 MG/DL (ref 8.3–10.6)
CHLORIDE BLD-SCNC: 95 MMOL/L (ref 99–110)
CLARITY: CLEAR
CO2: 27 MMOL/L (ref 21–32)
COLOR: YELLOW
CREAT SERPL-MCNC: 0.4 MG/DL (ref 0.9–1.3)
CRP SERPL HS-MCNC: 103.4 MG/L
DIFFERENTIAL TYPE: ABNORMAL
EOSINOPHILS ABSOLUTE: 0.2 K/CU MM
EOSINOPHILS RELATIVE PERCENT: 1.2 % (ref 0–3)
GFR SERPL CREATININE-BSD FRML MDRD: >60 ML/MIN/1.73M2
GLUCOSE BLD-MCNC: 111 MG/DL (ref 70–99)
GLUCOSE, URINE: NEGATIVE MG/DL
HCT VFR BLD CALC: 26.9 % (ref 42–52)
HEMOGLOBIN: 8.7 GM/DL (ref 13.5–18)
IMMATURE NEUTROPHIL %: 0.6 % (ref 0–0.43)
KETONES, URINE: ABNORMAL MG/DL
LACTATE: 1.1 MMOL/L (ref 0.5–1.9)
LEUKOCYTE ESTERASE, URINE: NEGATIVE
LYMPHOCYTES ABSOLUTE: 2 K/CU MM
LYMPHOCYTES RELATIVE PERCENT: 16.2 % (ref 24–44)
MCH RBC QN AUTO: 23.8 PG (ref 27–31)
MCHC RBC AUTO-ENTMCNC: 32.3 % (ref 32–36)
MCV RBC AUTO: 73.5 FL (ref 78–100)
MONOCYTES ABSOLUTE: 0.8 K/CU MM
MONOCYTES RELATIVE PERCENT: 6.6 % (ref 0–4)
NITRITE URINE, QUANTITATIVE: NEGATIVE
NUCLEATED RBC %: 0 %
PDW BLD-RTO: 17.4 % (ref 11.7–14.9)
PH, URINE: 8 (ref 5–8)
PLATELET # BLD: 433 K/CU MM (ref 140–440)
PMV BLD AUTO: 8.6 FL (ref 7.5–11.1)
POTASSIUM SERPL-SCNC: 4 MMOL/L (ref 3.5–5.1)
PROCALCITONIN SERPL-MCNC: 0.09 NG/ML
PROTEIN UA: 30 MG/DL
RAPID INFLUENZA  B AGN: NEGATIVE
RAPID INFLUENZA A AGN: NEGATIVE
RBC # BLD: 3.66 M/CU MM (ref 4.6–6.2)
RBC URINE: 5 /HPF (ref 0–3)
SARS-COV-2, NAAT: NOT DETECTED
SEGMENTED NEUTROPHILS ABSOLUTE COUNT: 9.4 K/CU MM
SEGMENTED NEUTROPHILS RELATIVE PERCENT: 75 % (ref 36–66)
SODIUM BLD-SCNC: 131 MMOL/L (ref 135–145)
SOURCE: NORMAL
SPECIFIC GRAVITY UA: 1.01 (ref 1–1.03)
TOTAL IMMATURE NEUTOROPHIL: 0.08 K/CU MM
TOTAL NUCLEATED RBC: 0 K/CU MM
TRICHOMONAS: ABNORMAL /HPF
UROBILINOGEN, URINE: 2 MG/DL (ref 0.2–1)
WBC # BLD: 12.5 K/CU MM (ref 4–10.5)
WBC UA: 2 /HPF (ref 0–2)

## 2023-01-31 PROCEDURE — 6370000000 HC RX 637 (ALT 250 FOR IP): Performed by: EMERGENCY MEDICINE

## 2023-01-31 PROCEDURE — 6360000004 HC RX CONTRAST MEDICATION: Performed by: INTERNAL MEDICINE

## 2023-01-31 PROCEDURE — 6360000002 HC RX W HCPCS: Performed by: EMERGENCY MEDICINE

## 2023-01-31 PROCEDURE — 99285 EMERGENCY DEPT VISIT HI MDM: CPT

## 2023-01-31 PROCEDURE — 86140 C-REACTIVE PROTEIN: CPT

## 2023-01-31 PROCEDURE — 6370000000 HC RX 637 (ALT 250 FOR IP): Performed by: INTERNAL MEDICINE

## 2023-01-31 PROCEDURE — 2100000000 HC CCU R&B

## 2023-01-31 PROCEDURE — 87804 INFLUENZA ASSAY W/OPTIC: CPT

## 2023-01-31 PROCEDURE — 80048 BASIC METABOLIC PNL TOTAL CA: CPT

## 2023-01-31 PROCEDURE — 87635 SARS-COV-2 COVID-19 AMP PRB: CPT

## 2023-01-31 PROCEDURE — 85025 COMPLETE CBC W/AUTO DIFF WBC: CPT

## 2023-01-31 PROCEDURE — 71260 CT THORAX DX C+: CPT

## 2023-01-31 PROCEDURE — 84145 PROCALCITONIN (PCT): CPT

## 2023-01-31 PROCEDURE — 2060000000 HC ICU INTERMEDIATE R&B

## 2023-01-31 PROCEDURE — 87040 BLOOD CULTURE FOR BACTERIA: CPT

## 2023-01-31 PROCEDURE — 6360000002 HC RX W HCPCS: Performed by: INTERNAL MEDICINE

## 2023-01-31 PROCEDURE — 74177 CT ABD & PELVIS W/CONTRAST: CPT

## 2023-01-31 PROCEDURE — 2580000003 HC RX 258: Performed by: EMERGENCY MEDICINE

## 2023-01-31 PROCEDURE — 2580000003 HC RX 258: Performed by: INTERNAL MEDICINE

## 2023-01-31 PROCEDURE — 87086 URINE CULTURE/COLONY COUNT: CPT

## 2023-01-31 PROCEDURE — 71045 X-RAY EXAM CHEST 1 VIEW: CPT

## 2023-01-31 PROCEDURE — 83605 ASSAY OF LACTIC ACID: CPT

## 2023-01-31 PROCEDURE — 1200000000 HC SEMI PRIVATE

## 2023-01-31 PROCEDURE — 81001 URINALYSIS AUTO W/SCOPE: CPT

## 2023-01-31 RX ORDER — FENTANYL 75 UG/H
1 PATCH TRANSDERMAL
Status: DISCONTINUED | OUTPATIENT
Start: 2023-01-31 | End: 2023-02-10

## 2023-01-31 RX ORDER — PREDNISONE 20 MG/1
20 TABLET ORAL DAILY
Status: DISCONTINUED | OUTPATIENT
Start: 2023-02-01 | End: 2023-02-16 | Stop reason: HOSPADM

## 2023-01-31 RX ORDER — ACETAMINOPHEN 325 MG/1
650 TABLET ORAL EVERY 6 HOURS PRN
Status: DISCONTINUED | OUTPATIENT
Start: 2023-01-31 | End: 2023-02-16 | Stop reason: HOSPADM

## 2023-01-31 RX ORDER — NALOXONE HYDROCHLORIDE 1 MG/ML
4 INJECTION INTRAMUSCULAR; INTRAVENOUS; SUBCUTANEOUS PRN
Status: DISCONTINUED | OUTPATIENT
Start: 2023-01-31 | End: 2023-02-16 | Stop reason: HOSPADM

## 2023-01-31 RX ORDER — DULOXETIN HYDROCHLORIDE 30 MG/1
60 CAPSULE, DELAYED RELEASE ORAL DAILY
Status: DISCONTINUED | OUTPATIENT
Start: 2023-01-31 | End: 2023-02-16 | Stop reason: HOSPADM

## 2023-01-31 RX ORDER — POLYETHYLENE GLYCOL 3350 17 G/17G
17 POWDER, FOR SOLUTION ORAL DAILY PRN
Status: DISCONTINUED | OUTPATIENT
Start: 2023-01-31 | End: 2023-02-16 | Stop reason: HOSPADM

## 2023-01-31 RX ORDER — ONDANSETRON 4 MG/1
4 TABLET, ORALLY DISINTEGRATING ORAL EVERY 8 HOURS PRN
Status: DISCONTINUED | OUTPATIENT
Start: 2023-01-31 | End: 2023-02-16 | Stop reason: HOSPADM

## 2023-01-31 RX ORDER — SODIUM CHLORIDE 0.9 % (FLUSH) 0.9 %
5-40 SYRINGE (ML) INJECTION PRN
Status: DISCONTINUED | OUTPATIENT
Start: 2023-01-31 | End: 2023-02-16 | Stop reason: HOSPADM

## 2023-01-31 RX ORDER — 0.9 % SODIUM CHLORIDE 0.9 %
30 INTRAVENOUS SOLUTION INTRAVENOUS ONCE
Status: COMPLETED | OUTPATIENT
Start: 2023-01-31 | End: 2023-01-31

## 2023-01-31 RX ORDER — ONDANSETRON 2 MG/ML
4 INJECTION INTRAMUSCULAR; INTRAVENOUS EVERY 6 HOURS PRN
Status: DISCONTINUED | OUTPATIENT
Start: 2023-01-31 | End: 2023-02-16 | Stop reason: HOSPADM

## 2023-01-31 RX ORDER — SODIUM CHLORIDE 9 MG/ML
INJECTION, SOLUTION INTRAVENOUS CONTINUOUS
Status: DISCONTINUED | OUTPATIENT
Start: 2023-01-31 | End: 2023-02-08

## 2023-01-31 RX ORDER — GABAPENTIN 300 MG/1
600 CAPSULE ORAL 3 TIMES DAILY
Status: DISCONTINUED | OUTPATIENT
Start: 2023-01-31 | End: 2023-02-16 | Stop reason: HOSPADM

## 2023-01-31 RX ORDER — SODIUM CHLORIDE 9 MG/ML
INJECTION, SOLUTION INTRAVENOUS PRN
Status: DISCONTINUED | OUTPATIENT
Start: 2023-01-31 | End: 2023-02-16 | Stop reason: HOSPADM

## 2023-01-31 RX ORDER — ACETAMINOPHEN 650 MG/1
650 SUPPOSITORY RECTAL EVERY 6 HOURS PRN
Status: DISCONTINUED | OUTPATIENT
Start: 2023-01-31 | End: 2023-02-16 | Stop reason: HOSPADM

## 2023-01-31 RX ORDER — OXYCODONE AND ACETAMINOPHEN 10; 325 MG/1; MG/1
2 TABLET ORAL EVERY 6 HOURS PRN
Status: DISCONTINUED | OUTPATIENT
Start: 2023-01-31 | End: 2023-01-31 | Stop reason: CLARIF

## 2023-01-31 RX ORDER — OXYCODONE HYDROCHLORIDE 5 MG/1
10 TABLET ORAL ONCE
Status: COMPLETED | OUTPATIENT
Start: 2023-01-31 | End: 2023-01-31

## 2023-01-31 RX ORDER — OXYCODONE HCL 10 MG/1
20 TABLET, FILM COATED, EXTENDED RELEASE ORAL EVERY 12 HOURS
Status: DISCONTINUED | OUTPATIENT
Start: 2023-01-31 | End: 2023-02-01

## 2023-01-31 RX ORDER — TAMSULOSIN HYDROCHLORIDE 0.4 MG/1
0.4 CAPSULE ORAL DAILY
Status: DISCONTINUED | OUTPATIENT
Start: 2023-02-01 | End: 2023-02-16 | Stop reason: HOSPADM

## 2023-01-31 RX ORDER — OXYCODONE HYDROCHLORIDE 10 MG/1
20 TABLET ORAL EVERY 6 HOURS PRN
Status: DISCONTINUED | OUTPATIENT
Start: 2023-01-31 | End: 2023-02-01

## 2023-01-31 RX ORDER — CYCLOBENZAPRINE HCL 10 MG
10 TABLET ORAL 3 TIMES DAILY PRN
Status: DISCONTINUED | OUTPATIENT
Start: 2023-01-31 | End: 2023-02-16 | Stop reason: HOSPADM

## 2023-01-31 RX ORDER — SODIUM CHLORIDE 0.9 % (FLUSH) 0.9 %
5-40 SYRINGE (ML) INJECTION EVERY 12 HOURS SCHEDULED
Status: DISCONTINUED | OUTPATIENT
Start: 2023-01-31 | End: 2023-02-16 | Stop reason: HOSPADM

## 2023-01-31 RX ORDER — ALPRAZOLAM 0.5 MG/1
0.5 TABLET ORAL 3 TIMES DAILY PRN
Status: DISCONTINUED | OUTPATIENT
Start: 2023-01-31 | End: 2023-02-16 | Stop reason: HOSPADM

## 2023-01-31 RX ORDER — ACETAMINOPHEN 325 MG/1
650 TABLET ORAL EVERY 6 HOURS PRN
Status: DISCONTINUED | OUTPATIENT
Start: 2023-01-31 | End: 2023-02-01

## 2023-01-31 RX ORDER — ENOXAPARIN SODIUM 100 MG/ML
40 INJECTION SUBCUTANEOUS DAILY
Status: DISCONTINUED | OUTPATIENT
Start: 2023-01-31 | End: 2023-02-16 | Stop reason: HOSPADM

## 2023-01-31 RX ADMIN — SODIUM CHLORIDE 2397 ML: 9 INJECTION, SOLUTION INTRAVENOUS at 15:06

## 2023-01-31 RX ADMIN — GABAPENTIN 600 MG: 300 CAPSULE ORAL at 21:41

## 2023-01-31 RX ADMIN — OXYCODONE HYDROCHLORIDE 10 MG: 5 TABLET ORAL at 14:38

## 2023-01-31 RX ADMIN — ENOXAPARIN SODIUM 40 MG: 100 INJECTION SUBCUTANEOUS at 21:40

## 2023-01-31 RX ADMIN — PIPERACILLIN AND TAZOBACTAM 3375 MG: 3; .375 INJECTION, POWDER, LYOPHILIZED, FOR SOLUTION INTRAVENOUS at 22:04

## 2023-01-31 RX ADMIN — IOPAMIDOL 80 ML: 755 INJECTION, SOLUTION INTRAVENOUS at 20:33

## 2023-01-31 RX ADMIN — VANCOMYCIN HYDROCHLORIDE 2000 MG: 10 INJECTION, POWDER, LYOPHILIZED, FOR SOLUTION INTRAVENOUS at 17:15

## 2023-01-31 RX ADMIN — SODIUM CHLORIDE: 9 INJECTION, SOLUTION INTRAVENOUS at 21:05

## 2023-01-31 RX ADMIN — ALPRAZOLAM 0.5 MG: 0.5 TABLET ORAL at 19:57

## 2023-01-31 RX ADMIN — PIPERACILLIN AND TAZOBACTAM 3375 MG: 3; .375 INJECTION, POWDER, LYOPHILIZED, FOR SOLUTION INTRAVENOUS at 16:02

## 2023-01-31 ASSESSMENT — PAIN SCALES - GENERAL: PAINLEVEL_OUTOF10: 10

## 2023-01-31 ASSESSMENT — PAIN - FUNCTIONAL ASSESSMENT: PAIN_FUNCTIONAL_ASSESSMENT: 0-10

## 2023-01-31 ASSESSMENT — PAIN DESCRIPTION - LOCATION: LOCATION: CHEST

## 2023-01-31 NOTE — ED NOTES
2 nurse ( this nurse and Gold Degroot ) witness of removal and disposal of pt's 75mcg Fentanyl patch from left shoulder      La Benjamin RN  01/31/23 [id number]

## 2023-01-31 NOTE — ED PROVIDER NOTES
Triage Chief Complaint:    Fall    NIKKI Capone is a 46 y.o. male that presents this patient had come in today for evaluation of frequent falls pain all over and is fatigued. Patient does have a history of cancer has a PICC line in place is currently on antibiotics for pyelonephritis. He has not missed any doses. Patient has not had any new abdominal pain. He does have a catheter in place. Patient has not had any testicular pain or swelling. Denies any chest pain shortness of breath cough or congestion. No headaches neck pain or stiffness. Patient has not noticed any rashes. History from : Patient and EMS    Limitations to history : None    ROS:  10 systems reviewed and negative except as above.      Past Medical History:   Diagnosis Date    CAD (coronary artery disease) 12/23/2013    cath negative per pt    Cancer ([facility] [address] 06/2021    pt reports he has lung cancer    History of TMJ disorder     Hypertension     Trigeminal neuralgia      Past Surgical History:   Procedure Laterality Date    ABDOMEN SURGERY      CARDIAC CATHETERIZATION  08/03/2016    CT BIOPSY PERCUTANEOUS SUPERFICIAL BONE  12/17/2021    CT BIOPSY PERCUTANEOUS SUPERFICIAL BONE 12/17/2021 [facility] CT SCAN    CT NEEDLE BIOPSY LUNG PERCUTANEOUS  7/28/2021    CT NEEDLE BIOPSY LUNG PERCUTANEOUS 7/28/2021 [facility] CT SCAN    IR NEPHROSTOMY CATHETER PLACEMENT  1/5/2023    IR NEPHROSTOMY CATHETER PLACEMENT 1/5/2023 [facility] SPECIAL PROCEDURES    PORT SURGERY Right 8/13/2021    MEDIPORT INSERTION performed by Micheal Vargas MD at [address] [place] [address] OR     Family History   Problem Relation Age of Onset    High Blood Pressure Mother     High Blood Pressure Sister     Cancer Sister      Social History     Socioeconomic History    Marital status:      Spouse name: Not on file    Number of children: 5    Years of education: Not on file    Highest education level: Not on file   Occupational History    Not on file   Tobacco Use    Smoking status: Every Day     Packs/day: 2.00     Years: 39.00     Pack years: 78.00     Types: Cigarettes    Smokeless tobacco: Never    Tobacco comments:     smokes 1-2 a day   Vaping Use    Vaping Use: Never used   Substance and Sexual Activity    Alcohol use: Not Currently     Alcohol/week: 6.0 standard drinks     Types: 6 Cans of beer per week     Comment: per week (24 oz beers)     Drug use: Yes     Types: Marijuana Sarah Amble)     Comment: last 12/1    Sexual activity: Yes     Partners: Female   Other Topics Concern    Not on file   Social History Narrative    Not on file     Social Determinants of Health     Financial Resource Strain: Not on file   Food Insecurity: Not on file   Transportation Needs: Not on file   Physical Activity: Not on file   Stress: Not on file   Social Connections: Not on file   Intimate Partner Violence: Not on file   Housing Stability: Not on file     Current Facility-Administered Medications   Medication Dose Route Frequency Provider Last Rate Last Admin    0.9 % sodium chloride bolus  30 mL/kg (Ideal) IntraVENous Once Shameka Cardenas MD 1,188.6 mL/hr at 01/31/23 1506 2,397 mL at 01/31/23 1506    piperacillin-tazobactam (ZOSYN) 3,375 mg in sodium chloride 0.9 % 50 mL IVPB (mini-bag)  3,375 mg IntraVENous Once Shameka Cardenas  mL/hr at 01/31/23 1602 3,375 mg at 01/31/23 1602    vancomycin (VANCOCIN) 2,000 mg in sodium chloride 0.9 % 500 mL IVPB  2,000 mg IntraVENous Once Shameka Cardenas MD         Current Outpatient Medications   Medication Sig Dispense Refill    fentaNYL (DURAGESIC) 75 MCG/HR Place 1 patch onto the skin every 72 hours for 15 days.  Max Daily Amount: 1 patch 5 patch 0    naloxone (NARCAN) 2 MG/2ML injection 4 mLs by Nasal route as needed (if patient is unresponsive or stops breathing) 2 mL 3    DULoxetine (CYMBALTA) 60 MG extended release capsule Take 60 mg by mouth daily      tamsulosin (FLOMAX) 0.4 MG capsule Take 0.4 mg by mouth daily      oxyCODONE-acetaminophen (PERCOCET)  MG per tablet Take 2 tablets by mouth every 6 hours as needed for Pain.      gabapentin (NEURONTIN) 600 MG tablet Take 600 mg by mouth 3 times daily. polyethylene glycol (GLYCOLAX) 17 g packet Take 17 g by mouth daily as needed for Constipation 527 g 1    oxyCODONE (OXYCONTIN) 20 MG extended release tablet Take 20 mg by mouth in the morning and 20 mg in the evening. predniSONE (DELTASONE) 20 MG tablet Take 20 mg by mouth daily      cyclobenzaprine (FLEXERIL) 10 MG tablet Take 10 mg by mouth 3 times daily as needed for Muscle spasms      ALPRAZolam (XANAX) 0.5 MG tablet Take 0.5 mg by mouth 3 times daily as needed for Sleep or Anxiety (Twice Daily PRN). naloxone 4 MG/0.1ML LIQD nasal spray 1 spray by Nasal route as needed for Opioid Reversal 1 each 5    Calcium Carbonate-Vitamin D (OYSTER SHELL CALCIUM/D) 500-200 MG-UNIT TABS Take 1 tablet by mouth daily 30 tablet 3    cyanocobalamin ([employer] VITAMIN B12) 1000 MCG tablet Take 1 tablet by mouth daily 30 tablet 3    ondansetron (ZOFRAN) 8 MG tablet take 1 tablet by mouth every 8 hours if needed for nausea and vomiting      Sennosides (SENNA) 8.6 MG CAPS Take 1 capsule by mouth 2 times daily as needed (constipation) 20 capsule 0     Allergies   Allergen Reactions    Tramadol Other (See Comments)     seizures    Morphine Hallucinations    Vicodin [Hydrocodone-Acetaminophen] Hives       Nursing Notes Reviewed    Physical Exam:     ED Triage Vitals   Enc Vitals Group      BP 01/31/23 1431 126/83      Heart Rate 01/31/23 1431 (!) 112      Resp 01/31/23 1431 20      Temp 01/31/23 1431 (!) 101.1 °F (38.4 °C)      Temp Source 01/31/23 1431 Oral      SpO2 01/31/23 1431 98 %      Weight 01/31/23 1440 191 lb 12.8 oz (87 kg)      Height 01/31/23 1440 6' 1\" (1.854 m)      Head Circumference --       Peak Flow --       Pain Score --       Pain Loc --       Pain Edu? --       Excl. in [address]? --        My pulse ox interpretation is - normal    General appearance:  No acute distress. Skin:  Warm. Dry. Eye:  Extraocular movements intact. Ears, nose, mouth and throat:  Oral mucosa moist   Neck:  Trachea midline. Extremity:  No swelling. Normal ROM. Port in place no surrounding erythema. Heart: Tachycardic but regular. Perfusion:  intact  Respiratory:  Lungs clear to auscultation bilaterally. Respirations nonlabored. Abdominal:  Normal bowel sounds. Soft. Nontender. Non distended. Catheter in place but no obvious skin changes noted. Back:  No CVA tenderness to palpation     Neurological:  Alert and oriented times 3.   Motor and sensory grossly intact, coordination intact, cranial nerves II through XII intact, reflexes intact            Psychiatric:  Appropriate      I have reviewed and interpreted all of the currently available lab results from this visit (if applicable):  Results for orders placed or performed during the hospital encounter of 01/31/23   COVID-19, Rapid    Specimen: Nasopharyngeal   Result Value Ref Range    Source NARES     SARS-CoV-2, NAAT NOT DETECTED NOT DETECTED   Rapid Flu Swab    Specimen: Nasopharyngeal   Result Value Ref Range    Rapid Influenza A Ag NEGATIVE NEGATIVE    Rapid Influenza B Ag NEGATIVE NEGATIVE   BMP   Result Value Ref Range    Sodium 131 (L) 135 - 145 MMOL/L    Potassium 4.0 3.5 - 5.1 MMOL/L    Chloride 95 (L) 99 - 110 mMol/L    CO2 27 21 - 32 MMOL/L    Anion Gap 9 4 - 16    BUN 9 6 - 23 MG/DL    Creatinine 0.4 (L) 0.9 - 1.3 MG/DL    Est, Glom Filt Rate >60 >60 mL/min/1.73m2    Glucose 111 (H) 70 - 99 MG/DL    Calcium 10.6 8.3 - 10.6 MG/DL   CBC with Auto Differential   Result Value Ref Range    WBC 12.5 (H) 4.0 - 10.5 K/CU MM    RBC 3.66 (L) 4.6 - 6.2 M/CU MM    Hemoglobin 8.7 (L) 13.5 - 18.0 GM/DL    Hematocrit 26.9 (L) 42 - 52 %    MCV 73.5 (L) 78 - 100 FL    MCH 23.8 (L) 27 - 31 PG    MCHC 32.3 32.0 - 36.0 %    RDW 17.4 (H) 11.7 - 14.9 %    Platelets 081 055 - 985 K/CU MM    MPV 8.6 7.5 - 11.1 FL    Differential Type AUTOMATED DIFFERENTIAL Segs Relative 75.0 (H) 36 - 66 %    Lymphocytes % 16.2 (L) 24 - 44 %    Monocytes % 6.6 (H) 0 - 4 %    Eosinophils % 1.2 0 - 3 %    Basophils % 0.4 0 - 1 %    Segs Absolute 9.4 K/CU MM    Lymphocytes Absolute 2.0 K/CU MM    Monocytes Absolute 0.8 K/CU MM    Eosinophils Absolute 0.2 K/CU MM    Basophils Absolute 0.1 K/CU MM    Nucleated RBC % 0.0 %    Total Nucleated RBC 0.0 K/CU MM    Total Immature Neutrophil 0.08 K/CU MM    Immature Neutrophil % 0.6 (H) 0 - 0.43 %   Urinalysis   Result Value Ref Range    Color, UA YELLOW YELLOW    Clarity, UA CLEAR CLEAR    Glucose, Urine NEGATIVE NEGATIVE MG/DL    Bilirubin Urine NEGATIVE NEGATIVE MG/DL    Ketones, Urine TRACE (A) NEGATIVE MG/DL    Specific Gravity, UA 1.010 1.001 - 1.035    Blood, Urine TRACE (A) NEGATIVE    pH, Urine 8.0 5.0 - 8.0    Protein, UA 30 (A) NEGATIVE MG/DL    Urobilinogen, Urine 2.0 (H) 0.2 - 1.0 MG/DL    Nitrite Urine, Quantitative NEGATIVE NEGATIVE    Leukocyte Esterase, Urine NEGATIVE NEGATIVE   Lactic Acid   Result Value Ref Range    Lactate 1.1 0.5 - 1.9 mMOL/L   Microscopic Urinalysis   Result Value Ref Range    RBC, UA 5 (H) 0 - 3 /HPF    WBC, UA 2 0 - 2 /HPF    Bacteria, UA NEGATIVE NEGATIVE /HPF    Trichomonas, UA NONE SEEN NONE SEEN /HPF      Radiographs (if obtained):  [] The following radiograph was interpreted by myself in the absence of a radiologist:   [x] Radiologist's Report Reviewed:  XR CHEST PORTABLE   Final Result   1. Redemonstration of mediastinal left upper lobe mass consistent with known   malignancy. Diffuse osseous metastatic disease also redemonstrated. No   superimposed acute consolidative change of the lungs identified. Procedures: Total critical care time today provided was 33 minutes. This excludes seperately billable procedure. Critical care time provided for sepsis that required close evaluation and/or intervention with concern for patient decompensation.       Chart review shows recent radiographs:  CT ABDOMEN PELVIS WO CONTRAST Additional Contrast? None    Result Date: 1/6/2023  EXAMINATION: CT OF THE ABDOMEN AND PELVIS WITHOUT CONTRAST 1/6/2023 2:08 pm TECHNIQUE: CT of the abdomen and pelvis was performed without the administration of intravenous contrast. Multiplanar reformatted images are provided for review. Automated exposure control, iterative reconstruction, and/or weight based adjustment of the mA/kV was utilized to reduce the radiation dose to as low as reasonably achievable. COMPARISON: CT abdomen and pelvis 01/05/2023. HISTORY: ORDERING SYSTEM PROVIDED HISTORY: hydronephrosis TECHNOLOGIST PROVIDED HISTORY: Reason for exam:->hydronephrosis Additional Contrast?->None Reason for Exam: hydronephrosis FINDINGS: Lower Chest: Mild right basilar opacities improved from the previous study likely representing atelectasis. Probable left parahilar scarring without significant change. Organs: Lack of intravenous contrast limits evaluation of the solid organs, vascular structures, and bowel. The liver and gallbladder are unremarkable. No biliary ductal dilatation is identified. The pancreas, spleen, and bilateral adrenal glands are unremarkable. The right kidney is unremarkable. 12 mm simple appearing cyst in the lower pole of the left kidney. No further evaluation recommended. No obstructive uropathy or urinary collecting system calculi identified. GI/Bowel: Moderately increased stool throughout the colon. Normal appendix. The stomach and small bowel are unremarkable. No obstruction or wall thickening identified. Pelvis: A Doherty catheter balloon is inflated in the urinary bladder lumen. There may be mild circumferential wall thickening. Mild prostatomegaly. No free fluid in the pelvis. No pelvic or inguinal lymphadenopathy. Peritoneum/Retroperitoneum: The abdominal aorta is normal in appearance.  Retroperitoneal lymphadenopathy including a node measuring approximately 2.9 x 1.6 cm on axial image 88 (previously 2.2 x 1.2 cm on 03/26/2022). No mesenteric lymphadenopathy. No ascites or pneumoperitoneum. Bones/Soft Tissues: Severe diffuse osseous metastases. Large lytic lesions with extensive bone loss involving the sacrum and posterior iliac bones left worse than right. Adjacent soft tissue masses involving the gluteal and iliacus muscles on the left. There is also soft tissue involvement of the presacral space. 1. No hydronephrosis. 2. Doherty catheter in place. Mild circumferential urinary bladder wall thickening as can be seen with cystitis. Recommend correlation with urinalysis. 3. Severe diffuse osseous metastases with extensive replacement of the sacrum and posterior iliac bones left worse than right and adjacent soft tissue extension. 4. Retroperitoneal lymphadenopathy mildly worsened from 03/26/2022. 5. Moderately increased stool throughout the colon compatible with constipation. CT ABDOMEN PELVIS WO CONTRAST Additional Contrast? None    Result Date: 1/5/2023  EXAMINATION: CT OF THE ABDOMEN AND PELVIS WITHOUT CONTRAST 1/5/2023 12:27 am TECHNIQUE: CT of the abdomen and pelvis was performed without the administration of intravenous contrast. Multiplanar reformatted images are provided for review. Automated exposure control, iterative reconstruction, and/or weight based adjustment of the mA/kV was utilized to reduce the radiation dose to as low as reasonably achievable.  COMPARISON: 03/26/2022 HISTORY: ORDERING SYSTEM PROVIDED HISTORY: left sided flank pain; has h/o left sided uvj obstruction 2/2 mass, had nephrostomy tube that fell out TECHNOLOGIST PROVIDED HISTORY: Reason for exam:->left sided flank pain; has h/o left sided uvj obstruction 2/2 mass, had nephrostomy tube that fell out Additional Contrast?->None Decision Support Exception - unselect if not a suspected or confirmed emergency medical condition->Emergency Medical Condition (MA) Reason for Exam: left sided flank pain; has h/o left sided uvj obstruction 2/2 mass, had nephrostomy tube that fell out. .. FINDINGS: Lower Chest: There are opacities in the posterior right lower lobe which appear more than just atelectasis. Trace amount of pleural effusion along the margin of the right lung and major fissure. Organs: Lack of IV contrast reduces evaluation of the organs and vasculature. No new mass or surface nodularity is appreciated at the liver. The spleen is not enlarged. There is no pancreatic calcification, ductal dilatation, or focal fluid collection. The adrenal glands are unremarkable. No right renal calculus, hydronephrosis, or proximal hydroureter. Difficulty tracking the right ureter all the way to the urinary bladder. There is mild edema of the left kidney with a tiny focus of gas in the interpolar region. There is a soft tissue tract from the left back skin surface to the kidney likely representing the tract of prior nephrostomy tube. There is distension of the left renal pelvis with thickened wall. Proximal left ureter has a small amount of fluid but not overtly dilated. GI/Bowel: The stomach, small bowel, and colon are not dilated. Diffuse constipation is seen throughout the colon down to the rectum. The colonic wall does not appear thickened. There is no pneumoperitoneum or significant ascites. Pelvis: The urinary bladder has a Doherty catheter in lumen. The bladder wall does appear diffusely thickened with a somewhat asymmetric area in the left lateral to inferolateral margin. Prostate is acceptable. Peritoneum/Retroperitoneum: There is no abdominal aortic aneurysm. There is some mildly increased soft tissue attenuation in the retroperitoneum along the distal aorta and cava near the bifurcation the iliac arteries. Also along the left iliac and psoas margin more than right side. Bones/Soft Tissues: Diffuse sclerotic areas are again noted within the pelvic bones.   Progressive worsening of lytic areas and bone loss throughout the sacrum and sacroiliac joint margins. The left side is worse than the right. Replacement by soft tissue attenuating mass at the expected lytic bone and some continuation anteriorly beyond the bony margin into the presacral space and edge of the retroperitoneum. Some involvement into the edge of the left gluteus muscles and left iliacus muscle. Posterior extension along the paraspinal muscles at the lower sacrum and coccygeal junction. 1.  No left ureteral stent or nephrostomy tube at this time. A tract of previous nephrostomy tubes is noted at the posterior edge of the kidney and the inferolateral edge. There is no perinephric hematoma. 2.  Small amount of fluid in the left renal pelvis with a thickened wall but without overt dilatation of the left ureter at this time. 3.  Diffuse thickening of the urinary bladder wall but with some asymmetry along the left lateral and inferolateral margin. Doherty catheter in the lumen. 4.  Diffuse metastatic disease in the bones with extensive areas of sclerosis and mixed lucencies. There is progressive lytic lesion and bone loss throughout the sacrum and into the left hemipelvis more than the right side. Replacement with soft tissue extending beyond the bony margins into the presacral, muscular, and edge of the retroperitoneum margins. 5.  Opacities in the right lower lung appear to be more than just atelectasis and could have pneumonitis or developing infection. PET CT SKULL BASE TO MID THIGH    Result Date: 1/14/2023  EXAMINATION: WHOLE BODY PET/CT 1/13/2023 TECHNIQUE: Following intravenous administration of 10.85 mCi of F18-FDG, PET imaging was acquired from the base of the head to the mid thighs. Computed tomography was used for purposes of attenuation correction and anatomic localization. Fusion imaging was utilized for interpretation. Uptake time 55 mins. Glucose level 137 mg/dl.  COMPARISON: Abdomen/pelvis CT on 01/06/2023, bone scan on 10/27/2022, chest CT on 10/11/2022, PET-CT on 05/16/2022 HISTORY: Metastatic left upper lobe lung cancer, follow-up. FINDINGS: HEAD/NECK: No suspicious FDG uptake. CHEST: Increased size and mildly increased uptake of the mass in the medial left upper lobe abutting the mediastinum which demonstrates maximum SUV of 7.5 with central necrosis. Adjacent left superior mediastinal lymph node with maximum SUV of 3.2. New hypermetabolic right axillary lymph node with maximum SUV of 18.7. ABDOMEN/PELVIS: No abnormal uptake within the solid abdominal organs. New hypermetabolic periportal and portacaval lymph nodes with maximum SUV of 5.5. New hypermetabolic bilateral retroperitoneal lymph nodes with maximum SUV of 6.3 and several left common iliac lymph nodes with maximum SUV of 5.3. BONES/SOFT TISSUE: New intense uptake associated with a destructive lesion of the superior left scapula, maximum SUV of 10.6. New destructive lesions of the upper thoracic spine with maximum SUV of 15.8 and severe pathologic compression deformity of T1. Significantly enlarged lytic lesions of the sacrum and bilateral iliac bones with maximum SUV of 16.5. Additional new areas of intense uptake include the inferior left scapula and right posterior elements of T10-T11. Diffuse sclerotic osseous metastatic disease within the remainder of the skeleton without significant uptake is compatible with treated disease. INCIDENTAL CT FINDINGS: Right chest MediPort. Emphysema. Mild gynecomastia. Mild atherosclerotic disease. Doherty catheter within the urinary bladder. Enlarged prostate with stable urinary bladder wall thickening that could be due to chronic outlet obstruction. 1. Marked progression of osseous metastatic disease with multiple new hypermetabolic lesions as described above. Most notable are severe pathologic compression deformity of T1 and markedly enlarged lytic lesions of the sacrum and bilateral iliac bones.  2. Increased size and mildly increased uptake of the mass in the medial left upper lobe abutting the mediastinum. 3. Hypermetabolic lymphadenopathy in the chest, abdomen, and pelvis which is new from prior PET-CT on 05/16/2022 and progressed from more recent CTs later in 2022. IR GUIDED NEPHROSTOMY CATH PLACEMENT    Result Date: 1/5/2023  PROCEDURE: IR attempted CHG NEPHROSTMY CATH PROC Limited ultrasound left kidney 1/5/2023 HISTORY: ORDERING SYSTEM PROVIDED HISTORY: nephrostomy tube came out TECHNOLOGIST PROVIDED HISTORY: nephrostomy tube came out See IP consult Reason for exam:->nephrostomy tube came out TECHNIQUE: Following informed consent, pause a confirm/time-out patient is placed in prone position on fluoroscopic table. Attempted recanalization of previously utilized left nephrostomy tube tract was unsuccessful. Sonographic evaluation of the left kidney was made in anticipation of new puncture site for placement of left nephrostomy tube. CONTRAST: None SEDATION: None FLUOROSCOPY DOSE AND TYPE OR TIME AND EXPOSURES: 1.1 minute fluoroscopy time Air kerma: 30 mGy DESCRIPTION OF PROCEDURE: Informed consent was obtained after a detailed explanation of the procedure including risks, benefits, and alternatives. Universal protocol was observed. Sterile gowns, masks, hats and gloves utilized for maximal sterile barrier. As above in technique section FINDINGS: Fluoroscopic images demonstrate attempted, unsuccessful recanalization of left nephrostomy tube tract. Lunar E sonographic evaluation of the left kidney in anticipation of left nephrostomy tube placement with new puncture demonstrates no definite hydronephrosis. New nephrostomy tube placement deferred at this time pending evaluation/confirmation of impaired left renal drainage. Unsuccessful attempted recanalization of previously established left nephrostomy tube tract.   Preliminary sonographic evaluation of the left kidney made in anticipation of new nephrostomy tube placement demonstrates no definite hydronephrosis. New nephrostomy tube placement deferred at this time. Repeat renal ultrasound suggested in 1-2 days to evaluate recurrence of left hydronephrosis and need for new nephrostomy tube placement. MDM:     Discussion with Other Professionals : Admitting Team Dr. Darby Fleischer and Case Management for continued monitoring for his hospice care    Social Determinants : None    Records Reviewed : Outpatient Notes shows a history of pyelonephritis on Rocephin and also in hospice care with multiple different analgesia patient including fentanyl patch as well as oral medications on Percocet. Chronic conditions affecting care:  Cancer she states that she was    Labs ordered and results as above, (interpreted by myself) concerning for leukocytosis, mild anemia but close to baseline, questionably infected urine and Imaging interpreted and reviewed by myself: CXR showed no acute irregularities    Patient was given the following medications:  Medications   0.9 % sodium chloride bolus (2,397 mLs IntraVENous New Bag 1/31/23 1506)   piperacillin-tazobactam (ZOSYN) 3,375 mg in sodium chloride 0.9 % 50 mL IVPB (mini-bag) (3,375 mg IntraVENous New Bag 1/31/23 1602)   vancomycin (VANCOCIN) 2,000 mg in sodium chloride 0.9 % 500 mL IVPB (has no administration in time range)   oxyCODONE (ROXICODONE) immediate release tablet 10 mg (10 mg Oral Given 1/31/23 1438)       Disposition Discussion:  Plan is to hospitalize for presumed sepsis and likely bacteremia. Possible sources from reports of asked nursing to grab 1 blood culture from the port as well as one peripherally. Patient started on broad-spectrum antibiotics where previously he was on Rocephin. Given 30 cc/kg of fluid. Provided with oxy for symptom management. There is a possibility that there is a overlying pneumonia although patient does not have any respiratory complaints.   No abdominal tenderness to palpation to warrant need for emergent CT imaging of his abdomen pelvis. Disposition: Hospitalized     I am the primary physician of record    Clinical Impression:  1. Sepsis without acute organ dysfunction, due to unspecified organism ([facility] [address]    2. Cancer ([facility] [address]    3. Anemia, unspecified type      Disposition referral (if applicable):  No follow-up provider specified. Disposition medications (if applicable):  New Prescriptions    No medications on file       Comment: Please note this report has been produced using speech recognition software and may contain errors related to that system including errors in grammar, punctuation, and spelling, as well as words and phrases that may be inappropriate. If there are any questions or concerns please feel free to contact the dictating provider for clarification.        Olive Johnston MD  01/31/23 0260

## 2023-01-31 NOTE — ED TRIAGE NOTES
Pt presents to ED by EMS from home for frequent falls and \"pain all over\" pt has a fentanyl pain patch left shoulder

## 2023-01-31 NOTE — CARE COORDINATION
CM - HB-3 CM to see pt for a consult to Mart Zambrano could not verify with pt as to which hospice company he had a preference for . Mr Rock Bruno has been sedated for pain . CM will attempt to verify information later .      Cameron chavez / Duc Carrillo / Roseann Winters

## 2023-01-31 NOTE — CARE COORDINATION
CM - HB-3 CM again attempted to verify pt information --called [facility] whom confirmed that they do not have him listed as a pending client nor do they have consults for him . Pt had stated he spoke with a representative of unknown hospice company as this was confusing , CM had attempted to allow [facility] to see him & give an order -without success.      Irene aBrajas / Ollie Cota

## 2023-01-31 NOTE — H&P
History and Physical      Name:  Shivani Mendoza /Age/Sex: 1970  (46 y.o. male)   MRN & CSN:  [id number] & [id number] Admission Date/Time: 2023  2:14 PM   Location:  [place] [address] PCP: Meet Murillo MD       Hospital Day: 1    Assessment and Plan:   hSivani Mendoza is a 46 y.o.  male  who presents with Sepsis ([address]    Sepsis: d/w ED; unknown source; recently discharged on Meropenem for complicated UTI; CT Chest, abdomen, pelvis ordered; blood cultures ordered in ED as well as urine culture; lactate wnl; LFT's wnl; will check Respiratory PCR given his weakness and fevers to look for any viral etiology (rapid flu/covid negative) as patient has been on IV abx at home; continue Zosyn/Vanc for now, procal/CRP ordered, will also have to consider port as source pending blood culture results and CT results; will likely benefit from ID c/s after results are completed however do not have obvious source of infection currently  Tachycardia: Likely pain related, EKG ordered  Hyponatremia: Continue IVF, repeat BMP in AM  Hx of Chronic anemia: In setting of known cancer; appears at baseline  Recent Complicated UTI: Has been on Meropenem at home with [facility] for complicated UTI; Urine does not look infected on admission, abx changed to above given fevers  Hx Metastatic Prostate Cancer:  On hospice at home, re-consulted in ED but patient wants full work-up and medical treatment for now; continue steroid therapy; follows with Dr. Мария Gaxiola  Hx Squamous Cell Carcinoma of Left Lung: Diagnosed with biopsy on Dec 13, 2021; had debulking of his primary tumor via bronchoscopy at [facility] did undergo carbo platinum/pembrolizumab/pemetrexted followed by Miguel Salmeron in the past  Hx of Urinary Retention with Chronic Indwelling Doherty: Did have a prior nephrostomy tube placed at [facility] on 2022; recently did have an admit for a dislodged nephrostomy tube however patient refused  Anxiety Disorder: Continue home Alprazolam      Diet No diet orders on file   DVT Prophylaxis [] Lovenox, []  Heparin, [] SCDs, [] Ambulation   GI Prophylaxis [] PPI,  [] H2 Blocker,  [] Carafate,  [] Diet/Tube Feeds   Code Status Prior   Disposition Patient requires continued admission due to sepsis work-up   MDM [] Low, [] Moderate,[]  High  Patient's risk as above due to      History of Present Illness: This is a 45 y/o M with a past medical history of metastatic prostate cancer with mets to bone, squamous cell lung cancer, history of urinary retention with chronic indwelling Doherty, currently enrolled in hospice and was just recently discharged from [facility] after being treated for complicated UTI and is currently on meropenem that he has been receiving at home that is presenting with generalized fatigue and weakness as well as complaints of mild abdominal pain. He states at home he has had chills and some fevers but does not know what his temperature has been. Denies any drainage erythema around his port, denies any nausea or vomiting, no significant shortness of breath or new cough, denies any drainage around his Doherty denies any changes in the color of his urine. I I did discuss with patient his current untreatable metastatic prostate cancer and him being enrolled in hospice however he does want full medical treatment and a full work-up. In the ED the patient had a temperature of 101.1, his pulse was 112, blood pressure was stable and was stable at 98% on room air. Labs did demonstrate a leukocytosis to 12.5, sodium was 131, LFTs were within normal limits except for a mildly elevated alk phos which is consistent with his known metastatic prostate cancer with bony mets. Blood cultures were collected in the ED and was started on empiric IV antibiotics. Chest x-ray did not show any significant acute pathology.     Objective:   No intake or output data in the 24 hours ending 01/31/23 1715   Vitals:   Vitals:    01/31/23 1639   BP:    Pulse: (!) 105   Resp: 18   Temp: 98.6 °F (37 °C)   SpO2: 95%     Physical Exam:   GEN Awake male, sitting upright in bed in no apparent distress. Appears given age. EYES Pupils are equally round. No scleral erythema, discharge, or conjunctivitis. NECK Supple, no apparent thyromegaly or masses. RESP Clear to auscultation, no wheezes, rales or rhonchi on room air  CARDIO/VASC S1/S2 auscultated. Regular rate without appreciable murmurs, rubs, or gallops. GI Abdomen is soft with minimal tenderness, masses, or guarding   No costovertebral angle tenderness. HEME/LYMPH No petechiae or ecchymoses. MSK No gross joint deformities. SKIN Normal coloration, warm, dry. NEURO Cranial nerves appear grossly intact, normal speech  PSYCH Awake, alert, oriented, affect appropriate. Past Medical History:      Past Medical History:   Diagnosis Date    CAD (coronary artery disease) 12/23/2013    cath negative per pt    Cancer ([facility] [address] 06/2021    pt reports he has lung cancer    History of TMJ disorder     Hypertension     Trigeminal neuralgia      PSHX:  has a past surgical history that includes Abdomen surgery; Cardiac catheterization (08/03/2016); CT NEEDLE BIOPSY LUNG PERCUTANEOUS (7/28/2021); Port Surgery (Right, 8/13/2021); CT BIOPSY SUPERFICIAL BONE PERCUTANEOUS (12/17/2021); and IR GUIDED NEPHROSTOMY CATH PLACEMENT (1/5/2023). Allergies: Allergies   Allergen Reactions    Tramadol Other (See Comments)     seizures    Morphine Hallucinations    Vicodin [Hydrocodone-Acetaminophen] Hives       FAM HX: family history includes Cancer in his sister; High Blood Pressure in his mother and sister.   Soc HX:   Social History     Socioeconomic History    Marital status:     Number of children: 5   Tobacco Use    Smoking status: Every Day     Packs/day: 2.00     Years: 39.00     Pack years: 78.00     Types: Cigarettes    Smokeless tobacco: Never    Tobacco comments:     smokes 1-2 a day   Vaping Use    Vaping Use: Never used Substance and Sexual Activity    Alcohol use: Not Currently     Alcohol/week: 6.0 standard drinks     Types: 6 Cans of beer per week     Comment: per week (24 oz beers)     Drug use: Yes     Types: Marijuana Crystal Gouty)     Comment: last 12/1    Sexual activity: Yes     Partners: Female       Medications:   Medications:    vancomycin  2,000 mg IntraVENous Once      Infusions:   PRN Meds:        Electronically signed by Ellen Allen MD on 1/31/2023 at 5:15 PM

## 2023-01-31 NOTE — CARE COORDINATION
CM --Room # TR-1 [facility] criteria for Sepsis  reviewed at this time, criteria supports the INPATIENT admission .

## 2023-02-01 ENCOUNTER — APPOINTMENT (OUTPATIENT)
Dept: GENERAL RADIOLOGY | Age: 53
End: 2023-02-01
Payer: MEDICAID

## 2023-02-01 LAB
ANION GAP SERPL CALCULATED.3IONS-SCNC: 9 MMOL/L (ref 4–16)
BASOPHILS ABSOLUTE: 0.1 K/CU MM
BASOPHILS RELATIVE PERCENT: 0.5 % (ref 0–1)
BUN SERPL-MCNC: 8 MG/DL (ref 6–23)
CALCIUM SERPL-MCNC: 10.7 MG/DL (ref 8.3–10.6)
CHLORIDE BLD-SCNC: 97 MMOL/L (ref 99–110)
CO2: 26 MMOL/L (ref 21–32)
CREAT SERPL-MCNC: 0.4 MG/DL (ref 0.9–1.3)
CULTURE: NORMAL
DIFFERENTIAL TYPE: ABNORMAL
EOSINOPHILS ABSOLUTE: 0.2 K/CU MM
EOSINOPHILS RELATIVE PERCENT: 1.4 % (ref 0–3)
GFR SERPL CREATININE-BSD FRML MDRD: >60 ML/MIN/1.73M2
GLUCOSE SERPL-MCNC: 116 MG/DL (ref 70–99)
HCT VFR BLD CALC: 25.1 % (ref 42–52)
HEMOGLOBIN: 7.8 GM/DL (ref 13.5–18)
IMMATURE NEUTROPHIL %: 0.6 % (ref 0–0.43)
LYMPHOCYTES ABSOLUTE: 2 K/CU MM
LYMPHOCYTES RELATIVE PERCENT: 17.9 % (ref 24–44)
Lab: NORMAL
MCH RBC QN AUTO: 23.5 PG (ref 27–31)
MCHC RBC AUTO-ENTMCNC: 31.1 % (ref 32–36)
MCV RBC AUTO: 75.6 FL (ref 78–100)
MONOCYTES ABSOLUTE: 0.8 K/CU MM
MONOCYTES RELATIVE PERCENT: 6.9 % (ref 0–4)
NUCLEATED RBC %: 0 %
PDW BLD-RTO: 17.8 % (ref 11.7–14.9)
PLATELET # BLD: 387 K/CU MM (ref 140–440)
PMV BLD AUTO: 9 FL (ref 7.5–11.1)
POTASSIUM SERPL-SCNC: 3.9 MMOL/L (ref 3.5–5.1)
RBC # BLD: 3.32 M/CU MM (ref 4.6–6.2)
SEGMENTED NEUTROPHILS ABSOLUTE COUNT: 8.2 K/CU MM
SEGMENTED NEUTROPHILS RELATIVE PERCENT: 72.7 % (ref 36–66)
SODIUM BLD-SCNC: 132 MMOL/L (ref 135–145)
SPECIMEN: NORMAL
TOTAL IMMATURE NEUTOROPHIL: 0.07 K/CU MM
TOTAL NUCLEATED RBC: 0 K/CU MM
WBC # BLD: 11.3 K/CU MM (ref 4–10.5)

## 2023-02-01 PROCEDURE — 2580000003 HC RX 258: Performed by: NURSE PRACTITIONER

## 2023-02-01 PROCEDURE — 6360000002 HC RX W HCPCS: Performed by: HOSPITALIST

## 2023-02-01 PROCEDURE — 1200000000 HC SEMI PRIVATE

## 2023-02-01 PROCEDURE — 6370000000 HC RX 637 (ALT 250 FOR IP): Performed by: HOSPITALIST

## 2023-02-01 PROCEDURE — 6370000000 HC RX 637 (ALT 250 FOR IP): Performed by: INTERNAL MEDICINE

## 2023-02-01 PROCEDURE — 6360000002 HC RX W HCPCS: Performed by: INTERNAL MEDICINE

## 2023-02-01 PROCEDURE — 6360000002 HC RX W HCPCS: Performed by: NURSE PRACTITIONER

## 2023-02-01 PROCEDURE — 2580000003 HC RX 258: Performed by: INTERNAL MEDICINE

## 2023-02-01 PROCEDURE — 80048 BASIC METABOLIC PNL TOTAL CA: CPT

## 2023-02-01 PROCEDURE — 2060000000 HC ICU INTERMEDIATE R&B

## 2023-02-01 PROCEDURE — 51702 INSERT TEMP BLADDER CATH: CPT

## 2023-02-01 PROCEDURE — 94761 N-INVAS EAR/PLS OXIMETRY MLT: CPT

## 2023-02-01 PROCEDURE — 85025 COMPLETE CBC W/AUTO DIFF WBC: CPT

## 2023-02-01 PROCEDURE — 73030 X-RAY EXAM OF SHOULDER: CPT

## 2023-02-01 PROCEDURE — APPSS60 APP SPLIT SHARED TIME 46-60 MINUTES: Performed by: NURSE PRACTITIONER

## 2023-02-01 PROCEDURE — 99255 IP/OBS CONSLTJ NEW/EST HI 80: CPT | Performed by: INTERNAL MEDICINE

## 2023-02-01 RX ORDER — ACETAMINOPHEN 325 MG/1
650 TABLET ORAL EVERY 6 HOURS PRN
Status: DISCONTINUED | OUTPATIENT
Start: 2023-02-01 | End: 2023-02-08

## 2023-02-01 RX ORDER — OXYCODONE HYDROCHLORIDE 10 MG/1
20 TABLET ORAL EVERY 6 HOURS PRN
Status: DISCONTINUED | OUTPATIENT
Start: 2023-02-01 | End: 2023-02-08

## 2023-02-01 RX ADMIN — PIPERACILLIN AND TAZOBACTAM 3375 MG: 3; .375 INJECTION, POWDER, LYOPHILIZED, FOR SOLUTION INTRAVENOUS at 09:30

## 2023-02-01 RX ADMIN — CYCLOBENZAPRINE 10 MG: 10 TABLET, FILM COATED ORAL at 12:05

## 2023-02-01 RX ADMIN — OXYCODONE HYDROCHLORIDE 20 MG: 10 TABLET ORAL at 15:45

## 2023-02-01 RX ADMIN — ALPRAZOLAM 0.5 MG: 0.5 TABLET ORAL at 17:57

## 2023-02-01 RX ADMIN — GABAPENTIN 600 MG: 300 CAPSULE ORAL at 15:43

## 2023-02-01 RX ADMIN — VANCOMYCIN HYDROCHLORIDE 1250 MG: 1.25 INJECTION, POWDER, LYOPHILIZED, FOR SOLUTION INTRAVENOUS at 17:48

## 2023-02-01 RX ADMIN — ENOXAPARIN SODIUM 40 MG: 100 INJECTION SUBCUTANEOUS at 22:47

## 2023-02-01 RX ADMIN — OXYCODONE HYDROCHLORIDE 20 MG: 10 TABLET ORAL at 05:46

## 2023-02-01 RX ADMIN — MEROPENEM 1000 MG: 1 INJECTION, POWDER, FOR SOLUTION INTRAVENOUS at 15:52

## 2023-02-01 RX ADMIN — MEROPENEM 1000 MG: 1 INJECTION, POWDER, FOR SOLUTION INTRAVENOUS at 22:48

## 2023-02-01 RX ADMIN — OXYCODONE HYDROCHLORIDE 20 MG: 10 TABLET ORAL at 09:19

## 2023-02-01 RX ADMIN — HYDROMORPHONE HYDROCHLORIDE 0.5 MG: 1 INJECTION, SOLUTION INTRAMUSCULAR; INTRAVENOUS; SUBCUTANEOUS at 22:46

## 2023-02-01 RX ADMIN — ACETAMINOPHEN 650 MG: 325 TABLET ORAL at 15:43

## 2023-02-01 RX ADMIN — ALPRAZOLAM 0.5 MG: 0.5 TABLET ORAL at 12:06

## 2023-02-01 RX ADMIN — VANCOMYCIN HYDROCHLORIDE 1250 MG: 1.25 INJECTION, POWDER, LYOPHILIZED, FOR SOLUTION INTRAVENOUS at 06:59

## 2023-02-01 RX ADMIN — TAMSULOSIN HYDROCHLORIDE 0.4 MG: 0.4 CAPSULE ORAL at 09:20

## 2023-02-01 RX ADMIN — SODIUM CHLORIDE, PRESERVATIVE FREE 10 ML: 5 INJECTION INTRAVENOUS at 09:20

## 2023-02-01 RX ADMIN — DULOXETINE 60 MG: 30 CAPSULE, DELAYED RELEASE ORAL at 03:38

## 2023-02-01 RX ADMIN — GABAPENTIN 600 MG: 300 CAPSULE ORAL at 09:20

## 2023-02-01 RX ADMIN — GABAPENTIN 600 MG: 300 CAPSULE ORAL at 22:47

## 2023-02-01 RX ADMIN — DULOXETINE 60 MG: 30 CAPSULE, DELAYED RELEASE ORAL at 09:19

## 2023-02-01 SDOH — SOCIAL STABILITY: SOCIAL NETWORK: HOW OFTEN DO YOU ATTENT MEETINGS OF THE CLUB OR ORGANIZATION YOU BELONG TO?: 1 TO 4 TIMES PER YEAR

## 2023-02-01 SDOH — ECONOMIC STABILITY: INCOME INSECURITY: IN THE LAST 12 MONTHS, WAS THERE A TIME WHEN YOU WERE NOT ABLE TO PAY THE MORTGAGE OR RENT ON TIME?: NO

## 2023-02-01 SDOH — ECONOMIC STABILITY: TRANSPORTATION INSECURITY
IN THE PAST 12 MONTHS, HAS THE LACK OF TRANSPORTATION KEPT YOU FROM MEDICAL APPOINTMENTS OR FROM GETTING MEDICATIONS?: NO

## 2023-02-01 SDOH — ECONOMIC STABILITY: HOUSING INSECURITY
IN THE LAST 12 MONTHS, WAS THERE A TIME WHEN YOU DID NOT HAVE A STEADY PLACE TO SLEEP OR SLEPT IN A SHELTER (INCLUDING NOW)?: NO

## 2023-02-01 SDOH — SOCIAL STABILITY: SOCIAL NETWORK: IN A TYPICAL WEEK, HOW MANY TIMES DO YOU TALK ON THE PHONE WITH FAMILY, FRIENDS, OR NEIGHBORS?: TWICE A WEEK

## 2023-02-01 SDOH — ECONOMIC STABILITY: HOUSING INSECURITY: IN THE LAST 12 MONTHS, HOW MANY PLACES HAVE YOU LIVED?: 1

## 2023-02-01 SDOH — HEALTH STABILITY: PHYSICAL HEALTH: ON AVERAGE, HOW MANY MINUTES DO YOU ENGAGE IN EXERCISE AT THIS LEVEL?: 0 MIN

## 2023-02-01 SDOH — ECONOMIC STABILITY: TRANSPORTATION INSECURITY
IN THE PAST 12 MONTHS, HAS LACK OF TRANSPORTATION KEPT YOU FROM MEETINGS, WORK, OR FROM GETTING THINGS NEEDED FOR DAILY LIVING?: NO

## 2023-02-01 SDOH — SOCIAL STABILITY: SOCIAL INSECURITY
WITHIN THE LAST YEAR, HAVE YOU BEEN KICKED, HIT, SLAPPED, OR OTHERWISE PHYSICALLY HURT BY YOUR PARTNER OR EX-PARTNER?: NO

## 2023-02-01 SDOH — HEALTH STABILITY: MENTAL HEALTH
STRESS IS WHEN SOMEONE FEELS TENSE, NERVOUS, ANXIOUS, OR CAN'T SLEEP AT NIGHT BECAUSE THEIR MIND IS TROUBLED. HOW STRESSED ARE YOU?: ONLY A LITTLE

## 2023-02-01 SDOH — ECONOMIC STABILITY: FOOD INSECURITY: WITHIN THE PAST 12 MONTHS, YOU WORRIED THAT YOUR FOOD WOULD RUN OUT BEFORE YOU GOT MONEY TO BUY MORE.: NEVER TRUE

## 2023-02-01 SDOH — SOCIAL STABILITY: SOCIAL INSECURITY
WITHIN THE LAST YEAR, HAVE TO BEEN RAPED OR FORCED TO HAVE ANY KIND OF SEXUAL ACTIVITY BY YOUR PARTNER OR EX-PARTNER?: NO

## 2023-02-01 SDOH — ECONOMIC STABILITY: INCOME INSECURITY: HOW HARD IS IT FOR YOU TO PAY FOR THE VERY BASICS LIKE FOOD, HOUSING, MEDICAL CARE, AND HEATING?: NOT HARD AT ALL

## 2023-02-01 SDOH — ECONOMIC STABILITY: FOOD INSECURITY: WITHIN THE PAST 12 MONTHS, THE FOOD YOU BOUGHT JUST DIDN'T LAST AND YOU DIDN'T HAVE MONEY TO GET MORE.: NEVER TRUE

## 2023-02-01 SDOH — SOCIAL STABILITY: SOCIAL INSECURITY: WITHIN THE LAST YEAR, HAVE YOU BEEN AFRAID OF YOUR PARTNER OR EX-PARTNER?: NO

## 2023-02-01 SDOH — HEALTH STABILITY: PHYSICAL HEALTH: ON AVERAGE, HOW MANY DAYS PER WEEK DO YOU ENGAGE IN MODERATE TO STRENUOUS EXERCISE (LIKE A BRISK WALK)?: 0 DAYS

## 2023-02-01 SDOH — SOCIAL STABILITY: SOCIAL NETWORK: ARE YOU MARRIED, WIDOWED, DIVORCED, SEPARATED, NEVER MARRIED, OR LIVING WITH A PARTNER?: MARRIED

## 2023-02-01 SDOH — SOCIAL STABILITY: SOCIAL NETWORK: HOW OFTEN DO YOU GET TOGETHER WITH FRIENDS OR RELATIVES?: TWICE A WEEK

## 2023-02-01 SDOH — SOCIAL STABILITY: SOCIAL INSECURITY: WITHIN THE LAST YEAR, HAVE YOU BEEN HUMILIATED OR EMOTIONALLY ABUSED IN OTHER WAYS BY YOUR PARTNER OR EX-PARTNER?: NO

## 2023-02-01 SDOH — SOCIAL STABILITY: SOCIAL NETWORK
DO YOU BELONG TO ANY CLUBS OR ORGANIZATIONS SUCH AS CHURCH GROUPS UNIONS, FRATERNAL OR ATHLETIC GROUPS, OR SCHOOL GROUPS?: NO

## 2023-02-01 SDOH — HEALTH STABILITY: MENTAL HEALTH: HOW MANY STANDARD DRINKS CONTAINING ALCOHOL DO YOU HAVE ON A TYPICAL DAY?: PATIENT DOES NOT DRINK

## 2023-02-01 SDOH — SOCIAL STABILITY: SOCIAL NETWORK: HOW OFTEN DO YOU ATTEND CHURCH OR RELIGIOUS SERVICES?: 1 TO 4 TIMES PER YEAR

## 2023-02-01 SDOH — HEALTH STABILITY: MENTAL HEALTH: HOW OFTEN DO YOU HAVE A DRINK CONTAINING ALCOHOL?: NEVER

## 2023-02-01 ASSESSMENT — PAIN DESCRIPTION - LOCATION
LOCATION: GENERALIZED
LOCATION: GENERALIZED
LOCATION: BACK
LOCATION: GENERALIZED
LOCATION: ABDOMEN;BACK

## 2023-02-01 ASSESSMENT — PAIN SCALES - GENERAL
PAINLEVEL_OUTOF10: 10
PAINLEVEL_OUTOF10: 8
PAINLEVEL_OUTOF10: 10
PAINLEVEL_OUTOF10: 3
PAINLEVEL_OUTOF10: 7

## 2023-02-01 ASSESSMENT — PAIN DESCRIPTION - DESCRIPTORS
DESCRIPTORS: ACHING
DESCRIPTORS: ACHING
DESCRIPTORS: STABBING;SHARP
DESCRIPTORS: ACHING

## 2023-02-01 ASSESSMENT — PAIN DESCRIPTION - ORIENTATION
ORIENTATION: LOWER
ORIENTATION: RIGHT;LEFT

## 2023-02-01 ASSESSMENT — PAIN - FUNCTIONAL ASSESSMENT
PAIN_FUNCTIONAL_ASSESSMENT: PREVENTS OR INTERFERES WITH ALL ACTIVE AND SOME PASSIVE ACTIVITIES
PAIN_FUNCTIONAL_ASSESSMENT: ACTIVITIES ARE NOT PREVENTED

## 2023-02-01 ASSESSMENT — PAIN SCALES - WONG BAKER: WONGBAKER_NUMERICALRESPONSE: 0

## 2023-02-01 NOTE — PROGRESS NOTES
[id number] [facility]  consulted by  Pradip Rwoe NP for monitoring and adjustment. Indication for treatment: Sepsis     AUC/CASH: 400-600    Risk Factors for MRSA Identified:   Hospitalization within the past 90 days    Pertinent Laboratory Values:   Temp Readings from Last 3 Encounters:   01/31/23 99.6 °F (37.6 °C) (Oral)   01/26/23 97.7 °F (36.5 °C) (Infrared)   01/22/23 99.2 °F (37.3 °C) (Oral)     Recent Labs     01/31/23  1433 01/31/23  1503   WBC 12.5*  --    LACTATE  --  1.1     Recent Labs     01/31/23  1433   BUN 9   CREATININE 0.4*     Estimated Creatinine Clearance: 239 mL/min (A) (based on SCr of 0.4 mg/dL (L)). No intake or output data in the 24 hours ending 01/31/23 2221    Pertinent Cultures:   Date    Source    Results  1/31   Blood    pending  1/31   MRSA Nasal   pending       Vancomycin level:   TROUGH:  No results for input(s): VANCOTROUGH in the last 72 hours. RANDOM:  No results for input(s): VANCORANDOM in the last 72 hours.     Assessment:  HPI: Sepsis: d/w ED; unknown source; recently discharged on Meropenem for complicated UTI; CT Chest, abdomen, pelvis ordered; blood cultures ordered in ED as well as urine culture; lactate wnl; LFT's wnl; will check Respiratory PCR given his weakness and fevers to look for any viral etiology (rapid flu/covid negative) as patient has been on IV abx at home; continue Zosyn/Vanc for now, procal/CRP ordered, will also have to consider port as source pending blood culture results and CT results; will likely benefit from ID c/s after results are completed however do not have obvious source of infection currently  SCr, BUN, and urine output:    Day(s) of therapy: 1  Vancomycin concentration: to be collected    Plan:  Vancomycin 2000 mg x 1 dose, followed by vancomycin 1250 mg every 12 hr  Pharmacy will continue to monitor patient and adjust therapy as indicated    Pedro 3 2/2 @7700    Thank you for the consult.   Alea Martinez [facility]  1/31/2023 10:21 PM

## 2023-02-01 NOTE — PROGRESS NOTES
Comprehensive Nutrition Assessment    Type and Reason for Visit:  Initial, Positive Nutrition Screen (weight loss, poor intake)    Nutrition Recommendations/Plan:   Continue regular diet   Will offer oral nutrition supplements during stay  Will continue to follow up during stay     Malnutrition Assessment:  Malnutrition Status:  Insufficient data (02/01/23 8997)    Context:  Chronic Illness       Nutrition Assessment:    Admit with hx fall, fatigue, hx sepsis and metastatic cancer. Currently on regular diet, limited po diet. Lunch tray in room, some eaten, half a sandwich. Reports weight loss on admit, weight hx currently estimated weights or stated weights. Patient did not rouse/wake up on room visit today. Will follow at high nutrition risk at this time with predicetd suboptimal intake. Nutrition Related Findings:    patient asleep in bed on visit, hx prostate cancer with bone mets,  Hb 7.8,  hospice referral pending, dry scaly skin on legs Wound Type: None       Current Nutrition Intake & Therapies:    Average Meal Intake: Unable to assess  Average Supplements Intake: None Ordered  ADULT DIET; Regular    Anthropometric Measures:  Height: 6' 1\" (185.4 cm)  Ideal Body Weight (IBW): 184 lbs (84 kg)       Current Body Weight: 191 lb 12.8 oz (87 kg), 104.2 % IBW. Weight Source: Other (Comment) (? estimated)  Current BMI (kg/m2): 25.3  Usual Body Weight: 192 lb 14.4 oz (87.5 kg) (hx wt ~ 200# past years, july wt 2022)  % Weight Change (Calculated): -0.6  Weight Adjustment For: No Adjustment                 BMI Categories: Overweight (BMI 25.0-29. 9)    Estimated Daily Nutrient Needs:  Energy Requirements Based On: Kcal/kg  Weight Used for Energy Requirements: Current  Energy (kcal/day): 4649-4732 (25-30 joseph/kg)  Weight Used for Protein Requirements: Current  Protein (g/day):  (1-1.2 g/kg)  Method Used for Fluid Requirements: 1 ml/kcal  Fluid (ml/day): 2100    Nutrition Diagnosis:   Predicted inadequate energy intake related to other (comment) (reduced appetite in illness) as evidenced by other (comment) (limited po data with reported weight loss)    Nutrition Interventions:   Food and/or Nutrient Delivery: Continue Current Diet, Start Oral Nutrition Supplement  Nutrition Education/Counseling: No recommendation at this time  Coordination of Nutrition Care: Continue to monitor while inpatient       Goals:     Goals: PO intake 50% or greater, by next RD assessment       Nutrition Monitoring and Evaluation:   Behavioral-Environmental Outcomes: None Identified  Food/Nutrient Intake Outcomes: Food and Nutrient Intake, Supplement Intake  Physical Signs/Symptoms Outcomes: Biochemical Data, Meal Time Behavior, Skin, Weight    Discharge Planning:    Continue current diet     Jeet Miles RD, LD  Contact: [phone]

## 2023-02-01 NOTE — PROGRESS NOTES
[address] [facility]  consulted by  Tammy Lara NP for monitoring and adjustment. Indication for treatment: Sepsis     AUC/CASH: 400-600    Risk Factors for MRSA Identified:   Hospitalization within the past 90 days    Pertinent Laboratory Values:   Temp Readings from Last 3 Encounters:   02/01/23 97.5 °F (36.4 °C) (Oral)   01/26/23 97.7 °F (36.5 °C) (Infrared)   01/22/23 99.2 °F (37.3 °C) (Oral)     Recent Labs     01/31/23  1433 01/31/23  1503 02/01/23  0911   WBC 12.5*  --  11.3*   LACTATE  --  1.1  --      Recent Labs     01/31/23  1433 02/01/23  0911   BUN 9 8   CREATININE 0.4* 0.4*     Estimated Creatinine Clearance: 239 mL/min (A) (based on SCr of 0.4 mg/dL (L)). Intake/Output Summary (Last 24 hours) at 2/1/2023 1202  Last data filed at 2/1/2023 0741  Gross per 24 hour   Intake --   Output 2500 ml   Net -2500 ml       Pertinent Cultures:   Date    Source    Results  1/31   Blood    pending  1/31   MRSA Nasal   pending       Vancomycin level:   TROUGH:  No results for input(s): VANCOTROUGH in the last 72 hours. RANDOM:  No results for input(s): VANCORANDOM in the last 72 hours.     Assessment:  HPI: Sepsis: d/w ED; unknown source; recently discharged on Meropenem for complicated UTI; CT Chest, abdomen, pelvis ordered; blood cultures ordered in ED as well as urine culture; lactate wnl; LFT's wnl; will check Respiratory PCR given his weakness and fevers to look for any viral etiology (rapid flu/covid negative) as patient has been on IV abx at home; continue Zosyn/Vanc for now, procal/CRP ordered, will also have to consider port as source pending blood culture results and CT results; will likely benefit from ID c/s after results are completed however do not have obvious source of infection currently  SCr, BUN, and urine output:    Day(s) of therapy: 2  Vancomycin concentration: to be collected    Plan:  Continue vancomycin IVPB 1250 mg every 12 hrs  Pharmacy will continue to monitor patient and adjust therapy as indicated    Pedro 3 2/2 @0400    Thank you for the consult.   Ramy Posada, [facility]  2/1/2023 12:02 PM

## 2023-02-01 NOTE — CARE COORDINATION
Chart reviewed. Patient is rescinding Hospice need at this time- he wants \"full work up\". Zarina Cuadra RN     [id number] Notified of Hospice referral. Patient has [facility]. Will reach out to [facility] for update. Zarina Cuadra RN     [address] at [phone]; spoke to Mike Dubon. Patient is not a client of [facility] at this time. Zarina Cuadra RN     [address] [facility] at [phone]; LVM for Andie in intake. Zarina Cuadra RN     Per 1/26/23 [facility] note- family was offered [facility]; declined by family at that time. Zarina Cuadra RN     1400 Contacted [facility]; spoke to [facility]. They did receive discharge info. ATB were to be administered by family - not [facility]. They provide Waiver based services- 35 hrs weekly/7 days a week (5 hrs daily) . He was affiliated with [facility] ([phone]) . Will reach out to [facility] for update. Zarina Cuadra RN     [address]; spoke to [facility]. Patient is not affiliated with [facility] nor will they accept a referral. Zarina Cuadra RN     [facility]; spoke to Bk Bhagat. Patient was a client of [facility]. Rescinded services due to lack of escalation of pain medication. Patient had a pain contract;; broken - missing/unaccounted narcotics (Oxy/Fentanyl patches) [facility].  Zarina Cuadra RN

## 2023-02-01 NOTE — CONSULTS
Infectious Disease Consult Note  2023   Patient Name: Sena Dixon : 1970   Impression  Fever with Unclear Source:  Possible Pneumonia:  Possible Constipation:  Mediport Placement:  Chronic Urinary Retention with Doherty:  T-max 101.1 trended down to afebrile  WBC 11.3  CrCl 239  Pct 0.088, .4  -UA WBC 2, RBC 5, culture: NGTD  -Rapid COVID-19, rapid flu A/B negative  -BC Pending  -MRSA/MSSA pending  -CXR: Re-demonstration of mediastinal SHELLEY mass consistent with known malignancy. Diffuse osseous metastatic disease also re-demonstrated. No superimposed acute consolidative change of the lungs identified. -CT Chest W Contrast: 1. Interval increased size and extent left hilar/mediastinal mass lesion and   left upper lobe atelectasis reflecting progression of disease. Cannot   exclude underlying pneumonia. 2. 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 3. Diffuse osteoblastic metastatic disease. 4. Evaluation of the lower lungs degraded because of motion during imaging,   blurring detail and resulting in misregistration artifact. -CT A&P W Contrast: 1. Evaluation of the lower lungs and abdomen degraded because of motion   during imaging, blurring detail and resulting in misregistration artifact. 2. Extensive osteoblastic metastatic disease with increasing areas of   destructive and lytic disease particularly the left hemipelvis and throughout   the sacrum. 3. Possible constipation. 4. Possible cystitis. Correlate with urinalysis. 5. Urinary bladder stones are demonstrated.    Metastatic Squamous Cell Carcinoma Left Lung:  Metastatic Prostate Cancer:  Mgt as OP per Dr. Shayy Cole  Dx: 2021, debulking of his primary tumor via bronchoscopy at [facility] past Lisseth platinum/ pembrolizumab/ pemetrexted followed by [facility] Cardiomyopathy/ CAD/ HTN:  TMJ:   Trigeminal Neuralgia:  Multi-morbidity: per PMHx:    Plan:  Continue IV vancomycin per pharmacy dosing  DC IV Zosyn  Start IV meropenem 1 gm q8h, suspect possible pneumonia  Trend CRP and Pct, ordered  Await BC from 1/31    Thank you for allowing me to consult in the care of this patient.  ------------------------  REASON FOR CONSULT: Infective syndrome \"Fever, source not clear\"  Requested by: Dr. Ashish Reyna is a 46 y.o. -American  male with a history of non-ischemic cardiomyopathy, CAD, squamous cell carcinoma of the left lung, prostate cancer with bony mets, chronic urinary retention with Doherty, TMJ, HTN and trigeminal neuralgia who was admitted 1/31/2023 for further evaluation and management of chills/ fevers and generalized weakness and fatigue which started the day prior to admission. He was just hospitalized from 1/16-1/20/23 for CAUTI from  Pseudomonas aeruginosa and Enterococcus faecalis secondary to chronic Doherty, he was treated by this ID team with IV meropenem 1 gm q8h x 14 days which was due to complete on 2/2/2023. He was started on IV empiric ABX therapy upon admission of Zosyn and vancomycin. He has had a CT of the chest W Contrast which revealed interval increased size and extent left hilar/mediastinal mass lesion and SHELLEY atelectasis reflecting progression of disease, cannot exclude underlying pneumonia. CT A&P W IV Contrast revealed extensive osteoblastic metastatic disease with increasing areas of destructive and lytic disease particularly in the left hemipelvis and throughout the sacrum. Possible constipation. Patient reports his last BM was 1/31/23 and was \"normal\" for him. ? Infectious diseases service was consulted to evaluate the pt, and recommend further investigative and therapeutic measures. ROS: Other systems reviewed Including eyes, ENT, respiratory, cardiovascular, GI, , dermatologic, neurologic, psych, hem/lymphatic, musculoskeletal and endocrine were negative other than what is mentioned above. Patient Active Problem List    Diagnosis Date Noted    Extended spectrum beta lactamase (ESBL) resistance 01/21/2023    Malignant neoplasm of lung ([address] 01/18/2023    Urinary tract infection associated with indwelling urethral catheter ([address] 01/16/2023    Flank pain 01/06/2023    Moderate malnutrition ([address] 01/05/2023    Nephrostomy tube displaced ([address] 10/20/2022    Malignant neoplasm of upper lobe of left lung ([address] 10/13/2022    Leukocytosis 10/13/2022    Chronic pain of right upper extremity 10/13/2022    Sepsis ([address] 10/13/2022    Pneumothorax 10/11/2022    Prostate cancer ([address] 06/03/2022    Burn of left hand including fingers 07/11/2014    Shortness of breath 04/08/2022    Thrombocytopenia ([address] 04/01/2022    Anemia 03/18/2022    Secondary malignant neoplasm of bone ([address] 12/08/2021    Elevated PSA 09/14/2021    Malignant neoplasm of overlapping sites of left lung ([address] 08/06/2021    Mass of left lung 07/23/2021    Disease of prostate 07/23/2021    Cigarette nicotine dependence without complication 96/67/8590    H/O: facial fractures 09/29/2020    LVH (left ventricular hypertrophy) 12/26/2013    Cardiomyopathy ([address] 12/23/2013    CAD (coronary artery disease) 12/23/2013    Cocaine abuse ([address] 12/19/2013    Chest pain 07/27/2013    History of cocaine use 01/01/2013    Trigeminal neuralgia 07/11/2012     Past Medical History:   Diagnosis Date    CAD (coronary artery disease) 12/23/2013    cath negative per pt    Cancer ([address] 06/2021    pt reports he has lung cancer    History of TMJ disorder     Hypertension     Trigeminal neuralgia       Past Surgical History:   Procedure Laterality Date    ABDOMEN SURGERY      CARDIAC CATHETERIZATION  08/03/2016    CT BIOPSY PERCUTANEOUS SUPERFICIAL BONE  12/17/2021    CT BIOPSY PERCUTANEOUS SUPERFICIAL BONE 12/17/2021 [facility] CT SCAN    CT NEEDLE BIOPSY LUNG PERCUTANEOUS  7/28/2021    CT NEEDLE BIOPSY LUNG PERCUTANEOUS 7/28/2021 [facility] CT SCAN    IR NEPHROSTOMY CATHETER PLACEMENT  1/5/2023    IR NEPHROSTOMY CATHETER PLACEMENT 1/5/2023 [facility]    PORT SURGERY Right 8/13/2021    MEDIPORT INSERTION performed by Hyun Lorenzana MD at [facility] OR      Family History   Problem Relation Age of Onset    High Blood Pressure Mother     High Blood Pressure Sister     Cancer Sister       Infectious disease related family history - not contibutory. SOCIAL HISTORY  Social History     Tobacco Use    Smoking status: Every Day     Packs/day: 2.00     Years: 39.00     Pack years: 78.00     Types: Cigarettes    Smokeless tobacco: Never    Tobacco comments:     smokes 1-2 a day   Substance Use Topics    Alcohol use: Not Currently     Alcohol/week: 6.0 standard drinks     Types: 6 Cans of beer per week     Comment: per week (24 oz beers)       Born: [place]  Lives: [place] with wife  Occupation: disabled  No recent travel of significance. No recent unusual exposures. NO pets    ? ALLERGIES  Allergies   Allergen Reactions    Tramadol Other (See Comments)     seizures    Morphine Hallucinations    Vicodin [Hydrocodone-Acetaminophen] Hives      MEDICATIONS  Reviewed and are per the chart/EMR. ? Antibiotics:   Present:  Meropenem 2/1-  Vancomycin 1/31-  Past:  Zosyn 1/31-2/1?  -------------------------------------------------------------------------------------------------------------------    Vital Signs:  Vitals:    02/01/23 1302   BP:    Pulse:    Resp:    Temp: 98.2 °F (36.8 °C)   SpO2:          Exam:    VS: noted; wt 191 lb (87 kg) Height 6'1\"  Gen: alert and oriented X3, no distress  Skin: no stigmata of endocarditis  Wounds: C/D/I-NA  HEMT: AT/NC Oropharynx pink, moist, and without lesions or exudates; dentition in good state of repair  Eyes: PERRLA, EOMI, conjunctiva pink, sclera anicteric. Neck: Supple. Trachea midline. No LAD. Chest: no distress and CTA. Good air movement. Room air. Heart: NSR and no MRG.    Abd: soft, non-distended, no tenderness, no hepatomegaly. Normoactive bowel sounds. Ext: no clubbing, cyanosis, or edema  Doherty Catheter Site: without erythema or tenderness draining clear yellow urine  Neuro: Mental status intact. CN 2-12 intact and no focal sensory or motor deficits    ? Diagnostic Studies: reviewed  1/31/2023 XR Chest Portable:  Impression   1. Redemonstration of mediastinal left upper lobe mass consistent with known   malignancy. Diffuse osseous metastatic disease also redemonstrated. No   superimposed acute consolidative change of the lungs identified. 1/31/2023 CT Chest W Contrast:  Impression   1. Interval increased size and extent left hilar/mediastinal mass lesion and   left upper lobe atelectasis reflecting progression of disease. Cannot   exclude underlying pneumonia. 2. 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 3. Diffuse osteoblastic metastatic disease. 4. Evaluation of the lower lungs degraded because of motion during imaging,   blurring detail and resulting in misregistration artifact. 1/31/2023 CT Abdomen Pelvis W IV Contrast:  Impression   1. Evaluation of the lower lungs and abdomen degraded because of motion   during imaging, blurring detail and resulting in misregistration artifact. 2. Extensive osteoblastic metastatic disease with increasing areas of   destructive and lytic disease particularly the left hemipelvis and throughout   the sacrum. 3. Possible constipation. 4. Possible cystitis. Correlate with urinalysis. 5. Urinary bladder stones are demonstrated. ??  I have examined this patient and available medical records on this date and have made the above observations, conclusions and recommendations. Electronically signed by: Electronically signed by GERDA Dior CNP on 2/1/2023 at 2:04 PM

## 2023-02-01 NOTE — ED NOTES
ED TO INPATIENT SBAR HANDOFF    Patient Name: Pete Martin   :  1970  46 y.o. MRN:  [id number]  Preferred Name Kym Tubbs  ED Room #:  TR01/01TR-01  Family/Caregiver Present yes   Restraints no   Sitter no   Sepsis Risk Score Sepsis Risk Score: 6.51    Situation  Code Status: Full Code No additional code details. Allergies: Tramadol, Morphine, and Vicodin [hydrocodone-acetaminophen]  Weight:   Patient Vitals for the past 96 hrs (Last 3 readings):   Weight   23 1440 191 lb 12.8 oz (87 kg)     Arrived from: home  Chief Complaint:   Chief Complaint   Patient presents with   [facility] Problem/Diagnosis:  Principal Problem:    Sepsis ([facility]  Resolved Problems:    * No resolved hospital problems. *      Imaging:   CT ABDOMEN PELVIS W IV CONTRAST Additional Contrast? None   Final Result   1. Evaluation of the lower lungs and abdomen degraded because of motion   during imaging, blurring detail and resulting in misregistration artifact. 2. Extensive osteoblastic metastatic disease with increasing areas of   destructive and lytic disease particularly the left hemipelvis and throughout   the sacrum. 3. Possible constipation. 4. Possible cystitis. Correlate with urinalysis. 5. Urinary bladder stones are demonstrated. CT CHEST W CONTRAST   Final Result   1. Interval increased size and extent left hilar/mediastinal mass lesion and   left upper lobe atelectasis reflecting progression of disease. Cannot   exclude underlying pneumonia. 2. 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 3. Diffuse osteoblastic metastatic disease. 4. Evaluation of the lower lungs degraded because of motion during imaging,   blurring detail and resulting in misregistration artifact. XR CHEST PORTABLE   Final Result   1. Redemonstration of mediastinal left upper lobe mass consistent with known   malignancy. Diffuse osseous metastatic disease also redemonstrated.   No superimposed acute consolidative change of the lungs identified.            Abnormal labs:   Abnormal Labs Reviewed   BASIC METABOLIC PANEL - Abnormal; Notable for the following components:       Result Value    Sodium 131 (*)     Chloride 95 (*)     Creatinine 0.4 (*)     Glucose 111 (*)     All other components within normal limits   CBC WITH AUTO DIFFERENTIAL - Abnormal; Notable for the following components:    WBC 12.5 (*)     RBC 3.66 (*)     Hemoglobin 8.7 (*)     Hematocrit 26.9 (*)     MCV 73.5 (*)     MCH 23.8 (*)     RDW 17.4 (*)     Segs Relative 75.0 (*)     Lymphocytes % 16.2 (*)     Monocytes % 6.6 (*)     Immature Neutrophil % 0.6 (*)     All other components within normal limits   URINALYSIS - Abnormal; Notable for the following components:    Ketones, Urine TRACE (*)     Blood, Urine TRACE (*)     Protein, UA 30 (*)     Urobilinogen, Urine 2.0 (*)     All other components within normal limits   MICROSCOPIC URINALYSIS - Abnormal; Notable for the following components:    RBC, UA 5 (*)     All other components within normal limits   C-REACTIVE PROTEIN - Abnormal; Notable for the following components:    CRP High Sensitivity 103.4 (*)     All other components within normal limits     Critical values: no     Abnormal Assessment Findings: yes    Background  History:   Past Medical History:   Diagnosis Date    CAD (coronary artery disease) 12/23/2013    cath negative per pt    Cancer [facility] 06/2021    pt reports he has lung cancer    History of TMJ disorder     Hypertension     Trigeminal neuralgia        Assessment    Vitals/MEWS: MEWS Score: 2  Level of Consciousness: Alert (0)   Vitals:    02/01/23 0532 02/01/23 0546 02/01/23 0551 02/01/23 0552   BP:    137/88   Pulse: (!) 103  (!) 105 (!) 104   Resp:  20  20   Temp:    99.6 °F (37.6 °C)   TempSrc:       SpO2: 97%  99%    Weight:       Height:         FiO2 (%):   O2 Flow Rate: O2 Device: None (Room air)    Cardiac Rhythm:   Pain Assessment:  [x] Verbal [] Render Civil Scale  Pain Scale: Pain Assessment  Pain Assessment: 0-10  Pain Level: 10  Patient's Stated Pain Goal: 0 - No pain  Pain Location: Abdomen, Back  Pain Orientation: Right, Left  Last documented pain score (0-10 scale) Pain Level: 10  Last documented pain medication administered:   Mental Status: oriented  NIH Score: NIH     C-SSRS: Risk of Suicide: No Risk  Bedside swallow:    Harika Coma Scale (GCS): Harika Coma Scale  Eye Opening: Spontaneous  Best Verbal Response: Oriented  Best Motor Response: Obeys commands  [place] Coma Scale Score: 15  Active LDA's:    PO Status: Regular  Pertinent or High Risk Medications/Drips: no   o If Yes, please provide details:   Pending Blood Product Administration: no     You may also review the ED PT Care Timeline found under the Summary Nursing Index tab. Recommendation    Pending orders   Plan for Discharge (if known):    Additional Comments:   If any further questions, please call Sending RN at [phone]    Electronically signed by: Electronically signed by Fco Dhillon RN on 2/1/2023 at 7:08 AM     Fco Dhillon RN  01/31/23 729  Alli Jane RN  01/31/23 0759       Fco Dhillon RN  01/31/23 3750       Fco Dhillon RN  01/31/23 2114       Fco Dhillon RN  02/01/23 8628

## 2023-02-01 NOTE — PROGRESS NOTES
V2.0  [facility] Progress Note      Name:  Teodoro Nunes /Age/Sex: 1970  (46 y.o. male)   MRN & CSN:  [id number] & [id number] Encounter Date/Time: 2023 12:56 PM EST    Location:  -A PCP: Lizette Dior MD       Hospital Day: 2    Assessment and Plan:   Teodoro Nunes is a 46 y.o. male who presents with concern for sepsis      Plan:  Concern for sepsis-febrile and tachycardic. Recently was on meropenem for complicated UTI on previous admission. Source of infection is not clear. Checking respiratory PCR and blood cultures. On Vanco and Zosyn. Received IV bolus in ER. Tmax 101.8. Procalcitonin 0.088. . Flu negative. COVID-negative. CT chest: Cannot exclude pneumonia; postradiation pneumonitis. CT abdomen pelvis: Possible constipation; possible cystitis. Hyponatremia-sodium 132. Given IV fluids. Chronic anemia-may be related to cancer. Monitor. Hemoglobin 8.7. Check anemia panel. History metastatic prostate cancer-consult heme-onc. Continue steroid therapy. CT chest: Diffuse osteoblastic metastatic disease. CT abdomen pelvis: Extensive osteoblastic metastatic disease. History squamous cell carcinoma left lung-diagnosed on 2021. Had debulking of primary tumor via bronchoscopy at [address] [place] [address]. Has received chemotherapy followed by [facility]. CT chest: Interval increase size and extent of the left hilar/mediastinal mass and left upper lobe atelectasis. History urinary retention with chronic indwelling Doherty-had prior nephrostomy tube which was dislodged and never placed back. Anxiety disorder-alprazolam PRN    Recent complicated UTI-had been on meropenem at home. Diet ADULT DIET;  Regular    DVT Prophylaxis [x] Lovenox, []  Heparin, [] SCDs, [] Ambulation,  [] Eliquis, [] Xarelto  [] Coumadin   Code Status Full Code   Disposition From: Home  Expected Disposition: TBD  Estimated Date of Discharge:   Patient requires continued admission due to concern for infection   Home O2 None     Subjective/Interval history:   Chief Complaint: Fall     Had recently been discharged from on 1/20 for UTI  Patient has been feeling sick the last two days but does not have a focal area that is bothering him. He denies productive cough or shortness of breath, no dysuria, no diarrhea, no rash. Review of Systems:    Negative unless mentioned above    Objective: Intake/Output Summary (Last 24 hours) at 2/1/2023 1256  Last data filed at 2/1/2023 0741  Gross per 24 hour   Intake --   Output 2500 ml   Net -2500 ml        Vitals:   /87   Pulse (!) 103   Temp 98.4 °F (36.9 °C) (Oral)   Resp 20   Ht 6' 1\" (1.854 m)   Wt 191 lb 12.8 oz (87 kg)   SpO2 97%   BMI 25.30 kg/m²     Physical Exam:   General: NAD, laying in bed  Eyes: no discharge  HENT: NCAT  Cardiovascular: slightly tachycardic  Respiratory: Clear to auscultation   Gastrointestinal: Soft, non tender, nondistended  Genitourinary: no suprapubic tenderness, has sampson catheter  Musculoskeletal: trace edema in LE, nontender  Skin: warm, dry, no gross lesions. Patient has a port in chest.  Neuro: Alert. No gross deficits  Psych: Mood appropriate.      Medications:   Medications:    DULoxetine  60 mg Oral Daily    fentaNYL  1 patch TransDERmal Q72H    gabapentin  600 mg Oral TID    predniSONE  20 mg Oral Daily    tamsulosin  0.4 mg Oral Daily    piperacillin-tazobactam  3,375 mg IntraVENous Q8H    sodium chloride flush  5-40 mL IntraVENous 2 times per day    enoxaparin  40 mg SubCUTAneous Daily    vancomycin  1,250 mg IntraVENous Q12H      Infusions:    sodium chloride 100 mL/hr at 01/31/23 2105    sodium chloride       PRN Meds: oxyCODONE, 20 mg, Q6H PRN   And  acetaminophen, 650 mg, Q6H PRN  ALPRAZolam, 0.5 mg, TID PRN  cyclobenzaprine, 10 mg, TID PRN  naloxone, 4 mg, PRN  sodium chloride flush, 5-40 mL, PRN  sodium chloride, , PRN  ondansetron, 4 mg, Q8H PRN   Or  ondansetron, 4 mg, Q6H PRN  polyethylene glycol, 17 g, Daily PRN  acetaminophen, 650 mg, Q6H PRN   Or  acetaminophen, 650 mg, Q6H PRN        Labs      Recent Labs     01/31/23  1433 02/01/23  0911   WBC 12.5* 11.3*   HGB 8.7* 7.8*   HCT 26.9* 25.1*    387      Recent Labs     01/31/23  1433 02/01/23  0911   * 132*   K 4.0 3.9   CL 95* 97*   CO2 27 26   BUN 9 8   CREATININE 0.4* 0.4*     No results for input(s): AST, ALT, ALB, BILIDIR, BILITOT, ALKPHOS in the last 72 hours. No results for input(s): INR in the last 72 hours. No results for input(s): CKTOTAL, CKMB, CKMBINDEX, TROPONINT in the last 72 hours.   No results found for: LABA1C  CALCIUM:  10.7/26 (02/01 0911)  Lab Results   Component Value Date/Time    MG 1.8 04/10/2022 03:45 AM           Electronically signed by Nathaniel Thurman MD on 2/1/2023 at 12:56 PM

## 2023-02-02 ENCOUNTER — HOSPITAL ENCOUNTER (OUTPATIENT)
Dept: RADIATION ONCOLOGY | Age: 53
Discharge: HOME | End: 2023-02-02

## 2023-02-02 ENCOUNTER — HOSPITAL ENCOUNTER (OUTPATIENT)
Dept: RADIATION ONCOLOGY | Age: 53
Discharge: HOME | End: 2023-02-02
Payer: MEDICAID

## 2023-02-02 ENCOUNTER — TELEPHONE (OUTPATIENT)
Dept: INFECTIOUS DISEASES | Age: 53
End: 2023-02-02

## 2023-02-02 LAB
AMPHETAMINES: NEGATIVE
B PARAP IS1001 DNA NPH QL NAA+NON-PROBE: NOT DETECTED
B PERT.PT PRMT NPH QL NAA+NON-PROBE: NOT DETECTED
BARBITURATE SCREEN URINE: NEGATIVE
BENZODIAZEPINE SCREEN, URINE: NEGATIVE
C PNEUM DNA NPH QL NAA+NON-PROBE: NOT DETECTED
CANNABINOID SCREEN URINE: ABNORMAL
COCAINE METABOLITE: NEGATIVE
CRP SERPL HS-MCNC: 89.7 MG/L
DOSE AMOUNT: NORMAL
DOSE TIME: NORMAL
FLUAV H1 2009 PAN RNA NPH NAA+NON-PROBE: NOT DETECTED
FLUAV H1 RNA NPH QL NAA+NON-PROBE: NOT DETECTED
FLUAV H3 RNA NPH QL NAA+NON-PROBE: NOT DETECTED
FLUAV RNA NPH QL NAA+NON-PROBE: NOT DETECTED
FLUBV RNA NPH QL NAA+NON-PROBE: NOT DETECTED
HADV DNA NPH QL NAA+NON-PROBE: NOT DETECTED
HCOV 229E RNA NPH QL NAA+NON-PROBE: NOT DETECTED
HCOV HKU1 RNA NPH QL NAA+NON-PROBE: NOT DETECTED
HCOV NL63 RNA NPH QL NAA+NON-PROBE: NOT DETECTED
HCOV OC43 RNA NPH QL NAA+NON-PROBE: NOT DETECTED
HMPV RNA NPH QL NAA+NON-PROBE: NOT DETECTED
HPIV1 RNA NPH QL NAA+NON-PROBE: NOT DETECTED
HPIV2 RNA NPH QL NAA+NON-PROBE: NOT DETECTED
HPIV3 RNA NPH QL NAA+NON-PROBE: NOT DETECTED
HPIV4 RNA NPH QL NAA+NON-PROBE: NOT DETECTED
M PNEUMO DNA NPH QL NAA+NON-PROBE: NOT DETECTED
OPIATES, URINE: NEGATIVE
OXYCODONE: ABNORMAL
PHENCYCLIDINE, URINE: NEGATIVE
PROCALCITONIN SERPL-MCNC: 0.09 NG/ML
RSV RNA NPH QL NAA+NON-PROBE: NOT DETECTED
RV+EV RNA NPH QL NAA+NON-PROBE: NOT DETECTED
SARS-COV-2 RNA NPH QL NAA+NON-PROBE: NOT DETECTED
VANCOMYCIN RANDOM: 37.1 UG/ML

## 2023-02-02 PROCEDURE — 99223 1ST HOSP IP/OBS HIGH 75: CPT | Performed by: RADIOLOGY

## 2023-02-02 PROCEDURE — 36415 COLL VENOUS BLD VENIPUNCTURE: CPT

## 2023-02-02 PROCEDURE — 0202U NFCT DS 22 TRGT SARS-COV-2: CPT

## 2023-02-02 PROCEDURE — 6370000000 HC RX 637 (ALT 250 FOR IP): Performed by: INTERNAL MEDICINE

## 2023-02-02 PROCEDURE — 84145 PROCALCITONIN (PCT): CPT

## 2023-02-02 PROCEDURE — 86140 C-REACTIVE PROTEIN: CPT

## 2023-02-02 PROCEDURE — 6360000002 HC RX W HCPCS: Performed by: INTERNAL MEDICINE

## 2023-02-02 PROCEDURE — 2060000000 HC ICU INTERMEDIATE R&B

## 2023-02-02 PROCEDURE — 6360000002 HC RX W HCPCS: Performed by: HOSPITALIST

## 2023-02-02 PROCEDURE — 94761 N-INVAS EAR/PLS OXIMETRY MLT: CPT

## 2023-02-02 PROCEDURE — 6360000002 HC RX W HCPCS: Performed by: NURSE PRACTITIONER

## 2023-02-02 PROCEDURE — 6370000000 HC RX 637 (ALT 250 FOR IP): Performed by: HOSPITALIST

## 2023-02-02 PROCEDURE — 77334 RADIATION TREATMENT AID(S): CPT | Performed by: RADIOLOGY

## 2023-02-02 PROCEDURE — 84153 ASSAY OF PSA TOTAL: CPT

## 2023-02-02 PROCEDURE — 6360000002 HC RX W HCPCS: Performed by: STUDENT IN AN ORGANIZED HEALTH CARE EDUCATION/TRAINING PROGRAM

## 2023-02-02 PROCEDURE — 84403 ASSAY OF TOTAL TESTOSTERONE: CPT

## 2023-02-02 PROCEDURE — 6370000000 HC RX 637 (ALT 250 FOR IP): Performed by: NURSE PRACTITIONER

## 2023-02-02 PROCEDURE — 80307 DRUG TEST PRSMV CHEM ANLYZR: CPT

## 2023-02-02 PROCEDURE — 77470 SPECIAL RADIATION TREATMENT: CPT | Performed by: RADIOLOGY

## 2023-02-02 PROCEDURE — 87081 CULTURE SCREEN ONLY: CPT

## 2023-02-02 PROCEDURE — 2580000003 HC RX 258: Performed by: NURSE PRACTITIONER

## 2023-02-02 PROCEDURE — 84270 ASSAY OF SEX HORMONE GLOBUL: CPT

## 2023-02-02 PROCEDURE — 77332 RADIATION TREATMENT AID(S): CPT | Performed by: RADIOLOGY

## 2023-02-02 PROCEDURE — 99231 SBSQ HOSP IP/OBS SF/LOW 25: CPT | Performed by: NURSE PRACTITIONER

## 2023-02-02 PROCEDURE — 2580000003 HC RX 258: Performed by: INTERNAL MEDICINE

## 2023-02-02 PROCEDURE — 80202 ASSAY OF VANCOMYCIN: CPT

## 2023-02-02 PROCEDURE — 99223 1ST HOSP IP/OBS HIGH 75: CPT | Performed by: INTERNAL MEDICINE

## 2023-02-02 PROCEDURE — 84154 ASSAY OF PSA FREE: CPT

## 2023-02-02 RX ORDER — DIPHENHYDRAMINE HYDROCHLORIDE 50 MG/ML
25 INJECTION INTRAMUSCULAR; INTRAVENOUS EVERY 6 HOURS PRN
Status: DISCONTINUED | OUTPATIENT
Start: 2023-02-02 | End: 2023-02-16 | Stop reason: HOSPADM

## 2023-02-02 RX ORDER — BICALUTAMIDE 50 MG/1
50 TABLET, FILM COATED ORAL DAILY
Status: DISCONTINUED | OUTPATIENT
Start: 2023-02-02 | End: 2023-02-16 | Stop reason: HOSPADM

## 2023-02-02 RX ADMIN — HYDROMORPHONE HYDROCHLORIDE 0.5 MG: 1 INJECTION, SOLUTION INTRAMUSCULAR; INTRAVENOUS; SUBCUTANEOUS at 23:55

## 2023-02-02 RX ADMIN — CYCLOBENZAPRINE 10 MG: 10 TABLET, FILM COATED ORAL at 21:27

## 2023-02-02 RX ADMIN — SODIUM CHLORIDE: 9 INJECTION, SOLUTION INTRAVENOUS at 02:05

## 2023-02-02 RX ADMIN — OXYCODONE HYDROCHLORIDE 20 MG: 10 TABLET ORAL at 02:57

## 2023-02-02 RX ADMIN — ALPRAZOLAM 0.5 MG: 0.5 TABLET ORAL at 14:18

## 2023-02-02 RX ADMIN — SODIUM CHLORIDE: 9 INJECTION, SOLUTION INTRAVENOUS at 21:40

## 2023-02-02 RX ADMIN — HYDROMORPHONE HYDROCHLORIDE 0.5 MG: 1 INJECTION, SOLUTION INTRAMUSCULAR; INTRAVENOUS; SUBCUTANEOUS at 05:39

## 2023-02-02 RX ADMIN — GABAPENTIN 600 MG: 300 CAPSULE ORAL at 08:51

## 2023-02-02 RX ADMIN — DIPHENHYDRAMINE HYDROCHLORIDE 25 MG: 50 INJECTION, SOLUTION INTRAMUSCULAR; INTRAVENOUS at 17:43

## 2023-02-02 RX ADMIN — HYDROMORPHONE HYDROCHLORIDE 0.5 MG: 1 INJECTION, SOLUTION INTRAMUSCULAR; INTRAVENOUS; SUBCUTANEOUS at 02:00

## 2023-02-02 RX ADMIN — HYDROMORPHONE HYDROCHLORIDE 0.5 MG: 1 INJECTION, SOLUTION INTRAMUSCULAR; INTRAVENOUS; SUBCUTANEOUS at 08:51

## 2023-02-02 RX ADMIN — ALPRAZOLAM 0.5 MG: 0.5 TABLET ORAL at 02:57

## 2023-02-02 RX ADMIN — SODIUM CHLORIDE: 9 INJECTION, SOLUTION INTRAVENOUS at 17:44

## 2023-02-02 RX ADMIN — MEROPENEM 1000 MG: 1 INJECTION, POWDER, FOR SOLUTION INTRAVENOUS at 09:00

## 2023-02-02 RX ADMIN — PREDNISONE 20 MG: 20 TABLET ORAL at 08:51

## 2023-02-02 RX ADMIN — GABAPENTIN 600 MG: 300 CAPSULE ORAL at 21:26

## 2023-02-02 RX ADMIN — ENOXAPARIN SODIUM 40 MG: 100 INJECTION SUBCUTANEOUS at 21:27

## 2023-02-02 RX ADMIN — VANCOMYCIN HYDROCHLORIDE 1250 MG: 1.25 INJECTION, POWDER, LYOPHILIZED, FOR SOLUTION INTRAVENOUS at 05:45

## 2023-02-02 RX ADMIN — DIPHENHYDRAMINE HYDROCHLORIDE 25 MG: 50 INJECTION, SOLUTION INTRAMUSCULAR; INTRAVENOUS at 23:55

## 2023-02-02 RX ADMIN — GABAPENTIN 600 MG: 300 CAPSULE ORAL at 14:11

## 2023-02-02 RX ADMIN — VANCOMYCIN HYDROCHLORIDE 1250 MG: 1.25 INJECTION, POWDER, LYOPHILIZED, FOR SOLUTION INTRAVENOUS at 18:26

## 2023-02-02 RX ADMIN — MEROPENEM 1000 MG: 1 INJECTION, POWDER, FOR SOLUTION INTRAVENOUS at 17:26

## 2023-02-02 RX ADMIN — HYDROMORPHONE HYDROCHLORIDE 0.5 MG: 1 INJECTION, SOLUTION INTRAMUSCULAR; INTRAVENOUS; SUBCUTANEOUS at 14:12

## 2023-02-02 RX ADMIN — OXYCODONE HYDROCHLORIDE 20 MG: 10 TABLET ORAL at 21:27

## 2023-02-02 RX ADMIN — BICALUTAMIDE 50 MG: 50 TABLET, FILM COATED ORAL at 14:18

## 2023-02-02 RX ADMIN — TAMSULOSIN HYDROCHLORIDE 0.4 MG: 0.4 CAPSULE ORAL at 08:50

## 2023-02-02 RX ADMIN — OXYCODONE HYDROCHLORIDE 20 MG: 10 TABLET ORAL at 11:24

## 2023-02-02 RX ADMIN — DULOXETINE 60 MG: 30 CAPSULE, DELAYED RELEASE ORAL at 08:50

## 2023-02-02 RX ADMIN — HYDROMORPHONE HYDROCHLORIDE 0.5 MG: 1 INJECTION, SOLUTION INTRAMUSCULAR; INTRAVENOUS; SUBCUTANEOUS at 18:21

## 2023-02-02 ASSESSMENT — PAIN SCALES - GENERAL
PAINLEVEL_OUTOF10: 6
PAINLEVEL_OUTOF10: 8
PAINLEVEL_OUTOF10: 7
PAINLEVEL_OUTOF10: 10
PAINLEVEL_OUTOF10: 9
PAINLEVEL_OUTOF10: 10
PAINLEVEL_OUTOF10: 10

## 2023-02-02 ASSESSMENT — PAIN SCALES - WONG BAKER
WONGBAKER_NUMERICALRESPONSE: 2
WONGBAKER_NUMERICALRESPONSE: 2

## 2023-02-02 ASSESSMENT — PAIN DESCRIPTION - DESCRIPTORS
DESCRIPTORS: ACHING;DISCOMFORT
DESCRIPTORS: ACHING;CRAMPING
DESCRIPTORS: ACHING;DISCOMFORT;DULL
DESCRIPTORS: ACHING;DISCOMFORT
DESCRIPTORS: SHOOTING;ACHING;CRAMPING

## 2023-02-02 ASSESSMENT — PAIN DESCRIPTION - LOCATION
LOCATION: BACK
LOCATION: GENERALIZED
LOCATION: GENERALIZED
LOCATION: BACK;SHOULDER
LOCATION: GENERALIZED
LOCATION: BACK
LOCATION: GENERALIZED

## 2023-02-02 ASSESSMENT — PAIN DESCRIPTION - ORIENTATION: ORIENTATION: LEFT;RIGHT

## 2023-02-02 NOTE — PLAN OF CARE
Problem: Pain  Goal: Verbalizes/displays adequate comfort level or baseline comfort level  Outcome: Not Progressing     Problem: Nutrition Deficit:  Goal: Optimize nutritional status  Outcome: Not Progressing     Problem: Pain  Goal: Verbalizes/displays adequate comfort level or baseline comfort level  Outcome: Not Progressing     Problem: Nutrition Deficit:  Goal: Optimize nutritional status  Outcome: Not Progressing

## 2023-02-02 NOTE — PLAN OF CARE
Problem: Skin/Tissue Integrity  Goal: Absence of new skin breakdown  Description: 1. Monitor for areas of redness and/or skin breakdown  2. Assess vascular access sites hourly  3. Every 4-6 hours minimum:  Change oxygen saturation probe site  4. Every 4-6 hours:  If on nasal continuous positive airway pressure, respiratory therapy assess nares and determine need for appliance change or resting period.   Outcome: Progressing     Problem: Pain  Goal: Verbalizes/displays adequate comfort level or baseline comfort level  Outcome: Progressing     Problem: Nutrition Deficit:  Goal: Optimize nutritional status  Outcome: Progressing

## 2023-02-02 NOTE — CARE COORDINATION
Chart reviewed. Patient to have CT Sim today at [facility]. Time 1 PM per RN. Marjorie Patel RN     4019 Met with patient at bedside; he did not participate in any part of this discussion. Spouse is at bedside. She is able to answer questions. Patient lives at home with spouse. He is dependent for all cares. Spouse provides transpiration. [facility] but \"signed off because they could not control his pain - had to come to the ER for pain control\" Per spouse- patient received 35 hrs weekly (5 hrs/7 days) Aides do bathing, cleaning, transportation \"whatever he needs\". Spouse stated that she works as an aide at [employer]. She is now on FMLA since November. Patient's daughter is a aide at [employer] \"but not his aide\". Explained Hospice services (general) and offered to notify whatever Hospice she would like to initiate services upon discharge. Spouse then said \"I feel pressured I don't want to talk about this anymore\". Offered to resume conversation at a later time. Marjorie Patel RN      Noted in [employer] note- continue care at [facility]. Highly doubtful spouse will allow for SNF placement.  Marjorie Patel RN
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
## 2023-02-02 NOTE — CONSULTS
Radiation Oncology Consultation  Encounter Date: 2023 9:27 AM    Mr. Keara Molina is a 46 y.o. male  : 1970  MRN: [id number]  Acct Number: [de-identified]  Requesting Provider: No att. providers found        CONSULTANT: Nieves Betancourt MD    PHYSICIANS:   Primary Care: Madai Casas MD       DIAGNOSIS: secondary malignant neoplasm of bone. Cancer Staging  Malignant neoplasm of overlapping sites of left lung [facility])  Staging form: Lung, AJCC 8th Edition  - Clinical: Stage IV (pM1) - Signed by Mitzie Habermann, MD on 2021  - Pathologic: No stage assigned - Unsigned         TREATMENT COURSE:  Oncology History   Malignant neoplasm of overlapping sites of left lung ([address]   2021 Initial Diagnosis    Malignant neoplasm of overlapping sites of left lung ([address]     1/3/2022 - 2022 Radiation    Left upper lobe mass: 2000 cGy in 5 fractions 3D conformal radiation     2022 - 2022 Chemotherapy    OP pembrolizumab Q21D  Plan Provider: Mitzie Habermann, MD  Treatment goal: Palliative  Line of treatment: 1st Line       2023 -  Chemotherapy    OP pembrolizumab Q21D  Plan Provider: Mitzie Habermann, MD  Treatment goal: Control  Line of treatment: 2nd Line       Secondary malignant neoplasm of bone ([address]   2021 Initial Diagnosis    Secondary malignant neoplasm of bone ([address]     2021 - 1/10/2022 Radiation    Left pelvic metastasis: 3000 cGy 3D conformal           HPI:     Mr. Gabriela Espinoza is a 58-year-old male that I am seeing as an inpatient consult for treatment options the above diagnosis. He has metastatic lung and prostate cancer. He has been seen and followed by medical oncology here at [facility]. He has received radiation therapy to a lung mass 2022 as well as the left hip/pelvis completed 1/10/2022. He was recently in hospice but is not in it now. They are planning to get him back into hospice if they can.   He is in a lot of pain and has gotten worse especially in the low back/right pelvis. It is excruciating and prevents him from sitting for long period of time. Pain medication is not controlling it well. Its gotten significantly worse over the past couple weeks to months. CT abdomen pelvis on 1/31/2023 showed extensive osteoblastic metastatic disease with increasing areas of destructive lytic disease particularly in the pelvis and throughout the sacrum. PSA was 144 on 12/22/22, previously 976 on 3/22/22. He does not have a cardiac implanted device.     Past Medical History:   Diagnosis Date    CAD (coronary artery disease) 12/23/2013    cath negative per pt    Cancer ([address] 06/2021    pt reports he has lung cancer    History of TMJ disorder     Hypertension     Trigeminal neuralgia         Past Surgical History:   Procedure Laterality Date    ABDOMEN SURGERY      CARDIAC CATHETERIZATION  08/03/2016    CT BIOPSY PERCUTANEOUS SUPERFICIAL BONE  12/17/2021    CT BIOPSY PERCUTANEOUS SUPERFICIAL BONE 12/17/2021 [facility] CT SCAN    CT NEEDLE BIOPSY LUNG PERCUTANEOUS  7/28/2021    CT NEEDLE BIOPSY LUNG PERCUTANEOUS 7/28/2021 [facility] CT SCAN    IR NEPHROSTOMY CATHETER PLACEMENT  1/5/2023    IR NEPHROSTOMY CATHETER PLACEMENT 1/5/2023 [facility] SPECIAL PROCEDURES    PORT SURGERY Right 8/13/2021    MEDIPORT INSERTION performed by Princess Cho MD at [address] OR       Family History   Problem Relation Age of Onset    High Blood Pressure Mother     High Blood Pressure Sister     Cancer Sister        Social History     Socioeconomic History    Marital status:      Spouse name: Not on file    Number of children: 5    Years of education: Not on file    Highest education level: Not on file   Occupational History    Not on file   Tobacco Use    Smoking status: Every Day     Packs/day: 2.00     Years: 39.00     Pack years: 78.00     Types: Cigarettes    Smokeless tobacco: Never    Tobacco comments:     smokes 1-2 a day   Vaping Use    Vaping Use: Never used   Substance and Sexual Activity    Alcohol use: Not Currently     Alcohol/week: 6.0 standard drinks     Types: 6 Cans of beer per week     Comment: per week (24 oz beers)     Drug use: Yes     Types: Marijuana [place])     Comment: last 12/1    Sexual activity: Yes     Partners: Female   Other Topics Concern    Not on file   Social History Narrative    Not on file     Social Determinants of Health     Financial Resource Strain: Low Risk     Difficulty of Paying Living Expenses: Not hard at all   Food Insecurity: No Food Insecurity    Worried About Running Out of Food in the Last Year: Never true    [address] in the Last Year: Never true   Transportation Needs: No Transportation Needs    Lack of Transportation (Medical): No    Lack of Transportation (Non-Medical): No   Physical Activity: Inactive    Days of Exercise per Week: 0 days    Minutes of Exercise per Session: 0 min   Stress: No Stress Concern Present    Feeling of Stress : Only a little   Social Connections: Socially Integrated    Frequency of Communication with Friends and Family: Twice a week    Frequency of Social Gatherings with Friends and Family: Twice a week    Attends [employer] Services: 1 to 4 times per year    Active Member of [employer] or Organizations: No    Attends Club or Organization Meetings: 1 to 4 times per year    Marital Status:    Intimate Partner Violence: Not At Risk    Fear of Current or Ex-Partner: No    Emotionally Abused: No    Physically Abused: No    Sexually Abused: No   Housing Stability: Low Risk     Unable to Pay for Housing in the Last Year: No    Number of [place] in the Last Year: 1    Unstable Housing in the Last Year: No     ALLERGIES:   Allergies   Allergen Reactions    Tramadol Other (See Comments)     seizures    Morphine Hallucinations    Vicodin [Hydrocodone-Acetaminophen] Hives        No current facility-administered medications for this encounter. No current outpatient medications on file.      Facility-Administered Medications Ordered in Other Encounters   Medication Dose Route Frequency Provider Last Rate Last Admin    oxyCODONE HCl (OXY-IR) immediate release tablet 20 mg  20 mg Oral Q6H PRN Stephie Delgadillo MD   20 mg at 02/02/23 0257    And    acetaminophen (TYLENOL) tablet 650 mg  650 mg Oral Q6H PRN Stephie Delgadillo MD   650 mg at 02/01/23 1543    meropenem (MERREM) 1,000 mg in sodium chloride 0.9 % 100 mL IVPB (mini-bag)  1,000 mg IntraVENous Q8H GERDA Lane  mL/hr at 02/02/23 0900 1,000 mg at 02/02/23 0900    Followed by    Annabelle Finn ON 2/8/2023] meropenem (MERREM) 1,000 mg in sodium chloride 0.9 % 100 mL IVPB (mini-bag)  1,000 mg IntraVENous Q8H GERDA Lane - ROYCE        HYDROmorphone (DILAUDID) injection 0.25 mg  0.25 mg IntraVENous Q3H PRN Stephie Delgadillo MD        Or    HYDROmorphone (DILAUDID) injection 0.5 mg  0.5 mg IntraVENous Q3H PRN Stephie Delgadillo MD   0.5 mg at 02/02/23 0851    ALPRAZolam (XANAX) tablet 0.5 mg  0.5 mg Oral TID PRN Arnulfo Garcia MD   0.5 mg at 02/02/23 0257    cyclobenzaprine (FLEXERIL) tablet 10 mg  10 mg Oral TID PRN Arnulfo Garcia MD   10 mg at 02/01/23 1205    DULoxetine (CYMBALTA) extended release capsule 60 mg  60 mg Oral Daily Arnulfo Garcia MD   60 mg at 02/02/23 0850    fentaNYL (DURAGESIC) 75 MCG/HR 1 patch  1 patch TransDERmal Q72H Arnulfo Garcia MD   1 patch at 02/01/23 0343    gabapentin (NEURONTIN) capsule 600 mg  600 mg Oral TID Arnulfo Garcia MD   600 mg at 02/02/23 0851    naloxone [facility]) injection 4 mg  4 mg Nasal PRN Arnulfo Garcia MD        predniSONE (DELTASONE) tablet 20 mg  20 mg Oral Daily Arnulfo Garcia MD   20 mg at 02/02/23 0851    tamsulosin (FLOMAX) capsule 0.4 mg  0.4 mg Oral Daily Arnulfo Garcia MD   0.4 mg at 02/02/23 0850    0.9 % sodium chloride infusion   IntraVENous Continuous Arnulfo Garcia  mL/hr at 02/02/23 0205 New Bag at 02/02/23 0205    sodium chloride flush 0.9 % injection 5-40 mL  5-40 mL IntraVENous 2 times per day Arnulfo Garcia MD   10 mL at 02/01/23 0920    sodium chloride flush 0.9 % injection 5-40 mL  5-40 mL IntraVENous PRN Shasta Rivero MD        0.9 % sodium chloride infusion   IntraVENous PRN Shasta Rivero MD        enoxaparin (LOVENOX) injection 40 mg  40 mg SubCUTAneous Daily Shasta Rivero MD   40 mg at 02/01/23 2247    ondansetron (ZOFRAN-ODT) disintegrating tablet 4 mg  4 mg Oral Q8H PRN Shasta Rivero MD        Or    ondansetron (ZOFRAN) injection 4 mg  4 mg IntraVENous Q6H PRN Shasta Rivero MD        polyethylene glycol (GLYCOLAX) packet 17 g  17 g Oral Daily PRN Shasta Rivero MD        acetaminophen (TYLENOL) tablet 650 mg  650 mg Oral Q6H PRN Shasta Rivero MD        Or    acetaminophen (TYLENOL) suppository 650 mg  650 mg Rectal Q6H PRN Shasta Rivero MD        vancomycin (VANCOCIN) 1,250 mg in sodium chloride 0.9 % 250 mL IVPB ([id number])  1,250 mg IntraVENous Q12H GERDA Garcia - CNP   Stopped at 02/02/23 0715     LABORATORY STUDIES:   CBC:   Lab Results   Component Value Date/Time    WBC 11.3 02/01/2023 09:11 AM    RBC 3.32 02/01/2023 09:11 AM    HGB 7.8 02/01/2023 09:11 AM    HCT 25.1 02/01/2023 09:11 AM    MCV 75.6 02/01/2023 09:11 AM    MCH 23.5 02/01/2023 09:11 AM    MCHC 31.1 02/01/2023 09:11 AM    RDW 17.8 02/01/2023 09:11 AM     02/01/2023 09:11 AM    MPV 9.0 02/01/2023 09:11 AM     CMP:  Lab Results   Component Value Date/Time     02/01/2023 09:11 AM    K 3.9 02/01/2023 09:11 AM    CL 97 02/01/2023 09:11 AM    CO2 26 02/01/2023 09:11 AM    BUN 8 02/01/2023 09:11 AM    CREATININE 0.4 02/01/2023 09:11 AM    GFRAA >60 10/13/2022 05:15 AM    LABGLOM >60 02/01/2023 09:11 AM    GLUCOSE 116 02/01/2023 09:11 AM    PROT 5.9 01/18/2023 11:30 AM    PROT 7.6 12/29/2012 02:45 AM    LABALBU 2.6 01/18/2023 11:30 AM    CALCIUM 10.7 02/01/2023 09:11 AM    BILITOT 0.2 01/18/2023 11:30 AM    ALKPHOS 140 01/18/2023 11:30 AM    AST 12 01/18/2023 11:30 AM    ALT 6 01/18/2023 11:30 AM     Onc labs: Lab Results   Component Value Date/Time    .0 12/22/2022 11:43 AM    CEA 7.7 07/23/2021 06:40 AM     03/22/2022 02:13 PM       PATHOLOGY: biopsy reports from 7/28/21 and 12/17/21 reviewed and noted above. RADIOLOGIC STUDIES:     CT ABDOMEN PELVIS WO CONTRAST Additional Contrast? None    Result Date: 1/6/2023  EXAMINATION: CT OF THE ABDOMEN AND PELVIS WITHOUT CONTRAST 1/6/2023 2:08 pm TECHNIQUE: CT of the abdomen and pelvis was performed without the administration of intravenous contrast. Multiplanar reformatted images are provided for review. Automated exposure control, iterative reconstruction, and/or weight based adjustment of the mA/kV was utilized to reduce the radiation dose to as low as reasonably achievable. COMPARISON: CT abdomen and pelvis 01/05/2023. HISTORY: ORDERING SYSTEM PROVIDED HISTORY: hydronephrosis TECHNOLOGIST PROVIDED HISTORY: Reason for exam:->hydronephrosis Additional Contrast?->None Reason for Exam: hydronephrosis FINDINGS: Lower Chest: Mild right basilar opacities improved from the previous study likely representing atelectasis. Probable left parahilar scarring without significant change. Organs: Lack of intravenous contrast limits evaluation of the solid organs, vascular structures, and bowel. The liver and gallbladder are unremarkable. No biliary ductal dilatation is identified. The pancreas, spleen, and bilateral adrenal glands are unremarkable. The right kidney is unremarkable. 12 mm simple appearing cyst in the lower pole of the left kidney. No further evaluation recommended. No obstructive uropathy or urinary collecting system calculi identified. GI/Bowel: Moderately increased stool throughout the colon. Normal appendix. The stomach and small bowel are unremarkable. No obstruction or wall thickening identified. Pelvis: A Doherty catheter balloon is inflated in the urinary bladder lumen. There may be mild circumferential wall thickening. Mild prostatomegaly. No free fluid in the pelvis. No pelvic or inguinal lymphadenopathy. Peritoneum/Retroperitoneum: The abdominal aorta is normal in appearance. Retroperitoneal lymphadenopathy including a node measuring approximately 2.9 x 1.6 cm on axial image 88 (previously 2.2 x 1.2 cm on 03/26/2022). No mesenteric lymphadenopathy. No ascites or pneumoperitoneum. Bones/Soft Tissues: Severe diffuse osseous metastases. Large lytic lesions with extensive bone loss involving the sacrum and posterior iliac bones left worse than right. Adjacent soft tissue masses involving the gluteal and iliacus muscles on the left. There is also soft tissue involvement of the presacral space. 1. No hydronephrosis. 2. Doherty catheter in place. Mild circumferential urinary bladder wall thickening as can be seen with cystitis. Recommend correlation with urinalysis. 3. Severe diffuse osseous metastases with extensive replacement of the sacrum and posterior iliac bones left worse than right and adjacent soft tissue extension. 4. Retroperitoneal lymphadenopathy mildly worsened from 03/26/2022. 5. Moderately increased stool throughout the colon compatible with constipation. CT ABDOMEN PELVIS WO CONTRAST Additional Contrast? None    Result Date: 1/5/2023  EXAMINATION: CT OF THE ABDOMEN AND PELVIS WITHOUT CONTRAST 1/5/2023 12:27 am TECHNIQUE: CT of the abdomen and pelvis was performed without the administration of intravenous contrast. Multiplanar reformatted images are provided for review. Automated exposure control, iterative reconstruction, and/or weight based adjustment of the mA/kV was utilized to reduce the radiation dose to as low as reasonably achievable.  COMPARISON: 03/26/2022 HISTORY: ORDERING SYSTEM PROVIDED HISTORY: left sided flank pain; has h/o left sided uvj obstruction 2/2 mass, had nephrostomy tube that fell out TECHNOLOGIST PROVIDED HISTORY: Reason for exam:->left sided flank pain; has h/o left sided uvj obstruction 2/2 mass, had nephrostomy tube that fell out Additional Contrast?->None Decision Support Exception - unselect if not a suspected or confirmed emergency medical condition->Emergency Medical Condition (MA) Reason for Exam: left sided flank pain; has h/o left sided uvj obstruction 2/2 mass, had nephrostomy tube that fell out. .. FINDINGS: Lower Chest: There are opacities in the posterior right lower lobe which appear more than just atelectasis. Trace amount of pleural effusion along the margin of the right lung and major fissure. Organs: Lack of IV contrast reduces evaluation of the organs and vasculature. No new mass or surface nodularity is appreciated at the liver. The spleen is not enlarged. There is no pancreatic calcification, ductal dilatation, or focal fluid collection. The adrenal glands are unremarkable. No right renal calculus, hydronephrosis, or proximal hydroureter. Difficulty tracking the right ureter all the way to the urinary bladder. There is mild edema of the left kidney with a tiny focus of gas in the interpolar region. There is a soft tissue tract from the left back skin surface to the kidney likely representing the tract of prior nephrostomy tube. There is distension of the left renal pelvis with thickened wall. Proximal left ureter has a small amount of fluid but not overtly dilated. GI/Bowel: The stomach, small bowel, and colon are not dilated. Diffuse constipation is seen throughout the colon down to the rectum. The colonic wall does not appear thickened. There is no pneumoperitoneum or significant ascites. Pelvis: The urinary bladder has a Doherty catheter in lumen. The bladder wall does appear diffusely thickened with a somewhat asymmetric area in the left lateral to inferolateral margin. Prostate is acceptable. Peritoneum/Retroperitoneum: There is no abdominal aortic aneurysm.   There is some mildly increased soft tissue attenuation in the retroperitoneum along the distal aorta and cava near the bifurcation the iliac arteries. Also along the left iliac and psoas margin more than right side. Bones/Soft Tissues: Diffuse sclerotic areas are again noted within the pelvic bones. Progressive worsening of lytic areas and bone loss throughout the sacrum and sacroiliac joint margins. The left side is worse than the right. Replacement by soft tissue attenuating mass at the expected lytic bone and some continuation anteriorly beyond the bony margin into the presacral space and edge of the retroperitoneum. Some involvement into the edge of the left gluteus muscles and left iliacus muscle. Posterior extension along the paraspinal muscles at the lower sacrum and coccygeal junction. 1.  No left ureteral stent or nephrostomy tube at this time. A tract of previous nephrostomy tubes is noted at the posterior edge of the kidney and the inferolateral edge. There is no perinephric hematoma. 2.  Small amount of fluid in the left renal pelvis with a thickened wall but without overt dilatation of the left ureter at this time. 3.  Diffuse thickening of the urinary bladder wall but with some asymmetry along the left lateral and inferolateral margin. Doherty catheter in the lumen. 4.  Diffuse metastatic disease in the bones with extensive areas of sclerosis and mixed lucencies. There is progressive lytic lesion and bone loss throughout the sacrum and into the left hemipelvis more than the right side. Replacement with soft tissue extending beyond the bony margins into the presacral, muscular, and edge of the retroperitoneum margins. 5.  Opacities in the right lower lung appear to be more than just atelectasis and could have pneumonitis or developing infection. XR SHOULDER RIGHT (MIN 2 VIEWS)    Result Date: 2/2/2023  EXAMINATION: TWO XRAY VIEWS OF THE RIGHT SHOULDER 2/1/2023 9:58 pm COMPARISON: November 13, 2022. January 31, 2023.  HISTORY: ORDERING SYSTEM PROVIDED HISTORY: pain TECHNOLOGIST PROVIDED HISTORY: Reason for exam:->pain Reason for Exam: pain Additional signs and symptoms: Patient could not rotate arm for the lateral shoulder Y-View exam FINDINGS: An expansile mass is identified involving the distal clavicle without an obvious pathologic fracture.  The mass measures approximately 4.5 x 3.1 cm. The finding has significantly progressed since November 2022. Elsewhere, numerous osteoblastic metastases are identified. No acute soft tissue injury.     1. Interval progression of expansile metastatic lesion of the distal right clavicle.  No obvious pathologic fracture. 2. Numerous osteoblastic metastases.     CT CHEST W CONTRAST    Result Date: 1/31/2023  EXAMINATION: CT OF THE CHEST WITH CONTRAST 1/31/2023 8:32 pm TECHNIQUE: CT of the chest was performed with the administration of intravenous contrast. Multiplanar reformatted images are provided for review. Automated exposure control, iterative reconstruction, and/or weight based adjustment of the mA/kV was utilized to reduce the radiation dose to as low as reasonably achievable. 80 ml isovue 370 used COMPARISON: Chest CT 10/11/2022 HISTORY: ORDERING SYSTEM PROVIDED HISTORY: sepsis with unknown source; hx of squamous cell carcinoma lung cancer TECHNOLOGIST PROVIDED HISTORY: Additional signs and symptoms: no Relevant Medical/Surgical History: FINDINGS: Technical: Evaluation of the lower lungs degraded because of motion during imaging, blurring detail and resulting in misregistration artifact. Mediastinum: Heart size appears normal.  No pericardial effusion.  Great vessels appear unremarkable.  Left hilar and left anterior mediastinal mass lesion is clearly larger than previously with postobstructive atelectasis left upper lobe, axial series 2, image 56 measuring 4.6 x 6.7 cm compared to 3.2 x 3.8 cm.  Evidence of increased necrosis as well.  Right subclavian MediPort tip is at the distal superior vena cava. Lungs/pleura: Atelectasis left upper lobe.  Pulmonary fibrotic  changes posteromedial mid and lower left lung with sharply demarcated lateral margins typical of post radiation pneumonitis. Upper Abdomen: Correlate with CT abdomen/pelvis performed at the same time Soft Tissues/Bones: Diffuse osteoblastic metastatic disease as demonstrated previously. 1. Interval increased size and extent left hilar/mediastinal mass lesion and left upper lobe atelectasis reflecting progression of disease. Cannot exclude underlying pneumonia. 2. 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 3. Diffuse osteoblastic metastatic disease. 4. Evaluation of the lower lungs degraded because of motion during imaging, blurring detail and resulting in misregistration artifact. CT ABDOMEN PELVIS W IV CONTRAST Additional Contrast? None    Result Date: 1/31/2023  EXAMINATION: CT OF THE ABDOMEN AND PELVIS WITH CONTRAST 1/31/2023 8:34 pm TECHNIQUE: CT of the abdomen and pelvis was performed with the administration of intravenous contrast. Multiplanar reformatted images are provided for review. Automated exposure control, iterative reconstruction, and/or weight based adjustment of the mA/kV was utilized to reduce the radiation dose to as low as reasonably achievable. 80 ml isovue 370 used COMPARISON: CT abdomen and pelvis 01/06/2023 HISTORY: ORDERING SYSTEM PROVIDED HISTORY: Sepsis with mid epigastric pain Decision Support Exception - unselect if not a suspected or confirmed emergency medical condition->Emergency Medical Condition (MA) Reason for Exam: Sepsis with mid epigastric pain Additional signs and symptoms: no Relevant Medical/Surgical History: FINDINGS: Technical: Evaluation of the lower lungs and abdomen degraded because of motion during imaging, blurring detail and resulting in misregistration artifact. Lower Chest: Correlate with chest CT findings performed at the same time. Organs: No gross acute abnormality is appreciated.   To the extent of visualization, the liver, spleen, pancreas, adrenal glands, gallbladder and kidneys are unremarkable. Unchanged 12 mm simple cyst inferior pole left kidney (Bosniak type I cyst; benign finding requiring no additional evaluation or follow-up imaging). GI/Bowel: The stomach and small bowel appear unremarkable. No diffuse or focal small bowel wall thickening or inflammatory changes evident. No obstruction is seen. The appendix is visualized right lower quadrant, unremarkable in appearance. Moderate volume fecal debris throughout the colon typical of constipation. The colon appears otherwise unremarkable. Pelvis: Mildly diffusely thick walled appearance of the urinary bladder with Doherty catheter in place and calcifications layering posterior on the right. Infiltrative malignancy deep within the pelvis, similar in appearance to prior study. Peritoneum/Retroperitoneum: Unremarkable appearance of the aorta. No aneurysm. Unremarkable appearance of the IVC. No adenopathy or fluid. No gross pneumoperitoneum evident. Bones/Soft Tissues: Extensive osteoblastic metastatic disease with increasing areas of destructive and lytic disease particularly the left hemipelvis and throughout the sacrum. 1. Evaluation of the lower lungs and abdomen degraded because of motion during imaging, blurring detail and resulting in misregistration artifact. 2. Extensive osteoblastic metastatic disease with increasing areas of destructive and lytic disease particularly the left hemipelvis and throughout the sacrum. 3. Possible constipation. 4. Possible cystitis. Correlate with urinalysis. 5. Urinary bladder stones are demonstrated.      XR CHEST PORTABLE    Result Date: 1/31/2023  EXAMINATION: ONE XRAY VIEW OF THE CHEST 1/31/2023 3:26 pm COMPARISON: Chest x-ray October 11, 2022; CT chest October 11, 2022; PET-CT January 13, 2023 HISTORY: ORDERING SYSTEM PROVIDED HISTORY: sepsis of unknown origin TECHNOLOGIST PROVIDED HISTORY: Reason for exam:->sepsis of unknown origin Reason for Exam:  sepsis of unknown origin FINDINGS: Cardiac silhouette appears stable. Right-sided MediPort remains in place. Redemonstration of mass lesion extending from the superior hilum into the left upper lobe as seen on prior CT and radiograph. Diffuse osseous metastatic disease. Sclerotic appearance of the bones mildly obscures underlying lungs, however, no superimposed acute consolidative change identified. No pleural effusion. No pneumothorax. 1. Redemonstration of mediastinal left upper lobe mass consistent with known malignancy. Diffuse osseous metastatic disease also redemonstrated. No superimposed acute consolidative change of the lungs identified. PET CT SKULL BASE TO MID THIGH    Result Date: 1/14/2023  EXAMINATION: WHOLE BODY PET/CT 1/13/2023 TECHNIQUE: Following intravenous administration of 10.85 mCi of F18-FDG, PET imaging was acquired from the base of the head to the mid thighs. Computed tomography was used for purposes of attenuation correction and anatomic localization. Fusion imaging was utilized for interpretation. Uptake time 55 mins. Glucose level 137 mg/dl. COMPARISON: Abdomen/pelvis CT on 01/06/2023, bone scan on 10/27/2022, chest CT on 10/11/2022, PET-CT on 05/16/2022 HISTORY: Metastatic left upper lobe lung cancer, follow-up. FINDINGS: HEAD/NECK: No suspicious FDG uptake. CHEST: Increased size and mildly increased uptake of the mass in the medial left upper lobe abutting the mediastinum which demonstrates maximum SUV of 7.5 with central necrosis. Adjacent left superior mediastinal lymph node with maximum SUV of 3.2. New hypermetabolic right axillary lymph node with maximum SUV of 18.7. ABDOMEN/PELVIS: No abnormal uptake within the solid abdominal organs. New hypermetabolic periportal and portacaval lymph nodes with maximum SUV of 5.5.  New hypermetabolic bilateral retroperitoneal lymph nodes with maximum SUV of 6.3 and several left common iliac lymph nodes with maximum SUV of 5.3. BONES/SOFT TISSUE: New intense uptake associated with a destructive lesion of the superior left scapula, maximum SUV of 10.6. New destructive lesions of the upper thoracic spine with maximum SUV of 15.8 and severe pathologic compression deformity of T1. Significantly enlarged lytic lesions of the sacrum and bilateral iliac bones with maximum SUV of 16.5. Additional new areas of intense uptake include the inferior left scapula and right posterior elements of T10-T11. Diffuse sclerotic osseous metastatic disease within the remainder of the skeleton without significant uptake is compatible with treated disease. INCIDENTAL CT FINDINGS: Right chest MediPort. Emphysema. Mild gynecomastia. Mild atherosclerotic disease. Doherty catheter within the urinary bladder. Enlarged prostate with stable urinary bladder wall thickening that could be due to chronic outlet obstruction. 1. Marked progression of osseous metastatic disease with multiple new hypermetabolic lesions as described above. Most notable are severe pathologic compression deformity of T1 and markedly enlarged lytic lesions of the sacrum and bilateral iliac bones. 2. Increased size and mildly increased uptake of the mass in the medial left upper lobe abutting the mediastinum. 3. Hypermetabolic lymphadenopathy in the chest, abdomen, and pelvis which is new from prior PET-CT on 05/16/2022 and progressed from more recent CTs later in 2022. IR GUIDED NEPHROSTOMY CATH PLACEMENT    Result Date: 1/5/2023  PROCEDURE: IR attempted CHG NEPHROSTMY CATH PROC Limited ultrasound left kidney 1/5/2023 HISTORY: ORDERING SYSTEM PROVIDED HISTORY: nephrostomy tube came out TECHNOLOGIST PROVIDED HISTORY: nephrostomy tube came out See IP consult Reason for exam:->nephrostomy tube came out TECHNIQUE: Following informed consent, pause a confirm/time-out patient is placed in prone position on fluoroscopic table.   Attempted recanalization of previously utilized left nephrostomy tube tract was unsuccessful. Sonographic evaluation of the left kidney was made in anticipation of new puncture site for placement of left nephrostomy tube. CONTRAST: None SEDATION: None FLUOROSCOPY DOSE AND TYPE OR TIME AND EXPOSURES: 1.1 minute fluoroscopy time Air kerma: 30 mGy DESCRIPTION OF PROCEDURE: Informed consent was obtained after a detailed explanation of the procedure including risks, benefits, and alternatives.  Universal protocol was observed. Sterile gowns, masks, hats and gloves utilized for maximal sterile barrier.  As above in technique section FINDINGS: Fluoroscopic images demonstrate attempted, unsuccessful recanalization of left nephrostomy tube tract. Lunar E sonographic evaluation of the left kidney in anticipation of left nephrostomy tube placement with new puncture demonstrates no definite hydronephrosis.  New nephrostomy tube placement deferred at this time pending evaluation/confirmation of impaired left renal drainage.     Unsuccessful attempted recanalization of previously established left nephrostomy tube tract.  Preliminary sonographic evaluation of the left kidney made in anticipation of new nephrostomy tube placement demonstrates no definite hydronephrosis.  New nephrostomy tube placement deferred at this time.  Repeat renal ultrasound suggested in 1-2 days to evaluate recurrence of left hydronephrosis and need for new nephrostomy tube placement.      US breast: No results found for this or any previous visit.           No results found for this or any previous visit.           No results found for this or any previous visit.           No results found for this or any previous visit.      Mammogram:No results found for this or any previous visit.      No results found for this or any previous visit.      No results found for this or any previous visit.      No results found for this or any previous visit.      PET/CT: Results for orders placed during the hospital  encounter of 01/13/23    PET CT SKULL BASE TO MID THIGH    Narrative  EXAMINATION:  WHOLE BODY PET/CT    1/13/2023    TECHNIQUE:  Following intravenous administration of 10.85 mCi of F18-FDG, PET imaging was  acquired from the base of the head to the mid thighs. Computed tomography  was used for purposes of attenuation correction and anatomic localization. Fusion imaging was utilized for interpretation. Uptake time 55 mins. Glucose level 137 mg/dl. COMPARISON:  Abdomen/pelvis CT on 01/06/2023, bone scan on 10/27/2022, chest CT on  10/11/2022, PET-CT on 05/16/2022    HISTORY:  Metastatic left upper lobe lung cancer, follow-up. FINDINGS:  HEAD/NECK: No suspicious FDG uptake. CHEST: Increased size and mildly increased uptake of the mass in the medial  left upper lobe abutting the mediastinum which demonstrates maximum SUV of  7.5 with central necrosis. Adjacent left superior mediastinal lymph node  with maximum SUV of 3.2. New hypermetabolic right axillary lymph node with  maximum SUV of 18.7. ABDOMEN/PELVIS: No abnormal uptake within the solid abdominal organs. New  hypermetabolic periportal and portacaval lymph nodes with maximum SUV of 5.5. New hypermetabolic bilateral retroperitoneal lymph nodes with maximum SUV of  6.3 and several left common iliac lymph nodes with maximum SUV of 5.3. BONES/SOFT TISSUE: New intense uptake associated with a destructive lesion of  the superior left scapula, maximum SUV of 10.6. New destructive lesions of  the upper thoracic spine with maximum SUV of 15.8 and severe pathologic  compression deformity of T1. Significantly enlarged lytic lesions of the  sacrum and bilateral iliac bones with maximum SUV of 16.5. Additional new  areas of intense uptake include the inferior left scapula and right posterior  elements of T10-T11.   Diffuse sclerotic osseous metastatic disease within the  remainder of the skeleton without significant uptake is compatible with  treated disease. INCIDENTAL CT FINDINGS: Right chest MediPort. Emphysema. Mild gynecomastia. Mild atherosclerotic disease. Doherty catheter within the urinary bladder. Enlarged prostate with stable urinary bladder wall thickening that could be  due to chronic outlet obstruction. Impression  1. Marked progression of osseous metastatic disease with multiple new  hypermetabolic lesions as described above. Most notable are severe  pathologic compression deformity of T1 and markedly enlarged lytic lesions of  the sacrum and bilateral iliac bones. 2. Increased size and mildly increased uptake of the mass in the medial left  upper lobe abutting the mediastinum. 3. Hypermetabolic lymphadenopathy in the chest, abdomen, and pelvis which is  new from prior PET-CT on 05/16/2022 and progressed from more recent CTs later  in 2022. CT Chest w/ contrast: Results for orders placed during the hospital encounter of 01/31/23    CT CHEST W CONTRAST    Narrative  EXAMINATION:  CT OF THE CHEST WITH CONTRAST 1/31/2023 8:32 pm    TECHNIQUE:  CT of the chest was performed with the administration of intravenous  contrast. Multiplanar reformatted images are provided for review. Automated  exposure control, iterative reconstruction, and/or weight based adjustment of  the mA/kV was utilized to reduce the radiation dose to as low as reasonably  achievable. 80 ml isovue 370 used    COMPARISON:  Chest CT 10/11/2022    HISTORY:  ORDERING SYSTEM PROVIDED HISTORY: sepsis with unknown source; hx of squamous  cell carcinoma lung cancer  TECHNOLOGIST PROVIDED HISTORY:  Additional signs and symptoms: no  Relevant Medical/Surgical History:    FINDINGS:  Technical: Evaluation of the lower lungs degraded because of motion during  imaging, blurring detail and resulting in misregistration artifact. Mediastinum: Heart size appears normal.  No pericardial effusion. Great  vessels appear unremarkable.   Left hilar and left anterior mediastinal mass  lesion is clearly larger than previously with postobstructive atelectasis  left upper lobe, axial series 2, image 56 measuring 4.6 x 6.7 cm compared to  3.2 x 3.8 cm. Evidence of increased necrosis as well. Right subclavian  MediPort tip is at the distal superior vena cava. Lungs/pleura: Atelectasis left upper lobe. Pulmonary fibrotic changes  posteromedial mid and lower left lung with sharply demarcated lateral margins  typical of post radiation pneumonitis. Upper Abdomen: Correlate with CT abdomen/pelvis performed at the same time    Soft Tissues/Bones: Diffuse osteoblastic metastatic disease as demonstrated  previously. Impression  1. Interval increased size and extent left hilar/mediastinal mass lesion and  left upper lobe atelectasis reflecting progression of disease. Cannot  exclude underlying pneumonia. 2. 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 3. Diffuse osteoblastic metastatic disease. 4. Evaluation of the lower lungs degraded because of motion during imaging,  blurring detail and resulting in misregistration artifact. REVIEW OF SYSTEMS:   Constitutional:  Patient denies fevers, rigors, or drenching night sweats. The patient's weight and appetite have been stable. Eyes:  The patient denies any recent visual changes, diplopia, or cataracts  ENMT:  The patient denies any ear pain, hearing changes, or nosebleeds  Respiratory:  The patient denies any SOB, hemoptysis, or green sputum production  Cardiovascular: The patient denies any chest pain, leg edema, or fainting spells  GI:  Patient denies nausea, vomiting, diarrhea, melena, or hematochezia  : Patient denies dysuria, hematuria, or urinary incontinence.   Integumentary: patient denies skin rashes, pruritis, or arm edema  Endocrine:  Patient denies any diabetic or thyroid problems  Neurologic: Patient denies headaches, focal weakness, or disorientation  Psychiatric: The patient denies any depression, anxiety, or rapid mood swings. PHYSICAL EXAMINATION:   VITAL SIGNS: located in the EMR  ECOG PERFORMANCE STATUS: 2  PAIN RATIN-8    GENERAL: Pleasant well-developed adult in no acute distress. Alert and oriented ×3  SKIN: Warm and dry, without jaundice, ecchymoses, or petechiae. HEENT: Normocephalic, atraumatic. Pupils are round and symmetric. Sclerae anicteric  NECK:  No JVD; Supple. No cervical, supraclavicular, or infraclavicular lymphadenopathy is palpable. LUNGS: Bilaterally clear to auscultation. No rales, rhonci, or wheezing are present. Good inspiratory effort, no accessory muscle use. CARDIAC: Regular rate and rhythm, without murmurs, clicks, or gallops   ABDOMEN: Normoactive bowels sounds are present. Soft. Non-tender and non-distended. No hepatosplenomegaly or masses are present. EXTREMITIES: No cyanosis, clubbing or edema is present. FROM  NEUROLOGIC: CN II-XII Intact grossly. IMPRESSION/PLAN:    Tyra Casiano is a 46 y.o. male with extensive metastatic lung/prostate cancer with increasing pain in the lower back/sacrum/R pelvis that isn't well controlled who presents for discussion of palliative radiotherapy options. I spoke with patient and his wife at length about palliative radiation therapy. He had the left hemipelvis treated previously. I propose treating the sacrum and right pelvis to see if it will help with the pain. We could try that before he goes back in the hospice and they could make him have a little bit better quality of life. The indications, risks, and benefits were discussed at length after all questions were answered he expressed understanding of his diagnosis, prognosis, my recommendations. The prescription will be 20Gy/5fx. they would like to move forward. We will bring him over today for CT simulation. We will plan to deliver his first treatment tomorrow. We will finish the rest of the treatments next week.   I discussed the plan with medical oncology.     Electronically signed by Nieves Betancourt MD on 2/2/2023 at 9:27 AM

## 2023-02-02 NOTE — TELEPHONE ENCOUNTER
Anaya Calvin, from [facility] called and wants to know if pt is done with meropenem and if port can be de accessed. Please advise.

## 2023-02-02 NOTE — PROGRESS NOTES
V2.0  [facility] [place] Hospitalist Progress Note      Name:  Elodia Miramontes /Age/Sex: 1970  (46 y.o. male)   MRN & CSN:  [id number] & [id number] Encounter Date/Time: 2023 12:56 PM EST    Location:  -A PCP: Michaela Celeste MD       Hospital Day: 3    Assessment and Plan:   Elodia Miramontes is a 46 y.o. male who presents with concern for sepsis      Plan:  Concern for sepsis-febrile and tachycardic. Recently was on meropenem for complicated UTI on previous admission. Source of infection is not clear. Checking respiratory PCR and blood cultures. On Vanco and Zosyn on admission, switched to vancomycin and meropenem . ID. Received IV bolus in ER. Tmax 101.8. Procalcitonin 0.088. , trending. Flu negative. COVID-negative. CT chest: Cannot exclude pneumonia; postradiation pneumonitis. CT abdomen pelvis: Possible constipation; possible cystitis. Urinalysis with no overt signs of infection. Hyponatremia-sodium 132. Given IV fluids. Trending, likely hypovolemic. Chronic anemia-may be related to cancer. Monitor. Hemoglobin 8.7. Check anemia panel, pending. History metastatic prostate cancer-consult heme-onc. Continue steroid therapy. CT chest: Diffuse osteoblastic metastatic disease. CT abdomen pelvis: Extensive osteoblastic metastatic disease. History squamous cell carcinoma left lung-diagnosed on 2021. Had debulking of primary tumor via bronchoscopy at [facility]. Has received chemotherapy followed by [facility]. CT chest: Interval increase size and extent of the left hilar/mediastinal mass and left upper lobe atelectasis. History urinary retention with chronic indwelling Doherty-had prior nephrostomy tube which was dislodged and never placed back. Anxiety disorder-alprazolam PRN    Recent complicated UTI-had been on meropenem at home. Diet ADULT DIET;  Regular  ADULT ORAL NUTRITION SUPPLEMENT; Breakfast, Dinner; Standard High Calorie/High Protein Oral Supplement    DVT Prophylaxis [x] Lovenox, []  Heparin, [] SCDs, [] Ambulation,  [] Eliquis, [] Xarelto  [] Coumadin   Code Status Full Code   Disposition From: Home  Expected Disposition: TBD  Estimated Date of Discharge: 2 to 3 days  Patient requires continued admission due to concern for infection   Home O2 None     Subjective/Interval history:   Chief Complaint: Fall     Had recently been discharged from on 1/20 for UTI. 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 No other complaints. Review of Systems:    Negative unless mentioned above    Objective: Intake/Output Summary (Last 24 hours) at 2/2/2023 0802  Last data filed at 2/2/2023 0402  Gross per 24 hour   Intake 350 ml   Output 2475 ml   Net -2125 ml          Vitals:   BP (!) 125/94   Pulse (!) 104   Temp 98 °F (36.7 °C) (Oral)   Resp 18   Ht 6' 1\" (1.854 m)   Wt 191 lb 12.8 oz (87 kg)   SpO2 95%   BMI 25.30 kg/m²     Physical Exam:   General: NAD, laying in bed  Eyes: no discharge  HENT: NCAT  Cardiovascular: slightly tachycardic  Respiratory: Clear to auscultation   Gastrointestinal: Soft, non tender, nondistended  Genitourinary: no suprapubic tenderness, has sampson catheter  Musculoskeletal: trace edema in LE, nontender  Skin: warm, dry, no gross lesions. Patient has a port in chest.  Neuro: Alert. No gross deficits  Psych: Mood appropriate.      Medications:   Medications:    meropenem  1,000 mg IntraVENous Q8H    Followed by    Torrey Muñoz ON 2/8/2023] meropenem  1,000 mg IntraVENous Q8H    DULoxetine  60 mg Oral Daily    fentaNYL  1 patch TransDERmal Q72H    gabapentin  600 mg Oral TID    predniSONE  20 mg Oral Daily    tamsulosin  0.4 mg Oral Daily    sodium chloride flush  5-40 mL IntraVENous 2 times per day    enoxaparin  40 mg SubCUTAneous Daily    vancomycin  1,250 mg IntraVENous Q12H      Infusions:    sodium chloride 100 mL/hr at 02/02/23 0205    sodium chloride       PRN Meds: oxyCODONE, 20 mg, Q6H PRN   And  acetaminophen, 650 mg, Q6H PRN  HYDROmorphone, 0.25 mg, Q3H PRN   Or  HYDROmorphone, 0.5 mg, Q3H PRN  ALPRAZolam, 0.5 mg, TID PRN  cyclobenzaprine, 10 mg, TID PRN  naloxone, 4 mg, PRN  sodium chloride flush, 5-40 mL, PRN  sodium chloride, , PRN  ondansetron, 4 mg, Q8H PRN   Or  ondansetron, 4 mg, Q6H PRN  polyethylene glycol, 17 g, Daily PRN  acetaminophen, 650 mg, Q6H PRN   Or  acetaminophen, 650 mg, Q6H PRN      Labs      Recent Labs     01/31/23  1433 02/01/23  0911   WBC 12.5* 11.3*   HGB 8.7* 7.8*   HCT 26.9* 25.1*    387        Recent Labs     01/31/23  1433 02/01/23  0911   * 132*   K 4.0 3.9   CL 95* 97*   CO2 27 26   BUN 9 8   CREATININE 0.4* 0.4*       No results for input(s): AST, ALT, ALB, BILIDIR, BILITOT, ALKPHOS in the last 72 hours. No results for input(s): INR in the last 72 hours. No results for input(s): CKTOTAL, CKMB, CKMBINDEX, TROPONINT in the last 72 hours.   No results found for: LABA1C  CALCIUM:  10.7/26 (02/01 0911)  Lab Results   Component Value Date/Time    MG 1.8 04/10/2022 03:45 AM           Electronically signed by Damian Tate MD on 2/2/2023 at 8:02 AM

## 2023-02-02 NOTE — PROGRESS NOTES
[facility]  consulted by  Izzy Mancia NP for monitoring and adjustment. Indication for treatment: Sepsis possibly 2/2 phneumonia per ID     AUC/CASH: 400-600    Risk Factors for MRSA Identified:   Hospitalization within the past 90 days    Pertinent Laboratory Values:   Temp Readings from Last 3 Encounters:   02/02/23 98.7 °F (37.1 °C) (Oral)   01/26/23 97.7 °F (36.5 °C) (Infrared)   01/22/23 99.2 °F (37.3 °C) (Oral)     Recent Labs     01/31/23  1433 01/31/23  1503 02/01/23  0911   WBC 12.5*  --  11.3*   LACTATE  --  1.1  --        Recent Labs     01/31/23  1433 02/01/23  0911   BUN 9 8   CREATININE 0.4* 0.4*       Estimated Creatinine Clearance: 239 mL/min (A) (based on SCr of 0.4 mg/dL (L)). Intake/Output Summary (Last 24 hours) at 2/2/2023 1119  Last data filed at 2/2/2023 0402  Gross per 24 hour   Intake 350 ml   Output 1700 ml   Net -1350 ml         Pertinent Cultures:   Date    Source   Results  1/31   Blood    No growth at 48 hours  1/31   MRSA Nasal   Pending  1/31   Urine    Final- go growth at 36 hours       Vancomycin level:   TROUGH:  No results for input(s): VANCOTROUGH in the last 72 hours. RANDOM:    Recent Labs     02/02/23  0703   VANCORANDOM 37.1       Assessment:  HPI: Patient is 46 yom admitted with concerns for sepsis 2/2 pneumonia. Pt recently admitted for UTI growing VRE and pseudomonas. Patient started on vanco and meropenem for sepsis possibly 2/2 pneumonia- ID managing. SCr, BUN, and urine output:  stable Scr, good UOP  Day(s) of therapy: 3  Vancomycin concentration:   2/2 @ 0703 = 37.1 (~1.5 hours post dose)   with ssTrough 14    Plan:  Continue vancomycin IVPB 1250 mg every 12 hrs  Pharmacy will continue to monitor patient and adjust therapy as indicated    VANCOMYCIN CONCENTRATION SCHEDULED FOR 2/5 @0500    Thank you for the consult.   Darline Corral, [facility]  2/2/2023 11:19 AM

## 2023-02-03 ENCOUNTER — HOSPITAL ENCOUNTER (OUTPATIENT)
Dept: RADIATION ONCOLOGY | Age: 53
Discharge: HOME | End: 2023-02-03

## 2023-02-03 PROBLEM — R50.9 FEVER IN ADULT: Status: ACTIVE | Noted: 2023-02-03

## 2023-02-03 LAB
ANION GAP SERPL CALCULATED.3IONS-SCNC: 8 MMOL/L (ref 4–16)
BASOPHILS ABSOLUTE: 0 K/CU MM
BASOPHILS RELATIVE PERCENT: 0.3 % (ref 0–1)
BUN SERPL-MCNC: 5 MG/DL (ref 6–23)
CALCIUM SERPL-MCNC: 10.6 MG/DL (ref 8.3–10.6)
CHLORIDE BLD-SCNC: 98 MMOL/L (ref 99–110)
CO2: 28 MMOL/L (ref 21–32)
CREAT SERPL-MCNC: 0.4 MG/DL (ref 0.9–1.3)
CRP SERPL HS-MCNC: 70.1 MG/L
DIFFERENTIAL TYPE: ABNORMAL
EOSINOPHILS ABSOLUTE: 0.2 K/CU MM
EOSINOPHILS RELATIVE PERCENT: 1.9 % (ref 0–3)
GFR SERPL CREATININE-BSD FRML MDRD: >60 ML/MIN/1.73M2
GLUCOSE BLD-MCNC: 139 MG/DL (ref 70–99)
GLUCOSE SERPL-MCNC: 102 MG/DL (ref 70–99)
HCT VFR BLD CALC: 24.2 % (ref 42–52)
HEMOGLOBIN: 7.7 GM/DL (ref 13.5–18)
IMMATURE NEUTROPHIL %: 0.6 % (ref 0–0.43)
LYMPHOCYTES ABSOLUTE: 1.8 K/CU MM
LYMPHOCYTES RELATIVE PERCENT: 20.7 % (ref 24–44)
MCH RBC QN AUTO: 23.5 PG (ref 27–31)
MCHC RBC AUTO-ENTMCNC: 31.8 % (ref 32–36)
MCV RBC AUTO: 73.8 FL (ref 78–100)
MONOCYTES ABSOLUTE: 0.6 K/CU MM
MONOCYTES RELATIVE PERCENT: 7.3 % (ref 0–4)
NUCLEATED RBC %: 0 %
PDW BLD-RTO: 17.3 % (ref 11.7–14.9)
PLATELET # BLD: 368 K/CU MM (ref 140–440)
PMV BLD AUTO: 8.9 FL (ref 7.5–11.1)
POTASSIUM SERPL-SCNC: 3.9 MMOL/L (ref 3.5–5.1)
RBC # BLD: 3.28 M/CU MM (ref 4.6–6.2)
SEGMENTED NEUTROPHILS ABSOLUTE COUNT: 6 K/CU MM
SEGMENTED NEUTROPHILS RELATIVE PERCENT: 69.2 % (ref 36–66)
SHBG SERPL-SCNC: 65 NMOL/L (ref 19–76)
SODIUM BLD-SCNC: 134 MMOL/L (ref 135–145)
TESTOST FREE MFR SERPL: 1.1 % (ref 1.6–2.9)
TESTOST FREE SERPL-MCNC: 29 PG/ML (ref 47–244)
TESTOST SERPL-MCNC: 249 NG/DL (ref 300–890)
TOTAL IMMATURE NEUTOROPHIL: 0.05 K/CU MM
TOTAL NUCLEATED RBC: 0 K/CU MM
WBC # BLD: 8.6 K/CU MM (ref 4–10.5)

## 2023-02-03 PROCEDURE — 6360000002 HC RX W HCPCS: Performed by: HOSPITALIST

## 2023-02-03 PROCEDURE — 77280 THER RAD SIMULAJ FIELD SMPL: CPT | Performed by: RADIOLOGY

## 2023-02-03 PROCEDURE — 2580000003 HC RX 258: Performed by: INTERNAL MEDICINE

## 2023-02-03 PROCEDURE — 94761 N-INVAS EAR/PLS OXIMETRY MLT: CPT

## 2023-02-03 PROCEDURE — 6360000002 HC RX W HCPCS: Performed by: STUDENT IN AN ORGANIZED HEALTH CARE EDUCATION/TRAINING PROGRAM

## 2023-02-03 PROCEDURE — 2580000003 HC RX 258: Performed by: NURSE PRACTITIONER

## 2023-02-03 PROCEDURE — 85025 COMPLETE CBC W/AUTO DIFF WBC: CPT

## 2023-02-03 PROCEDURE — 86140 C-REACTIVE PROTEIN: CPT

## 2023-02-03 PROCEDURE — 6360000002 HC RX W HCPCS: Performed by: NURSE PRACTITIONER

## 2023-02-03 PROCEDURE — 99231 SBSQ HOSP IP/OBS SF/LOW 25: CPT | Performed by: NURSE PRACTITIONER

## 2023-02-03 PROCEDURE — 6370000000 HC RX 637 (ALT 250 FOR IP): Performed by: NURSE PRACTITIONER

## 2023-02-03 PROCEDURE — 6370000000 HC RX 637 (ALT 250 FOR IP): Performed by: INTERNAL MEDICINE

## 2023-02-03 PROCEDURE — 82962 GLUCOSE BLOOD TEST: CPT

## 2023-02-03 PROCEDURE — 6370000000 HC RX 637 (ALT 250 FOR IP): Performed by: HOSPITALIST

## 2023-02-03 PROCEDURE — 99232 SBSQ HOSP IP/OBS MODERATE 35: CPT | Performed by: INTERNAL MEDICINE

## 2023-02-03 PROCEDURE — 77334 RADIATION TREATMENT AID(S): CPT | Performed by: RADIOLOGY

## 2023-02-03 PROCEDURE — 77307 TELETHX ISODOSE PLAN CPLX: CPT | Performed by: RADIOLOGY

## 2023-02-03 PROCEDURE — 80048 BASIC METABOLIC PNL TOTAL CA: CPT

## 2023-02-03 PROCEDURE — 1200000000 HC SEMI PRIVATE

## 2023-02-03 PROCEDURE — 2060000000 HC ICU INTERMEDIATE R&B

## 2023-02-03 PROCEDURE — 77412 RADIATION TX DELIVERY LVL 3: CPT | Performed by: RADIOLOGY

## 2023-02-03 RX ORDER — KETOROLAC TROMETHAMINE 30 MG/ML
30 INJECTION, SOLUTION INTRAMUSCULAR; INTRAVENOUS EVERY 6 HOURS PRN
Status: DISCONTINUED | OUTPATIENT
Start: 2023-02-03 | End: 2023-02-07

## 2023-02-03 RX ADMIN — MEROPENEM 1000 MG: 1 INJECTION, POWDER, FOR SOLUTION INTRAVENOUS at 00:00

## 2023-02-03 RX ADMIN — HYDROMORPHONE HYDROCHLORIDE 0.5 MG: 1 INJECTION, SOLUTION INTRAMUSCULAR; INTRAVENOUS; SUBCUTANEOUS at 08:30

## 2023-02-03 RX ADMIN — MEROPENEM 1000 MG: 1 INJECTION, POWDER, FOR SOLUTION INTRAVENOUS at 08:40

## 2023-02-03 RX ADMIN — PREDNISONE 20 MG: 20 TABLET ORAL at 08:34

## 2023-02-03 RX ADMIN — HYDROMORPHONE HYDROCHLORIDE 0.5 MG: 1 INJECTION, SOLUTION INTRAMUSCULAR; INTRAVENOUS; SUBCUTANEOUS at 02:52

## 2023-02-03 RX ADMIN — OXYCODONE HYDROCHLORIDE 20 MG: 10 TABLET ORAL at 11:00

## 2023-02-03 RX ADMIN — DIPHENHYDRAMINE HYDROCHLORIDE 25 MG: 50 INJECTION, SOLUTION INTRAMUSCULAR; INTRAVENOUS at 21:36

## 2023-02-03 RX ADMIN — ACETAMINOPHEN 650 MG: 325 TABLET ORAL at 05:24

## 2023-02-03 RX ADMIN — GABAPENTIN 600 MG: 300 CAPSULE ORAL at 13:39

## 2023-02-03 RX ADMIN — HYDROMORPHONE HYDROCHLORIDE 0.5 MG: 1 INJECTION, SOLUTION INTRAMUSCULAR; INTRAVENOUS; SUBCUTANEOUS at 21:35

## 2023-02-03 RX ADMIN — GABAPENTIN 600 MG: 300 CAPSULE ORAL at 21:35

## 2023-02-03 RX ADMIN — KETOROLAC TROMETHAMINE 30 MG: 30 INJECTION, SOLUTION INTRAMUSCULAR at 15:08

## 2023-02-03 RX ADMIN — TAMSULOSIN HYDROCHLORIDE 0.4 MG: 0.4 CAPSULE ORAL at 08:34

## 2023-02-03 RX ADMIN — SODIUM CHLORIDE, PRESERVATIVE FREE 10 ML: 5 INJECTION INTRAVENOUS at 08:35

## 2023-02-03 RX ADMIN — VANCOMYCIN HYDROCHLORIDE 1250 MG: 1.25 INJECTION, POWDER, LYOPHILIZED, FOR SOLUTION INTRAVENOUS at 05:28

## 2023-02-03 RX ADMIN — DULOXETINE 60 MG: 30 CAPSULE, DELAYED RELEASE ORAL at 08:34

## 2023-02-03 RX ADMIN — ALPRAZOLAM 0.5 MG: 0.5 TABLET ORAL at 11:00

## 2023-02-03 RX ADMIN — ALPRAZOLAM 0.5 MG: 0.5 TABLET ORAL at 18:01

## 2023-02-03 RX ADMIN — SODIUM CHLORIDE: 9 INJECTION, SOLUTION INTRAVENOUS at 11:17

## 2023-02-03 RX ADMIN — ALPRAZOLAM 0.5 MG: 0.5 TABLET ORAL at 02:52

## 2023-02-03 RX ADMIN — HYDROMORPHONE HYDROCHLORIDE 0.5 MG: 1 INJECTION, SOLUTION INTRAMUSCULAR; INTRAVENOUS; SUBCUTANEOUS at 18:00

## 2023-02-03 RX ADMIN — CYCLOBENZAPRINE 10 MG: 10 TABLET, FILM COATED ORAL at 18:01

## 2023-02-03 RX ADMIN — BICALUTAMIDE 50 MG: 50 TABLET, FILM COATED ORAL at 09:03

## 2023-02-03 RX ADMIN — GABAPENTIN 600 MG: 300 CAPSULE ORAL at 08:34

## 2023-02-03 RX ADMIN — DIPHENHYDRAMINE HYDROCHLORIDE 25 MG: 50 INJECTION, SOLUTION INTRAMUSCULAR; INTRAVENOUS at 11:00

## 2023-02-03 ASSESSMENT — PAIN SCALES - GENERAL
PAINLEVEL_OUTOF10: 7
PAINLEVEL_OUTOF10: 10
PAINLEVEL_OUTOF10: 8

## 2023-02-03 ASSESSMENT — PAIN DESCRIPTION - DESCRIPTORS
DESCRIPTORS: ACHING
DESCRIPTORS: DISCOMFORT
DESCRIPTORS: ACHING
DESCRIPTORS: ACHING;SHARP;TIGHTNESS
DESCRIPTORS: ACHING

## 2023-02-03 ASSESSMENT — PAIN - FUNCTIONAL ASSESSMENT: PAIN_FUNCTIONAL_ASSESSMENT: PREVENTS OR INTERFERES WITH ALL ACTIVE AND SOME PASSIVE ACTIVITIES

## 2023-02-03 ASSESSMENT — PAIN SCALES - WONG BAKER
WONGBAKER_NUMERICALRESPONSE: 2

## 2023-02-03 ASSESSMENT — PAIN DESCRIPTION - LOCATION
LOCATION: GENERALIZED
LOCATION: SHOULDER
LOCATION: GENERALIZED
LOCATION: BACK;GENERALIZED
LOCATION: GENERALIZED
LOCATION: BACK;SHOULDER

## 2023-02-03 ASSESSMENT — PAIN DESCRIPTION - ORIENTATION: ORIENTATION: RIGHT;LEFT

## 2023-02-03 NOTE — CARE COORDINATION
Received 2nd and 3rd consult for assistance. Patient is going for tx at [facility]. Will revisit spouse to secure Hospice choice for discharge to home.  [facility] Roni Caputo (provider of waiver services) have been notified of admission and anticipating resumption of services upon discharge (35 hrs weekly- 5 hrs daily/7 days a week) Kurt Crockett RN Labs/EKG

## 2023-02-03 NOTE — PROGRESS NOTES
V2.0  [facility] Hospitalist Progress Note      Name:  Delilah Rivero /Age/Sex: 1970  (46 y.o. male)   MRN & CSN:  [id number] & [id number] Encounter Date/Time: 2/3/2023 12:56 PM EST    Location:  -A PCP: Debbie Jean MD       Hospital Day: 4    Assessment and Plan:   Delilah Rivero is a 46 y.o. male who presents with concern for sepsis      Plan:  Concern for sepsis-febrile and tachycardic. Recently was on meropenem for complicated UTI on previous admission. Source of infection is not clear. Checking respiratory PCR and blood cultures. On Vanco and Zosyn on admission, switched to vancomycin and meropenem . ID. Received IV bolus in ER. Tmax 101.8. Procalcitonin 0.088. , trending. Flu negative. COVID-negative. CT chest: Cannot exclude pneumonia; postradiation pneumonitis. CT abdomen pelvis: Possible constipation; possible cystitis. Urinalysis with no overt signs of infection. Hyponatremia-sodium 132. Given IV fluids. Trending, likely hypovolemic. Chronic anemia-may be related to cancer. Monitor. Hemoglobin 8.7. Patient has known bone marrow infiltration. History metastatic prostate cancer-consult heme-onc. Continue steroid therapy. CT chest: Diffuse osteoblastic metastatic disease. CT abdomen pelvis: Extensive osteoblastic metastatic disease. last known lupron . Last known abiraterone prescription  . Unclear last time he used this. Last PSA 22 144.0, We will obtain PSA, testosterone level. Casodex initiated. Progressive osseous metastatic disease, radiation oncology consulted for evaluation and treatment. Head CT simulation , plan for treatment session 3. History squamous cell carcinoma left lung-diagnosed on 2021. Had debulking of primary tumor via bronchoscopy at [facility]. Has received chemotherapy followed by [facility].   CT chest: Interval increase size and extent of the left hilar/mediastinal mass and left upper lobe atelectasis. Positive response to therapy with carbo/alimta/pembro x 1 followed by Narinder Martinez  Noted to have ongoing pattern of extreme non compliance. Has been frequencly enrolling and revoking hospice, last revoke 1/24/23, last re/enroll 1/30. Reports at this time he would like to consider single agent keytruda if able    History urinary retention with chronic indwelling Sampson-had prior nephrostomy tube which was dislodged and never placed back. Anxiety disorder-alprazolam PRN    Recent complicated UTI-had been on meropenem at home. Diet ADULT DIET; Regular  ADULT ORAL NUTRITION SUPPLEMENT; Breakfast, Dinner; Standard High Calorie/High Protein Oral Supplement    DVT Prophylaxis [x] Lovenox, []  Heparin, [] SCDs, [] Ambulation,  [] Eliquis, [] Xarelto  [] Coumadin   Code Status Full Code   Disposition From: Home  Expected Disposition: TBD  Estimated Date of Discharge: 2 to 3 days  Patient requires continued admission due to concern for infection   Home O2 None     Subjective/Interval history:   Chief Complaint: Fall     Doing well this morning, still itching, no other complaints. Review of Systems:    Negative unless mentioned above    Objective: Intake/Output Summary (Last 24 hours) at 2/3/2023 0807  Last data filed at 2/2/2023 0930  Gross per 24 hour   Intake --   Output 300 ml   Net -300 ml          Vitals:   BP (!) 127/94   Pulse 100   Temp 98.4 °F (36.9 °C) (Oral)   Resp 16   Ht 6' 1\" (1.854 m)   Wt 191 lb 12.8 oz (87 kg)   SpO2 97%   BMI 25.30 kg/m²     Physical Exam:   General: NAD, laying in bed  Eyes: no discharge  HENT: NCAT  Cardiovascular: slightly tachycardic  Respiratory: Clear to auscultation   Gastrointestinal: Soft, non tender, nondistended  Genitourinary: no suprapubic tenderness, has sampson catheter  Musculoskeletal: trace edema in LE, nontender  Skin: warm, dry, no gross lesions. Patient has a port in chest.  Neuro: Alert. No gross deficits  Psych: Mood appropriate. Medications:   Medications:    bicalutamide  50 mg Oral Daily    meropenem  1,000 mg IntraVENous Q8H    Followed by    Laura Mask ON 2/8/2023] meropenem  1,000 mg IntraVENous Q8H    DULoxetine  60 mg Oral Daily    fentaNYL  1 patch TransDERmal Q72H    gabapentin  600 mg Oral TID    predniSONE  20 mg Oral Daily    tamsulosin  0.4 mg Oral Daily    sodium chloride flush  5-40 mL IntraVENous 2 times per day    enoxaparin  40 mg SubCUTAneous Daily    vancomycin  1,250 mg IntraVENous Q12H      Infusions:    sodium chloride 100 mL/hr at 02/02/23 2140    sodium chloride       PRN Meds: diphenhydrAMINE, 25 mg, Q6H PRN  oxyCODONE, 20 mg, Q6H PRN   And  acetaminophen, 650 mg, Q6H PRN  HYDROmorphone, 0.25 mg, Q3H PRN   Or  HYDROmorphone, 0.5 mg, Q3H PRN  ALPRAZolam, 0.5 mg, TID PRN  cyclobenzaprine, 10 mg, TID PRN  naloxone, 4 mg, PRN  sodium chloride flush, 5-40 mL, PRN  sodium chloride, , PRN  ondansetron, 4 mg, Q8H PRN   Or  ondansetron, 4 mg, Q6H PRN  polyethylene glycol, 17 g, Daily PRN  acetaminophen, 650 mg, Q6H PRN   Or  acetaminophen, 650 mg, Q6H PRN      Labs      Recent Labs     01/31/23  1433 02/01/23  0911   WBC 12.5* 11.3*   HGB 8.7* 7.8*   HCT 26.9* 25.1*    387        Recent Labs     01/31/23  1433 02/01/23  0911   * 132*   K 4.0 3.9   CL 95* 97*   CO2 27 26   BUN 9 8   CREATININE 0.4* 0.4*       No results for input(s): AST, ALT, ALB, BILIDIR, BILITOT, ALKPHOS in the last 72 hours. No results for input(s): INR in the last 72 hours. No results for input(s): CKTOTAL, CKMB, CKMBINDEX, TROPONINT in the last 72 hours.   No results found for: LABA1C  CALCIUM:  10.7/26 (02/01 0911)  Lab Results   Component Value Date/Time    MG 1.8 04/10/2022 03:45 AM           Electronically signed by Kami Nassar MD on 2/3/2023 at 8:07 AM

## 2023-02-03 NOTE — PROGRESS NOTES
Radiation Oncology  Treatment Completion Summary  Encounter Date: 2/3/2023 4:01 PM    Mr. Tyra Casiano is a 46 y.o. male  : 1970  MRN: [id number]  [facility]      FOLLOW UP PHYSICIANS:   Primary Care: Carlos Alberto London MD     DIAGNOSIS: metastatic lung and prostate cancer    TREATMENT COURSE:       HISTORY:  Tyra Casiano is a 46 y.o. male with the above referenced diagnosis. Complete details on history of present illness please see my initial consultation note. 8Gy/1fx versus 20Gy/5fx was considered and ultimately decided to go with a single treatment of 8Gy. The patient has recently completed a course of palliative radiation therapy and what follows is a description of the treatments received:    ANATOMIC SITE: pelvis   BEAM ORIENTATION: AP/PA  ENERGY: 10/18MV photons  DOSE PER FRACTION: 8Gy  TOTAL DOSE: 8Gy, fraction     ELAPSED DAYS: 2/3/23    TREATMENT TOLERANCE:     Overall, the patient tolerated radiation therapy quite well. FOLLOW-UP PLANS:     As needed.        Electronically signed by Cristy Goodman MD on 2/3/2023 at 4:01 PM

## 2023-02-03 NOTE — PROGRESS NOTES
[id number] [facility]  consulted by  Nessa Watkins NP for monitoring and adjustment. Indication for treatment: Sepsis possibly 2/2 pneumonia per ID     AUC/CASH: 400-600    Risk Factors for MRSA Identified:   Hospitalization within the past 90 days    Pertinent Laboratory Values:   Temp Readings from Last 3 Encounters:   02/03/23 98.4 °F (36.9 °C)   01/26/23 97.7 °F (36.5 °C) (Infrared)   01/22/23 99.2 °F (37.3 °C) (Oral)     Recent Labs     01/31/23  1433 01/31/23  1503 02/01/23  0911 02/03/23  0900   WBC 12.5*  --  11.3* 8.6   LACTATE  --  1.1  --   --        Recent Labs     01/31/23  1433 02/01/23  0911 02/03/23  0900   BUN 9 8 5*   CREATININE 0.4* 0.4* 0.4*       Estimated Creatinine Clearance: 239 mL/min (A) (based on SCr of 0.4 mg/dL (L)). No intake or output data in the 24 hours ending 02/03/23 1245      Pertinent Cultures:   Date    Source   Results  1/31   Blood    No growth at 48 hours  1/31   MRSA Nasal   Pending  1/31   Urine    Final- go growth at 36 hours       Vancomycin level:   TROUGH:  No results for input(s): VANCOTROUGH in the last 72 hours. RANDOM:    Recent Labs     02/02/23  0703   VANCORANDOM 37.1         Assessment:  HPI: Patient is 46 yom admitted with concerns for sepsis 2/2 pneumonia. Pt recently admitted for UTI growing VRE and pseudomonas. Patient started on vanco and meropenem for sepsis possibly 2/2 pneumonia- ID managing. SCr, BUN, and urine output:  stable Scr, good UOP  Day(s) of therapy: 4  Vancomycin concentration:   2/2 @ 0703 = 37.1 (~1.5 hours post dose)   with ssTrough 14    Plan:  Continue vancomycin IVPB 1250 mg every 12 hrs  Pharmacy will continue to monitor patient and adjust therapy as indicated    VANCOMYCIN CONCENTRATION SCHEDULED FOR 2/5 @0500    Thank you for the consult.   Reji Webb [facility]  2/3/2023 12:45 PM

## 2023-02-03 NOTE — PROGRESS NOTES
HEMATOLOGY ONCOLOGY  Progress Note        HISTORY OF PRESENT ILLNESS   Ray Barclay is a 46 y.o. male with past medical history of metastatic prostate cancer, and metastatic lung cancer who has intermittently been enrolled in hospice care. Review of labs reveal last ADT  22.5 mg. Last refill of abiraterone ,  . It is unclear how long he has not been taking medication. He was seen in office last week and offered radiation therapy to his left pelvis/sacrum. He then rejoined hospice and cancelled radiation therapy appointment. He now reports he would like to pursue all treatment modalities. 23 CT abdomen/pelvis showed extensive osteoblastic metastatic disease with increasing area of destructive and lytic disease particularly     23 CT chest- interval increases size and extent of left hilar/mediastinal mass lesion and SHELLEY atelectasis reflecting progression of disease. Last PSA 22 was 144.0 with prior reading 3/22/22 976.0. He was readmitted with concern for sepsis. He has been on IV meropenem for complicated UTI. Blood and urine cultures obtained in ED. On exam he appears comfortable in bed, remains tachycardic, denies  reports pain is better controlled, denies chest pain, shortness of breath, he denies dysuria, tolerating catheter well. He does endorse pain that prevents him from engaging in ADLs. Patient expressing concern about significant other's ability to care for him at home. In discussing patient care with [facility] concern was expressed for diversion of pain medication from the significant other. Interval 2/3  No family at bedside. Pt seen and examined resting in bed. He had CT sim yesterday and plans to start treatment today. His states his pain is under better control now than previously. Briefly discussed goals of palliative radiation. He is hopeful to be able to ambulate. Afebrile and HDS.     PHYSICAL EXAM    Vitals: /84 Pulse 100   Temp 98.4 °F (36.9 °C)   Resp 15   Ht 6' 1\" (1.854 m)   Wt 191 lb 12.8 oz (87 kg)   SpO2 99%   BMI 25.30 kg/m²   CONSTITUTIONAL: awake, alert, cooperative, no apparent distress, cachectic   EYES: EOM grossly intact, no conjunctival pallor, no scleral icterus  ENT: Normocephalic, without obvious abnormality, atraumatic  NECK: supple, symmetrical, no jugular venous distension  HEMATOLOGIC/LYMPHATIC: no cervical, supraclavicular or axillary lymphadenopathy   LUNGS: CTA bilaterally, no wheezes/rhonchi/rales, unlabored on RA   CARDIOVASCULAR: regular rate and rhythm, normal S1 and S2, no murmur noted  ABDOMEN: Non-tender, non-distended, NABS  MUSCULOSKELETAL: full range of motion noted, tone is normal  NEUROLOGIC: awake, alert, oriented to name, place and time. Motor skills grossly intact. SKIN: warm and dry, no jaundice, no bruising or petechiae. EXTREMITIES: no peripheral edema, no clubbing or cyanosis      LABORATORY RESULTS  CBC:   Recent Labs     01/31/23  1433 02/01/23  0911 02/03/23  0900   WBC 12.5* 11.3* 8.6   HGB 8.7* 7.8* 7.7*    387 368       BMP:    Recent Labs     01/31/23  1433 02/01/23  0911 02/03/23  0900   * 132* 134*   K 4.0 3.9 3.9   CL 95* 97* 98*   CO2 27 26 28   BUN 9 8 5*   CREATININE 0.4* 0.4* 0.4*   GLUCOSE 111* 116* 102*       Hepatic: No results for input(s): AST, ALT, ALB, BILITOT, ALKPHOS in the last 72 hours. INR: No results for input(s): INR in the last 72 hours. RADIOLOGY REPORTS  XR SHOULDER RIGHT (MIN 2 VIEWS)   Final Result   1. Interval progression of expansile metastatic lesion of the distal right   clavicle. No obvious pathologic fracture. 2. Numerous osteoblastic metastases. CT ABDOMEN PELVIS W IV CONTRAST Additional Contrast? None   Final Result   1. Evaluation of the lower lungs and abdomen degraded because of motion   during imaging, blurring detail and resulting in misregistration artifact.    2. Extensive osteoblastic metastatic disease with increasing areas of   destructive and lytic disease particularly the left hemipelvis and throughout   the sacrum. 3. Possible constipation. 4. Possible cystitis. Correlate with urinalysis. 5. Urinary bladder stones are demonstrated. CT CHEST W CONTRAST   Final Result   1. Interval increased size and extent left hilar/mediastinal mass lesion and   left upper lobe atelectasis reflecting progression of disease. Cannot   exclude underlying pneumonia. 2. 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 3. Diffuse osteoblastic metastatic disease. 4. Evaluation of the lower lungs degraded because of motion during imaging,   blurring detail and resulting in misregistration artifact. XR CHEST PORTABLE   Final Result   1. Redemonstration of mediastinal left upper lobe mass consistent with known   malignancy. Diffuse osseous metastatic disease also redemonstrated. No   superimposed acute consolidative change of the lungs identified. ASSESSMENT/RECOMMENDATION    Metastatic prostate cancer-   last known lupron . Last known abiraterone prescription  . Unclear last time he used this. Last PSA 22 144.0, We will obtain PSA, testosterone level. Casodex initiated. Progressive osseous metastatic disease, radiation oncology consulted for evaluation and treatment. CT Sim completed 23, plan for palliative radiation to sacrum and R pelvis in 20Gy/5fx    Metastatic squamous cell lung cancer  -SCC with lung primary biopsy proven metastatic to bone  Positive response to therapy with carbo/alimta/pembro x 1 followed by Kavita Schroeder  Noted to have ongoing pattern of exteme non compliance. Has been frequencly enrolling and revoking hospice, last revoke 23, last re/enroll . Reports at this time he would like to consider single agent keytruda if able    Anemia-last CBC with Hgb 7.8. Has known bone marrow infiltration.  Continue to trend, stable RN    Pain- fentanyl patch. IV dilaudid  -UDS +oxycodone which is expected. Discussed with lab, fentanyl does not result on our UDS in house (opiates negative). Xanax was prescribed by hospice and not positive, although unsure if current.  -Ensure adequate bowel regimen    Sepsis- ID following, vancomycin, meropenem     Social issues- case management consulted, ? Continue care at SNF    Imaging and labs reviewed and plan of care discussed with patient in depth. We will follow along. Please call with any questions. I have independently evaluated and examined this patient today. I have reviewed radiologic and biochemical tests on this patient. Management Plan is developed mutually with ROBB Corrales. I have reviewed above note and agree with assessment and plan    Laura Haro PA-C    Portions of this note are copied forward from previous clinic note. Interval history, ROS, physical exam, assessment and plan has been reviewed and updated for accuracy by this provider.
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
## 2023-02-03 NOTE — PROGRESS NOTES
Comprehensive Nutrition Assessment    Type and Reason for Visit:  Reassess    Nutrition Recommendations/Plan:   Will add on personal preferences on diet menu  Continue Regular diet   Continue Standard high calorie, high protein oral nutrition supplements TID   Add Frozen oral nutrition supplements   Document PO intakes in I/o      Malnutrition Assessment:  Malnutrition Status: Moderate malnutrition (Ongoing) (02/03/23 5606)    Context:  Chronic Illness     Findings of the 6 clinical characteristics of malnutrition:  Energy Intake:  Mild decrease in energy intake (Comment) (intermittent)  Weight Loss:  Unable to assess     Body Fat Loss:  Mild body fat loss Triceps   Muscle Mass Loss:  Mild muscle mass loss Thigh (quadraceps), Calf (gastrocnemius), Clavicles (pectoralis & deltoids)  Fluid Accumulation:  No significant fluid accumulation     Strength:  Not Performed    Nutrition Assessment:    Pt [facility]. Currently on regular diet, noted 0% on last meal. Spouse at bedside reports pt skipped meal due to dislikes meal at that time, otherwise pt will consume % of meals. At home, pt does not eat much per spouse. Spouse states pt drinks Ensure at home and like it during stay. Noted plans for hospice care at home after discharge. Follow as high nutrition risk. Nutrition Related Findings:    +deltasone. Pt with metastatic prostate cancer and metastatic squamous cell lung cancer with lung primary biopsy and metastatic to bone. Wound Type: None       Current Nutrition Intake & Therapies:    Average Meal Intake: 0%, % (fluctates)  Average Supplements Intake: %  ADULT DIET; Regular  ADULT ORAL NUTRITION SUPPLEMENT; Breakfast, Dinner; Standard High Calorie/High Protein Oral Supplement    Anthropometric Measures:  Height: 6' 1\" (185.4 cm)  Ideal Body Weight (IBW): 184 lbs (84 kg)       Current Body Weight: 191 lb 12.8 oz (87 kg), 104.2 % IBW.  Weight Source: Bed Scale  Current BMI (kg/m2): 25.3  Usual Body Weight: 192 lb 14.4 oz (87.5 kg) (hx wt ~ 200# past years, july wt 2022)  % Weight Change (Calculated): -0.6  Weight Adjustment For: No Adjustment                 BMI Categories: Overweight (BMI 25.0-29. 9)    Estimated Daily Nutrient Needs:  Energy Requirements Based On: Kcal/kg  Weight Used for Energy Requirements: Current  Energy (kcal/day): 0758-0886 (25-30 joseph/kg)  Weight Used for Protein Requirements: Current  Protein (g/day):  (1-1.2 g/kg)  Method Used for Fluid Requirements: 1 ml/kcal  Fluid (ml/day): 2100    Nutrition Diagnosis:   Moderate malnutrition, In context of chronic illness related to increase demand for energy/nutrients, catabolic illness as evidenced by poor intake prior to admission, mild muscle loss, mild loss of subcutaneous fat    Nutrition Interventions:   Food and/or Nutrient Delivery: Continue Current Diet, Continue Oral Nutrition Supplement  Nutrition Education/Counseling:  (Send handouts for Maximizing Nutrition Through Cancer Treatment and High calorie, high protein nutriiton therapy)  Coordination of Nutrition Care: Continue to monitor while inpatient   PS MD in regards to malnutrition dx, spoke w pt and wife    Goals:  Previous Goal Met: Progressing toward Goal(s)  Goals: PO intake 50% or greater, by next RD assessment       Nutrition Monitoring and Evaluation:   Behavioral-Environmental Outcomes: None Identified  Food/Nutrient Intake Outcomes: Food and Nutrient Intake, Supplement Intake  Physical Signs/Symptoms Outcomes: Biochemical Data, Meal Time Behavior, Skin, Weight    Discharge Planning:    Continue Oral Nutrition Supplement, Continue current diet     Emmy Paz RD, LD  Contact: [phone]

## 2023-02-04 PROBLEM — R50.9 FEVER: Status: ACTIVE | Noted: 2023-02-04

## 2023-02-04 LAB
CRP SERPL HS-MCNC: 56.1 MG/L
CULTURE: NORMAL
Lab: NORMAL
SPECIMEN: NORMAL

## 2023-02-04 PROCEDURE — 6370000000 HC RX 637 (ALT 250 FOR IP): Performed by: INTERNAL MEDICINE

## 2023-02-04 PROCEDURE — 6370000000 HC RX 637 (ALT 250 FOR IP): Performed by: NURSE PRACTITIONER

## 2023-02-04 PROCEDURE — 1200000000 HC SEMI PRIVATE

## 2023-02-04 PROCEDURE — 6370000000 HC RX 637 (ALT 250 FOR IP): Performed by: HOSPITALIST

## 2023-02-04 PROCEDURE — 6360000002 HC RX W HCPCS: Performed by: STUDENT IN AN ORGANIZED HEALTH CARE EDUCATION/TRAINING PROGRAM

## 2023-02-04 PROCEDURE — 6360000002 HC RX W HCPCS: Performed by: INTERNAL MEDICINE

## 2023-02-04 PROCEDURE — 2580000003 HC RX 258: Performed by: INTERNAL MEDICINE

## 2023-02-04 PROCEDURE — 94761 N-INVAS EAR/PLS OXIMETRY MLT: CPT

## 2023-02-04 PROCEDURE — 6360000002 HC RX W HCPCS: Performed by: HOSPITALIST

## 2023-02-04 PROCEDURE — 86140 C-REACTIVE PROTEIN: CPT

## 2023-02-04 RX ADMIN — CYCLOBENZAPRINE 10 MG: 10 TABLET, FILM COATED ORAL at 20:40

## 2023-02-04 RX ADMIN — SODIUM CHLORIDE: 9 INJECTION, SOLUTION INTRAVENOUS at 20:37

## 2023-02-04 RX ADMIN — OXYCODONE HYDROCHLORIDE 20 MG: 10 TABLET ORAL at 22:35

## 2023-02-04 RX ADMIN — SODIUM CHLORIDE, PRESERVATIVE FREE 10 ML: 5 INJECTION INTRAVENOUS at 13:06

## 2023-02-04 RX ADMIN — TAMSULOSIN HYDROCHLORIDE 0.4 MG: 0.4 CAPSULE ORAL at 08:20

## 2023-02-04 RX ADMIN — GABAPENTIN 600 MG: 300 CAPSULE ORAL at 08:20

## 2023-02-04 RX ADMIN — KETOROLAC TROMETHAMINE 30 MG: 30 INJECTION, SOLUTION INTRAMUSCULAR at 09:56

## 2023-02-04 RX ADMIN — OXYCODONE HYDROCHLORIDE 20 MG: 10 TABLET ORAL at 14:59

## 2023-02-04 RX ADMIN — HYDROMORPHONE HYDROCHLORIDE 0.5 MG: 1 INJECTION, SOLUTION INTRAMUSCULAR; INTRAVENOUS; SUBCUTANEOUS at 18:21

## 2023-02-04 RX ADMIN — KETOROLAC TROMETHAMINE 30 MG: 30 INJECTION, SOLUTION INTRAMUSCULAR at 00:17

## 2023-02-04 RX ADMIN — HYDROMORPHONE HYDROCHLORIDE 0.5 MG: 1 INJECTION, SOLUTION INTRAMUSCULAR; INTRAVENOUS; SUBCUTANEOUS at 03:00

## 2023-02-04 RX ADMIN — KETOROLAC TROMETHAMINE 30 MG: 30 INJECTION, SOLUTION INTRAMUSCULAR at 20:38

## 2023-02-04 RX ADMIN — GABAPENTIN 600 MG: 300 CAPSULE ORAL at 13:31

## 2023-02-04 RX ADMIN — SODIUM CHLORIDE, PRESERVATIVE FREE 10 ML: 5 INJECTION INTRAVENOUS at 00:18

## 2023-02-04 RX ADMIN — SODIUM CHLORIDE, PRESERVATIVE FREE 10 ML: 5 INJECTION INTRAVENOUS at 20:44

## 2023-02-04 RX ADMIN — HYDROMORPHONE HYDROCHLORIDE 0.5 MG: 1 INJECTION, SOLUTION INTRAMUSCULAR; INTRAVENOUS; SUBCUTANEOUS at 13:05

## 2023-02-04 RX ADMIN — GABAPENTIN 600 MG: 300 CAPSULE ORAL at 20:40

## 2023-02-04 RX ADMIN — DULOXETINE 60 MG: 30 CAPSULE, DELAYED RELEASE ORAL at 08:20

## 2023-02-04 RX ADMIN — SODIUM CHLORIDE, PRESERVATIVE FREE 10 ML: 5 INJECTION INTRAVENOUS at 03:01

## 2023-02-04 RX ADMIN — OXYCODONE HYDROCHLORIDE 20 MG: 10 TABLET ORAL at 08:20

## 2023-02-04 RX ADMIN — SODIUM CHLORIDE: 9 INJECTION, SOLUTION INTRAVENOUS at 00:13

## 2023-02-04 RX ADMIN — OXYCODONE HYDROCHLORIDE 20 MG: 10 TABLET ORAL at 00:36

## 2023-02-04 RX ADMIN — HYDROMORPHONE HYDROCHLORIDE 0.5 MG: 1 INJECTION, SOLUTION INTRAMUSCULAR; INTRAVENOUS; SUBCUTANEOUS at 09:56

## 2023-02-04 RX ADMIN — CYCLOBENZAPRINE 10 MG: 10 TABLET, FILM COATED ORAL at 13:31

## 2023-02-04 RX ADMIN — BICALUTAMIDE 50 MG: 50 TABLET, FILM COATED ORAL at 13:31

## 2023-02-04 RX ADMIN — ENOXAPARIN SODIUM 40 MG: 100 INJECTION SUBCUTANEOUS at 20:40

## 2023-02-04 RX ADMIN — PREDNISONE 20 MG: 20 TABLET ORAL at 08:20

## 2023-02-04 RX ADMIN — ALPRAZOLAM 0.5 MG: 0.5 TABLET ORAL at 09:56

## 2023-02-04 RX ADMIN — ACETAMINOPHEN 650 MG: 325 TABLET ORAL at 00:36

## 2023-02-04 RX ADMIN — ALPRAZOLAM 0.5 MG: 0.5 TABLET ORAL at 20:40

## 2023-02-04 ASSESSMENT — PAIN SCALES - GENERAL
PAINLEVEL_OUTOF10: 4
PAINLEVEL_OUTOF10: 10
PAINLEVEL_OUTOF10: 7
PAINLEVEL_OUTOF10: 10
PAINLEVEL_OUTOF10: 0
PAINLEVEL_OUTOF10: 10
PAINLEVEL_OUTOF10: 10

## 2023-02-04 ASSESSMENT — PAIN DESCRIPTION - LOCATION
LOCATION: GENERALIZED

## 2023-02-04 ASSESSMENT — PAIN - FUNCTIONAL ASSESSMENT
PAIN_FUNCTIONAL_ASSESSMENT: PREVENTS OR INTERFERES WITH ALL ACTIVE AND SOME PASSIVE ACTIVITIES
PAIN_FUNCTIONAL_ASSESSMENT: ACTIVITIES ARE NOT PREVENTED
PAIN_FUNCTIONAL_ASSESSMENT: ACTIVITIES ARE NOT PREVENTED

## 2023-02-04 ASSESSMENT — PAIN DESCRIPTION - ONSET
ONSET: ON-GOING

## 2023-02-04 ASSESSMENT — PAIN DESCRIPTION - FREQUENCY
FREQUENCY: CONTINUOUS

## 2023-02-04 ASSESSMENT — PAIN DESCRIPTION - DESCRIPTORS
DESCRIPTORS: PATIENT UNABLE TO DESCRIBE

## 2023-02-04 ASSESSMENT — PAIN DESCRIPTION - ORIENTATION
ORIENTATION: RIGHT;LEFT
ORIENTATION: RIGHT;LEFT

## 2023-02-04 ASSESSMENT — PAIN DESCRIPTION - PAIN TYPE
TYPE: CHRONIC PAIN

## 2023-02-04 NOTE — PROGRESS NOTES
V2.0  [facility] [place]      Name:  Elodia Miramontes /Age/Sex: 1970  (46 y.o. male)   MRN & CSN:  [id number] & [id number] Encounter Date/Time: 2023 12:56 PM EST    Location:  1111/1111-A PCP: Michaela Celeste MD       Hospital Day: 5    Assessment and Plan:   Elodia Miramontes is a 46 y.o. male who presents with concern for sepsis      Plan:  Concern for sepsis-febrile and tachycardic. Recently was on meropenem for complicated UTI on previous admission. Source of infection is not clear. Checking respiratory PCR and blood cultures. On Vanco and Zosyn on admission, switched to vancomycin and meropenem . ID. Received IV bolus in ER. Tmax 101.8. Procalcitonin 0.088. , trending. Flu negative. COVID-negative. CT chest: Cannot exclude pneumonia; postradiation pneumonitis. CT abdomen pelvis: Possible constipation; possible cystitis. Urinalysis with no overt signs of infection.  ------ discontinued antibiotics per ID 2/3. Will monitor patient off antibiotics for now. Trending CRP and procalcitonin of antibiotics as well. If patient spikes a fever plan is to restart meropenem after obtaining blood cultures. Hyponatremia-sodium 132 on admission. Given IV fluids. Improving, likely hypovolemic. Chronic anemia-may be related to cancer. Monitor. Hemoglobin 8.7. Patient has known bone marrow infiltration. History metastatic prostate cancer-consult heme-onc. Continue steroid therapy. CT chest: Diffuse osteoblastic metastatic disease. CT abdomen pelvis: Extensive osteoblastic metastatic disease. last known lupron . Last known abiraterone prescription  . Unclear last time he used this. Last PSA 22 144.0, We will obtain PSA, testosterone level. Casodex initiated. Progressive osseous metastatic disease, radiation oncology consulted for evaluation and treatment. Head CT simulation , plan for treatment session /3.   Patient tolerated treatment session 2/3 okay. History squamous cell carcinoma left lung-diagnosed on 12/13/2021. Had debulking of primary tumor via bronchoscopy at [facility]. Has received chemotherapy followed by Nicole Beasley. CT chest: Interval increase size and extent of the left hilar/mediastinal mass and left upper lobe atelectasis. Positive response to therapy with carbo/alimta/pembro x 1 followed by Clearance Kil  Noted to have ongoing pattern of extreme non compliance. Has been frequencly enrolling and revoking hospice, last revoke 1/24/23, last re/enroll 1/30. Reports at this time he would like to consider single agent keytruda if able    History urinary retention with chronic indwelling Doherty-had prior nephrostomy tube which was dislodged and never placed back. Anxiety disorder-alprazolam PRN    Recent complicated UTI-had been on meropenem at home. Diet ADULT DIET; Regular  ADULT ORAL NUTRITION SUPPLEMENT; Breakfast, Dinner; Standard High Calorie/High Protein Oral Supplement  ADULT ORAL NUTRITION SUPPLEMENT; Dinner; Frozen Oral Supplement    DVT Prophylaxis [x] Lovenox, []  Heparin, [] SCDs, [] Ambulation,  [] Eliquis, [] Xarelto  [] Coumadin   Code Status Full Code   Disposition From: Home  Expected Disposition: TBD  Estimated Date of Discharge: 2 to 3 days  Patient requires continued admission due to concern for infection   Home O2 None     Subjective/Interval history:   Chief Complaint: Fall     Doing well this morning, still itching, no other complaints. Review of Systems:    Negative unless mentioned above    Objective:      Intake/Output Summary (Last 24 hours) at 2/4/2023 0931  Last data filed at 2/4/2023 0300  Gross per 24 hour   Intake --   Output 3350 ml   Net -3350 ml          Vitals:   /84   Pulse (!) 105   Temp 98.2 °F (36.8 °C) (Oral)   Resp 18   Ht 6' 1\" (1.854 m)   Wt 191 lb 12.8 oz (87 kg)   SpO2 99%   BMI 25.30 kg/m²     Physical Exam:   General: NAD, laying in bed  Eyes: no discharge  HENT: NCAT  Cardiovascular: slightly tachycardic  Respiratory: Clear to auscultation   Gastrointestinal: Soft, non tender, nondistended  Genitourinary: no suprapubic tenderness, has sampson catheter  Musculoskeletal: trace edema in LE, nontender  Skin: warm, dry, no gross lesions. Patient has a port in chest.  Neuro: Alert. No gross deficits  Psych: Mood appropriate. Medications:   Medications:    bicalutamide  50 mg Oral Daily    DULoxetine  60 mg Oral Daily    fentaNYL  1 patch TransDERmal Q72H    gabapentin  600 mg Oral TID    predniSONE  20 mg Oral Daily    tamsulosin  0.4 mg Oral Daily    sodium chloride flush  5-40 mL IntraVENous 2 times per day    enoxaparin  40 mg SubCUTAneous Daily      Infusions:    sodium chloride 100 mL/hr at 02/04/23 0013    sodium chloride       PRN Meds: ketorolac, 30 mg, Q6H PRN  HYDROmorphone, 0.5 mg, Q4H PRN  diphenhydrAMINE, 25 mg, Q6H PRN  oxyCODONE, 20 mg, Q6H PRN   And  acetaminophen, 650 mg, Q6H PRN  HYDROmorphone, 0.25 mg, Q3H PRN   Or  HYDROmorphone, 0.5 mg, Q3H PRN  ALPRAZolam, 0.5 mg, TID PRN  cyclobenzaprine, 10 mg, TID PRN  naloxone, 4 mg, PRN  sodium chloride flush, 5-40 mL, PRN  sodium chloride, , PRN  ondansetron, 4 mg, Q8H PRN   Or  ondansetron, 4 mg, Q6H PRN  polyethylene glycol, 17 g, Daily PRN  acetaminophen, 650 mg, Q6H PRN   Or  acetaminophen, 650 mg, Q6H PRN      Labs      Recent Labs     02/03/23  0900   WBC 8.6   HGB 7.7*   HCT 24.2*           Recent Labs     02/03/23  0900   *   K 3.9   CL 98*   CO2 28   BUN 5*   CREATININE 0.4*       No results for input(s): AST, ALT, ALB, BILIDIR, BILITOT, ALKPHOS in the last 72 hours. No results for input(s): INR in the last 72 hours. No results for input(s): CKTOTAL, CKMB, CKMBINDEX, TROPONINT in the last 72 hours.   No results found for: LABA1C  CALCIUM:  10.6/28 (02/03 0900)  Lab Results   Component Value Date/Time    MG 1.8 04/10/2022 03:45 AM           Electronically signed by Myke Ferris MD on 2/4/2023 at 9:31 AM

## 2023-02-04 NOTE — PROGRESS NOTES
4 Eyes Skin Assessment     NAME:  Pete Martin  YOB: 1970  MEDICAL RECORD NUMBER:  [id number]    The patient is being assessed for  Transfer to New Unit    I agree that One RN have performed a thorough Head to Toe Skin Assessment on the patient. ALL assessment sites listed below have been assessed. Areas assessed by both nurses:    Head, Face, Ears, Shoulders, Back, Chest, Arms, Elbows, Hands, Sacrum. Buttock, Coccyx, Ischium, and Legs. Feet and Heels        Does the Patient have a Wound?  No noted wound(s)       Twin Prevention initiated by RN: Yes   Wound Care Orders initiated by RN: NA    Pressure Injury (Stage 3,4, Unstageable, DTI, NWPT, and Complex wounds) if present place referral order by RN under : NA    New and Established Ostomies, if present place, referral order under : NA      Nurse 1 eSignature: Electronically signed by Dalila Bolton RN on 2/4/23 at 2:35 AM EST    **SHARE this note so that the co-signing nurse is able to place an eSignature**    Nurse 2 eSignature: Electronically signed by Pat Rosa LPN on [birth year]/68 at 8:17 AM EST

## 2023-02-05 LAB
ANION GAP SERPL CALCULATED.3IONS-SCNC: 9 MMOL/L (ref 4–16)
BASOPHILS ABSOLUTE: 0 K/CU MM
BASOPHILS RELATIVE PERCENT: 0.3 % (ref 0–1)
BUN SERPL-MCNC: 11 MG/DL (ref 6–23)
CALCIUM SERPL-MCNC: 10.3 MG/DL (ref 8.3–10.6)
CHLORIDE BLD-SCNC: 98 MMOL/L (ref 99–110)
CO2: 28 MMOL/L (ref 21–32)
CREAT SERPL-MCNC: 0.4 MG/DL (ref 0.9–1.3)
CULTURE: NORMAL
CULTURE: NORMAL
DIFFERENTIAL TYPE: ABNORMAL
EOSINOPHILS ABSOLUTE: 0 K/CU MM
EOSINOPHILS RELATIVE PERCENT: 0.1 % (ref 0–3)
GFR SERPL CREATININE-BSD FRML MDRD: >60 ML/MIN/1.73M2
GLUCOSE SERPL-MCNC: 123 MG/DL (ref 70–99)
HCT VFR BLD CALC: 23.6 % (ref 42–52)
HEMOGLOBIN: 7.4 GM/DL (ref 13.5–18)
IMMATURE NEUTROPHIL %: 0.8 % (ref 0–0.43)
LYMPHOCYTES ABSOLUTE: 1.8 K/CU MM
LYMPHOCYTES RELATIVE PERCENT: 15.4 % (ref 24–44)
Lab: NORMAL
Lab: NORMAL
MCH RBC QN AUTO: 23.6 PG (ref 27–31)
MCHC RBC AUTO-ENTMCNC: 31.4 % (ref 32–36)
MCV RBC AUTO: 75.4 FL (ref 78–100)
MONOCYTES ABSOLUTE: 0.8 K/CU MM
MONOCYTES RELATIVE PERCENT: 6.5 % (ref 0–4)
NUCLEATED RBC %: 0.2 %
PDW BLD-RTO: 17.9 % (ref 11.7–14.9)
PLATELET # BLD: 399 K/CU MM (ref 140–440)
PMV BLD AUTO: 8.6 FL (ref 7.5–11.1)
POTASSIUM SERPL-SCNC: 4.3 MMOL/L (ref 3.5–5.1)
PSA FREE MFR SERPL: 25 %
PSA FREE SERPL-MCNC: 57.9 NG/ML
PSA SERPL IA-MCNC: 233 NG/ML (ref 0–4)
RBC # BLD: 3.13 M/CU MM (ref 4.6–6.2)
SEGMENTED NEUTROPHILS ABSOLUTE COUNT: 9.2 K/CU MM
SEGMENTED NEUTROPHILS RELATIVE PERCENT: 76.9 % (ref 36–66)
SODIUM BLD-SCNC: 135 MMOL/L (ref 135–145)
SPECIMEN: NORMAL
SPECIMEN: NORMAL
TOTAL IMMATURE NEUTOROPHIL: 0.09 K/CU MM
TOTAL NUCLEATED RBC: 0 K/CU MM
WBC # BLD: 11.9 K/CU MM (ref 4–10.5)

## 2023-02-05 PROCEDURE — 94761 N-INVAS EAR/PLS OXIMETRY MLT: CPT

## 2023-02-05 PROCEDURE — 85025 COMPLETE CBC W/AUTO DIFF WBC: CPT

## 2023-02-05 PROCEDURE — 80048 BASIC METABOLIC PNL TOTAL CA: CPT

## 2023-02-05 PROCEDURE — 2580000003 HC RX 258: Performed by: INTERNAL MEDICINE

## 2023-02-05 PROCEDURE — 6370000000 HC RX 637 (ALT 250 FOR IP): Performed by: NURSE PRACTITIONER

## 2023-02-05 PROCEDURE — 6360000002 HC RX W HCPCS: Performed by: STUDENT IN AN ORGANIZED HEALTH CARE EDUCATION/TRAINING PROGRAM

## 2023-02-05 PROCEDURE — 6360000002 HC RX W HCPCS: Performed by: HOSPITALIST

## 2023-02-05 PROCEDURE — 6360000002 HC RX W HCPCS: Performed by: INTERNAL MEDICINE

## 2023-02-05 PROCEDURE — 6370000000 HC RX 637 (ALT 250 FOR IP): Performed by: INTERNAL MEDICINE

## 2023-02-05 PROCEDURE — 6370000000 HC RX 637 (ALT 250 FOR IP): Performed by: HOSPITALIST

## 2023-02-05 PROCEDURE — 51702 INSERT TEMP BLADDER CATH: CPT

## 2023-02-05 PROCEDURE — 1200000000 HC SEMI PRIVATE

## 2023-02-05 RX ORDER — MIRTAZAPINE 15 MG/1
7.5 TABLET, FILM COATED ORAL ONCE
Status: DISCONTINUED | OUTPATIENT
Start: 2023-02-05 | End: 2023-02-05

## 2023-02-05 RX ORDER — MIRTAZAPINE 15 MG/1
7.5 TABLET, FILM COATED ORAL NIGHTLY
Status: DISCONTINUED | OUTPATIENT
Start: 2023-02-05 | End: 2023-02-13

## 2023-02-05 RX ADMIN — GABAPENTIN 600 MG: 300 CAPSULE ORAL at 14:13

## 2023-02-05 RX ADMIN — ALPRAZOLAM 0.5 MG: 0.5 TABLET ORAL at 22:41

## 2023-02-05 RX ADMIN — MIRTAZAPINE 7.5 MG: 15 TABLET, FILM COATED ORAL at 20:58

## 2023-02-05 RX ADMIN — OXYCODONE HYDROCHLORIDE 20 MG: 10 TABLET ORAL at 14:22

## 2023-02-05 RX ADMIN — ENOXAPARIN SODIUM 40 MG: 100 INJECTION SUBCUTANEOUS at 21:05

## 2023-02-05 RX ADMIN — OXYCODONE HYDROCHLORIDE 20 MG: 10 TABLET ORAL at 22:41

## 2023-02-05 RX ADMIN — HYDROMORPHONE HYDROCHLORIDE 0.5 MG: 1 INJECTION, SOLUTION INTRAMUSCULAR; INTRAVENOUS; SUBCUTANEOUS at 17:26

## 2023-02-05 RX ADMIN — KETOROLAC TROMETHAMINE 30 MG: 30 INJECTION, SOLUTION INTRAMUSCULAR at 02:38

## 2023-02-05 RX ADMIN — HYDROMORPHONE HYDROCHLORIDE 0.5 MG: 1 INJECTION, SOLUTION INTRAMUSCULAR; INTRAVENOUS; SUBCUTANEOUS at 01:36

## 2023-02-05 RX ADMIN — CYCLOBENZAPRINE 10 MG: 10 TABLET, FILM COATED ORAL at 20:58

## 2023-02-05 RX ADMIN — HYDROMORPHONE HYDROCHLORIDE 0.5 MG: 1 INJECTION, SOLUTION INTRAMUSCULAR; INTRAVENOUS; SUBCUTANEOUS at 06:50

## 2023-02-05 RX ADMIN — KETOROLAC TROMETHAMINE 30 MG: 30 INJECTION, SOLUTION INTRAMUSCULAR at 10:16

## 2023-02-05 RX ADMIN — GABAPENTIN 600 MG: 300 CAPSULE ORAL at 10:21

## 2023-02-05 RX ADMIN — ALPRAZOLAM 0.5 MG: 0.5 TABLET ORAL at 06:52

## 2023-02-05 RX ADMIN — ACETAMINOPHEN 650 MG: 325 TABLET ORAL at 22:41

## 2023-02-05 RX ADMIN — TAMSULOSIN HYDROCHLORIDE 0.4 MG: 0.4 CAPSULE ORAL at 10:15

## 2023-02-05 RX ADMIN — OXYCODONE HYDROCHLORIDE 20 MG: 10 TABLET ORAL at 04:39

## 2023-02-05 RX ADMIN — BICALUTAMIDE 50 MG: 50 TABLET, FILM COATED ORAL at 10:21

## 2023-02-05 RX ADMIN — GABAPENTIN 600 MG: 300 CAPSULE ORAL at 20:57

## 2023-02-05 RX ADMIN — SODIUM CHLORIDE: 9 INJECTION, SOLUTION INTRAVENOUS at 06:54

## 2023-02-05 RX ADMIN — DULOXETINE 60 MG: 30 CAPSULE, DELAYED RELEASE ORAL at 10:15

## 2023-02-05 RX ADMIN — SODIUM CHLORIDE: 9 INJECTION, SOLUTION INTRAVENOUS at 15:42

## 2023-02-05 RX ADMIN — CYCLOBENZAPRINE 10 MG: 10 TABLET, FILM COATED ORAL at 10:15

## 2023-02-05 RX ADMIN — HYDROMORPHONE HYDROCHLORIDE 0.5 MG: 1 INJECTION, SOLUTION INTRAMUSCULAR; INTRAVENOUS; SUBCUTANEOUS at 12:55

## 2023-02-05 RX ADMIN — HYDROMORPHONE HYDROCHLORIDE 0.5 MG: 1 INJECTION, SOLUTION INTRAMUSCULAR; INTRAVENOUS; SUBCUTANEOUS at 20:58

## 2023-02-05 RX ADMIN — PREDNISONE 20 MG: 20 TABLET ORAL at 10:17

## 2023-02-05 RX ADMIN — ALPRAZOLAM 0.5 MG: 0.5 TABLET ORAL at 15:40

## 2023-02-05 RX ADMIN — SODIUM CHLORIDE, PRESERVATIVE FREE 10 ML: 5 INJECTION INTRAVENOUS at 20:58

## 2023-02-05 RX ADMIN — SODIUM CHLORIDE, PRESERVATIVE FREE 10 ML: 5 INJECTION INTRAVENOUS at 10:16

## 2023-02-05 ASSESSMENT — PAIN DESCRIPTION - LOCATION
LOCATION: GENERALIZED
LOCATION: BACK
LOCATION: BACK
LOCATION: GENERALIZED
LOCATION: BACK
LOCATION: BACK
LOCATION: GENERALIZED
LOCATION: GENERALIZED

## 2023-02-05 ASSESSMENT — PAIN SCALES - GENERAL
PAINLEVEL_OUTOF10: 10
PAINLEVEL_OUTOF10: 10
PAINLEVEL_OUTOF10: 7
PAINLEVEL_OUTOF10: 10
PAINLEVEL_OUTOF10: 8
PAINLEVEL_OUTOF10: 9
PAINLEVEL_OUTOF10: 10
PAINLEVEL_OUTOF10: 0

## 2023-02-05 ASSESSMENT — PAIN DESCRIPTION - ORIENTATION: ORIENTATION: MID;RIGHT;LEFT

## 2023-02-05 ASSESSMENT — PAIN DESCRIPTION - DESCRIPTORS
DESCRIPTORS: ACHING
DESCRIPTORS: ACHING;SORE
DESCRIPTORS: ACHING

## 2023-02-05 ASSESSMENT — PAIN - FUNCTIONAL ASSESSMENT: PAIN_FUNCTIONAL_ASSESSMENT: PREVENTS OR INTERFERES WITH ALL ACTIVE AND SOME PASSIVE ACTIVITIES

## 2023-02-05 NOTE — PROGRESS NOTES
V2.0  [facility] [place] [facility] Progress Note      Name:  Viral Terry /Age/Sex: 1970  (46 y.o. male)   MRN & CSN:  [id number] & [id number] Encounter Date/Time: 2023 12:56 PM EST    Location:  1111/1111-A PCP: Bret Wilkerson MD       Hospital Day: 6    Assessment and Plan:   Viral Terry is a 46 y.o. male who presents with concern for sepsis      Plan:  Concern for sepsis-febrile and tachycardic. Recently was on meropenem for complicated UTI on previous admission. Source of infection is not clear. Checking respiratory PCR and blood cultures. On Vanco and Zosyn on admission, switched to vancomycin and meropenem . ID. Received IV bolus in ER. Tmax 101.8. Procalcitonin 0.088. , trending. Flu negative. COVID-negative. CT chest: Cannot exclude pneumonia; postradiation pneumonitis. CT abdomen pelvis: Possible constipation; possible cystitis. Urinalysis with no overt signs of infection.  ------ discontinued antibiotics per ID 2/3. Will monitor patient off antibiotics for now. Trending CRP and procalcitonin of antibiotics as well. If patient spikes a fever plan is to restart meropenem after obtaining blood cultures. Hyponatremia-sodium 132 on admission. Given IV fluids. Improving, likely hypovolemic. Chronic anemia-may be related to cancer. Monitor. Hemoglobin 8.7. Patient has known bone marrow infiltration. History metastatic prostate cancer-consult heme-onc. Continue steroid therapy. CT chest: Diffuse osteoblastic metastatic disease. CT abdomen pelvis: Extensive osteoblastic metastatic disease. last known lupron . Last known abiraterone prescription  . Unclear last time he used this. Last PSA 22 144.0, We will obtain PSA, testosterone level. Casodex initiated. Progressive osseous metastatic disease, radiation oncology consulted for evaluation and treatment. Head CT simulation , plan for treatment session 2/3.   Patient tolerated treatment session 2/3 okay. History squamous cell carcinoma left lung-diagnosed on 12/13/2021. Had debulking of primary tumor via bronchoscopy at [facility]. Has received chemotherapy followed by Juan M Santana. CT chest: Interval increase size and extent of the left hilar/mediastinal mass and left upper lobe atelectasis. Positive response to therapy with carbo/alimta/pembro x 1 followed by Lucy Palmer  Noted to have ongoing pattern of extreme non compliance. Has been frequencly enrolling and revoking hospice, last revoke 1/24/23, last re/enroll 1/30. Reports at this time he would like to consider single agent keytruda if able    History urinary retention with chronic indwelling Doherty-had prior nephrostomy tube which was dislodged and never placed back. Anxiety disorder-alprazolam PRN    Recent complicated UTI-had been on meropenem at home. Diet ADULT DIET; Regular  ADULT ORAL NUTRITION SUPPLEMENT; Breakfast, Dinner; Standard High Calorie/High Protein Oral Supplement  ADULT ORAL NUTRITION SUPPLEMENT; Dinner; Frozen Oral Supplement    DVT Prophylaxis [x] Lovenox, []  Heparin, [] SCDs, [] Ambulation,  [] Eliquis, [] Xarelto  [] Coumadin   Code Status Full Code   Disposition From: Home  Expected Disposition: TBD  Estimated Date of Discharge: 2 to 3 days  Patient requires continued admission due to concern for infection   Home O2 None     Subjective/Interval history:   Chief Complaint: Fall     Doing well this morning, it oriented x1, according to nursing he has been hollering asking for pain medications. Review of Systems:    Negative unless mentioned above    Objective:      Intake/Output Summary (Last 24 hours) at 2/5/2023 0847  Last data filed at 2/5/2023 0658  Gross per 24 hour   Intake --   Output 5650 ml   Net -5650 ml          Vitals:   BP (!) 135/95   Pulse (!) 102   Temp 97.6 °F (36.4 °C) (Oral)   Resp 18   Ht 6' 1\" (1.854 m)   Wt 191 lb 12.8 oz (87 kg)   SpO2 98%   BMI 25.30 kg/m²     Physical Exam: General: NAD, laying in bed  Eyes: no discharge  HENT: NCAT  Cardiovascular: slightly tachycardic  Respiratory: Clear to auscultation   Gastrointestinal: Soft, non tender, nondistended  Genitourinary: no suprapubic tenderness, has sampson catheter  Musculoskeletal: trace edema in LE, nontender  Skin: warm, dry, no gross lesions. Patient has a port in chest.  Neuro: Alert. No gross deficits, oriented to self and place, mental state waxes and wanes. Psych: Mood appropriate. Medications:   Medications:    bicalutamide  50 mg Oral Daily    DULoxetine  60 mg Oral Daily    fentaNYL  1 patch TransDERmal Q72H    gabapentin  600 mg Oral TID    predniSONE  20 mg Oral Daily    tamsulosin  0.4 mg Oral Daily    sodium chloride flush  5-40 mL IntraVENous 2 times per day    enoxaparin  40 mg SubCUTAneous Daily      Infusions:    sodium chloride 100 mL/hr at 02/05/23 0654    sodium chloride       PRN Meds: ketorolac, 30 mg, Q6H PRN  HYDROmorphone, 0.5 mg, Q4H PRN  diphenhydrAMINE, 25 mg, Q6H PRN  oxyCODONE, 20 mg, Q6H PRN   And  acetaminophen, 650 mg, Q6H PRN  HYDROmorphone, 0.25 mg, Q3H PRN   Or  HYDROmorphone, 0.5 mg, Q3H PRN  ALPRAZolam, 0.5 mg, TID PRN  cyclobenzaprine, 10 mg, TID PRN  naloxone, 4 mg, PRN  sodium chloride flush, 5-40 mL, PRN  sodium chloride, , PRN  ondansetron, 4 mg, Q8H PRN   Or  ondansetron, 4 mg, Q6H PRN  polyethylene glycol, 17 g, Daily PRN  acetaminophen, 650 mg, Q6H PRN   Or  acetaminophen, 650 mg, Q6H PRN      Labs      Recent Labs     02/03/23  0900   WBC 8.6   HGB 7.7*   HCT 24.2*           Recent Labs     02/03/23  0900   *   K 3.9   CL 98*   CO2 28   BUN 5*   CREATININE 0.4*       No results for input(s): AST, ALT, ALB, BILIDIR, BILITOT, ALKPHOS in the last 72 hours. No results for input(s): INR in the last 72 hours. No results for input(s): CKTOTAL, CKMB, CKMBINDEX, TROPONINT in the last 72 hours.   No results found for: LABA1C  CALCIUM:  10.6/28 (02/03 0900)  Lab Results Component Value Date/Time    MG 1.8 04/10/2022 03:45 AM           Electronically signed by Margarita Aguilar MD on 2/5/2023 at 8:47 AM

## 2023-02-05 NOTE — CARE COORDINATION
Patient on the weekend follow up list. LSW reviewed patient chart and call to patient RN and spoke the the patient spouse. LSW talked about hospice. Patient spouse stated that she was told by the doctor on the previous floor that they should wait until after the patient radiation treatments then based on that think about home with hospice. Patient is scheduled for radiation on Monday, Tuesday, and Wednesday.

## 2023-02-06 LAB — CRP SERPL HS-MCNC: 74.4 MG/L

## 2023-02-06 PROCEDURE — 86140 C-REACTIVE PROTEIN: CPT

## 2023-02-06 PROCEDURE — 6370000000 HC RX 637 (ALT 250 FOR IP): Performed by: NURSE PRACTITIONER

## 2023-02-06 PROCEDURE — 6360000002 HC RX W HCPCS: Performed by: STUDENT IN AN ORGANIZED HEALTH CARE EDUCATION/TRAINING PROGRAM

## 2023-02-06 PROCEDURE — 51702 INSERT TEMP BLADDER CATH: CPT

## 2023-02-06 PROCEDURE — 36591 DRAW BLOOD OFF VENOUS DEVICE: CPT

## 2023-02-06 PROCEDURE — 6370000000 HC RX 637 (ALT 250 FOR IP): Performed by: STUDENT IN AN ORGANIZED HEALTH CARE EDUCATION/TRAINING PROGRAM

## 2023-02-06 PROCEDURE — 6370000000 HC RX 637 (ALT 250 FOR IP): Performed by: HOSPITALIST

## 2023-02-06 PROCEDURE — 6360000002 HC RX W HCPCS: Performed by: INTERNAL MEDICINE

## 2023-02-06 PROCEDURE — 99231 SBSQ HOSP IP/OBS SF/LOW 25: CPT | Performed by: NURSE PRACTITIONER

## 2023-02-06 PROCEDURE — 6360000002 HC RX W HCPCS: Performed by: HOSPITALIST

## 2023-02-06 PROCEDURE — 6370000000 HC RX 637 (ALT 250 FOR IP): Performed by: INTERNAL MEDICINE

## 2023-02-06 PROCEDURE — 1200000000 HC SEMI PRIVATE

## 2023-02-06 PROCEDURE — 2580000003 HC RX 258: Performed by: INTERNAL MEDICINE

## 2023-02-06 PROCEDURE — 94761 N-INVAS EAR/PLS OXIMETRY MLT: CPT

## 2023-02-06 PROCEDURE — 99232 SBSQ HOSP IP/OBS MODERATE 35: CPT | Performed by: INTERNAL MEDICINE

## 2023-02-06 RX ORDER — NICOTINE 21 MG/24HR
1 PATCH, TRANSDERMAL 24 HOURS TRANSDERMAL DAILY
Status: DISCONTINUED | OUTPATIENT
Start: 2023-02-06 | End: 2023-02-16 | Stop reason: HOSPADM

## 2023-02-06 RX ORDER — BISACODYL 10 MG
10 SUPPOSITORY, RECTAL RECTAL DAILY PRN
Status: DISCONTINUED | OUTPATIENT
Start: 2023-02-06 | End: 2023-02-16 | Stop reason: HOSPADM

## 2023-02-06 RX ADMIN — KETOROLAC TROMETHAMINE 30 MG: 30 INJECTION, SOLUTION INTRAMUSCULAR at 08:28

## 2023-02-06 RX ADMIN — GABAPENTIN 600 MG: 300 CAPSULE ORAL at 14:41

## 2023-02-06 RX ADMIN — TAMSULOSIN HYDROCHLORIDE 0.4 MG: 0.4 CAPSULE ORAL at 08:20

## 2023-02-06 RX ADMIN — ALPRAZOLAM 0.5 MG: 0.5 TABLET ORAL at 18:42

## 2023-02-06 RX ADMIN — HYDROMORPHONE HYDROCHLORIDE 0.5 MG: 1 INJECTION, SOLUTION INTRAMUSCULAR; INTRAVENOUS; SUBCUTANEOUS at 01:56

## 2023-02-06 RX ADMIN — OXYCODONE HYDROCHLORIDE 20 MG: 10 TABLET ORAL at 05:51

## 2023-02-06 RX ADMIN — OXYCODONE HYDROCHLORIDE 20 MG: 10 TABLET ORAL at 18:42

## 2023-02-06 RX ADMIN — ACETAMINOPHEN 650 MG: 325 TABLET ORAL at 05:52

## 2023-02-06 RX ADMIN — GABAPENTIN 600 MG: 300 CAPSULE ORAL at 20:58

## 2023-02-06 RX ADMIN — CYCLOBENZAPRINE 10 MG: 10 TABLET, FILM COATED ORAL at 14:41

## 2023-02-06 RX ADMIN — SODIUM CHLORIDE: 9 INJECTION, SOLUTION INTRAVENOUS at 00:01

## 2023-02-06 RX ADMIN — ENOXAPARIN SODIUM 40 MG: 100 INJECTION SUBCUTANEOUS at 20:58

## 2023-02-06 RX ADMIN — SODIUM CHLORIDE, PRESERVATIVE FREE 10 ML: 5 INJECTION INTRAVENOUS at 08:28

## 2023-02-06 RX ADMIN — MIRTAZAPINE 7.5 MG: 15 TABLET, FILM COATED ORAL at 20:58

## 2023-02-06 RX ADMIN — BICALUTAMIDE 50 MG: 50 TABLET, FILM COATED ORAL at 08:21

## 2023-02-06 RX ADMIN — HYDROMORPHONE HYDROCHLORIDE 0.5 MG: 1 INJECTION, SOLUTION INTRAMUSCULAR; INTRAVENOUS; SUBCUTANEOUS at 16:17

## 2023-02-06 RX ADMIN — PREDNISONE 20 MG: 20 TABLET ORAL at 08:21

## 2023-02-06 RX ADMIN — SODIUM CHLORIDE: 9 INJECTION, SOLUTION INTRAVENOUS at 22:23

## 2023-02-06 RX ADMIN — OXYCODONE HYDROCHLORIDE 20 MG: 10 TABLET ORAL at 11:41

## 2023-02-06 RX ADMIN — SODIUM CHLORIDE, PRESERVATIVE FREE 10 ML: 5 INJECTION INTRAVENOUS at 20:59

## 2023-02-06 RX ADMIN — CYCLOBENZAPRINE 10 MG: 10 TABLET, FILM COATED ORAL at 08:28

## 2023-02-06 RX ADMIN — HYDROMORPHONE HYDROCHLORIDE 0.5 MG: 1 INJECTION, SOLUTION INTRAMUSCULAR; INTRAVENOUS; SUBCUTANEOUS at 06:56

## 2023-02-06 RX ADMIN — BISACODYL 10 MG: 10 SUPPOSITORY RECTAL at 17:25

## 2023-02-06 RX ADMIN — DULOXETINE 60 MG: 30 CAPSULE, DELAYED RELEASE ORAL at 08:20

## 2023-02-06 RX ADMIN — EPOETIN ALFA-EPBX 10000 UNITS: 10000 INJECTION, SOLUTION INTRAVENOUS; SUBCUTANEOUS at 11:42

## 2023-02-06 RX ADMIN — GABAPENTIN 600 MG: 300 CAPSULE ORAL at 08:20

## 2023-02-06 ASSESSMENT — PAIN SCALES - GENERAL
PAINLEVEL_OUTOF10: 10
PAINLEVEL_OUTOF10: 0
PAINLEVEL_OUTOF10: 10
PAINLEVEL_OUTOF10: 10

## 2023-02-06 ASSESSMENT — PAIN DESCRIPTION - LOCATION
LOCATION: GENERALIZED
LOCATION: ABDOMEN
LOCATION: BACK
LOCATION: BACK
LOCATION: BACK;LEG

## 2023-02-06 ASSESSMENT — PAIN SCALES - WONG BAKER: WONGBAKER_NUMERICALRESPONSE: 0
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
## 2023-02-06 NOTE — PROGRESS NOTES
HEMATOLOGY ONCOLOGY  Progress Note    HISTORY OF PRESENT ILLNESS   Pete Martin is a 46 y.o. male with past medical history of metastatic prostate cancer, and metastatic lung cancer who has intermittently been enrolled in hospice care. Review of labs reveal last ADT  22.5 mg. Last refill of abiraterone ,  . It is unclear how long he has not been taking medication. He was seen in office last week and offered radiation therapy to his left pelvis/sacrum. He then rejoined hospice and cancelled radiation therapy appointment. He now reports he would like to pursue all treatment modalities. 23 CT abdomen/pelvis showed extensive osteoblastic metastatic disease with increasing area of destructive and lytic disease particularly     23 CT chest- interval increases size and extent of left hilar/mediastinal mass lesion and SHELLEY atelectasis reflecting progression of disease. Last PSA 22 was 144.0 with prior reading 3/22/22 976.0. He was readmitted with concern for sepsis. He has been on IV meropenem for complicated UTI. Blood and urine cultures obtained in ED. On exam he appears comfortable in bed, remains tachycardic, denies  reports pain is better controlled, denies chest pain, shortness of breath, he denies dysuria, tolerating catheter well. He does endorse pain that prevents him from engaging in ADLs. Patient expressing concern about significant other's ability to care for him at home. In discussing patient care with [facility] concern was expressed for diversion of pain medication from the significant other. Interval   No family at bedside. Pt seen and examined resting in bed. Radiation was condensed to 1 fraction/8 Gy given difficulty tolerating laying flat on treatment table and was completed last week. He states he has fairly good pain control right now. He wants to be able to walk, note that PT/OT is requested.   He remains tachycardic with newly elevated WBC count. PHYSICAL EXAM    Vitals: BP (!) 152/97   Pulse (!) 114   Temp 98.1 °F (36.7 °C) (Oral)   Resp 17   Ht 6' 1\" (1.854 m)   Wt 191 lb 12.8 oz (87 kg)   SpO2 97%   BMI 25.30 kg/m²   CONSTITUTIONAL: awake, alert, cooperative, no apparent distress, cachectic   EYES: EOM grossly intact, no conjunctival pallor, no scleral icterus  ENT: Normocephalic, without obvious abnormality, atraumatic  NECK: supple, symmetrical, no jugular venous distension  HEMATOLOGIC/LYMPHATIC: no cervical, supraclavicular or axillary lymphadenopathy   LUNGS: CTA bilaterally, no wheezes/rhonchi/rales, unlabored on RA   CARDIOVASCULAR: tachycardic normal S1 and S2, no murmur noted  ABDOMEN: Non-tender, non-distended, NABS  :Doherty in place with pale yellow urine  MUSCULOSKELETAL: full range of motion noted, tone is normal  NEUROLOGIC: awake, alert, oriented to name, place and time. Motor skills grossly intact. SKIN: warm and dry, no jaundice, no bruising or petechiae. EXTREMITIES: no peripheral edema, no clubbing or cyanosis      LABORATORY RESULTS  CBC:   Recent Labs     02/05/23  2211   WBC 11.9*   HGB 7.4*          BMP:    Recent Labs     02/05/23  2211      K 4.3   CL 98*   CO2 28   BUN 11   CREATININE 0.4*   GLUCOSE 123*       Hepatic: No results for input(s): AST, ALT, ALB, BILITOT, ALKPHOS in the last 72 hours. INR: No results for input(s): INR in the last 72 hours. RADIOLOGY REPORTS  XR SHOULDER RIGHT (MIN 2 VIEWS)   Final Result   1. Interval progression of expansile metastatic lesion of the distal right   clavicle. No obvious pathologic fracture. 2. Numerous osteoblastic metastases. CT ABDOMEN PELVIS W IV CONTRAST Additional Contrast? None   Final Result   1. Evaluation of the lower lungs and abdomen degraded because of motion   during imaging, blurring detail and resulting in misregistration artifact.    2. Extensive osteoblastic metastatic disease with increasing areas of destructive and lytic disease particularly the left hemipelvis and throughout   the sacrum. 3. Possible constipation. 4. Possible cystitis. Correlate with urinalysis. 5. Urinary bladder stones are demonstrated. CT CHEST W CONTRAST   Final Result   1. Interval increased size and extent left hilar/mediastinal mass lesion and   left upper lobe atelectasis reflecting progression of disease. Cannot   exclude underlying pneumonia. 2. 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 3. Diffuse osteoblastic metastatic disease. 4. Evaluation of the lower lungs degraded because of motion during imaging,   blurring detail and resulting in misregistration artifact. XR CHEST PORTABLE   Final Result   1. Redemonstration of mediastinal left upper lobe mass consistent with known   malignancy. Diffuse osseous metastatic disease also redemonstrated. No   superimposed acute consolidative change of the lungs identified. ASSESSMENT/RECOMMENDATION    Metastatic prostate cancer-   last known lupron . Last known abiraterone prescription  . Unclear last time he used this. Last PSA 22 144.0, We will obtain PSA, testosterone level. Casodex initiated. Progressive osseous metastatic disease, radiation oncology consulted for evaluation and treatment. CT Sim completed 23, originally planned for palliative radiation to sacrum and R pelvis in 20Gy/5fx however with difficulty tolerating positioning, completed 8Gy/1fx. Metastatic squamous cell lung cancer  -SCC with lung primary biopsy proven metastatic to bone  Positive response to therapy with carbo/alimta/pembro x 1 followed by Keeley Bourgeois  Noted to have ongoing pattern of exteme non compliance. Has been frequently enrolling and revoking hospice, last revoke 23, last re/enroll . Reports at this time he would like to consider single agent [place] if able.  Have discussed that opiate regimen would have to remain under legal limits for us to manage in clinic. Anemia-last CBC with Hgb 7.4. Has known bone marrow infiltration. Continue to trend, stable right now, transfuse for Hgb <7    Pain- fentanyl patch. IV dilaudid  -UDS +oxycodone which is expected. Discussed with lab, fentanyl does not result on our UDS in house (opiates negative). Xanax was prescribed by hospice and not positive, although unsure if current.  -Ensure adequate bowel regimen    Sepsis- ID following, vancomycin, meropenem discontinued, monitor for stability now that he is off. WBC increased to 11.9, note that prednisone was initiated. He is afebrile and HDS although remains tachyardic    Barriers to care-difficult social situation, pt has confided that his care in the home may not be sufficient. Appreciate case management following ?consider SNF placement    Imaging and labs reviewed and plan of care discussed with patient in depth. We will follow along. Please call with any questions. Laura Haro PA-C    Portions of this note are copied forward from previous clinic note. Interval history, ROS, physical exam, assessment and plan has been reviewed and updated for accuracy by this provider. Patient seen chart reviewed. Remains afebrile. Leukocytosis that he was initiated on on prednisone. Off of antimicrobials. Started on Casodex. We will repeat PSA in 4 weeks. PSA prior to commencement February 2023 was 233. Plan to initiate Richrd Mess as outpatient once clinically stable. Adequate analgesic regimen. Continue other medical care. Anemia most probably multifactorial, discussed SHABBIR    I have independently evaluated and examined this patient today. I have reviewed radiologic and biochemical tests on this patient. Management Plan is developed mutually  With Logan Anaya.  I have reviewed above note and agree with assessment and plan    TOMI

## 2023-02-06 NOTE — PROGRESS NOTES
V2.0  [facility] Hospitalist Progress Note      Name:  Shane Macedo /Age/Sex: 1970  (46 y.o. male)   MRN & CSN:  [id number] & [id number] Encounter Date/Time: 2023 2:01 PM EST    Location:  1111/1111-A PCP: Bill Vickers MD       Hospital Day: 7    Assessment and Plan:   Shane Macedo is a 46 y.o. male with pmh of CAD, history of metastatic prostate cancer, history of squamous cell carcinoma of the left lung who presents with Sepsis [facility])    Past Medical History:   Diagnosis Date    CAD (coronary artery disease) 2013    cath negative per pt    Cancer ([address] 2021    pt reports he has lung cancer    History of TMJ disorder     Hypertension     Trigeminal neuralgia        Plan:  Concern for sepsis:  -Patient presented with fever and tachycardia, recent admission with complicated urinary sensitive to meropenem, unclear source of infection. Respiratory viral panel unremarkable, blood cultures/MRSA/urine cultures/flu/COVID unremarkable. Chest x-ray with no evident pneumonia/CT chest, cannot rule out pneumonia. CT A/P possible cystitis. Urinalysis  unremarkable. Status post vancomycin and Zosyn in ED, switched to Vanco and meropenem . ID on board Pro-Lexx 0.088/CRP 74<56<<103, trending. Discontinue antibiotics per ID 2/3. Trending Pro-Lexx and CRP and plan to restart meropenem after getting blood cultures if patient spikes fever. Tmax 98.2 F in past 24 hours. Hyponatremia-resolved, 135 today  Chronic anemia:  -Monitor H&H, heme on board, will trend, H&H stable right now, will transfuse for Hb less than 7 as per heme-onc  History of metastatic prostate cancer:  -Heme-onc on board, continue steroid therapy. CT chest with diffuse osteoblastic metastatic disease, CT A/P with extensive osteoblastic metastatic disease, last known Lupron . Last known abiraterone prescription , unclear last time he used it. Last PSA 2022-144. PSA on this admission 233.   Plan to start Keytruda as outpatient patient clinically stable as per heme-onc.  History of squamous cell carcinoma left lung-diagnosed on 12/13/2021:  -Status post debulking of primary tumor via bronchoscopy at [facility], positive response to therapy with carbo/Alimta/Pembro X1 followed by Keytruda X1.  Noted to have ongoing bilateral upper extreme noncompliance.  Has been frequently enrolling in revoking hospice, last hospice at home 1/24 in the last week.  No 1/30.  Patient reports at this time would like to consider single agent Keytruda given.  Heme-onc on board  History of urinary retention with chronic indwelling Doherty:  -Had prior nephrostomy tube which was dislodged and replaced back  Anxiety disorder-alprazolam  Recent complicated UTI-patient was on meropenem at home  Pain control:  -Fentanyl patch and IV Dilaudid    Diet ADULT DIET; Regular  ADULT ORAL NUTRITION SUPPLEMENT; Breakfast, Dinner; Standard High Calorie/High Protein Oral Supplement  ADULT ORAL NUTRITION SUPPLEMENT; Dinner; Frozen Oral Supplement   DVT Prophylaxis [x] Lovenox, []  Heparin, [] SCDs, [] Ambulation,  [] Eliquis, [] Xarelto  [] Coumadin   Code Status Full Code   Disposition From: Home  Expected Disposition: TBD  Estimated Date of Discharge: 2-3 days  Patient requires continued admission due to concern for infection   Surrogate Decision Maker/ POA None     Subjective:     Chief Complaint: Yung Covarrubias is a 53 y.o. male who presents with sepsis     2/6/2023: Patient was able to open eyes, follow simple commands, able to move all extremities.  Concern for sepsis:  -    Review of Systems:    Review of Systems    Review of Systems:   Constitutional: Negative.  Negative for activity change, appetite change, chills, diaphoresis, fatigue, fever and unexpected weight change.   HENT: Negative.  Negative for congestion, dental problem, drooling, ear discharge, ear pain, facial swelling, hearing loss, mouth sores, nosebleeds, postnasal drip,  rhinorrhea, sinus pressure, sinus pain, sneezing, sore throat, tinnitus, trouble swallowing and voice change. Eyes: Negative. Negative for photophobia, pain, discharge, redness, itching and visual disturbance. Respiratory: Negative. Negative for apnea, cough, choking, chest tightness, shortness of breath, wheezing and stridor. Cardiovascular: Negative. Negative for chest pain and palpitations. Gastrointestinal: Negative. Negative for abdominal distention, abdominal pain, anal bleeding, blood in stool, constipation, diarrhea, nausea, rectal pain and vomiting. Endocrine: Negative. Negative for cold intolerance, heat intolerance, polydipsia, polyphagia and polyuria. Genitourinary: Negative. Negative for decreased urine volume, difficulty urinating, dysuria, enuresis, flank pain, frequency, genital sores, hematuria, penile discharge, penile pain, penile swelling, scrotal swelling, testicular pain and urgency. Musculoskeletal: Negative. Negative for arthralgias, back pain, gait problem, joint swelling, myalgias, neck pain and neck stiffness. Skin: Negative. Negative for color change, pallor, rash and wound. Allergic/Immunologic: Negative for environmental allergies, food allergies and immunocompromised state. Neurological: Negative. Negative for dizziness, tremors, seizures, syncope, facial asymmetry, speech difficulty, weakness, light-headedness, numbness and headaches. Hematological: Negative. Negative for adenopathy. Does not bruise/bleed easily. Psychiatric/Behavioral: Negative. Negative for agitation, behavioral problems, confusion, decreased concentration, dysphoric mood, hallucinations, self-injury, sleep disturbance and suicidal ideas. The patient is not nervous/anxious and is not hyperactive. Objective:      Intake/Output Summary (Last 24 hours) at 2/6/2023 1401  Last data filed at 2/6/2023 1157  Gross per 24 hour   Intake 1450 ml   Output 1970 ml   Net -520 ml Vitals:   Vitals:    02/06/23 0811   BP: (!) 152/97   Pulse: (!) 114   Resp: 17   Temp: 98.1 °F (36.7 °C)   SpO2: 97%       Physical Exam:     General: NAD  Eyes: EOMI  ENT: neck supple  Cardiovascular: Slightly tachycardic  Respiratory: Clear to auscultation  Gastrointestinal: Soft, non tender  Genitourinary: no suprapubic tenderness  Musculoskeletal: No edema  Skin: warm, dry. Patient has port in chest  Neuro: Alert, opens eyes, follows simple commands such as sticking tongue out moving it left and right, able to move all extremities unable to determine if patient lucid. Psych: Mood appropriate.      Medications:   Medications:    nicotine  1 patch TransDERmal Daily    epoetin kelsi-epbx  10,000 Units SubCUTAneous Once per day on Mon Wed Fri    mirtazapine  7.5 mg Oral Nightly    bicalutamide  50 mg Oral Daily    DULoxetine  60 mg Oral Daily    fentaNYL  1 patch TransDERmal Q72H    gabapentin  600 mg Oral TID    predniSONE  20 mg Oral Daily    tamsulosin  0.4 mg Oral Daily    sodium chloride flush  5-40 mL IntraVENous 2 times per day    enoxaparin  40 mg SubCUTAneous Daily      Infusions:    sodium chloride 100 mL/hr at 02/06/23 0001    sodium chloride       PRN Meds: ketorolac, 30 mg, Q6H PRN  diphenhydrAMINE, 25 mg, Q6H PRN  oxyCODONE, 20 mg, Q6H PRN   And  acetaminophen, 650 mg, Q6H PRN  HYDROmorphone, 0.25 mg, Q3H PRN   Or  HYDROmorphone, 0.5 mg, Q3H PRN  ALPRAZolam, 0.5 mg, TID PRN  cyclobenzaprine, 10 mg, TID PRN  naloxone, 4 mg, PRN  sodium chloride flush, 5-40 mL, PRN  sodium chloride, , PRN  ondansetron, 4 mg, Q8H PRN   Or  ondansetron, 4 mg, Q6H PRN  polyethylene glycol, 17 g, Daily PRN  acetaminophen, 650 mg, Q6H PRN   Or  acetaminophen, 650 mg, Q6H PRN        Labs      Recent Results (from the past 24 hour(s))   CBC with Auto Differential    Collection Time: 02/05/23 10:11 PM   Result Value Ref Range    WBC 11.9 (H) 4.0 - 10.5 K/CU MM    RBC 3.13 (L) 4.6 - 6.2 M/CU MM    Hemoglobin 7.4 (L) 13.5 - 18.0 GM/DL    Hematocrit 23.6 (L) 42 - 52 %    MCV 75.4 (L) 78 - 100 FL    MCH 23.6 (L) 27 - 31 PG    MCHC 31.4 (L) 32.0 - 36.0 %    RDW 17.9 (H) 11.7 - 14.9 %    Platelets 276 514 - 482 K/CU MM    MPV 8.6 7.5 - 11.1 FL    Differential Type AUTOMATED DIFFERENTIAL     Segs Relative 76.9 (H) 36 - 66 %    Lymphocytes % 15.4 (L) 24 - 44 %    Monocytes % 6.5 (H) 0 - 4 %    Eosinophils % 0.1 0 - 3 %    Basophils % 0.3 0 - 1 %    Segs Absolute 9.2 K/CU MM    Lymphocytes Absolute 1.8 K/CU MM    Monocytes Absolute 0.8 K/CU MM    Eosinophils Absolute 0.0 K/CU MM    Basophils Absolute 0.0 K/CU MM    Nucleated RBC % 0.2 %    Total Nucleated RBC 0.0 K/CU MM    Total Immature Neutrophil 0.09 K/CU MM    Immature Neutrophil % 0.8 (H) 0 - 0.43 %   Basic Metabolic Panel w/ Reflex to MG    Collection Time: 02/05/23 10:11 PM   Result Value Ref Range    Sodium 135 135 - 145 MMOL/L    Potassium 4.3 3.5 - 5.1 MMOL/L    Chloride 98 (L) 99 - 110 mMol/L    CO2 28 21 - 32 MMOL/L    Anion Gap 9 4 - 16    BUN 11 6 - 23 MG/DL    Creatinine 0.4 (L) 0.9 - 1.3 MG/DL    Est, Glom Filt Rate >60 >60 mL/min/1.73m2    Glucose 123 (H) 70 - 99 MG/DL    Calcium 10.3 8.3 - 10.6 MG/DL   C-Reactive Protein    Collection Time: 02/06/23  6:40 AM   Result Value Ref Range    CRP High Sensitivity 74.4 (H) <5.0 mg/L        Imaging/Diagnostics Last 24 Hours   No results found.     Electronically signed by Daniel Quiroz MD on 2/6/2023 at 2:01 PM

## 2023-02-06 NOTE — PROGRESS NOTES
Fentanyl patch removed from right arm and disposed. New patch applied to left arm. Witnessed by Gregory Bartholomew LPN.

## 2023-02-07 LAB
BASOPHILS ABSOLUTE: 0 K/CU MM
BASOPHILS RELATIVE PERCENT: 0.3 % (ref 0–1)
DIFFERENTIAL TYPE: ABNORMAL
EOSINOPHILS ABSOLUTE: 0.1 K/CU MM
EOSINOPHILS RELATIVE PERCENT: 0.5 % (ref 0–3)
FERRITIN: 628 NG/ML (ref 30–400)
FOLATE SERPL-MCNC: 5.6 NG/ML (ref 3.1–17.5)
HCT VFR BLD CALC: 24.4 % (ref 42–52)
HEMOGLOBIN: 7.7 GM/DL (ref 13.5–18)
IMMATURE NEUTROPHIL %: 0.9 % (ref 0–0.43)
IRON: 32 UG/DL (ref 59–158)
LYMPHOCYTES ABSOLUTE: 2.2 K/CU MM
LYMPHOCYTES RELATIVE PERCENT: 20.1 % (ref 24–44)
MCH RBC QN AUTO: 23.5 PG (ref 27–31)
MCHC RBC AUTO-ENTMCNC: 31.6 % (ref 32–36)
MCV RBC AUTO: 74.6 FL (ref 78–100)
MONOCYTES ABSOLUTE: 0.7 K/CU MM
MONOCYTES RELATIVE PERCENT: 6 % (ref 0–4)
NUCLEATED RBC %: 0.3 %
PCT TRANSFERRIN: 24 % (ref 10–44)
PDW BLD-RTO: 18.3 % (ref 11.7–14.9)
PLATELET # BLD: 424 K/CU MM (ref 140–440)
PMV BLD AUTO: 8.6 FL (ref 7.5–11.1)
RBC # BLD: 3.27 M/CU MM (ref 4.6–6.2)
RETICULOCYTE COUNT PCT: 2.6 % (ref 0.2–2.2)
SEGMENTED NEUTROPHILS ABSOLUTE COUNT: 8 K/CU MM
SEGMENTED NEUTROPHILS RELATIVE PERCENT: 72.2 % (ref 36–66)
TOTAL IMMATURE NEUTOROPHIL: 0.1 K/CU MM
TOTAL IRON BINDING CAPACITY: 136 UG/DL (ref 250–450)
TOTAL NUCLEATED RBC: 0 K/CU MM
UNSATURATED IRON BINDING CAPACITY: 104 UG/DL (ref 110–370)
VITAMIN B-12: 890.3 PG/ML (ref 211–911)
WBC # BLD: 11.1 K/CU MM (ref 4–10.5)

## 2023-02-07 PROCEDURE — 2580000003 HC RX 258: Performed by: INTERNAL MEDICINE

## 2023-02-07 PROCEDURE — 6370000000 HC RX 637 (ALT 250 FOR IP): Performed by: HOSPITALIST

## 2023-02-07 PROCEDURE — 97163 PT EVAL HIGH COMPLEX 45 MIN: CPT

## 2023-02-07 PROCEDURE — 85025 COMPLETE CBC W/AUTO DIFF WBC: CPT

## 2023-02-07 PROCEDURE — 97530 THERAPEUTIC ACTIVITIES: CPT

## 2023-02-07 PROCEDURE — 1200000000 HC SEMI PRIVATE

## 2023-02-07 PROCEDURE — 97166 OT EVAL MOD COMPLEX 45 MIN: CPT

## 2023-02-07 PROCEDURE — 6370000000 HC RX 637 (ALT 250 FOR IP): Performed by: INTERNAL MEDICINE

## 2023-02-07 PROCEDURE — 82607 VITAMIN B-12: CPT

## 2023-02-07 PROCEDURE — 82746 ASSAY OF FOLIC ACID SERUM: CPT

## 2023-02-07 PROCEDURE — 94761 N-INVAS EAR/PLS OXIMETRY MLT: CPT

## 2023-02-07 PROCEDURE — 97112 NEUROMUSCULAR REEDUCATION: CPT

## 2023-02-07 PROCEDURE — 85045 AUTOMATED RETICULOCYTE COUNT: CPT

## 2023-02-07 PROCEDURE — 6360000002 HC RX W HCPCS: Performed by: HOSPITALIST

## 2023-02-07 PROCEDURE — 6370000000 HC RX 637 (ALT 250 FOR IP): Performed by: NURSE PRACTITIONER

## 2023-02-07 PROCEDURE — 83540 ASSAY OF IRON: CPT

## 2023-02-07 PROCEDURE — 6370000000 HC RX 637 (ALT 250 FOR IP): Performed by: STUDENT IN AN ORGANIZED HEALTH CARE EDUCATION/TRAINING PROGRAM

## 2023-02-07 PROCEDURE — 83550 IRON BINDING TEST: CPT

## 2023-02-07 PROCEDURE — 6360000002 HC RX W HCPCS: Performed by: STUDENT IN AN ORGANIZED HEALTH CARE EDUCATION/TRAINING PROGRAM

## 2023-02-07 PROCEDURE — 82728 ASSAY OF FERRITIN: CPT

## 2023-02-07 PROCEDURE — 6360000002 HC RX W HCPCS: Performed by: INTERNAL MEDICINE

## 2023-02-07 PROCEDURE — 99231 SBSQ HOSP IP/OBS SF/LOW 25: CPT | Performed by: INTERNAL MEDICINE

## 2023-02-07 RX ADMIN — OXYCODONE HYDROCHLORIDE 20 MG: 10 TABLET ORAL at 14:36

## 2023-02-07 RX ADMIN — ALPRAZOLAM 0.5 MG: 0.5 TABLET ORAL at 12:17

## 2023-02-07 RX ADMIN — GABAPENTIN 600 MG: 300 CAPSULE ORAL at 14:10

## 2023-02-07 RX ADMIN — TAMSULOSIN HYDROCHLORIDE 0.4 MG: 0.4 CAPSULE ORAL at 09:35

## 2023-02-07 RX ADMIN — BISACODYL 10 MG: 10 SUPPOSITORY RECTAL at 16:35

## 2023-02-07 RX ADMIN — HYDROMORPHONE HYDROCHLORIDE 0.5 MG: 1 INJECTION, SOLUTION INTRAMUSCULAR; INTRAVENOUS; SUBCUTANEOUS at 12:13

## 2023-02-07 RX ADMIN — ALPRAZOLAM 0.5 MG: 0.5 TABLET ORAL at 21:19

## 2023-02-07 RX ADMIN — OXYCODONE HYDROCHLORIDE 20 MG: 10 TABLET ORAL at 21:13

## 2023-02-07 RX ADMIN — DULOXETINE 60 MG: 30 CAPSULE, DELAYED RELEASE ORAL at 09:35

## 2023-02-07 RX ADMIN — PREDNISONE 20 MG: 20 TABLET ORAL at 09:35

## 2023-02-07 RX ADMIN — CYCLOBENZAPRINE 10 MG: 10 TABLET, FILM COATED ORAL at 01:13

## 2023-02-07 RX ADMIN — BICALUTAMIDE 50 MG: 50 TABLET, FILM COATED ORAL at 09:36

## 2023-02-07 RX ADMIN — GABAPENTIN 600 MG: 300 CAPSULE ORAL at 21:13

## 2023-02-07 RX ADMIN — GABAPENTIN 600 MG: 300 CAPSULE ORAL at 09:35

## 2023-02-07 RX ADMIN — SODIUM CHLORIDE: 9 INJECTION, SOLUTION INTRAVENOUS at 08:39

## 2023-02-07 RX ADMIN — MIRTAZAPINE 7.5 MG: 15 TABLET, FILM COATED ORAL at 21:13

## 2023-02-07 RX ADMIN — OXYCODONE HYDROCHLORIDE 20 MG: 10 TABLET ORAL at 01:13

## 2023-02-07 RX ADMIN — KETOROLAC TROMETHAMINE 30 MG: 30 INJECTION, SOLUTION INTRAMUSCULAR at 04:48

## 2023-02-07 RX ADMIN — SODIUM CHLORIDE: 9 INJECTION, SOLUTION INTRAVENOUS at 18:55

## 2023-02-07 RX ADMIN — ALPRAZOLAM 0.5 MG: 0.5 TABLET ORAL at 01:13

## 2023-02-07 RX ADMIN — ENOXAPARIN SODIUM 40 MG: 100 INJECTION SUBCUTANEOUS at 21:13

## 2023-02-07 ASSESSMENT — PAIN DESCRIPTION - DESCRIPTORS
DESCRIPTORS: SHARP;THROBBING;TIGHTNESS
DESCRIPTORS: SHARP;SHOOTING;SPASM

## 2023-02-07 ASSESSMENT — PAIN - FUNCTIONAL ASSESSMENT
PAIN_FUNCTIONAL_ASSESSMENT: ACTIVITIES ARE NOT PREVENTED
PAIN_FUNCTIONAL_ASSESSMENT: PREVENTS OR INTERFERES SOME ACTIVE ACTIVITIES AND ADLS

## 2023-02-07 ASSESSMENT — ENCOUNTER SYMPTOMS
EYES NEGATIVE: 1
GASTROINTESTINAL NEGATIVE: 1
RESPIRATORY NEGATIVE: 1

## 2023-02-07 ASSESSMENT — PAIN SCALES - GENERAL
PAINLEVEL_OUTOF10: 6
PAINLEVEL_OUTOF10: 8
PAINLEVEL_OUTOF10: 10
PAINLEVEL_OUTOF10: 6

## 2023-02-07 ASSESSMENT — PAIN DESCRIPTION - LOCATION
LOCATION: BACK

## 2023-02-07 ASSESSMENT — PAIN SCALES - WONG BAKER
WONGBAKER_NUMERICALRESPONSE: 0

## 2023-02-07 ASSESSMENT — PAIN DESCRIPTION - ORIENTATION: ORIENTATION: LOWER

## 2023-02-07 NOTE — PROGRESS NOTES
HEMATOLOGY ONCOLOGY  Progress Note    HISTORY OF PRESENT ILLNESS   Natalie Grant is a 46 y.o. male with past medical history of metastatic prostate cancer, and metastatic lung cancer who has intermittently been enrolled in hospice care. Review of labs reveal last ADT  22.5 mg. Last refill of abiraterone ,  . It is unclear how long he has not been taking medication. He was seen in office last week and offered radiation therapy to his left pelvis/sacrum. He then rejoined hospice and cancelled radiation therapy appointment. He now reports he would like to pursue all treatment modalities. 23 CT abdomen/pelvis showed extensive osteoblastic metastatic disease with increasing area of destructive and lytic disease particularly     23 CT chest- interval increases size and extent of left hilar/mediastinal mass lesion and SHELLEY atelectasis reflecting progression of disease. Last PSA 22 was 144.0 with prior reading 3/22/22 976.0. He was readmitted with concern for sepsis. He has been on IV meropenem for complicated UTI. Blood and urine cultures obtained in ED. On exam he appears comfortable in bed, remains tachycardic, denies  reports pain is better controlled, denies chest pain, shortness of breath, he denies dysuria, tolerating catheter well. He does endorse pain that prevents him from engaging in ADLs. Patient expressing concern about significant other's ability to care for him at home. In discussing patient care with [facility] concern was expressed for diversion of pain medication from the significant other. Interval   No family at bedside. Pt seen and examined resting in bed. Radiation was condensed to 1 fraction/8 Gy given difficulty tolerating laying flat on treatment table and was completed last week. He states he has fairly good pain control right now. He wants to be able to walk, note that PT/OT is requested.   He remains tachycardic with newly elevated WBC count. 2/7/2023, he reported that he has not gotten out of the bed. Appetite is poor. Continues to have pain in the back. Urinating okay. No bleeding. No fever. PHYSICAL EXAM    Vitals: /81   Pulse (!) 118   Temp 98.1 °F (36.7 °C) (Oral)   Resp 17   Ht 6' 1\" (1.854 m)   Wt 191 lb 12.8 oz (87 kg)   SpO2 97%   BMI 25.30 kg/m²   Awake  Ctab  S1s2 , tachycardia  Soft bs pos      LABORATORY RESULTS  CBC:   Recent Labs     02/05/23  2211 02/07/23  0440   WBC 11.9* 11.1*   HGB 7.4* 7.7*    424     BMP:    Recent Labs     02/05/23  2211      K 4.3   CL 98*   CO2 28   BUN 11   CREATININE 0.4*   GLUCOSE 123*     Hepatic: No results for input(s): AST, ALT, ALB, BILITOT, ALKPHOS in the last 72 hours. INR: No results for input(s): INR in the last 72 hours. RADIOLOGY REPORTS  XR SHOULDER RIGHT (MIN 2 VIEWS)   Final Result   1. Interval progression of expansile metastatic lesion of the distal right   clavicle. No obvious pathologic fracture. 2. Numerous osteoblastic metastases. CT ABDOMEN PELVIS W IV CONTRAST Additional Contrast? None   Final Result   1. Evaluation of the lower lungs and abdomen degraded because of motion   during imaging, blurring detail and resulting in misregistration artifact. 2. Extensive osteoblastic metastatic disease with increasing areas of   destructive and lytic disease particularly the left hemipelvis and throughout   the sacrum. 3. Possible constipation. 4. Possible cystitis. Correlate with urinalysis. 5. Urinary bladder stones are demonstrated. CT CHEST W CONTRAST   Final Result   1. Interval increased size and extent left hilar/mediastinal mass lesion and   left upper lobe atelectasis reflecting progression of disease. Cannot   exclude underlying pneumonia. 2. 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 3. Diffuse osteoblastic metastatic disease.    4. Evaluation of the lower lungs degraded because of motion during imaging,   blurring detail and resulting in misregistration artifact. XR CHEST PORTABLE   Final Result   1. Redemonstration of mediastinal left upper lobe mass consistent with known   malignancy. Diffuse osseous metastatic disease also redemonstrated. No   superimposed acute consolidative change of the lungs identified. ASSESSMENT/RECOMMENDATION    Metastatic prostate cancer-   last known lupron . Last known abiraterone prescription  . Unclear last time he used this.  in 2023 Casodex initiated. Progressive osseous metastatic disease, radiation oncology consulted for evaluation and treatment. CT Sim completed 23, originally planned for palliative radiation to sacrum and R pelvis in 20Gy/5fx however with difficulty tolerating positioning, completed 8Gy/1fx. Metastatic squamous cell lung cancer  -SCC with lung primary biopsy proven metastatic to bone  Positive response to therapy with carbo/alimta/pembro x 1 followed by Beatriz Fay  Noted to have ongoing pattern of exteme non compliance. Has been frequently enrolling and revoking hospice, last revoke 23, last re/enroll . Reports at this time he would like to consider single agent Mj Hitchcock if able. Have discussed that opiate regimen would have to remain under legal limits for us to manage in clinic. Anemia-hemoglobin 7.6 today. No nutritional deficiency. Could be secondary to underlying malignancy, bony metastatic disease, skin infection, antibiotics. Recommend erythrocyte stimulating agent. Transfusion support to keep hemoglobin above 7.    -Ensure adequate analgesic and bowel regimen    Sepsis- ID following, vancomycin, meropenem discontinued, monitor for stability now that he is off. WBC increased to 11.9, note that prednisone was initiated. He is afebrile and HDS although remains tachycardic    Continue to monitor for now and follow-up.     [address]

## 2023-02-07 NOTE — PROGRESS NOTES
[address] [facility],[address] ACUTE CARE OCCUPATIONAL THERAPY EVALUATION    Pete Martin, 1970, 1111/1111-A, 2/7/2023    Discharge Recommendation: SNF vs home with 24hr support and HHOT services    History:  [place]:  The primary encounter diagnosis was Sepsis without acute organ dysfunction, due to unspecified organism ([facility] [address] Diagnoses of Cancer ([facility] [address] and Anemia, unspecified type were also pertinent to this visit. Patient  has a past medical history of CAD (coronary artery disease), Cancer ([address] History of TMJ disorder, Hypertension, and Trigeminal neuralgia. Patient  has a past surgical history that includes Abdomen surgery; Cardiac catheterization (08/03/2016); CT NEEDLE BIOPSY LUNG PERCUTANEOUS (7/28/2021); Port Surgery (Right, 8/13/2021); CT BIOPSY SUPERFICIAL BONE PERCUTANEOUS (12/17/2021); and IR GUIDED NEPHROSTOMY CATH PLACEMENT (1/5/2023). Subjective:  Patient states: \"Just let me rest for a few please\"  Pain: endorses low back pain seated EOB 10/10  Communication with other providers: coeval with PT Parag  Restrictions: general precautions, fall risk, R subclavian port  family at bedside, very kind and receptive to edu and encouraging/supportive of pt. Family reports that pt has not been tolerating OOB/EOB well, much prefers to lie flat in bed and has been doing so for ~1 week.     Home Setup/Prior level of function:  Social/Functional History  Lives With: Family  Type of Home: House  Home Layout: One level  Home Access: Stairs to enter without rails  Entrance Stairs - Number of Steps: 1  Bathroom Shower/Tub: Tub/Shower unit  Bathroom Equipment: Shower chair  Home Equipment: [address]  Has the patient had two or more falls in the past year or any fall with injury in the past year?: Yes  Receives Help From: Family (looking to get inc assist from home health)  ADL Assistance: Needs assistance  Homemaking Assistance: Needs assistance  Ambulation Assistance: Needs assistance  Transfer Assistance: Needs assistance  Active : No  Mode of Transportation: Family, Other (superior transport for appointments)    Examination:  Observation: Pt was low fowlers in bed upon arrival, agreeable to session. Tele, IV via R subclavian camilla veloz. Vision: WFL  Hearing: WFL  Objective Measures: HR elevated throughout, 110s at rest reaching mid 130s with bed mobility. BP taken with pt in semi fowlers after BP mgmt ex WFL     Body Systems and functions:  ROM: WFL in BUE- pt initially states that he cannot extend R elbow, however later in session pt fully extends BUE with pt directed activities. Strength: 3-/5 in BUE. Poor  strength bilaterally, especially in R hand  Sensation: impaired BUE and BLE. States especially dulled in areas of palm/finger innervated by median nerve  Tone: normotonic in BUE  Coordination: impaired bilaterally  Activity Tolerance: poor  Suspect pt largely with neuromuscular/neurological deficits impacting above. Activities of Daily Living (ADLs):  Feeding: min A  Grooming: mod A  Toileting: dep  UB dressing/bathing: mod A  LB dressing/bathing: dep    *pt ADL function inferred from gross functional assessment of mobility, balance, posture, safety awareness, activity tolerance (unless otherwise indicated)    Cognitive and Psychosocial Functioning:  Overall cognitive status: A/Ox4, WNL. Affect: normal    AM-PAC 6 click short form for inpatient daily activity:   How much help from another person does the patient currently need. .. Unable  Dep A Lot  Max A A Lot   Mod A A Little  Min A A Little   CGA  SBA None   Mod I  Indep  Sup   1. Putting on and taking off regular lower body clothing? [x] 1    [] 2   [] 2   [] 3   [] 3   [] 4      2. Bathing (including washing, rinsing, drying)? [x] 1   [] 2   [] 2 [] 3 [] 3 [] 4   3. Toileting, which includes using toilet, bedpan, or urinal? [x] 1    [] 2   [] 2   [] 3   [] 3   [] 4     4. Putting on and taking off regular upper body clothing?  [] 1   [] 2   [x] 2   [] 3   [] 3    [] 4      5. Taking care of personal grooming such as brushing teeth? [] 1   [] 2    [x] 2 [] 3    [] 3   [] 4      6. Eating meals? [] 1   [] 2   [] 2   [x] 3   [] 3   [] 4        Raw Score:  10      24/24 = unimpaired  23/24 = 1-20% impaired   20/24-22/24 = 21-40% impaired  15/24-19/24 = 41-59% impaired   10/24-14/24 = 60%-79% impaired  7/24-9/24 = 80%-99% impaired  6/24 = 100% impaired     Treatment:    Therapeutic Activity Training (30 minutes): Therapeutic activity training was instructed today. Cues were given for safety, sequence, UE/LE placement, visual cues, and balance. Activities performed today included     Pt and family edu/receiving demonstration on adaptive equipment, DME, and exercises for strengthening, endurance, and coordination. Pt given theraputty with activity handout, stress ball, leg , and built up handles for utensils. Supine to sit EOB- max Ax2    Seated balance- mod A. Pt restless with multidirectional LOB seated. Pt with increasing c/o back pain and dizziness, requests several times to return to supine    Sit to supine- max Ax2    Pt edu on need to progress mobility to EOB/OOB to assist with BP mgmt. Pt agreeable to sit in chair position in bed, pt tolerating chair position for <5 minutes before adamantly requesting to return to supine despite encouragement from family/therapists. Pt left in semi fowlers, positioned for comfort. Pt disengaged from session at this point, requests OOB in later session. Education: Role of OT, OT POC, discharge needs, safety, benefits of EOB/OOB activity, AD/DME needs, Home safety  Safety Measures: Gait belt used for safety of pt and therapist, Left in semi fowlers in bed, bed alarm on bed nonfunctioning with tag on bed for maintenance, call light and phone within reach, lines managed    Assessment:  Pt is a 46 yr old male from home with family who presents with sepsis.  Prior to admission, pt was requiring assistance for ADLs, IADLs, and limited mobility/transfers. Pt currently max Ax2 for limited bed mobility and min-total A for ADLs. Pt also presents with generalized weakness, activity tolerance, and impaired mobility. Pt would benefit from continued IP OT services during their stay and discharge to SNF d/t pt chronic/acute deficits and level of assistance from caregivers, though pt and family will likely refuse placement. Family and pt do request inc in [facility] [place] frequency.     Complexity: mod  Prognosis: guarded  Barriers: chronic conditions/comorbidities, guarded overall medical prognosis, debility, neuro deficits    Plan:  Plan: 3+/week- plan to target neuro re-ed and emphasize reaching/dynamic seated activities (stacking cones?), fine motor control/planning of B hands,  strength of B hands to increase pt independence in self care tasks    Treatment to include: Strengthening, ROM, Balance Training, Functional Mobility Training, Endurance Training, Gait Training, Pain Management, Safety Education and Training, Patient+Caregiver Education and Training, Equipment Evaluation Education and Procurement, Positioning, Self Care Training, Home Management Training, Coordination Training, neuromuscular re-education    Pt will complete therapeutic exercise/activity to increase independence in ADL/IADL function    Pt will practice functional transfers and mobility with AD for increased safety and independence    Pt Would Benefit from Continued Edu on importance of OOB, BP mgmt activities  Adaptive Equipment Recommendations: none    Goals:  Time frame for goals: 2 weeks  Pt will complete feeding tasks with mod I  Pt will complete grooming tasks with min A seated at EOB unsupported  Pt will complete toileting tasks with max A using BSC  Pt will complete UB dressing and bathing tasks with min A  Pt will complete LB dressing and bathing tasks with max A  Pt will tolerate dynamic/static seated activities at EOB for 10 minutes consecutively with good tolerance  Pt will successfully reach and grasp for 5/5 cones placed at varying distances and directions while seated with SBA    Time:   Time in: 1123  Time out: 1207  Treatment Minutes: 30  Evaluation Minutes: 10  Total time: 44    Electronically signed by:      SHAZIA Ba  2/7/2023, 1:08 PM

## 2023-02-07 NOTE — CARE COORDINATION
This RN case manager to bedside to speak with patient regarding therapy recommendations. Patient and wife do NOT want SNF placement. They are interested in [facility] , however , at this time patient does not have a PCP since they have rescinded Hospice. I have provided a list of PCPs to wife and patient. I will also see if ID recommends IVAB at [place]. CM following. [id number] -  advised that they can follow pt for [facility]. Referral to [phone].

## 2023-02-07 NOTE — PROGRESS NOTES
Comprehensive Nutrition Assessment    Type and Reason for Visit:  Reassess    Nutrition Recommendations/Plan:   Continue regular diet with snacks as desires  Will continue to offer oral nutrition supplements with meals  Will continue to follow up during stay     Malnutrition Assessment:  Malnutrition Status: Moderate malnutrition (Ongoing) (02/03/23 2424)    Context:  Chronic Illness       Nutrition Assessment:    Tolerating regular diet with recent meal intake 50-75% and agreeable to consuming high calorie oral nutrition supplements. Improving intake at recent meals. Will continue to follow at moderate nutrition risk at this time. Nutrition Related Findings:    resting in bed, pain with cancer, started on remeron Wound Type: None       Current Nutrition Intake & Therapies:    Average Meal Intake: 51-75%  Average Supplements Intake: %  ADULT DIET; Regular  ADULT ORAL NUTRITION SUPPLEMENT; Breakfast, Dinner; Standard High Calorie/High Protein Oral Supplement  ADULT ORAL NUTRITION SUPPLEMENT; Dinner; Frozen Oral Supplement    Anthropometric Measures:  Height: 6' 1\" (185.4 cm)  Ideal Body Weight (IBW): 184 lbs (84 kg)       Current Body Weight: 191 lb 12.8 oz (87 kg), 104.2 % IBW. Weight Source: Bed Scale  Current BMI (kg/m2): 25.3  Usual Body Weight: 192 lb 14.4 oz (87.5 kg) (hx wt ~ 200# past years, july wt 2022)  % Weight Change (Calculated): -0.6  Weight Adjustment For: No Adjustment                 BMI Categories: Overweight (BMI 25.0-29. 9)    Estimated Daily Nutrient Needs:  Energy Requirements Based On: Kcal/kg  Weight Used for Energy Requirements: Current  Energy (kcal/day): 1020-6670 (25-30 joseph/kg)  Weight Used for Protein Requirements: Current  Protein (g/day):  (1-1.2 g/kg)  Method Used for Fluid Requirements: 1 ml/kcal  Fluid (ml/day): 2100    Nutrition Diagnosis:   Moderate malnutrition, In context of chronic illness related to increase demand for energy/nutrients, catabolic illness as evidenced by poor intake prior to admission, mild muscle loss, mild loss of subcutaneous fat    Nutrition Interventions:   Food and/or Nutrient Delivery: Continue Current Diet, Continue Oral Nutrition Supplement  Nutrition Education/Counseling:  (Send handouts for Maximizing Nutrition Through Cancer Treatment and High calorie, high protein nutriiton therapy)  Coordination of Nutrition Care: Continue to monitor while inpatient  Plan of Care discussed with: patient    Goals:  Previous Goal Met: Progressing toward Goal(s)  Goals: PO intake 50% or greater, by next RD assessment       Nutrition Monitoring and Evaluation:   Behavioral-Environmental Outcomes: None Identified  Food/Nutrient Intake Outcomes: Food and Nutrient Intake, Supplement Intake  Physical Signs/Symptoms Outcomes: Biochemical Data, Meal Time Behavior, Skin, Weight, GI Status    Discharge Planning:    Continue current diet, Continue Oral Nutrition Supplement     Madison Childers, RD, LD  Contact: [phone]

## 2023-02-07 NOTE — PLAN OF CARE
Problem: Skin/Tissue Integrity  Goal: Absence of new skin breakdown  Description: 1. Monitor for areas of redness and/or skin breakdown  2. Assess vascular access sites hourly  3. Every 4-6 hours minimum:  Change oxygen saturation probe site  4. Every 4-6 hours:  If on nasal continuous positive airway pressure, respiratory therapy assess nares and determine need for appliance change or resting period.   Outcome: Progressing     Problem: Pain  Goal: Verbalizes/displays adequate comfort level or baseline comfort level  Outcome: Progressing  Flowsheets (Taken 2/6/2023 2000 by Queta Majano LPN)  Verbalizes/displays adequate comfort level or baseline comfort level: Encourage patient to monitor pain and request assistance     Problem: Nutrition Deficit:  Goal: Optimize nutritional status  Outcome: Progressing     Problem: Safety - Adult  Goal: Free from fall injury  Outcome: Progressing     Problem: Discharge Planning  Goal: Discharge to home or other facility with appropriate resources  Outcome: Progressing  Flowsheets (Taken 2/6/2023 2000 by Queta Majano LPN)  Discharge to home or other facility with appropriate resources: Identify barriers to discharge with patient and caregiver

## 2023-02-07 NOTE — PROGRESS NOTES
Physician Progress Note      PATIENT:               Keerthi Kincaid  CSN #:                  [id number]  :                       1970  ADMIT DATE:       2023 2:14 PM  [address] [place] DATE:  RESPONDING  PROVIDER #:        Anita Torres MD          QUERY TEXT:    Hospitalists,    Pt admitted with sepsis of unknown infection source. Patient had recent   admission for CAUTI and has possible cystitis documented. If possible, please   document in the progress notes and discharge summary if you are evaluating   and/or treating any of the following: The medical record reflects the following:  Risk Factors: sampson cath  Clinical Indicators: recent CAUTI, possible cystitis documented, urine culture   no growth  Treatment: labs, imaging, IV atb    Thank you,  Vishal Bond RN CDS  [phone]  Options provided:  -- UTI due to chronic indwelling urinary catheter  -- UTI not due to indwelling urinary catheter  -- UTI ruled out  -- Other - I will add my own diagnosis  -- Disagree - Not applicable / Not valid  -- Disagree - Clinically unable to determine / Unknown  -- Refer to Clinical Documentation Reviewer    PROVIDER RESPONSE TEXT:    UTI ruled out.     Query created by: Amy Heart on 2023 2:36 PM      Electronically signed by:  Anita Torres MD 2023 7:01 PM

## 2023-02-07 NOTE — PROGRESS NOTES
Physical Therapy  [facility] ACUTE CARE PHYSICAL THERAPY EVALUATION  Eliezer Navarro, 1970, 1111/1111-A, 2/7/2023    History  [place]:  The primary encounter diagnosis was Sepsis without acute organ dysfunction, due to unspecified organism ([address] Diagnoses of Cancer ([address] and Anemia, unspecified type were also pertinent to this visit. Patient  has a past medical history of CAD (coronary artery disease), Cancer ([address] History of TMJ disorder, Hypertension, and Trigeminal neuralgia. Patient  has a past surgical history that includes Abdomen surgery; Cardiac catheterization (08/03/2016); CT NEEDLE BIOPSY LUNG PERCUTANEOUS (7/28/2021); Port Surgery (Right, 8/13/2021); CT BIOPSY SUPERFICIAL BONE PERCUTANEOUS (12/17/2021); and IR GUIDED NEPHROSTOMY CATH PLACEMENT (1/5/2023). Assessment:  Pt presents 7 days s/p admission for malaise, infection, hx of cancer w/ further imaging pending. Pt is from home w/ family, in 1 level house w/ 1 JANET, extensive hx of falls 2/2 weakness, requiring assist for all activities, extensive comorbid conditions. Pt is primarily limited by strength, balance, pain, additional deficits include functional mobility, cognition, posture, sensation, endurance, cognition. He requires heavy assist for mobility, pain impacting all function, hospice pending. Recommending SNF. Complexity: High  Prognosis: Good, deconditioning, comorbid conditions, pain  Plan 3+/week, 1 week  Equipment: pend at this time    Recommendations for NURSING mobility: ASTON for OOB    Subjective:  Patient states:  \"Can I have a few minutes of rest flor\"    Pain:  10/10 LBP .     Communication with other providers:  Handoff to RN, co-eval with YUDY Dowell  Restrictions: fall risk; general precautions    Home Setup/Prior level of function  Social/Functional History  Lives With: Family  Type of Home: House  Home Layout: One level  Home Access: Stairs to enter without rails  Entrance Stairs - Number of Steps: 1  Bathroom Shower/Tub: Tub/Shower unit  Bathroom Equipment: Shower chair  Home Equipment: [address]  Has the patient had two or more falls in the past year or any fall with injury in the past year?: Yes  Receives Help From: Family (looking to get inc assist from home health)  ADL Assistance: Needs assistance  Homemaking Assistance: Needs assistance  Ambulation Assistance: Needs assistance  Transfer Assistance: Needs assistance  Active : No  Mode of Transportation: Family, Other (superior transport for appointments)    Examination of body systems (includes body structures/functions, activity/participation limitations):  Observation:  Supine, awake, alert, agreeable. He is distractible and restless 2/2 pain ; deconditioned w/ recent prolonged supine positioning, comorbid conditions w/ hospice on hold pending medical improvement. Tele, BP cuff, IV, bed alarm in place upon arrival  Posture: poor + ; impaired truncal tone, restless 2/2 pain, reliant on BL UE support, moderate assist to attend  Vision:  glasses  Hearing:  [facility]  Cardiopulmonary:  WFL  Cognition: A&O x 4 ; intermittent confusion, follows commands w/ redirection, distractible in pain, impaired recall of recent events, cognition impacting participation intermittently, fair + safety awareness, mod cues needed for sequencing/setup, mod cues needed for initiation, mod cues to bring inc awareness to errors    Musculoskeletal  ROM R/L: AROM WFL. Strength R/L:  grossly 3-/5  Sensation: partial dec sensation to light touch t/o all of BLE + UE from elbows down along median nerve distribution  Tone: normotonic  Coordination: WFL  Proprioception: WFL    Mobility:  Supine to sit:  max A x 2  Transfers: NT ; pain and dizziness impacting - normotonic   Sitting balance:  poor + . Standing balance:  NT ; refusing SPT to recliner 2/2 pain and dizziness.     Gait: NT ; has been unable to ambulate recently 2/2 weakness/deconditioning, pain     AMPAC 6 Clicks Inpatient Mobility:  AM-PAC Inpatient Mobility Raw Score : 9    Goals:  Pt Goals: return home  Short Term Goals  Time Frame for Short Term Goals: 1 week  Short Term Goal 1: pt will complete bed mobility at min A  Short Term Goal 2: pt will complete SPT at mod A  Short Term Goal 3: pt will demonstrate fair + seated balance for attention  Short Term Goal 4: pt will demonstrate seated tolerance for ~1 hour for transportation to/from appointments and progression to inc OOB activities     Treatment plan:  Bed mobility, functional mobility, transfers, balance, gait, TA, TX, strength activities, neuro    Treatment:  Neuro-Muscular re-education:  Cues were given for position, posture, kinesthetic sense, safety, recruitment, and rationale. Cues were verbal and/or tactile.   Seated balance, postural tolerance education, functional mobility/safety education for pt in family    Positioned for comfort and pressure relief  Safety: patient left in bed, call light within reach, RN notified, gait belt used, all needs met    Time:   Time in: 1123  Time out: 1207  Timed treatment minutes: 34  Total time + eval: 44    Electronically signed by:    Jerica Telles PT, DPT  2/7/2023, 1:09 PM

## 2023-02-07 NOTE — PROGRESS NOTES
Physician Progress Note      PATIENT:               Jhonathan Santos  CSN #:                  [id number]  :                       1970  ADMIT DATE:       2023 2:14 PM  [address] [place] DATE:  RESPONDING  PROVIDER #:        Rama Link MD          QUERY TEXT:    Hospitalists,    Pt admitted with sepsis, etiology undetermined. Noted documentation of    possible  PNA documented by the ED physician. If possible, please document in   progress notes and discharge summary any of the following: The medical record reflects the following:  Risk Factors: metastatic CA  Clinical Indicators: Per ED physician: \"Opacities in the right lower lung   appear to be more than just atelectasis and could have pneumonitis or   developing infection. \" CT chest: Cannot exclude pneumonia; post radiation   pneumonitis  Treatment: labs, imaging, meropenem, Vanco and Zosyn    Thank you,  Sidney Lilly RN CDS  [phone]  Options provided:  -- Gram negative pneumonia  -- Gram positive pneumonia  -- Radiation Pneumonitis  -- Other - I will add my own diagnosis  -- Disagree - Not applicable / Not valid  -- Disagree - Clinically unable to determine / Unknown  -- Refer to Clinical Documentation Reviewer    PROVIDER RESPONSE TEXT:    Provider is clinically unable to determine a response to this query.     Query created by: Aminata Robin on 2023 12:26 PM      Electronically signed by:  Rama Link MD 2023 12:33 PM

## 2023-02-08 LAB
ANION GAP SERPL CALCULATED.3IONS-SCNC: 9 MMOL/L (ref 4–16)
BUN SERPL-MCNC: 10 MG/DL (ref 6–23)
CALCIUM SERPL-MCNC: 10.1 MG/DL (ref 8.3–10.6)
CHLORIDE BLD-SCNC: 99 MMOL/L (ref 99–110)
CO2: 29 MMOL/L (ref 21–32)
CREAT SERPL-MCNC: 0.5 MG/DL (ref 0.9–1.3)
GFR SERPL CREATININE-BSD FRML MDRD: >60 ML/MIN/1.73M2
GLUCOSE SERPL-MCNC: 84 MG/DL (ref 70–99)
HCT VFR BLD CALC: 22.3 % (ref 42–52)
HEMOGLOBIN: 7.1 GM/DL (ref 13.5–18)
MCH RBC QN AUTO: 23.7 PG (ref 27–31)
MCHC RBC AUTO-ENTMCNC: 31.8 % (ref 32–36)
MCV RBC AUTO: 74.6 FL (ref 78–100)
PDW BLD-RTO: 18.6 % (ref 11.7–14.9)
PLATELET # BLD: 426 K/CU MM (ref 140–440)
PMV BLD AUTO: 9 FL (ref 7.5–11.1)
POTASSIUM SERPL-SCNC: 3.9 MMOL/L (ref 3.5–5.1)
RBC # BLD: 2.99 M/CU MM (ref 4.6–6.2)
SODIUM BLD-SCNC: 137 MMOL/L (ref 135–145)
WBC # BLD: 10.2 K/CU MM (ref 4–10.5)

## 2023-02-08 PROCEDURE — 6370000000 HC RX 637 (ALT 250 FOR IP): Performed by: INTERNAL MEDICINE

## 2023-02-08 PROCEDURE — 6360000002 HC RX W HCPCS: Performed by: INTERNAL MEDICINE

## 2023-02-08 PROCEDURE — 6370000000 HC RX 637 (ALT 250 FOR IP): Performed by: HOSPITALIST

## 2023-02-08 PROCEDURE — 1200000000 HC SEMI PRIVATE

## 2023-02-08 PROCEDURE — 6370000000 HC RX 637 (ALT 250 FOR IP): Performed by: NURSE PRACTITIONER

## 2023-02-08 PROCEDURE — 2580000003 HC RX 258: Performed by: INTERNAL MEDICINE

## 2023-02-08 PROCEDURE — 99231 SBSQ HOSP IP/OBS SF/LOW 25: CPT | Performed by: INTERNAL MEDICINE

## 2023-02-08 PROCEDURE — 51702 INSERT TEMP BLADDER CATH: CPT

## 2023-02-08 PROCEDURE — 80048 BASIC METABOLIC PNL TOTAL CA: CPT

## 2023-02-08 PROCEDURE — 6360000002 HC RX W HCPCS: Performed by: HOSPITALIST

## 2023-02-08 PROCEDURE — 94761 N-INVAS EAR/PLS OXIMETRY MLT: CPT

## 2023-02-08 PROCEDURE — 85027 COMPLETE CBC AUTOMATED: CPT

## 2023-02-08 PROCEDURE — 6370000000 HC RX 637 (ALT 250 FOR IP): Performed by: STUDENT IN AN ORGANIZED HEALTH CARE EDUCATION/TRAINING PROGRAM

## 2023-02-08 PROCEDURE — 6360000002 HC RX W HCPCS: Performed by: STUDENT IN AN ORGANIZED HEALTH CARE EDUCATION/TRAINING PROGRAM

## 2023-02-08 RX ORDER — OXYCODONE HYDROCHLORIDE AND ACETAMINOPHEN 5; 325 MG/1; MG/1
1 TABLET ORAL EVERY 6 HOURS PRN
Status: DISCONTINUED | OUTPATIENT
Start: 2023-02-08 | End: 2023-02-08

## 2023-02-08 RX ORDER — OXYCODONE HCL 20 MG/1
20 TABLET, FILM COATED, EXTENDED RELEASE ORAL EVERY 12 HOURS SCHEDULED
Status: DISCONTINUED | OUTPATIENT
Start: 2023-02-08 | End: 2023-02-12

## 2023-02-08 RX ADMIN — PREDNISONE 20 MG: 20 TABLET ORAL at 10:18

## 2023-02-08 RX ADMIN — OXYCODONE HYDROCHLORIDE 20 MG: 10 TABLET ORAL at 02:58

## 2023-02-08 RX ADMIN — OXYCODONE HYDROCHLORIDE 20 MG: 20 TABLET, FILM COATED, EXTENDED RELEASE ORAL at 21:21

## 2023-02-08 RX ADMIN — ENOXAPARIN SODIUM 40 MG: 100 INJECTION SUBCUTANEOUS at 21:22

## 2023-02-08 RX ADMIN — ALPRAZOLAM 0.5 MG: 0.5 TABLET ORAL at 13:34

## 2023-02-08 RX ADMIN — TAMSULOSIN HYDROCHLORIDE 0.4 MG: 0.4 CAPSULE ORAL at 10:06

## 2023-02-08 RX ADMIN — GABAPENTIN 600 MG: 300 CAPSULE ORAL at 09:00

## 2023-02-08 RX ADMIN — SODIUM CHLORIDE, PRESERVATIVE FREE 10 ML: 5 INJECTION INTRAVENOUS at 10:18

## 2023-02-08 RX ADMIN — GABAPENTIN 600 MG: 300 CAPSULE ORAL at 13:34

## 2023-02-08 RX ADMIN — OXYCODONE HYDROCHLORIDE 20 MG: 10 TABLET ORAL at 10:06

## 2023-02-08 RX ADMIN — OXYCODONE AND ACETAMINOPHEN 1 TABLET: 5; 325 TABLET ORAL at 16:48

## 2023-02-08 RX ADMIN — GABAPENTIN 600 MG: 300 CAPSULE ORAL at 21:21

## 2023-02-08 RX ADMIN — ALPRAZOLAM 0.5 MG: 0.5 TABLET ORAL at 21:22

## 2023-02-08 RX ADMIN — SODIUM CHLORIDE, PRESERVATIVE FREE 10 ML: 5 INJECTION INTRAVENOUS at 21:22

## 2023-02-08 RX ADMIN — EPOETIN ALFA-EPBX 10000 UNITS: 10000 INJECTION, SOLUTION INTRAVENOUS; SUBCUTANEOUS at 10:07

## 2023-02-08 RX ADMIN — DULOXETINE 60 MG: 30 CAPSULE, DELAYED RELEASE ORAL at 10:06

## 2023-02-08 RX ADMIN — BICALUTAMIDE 50 MG: 50 TABLET, FILM COATED ORAL at 10:07

## 2023-02-08 RX ADMIN — HYDROMORPHONE HYDROCHLORIDE 0.5 MG: 1 INJECTION, SOLUTION INTRAMUSCULAR; INTRAVENOUS; SUBCUTANEOUS at 13:34

## 2023-02-08 RX ADMIN — MIRTAZAPINE 7.5 MG: 15 TABLET, FILM COATED ORAL at 21:21

## 2023-02-08 RX ADMIN — HYDROMORPHONE HYDROCHLORIDE 0.5 MG: 1 INJECTION, SOLUTION INTRAMUSCULAR; INTRAVENOUS; SUBCUTANEOUS at 05:59

## 2023-02-08 ASSESSMENT — PAIN DESCRIPTION - DESCRIPTORS
DESCRIPTORS: CRAMPING;DISCOMFORT
DESCRIPTORS: ACHING
DESCRIPTORS: ACHING
DESCRIPTORS: SHARP
DESCRIPTORS: ACHING

## 2023-02-08 ASSESSMENT — ENCOUNTER SYMPTOMS
GASTROINTESTINAL NEGATIVE: 1
RESPIRATORY NEGATIVE: 1
EYES NEGATIVE: 1

## 2023-02-08 ASSESSMENT — PAIN DESCRIPTION - PAIN TYPE: TYPE: CHRONIC PAIN

## 2023-02-08 ASSESSMENT — PAIN SCALES - GENERAL
PAINLEVEL_OUTOF10: 10
PAINLEVEL_OUTOF10: 9
PAINLEVEL_OUTOF10: 10

## 2023-02-08 ASSESSMENT — PAIN - FUNCTIONAL ASSESSMENT
PAIN_FUNCTIONAL_ASSESSMENT: PREVENTS OR INTERFERES SOME ACTIVE ACTIVITIES AND ADLS
PAIN_FUNCTIONAL_ASSESSMENT: ACTIVITIES ARE NOT PREVENTED
PAIN_FUNCTIONAL_ASSESSMENT: PREVENTS OR INTERFERES SOME ACTIVE ACTIVITIES AND ADLS
PAIN_FUNCTIONAL_ASSESSMENT: ACTIVITIES ARE NOT PREVENTED
PAIN_FUNCTIONAL_ASSESSMENT: PREVENTS OR INTERFERES SOME ACTIVE ACTIVITIES AND ADLS

## 2023-02-08 ASSESSMENT — PAIN DESCRIPTION - LOCATION
LOCATION: GENERALIZED
LOCATION: GENERALIZED
LOCATION: ABDOMEN;GENERALIZED
LOCATION: BACK
LOCATION: OTHER (COMMENT)
LOCATION: BACK
LOCATION: OTHER (COMMENT)

## 2023-02-08 ASSESSMENT — PAIN SCALES - WONG BAKER
WONGBAKER_NUMERICALRESPONSE: 0

## 2023-02-08 ASSESSMENT — PAIN DESCRIPTION - ORIENTATION
ORIENTATION: MID
ORIENTATION: OTHER (COMMENT)
ORIENTATION: OTHER (COMMENT)

## 2023-02-08 ASSESSMENT — PAIN DESCRIPTION - ONSET: ONSET: ON-GOING

## 2023-02-08 ASSESSMENT — PAIN DESCRIPTION - FREQUENCY: FREQUENCY: CONTINUOUS

## 2023-02-08 NOTE — PROGRESS NOTES
HEMATOLOGY ONCOLOGY  Progress Note    HISTORY OF PRESENT ILLNESS   Elodia Miramontes is a 46 y.o. male with past medical history of metastatic prostate cancer, and metastatic lung cancer who has intermittently been enrolled in hospice care. Review of labs reveal last ADT  22.5 mg. Last refill of abiraterone ,  . It is unclear how long he has not been taking medication. He was seen in office last week and offered radiation therapy to his left pelvis/sacrum. He then rejoined hospice and cancelled radiation therapy appointment. He now reports he would like to pursue all treatment modalities. 23 CT abdomen/pelvis showed extensive osteoblastic metastatic disease with increasing area of destructive and lytic disease particularly     23 CT chest- interval increases size and extent of left hilar/mediastinal mass lesion and SHELLEY atelectasis reflecting progression of disease. Last PSA 22 was 144.0 with prior reading 3/22/22 976.0. He was readmitted with concern for sepsis. He has been on IV meropenem for complicated UTI. Blood and urine cultures obtained in ED. On exam he appears comfortable in bed, remains tachycardic, denies  reports pain is better controlled, denies chest pain, shortness of breath, he denies dysuria, tolerating catheter well. He does endorse pain that prevents him from engaging in ADLs. Patient expressing concern about significant other's ability to care for him at home. In discussing patient care with [facility] concern was expressed for diversion of pain medication from the significant other. Interval   No family at bedside. Pt seen and examined resting in bed. Radiation was condensed to 1 fraction/8 Gy given difficulty tolerating laying flat on treatment table and was completed last week. He states he has fairly good pain control right now. He wants to be able to walk, note that PT/OT is requested.   He remains tachycardic with newly elevated WBC count. 2/7/2023, he reported that he has not gotten out of the bed. Appetite is poor. Continues to have pain in the back. Urinating okay. No bleeding. No fever. 2/8/2023 patient seen and examined. Has not been able to get out of the bed. Physical therapy came yesterday and evaluated, he was able to get up only with the help of a hoist.  Had good bowel movement yesterday. No bleeding. No fever. Pain with activity otherwise controlled. PHYSICAL EXAM    Vitals: BP (!) 131/91   Pulse (!) 106   Temp 98.9 °F (37.2 °C) (Oral)   Resp 20   Ht 6' 1\" (1.854 m)   Wt 191 lb 12.8 oz (87 kg)   SpO2 95%   BMI 25.30 kg/m²   Awake  Ctab  S1s2 , tachycardia  Soft bs pos      LABORATORY RESULTS  CBC:   Recent Labs     02/05/23  2211 02/07/23  0440 02/08/23  0555   WBC 11.9* 11.1* 10.2   HGB 7.4* 7.7* 7.1*    424 426     BMP:    Recent Labs     02/05/23  2211 02/08/23  0555    137   K 4.3 3.9   CL 98* 99   CO2 28 29   BUN 11 10   CREATININE 0.4* 0.5*   GLUCOSE 123* 84     Hepatic: No results for input(s): AST, ALT, ALB, BILITOT, ALKPHOS in the last 72 hours. INR: No results for input(s): INR in the last 72 hours. RADIOLOGY REPORTS  XR SHOULDER RIGHT (MIN 2 VIEWS)   Final Result   1. Interval progression of expansile metastatic lesion of the distal right   clavicle. No obvious pathologic fracture. 2. Numerous osteoblastic metastases. CT ABDOMEN PELVIS W IV CONTRAST Additional Contrast? None   Final Result   1. Evaluation of the lower lungs and abdomen degraded because of motion   during imaging, blurring detail and resulting in misregistration artifact. 2. Extensive osteoblastic metastatic disease with increasing areas of   destructive and lytic disease particularly the left hemipelvis and throughout   the sacrum. 3. Possible constipation. 4. Possible cystitis. Correlate with urinalysis. 5. Urinary bladder stones are demonstrated.          CT CHEST W CONTRAST   Final Result   1. Interval increased size and extent left hilar/mediastinal mass lesion and   left upper lobe atelectasis reflecting progression of disease. Cannot   exclude underlying pneumonia. 2. 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 3. Diffuse osteoblastic metastatic disease. 4. Evaluation of the lower lungs degraded because of motion during imaging,   blurring detail and resulting in misregistration artifact. XR CHEST PORTABLE   Final Result   1. Redemonstration of mediastinal left upper lobe mass consistent with known   malignancy. Diffuse osseous metastatic disease also redemonstrated. No   superimposed acute consolidative change of the lungs identified. ASSESSMENT/RECOMMENDATION    Metastatic prostate cancer-   last known lupron . Last known abiraterone prescription  . Unclear last time he used this.  in 2023 Casodex initiated. Progressive osseous metastatic disease, radiation oncology consulted for evaluation and treatment. CT Sim completed 23, originally planned for palliative radiation to sacrum and R pelvis in 20Gy/5fx however with difficulty tolerating positioning, completed 8Gy/1fx. Metastatic squamous cell lung cancer  -SCC with lung primary biopsy proven metastatic to bone  Positive response to therapy with carbo/alimta/pembro x 1 followed by Narinder Martinez  Noted to have ongoing pattern of exteme non compliance. Has been frequently enrolling and revoking hospice, last revoke 23, last re/enroll . Reports at this time he would like to consider single agent [place] if able. Anemia-hemoglobin 7.1 today. No nutritional deficiency. Could be secondary to underlying malignancy, bony metastatic disease, skin infection, antibiotics. Started erythrocyte stimulating agent. Transfusion support to keep hemoglobin above 7.    -Ensure adequate analgesic and bowel regimen.   Will discuss with physical therapy    Sepsis- ID following    Continue to monitor for now and follow-up.   Discussed with his partner and will discuss with admitting team    TOMI

## 2023-02-08 NOTE — PROGRESS NOTES
V2.0  [facility] [place]      Name:  Elodia Miramontes /Age/Sex: 1970  (46 y.o. male)   MRN & CSN:  [id number] & [id number] Encounter Date/Time: 2023 7:32 AM EST    Location:  1111/1111-A PCP: Michaela Celeste MD       Hospital Day: 9    Assessment and Plan:   Elodia Miramontes is a 46 y.o. male with pmh of CAD, history of metastatic prostate cancer, and squamous cell carcinoma of the left lung  who presents with Sepsis [facility])      Patient and family unwilling to go the SNF route and would prefer [place] [id number]. No PCP at this time,  working with them to establish need for [place] [address] and provider that will follow in the outpatient setting    Plan:  Concern for sepsis - ruled out  --Presented with fever and tachycardia, unclear source of infection at this time. He was started on broad-spectrum IV antibiotics. -- RVP, MRSA screen, UA and UCX were unremarkable  --Imaging showed no evidence of pneumonia and the CT of abdomen and pelvis was read for possible cystitis however the UA was unremarkable. Patient had recently undergone treatment with meropenem for UTI at home  --ID on board, recommended that antibiotics be discontinued and the patient be monitored with a plan to restart meropenem post blood cultures if the patient spikes a fever. Beyond the fever on admission, he has remained afebrile but remains tachycardic  --Mild leukocytosis resolved, WBC 10.2 this AM    #Hyponatremia  - Resolved  --Likely secondary to poor oral intake, Na 137 this AM  --Continue to monitor    #Chronic anemia  --H/H 7.1 this AM, may benefit from 1 unit of blood today, patient likely has BM infiltration per hemeonc  --Will obtain type and screen and repeat H/H this afternoon  --Monitor and transfuse for hgb <7, continue retacrit MWF as long as hgb is <10    #History of metastatic prostate cancer:  --CT chest with diffuse osteoblastic metastatic disease, CT A/P with extensive osteoblastic metastatic disease.  Last known Lupron dose was on 4/22, Last known abiraterone prescription 11/22, unclear last time he used it.   --PSA on this admission 233 up from 144 on 12/22/22  --Continue Casodex and tamsulosin  --Heme-onc on board, continue steroid therapy. Plan for outpatient follow up and PSA measurements    #History of squamous cell carcinoma left lung-diagnosed on 12/13/2021:  --Status post debulking of primary tumor via bronchoscopy at [address] [place] [address], positive response to therapy with carbo/Alimta/Pembro X1 followed by hCon [facility].   --Patient goes back and forth between being on hospice and not being on hospice  --[facility] to attempt treating with Mary Jane Wagner in the outpatient setting once he is feeling better    #History of urinary retention with chronic indwelling Doherty  --Had prior nephrostomy tube which was dislodged and replaced back  --Doherty remains in place with good output    #Anxiety disorder  --Continue alprazolam    #Depression  --Continue Mirtazapine and duloxetine    #Pain control  --Will continue home regimen of fentanyl patch, and PO percocet. IV dilaudid PRN for breakthrough pain    Diet ADULT DIET; Regular  ADULT ORAL NUTRITION SUPPLEMENT; Breakfast, Dinner; Standard High Calorie/High Protein Oral Supplement  ADULT ORAL NUTRITION SUPPLEMENT; Dinner; Frozen Oral Supplement   DVT Prophylaxis [x] Lovenox, []  Heparin, [] SCDs, [] Ambulation,  [] Eliquis, [] Xarelto  [] Coumadin   Code Status Full Code   Disposition From: Home  Expected Disposition: [facility]  Estimated Date of Discharge: 1-2 days  Patient requires continued admission due to need to establish [facility]    Surrogate Decision Maker/ POA      Subjective:     Chief Complaint: Fall       Patient seen and examined at bedside. He is being fed breakfast. Discussed recommendations for PT and patient called wife and put her on the phone. Discussed at length the need for rehab but both patient and family report their preference is home.      Review of Systems:    Review of Systems Constitutional: Negative. HENT: Negative. Eyes: Negative. Respiratory: Negative. Cardiovascular: Negative. Gastrointestinal: Negative. Genitourinary:         Chronic sampson   Musculoskeletal:         Pain in both legs   Skin: Negative. Neurological: Negative. Psychiatric/Behavioral: Negative. Objective: Intake/Output Summary (Last 24 hours) at 2/8/2023 0738  Last data filed at 2/8/2023 0352  Gross per 24 hour   Intake --   Output 4750 ml   Net -4750 ml          Vitals:   Vitals:    02/08/23 0629   BP:    Pulse:    Resp: 20   Temp:    SpO2:        Physical Exam:   General: NAD, resting in bed, being fed breakfast by PCT  Eyes: EOMI  ENT: Moist mucous membrane  Cardiovascular: Regular rate  Respiratory: Clear to auscultation  Gastrointestinal: Soft, non tender  Genitourinary: no suprapubic tenderness, sampson cath in place  Musculoskeletal: No edema  Skin: warm, dry  Neuro: Alert. Psych: Mood appropriate.      Medications:   Medications:    nicotine  1 patch TransDERmal Daily    epoetin kelsi-epbx  10,000 Units SubCUTAneous Once per day on Mon Wed Fri    mirtazapine  7.5 mg Oral Nightly    bicalutamide  50 mg Oral Daily    DULoxetine  60 mg Oral Daily    fentaNYL  1 patch TransDERmal Q72H    gabapentin  600 mg Oral TID    predniSONE  20 mg Oral Daily    tamsulosin  0.4 mg Oral Daily    sodium chloride flush  5-40 mL IntraVENous 2 times per day    enoxaparin  40 mg SubCUTAneous Daily      Infusions:    sodium chloride 100 mL/hr at 02/07/23 1855    sodium chloride       PRN Meds: bisacodyl, 10 mg, Daily PRN  diphenhydrAMINE, 25 mg, Q6H PRN  oxyCODONE, 20 mg, Q6H PRN   And  acetaminophen, 650 mg, Q6H PRN  HYDROmorphone, 0.25 mg, Q3H PRN   Or  HYDROmorphone, 0.5 mg, Q3H PRN  ALPRAZolam, 0.5 mg, TID PRN  cyclobenzaprine, 10 mg, TID PRN  naloxone, 4 mg, PRN  sodium chloride flush, 5-40 mL, PRN  sodium chloride, , PRN  ondansetron, 4 mg, Q8H PRN   Or  ondansetron, 4 mg, Q6H PRN  polyethylene glycol, 17 g, Daily PRN  acetaminophen, 650 mg, Q6H PRN   Or  acetaminophen, 650 mg, Q6H PRN      Labs      Recent Results (from the past 24 hour(s))   Basic Metabolic Panel    Collection Time: 02/08/23  5:55 AM   Result Value Ref Range    Sodium 137 135 - 145 MMOL/L    Potassium 3.9 3.5 - 5.1 MMOL/L    Chloride 99 99 - 110 mMol/L    CO2 29 21 - 32 MMOL/L    Anion Gap 9 4 - 16    BUN 10 6 - 23 MG/DL    Creatinine 0.5 (L) 0.9 - 1.3 MG/DL    Est, Glom Filt Rate >60 >60 mL/min/1.73m2    Glucose 84 70 - 99 MG/DL    Calcium 10.1 8.3 - 10.6 MG/DL   CBC    Collection Time: 02/08/23  5:55 AM   Result Value Ref Range    WBC 10.2 4.0 - 10.5 K/CU MM    RBC 2.99 (L) 4.6 - 6.2 M/CU MM    Hemoglobin 7.1 (L) 13.5 - 18.0 GM/DL    Hematocrit 22.3 (L) 42 - 52 %    MCV 74.6 (L) 78 - 100 FL    MCH 23.7 (L) 27 - 31 PG    MCHC 31.8 (L) 32.0 - 36.0 %    RDW 18.6 (H) 11.7 - 14.9 %    Platelets 151 233 - 931 K/CU MM    MPV 9.0 7.5 - 11.1 FL        Imaging/Diagnostics Last 24 Hours   No results found.     Electronically signed by Mina Harada, MD on 2/8/2023 at 7:38 AM

## 2023-02-09 PROBLEM — A41.9 SEPSIS (HCC): Status: RESOLVED | Noted: 2022-10-13 | Resolved: 2023-02-09

## 2023-02-09 LAB
ABO/RH: NORMAL
ANTIBODY SCREEN: NEGATIVE
HCT VFR BLD CALC: 23 % (ref 42–52)
HEMOGLOBIN: 7.1 GM/DL (ref 13.5–18)
MCH RBC QN AUTO: 22.9 PG (ref 27–31)
MCHC RBC AUTO-ENTMCNC: 30.9 % (ref 32–36)
MCV RBC AUTO: 74.2 FL (ref 78–100)
PDW BLD-RTO: 18.6 % (ref 11.7–14.9)
PLATELET # BLD: 429 K/CU MM (ref 140–440)
PMV BLD AUTO: 8.6 FL (ref 7.5–11.1)
RBC # BLD: 3.1 M/CU MM (ref 4.6–6.2)
WBC # BLD: 10.1 K/CU MM (ref 4–10.5)

## 2023-02-09 PROCEDURE — 6370000000 HC RX 637 (ALT 250 FOR IP): Performed by: NURSE PRACTITIONER

## 2023-02-09 PROCEDURE — 86850 RBC ANTIBODY SCREEN: CPT

## 2023-02-09 PROCEDURE — 97535 SELF CARE MNGMENT TRAINING: CPT

## 2023-02-09 PROCEDURE — 86901 BLOOD TYPING SEROLOGIC RH(D): CPT

## 2023-02-09 PROCEDURE — 6370000000 HC RX 637 (ALT 250 FOR IP): Performed by: STUDENT IN AN ORGANIZED HEALTH CARE EDUCATION/TRAINING PROGRAM

## 2023-02-09 PROCEDURE — 96523 IRRIG DRUG DELIVERY DEVICE: CPT

## 2023-02-09 PROCEDURE — 6360000002 HC RX W HCPCS: Performed by: STUDENT IN AN ORGANIZED HEALTH CARE EDUCATION/TRAINING PROGRAM

## 2023-02-09 PROCEDURE — 97530 THERAPEUTIC ACTIVITIES: CPT

## 2023-02-09 PROCEDURE — 2580000003 HC RX 258: Performed by: INTERNAL MEDICINE

## 2023-02-09 PROCEDURE — 1200000000 HC SEMI PRIVATE

## 2023-02-09 PROCEDURE — 86900 BLOOD TYPING SEROLOGIC ABO: CPT

## 2023-02-09 PROCEDURE — 6370000000 HC RX 637 (ALT 250 FOR IP): Performed by: INTERNAL MEDICINE

## 2023-02-09 PROCEDURE — 97112 NEUROMUSCULAR REEDUCATION: CPT

## 2023-02-09 PROCEDURE — 6360000002 HC RX W HCPCS: Performed by: INTERNAL MEDICINE

## 2023-02-09 PROCEDURE — 85027 COMPLETE CBC AUTOMATED: CPT

## 2023-02-09 RX ORDER — OXYCODONE HYDROCHLORIDE AND ACETAMINOPHEN 5; 325 MG/1; MG/1
1 TABLET ORAL EVERY 6 HOURS PRN
Status: DISCONTINUED | OUTPATIENT
Start: 2023-02-09 | End: 2023-02-14

## 2023-02-09 RX ADMIN — ENOXAPARIN SODIUM 40 MG: 100 INJECTION SUBCUTANEOUS at 20:34

## 2023-02-09 RX ADMIN — ALPRAZOLAM 0.5 MG: 0.5 TABLET ORAL at 20:34

## 2023-02-09 RX ADMIN — PREDNISONE 20 MG: 20 TABLET ORAL at 09:26

## 2023-02-09 RX ADMIN — TAMSULOSIN HYDROCHLORIDE 0.4 MG: 0.4 CAPSULE ORAL at 09:26

## 2023-02-09 RX ADMIN — GABAPENTIN 600 MG: 300 CAPSULE ORAL at 20:33

## 2023-02-09 RX ADMIN — CYCLOBENZAPRINE 10 MG: 10 TABLET, FILM COATED ORAL at 22:24

## 2023-02-09 RX ADMIN — OXYCODONE AND ACETAMINOPHEN 1 TABLET: 5; 325 TABLET ORAL at 18:16

## 2023-02-09 RX ADMIN — GABAPENTIN 600 MG: 300 CAPSULE ORAL at 09:26

## 2023-02-09 RX ADMIN — ACETAMINOPHEN 650 MG: 325 TABLET ORAL at 04:31

## 2023-02-09 RX ADMIN — DULOXETINE 60 MG: 30 CAPSULE, DELAYED RELEASE ORAL at 09:25

## 2023-02-09 RX ADMIN — HYDROMORPHONE HYDROCHLORIDE 0.5 MG: 1 INJECTION, SOLUTION INTRAMUSCULAR; INTRAVENOUS; SUBCUTANEOUS at 05:23

## 2023-02-09 RX ADMIN — ALPRAZOLAM 0.5 MG: 0.5 TABLET ORAL at 09:33

## 2023-02-09 RX ADMIN — MIRTAZAPINE 7.5 MG: 15 TABLET, FILM COATED ORAL at 20:34

## 2023-02-09 RX ADMIN — SODIUM CHLORIDE, PRESERVATIVE FREE 5 ML: 5 INJECTION INTRAVENOUS at 09:39

## 2023-02-09 RX ADMIN — OXYCODONE HYDROCHLORIDE 20 MG: 20 TABLET, FILM COATED, EXTENDED RELEASE ORAL at 09:25

## 2023-02-09 RX ADMIN — BICALUTAMIDE 50 MG: 50 TABLET, FILM COATED ORAL at 11:13

## 2023-02-09 RX ADMIN — HYDROMORPHONE HYDROCHLORIDE 0.5 MG: 1 INJECTION, SOLUTION INTRAMUSCULAR; INTRAVENOUS; SUBCUTANEOUS at 00:45

## 2023-02-09 RX ADMIN — GABAPENTIN 600 MG: 300 CAPSULE ORAL at 13:55

## 2023-02-09 RX ADMIN — SODIUM CHLORIDE, PRESERVATIVE FREE 10 ML: 5 INJECTION INTRAVENOUS at 20:34

## 2023-02-09 RX ADMIN — HYDROMORPHONE HYDROCHLORIDE 0.25 MG: 1 INJECTION, SOLUTION INTRAMUSCULAR; INTRAVENOUS; SUBCUTANEOUS at 22:24

## 2023-02-09 RX ADMIN — OXYCODONE HYDROCHLORIDE 20 MG: 20 TABLET, FILM COATED, EXTENDED RELEASE ORAL at 20:34

## 2023-02-09 ASSESSMENT — PAIN SCALES - WONG BAKER
WONGBAKER_NUMERICALRESPONSE: 0
WONGBAKER_NUMERICALRESPONSE: 2

## 2023-02-09 ASSESSMENT — PAIN DESCRIPTION - FREQUENCY: FREQUENCY: CONTINUOUS

## 2023-02-09 ASSESSMENT — PAIN DESCRIPTION - LOCATION
LOCATION: BACK
LOCATION: GENERALIZED
LOCATION: ARM;LEG
LOCATION: GENERALIZED
LOCATION: BACK

## 2023-02-09 ASSESSMENT — PAIN DESCRIPTION - ORIENTATION
ORIENTATION: OTHER (COMMENT)
ORIENTATION: OTHER (COMMENT)
ORIENTATION: LOWER
ORIENTATION: LEFT;RIGHT

## 2023-02-09 ASSESSMENT — PAIN DESCRIPTION - DESCRIPTORS
DESCRIPTORS: ACHING
DESCRIPTORS: STABBING;THROBBING
DESCRIPTORS: ACHING

## 2023-02-09 ASSESSMENT — PAIN SCALES - GENERAL
PAINLEVEL_OUTOF10: 10
PAINLEVEL_OUTOF10: 5
PAINLEVEL_OUTOF10: 3
PAINLEVEL_OUTOF10: 10

## 2023-02-09 ASSESSMENT — PAIN - FUNCTIONAL ASSESSMENT
PAIN_FUNCTIONAL_ASSESSMENT: ACTIVITIES ARE NOT PREVENTED

## 2023-02-09 ASSESSMENT — PAIN DESCRIPTION - ONSET: ONSET: ON-GOING

## 2023-02-09 ASSESSMENT — ENCOUNTER SYMPTOMS
GASTROINTESTINAL NEGATIVE: 1
EYES NEGATIVE: 1
RESPIRATORY NEGATIVE: 1

## 2023-02-09 ASSESSMENT — PAIN DESCRIPTION - PAIN TYPE: TYPE: CHRONIC PAIN

## 2023-02-09 NOTE — PROGRESS NOTES
Physical Therapy    Physical Therapy Treatment Note  Name: Ray Barclay MRN: [id number] :   1970   Date:  2023   Admission Date: 2023 Room:  [address] [place] [address]-A   Restrictions/Precautions:  fall risk; general precautions  Subjective:  Patient states:  \"you gotta lay me back buddy! \"  Pain:   Location, Type, Intensity (0/10 to 10/10):  10/10 LBP, pelvis pain  Objective:    Observation:  Supine, awake, alert, agreeable. He is very painful w/ WB through pelvic area ; unable to attempt OOB at this time. Tele, BP cuff, bed alarmed. Treatment, including education/measures:  Therapeutic Activity Training:   Therapeutic activity training was instructed today. Cues were given for safety, sequence, UE/LE placement, awareness, and balance. Activities performed today included bed mobility training, sup-sit, sit-stand, SPT. Supine <-> sit: max A x 2  Seated balance: poor ; retropulsive pushing, pain impacting - pt is restless, able to tolerated limited amount of OOB per insisting on returning to supine  Sit <-> supine: max A x 2  Scooting: max A x 2  Rolling: max A  Dependent for pericare, linen mgmt requiring inc time and effort   Inc time and effort performing pt education, functional mobility training, goal of therapy plan, progression of activity to return to inc OOB  Left in bed, bed alarmed, call light in reach, all needs met    Assessment / Impression:    Pt tolerating poorly 2/2 pain and cognition. Participation has been limited 2/2 above and pt is difficult to redirect ; he has demonstrated limited ability to tolerate seated and OOB positioning. Wants to return home, has not been able to participate in OOB or EOB 2/2 pain through areas of pelvic lesion. Will discuss plan for home going w/ family.     Patient's tolerance of treatment:  fair +  Barriers to improvement:  pain; comorbid conditions; functional strength, cognition  Plan for Next Session:    Cont w/ est DC plan (SNF) - will discuss plan w/ family in next session (tolerance, expectations)  Progress seated balance, ROM activities, NMR, positional tolerance, transfer training    Time in:  1035  Time out:  1109  Timed treatment minutes:  29  Total treatment time:  34    Previously filed items:  Social/Functional History  Lives With: Family  Type of Home: House  Home Layout: One level  Home Access: Stairs to enter without rails  Entrance Stairs - Number of Steps: 1  Bathroom Shower/Tub: Tub/Shower unit  Bathroom Equipment: Shower chair  Home Equipment: Memoright  Has the patient had two or more falls in the past year or any fall with injury in the past year?: Yes  Receives Help From: Family (looking to get inc assist from home health)  ADL Assistance: Needs assistance  Homemaking Assistance: Needs assistance  Ambulation Assistance: Needs assistance  Transfer Assistance: Needs assistance  Active : No  Mode of Transportation: Family, Other (superior transport for appointments)  Patient Goals   Patient Goals : return home     Short Term Goals  Time Frame for Short Term Goals: 1 week  Short Term Goal 1: pt will complete bed mobility at min A  Short Term Goal 2: pt will complete SPT at mod A  Short Term Goal 3: pt will demonstrate fair + seated balance for attention  Short Term Goal 4: pt will demonstrate seated tolerance for ~1 hour for transportation to/from appointments and progression to inc OOB activities    Electronically signed by:    Mariana Leon PT, DPT  2/9/2023, 12:56 PM

## 2023-02-09 NOTE — CARE COORDINATION
Patient was discussed in [address] [place] [address]. Hospice will need to  DME provided by them. [facility] and they are working on getting a hold of the family to schedule a time to pick items up. I also spoke with NATALIA and they will need to get wheelchair and hospital bed approved through [facility] and family will be responsible for picking up both items. Bed comes in pieces. I have sent a PS to Dr. Hina Wong advising on above and I have asked for orders for Wheelchair and Hospital Bed. Agapito Witt was evaluated today and a DME order was entered for variable height hospital bed because he requires assistance for positioning needs not possible in an ordinary bed, complexity of body positioning needs. Patient needs variability of bed height to perform patient transfers and for eating, bathing, toileting, personal cares, grooming, hygiene, dressing upper body, and dressing lower body. Current body Weight: 191 lb 12.8 oz (87 kg). The need for this equipment was discussed with the patient and he understands and is in agreement. Agapito Witt was evaluated today and a DME order was entered for a standard wheelchair because he requires this to successfully complete daily living tasks of eating, bathing, toileting, personal cares, ambulating, grooming, hygiene, dressing upper body, dressing lower body, meal preparation, and taking own medications. A standard manual wheelchair is necessary due to patient's impaired ambulation and mobility restrictions and would be unable to resolve these daily living tasks using a cane or walker. The patient is capable of using a standard wheelchair safely in their home and can maneuver within their home with adequate access. There is a caregiver available to provide necessary assistance. The need for this equipment was discussed with the patient and he understands, is in agreement, and has not expressed an unwillingness to use the wheelchair.

## 2023-02-09 NOTE — CARE COORDINATION
Spoke with Carl from [address] and Jeremy regarding this pt for c. Also spoke with pt and verified address, number and following Dr. Brandi Dickerson stated she spoke w/ Dr Estrella Byrnes Dr was agreeable to follow for hhc. Jeremy spoke with Brandi Dickerson also and placed inpt hhc order in under [address].

## 2023-02-09 NOTE — PROGRESS NOTES
Ray Barclay was evaluated today and a DME order was entered for variable height hospital bed because he requires assistance for positioning needs not possible in an ordinary bed, complexity of body positioning needs. Patient needs variability of bed height to perform patient transfers and for eating, bathing, toileting, personal cares, grooming, hygiene, dressing upper body, and dressing lower body. Current body Weight: 191 lb 12.8 oz (87 kg). The need for this equipment was discussed with the patient and he understands and is in agreement. Ray Barclay was evaluated today and a DME order was entered for a standard wheelchair because he requires this to successfully complete daily living tasks of eating, bathing, toileting, personal cares, ambulating, grooming, hygiene, dressing upper body, dressing lower body, meal preparation, and taking own medications. A standard manual wheelchair is necessary due to patient's impaired ambulation and mobility restrictions and would be unable to resolve these daily living tasks using a cane or walker. The patient is capable of using a standard wheelchair safely in their home and can maneuver within their home with adequate access. There is a caregiver available to provide necessary assistance. The need for this equipment was discussed with the patient and he understands, is in agreement, and has not expressed an unwillingness to use the wheelchair. normal

## 2023-02-09 NOTE — PROGRESS NOTES
V2.0  [facility] [place]      Name:  Licha Dyson /Age/Sex: 1970  (46 y.o. male)   MRN & CSN:  [id number] & [id number] Encounter Date/Time: 2023 7:32 AM EST    Location:  1111/1111-A PCP: Zaid Hernandez MD       Hospital Day: 10    Assessment and Plan:   Licha Dyson is a 46 y.o. male with pmh of CAD, history of metastatic prostate cancer, and squamous cell carcinoma of the left lung  who presents with Sepsis [facility])    Patient and family unwilling to go the SNF route and would prefer [address]. No PCP at this time,  working with them to establish need for [address] and Dr. Camryn Lawson will follow at this time until a PCP can be established in the outpatient setting. He is at this time medically cleared for discharge, however DC on hold d/t need for DME, as hospice will be taking back their equipment, CM and [address] working to obtain. Plan:  Concern for sepsis - ruled out  --Presented with fever and tachycardia, unclear source of infection at the time. He was started on broad-spectrum IV antibiotics. -- RVP, MRSA screen, UA and UCX were unremarkable  --Imaging showed no evidence of pneumonia and the CT of abdomen and pelvis was read for possible cystitis however the UA was unremarkable. Patient had recently undergone treatment with meropenem for UTI at home  --ID on board, recommended that antibiotics be discontinued and the patient be monitored with a plan to restart meropenem post blood cultures if the patient spikes a fever. Beyond the fever on admission, he has remained afebrile but remains tachycardic  --Mild leukocytosis resolved, WBC 10.1 this AM    #Hyponatremia  - Resolved  --Likely secondary to poor oral intake, Na stable at 137  --Continue to monitor    #Chronic anemia  --H/H stable at 7.1 this AM, patient likely has BM infiltration per hemeonc  --Repeat H/H was ordered yesterday but never drawn, given the stability of hgb will hold on transfusion.  Patient is on epo-stimulant  --Monitor and transfuse for hgb <7, continue retacrit MWF as long as hgb is <10    #History of metastatic prostate cancer:  --CT chest with diffuse osteoblastic metastatic disease, CT A/P with extensive osteoblastic metastatic disease. Last known Lupron dose was on 4/22, Last known abiraterone prescription 11/22, unclear last time he used it.   --PSA on this admission 233 up from 144 on 12/22/22  --Continue Casodex and tamsulosin  --Heme-onc on board, continue steroid therapy. Plan for outpatient follow up and PSA measurements    #History of squamous cell carcinoma left lung-diagnosed on 12/13/2021:  --Status post debulking of primary tumor via bronchoscopy at [address] [place] [address], positive response to therapy with carbo/Alimta/Pembro X1 followed by Bianka Min.   --Patient goes back and forth between being on hospice and not being on hospice  --[facility] to attempt treating with Krishna Poole in the outpatient setting once he is feeling better    #History of urinary retention with chronic indwelling Doherty  --Had prior nephrostomy tube which was dislodged and replaced back  --Doherty remains in place with good output    #Anxiety disorder  --Continue alprazolam    #Depression  --Continue Mirtazapine and duloxetine    #Pain control  --Given change in status from hospice will alter pain regimen  --Oxycontin 20mg BID, with immediate relief for breakthrough  --To be managed in the outpatient setting by PCP on [facility]    Diet ADULT DIET;  Regular  ADULT ORAL NUTRITION SUPPLEMENT; Breakfast, Dinner; Standard High Calorie/High Protein Oral Supplement  ADULT ORAL NUTRITION SUPPLEMENT; Dinner; Frozen Oral Supplement   DVT Prophylaxis [x] Lovenox, []  Heparin, [] SCDs, [] Ambulation,  [] Eliquis, [] Xarelto  [] Coumadin   Code Status Full Code   Disposition From: Home  Expected Disposition: [facility]  Estimated Date of Discharge: 1-2 days  Patient requires continued admission due to need to establish [facility]    Surrogate Decision Maker/ POA Subjective:     Chief Complaint: Fall       Patient seen and examined at bedside. MPOA at bedside, discussions held at length especially about pain regimen. Established from patient he would like Roni Caputo and an attempt at immunotherapy. At this time Hospice rescinded and plan to move forward with [facility]. Review of Systems:    Review of Systems   Constitutional: Negative. HENT: Negative. Eyes: Negative. Respiratory: Negative. Cardiovascular: Negative. Gastrointestinal: Negative. Genitourinary:         Chronic sampson   Musculoskeletal:         Pain in both legs   Skin: Negative. Neurological: Negative. Psychiatric/Behavioral: Negative. Objective: Intake/Output Summary (Last 24 hours) at 2/9/2023 1241  Last data filed at 2/9/2023 0604  Gross per 24 hour   Intake --   Output 2850 ml   Net -2850 ml          Vitals:   Vitals:    02/09/23 1205   BP: (!) 131/90   Pulse: (!) 101   Resp: 18   Temp: 98.9 °F (37.2 °C)   SpO2:        Physical Exam:   General: NAD, resting in bed  Eyes: EOMI  ENT: Moist mucous membrane  Cardiovascular: Regular rate  Respiratory: Clear to auscultation  Gastrointestinal: Soft, non tender  Genitourinary: no suprapubic tenderness, sampson cath in place  Musculoskeletal: No edema  Skin: warm, dry  Neuro: Alert. Psych: Mood appropriate.      Medications:   Medications:    oxyCODONE  20 mg Oral 2 times per day    nicotine  1 patch TransDERmal Daily    epoetin kelsi-epbx  10,000 Units SubCUTAneous Once per day on Mon Wed Fri    mirtazapine  7.5 mg Oral Nightly    bicalutamide  50 mg Oral Daily    DULoxetine  60 mg Oral Daily    [Held by provider] fentaNYL  1 patch TransDERmal Q72H    gabapentin  600 mg Oral TID    predniSONE  20 mg Oral Daily    tamsulosin  0.4 mg Oral Daily    sodium chloride flush  5-40 mL IntraVENous 2 times per day    enoxaparin  40 mg SubCUTAneous Daily      Infusions:    sodium chloride       PRN Meds: HYDROmorphone, 0.5 mg, Q6H PRN Or  HYDROmorphone, 0.25 mg, Q6H PRN  bisacodyl, 10 mg, Daily PRN  diphenhydrAMINE, 25 mg, Q6H PRN  ALPRAZolam, 0.5 mg, TID PRN  cyclobenzaprine, 10 mg, TID PRN  naloxone, 4 mg, PRN  sodium chloride flush, 5-40 mL, PRN  sodium chloride, , PRN  ondansetron, 4 mg, Q8H PRN   Or  ondansetron, 4 mg, Q6H PRN  polyethylene glycol, 17 g, Daily PRN  acetaminophen, 650 mg, Q6H PRN   Or  acetaminophen, 650 mg, Q6H PRN      Labs      Recent Results (from the past 24 hour(s))   TYPE AND SCREEN    Collection Time: 02/09/23  4:07 AM   Result Value Ref Range    ABO/Rh A POSITIVE     Antibody Screen NEGATIVE    CBC    Collection Time: 02/09/23  4:07 AM   Result Value Ref Range    WBC 10.1 4.0 - 10.5 K/CU MM    RBC 3.10 (L) 4.6 - 6.2 M/CU MM    Hemoglobin 7.1 (L) 13.5 - 18.0 GM/DL    Hematocrit 23.0 (L) 42 - 52 %    MCV 74.2 (L) 78 - 100 FL    MCH 22.9 (L) 27 - 31 PG    MCHC 30.9 (L) 32.0 - 36.0 %    RDW 18.6 (H) 11.7 - 14.9 %    Platelets 841 306 - 650 K/CU MM    MPV 8.6 7.5 - 11.1 FL        Imaging/Diagnostics Last 24 Hours   No results found.     Electronically signed by Jonna Real MD on 2/9/2023 at 12:41 PM

## 2023-02-09 NOTE — PROGRESS NOTES
Occupational Therapy Treatment Note  Name: Eliezer Navarro MRN: [id number] :   1970   Date:  2023   Admission Date: 2023 Room:  [address] [place], [place] [address]A     Primary Problem:    [place]:  The primary encounter diagnosis was Sepsis without acute organ dysfunction, due to unspecified organism ([facility] Diagnoses of [facility] ([facility] and Anemia, unspecified type were also pertinent to this visit. Patient  has a past medical history of CAD (coronary artery disease), Cancer ([facility] History of TMJ disorder, Hypertension, and Trigeminal neuralgia. Patient  has a past surgical history that includes Abdomen surgery; Cardiac catheterization (2016); CT NEEDLE BIOPSY LUNG PERCUTANEOUS (2021); Port Surgery (Right, 2021); CT BIOPSY SUPERFICIAL BONE PERCUTANEOUS (2021); and IR GUIDED NEPHROSTOMY CATH PLACEMENT (2023). Communication with other providers:  cotx with PT Parag d/t pt low tolerance, pain, assist levels, safety. Consult with RN for pt pain management/handoff    Subjective:  Patient states:  \"You can help me get back to walking\"  Pain: frequently verbalizes pain in low back, doesn't rate  Restrictions: general precautions, fall risk, R subclavian port, high assist/pain levels    Objective:    Observation:  pt was supine in bed upon arrival, agreeable to session with encouragement/coordination with RN for pain meds. Tele, PIV, sampson  Objective Measures:  stable. BP taken and WFL beginning of session d/t pt lethargy    Treatment, including education:  Self Care Training (13 minutes):   Self care training was performed today. Cues were given for safety, sequence, UE/LE placement, visual cues, and balance. Activities performed today included     Toileting- dep for pericare in side lying in bed. Pt cont to actively have BM while in side lying requiring inc time    Neuromuscular Re-education Training (20 minutes):   Neuromuscular re-education training was instructed today.   Cues were given for safety, rationale, techniques, and recruitment. Activities performed today included     X10 AAROM of BUE across all major joints    Supine to sit- max Ax2    Seated balance- max A with pt actively resisting and pushing self back despite cues. Pt restless, vocalizing pain with poor tolerance    Sit to supine- max Ax2    Scoot to HOB- max Ax2    Roll x1- max A for pericare above    Pt not agreeable to any additional EOB/raised HOB activity    Education: Role of OT, OT POC, safety, benefits of EOB/OOB activity, rationale for treatment  Safety Measures: Gait belt used for safety of pt and therapist, Left in supine in bed, Alarm in place, call light and phone within reach, lines managed    Assessment / Impression:    Pt with very poor tolerance for bed mobility today and activity in general. Pt lethargic throughout session and pain focused, very difficult to redirect when in pain. Pt and family continue to defer placement for rehab after discharge from hospital, would cont to recommend SNF. Plan to attempt EOB activity with next session tomorrow (if schedule allows and appropriate), however if pt cont to be unable to tolerate EOB, would need to have discussion with pt/family about rehab potential/expectations of pt recovery with rehab as well as if therapy is causing more pain than therapeutic gain.     Patient's tolerance of treatment: poor  Adverse Reaction: pain, lethargy  Significant change in status and impact:  none  Barriers to improvement: pain, chronic comorbidities, medical prognosis, neuro deficits    Plan for Next Session:    Continue OT POC    Time in:  1035  Time out:  1108  Timed treatment minutes:  33  Total treatment time:  33    Electronically signed by:    Juan Reynoso OT, OTR/L  2/9/2023, 1:03 PM

## 2023-02-09 NOTE — CONSULTS
IV Consult complete. Port Access Needle and dressing change completed per protocol. Patient tolerated well.

## 2023-02-10 LAB
HCT VFR BLD CALC: 24.1 % (ref 42–52)
HEMOGLOBIN: 7.5 GM/DL (ref 13.5–18)
MCH RBC QN AUTO: 23.2 PG (ref 27–31)
MCHC RBC AUTO-ENTMCNC: 31.1 % (ref 32–36)
MCV RBC AUTO: 74.6 FL (ref 78–100)
PDW BLD-RTO: 18.7 % (ref 11.7–14.9)
PLATELET # BLD: 449 K/CU MM (ref 140–440)
PMV BLD AUTO: 8.8 FL (ref 7.5–11.1)
RBC # BLD: 3.23 M/CU MM (ref 4.6–6.2)
WBC # BLD: 10.4 K/CU MM (ref 4–10.5)

## 2023-02-10 PROCEDURE — 85027 COMPLETE CBC AUTOMATED: CPT

## 2023-02-10 PROCEDURE — 1200000000 HC SEMI PRIVATE

## 2023-02-10 PROCEDURE — 2580000003 HC RX 258: Performed by: INTERNAL MEDICINE

## 2023-02-10 PROCEDURE — 6360000002 HC RX W HCPCS: Performed by: STUDENT IN AN ORGANIZED HEALTH CARE EDUCATION/TRAINING PROGRAM

## 2023-02-10 PROCEDURE — 6370000000 HC RX 637 (ALT 250 FOR IP): Performed by: NURSE PRACTITIONER

## 2023-02-10 PROCEDURE — 6370000000 HC RX 637 (ALT 250 FOR IP): Performed by: STUDENT IN AN ORGANIZED HEALTH CARE EDUCATION/TRAINING PROGRAM

## 2023-02-10 PROCEDURE — 6370000000 HC RX 637 (ALT 250 FOR IP): Performed by: INTERNAL MEDICINE

## 2023-02-10 PROCEDURE — 6360000002 HC RX W HCPCS: Performed by: INTERNAL MEDICINE

## 2023-02-10 PROCEDURE — 94761 N-INVAS EAR/PLS OXIMETRY MLT: CPT

## 2023-02-10 RX ORDER — BICALUTAMIDE 50 MG/1
50 TABLET, FILM COATED ORAL DAILY
Qty: 90 TABLET | Refills: 3 | OUTPATIENT
Start: 2023-02-10

## 2023-02-10 RX ORDER — OXYCODONE HYDROCHLORIDE AND ACETAMINOPHEN 5; 325 MG/1; MG/1
1 TABLET ORAL EVERY 6 HOURS PRN
Qty: 12 TABLET | Refills: 0 | OUTPATIENT
Start: 2023-02-10 | End: 2023-02-13

## 2023-02-10 RX ORDER — POLYETHYLENE GLYCOL 3350 17 G/17G
17 POWDER, FOR SOLUTION ORAL DAILY PRN
Qty: 527 G | Refills: 1 | OUTPATIENT
Start: 2023-02-10 | End: 2023-03-12

## 2023-02-10 RX ADMIN — BICALUTAMIDE 50 MG: 50 TABLET, FILM COATED ORAL at 08:24

## 2023-02-10 RX ADMIN — HYDROMORPHONE HYDROCHLORIDE 0.25 MG: 1 INJECTION, SOLUTION INTRAMUSCULAR; INTRAVENOUS; SUBCUTANEOUS at 19:16

## 2023-02-10 RX ADMIN — GABAPENTIN 600 MG: 300 CAPSULE ORAL at 20:40

## 2023-02-10 RX ADMIN — OXYCODONE HYDROCHLORIDE 20 MG: 20 TABLET, FILM COATED, EXTENDED RELEASE ORAL at 20:41

## 2023-02-10 RX ADMIN — OXYCODONE AND ACETAMINOPHEN 1 TABLET: 5; 325 TABLET ORAL at 18:11

## 2023-02-10 RX ADMIN — ENOXAPARIN SODIUM 40 MG: 100 INJECTION SUBCUTANEOUS at 20:42

## 2023-02-10 RX ADMIN — ALPRAZOLAM 0.5 MG: 0.5 TABLET ORAL at 22:19

## 2023-02-10 RX ADMIN — HYDROMORPHONE HYDROCHLORIDE 0.25 MG: 1 INJECTION, SOLUTION INTRAMUSCULAR; INTRAVENOUS; SUBCUTANEOUS at 04:57

## 2023-02-10 RX ADMIN — DULOXETINE 60 MG: 30 CAPSULE, DELAYED RELEASE ORAL at 08:24

## 2023-02-10 RX ADMIN — HYDROMORPHONE HYDROCHLORIDE 0.25 MG: 1 INJECTION, SOLUTION INTRAMUSCULAR; INTRAVENOUS; SUBCUTANEOUS at 11:41

## 2023-02-10 RX ADMIN — OXYCODONE HYDROCHLORIDE 20 MG: 20 TABLET, FILM COATED, EXTENDED RELEASE ORAL at 08:24

## 2023-02-10 RX ADMIN — EPOETIN ALFA-EPBX 10000 UNITS: 10000 INJECTION, SOLUTION INTRAVENOUS; SUBCUTANEOUS at 10:40

## 2023-02-10 RX ADMIN — CYCLOBENZAPRINE 10 MG: 10 TABLET, FILM COATED ORAL at 18:11

## 2023-02-10 RX ADMIN — PREDNISONE 20 MG: 20 TABLET ORAL at 08:23

## 2023-02-10 RX ADMIN — SODIUM CHLORIDE, PRESERVATIVE FREE 10 ML: 5 INJECTION INTRAVENOUS at 20:41

## 2023-02-10 RX ADMIN — OXYCODONE AND ACETAMINOPHEN 1 TABLET: 5; 325 TABLET ORAL at 10:40

## 2023-02-10 RX ADMIN — SODIUM CHLORIDE, PRESERVATIVE FREE 10 ML: 5 INJECTION INTRAVENOUS at 19:16

## 2023-02-10 RX ADMIN — OXYCODONE AND ACETAMINOPHEN 1 TABLET: 5; 325 TABLET ORAL at 02:14

## 2023-02-10 RX ADMIN — GABAPENTIN 600 MG: 300 CAPSULE ORAL at 08:24

## 2023-02-10 RX ADMIN — MIRTAZAPINE 7.5 MG: 15 TABLET, FILM COATED ORAL at 20:40

## 2023-02-10 RX ADMIN — ALPRAZOLAM 0.5 MG: 0.5 TABLET ORAL at 11:41

## 2023-02-10 RX ADMIN — TAMSULOSIN HYDROCHLORIDE 0.4 MG: 0.4 CAPSULE ORAL at 09:53

## 2023-02-10 RX ADMIN — GABAPENTIN 600 MG: 300 CAPSULE ORAL at 13:30

## 2023-02-10 RX ADMIN — SODIUM CHLORIDE, PRESERVATIVE FREE 5 ML: 5 INJECTION INTRAVENOUS at 09:54

## 2023-02-10 RX ADMIN — ALPRAZOLAM 0.5 MG: 0.5 TABLET ORAL at 04:56

## 2023-02-10 ASSESSMENT — PAIN DESCRIPTION - ORIENTATION
ORIENTATION: OTHER (COMMENT)
ORIENTATION: OTHER (COMMENT)
ORIENTATION: MID;LOWER
ORIENTATION: LEFT;RIGHT
ORIENTATION: MID;LOWER

## 2023-02-10 ASSESSMENT — PAIN SCALES - GENERAL
PAINLEVEL_OUTOF10: 10
PAINLEVEL_OUTOF10: 7
PAINLEVEL_OUTOF10: 10
PAINLEVEL_OUTOF10: 0
PAINLEVEL_OUTOF10: 10
PAINLEVEL_OUTOF10: 10
PAINLEVEL_OUTOF10: 7
PAINLEVEL_OUTOF10: 0
PAINLEVEL_OUTOF10: 10
PAINLEVEL_OUTOF10: 0
PAINLEVEL_OUTOF10: 4
PAINLEVEL_OUTOF10: 10

## 2023-02-10 ASSESSMENT — PAIN DESCRIPTION - DESCRIPTORS
DESCRIPTORS: ACHING
DESCRIPTORS: ACHING;DISCOMFORT
DESCRIPTORS: PENETRATING
DESCRIPTORS: ACHING;DISCOMFORT
DESCRIPTORS: ACHING;DISCOMFORT
DESCRIPTORS: ACHING
DESCRIPTORS: STABBING

## 2023-02-10 ASSESSMENT — PAIN DESCRIPTION - LOCATION
LOCATION: GENERALIZED
LOCATION: OTHER (COMMENT)
LOCATION: BACK
LOCATION: BACK
LOCATION: OTHER (COMMENT)
LOCATION: OTHER (COMMENT)

## 2023-02-10 ASSESSMENT — PAIN SCALES - WONG BAKER
WONGBAKER_NUMERICALRESPONSE: 2
WONGBAKER_NUMERICALRESPONSE: 6
WONGBAKER_NUMERICALRESPONSE: 0
WONGBAKER_NUMERICALRESPONSE: 2

## 2023-02-10 ASSESSMENT — ENCOUNTER SYMPTOMS
GASTROINTESTINAL NEGATIVE: 1
RESPIRATORY NEGATIVE: 1
EYES NEGATIVE: 1

## 2023-02-10 ASSESSMENT — PAIN DESCRIPTION - ONSET
ONSET: ON-GOING
ONSET: ON-GOING

## 2023-02-10 ASSESSMENT — PAIN DESCRIPTION - FREQUENCY
FREQUENCY: CONTINUOUS
FREQUENCY: CONTINUOUS

## 2023-02-10 ASSESSMENT — PAIN - FUNCTIONAL ASSESSMENT
PAIN_FUNCTIONAL_ASSESSMENT: ACTIVITIES ARE NOT PREVENTED

## 2023-02-10 ASSESSMENT — PAIN DESCRIPTION - PAIN TYPE
TYPE: CHRONIC PAIN
TYPE: CHRONIC PAIN

## 2023-02-10 NOTE — PROGRESS NOTES
[address], 1970, 1111/1111-A, 2/10/2023    OT/PT had lengthy conversation with pt and pt spouse today after chart review reveals discharge plans have changed. Pt distractible throughout conversation, perseverative on walking again, having PT move pt legs throughout. Pt spouse states that she and her family have had increasing caregiver burden since early November when pt had walked last. Pt spouse states that she and family are unable to care for pt needs around the clock d/t increasing assistance requirements and demands. Pt and spouse received edu on discharge options including home with HHC/hospice, SNF for rehab, IP hospice services; pt has had poor tolerance for therapy services including EOB/seated activites d/t inc pain and questionable cognitive status. Pt spouse interested in possible IP hospice services, however pt remains his own POA. Pt spouse brought to  desk, cont discussion/edu provided. PT/OT will dec frequency to reflect pt tolerance and participation however will continue to follow closely to assist with discharge planning.     Unbilled time: [id number]    Garcia Comfort, OTR/L  2/10/2023, 12:54 PM
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
## 2023-02-10 NOTE — PROGRESS NOTES
Physical Therapy  Attempt/Hold    PT tx attempt deferred per conversation w/ family ; see OT note for inc details regarding DC discussion.  Plan adjusted to better reflect pt tolerance and DC assist.    Christine Tubbs PT, DPT

## 2023-02-10 NOTE — CONSULTS
Initial Psychiatric History and Physical    Lance Burkitt  [id number]  1/31/2023  02/10/23    ID: Patient is a 46 yrs y.o. male    CC:\"to see if I knew whats going on. 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 Psychiatry consulted by Dr Courtney Arango for [place] capacity and competence to make health care decisions. \"    Met with patient at bedside. Patient is fatigued, but alert and oriented x4. He is able to discuss his diagnosis, prognosis and treatment in a limited manner but enough to show he does understand what is occurring. He does not want hospice involved at this time. He would like to go home with Roni Caputo, his wife and two daughters and their husbands helping. Patient's wife states she is willing to do this as long as he has [place] [phone] assistance in PT, nursing etc. A couple of times a week. She states he stopped walking in November 2022 and he does require assistance daily. Capacity:       Understanding- Patient does appear to completely understand the why he is here, the treatment and seriousness of his condition which includes prostate cancer and cancer that has moved to his lungs. The patient was able to explain the rationale behind the diagnosis and treatment plan using his own verbiage. The patient was  able to recall conversations about treatment, to make the link between causal relationships, and to process probabilities for outcomes. PT does not  display problems with memory, He is fatigued often and has difficulty with attention span, and can become confused per his wife. He does admit to this too. Nicole Parsons Choice-   Patient was provided with several options regarding choices involving treatment including medications, and SNF vs home and hospice in order to make a reasonable defined choices.  He was able to make several choices and provide  a reasonable explanation for not going to a SNF nor utlizing hospice stating \"I'd father go home and be with family. \"  He was able to have a conversation regarding the frequent changes back and forth in the decision-making and comprehension assessment though at times was limited in conversation and took some time to respond. His wife notes he can become confused when he is tired. Appreciation    The patient was able to identify the illness, treatment options, and likely outcomes as things that will affect him directly including death. Rationalization:  When discussing patient care for dc; patient is able to  compare options (ie, comparative reasoning) and to infer consequences of a choice (ie, consequential reasoning). The patient was  able to weigh the risks and benefits of the treatment options presented and come  to a conclusion in keeping with their goals and best interests, as defined by his own personal set of values. Patient and wife both agree that if patient can come home with Roni  including PT, RN and an aid a couple of times a week, then family and wife could assume the other care required at home. Past Psychiatric History:   Not known       Family Psychiatric History:   Family History   Problem Relation Age of Onset    High Blood Pressure Mother     High Blood Pressure Sister     Cancer Sister         Allergies:   Allergies   Allergen Reactions    Tramadol Other (See Comments)     seizures    Morphine Hallucinations    Vicodin [Hydrocodone-Acetaminophen] Hives        OBJECTIVE  Vital Signs:  Vitals:    02/10/23 1536   BP: (!) 125/90   Pulse: (!) 111   Resp: 19   Temp: 98.2 °F (36.8 °C)   SpO2: 99%       Labs:  Recent Results (from the past 48 hour(s))   TYPE AND SCREEN    Collection Time: 02/09/23  4:07 AM   Result Value Ref Range    ABO/Rh A POSITIVE     Antibody Screen NEGATIVE    CBC    Collection Time: 02/09/23  4:07 AM   Result Value Ref Range    WBC 10.1 4.0 - 10.5 K/CU MM RBC 3.10 (L) 4.6 - 6.2 M/CU MM    Hemoglobin 7.1 (L) 13.5 - 18.0 GM/DL    Hematocrit 23.0 (L) 42 - 52 %    MCV 74.2 (L) 78 - 100 FL    MCH 22.9 (L) 27 - 31 PG    MCHC 30.9 (L) 32.0 - 36.0 %    RDW 18.6 (H) 11.7 - 14.9 %    Platelets 160 731 - 129 K/CU MM    MPV 8.6 7.5 - 11.1 FL   CBC    Collection Time: 02/10/23  5:06 AM   Result Value Ref Range    WBC 10.4 4.0 - 10.5 K/CU MM    RBC 3.23 (L) 4.6 - 6.2 M/CU MM    Hemoglobin 7.5 (L) 13.5 - 18.0 GM/DL    Hematocrit 24.1 (L) 42 - 52 %    MCV 74.6 (L) 78 - 100 FL    MCH 23.2 (L) 27 - 31 PG    MCHC 31.1 (L) 32.0 - 36.0 %    RDW 18.7 (H) 11.7 - 14.9 %    Platelets 596 (H) 974 - 440 K/CU MM    MPV 8.8 7.5 - 11.1 FL       Review of Systems:  Reports of no current cardiovascular, respiratory, gastrointestinal, genitourinary, integumentary, neurological, muscuoskeletal, or immunological symptoms today. PSYCHIATRIC: See HPI above. PSYCHIATRIC EXAMINATION / MENTAL STATUS EXAM    CONSTITUTIONAL:    Vitals:   Vitals:    02/10/23 1536   BP: (!) 125/90   Pulse: (!) 111   Resp: 19   Temp: 98.2 °F (36.8 °C)   SpO2: 99%      General appearance: [x] appears age, []  appears older than stated age,               [x]  adequately dressed and groomed, [] disheveled,               []  in no acute distress, [x] appears mildly distressed, [] other           MUSCULOSKELETAL:   Gait:   [] normal, [] antalgic, [] unsteady, [] gait not evaluated   Station:             [] erect, [] sitting, [x] recumbent, [] other        Strength/tone:  [x] muscle strength and tone appear consistent with age and                                        condition     [] atrophy      [] abnormal movements     Vitals: Blood pressure (!) 125/90, pulse (!) 111, temperature 98.2 °F (36.8 °C), resp. rate 19, height 6' 1\" (1.854 m), weight 191 lb 12.8 oz (87 kg), SpO2 99 %. CONSTITUTIONAL:    Appearance: appears stated age. alert and oriented to person, place, time & situation. mild distress.  Adequate grooming and hygeine. Fair  eye contact. No prominent physical abnormalities. Attitude: Manner is cooperative and pleasant but slowed response and sometime confusion but was able to make himself understood and reorient himself. Motor: No psychomotor agitation, some retardation no abnormal movements noted  Speech: Not always clearly articulated but understood; normal rate, decreased volume, tone & amount. Language: intact understanding and production  Mood: \"good\"  Affect: flat, non-labile, congruent with mood and content of speech  Thought Production: Spontaneous. Thought Form: Coherent, linear, logical & goal-directed. No tangentiality or circumstantiality. No flight of ideas or loosening of associations. Thought Content/Perceptions: No CHU, no AVH, no delusion  Insight: good  Judgment- adequate  Memory: Immediate, recent, and remote appear intact, though not formally tested. Attention: maintained throughout interview  Fund of knowledge: Average  Gait/Balance: MAX    Impression:   Adjustment disorder  Has capacity    Problem List:   Sepsis ([facility]    Plan:   Patient has capacity at this time to make his medical decisions. Patient would like to go home with [facility], pt and RN. Wife is in agreement. He declines hospice. Patient's wife is in agreement. Wife would like to know when to call Hospice to  equipment but does require hospital bed at home. Psychiatry will sign off  Thank you for this consult  PS to DR Lisa Cota regarding recommendations.     Electronically signed by GERDA Rothman CNP on 2/10/2023 at 5:08 PM

## 2023-02-10 NOTE — CARE COORDINATION
This RN case manager to bedside to advise on DME. Pt is stating he can't use his legs well. He and wife are now more agreeable to SNF. They would like [facility] sent to [facility]. I have asked them to pick a couple more options also. I will add to the weekend list to follow up.     1243 - Therapy and wife to [address] desk. Therapy is concerned about patient making medical decisions. Wife would like to speak with provider. PS to provider.  Adding to weekend list.

## 2023-02-10 NOTE — PROGRESS NOTES
V2.0  [facility] [place]      Name:  Kassandra Gaona /Age/Sex: 1970  (46 y.o. male)   MRN & CSN:  [id number] & [id number] Encounter Date/Time: 2/10/2023 7:32 AM EST    Location:  1111/1111-A PCP: Nellie Cisneros MD       Hospital Day: 11    Assessment and Plan:   Kassandra Gaona is a 46 y.o. 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 No PCP at this time,  working with them to establish need for [facility] and Dr. Kash Kim will follow at this time until a PCP can be established in the outpatient setting. He is at this time medically cleared for discharge, however DC on hold d/t need for DME, as hospice will be taking back their equipment, CM and [facility] working to obtain. 2/10/23 @ noon -Patient deciding for SNF, wife at bedside and in agreement, conversation had with CM and referral sent to [facility]    2/10/23 @ 1300 message from [facility], wife requesting evaluation for patient having capacity to make decisions    Plan:  Concern for sepsis - ruled out  --Presented with fever and tachycardia, unclear source of infection at the time. He was started on broad-spectrum IV antibiotics. -- RVP, MRSA screen, UA and UCX were unremarkable  --Imaging showed no evidence of pneumonia and the CT of abdomen and pelvis was read for possible cystitis however the UA was unremarkable. Patient had recently undergone treatment with meropenem for UTI at home  --ID on board, recommended that antibiotics be discontinued and the patient be monitored with a plan to restart meropenem post blood cultures if the patient spikes a fever.   Beyond the fever on admission, he has remained afebrile but remains tachycardic  --Mild leukocytosis resolved, WBC 10.4 this AM    #Hyponatremia  - Resolved  --Likely secondary to poor oral intake, Na stable at 137  --Continue to monitor    #Chronic anemia  --H/H stable at 7.5 this AM, [facility]  --Monitor and transfuse for hgb <7, continue retacrit MWF as long as hgb is <10    #History of metastatic prostate cancer:  --CT chest with diffuse osteoblastic metastatic disease, CT A/P with extensive osteoblastic metastatic disease. Last known Lupron dose was on 4/22, Last known abiraterone prescription 11/22, unclear last time he used it.   --PSA on this admission 233 up from 144 on 12/22/22  --Continue Casodex and tamsulosin  --Heme-onc on board, continue steroid therapy. Plan for outpatient follow up and PSA measurements    #History of squamous cell carcinoma left lung-diagnosed on 12/13/2021:  --Status post debulking of primary tumor via bronchoscopy at [facility], positive response to therapy with carbo/Alimta/Pembro X1 followed by Sara Arzate.   --Patient goes back and forth between being on hospice and not being on hospice  --[facility] to attempt treating with Sultana Chen in the outpatient setting once he is feeling better    #History of urinary retention with chronic indwelling Doherty  --Had prior nephrostomy tube which was dislodged and replaced back  --Doherty remains in place with good output    #Anxiety disorder  --Continue alprazolam    #Depression  --Continue Mirtazapine and duloxetine    #Pain control  --Given change in status from hospice will alter pain regimen  --Oxycontin 20mg BID, with immediate relief percocet (and Dilaudid while in-patient) for breakthrough pain   --To be managed in the outpatient setting by PCP on [facility]    Diet ADULT DIET;  Regular  ADULT ORAL NUTRITION SUPPLEMENT; Breakfast, Dinner; Standard High Calorie/High Protein Oral Supplement  ADULT ORAL NUTRITION SUPPLEMENT; Dinner; Frozen Oral Supplement   DVT Prophylaxis [x] Lovenox, []  Heparin, [] SCDs, [] Ambulation,  [] Eliquis, [] Xarelto  [] Coumadin   Code Status Full Code   Disposition From: Home  Expected Disposition: [facility]  Estimated Date of Discharge: 1-2 days  Patient requires continued admission due to need to establish [facility] [place]    Surrogate Decision Maker/ POA      Subjective:     Chief Complaint: Fall       Patient seen and examined at bedside. Wife and MPOA at bedside, discussions held at length. She endorses tiredness with a sense of being overwhelmed and is unsure if she can handle patient at home. On further discussion with the patient, he is requesting placement in patient. Review of Systems:    Review of Systems   Constitutional: Negative. HENT: Negative. Eyes: Negative. Respiratory: Negative. Cardiovascular: Negative. Gastrointestinal: Negative. Genitourinary:         Chronic sampson   Musculoskeletal:         Pain in both legs   Skin: Negative. Neurological: Negative. Psychiatric/Behavioral: Negative. Objective: Intake/Output Summary (Last 24 hours) at 2/10/2023 0731  Last data filed at 2/10/2023 0546  Gross per 24 hour   Intake --   Output 2950 ml   Net -2950 ml          Vitals:   Vitals:    02/10/23 0527   BP:    Pulse:    Resp: 18   Temp:    SpO2:        Physical Exam:   General: Laying in bedside recliner  Eyes: EOMI  ENT: Moist mucous membrane  Cardiovascular: Regular rate  Respiratory: Clear to auscultation  Gastrointestinal: Soft, non tender  Genitourinary: no suprapubic tenderness, sampson cath in place  Musculoskeletal: No edema  Skin: warm, dry  Neuro: Alert. Psych: Mood appropriate.      Medications:   Medications:    oxyCODONE  20 mg Oral 2 times per day    nicotine  1 patch TransDERmal Daily    epoetin kelsi-epbx  10,000 Units SubCUTAneous Once per day on Mon Wed Fri    mirtazapine  7.5 mg Oral Nightly    bicalutamide  50 mg Oral Daily    DULoxetine  60 mg Oral Daily    [Held by provider] fentaNYL  1 patch TransDERmal Q72H    gabapentin  600 mg Oral TID    predniSONE  20 mg Oral Daily    tamsulosin  0.4 mg Oral Daily    sodium chloride flush  5-40 mL IntraVENous 2 times per day    enoxaparin  40 mg SubCUTAneous Daily      Infusions:    sodium chloride       PRN Meds: HYDROmorphone, 0.25 mg, Q6H PRN  oxyCODONE-acetaminophen, 1 tablet, Q6H PRN  bisacodyl, 10 mg, Daily PRN  diphenhydrAMINE, 25 mg, Q6H PRN  ALPRAZolam, 0.5 mg, TID PRN  cyclobenzaprine, 10 mg, TID PRN  naloxone, 4 mg, PRN  sodium chloride flush, 5-40 mL, PRN  sodium chloride, , PRN  ondansetron, 4 mg, Q8H PRN   Or  ondansetron, 4 mg, Q6H PRN  polyethylene glycol, 17 g, Daily PRN  acetaminophen, 650 mg, Q6H PRN   Or  acetaminophen, 650 mg, Q6H PRN      Labs      Recent Results (from the past 24 hour(s))   CBC    Collection Time: 02/10/23  5:06 AM   Result Value Ref Range    WBC 10.4 4.0 - 10.5 K/CU MM    RBC 3.23 (L) 4.6 - 6.2 M/CU MM    Hemoglobin 7.5 (L) 13.5 - 18.0 GM/DL    Hematocrit 24.1 (L) 42 - 52 %    MCV 74.6 (L) 78 - 100 FL    MCH 23.2 (L) 27 - 31 PG    MCHC 31.1 (L) 32.0 - 36.0 %    RDW 18.7 (H) 11.7 - 14.9 %    Platelets 011 (H) 070 - 440 K/CU MM    MPV 8.8 7.5 - 11.1 FL        Imaging/Diagnostics Last 24 Hours   No results found.     Electronically signed by Tammy Thompson MD on 2/10/2023 at 7:31 AM

## 2023-02-11 LAB
BASOPHILS ABSOLUTE: 0 K/CU MM
BASOPHILS RELATIVE PERCENT: 0.3 % (ref 0–1)
DIFFERENTIAL TYPE: ABNORMAL
EOSINOPHILS ABSOLUTE: 0.1 K/CU MM
EOSINOPHILS RELATIVE PERCENT: 1.1 % (ref 0–3)
HCT VFR BLD CALC: 24.9 % (ref 42–52)
HEMOGLOBIN: 7.8 GM/DL (ref 13.5–18)
IMMATURE NEUTROPHIL %: 1 % (ref 0–0.43)
LYMPHOCYTES ABSOLUTE: 2.3 K/CU MM
LYMPHOCYTES RELATIVE PERCENT: 22.8 % (ref 24–44)
MCH RBC QN AUTO: 23.3 PG (ref 27–31)
MCHC RBC AUTO-ENTMCNC: 31.3 % (ref 32–36)
MCV RBC AUTO: 74.3 FL (ref 78–100)
MONOCYTES ABSOLUTE: 0.9 K/CU MM
MONOCYTES RELATIVE PERCENT: 9 % (ref 0–4)
NUCLEATED RBC %: 0.4 %
PDW BLD-RTO: 18.9 % (ref 11.7–14.9)
PLATELET # BLD: 410 K/CU MM (ref 140–440)
PMV BLD AUTO: 8.5 FL (ref 7.5–11.1)
RBC # BLD: 3.35 M/CU MM (ref 4.6–6.2)
SEGMENTED NEUTROPHILS ABSOLUTE COUNT: 6.5 K/CU MM
SEGMENTED NEUTROPHILS RELATIVE PERCENT: 65.8 % (ref 36–66)
TOTAL IMMATURE NEUTOROPHIL: 0.1 K/CU MM
TOTAL NUCLEATED RBC: 0 K/CU MM
WBC # BLD: 9.9 K/CU MM (ref 4–10.5)

## 2023-02-11 PROCEDURE — 6370000000 HC RX 637 (ALT 250 FOR IP): Performed by: INTERNAL MEDICINE

## 2023-02-11 PROCEDURE — 6370000000 HC RX 637 (ALT 250 FOR IP): Performed by: NURSE PRACTITIONER

## 2023-02-11 PROCEDURE — 6360000002 HC RX W HCPCS: Performed by: STUDENT IN AN ORGANIZED HEALTH CARE EDUCATION/TRAINING PROGRAM

## 2023-02-11 PROCEDURE — 6360000002 HC RX W HCPCS: Performed by: INTERNAL MEDICINE

## 2023-02-11 PROCEDURE — 2580000003 HC RX 258: Performed by: INTERNAL MEDICINE

## 2023-02-11 PROCEDURE — 94761 N-INVAS EAR/PLS OXIMETRY MLT: CPT

## 2023-02-11 PROCEDURE — 1200000000 HC SEMI PRIVATE

## 2023-02-11 PROCEDURE — 51702 INSERT TEMP BLADDER CATH: CPT

## 2023-02-11 PROCEDURE — 85025 COMPLETE CBC W/AUTO DIFF WBC: CPT

## 2023-02-11 PROCEDURE — 6370000000 HC RX 637 (ALT 250 FOR IP): Performed by: STUDENT IN AN ORGANIZED HEALTH CARE EDUCATION/TRAINING PROGRAM

## 2023-02-11 RX ADMIN — TAMSULOSIN HYDROCHLORIDE 0.4 MG: 0.4 CAPSULE ORAL at 08:05

## 2023-02-11 RX ADMIN — HYDROMORPHONE HYDROCHLORIDE 0.25 MG: 1 INJECTION, SOLUTION INTRAMUSCULAR; INTRAVENOUS; SUBCUTANEOUS at 15:48

## 2023-02-11 RX ADMIN — MIRTAZAPINE 7.5 MG: 15 TABLET, FILM COATED ORAL at 20:35

## 2023-02-11 RX ADMIN — OXYCODONE HYDROCHLORIDE 20 MG: 20 TABLET, FILM COATED, EXTENDED RELEASE ORAL at 20:55

## 2023-02-11 RX ADMIN — SODIUM CHLORIDE, PRESERVATIVE FREE 10 ML: 5 INJECTION INTRAVENOUS at 08:06

## 2023-02-11 RX ADMIN — ALPRAZOLAM 0.5 MG: 0.5 TABLET ORAL at 21:49

## 2023-02-11 RX ADMIN — PREDNISONE 20 MG: 20 TABLET ORAL at 08:05

## 2023-02-11 RX ADMIN — OXYCODONE HYDROCHLORIDE 20 MG: 20 TABLET, FILM COATED, EXTENDED RELEASE ORAL at 08:09

## 2023-02-11 RX ADMIN — DULOXETINE 60 MG: 30 CAPSULE, DELAYED RELEASE ORAL at 08:06

## 2023-02-11 RX ADMIN — SODIUM CHLORIDE, PRESERVATIVE FREE 10 ML: 5 INJECTION INTRAVENOUS at 05:24

## 2023-02-11 RX ADMIN — ALPRAZOLAM 0.5 MG: 0.5 TABLET ORAL at 06:42

## 2023-02-11 RX ADMIN — ENOXAPARIN SODIUM 40 MG: 100 INJECTION SUBCUTANEOUS at 20:35

## 2023-02-11 RX ADMIN — OXYCODONE AND ACETAMINOPHEN 1 TABLET: 5; 325 TABLET ORAL at 13:43

## 2023-02-11 RX ADMIN — GABAPENTIN 600 MG: 300 CAPSULE ORAL at 20:35

## 2023-02-11 RX ADMIN — HYDROMORPHONE HYDROCHLORIDE 0.25 MG: 1 INJECTION, SOLUTION INTRAMUSCULAR; INTRAVENOUS; SUBCUTANEOUS at 05:24

## 2023-02-11 RX ADMIN — GABAPENTIN 600 MG: 300 CAPSULE ORAL at 08:05

## 2023-02-11 RX ADMIN — OXYCODONE AND ACETAMINOPHEN 1 TABLET: 5; 325 TABLET ORAL at 19:47

## 2023-02-11 RX ADMIN — OXYCODONE AND ACETAMINOPHEN 1 TABLET: 5; 325 TABLET ORAL at 02:33

## 2023-02-11 RX ADMIN — BICALUTAMIDE 50 MG: 50 TABLET, FILM COATED ORAL at 08:06

## 2023-02-11 RX ADMIN — CYCLOBENZAPRINE 10 MG: 10 TABLET, FILM COATED ORAL at 18:30

## 2023-02-11 RX ADMIN — SODIUM CHLORIDE, PRESERVATIVE FREE 10 ML: 5 INJECTION INTRAVENOUS at 20:35

## 2023-02-11 RX ADMIN — GABAPENTIN 600 MG: 300 CAPSULE ORAL at 13:43

## 2023-02-11 ASSESSMENT — PAIN DESCRIPTION - ONSET
ONSET: ON-GOING

## 2023-02-11 ASSESSMENT — PAIN SCALES - GENERAL
PAINLEVEL_OUTOF10: 10
PAINLEVEL_OUTOF10: 0
PAINLEVEL_OUTOF10: 8
PAINLEVEL_OUTOF10: 10
PAINLEVEL_OUTOF10: 0

## 2023-02-11 ASSESSMENT — PAIN - FUNCTIONAL ASSESSMENT
PAIN_FUNCTIONAL_ASSESSMENT: ACTIVITIES ARE NOT PREVENTED
PAIN_FUNCTIONAL_ASSESSMENT: PREVENTS OR INTERFERES SOME ACTIVE ACTIVITIES AND ADLS
PAIN_FUNCTIONAL_ASSESSMENT: ACTIVITIES ARE NOT PREVENTED

## 2023-02-11 ASSESSMENT — PAIN DESCRIPTION - LOCATION
LOCATION: GENERALIZED
LOCATION: BACK
LOCATION: GENERALIZED
LOCATION: GENERALIZED
LOCATION: BACK

## 2023-02-11 ASSESSMENT — PAIN DESCRIPTION - PAIN TYPE
TYPE: CHRONIC PAIN

## 2023-02-11 ASSESSMENT — PAIN DESCRIPTION - FREQUENCY
FREQUENCY: CONTINUOUS

## 2023-02-11 ASSESSMENT — ENCOUNTER SYMPTOMS
GASTROINTESTINAL NEGATIVE: 1
EYES NEGATIVE: 1
RESPIRATORY NEGATIVE: 1

## 2023-02-11 ASSESSMENT — PAIN DESCRIPTION - DESCRIPTORS
DESCRIPTORS: ACHING
DESCRIPTORS: ACHING;SHARP

## 2023-02-11 ASSESSMENT — PAIN DESCRIPTION - ORIENTATION
ORIENTATION: MID
ORIENTATION: LOWER

## 2023-02-11 NOTE — PROGRESS NOTES
V2.0  [facility] Hospitalist Progress Note      Name:  Pat James /Age/Sex: 1970  (46 y.o. male)   MRN & CSN:  [id number] & [id number] Encounter Date/Time: 2023 7:32 AM EST    Location:  1111/1111-A PCP: Geremias Maldonado MD       Hospital Day: 12    Assessment and Plan:   Pat James is a 46 y.o. 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 No PCP at this time,  working with them to establish need for [facility] and Dr. Yuliana Chavez will follow at this time until a PCP can be established in the outpatient setting. He is at this time medically cleared for discharge, however DC on hold d/t need for DME, as hospice will be taking back their equipment, CM and [facility]oni Caputo working to obtain. 2/10/23 @ noon -Patient deciding for SNF, wife at bedside and in agreement, conversation had with CM and referral sent to [facility]    2/10/23 @ 1300 message from [address], wife requesting evaluation for patient having capacity to make decisions. Psych evaluated patient and determined patient is able to do the decisions and they signed off. Plan:  Concern for sepsis - ruled out  --Presented with fever and tachycardia, unclear source of infection at the time. He was started on broad-spectrum IV antibiotics. -- RVP, MRSA screen, UA and UCX were unremarkable  --Imaging showed no evidence of pneumonia and the CT of abdomen and pelvis was read for possible cystitis however the UA was unremarkable. Patient had recently undergone treatment with meropenem for UTI at home  --ID on board, recommended that antibiotics be discontinued and the patient be monitored with a plan to restart meropenem post blood cultures if the patient spikes a fever.   Beyond the fever on admission, he has remained afebrile but remains tachycardic  --Mild leukocytosis resolved,    #Hyponatremia  - Resolved  --Likely secondary to poor oral intake, Na stable at 137  --Continue to monitor    #Chronic anemia  --H/H stable at 7.5 this AM, patient likely has BM infiltration per [facility]  --Monitor and transfuse for hgb <7, continue retacrit MWF as long as hgb is <10    #History of metastatic prostate cancer:  --CT chest with diffuse osteoblastic metastatic disease, CT A/P with extensive osteoblastic metastatic disease. Last known Lupron dose was on 4/22, Last known abiraterone prescription 11/22, unclear last time he used it.   --PSA on this admission 233 up from 144 on 12/22/22  --Continue Casodex and tamsulosin  --Heme-onc on board, continue steroid therapy. Plan for outpatient follow up and PSA measurements    #History of squamous cell carcinoma left lung-diagnosed on 12/13/2021:  --Status post debulking of primary tumor via bronchoscopy at [address] [place] [address], positive response to therapy with carbo/Alimta/Pembro X1 followed by David Romero.   --Patient goes back and forth between being on hospice and not being on hospice  --[facility] to attempt treating with Jennifer Restrepo in the outpatient setting once he is feeling better    #History of urinary retention with chronic indwelling Doherty  --Had prior nephrostomy tube which was dislodged and replaced back  --Doherty remains in place with good output    #Anxiety disorder  --Continue alprazolam    #Depression  --Continue Mirtazapine and duloxetine    #Pain control  --Given change in status from hospice will alter pain regimen  --Oxycontin 20mg BID, with immediate relief percocet (and Dilaudid while in-patient) for breakthrough pain   --To be managed in the outpatient setting by PCP on [facility]    Diet ADULT DIET;  Regular  ADULT ORAL NUTRITION SUPPLEMENT; Breakfast, Dinner; Standard High Calorie/High Protein Oral Supplement  ADULT ORAL NUTRITION SUPPLEMENT; Dinner; Frozen Oral Supplement   DVT Prophylaxis [x] Lovenox, []  Heparin, [] SCDs, [] Ambulation,  [] Eliquis, [] Xarelto  [] Coumadin   Code Status Full Code   Disposition From: Home  Expected Disposition: [facility]  Estimated Date of Discharge: Medically stable for discharge pending determination for SNF. Surrogate Decision Maker/ POA      Subjective:     Chief Complaint: Fall       Patient seen and examined at bedside. Feels okay and pain everywhere but that is his baseline. Reports he wants to go leave hospital soon. Review of Systems:    Review of Systems   Constitutional: Negative. HENT: Negative. Eyes: Negative. Respiratory: Negative. Cardiovascular: Negative. Gastrointestinal: Negative. Genitourinary:         Chronic sampson   Musculoskeletal:         Pain in both legs   Skin: Negative. Neurological: Negative. Psychiatric/Behavioral: Negative. Objective: Intake/Output Summary (Last 24 hours) at 2/11/2023 1143  Last data filed at 2/11/2023 0813  Gross per 24 hour   Intake 320 ml   Output 1800 ml   Net -1480 ml          Vitals:   Vitals:    02/11/23 0839   BP:    Pulse:    Resp: 18   Temp:    SpO2:        Physical Exam:   General: Laying in bedside recliner  Eyes: EOMI  ENT: Moist mucous membrane  Cardiovascular: Regular rate  Respiratory: Clear to auscultation  Gastrointestinal: Soft, non tender  Genitourinary: no suprapubic tenderness, sampson cath in place  Musculoskeletal: No edema  Skin: warm, dry  Neuro: Alert. Psych: Mood appropriate.      Medications:   Medications:    oxyCODONE  20 mg Oral 2 times per day    nicotine  1 patch TransDERmal Daily    epoetin kelsi-epbx  10,000 Units SubCUTAneous Once per day on Mon Wed Fri    mirtazapine  7.5 mg Oral Nightly    bicalutamide  50 mg Oral Daily    DULoxetine  60 mg Oral Daily    gabapentin  600 mg Oral TID    predniSONE  20 mg Oral Daily    tamsulosin  0.4 mg Oral Daily    sodium chloride flush  5-40 mL IntraVENous 2 times per day    enoxaparin  40 mg SubCUTAneous Daily      Infusions:    sodium chloride       PRN Meds: HYDROmorphone, 0.25 mg, Q6H PRN  oxyCODONE-acetaminophen, 1 tablet, Q6H PRN  bisacodyl, 10 mg, Daily PRN  diphenhydrAMINE, 25 mg, Q6H PRN  ALPRAZolam, 0.5 mg, TID PRN  cyclobenzaprine, 10 mg, TID PRN  naloxone, 4 mg, PRN  sodium chloride flush, 5-40 mL, PRN  sodium chloride, , PRN  ondansetron, 4 mg, Q8H PRN   Or  ondansetron, 4 mg, Q6H PRN  polyethylene glycol, 17 g, Daily PRN  acetaminophen, 650 mg, Q6H PRN   Or  acetaminophen, 650 mg, Q6H PRN      Labs      Recent Results (from the past 24 hour(s))   CBC with Auto Differential    Collection Time: 02/11/23  6:10 AM   Result Value Ref Range    WBC 9.9 4.0 - 10.5 K/CU MM    RBC 3.35 (L) 4.6 - 6.2 M/CU MM    Hemoglobin 7.8 (L) 13.5 - 18.0 GM/DL    Hematocrit 24.9 (L) 42 - 52 %    MCV 74.3 (L) 78 - 100 FL    MCH 23.3 (L) 27 - 31 PG    MCHC 31.3 (L) 32.0 - 36.0 %    RDW 18.9 (H) 11.7 - 14.9 %    Platelets 100 833 - 115 K/CU MM    MPV 8.5 7.5 - 11.1 FL    Differential Type AUTOMATED DIFFERENTIAL     Segs Relative 65.8 36 - 66 %    Lymphocytes % 22.8 (L) 24 - 44 %    Monocytes % 9.0 (H) 0 - 4 %    Eosinophils % 1.1 0 - 3 %    Basophils % 0.3 0 - 1 %    Segs Absolute 6.5 K/CU MM    Lymphocytes Absolute 2.3 K/CU MM    Monocytes Absolute 0.9 K/CU MM    Eosinophils Absolute 0.1 K/CU MM    Basophils Absolute 0.0 K/CU MM    Nucleated RBC % 0.4 %    Total Nucleated RBC 0.0 K/CU MM    Total Immature Neutrophil 0.10 K/CU MM    Immature Neutrophil % 1.0 (H) 0 - 0.43 %        Imaging/Diagnostics Last 24 Hours   No results found.     Electronically signed by Joanna Wright MD on 2/11/2023 at 11:43 AM

## 2023-02-12 LAB
ALBUMIN SERPL-MCNC: 3.3 GM/DL (ref 3.4–5)
ALP BLD-CCNC: 257 IU/L (ref 40–129)
ALT SERPL-CCNC: 13 U/L (ref 10–40)
ANION GAP SERPL CALCULATED.3IONS-SCNC: 9 MMOL/L (ref 4–16)
AST SERPL-CCNC: 10 IU/L (ref 15–37)
BASOPHILS ABSOLUTE: 0 K/CU MM
BASOPHILS RELATIVE PERCENT: 0.2 % (ref 0–1)
BILIRUB SERPL-MCNC: 0.1 MG/DL (ref 0–1)
BUN SERPL-MCNC: 15 MG/DL (ref 6–23)
CALCIUM SERPL-MCNC: 10.9 MG/DL (ref 8.3–10.6)
CHLORIDE BLD-SCNC: 95 MMOL/L (ref 99–110)
CO2: 30 MMOL/L (ref 21–32)
CREAT SERPL-MCNC: 0.4 MG/DL (ref 0.9–1.3)
DIFFERENTIAL TYPE: ABNORMAL
EOSINOPHILS ABSOLUTE: 0 K/CU MM
EOSINOPHILS RELATIVE PERCENT: 0.3 % (ref 0–3)
GFR SERPL CREATININE-BSD FRML MDRD: >60 ML/MIN/1.73M2
GLUCOSE SERPL-MCNC: 126 MG/DL (ref 70–99)
HCT VFR BLD CALC: 26.3 % (ref 42–52)
HEMOGLOBIN: 8.3 GM/DL (ref 13.5–18)
IMMATURE NEUTROPHIL %: 0.9 % (ref 0–0.43)
LYMPHOCYTES ABSOLUTE: 2.5 K/CU MM
LYMPHOCYTES RELATIVE PERCENT: 20.7 % (ref 24–44)
MCH RBC QN AUTO: 23.2 PG (ref 27–31)
MCHC RBC AUTO-ENTMCNC: 31.6 % (ref 32–36)
MCV RBC AUTO: 73.7 FL (ref 78–100)
MONOCYTES ABSOLUTE: 1 K/CU MM
MONOCYTES RELATIVE PERCENT: 8.1 % (ref 0–4)
NUCLEATED RBC %: 0.2 %
PDW BLD-RTO: 19 % (ref 11.7–14.9)
PLATELET # BLD: 448 K/CU MM (ref 140–440)
PMV BLD AUTO: 9 FL (ref 7.5–11.1)
POTASSIUM SERPL-SCNC: 4.2 MMOL/L (ref 3.5–5.1)
RBC # BLD: 3.57 M/CU MM (ref 4.6–6.2)
SEGMENTED NEUTROPHILS ABSOLUTE COUNT: 8.6 K/CU MM
SEGMENTED NEUTROPHILS RELATIVE PERCENT: 69.8 % (ref 36–66)
SODIUM BLD-SCNC: 134 MMOL/L (ref 135–145)
TOTAL IMMATURE NEUTOROPHIL: 0.11 K/CU MM
TOTAL NUCLEATED RBC: 0 K/CU MM
TOTAL PROTEIN: 7.2 GM/DL (ref 6.4–8.2)
WBC # BLD: 12.3 K/CU MM (ref 4–10.5)

## 2023-02-12 PROCEDURE — 6370000000 HC RX 637 (ALT 250 FOR IP): Performed by: NURSE PRACTITIONER

## 2023-02-12 PROCEDURE — 6360000002 HC RX W HCPCS: Performed by: INTERNAL MEDICINE

## 2023-02-12 PROCEDURE — 80053 COMPREHEN METABOLIC PANEL: CPT

## 2023-02-12 PROCEDURE — 51702 INSERT TEMP BLADDER CATH: CPT

## 2023-02-12 PROCEDURE — 6360000002 HC RX W HCPCS: Performed by: STUDENT IN AN ORGANIZED HEALTH CARE EDUCATION/TRAINING PROGRAM

## 2023-02-12 PROCEDURE — 94761 N-INVAS EAR/PLS OXIMETRY MLT: CPT

## 2023-02-12 PROCEDURE — 6370000000 HC RX 637 (ALT 250 FOR IP): Performed by: INTERNAL MEDICINE

## 2023-02-12 PROCEDURE — 6370000000 HC RX 637 (ALT 250 FOR IP): Performed by: STUDENT IN AN ORGANIZED HEALTH CARE EDUCATION/TRAINING PROGRAM

## 2023-02-12 PROCEDURE — 85025 COMPLETE CBC W/AUTO DIFF WBC: CPT

## 2023-02-12 PROCEDURE — 2580000003 HC RX 258: Performed by: INTERNAL MEDICINE

## 2023-02-12 PROCEDURE — 1200000000 HC SEMI PRIVATE

## 2023-02-12 RX ADMIN — HYDROMORPHONE HYDROCHLORIDE 0.25 MG: 1 INJECTION, SOLUTION INTRAMUSCULAR; INTRAVENOUS; SUBCUTANEOUS at 17:04

## 2023-02-12 RX ADMIN — HYDROMORPHONE HYDROCHLORIDE 0.25 MG: 1 INJECTION, SOLUTION INTRAMUSCULAR; INTRAVENOUS; SUBCUTANEOUS at 10:30

## 2023-02-12 RX ADMIN — OXYCODONE AND ACETAMINOPHEN 1 TABLET: 5; 325 TABLET ORAL at 20:49

## 2023-02-12 RX ADMIN — OXYCODONE AND ACETAMINOPHEN 1 TABLET: 5; 325 TABLET ORAL at 12:53

## 2023-02-12 RX ADMIN — OXYCODONE HYDROCHLORIDE 20 MG: 20 TABLET, FILM COATED, EXTENDED RELEASE ORAL at 08:25

## 2023-02-12 RX ADMIN — SODIUM CHLORIDE, PRESERVATIVE FREE 10 ML: 5 INJECTION INTRAVENOUS at 08:26

## 2023-02-12 RX ADMIN — GABAPENTIN 600 MG: 300 CAPSULE ORAL at 20:49

## 2023-02-12 RX ADMIN — GABAPENTIN 600 MG: 300 CAPSULE ORAL at 14:00

## 2023-02-12 RX ADMIN — HYDROMORPHONE HYDROCHLORIDE 0.25 MG: 1 INJECTION, SOLUTION INTRAMUSCULAR; INTRAVENOUS; SUBCUTANEOUS at 06:07

## 2023-02-12 RX ADMIN — GABAPENTIN 600 MG: 300 CAPSULE ORAL at 08:25

## 2023-02-12 RX ADMIN — MIRTAZAPINE 7.5 MG: 15 TABLET, FILM COATED ORAL at 20:49

## 2023-02-12 RX ADMIN — ENOXAPARIN SODIUM 40 MG: 100 INJECTION SUBCUTANEOUS at 20:49

## 2023-02-12 RX ADMIN — SODIUM CHLORIDE, PRESERVATIVE FREE 10 ML: 5 INJECTION INTRAVENOUS at 20:48

## 2023-02-12 RX ADMIN — SODIUM CHLORIDE, PRESERVATIVE FREE 10 ML: 5 INJECTION INTRAVENOUS at 06:07

## 2023-02-12 RX ADMIN — CYCLOBENZAPRINE 10 MG: 10 TABLET, FILM COATED ORAL at 12:54

## 2023-02-12 RX ADMIN — BICALUTAMIDE 50 MG: 50 TABLET, FILM COATED ORAL at 08:22

## 2023-02-12 RX ADMIN — TAMSULOSIN HYDROCHLORIDE 0.4 MG: 0.4 CAPSULE ORAL at 08:26

## 2023-02-12 RX ADMIN — CYCLOBENZAPRINE 10 MG: 10 TABLET, FILM COATED ORAL at 02:57

## 2023-02-12 RX ADMIN — DULOXETINE 60 MG: 30 CAPSULE, DELAYED RELEASE ORAL at 08:24

## 2023-02-12 RX ADMIN — ALPRAZOLAM 0.5 MG: 0.5 TABLET ORAL at 12:53

## 2023-02-12 RX ADMIN — PREDNISONE 20 MG: 20 TABLET ORAL at 08:26

## 2023-02-12 RX ADMIN — HYDROMORPHONE HYDROCHLORIDE 0.25 MG: 1 INJECTION, SOLUTION INTRAMUSCULAR; INTRAVENOUS; SUBCUTANEOUS at 00:04

## 2023-02-12 RX ADMIN — OXYCODONE AND ACETAMINOPHEN 1 TABLET: 5; 325 TABLET ORAL at 01:50

## 2023-02-12 RX ADMIN — SODIUM CHLORIDE, PRESERVATIVE FREE 10 ML: 5 INJECTION INTRAVENOUS at 00:04

## 2023-02-12 ASSESSMENT — PAIN DESCRIPTION - PAIN TYPE
TYPE: CHRONIC PAIN

## 2023-02-12 ASSESSMENT — PAIN SCALES - WONG BAKER
WONGBAKER_NUMERICALRESPONSE: 0

## 2023-02-12 ASSESSMENT — PAIN - FUNCTIONAL ASSESSMENT
PAIN_FUNCTIONAL_ASSESSMENT: ACTIVITIES ARE NOT PREVENTED

## 2023-02-12 ASSESSMENT — PAIN DESCRIPTION - ORIENTATION
ORIENTATION: LOWER
ORIENTATION: LOWER
ORIENTATION: RIGHT;LEFT
ORIENTATION: RIGHT;LEFT
ORIENTATION: RIGHT
ORIENTATION: LOWER

## 2023-02-12 ASSESSMENT — PAIN DESCRIPTION - DESCRIPTORS
DESCRIPTORS: ACHING;THROBBING
DESCRIPTORS: ACHING;DISCOMFORT
DESCRIPTORS: ACHING
DESCRIPTORS: ACHING
DESCRIPTORS: ACHING;DISCOMFORT
DESCRIPTORS: ACHING;CRAMPING
DESCRIPTORS: ACHING;DISCOMFORT
DESCRIPTORS: ACHING
DESCRIPTORS: ACHING;THROBBING

## 2023-02-12 ASSESSMENT — PAIN SCALES - GENERAL
PAINLEVEL_OUTOF10: 10
PAINLEVEL_OUTOF10: 0
PAINLEVEL_OUTOF10: 0
PAINLEVEL_OUTOF10: 10
PAINLEVEL_OUTOF10: 0
PAINLEVEL_OUTOF10: 0
PAINLEVEL_OUTOF10: 10
PAINLEVEL_OUTOF10: 8
PAINLEVEL_OUTOF10: 10
PAINLEVEL_OUTOF10: 8
PAINLEVEL_OUTOF10: 0
PAINLEVEL_OUTOF10: 10
PAINLEVEL_OUTOF10: 10

## 2023-02-12 ASSESSMENT — PAIN DESCRIPTION - LOCATION
LOCATION: BACK
LOCATION: BACK
LOCATION: KNEE
LOCATION: ABDOMEN
LOCATION: GENERALIZED
LOCATION: GENERALIZED
LOCATION: BACK
LOCATION: GENERALIZED
LOCATION: ABDOMEN
LOCATION: GENERALIZED

## 2023-02-12 ASSESSMENT — PAIN DESCRIPTION - ONSET
ONSET: ON-GOING

## 2023-02-12 ASSESSMENT — PAIN DESCRIPTION - FREQUENCY
FREQUENCY: CONTINUOUS

## 2023-02-12 ASSESSMENT — ENCOUNTER SYMPTOMS
EYES NEGATIVE: 1
RESPIRATORY NEGATIVE: 1
GASTROINTESTINAL NEGATIVE: 1

## 2023-02-12 NOTE — CARE COORDINATION
Attempted to meet with the pt for clarification on discharge plan. Pt wife not in the room. Patient was sleep and would not wake to name being called.

## 2023-02-12 NOTE — PROGRESS NOTES
V2.0  [facility] [place] Hospitalist Progress Note      Name:  Priyanka Capone /Age/Sex: 1970  (46 y.o. male)   MRN & CSN:  [id number] & [id number] Encounter Date/Time: 2023 7:32 AM EST    Location:  1111/1111-A PCP: Jamila Preciado MD       Hospital Day: 13    Assessment and Plan:   Priyanka Capone is a 46 y.o. male with pmh of CAD, history of metastatic prostate cancer, and squamous cell carcinoma of the left lung  who presents with Sepsis ([address]    23-Patient and family unwilling to go the SNF route and would prefer HHC. No PCP at this time,  working with them to establish need for [facility] and Dr. Erich Valdez will follow at this time until a PCP can be established in the outpatient setting. He is at this time medically cleared for discharge, however DC on hold d/t need for DME, as hospice will be taking back their equipment, CM and [facility] working to obtain. 2/10/23 @ noon -Patient deciding for SNF, wife at bedside and in agreement, conversation had with CM and referral sent to [facility]    2/10/23 @ 1300 message from [facility], wife requesting evaluation for patient having capacity to make decisions. Psych evaluated patient and determined patient is able to do the decisions and they signed off.    23 discussion again with patient and wife on the phone. Ultimate plan is now to go home with home health care. However hospital bed still not delivered at home. Also needs backup plan for pain med prescriptions with establishment of PCP over next week. Plan:  Concern for sepsis - ruled out  --Presented with fever and tachycardia, unclear source of infection at the time. He was started on broad-spectrum IV antibiotics. -- RVP, MRSA screen, UA and UCX were unremarkable  --Imaging showed no evidence of pneumonia and the CT of abdomen and pelvis was read for possible cystitis however the UA was unremarkable.  Patient had recently undergone treatment with meropenem for UTI at home  --ID on board, recommended that antibiotics be discontinued and the patient be monitored with a plan to restart meropenem post blood cultures if the patient spikes a fever. Beyond the fever on admission, he has remained afebrile but remains tachycardic  --Mild leukocytosis resolved,    #Hyponatremia  - Resolved  --Likely secondary to poor oral intake, Na stable at 137  --Continue to monitor    #Chronic anemia  --H/H stable at 7.5 this AM, patient likely has BM infiltration per hemeonc  --Monitor and transfuse for hgb <7, continue retacrit MWF as long as hgb is <10    #History of metastatic prostate cancer:  --CT chest with diffuse osteoblastic metastatic disease, CT A/P with extensive osteoblastic metastatic disease. Last known Lupron dose was on 4/22, Last known abiraterone prescription 11/22, unclear last time he used it.   --PSA on this admission 233 up from 144 on 12/22/22  --Continue Casodex and tamsulosin  --Heme-onc on board, continue steroid therapy. Plan for outpatient follow up and PSA measurements    #History of squamous cell carcinoma left lung-diagnosed on 12/13/2021:  --Status post debulking of primary tumor via bronchoscopy at [address] [place] [address], positive response to therapy with carbo/Alimta/Pembro X1 followed by Sara Arzate.   --Patient goes back and forth between being on hospice and not being on hospice  --[facility] to attempt treating with Sultana Chen in the outpatient setting once he is feeling better    #History of urinary retention with chronic indwelling Doherty  --Had prior nephrostomy tube which was dislodged and replaced back  --Doherty remains in place with good output    #Anxiety disorder  --Continue alprazolam    #Depression  --Continue Mirtazapine and duloxetine    #Pain control  --Given change in status from hospice will alter pain regimen  --Oxycontin 20mg BID, with immediate relief percocet (and Dilaudid while in-patient) for breakthrough pain   --To be managed in the outpatient setting by PCP on [facility]    Diet ADULT DIET;  Regular  ADULT ORAL NUTRITION SUPPLEMENT; Breakfast, Dinner; Standard High Calorie/High Protein Oral Supplement  ADULT ORAL NUTRITION SUPPLEMENT; Dinner; Frozen Oral Supplement   DVT Prophylaxis [x] Lovenox, []  Heparin, [] SCDs, [] Ambulation,  [] Eliquis, [] Xarelto  [] Coumadin   Code Status Full Code   Disposition From: Home  Expected Disposition: [facility]  Estimated Date of Discharge: Medically stable for discharge pending determination for SNF. Surrogate Decision Maker/ POA      Subjective:     Chief Complaint: Fall       Patient seen and examined at bedside. Feels okay and pain everywhere but that is his baseline. Reports he wants to go home soon. Spoke to his wife over the phone and she reports he wants him to come home soon as well however she is still waiting for hospital bed to be delivered. Review of Systems:    Review of Systems   Constitutional: Negative. HENT: Negative. Eyes: Negative. Respiratory: Negative. Cardiovascular: Negative. Gastrointestinal: Negative. Genitourinary:         Chronic sampson   Musculoskeletal:         Pain in both legs   Skin: Negative. Neurological: Negative. Psychiatric/Behavioral: Negative. Objective: Intake/Output Summary (Last 24 hours) at 2/12/2023 0954  Last data filed at 2/12/2023 0259  Gross per 24 hour   Intake 160 ml   Output 2150 ml   Net -1990 ml          Vitals:   Vitals:    02/12/23 0800   BP: 112/79   Pulse: (!) 119   Resp: 16   Temp: 97.9 °F (36.6 °C)   SpO2: 99%       Physical Exam:   General: Laying in bedside recliner  Eyes: EOMI  ENT: Moist mucous membrane  Cardiovascular: Regular rate  Respiratory: Clear to auscultation  Gastrointestinal: Soft, non tender  Genitourinary: no suprapubic tenderness, sampson cath in place  Musculoskeletal: No edema  Skin: warm, dry  Neuro: Alert. Psych: Mood appropriate.      Medications:   Medications:    oxyCODONE  20 mg Oral 2 times per day    nicotine  1 patch TransDERmal Daily    epoetin kelsi-epbx 10,000 Units SubCUTAneous Once per day on Mon Wed Fri    mirtazapine  7.5 mg Oral Nightly    bicalutamide  50 mg Oral Daily    DULoxetine  60 mg Oral Daily    gabapentin  600 mg Oral TID    predniSONE  20 mg Oral Daily    tamsulosin  0.4 mg Oral Daily    sodium chloride flush  5-40 mL IntraVENous 2 times per day    enoxaparin  40 mg SubCUTAneous Daily      Infusions:    sodium chloride       PRN Meds: HYDROmorphone, 0.25 mg, Q6H PRN  oxyCODONE-acetaminophen, 1 tablet, Q6H PRN  bisacodyl, 10 mg, Daily PRN  diphenhydrAMINE, 25 mg, Q6H PRN  ALPRAZolam, 0.5 mg, TID PRN  cyclobenzaprine, 10 mg, TID PRN  naloxone, 4 mg, PRN  sodium chloride flush, 5-40 mL, PRN  sodium chloride, , PRN  ondansetron, 4 mg, Q8H PRN   Or  ondansetron, 4 mg, Q6H PRN  polyethylene glycol, 17 g, Daily PRN  acetaminophen, 650 mg, Q6H PRN   Or  acetaminophen, 650 mg, Q6H PRN      Labs      No results found for this or any previous visit (from the past 24 hour(s)). Imaging/Diagnostics Last 24 Hours   No results found.     Electronically signed by Lake Howard MD on 2/12/2023 at 9:54 AM

## 2023-02-13 PROBLEM — A41.9 SEPSIS WITHOUT ACUTE ORGAN DYSFUNCTION (HCC): Status: ACTIVE | Noted: 2023-02-13

## 2023-02-13 PROCEDURE — 6370000000 HC RX 637 (ALT 250 FOR IP): Performed by: NURSE PRACTITIONER

## 2023-02-13 PROCEDURE — 6370000000 HC RX 637 (ALT 250 FOR IP): Performed by: STUDENT IN AN ORGANIZED HEALTH CARE EDUCATION/TRAINING PROGRAM

## 2023-02-13 PROCEDURE — 2580000003 HC RX 258: Performed by: INTERNAL MEDICINE

## 2023-02-13 PROCEDURE — 6360000002 HC RX W HCPCS: Performed by: STUDENT IN AN ORGANIZED HEALTH CARE EDUCATION/TRAINING PROGRAM

## 2023-02-13 PROCEDURE — 1200000000 HC SEMI PRIVATE

## 2023-02-13 PROCEDURE — 6370000000 HC RX 637 (ALT 250 FOR IP): Performed by: INTERNAL MEDICINE

## 2023-02-13 PROCEDURE — 94761 N-INVAS EAR/PLS OXIMETRY MLT: CPT

## 2023-02-13 PROCEDURE — 6360000002 HC RX W HCPCS: Performed by: INTERNAL MEDICINE

## 2023-02-13 PROCEDURE — 99232 SBSQ HOSP IP/OBS MODERATE 35: CPT | Performed by: INTERNAL MEDICINE

## 2023-02-13 RX ORDER — MIRTAZAPINE 15 MG/1
15 TABLET, FILM COATED ORAL NIGHTLY
Status: DISCONTINUED | OUTPATIENT
Start: 2023-02-13 | End: 2023-02-16 | Stop reason: HOSPADM

## 2023-02-13 RX ORDER — OXYCODONE HCL 20 MG/1
20 TABLET, FILM COATED, EXTENDED RELEASE ORAL EVERY 12 HOURS SCHEDULED
Status: DISCONTINUED | OUTPATIENT
Start: 2023-02-13 | End: 2023-02-16 | Stop reason: HOSPADM

## 2023-02-13 RX ADMIN — HYDROMORPHONE HYDROCHLORIDE 0.25 MG: 1 INJECTION, SOLUTION INTRAMUSCULAR; INTRAVENOUS; SUBCUTANEOUS at 00:11

## 2023-02-13 RX ADMIN — CYCLOBENZAPRINE 10 MG: 10 TABLET, FILM COATED ORAL at 19:54

## 2023-02-13 RX ADMIN — MIRTAZAPINE 15 MG: 15 TABLET, FILM COATED ORAL at 19:54

## 2023-02-13 RX ADMIN — ALPRAZOLAM 0.5 MG: 0.5 TABLET ORAL at 10:11

## 2023-02-13 RX ADMIN — OXYCODONE HYDROCHLORIDE 20 MG: 20 TABLET, FILM COATED, EXTENDED RELEASE ORAL at 10:10

## 2023-02-13 RX ADMIN — ALPRAZOLAM 0.5 MG: 0.5 TABLET ORAL at 22:08

## 2023-02-13 RX ADMIN — HYDROMORPHONE HYDROCHLORIDE 0.25 MG: 1 INJECTION, SOLUTION INTRAMUSCULAR; INTRAVENOUS; SUBCUTANEOUS at 06:17

## 2023-02-13 RX ADMIN — GABAPENTIN 600 MG: 300 CAPSULE ORAL at 14:28

## 2023-02-13 RX ADMIN — PREDNISONE 20 MG: 20 TABLET ORAL at 10:11

## 2023-02-13 RX ADMIN — ALPRAZOLAM 0.5 MG: 0.5 TABLET ORAL at 02:13

## 2023-02-13 RX ADMIN — TAMSULOSIN HYDROCHLORIDE 0.4 MG: 0.4 CAPSULE ORAL at 10:14

## 2023-02-13 RX ADMIN — BICALUTAMIDE 50 MG: 50 TABLET, FILM COATED ORAL at 10:11

## 2023-02-13 RX ADMIN — GABAPENTIN 600 MG: 300 CAPSULE ORAL at 10:11

## 2023-02-13 RX ADMIN — OXYCODONE HYDROCHLORIDE 20 MG: 20 TABLET, FILM COATED, EXTENDED RELEASE ORAL at 19:54

## 2023-02-13 RX ADMIN — ENOXAPARIN SODIUM 40 MG: 100 INJECTION SUBCUTANEOUS at 20:01

## 2023-02-13 RX ADMIN — OXYCODONE AND ACETAMINOPHEN 1 TABLET: 5; 325 TABLET ORAL at 02:49

## 2023-02-13 RX ADMIN — SODIUM CHLORIDE, PRESERVATIVE FREE 10 ML: 5 INJECTION INTRAVENOUS at 00:13

## 2023-02-13 RX ADMIN — CYCLOBENZAPRINE 10 MG: 10 TABLET, FILM COATED ORAL at 02:13

## 2023-02-13 RX ADMIN — SODIUM CHLORIDE, PRESERVATIVE FREE 10 ML: 5 INJECTION INTRAVENOUS at 20:59

## 2023-02-13 RX ADMIN — DULOXETINE 60 MG: 30 CAPSULE, DELAYED RELEASE ORAL at 10:25

## 2023-02-13 RX ADMIN — OXYCODONE AND ACETAMINOPHEN 1 TABLET: 5; 325 TABLET ORAL at 14:28

## 2023-02-13 RX ADMIN — GABAPENTIN 600 MG: 300 CAPSULE ORAL at 19:54

## 2023-02-13 RX ADMIN — OXYCODONE AND ACETAMINOPHEN 1 TABLET: 5; 325 TABLET ORAL at 19:54

## 2023-02-13 RX ADMIN — SODIUM CHLORIDE, PRESERVATIVE FREE 5 ML: 5 INJECTION INTRAVENOUS at 10:26

## 2023-02-13 ASSESSMENT — PAIN DESCRIPTION - FREQUENCY
FREQUENCY: CONTINUOUS

## 2023-02-13 ASSESSMENT — PAIN - FUNCTIONAL ASSESSMENT
PAIN_FUNCTIONAL_ASSESSMENT: ACTIVITIES ARE NOT PREVENTED

## 2023-02-13 ASSESSMENT — PAIN SCALES - GENERAL
PAINLEVEL_OUTOF10: 10

## 2023-02-13 ASSESSMENT — PAIN DESCRIPTION - LOCATION
LOCATION: GENERALIZED

## 2023-02-13 ASSESSMENT — ENCOUNTER SYMPTOMS
EYES NEGATIVE: 1
GASTROINTESTINAL NEGATIVE: 1
RESPIRATORY NEGATIVE: 1

## 2023-02-13 ASSESSMENT — PAIN DESCRIPTION - ONSET
ONSET: ON-GOING

## 2023-02-13 ASSESSMENT — PAIN DESCRIPTION - DESCRIPTORS
DESCRIPTORS: DISCOMFORT
DESCRIPTORS: ACHING

## 2023-02-13 ASSESSMENT — PAIN DESCRIPTION - PAIN TYPE
TYPE: CHRONIC PAIN

## 2023-02-13 ASSESSMENT — PAIN DESCRIPTION - ORIENTATION: ORIENTATION: RIGHT;LEFT

## 2023-02-13 NOTE — PROGRESS NOTES
V2.0  [facility] Hospitalist Progress Note      Name:  Natalie Grant /Age/Sex: 1970  (46 y.o. male)   MRN & CSN:  [id number] & [id number] Encounter Date/Time: 2023 7:32 AM EST    Location:  1111/1111-A PCP: Ria Hernandez MD       Hospital Day: 14    Assessment and Plan:   Natalie Grant is a 46 y.o. 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 No PCP at this time,  working with them to establish need for [address] and Dr. Syl Patel will follow at this time until a PCP can be established in the outpatient setting. He is at this time medically cleared for discharge, however DC on hold d/t need for DME, as hospice will be taking back their equipment, CM and [address] working to obtain. 2/10/23 @ noon -Patient deciding for SNF, wife at bedside and in agreement, conversation had with CM and referral sent to [facility]    2/10/23 @ 1300 message from [facility], wife requesting evaluation for patient having capacity to make decisions. Psych evaluated patient and determined patient is able to do the decisions and they signed off.    23 discussion again with patient and wife on the phone. Ultimate plan is now to go home with home health care. However hospital bed still not delivered at home. Also needs backup plan for pain med prescriptions with establishment of PCP over next week. 23 discussion again with wife at bedside and patient. They would like to get scans of brain, thoracic cervical and lumbar spine to evaluate for disease burden prior to making decision or trying [facility] as outpatient versus hospice. Explained to him that he will not have the best quality of life while undergoing therapy with Keytruda as we would like to monitor for disease response and so he would still be in significant amount of pain.   He reports he would like to get the scans prior to making a decision but leaning more towards focusing on his quality of life. Plan:  Concern for sepsis - ruled out  --Presented with fever and tachycardia, unclear source of infection at the time. He was started on broad-spectrum IV antibiotics. -- RVP, MRSA screen, UA and UCX were unremarkable  --Imaging showed no evidence of pneumonia and the CT of abdomen and pelvis was read for possible cystitis however the UA was unremarkable. Patient had recently undergone treatment with meropenem for UTI at home  --ID on board, recommended that antibiotics be discontinued and the patient be monitored with a plan to restart meropenem post blood cultures if the patient spikes a fever. Beyond the fever on admission, he has remained afebrile but remains tachycardic  --Mild leukocytosis resolved,    #Hyponatremia  - Resolved  --Likely secondary to poor oral intake, Na stable at 137  --Continue to monitor    #Chronic anemia  --H/H stable at 7.5 this AM, patient likely has BM infiltration per hemeonc  --Monitor and transfuse for hgb <7, continue retacrit MWF as long as hgb is <10    #History of metastatic prostate cancer:  --CT chest with diffuse osteoblastic metastatic disease, CT A/P with extensive osteoblastic metastatic disease. Last known Lupron dose was on 4/22, Last known abiraterone prescription 11/22, unclear last time he used it.   --PSA on this admission 233 up from 144 on 12/22/22  --Continue Casodex and tamsulosin  --Heme-onc on board, continue steroid therapy. Plan for MRI brain, cervical thoracic and lumbar spine today to evaluate for disease burden. With that information patient and family to make decision of treatment versus hospice.     #History of squamous cell carcinoma left lung-diagnosed on 12/13/2021:  --Status post debulking of primary tumor via bronchoscopy at [address] [place] [address], positive response to therapy with carbo/Alimta/Pembro X1 followed by Sara Arzate.   --Patient goes back and forth between being on hospice and not being on hospice  --[facility] to attempt treating with Amelia Gannon in the outpatient setting once he is feeling better    #History of urinary retention with chronic indwelling Doherty  --Had prior nephrostomy tube which was dislodged and replaced back  --Doherty remains in place with good output    #Anxiety disorder  --Continue alprazolam    #Depression  --Continue Mirtazapine and duloxetine    #Pain control  --Given change in status from hospice will alter pain regimen  --Oxycontin 20mg BID, with immediate relief percocet (and Dilaudid while in-patient) for breakthrough pain   --To be managed in the outpatient setting by PCP on [facility]    Diet ADULT DIET; Regular  ADULT ORAL NUTRITION SUPPLEMENT; Breakfast, Dinner; Standard High Calorie/High Protein Oral Supplement  ADULT ORAL NUTRITION SUPPLEMENT; Dinner; Frozen Oral Supplement   DVT Prophylaxis [x] Lovenox, []  Heparin, [] SCDs, [] Ambulation,  [] Eliquis, [] Xarelto  [] Coumadin   Code Status Full Code   Disposition From: Home  Expected Disposition: [facility]  Estimated Date of Discharge: Medically stable for discharge pending determination for SNF. Surrogate Decision Maker/ POA      Subjective:     Chief Complaint: Fall       Patient seen and examined at bedside. Reports worsening pain since stopping the OxyContin. He reports he would like to get the scans prior to making decision regarding therapy was a hospice but he certainly not like to be in pain. Upon being prompted that he would probably be in pain while undergoing cancer therapy to monitor for disease response, he reports he would like to see the scans first prior to making decision 1 way or the other but he is interested in more than quality of life at this point. Review of Systems:    Review of Systems   Constitutional: Negative. HENT: Negative. Eyes: Negative. Respiratory: Negative. Cardiovascular: Negative. Gastrointestinal: Negative. Genitourinary:         Chronic sampson   Musculoskeletal:         Pain in both legs   Skin: Negative. Neurological: Negative. Psychiatric/Behavioral: Negative. Objective: Intake/Output Summary (Last 24 hours) at 2/13/2023 1012  Last data filed at 2/13/2023 0401  Gross per 24 hour   Intake 50 ml   Output 2775 ml   Net -2725 ml          Vitals:   Vitals:    02/13/23 0357   BP: 112/79   Pulse: (!) 111   Resp: 16   Temp: 97.6 °F (36.4 °C)   SpO2: 98%       Physical Exam:   General: Laying in bed with wife at bedside. Eyes: EOMI  ENT: Moist mucous membrane  Cardiovascular: Regular rate  Respiratory: Clear to auscultation  Gastrointestinal: Soft, non tender  Genitourinary: no suprapubic tenderness, sampson cath in place  Musculoskeletal: No edema  Skin: warm, dry  Neuro: Alert, oriented time place person. .  Strength 4 out of 5 bilateral upper extremities. Unable to sustain lower extremities against gravity. Only passive range of motion. Pain upon passive range of motion bilateral lower extremity. Psych: Mood appropriate.      Medications:   Medications:    oxyCODONE  20 mg Oral 2 times per day    nicotine  1 patch TransDERmal Daily    epoetin kelsi-epbx  10,000 Units SubCUTAneous Once per day on Mon Wed Fri    mirtazapine  7.5 mg Oral Nightly    bicalutamide  50 mg Oral Daily    DULoxetine  60 mg Oral Daily    gabapentin  600 mg Oral TID    predniSONE  20 mg Oral Daily    tamsulosin  0.4 mg Oral Daily    sodium chloride flush  5-40 mL IntraVENous 2 times per day    enoxaparin  40 mg SubCUTAneous Daily      Infusions:    sodium chloride       PRN Meds: HYDROmorphone, 0.25 mg, Q6H PRN  oxyCODONE-acetaminophen, 1 tablet, Q6H PRN  bisacodyl, 10 mg, Daily PRN  diphenhydrAMINE, 25 mg, Q6H PRN  ALPRAZolam, 0.5 mg, TID PRN  cyclobenzaprine, 10 mg, TID PRN  naloxone, 4 mg, PRN  sodium chloride flush, 5-40 mL, PRN  sodium chloride, , PRN  ondansetron, 4 mg, Q8H PRN   Or  ondansetron, 4 mg, Q6H PRN  polyethylene glycol, 17 g, Daily PRN  acetaminophen, 650 mg, Q6H PRN   Or  acetaminophen, 650 mg, Q6H PRN      Labs      Recent Results (from the past 24 hour(s))   CBC with Auto Differential    Collection Time: 02/12/23  9:49 PM   Result Value Ref Range    WBC 12.3 (H) 4.0 - 10.5 K/CU MM    RBC 3.57 (L) 4.6 - 6.2 M/CU MM    Hemoglobin 8.3 (L) 13.5 - 18.0 GM/DL    Hematocrit 26.3 (L) 42 - 52 %    MCV 73.7 (L) 78 - 100 FL    MCH 23.2 (L) 27 - 31 PG    MCHC 31.6 (L) 32.0 - 36.0 %    RDW 19.0 (H) 11.7 - 14.9 %    Platelets 714 (H) 949 - 440 K/CU MM    MPV 9.0 7.5 - 11.1 FL    Differential Type AUTOMATED DIFFERENTIAL     Segs Relative 69.8 (H) 36 - 66 %    Lymphocytes % 20.7 (L) 24 - 44 %    Monocytes % 8.1 (H) 0 - 4 %    Eosinophils % 0.3 0 - 3 %    Basophils % 0.2 0 - 1 %    Segs Absolute 8.6 K/CU MM    Lymphocytes Absolute 2.5 K/CU MM    Monocytes Absolute 1.0 K/CU MM    Eosinophils Absolute 0.0 K/CU MM    Basophils Absolute 0.0 K/CU MM    Nucleated RBC % 0.2 %    Total Nucleated RBC 0.0 K/CU MM    Total Immature Neutrophil 0.11 K/CU MM    Immature Neutrophil % 0.9 (H) 0 - 0.43 %   Comprehensive Metabolic Panel    Collection Time: 02/12/23  9:49 PM   Result Value Ref Range    Sodium 134 (L) 135 - 145 MMOL/L    Potassium 4.2 3.5 - 5.1 MMOL/L    Chloride 95 (L) 99 - 110 mMol/L    CO2 30 21 - 32 MMOL/L    BUN 15 6 - 23 MG/DL    Creatinine 0.4 (L) 0.9 - 1.3 MG/DL    Est, Glom Filt Rate >60 >60 mL/min/1.73m2    Glucose 126 (H) 70 - 99 MG/DL    Calcium 10.9 (H) 8.3 - 10.6 MG/DL    Albumin 3.3 (L) 3.4 - 5.0 GM/DL    Total Protein 7.2 6.4 - 8.2 GM/DL    Total Bilirubin 0.1 0.0 - 1.0 MG/DL    ALT 13 10 - 40 U/L    AST 10 (L) 15 - 37 IU/L    Alkaline Phosphatase 257 (H) 40 - 129 IU/L    Anion Gap 9 4 - 16          Imaging/Diagnostics Last 24 Hours   No results found.     Electronically signed by Kiki Sandoval MD on 2/13/2023 at 10:12 AM

## 2023-02-13 NOTE — PROGRESS NOTES
HEMATOLOGY ONCOLOGY  Progress Note    HISTORY OF PRESENT ILLNESS   Viral Terry is a 46 y.o. male with past medical history of metastatic prostate cancer, and metastatic lung cancer who has intermittently been enrolled in hospice care. Review of labs reveal last ADT  22.5 mg. Last refill of abiraterone ,  . It is unclear how long he has not been taking medication. He was seen in office last week and offered radiation therapy to his left pelvis/sacrum. He then rejoined hospice and cancelled radiation therapy appointment. He now reports he would like to pursue all treatment modalities. 23 CT abdomen/pelvis showed extensive osteoblastic metastatic disease with increasing area of destructive and lytic disease particularly     23 CT chest- interval increases size and extent of left hilar/mediastinal mass lesion and SHELLEY atelectasis reflecting progression of disease. Last PSA 22 was 144.0 with prior reading 3/22/22 976.0. He was readmitted with concern for sepsis. He has been on IV meropenem for complicated UTI. Blood and urine cultures obtained in ED. On exam he appears comfortable in bed, remains tachycardic, reports pain is better controlled, denies chest pain, shortness of breath, he denies dysuria, tolerating catheter well. He does endorse pain that prevents him from engaging in ADLs. Patient expressing concern about significant other's ability to care for him at home. In discussing patient care with [facility] concern was expressed for diversion of pain medication from the significant other. Interval   No family at bedside. Pt seen and examined resting in bed. Radiation was condensed to 1 fraction/8 Gy given difficulty tolerating laying flat on treatment table and was completed last week. He states he has fairly good pain control right now. He wants to be able to walk, note that PT/OT is requested.   He remains tachycardic with newly elevated WBC count. 2/7/2023, he reported that he has not gotten out of the bed. Appetite is poor. Continues to have pain in the back. Urinating okay. No bleeding. No fever. 2/8/2023 patient seen and examined. Has not been able to get out of the bed. Physical therapy came yesterday and evaluated, he was able to get up only with the help of a hoist.  Had good bowel movement yesterday. No bleeding. No fever. Pain with activity otherwise controlled. 2/13/23  Resting in bed quietly, reports he is comfortable presently but has ongoing pain. He is alert and oriented, has weakness generalized, however right hand weaker than left. Plan for MRI lumbar, cervical, thoracic spine and MRI brain w/wo today. Appetite remains poor. PHYSICAL EXAM    Vitals: /79   Pulse (!) 111   Temp 97.6 °F (36.4 °C) (Oral)   Resp 16   Ht 6' 1\" (1.854 m)   Wt 152 lb 9.6 oz (69.2 kg)   SpO2 98%   BMI 20.13 kg/m²     Physical Exam  Constitutional:       Comments: Generalized weakness   Eyes:      Extraocular Movements: Extraocular movements intact. Cardiovascular:      Rate and Rhythm: Tachycardia present. Heart sounds: Normal heart sounds. Pulmonary:      Effort: Pulmonary effort is normal.      Breath sounds: Normal breath sounds. Abdominal:      General: Bowel sounds are normal.      Palpations: Abdomen is soft. Skin:     General: Skin is warm. Neurological:      General: No focal deficit present. Mental Status: He is alert.       Comments: Able to raise bilateral arms independently,  L>R        LABORATORY RESULTS  CBC:   Recent Labs     02/11/23  0610 02/12/23 2149   WBC 9.9 12.3*   HGB 7.8* 8.3*    448*     BMP:    Recent Labs     02/12/23 2149   *   K 4.2   CL 95*   CO2 30   BUN 15   CREATININE 0.4*   GLUCOSE 126*     Hepatic:   Recent Labs     02/12/23 2149   AST 10*   ALT 13   BILITOT 0.1   ALKPHOS 257*     INR: No results for input(s): INR in the last 72 hours.    RADIOLOGY REPORTS  XR SHOULDER RIGHT (MIN 2 VIEWS)   Final Result   1. Interval progression of expansile metastatic lesion of the distal right   clavicle. No obvious pathologic fracture. 2. Numerous osteoblastic metastases. CT ABDOMEN PELVIS W IV CONTRAST Additional Contrast? None   Final Result   1. Evaluation of the lower lungs and abdomen degraded because of motion   during imaging, blurring detail and resulting in misregistration artifact. 2. Extensive osteoblastic metastatic disease with increasing areas of   destructive and lytic disease particularly the left hemipelvis and throughout   the sacrum. 3. Possible constipation. 4. Possible cystitis. Correlate with urinalysis. 5. Urinary bladder stones are demonstrated. CT CHEST W CONTRAST   Final Result   1. Interval increased size and extent left hilar/mediastinal mass lesion and   left upper lobe atelectasis reflecting progression of disease. Cannot   exclude underlying pneumonia. 2. 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 3. Diffuse osteoblastic metastatic disease. 4. Evaluation of the lower lungs degraded because of motion during imaging,   blurring detail and resulting in misregistration artifact. XR CHEST PORTABLE   Final Result   1. Redemonstration of mediastinal left upper lobe mass consistent with known   malignancy. Diffuse osseous metastatic disease also redemonstrated. No   superimposed acute consolidative change of the lungs identified. MRI BRAIN W WO CONTRAST    (Results Pending)   MRI THORACIC SPINE W WO CONTRAST    (Results Pending)   MRI CERVICAL SPINE W WO CONTRAST    (Results Pending)   MRI LUMBAR SPINE W WO CONTRAST    (Results Pending)       ASSESSMENT/RECOMMENDATION    Metastatic prostate cancer-   last known lupron . Last known abiraterone prescription  . Unclear last time he used this.  in 2023 Casodex initiated. Progressive osseous metastatic disease, radiation oncology consulted for evaluation and treatment. CT Sim completed 2/2/23, originally planned for palliative radiation to sacrum and R pelvis in 20Gy/5fx however with difficulty tolerating positioning, completed 8Gy/1fx. Metastatic squamous cell lung cancer  -SCC with lung primary biopsy proven metastatic to bone  Positive response to therapy with carbo/alimta/pembro x 1 followed by Narinder Martinez  Noted to have ongoing pattern of exteme non compliance. Has been frequently enrolling and revoking hospice, last revoke 1/24/23, last re/enroll 1/30. Reports at this time he would like to consider single agent keytruda if able.   -obtain MRI brain, cervical, lumbar, thoracic spine to evaluate disease status, feasibility of treatment    Anemia-hemoglobin 7.1 today. No nutritional deficiency. Could be secondary to underlying malignancy, bony metastatic disease, skin infection, antibiotics. Started erythrocyte stimulating agent. Transfusion support to keep hemoglobin above 7.    -Ensure adequate analgesic and bowel regimen. Will discuss with physical therapy    Sepsis- ID following    Continue to monitor for now and follow-up. Discussed with his partner and will discuss with admitting team    Patient was seen and examined. Reported that he has not been able to use his right upper extremity and not able to move at all his lower extremities. Appetite is somewhat preserved. Reported that he continues to have pain. On examination very minimal movement in his bilateral lower extremities and minimal movement in the right upper extremity  Recommend further evaluation with MRI of the brain and also spine. Further treatment plan pending above. Discussed with the hospitalist.  I have independently evaluated and examined this patient today. I have reviewed radiologic and biochemical tests on this patient. Management Plan is developed mutually with Judy Lenz NP.  I have reviewed above note and agree with assessment and plan  TOMI

## 2023-02-13 NOTE — PROGRESS NOTES
Comprehensive Nutrition Assessment    Type and Reason for Visit:  Reassess    Nutrition Recommendations/Plan:   Continue regular diet as tolerates   Will continue to offer oral nutrition supplements with meals  Encourage consistent meal and supplement intake  Will continue to follow up during stay      Malnutrition Assessment:  Malnutrition Status: Moderate malnutrition (Ongoing) (02/03/23 0937)    Context:  Chronic Illness     Findings of the 6 clinical characteristics of malnutrition:  Energy Intake:  Mild decrease in energy intake (Comment) (intermittent)  Weight Loss:  Unable to assess     Body Fat Loss:  Mild body fat loss Triceps   Muscle Mass Loss:  Mild muscle mass loss Thigh (quadraceps), Calf (gastrocnemius), Clavicles (pectoralis & deltoids)  Fluid Accumulation:  No significant fluid accumulation     Strength:  Not Performed    Nutrition Assessment:    Remains on regular diet with intake 25-75% at recent meals. Offered standard and frozen oral nutrition supplements. Continues to struggle with reduced appetite, pain, weakness. Needs encouragement to consume more at meals as able. Nutrition Related Findings:    ? signficant difference in weights during stay, asleep on visit woke briefly when lunch came but did not yet went to eat meal, last BM 2/9?   remains on remeron, prednisone Wound Type: None       Current Nutrition Intake & Therapies:    Average Meal Intake: 26-50%, 51-75%  Average Supplements Intake: %  ADULT DIET; Regular  ADULT ORAL NUTRITION SUPPLEMENT; Breakfast, Dinner; Standard High Calorie/High Protein Oral Supplement  ADULT ORAL NUTRITION SUPPLEMENT; Dinner; Frozen Oral Supplement    Anthropometric Measures:  Height: 6' 1\" (185.4 cm)  Ideal Body Weight (IBW): 184 lbs (84 kg)    Admission Body Weight: 191 lb 12.8 oz (87 kg)  Current Body Weight: 154 lb 8.7 oz (70.1 kg), 104.2 % IBW.  Weight Source: Bed Scale  Current BMI (kg/m2): 20.4  Usual Body Weight: 192 lb 14.4 oz (87.5 kg) (hx wt ~ 200# past years, july wt 2022)  % Weight Change (Calculated): -0.6  Weight Adjustment For: No Adjustment                 BMI Categories: Overweight (BMI 25.0-29. 9)    Estimated Daily Nutrient Needs:  Energy Requirements Based On: Kcal/kg  Weight Used for Energy Requirements: Current  Energy (kcal/day): 5099-6757 (25-30 joseph/kg)  Weight Used for Protein Requirements: Current  Protein (g/day):  (1-1.2 g/kg)  Method Used for Fluid Requirements: 1 ml/kcal  Fluid (ml/day): 2100    Nutrition Diagnosis:   Moderate malnutrition, In context of chronic illness related to increase demand for energy/nutrients, catabolic illness as evidenced by poor intake prior to admission, mild muscle loss, mild loss of subcutaneous fat    Nutrition Interventions:   Food and/or Nutrient Delivery: Continue Current Diet, Continue Oral Nutrition Supplement  Nutrition Education/Counseling: Education initiated  Coordination of Nutrition Care: Continue to monitor while inpatient  Plan of Care discussed with: patient    Goals:  Previous Goal Met: Progressing toward Goal(s)  Goals: PO intake 50% or greater, by next RD assessment       Nutrition Monitoring and Evaluation:   Behavioral-Environmental Outcomes: None Identified  Food/Nutrient Intake Outcomes: Food and Nutrient Intake, Supplement Intake  Physical Signs/Symptoms Outcomes: Biochemical Data, Meal Time Behavior, Skin, Weight, GI Status    Discharge Planning:    Continue current diet, Continue Oral Nutrition Supplement     Reva Osgood, RD, LD  Contact: [phone]

## 2023-02-13 NOTE — PROGRESS NOTES
Patient called this nurse into room, wife is at bedside, patient requested this nurse to go ROM with bilateral legs. Wife stated\" No he needs to work with therapy and needs to understand that he can and needs to help move himself. This nurse completed MRI check list with patient while talking with patient, and wife patient did not want to help move himself with this nurse.

## 2023-02-13 NOTE — PROGRESS NOTES
pt needs to have MRI Questionnaire competed before pt can have MRI exam performed.  Must have pt, POA, or family complete screening form.

## 2023-02-14 ENCOUNTER — APPOINTMENT (OUTPATIENT)
Dept: MRI IMAGING | Age: 53
End: 2023-02-14
Payer: MEDICAID

## 2023-02-14 ENCOUNTER — APPOINTMENT (OUTPATIENT)
Dept: CT IMAGING | Age: 53
End: 2023-02-14
Payer: MEDICAID

## 2023-02-14 LAB
EKG ATRIAL RATE: 112 BPM
EKG DIAGNOSIS: NORMAL
EKG P AXIS: 74 DEGREES
EKG P-R INTERVAL: 144 MS
EKG Q-T INTERVAL: 310 MS
EKG QRS DURATION: 78 MS
EKG QTC CALCULATION (BAZETT): 423 MS
EKG R AXIS: 50 DEGREES
EKG T AXIS: 84 DEGREES
EKG VENTRICULAR RATE: 112 BPM

## 2023-02-14 PROCEDURE — 6370000000 HC RX 637 (ALT 250 FOR IP): Performed by: STUDENT IN AN ORGANIZED HEALTH CARE EDUCATION/TRAINING PROGRAM

## 2023-02-14 PROCEDURE — 6360000002 HC RX W HCPCS: Performed by: INTERNAL MEDICINE

## 2023-02-14 PROCEDURE — 70460 CT HEAD/BRAIN W/DYE: CPT

## 2023-02-14 PROCEDURE — 2580000003 HC RX 258: Performed by: INTERNAL MEDICINE

## 2023-02-14 PROCEDURE — 93010 ELECTROCARDIOGRAM REPORT: CPT | Performed by: INTERNAL MEDICINE

## 2023-02-14 PROCEDURE — 94761 N-INVAS EAR/PLS OXIMETRY MLT: CPT

## 2023-02-14 PROCEDURE — 72132 CT LUMBAR SPINE W/DYE: CPT

## 2023-02-14 PROCEDURE — 72129 CT CHEST SPINE W/DYE: CPT

## 2023-02-14 PROCEDURE — 93005 ELECTROCARDIOGRAM TRACING: CPT | Performed by: STUDENT IN AN ORGANIZED HEALTH CARE EDUCATION/TRAINING PROGRAM

## 2023-02-14 PROCEDURE — 6370000000 HC RX 637 (ALT 250 FOR IP): Performed by: INTERNAL MEDICINE

## 2023-02-14 PROCEDURE — 97112 NEUROMUSCULAR REEDUCATION: CPT

## 2023-02-14 PROCEDURE — 6360000002 HC RX W HCPCS: Performed by: STUDENT IN AN ORGANIZED HEALTH CARE EDUCATION/TRAINING PROGRAM

## 2023-02-14 PROCEDURE — 1200000000 HC SEMI PRIVATE

## 2023-02-14 PROCEDURE — 6360000004 HC RX CONTRAST MEDICATION: Performed by: INTERNAL MEDICINE

## 2023-02-14 PROCEDURE — 97530 THERAPEUTIC ACTIVITIES: CPT

## 2023-02-14 PROCEDURE — 72126 CT NECK SPINE W/DYE: CPT

## 2023-02-14 RX ORDER — OXYCODONE HYDROCHLORIDE AND ACETAMINOPHEN 5; 325 MG/1; MG/1
1 TABLET ORAL EVERY 4 HOURS PRN
Status: DISCONTINUED | OUTPATIENT
Start: 2023-02-14 | End: 2023-02-16 | Stop reason: HOSPADM

## 2023-02-14 RX ADMIN — ACETAMINOPHEN 650 MG: 325 TABLET ORAL at 01:10

## 2023-02-14 RX ADMIN — IOPAMIDOL 75 ML: 755 INJECTION, SOLUTION INTRAVENOUS at 15:41

## 2023-02-14 RX ADMIN — CYCLOBENZAPRINE 10 MG: 10 TABLET, FILM COATED ORAL at 22:31

## 2023-02-14 RX ADMIN — GABAPENTIN 600 MG: 300 CAPSULE ORAL at 21:25

## 2023-02-14 RX ADMIN — ALPRAZOLAM 0.5 MG: 0.5 TABLET ORAL at 21:26

## 2023-02-14 RX ADMIN — SODIUM CHLORIDE, PRESERVATIVE FREE 10 ML: 5 INJECTION INTRAVENOUS at 21:28

## 2023-02-14 RX ADMIN — GABAPENTIN 600 MG: 300 CAPSULE ORAL at 07:43

## 2023-02-14 RX ADMIN — HYDROMORPHONE HYDROCHLORIDE 0.5 MG: 1 INJECTION, SOLUTION INTRAMUSCULAR; INTRAVENOUS; SUBCUTANEOUS at 08:56

## 2023-02-14 RX ADMIN — OXYCODONE HYDROCHLORIDE 20 MG: 20 TABLET, FILM COATED, EXTENDED RELEASE ORAL at 21:26

## 2023-02-14 RX ADMIN — OXYCODONE AND ACETAMINOPHEN 1 TABLET: 5; 325 TABLET ORAL at 01:53

## 2023-02-14 RX ADMIN — TAMSULOSIN HYDROCHLORIDE 0.4 MG: 0.4 CAPSULE ORAL at 07:43

## 2023-02-14 RX ADMIN — OXYCODONE AND ACETAMINOPHEN 1 TABLET: 5; 325 TABLET ORAL at 14:33

## 2023-02-14 RX ADMIN — HYDROMORPHONE HYDROCHLORIDE 0.25 MG: 1 INJECTION, SOLUTION INTRAMUSCULAR; INTRAVENOUS; SUBCUTANEOUS at 00:20

## 2023-02-14 RX ADMIN — ALPRAZOLAM 0.5 MG: 0.5 TABLET ORAL at 07:43

## 2023-02-14 RX ADMIN — OXYCODONE HYDROCHLORIDE 20 MG: 20 TABLET, FILM COATED, EXTENDED RELEASE ORAL at 07:43

## 2023-02-14 RX ADMIN — DULOXETINE 60 MG: 30 CAPSULE, DELAYED RELEASE ORAL at 07:43

## 2023-02-14 RX ADMIN — MIRTAZAPINE 15 MG: 15 TABLET, FILM COATED ORAL at 21:25

## 2023-02-14 RX ADMIN — PREDNISONE 20 MG: 20 TABLET ORAL at 07:43

## 2023-02-14 RX ADMIN — GABAPENTIN 600 MG: 300 CAPSULE ORAL at 14:32

## 2023-02-14 RX ADMIN — ENOXAPARIN SODIUM 40 MG: 100 INJECTION SUBCUTANEOUS at 21:27

## 2023-02-14 RX ADMIN — OXYCODONE AND ACETAMINOPHEN 1 TABLET: 5; 325 TABLET ORAL at 23:15

## 2023-02-14 RX ADMIN — CYCLOBENZAPRINE 10 MG: 10 TABLET, FILM COATED ORAL at 05:09

## 2023-02-14 RX ADMIN — CYCLOBENZAPRINE 10 MG: 10 TABLET, FILM COATED ORAL at 14:33

## 2023-02-14 RX ADMIN — HYDROMORPHONE HYDROCHLORIDE 0.25 MG: 1 INJECTION, SOLUTION INTRAMUSCULAR; INTRAVENOUS; SUBCUTANEOUS at 06:33

## 2023-02-14 ASSESSMENT — ENCOUNTER SYMPTOMS
RESPIRATORY NEGATIVE: 1
GASTROINTESTINAL NEGATIVE: 1
EYES NEGATIVE: 1

## 2023-02-14 ASSESSMENT — PAIN SCALES - GENERAL
PAINLEVEL_OUTOF10: 10
PAINLEVEL_OUTOF10: 9
PAINLEVEL_OUTOF10: 10
PAINLEVEL_OUTOF10: 9
PAINLEVEL_OUTOF10: 10

## 2023-02-14 ASSESSMENT — PAIN DESCRIPTION - LOCATION
LOCATION: OTHER (COMMENT)
LOCATION: GENERALIZED

## 2023-02-14 ASSESSMENT — PAIN DESCRIPTION - DESCRIPTORS
DESCRIPTORS: ACHING
DESCRIPTORS: ACHING;SPASM
DESCRIPTORS: ACHING;PENETRATING
DESCRIPTORS: ACHING
DESCRIPTORS: ACHING

## 2023-02-14 ASSESSMENT — PAIN DESCRIPTION - PAIN TYPE
TYPE: CHRONIC PAIN

## 2023-02-14 ASSESSMENT — PAIN DESCRIPTION - ORIENTATION
ORIENTATION: RIGHT;LEFT

## 2023-02-14 ASSESSMENT — PAIN - FUNCTIONAL ASSESSMENT
PAIN_FUNCTIONAL_ASSESSMENT: ACTIVITIES ARE NOT PREVENTED

## 2023-02-14 ASSESSMENT — PAIN DESCRIPTION - FREQUENCY
FREQUENCY: CONTINUOUS

## 2023-02-14 ASSESSMENT — PAIN DESCRIPTION - ONSET
ONSET: ON-GOING

## 2023-02-14 NOTE — PROGRESS NOTES
Physical Therapy    Physical Therapy Treatment Note  Name: Pat James MRN: [id number] :   1970   Date:  2023   Admission Date: 2023 Room:  [facility]   Restrictions/Precautions: fall risk; general precautions  Communication with other providers:  cotx w/ OT Rafael Mendoza ; RN handoff ; LSW handoff  Subjective:  Patient states:  \"Please lay this chair back buddy\"  Pain:   Location, Type, Intensity (0/10 to 10/10):  10/10 pelvic pain  Objective:    Observation:  Supine, awake, alert, agreeable. He is tolerating mobility better this session. Tele wires in place. Treatment, including education/measures:  Supine <-> sit: max A x 2  Seated ~8 mins for EOB transfer prep, functional mobility training, balance activities    Neuro-Muscular re-education:  Cues were given for position, posture, kinesthetic sense, safety, recruitment, and rationale. Cues were verbal and/or tactile. Seated static and standing static balance w/ therapy assist to steady  Frequent max cues (verbal/tactile) t/o to bring inc attention to posture and UE assist in steady  Restless in pain and cognition impacting ability to attend to safety ; therapy providing education, cues t/o    SPT: max A   Scooting: max A x 2 in recliner  Positioned for comfort and pressure relief ; left in recliner, chair alarmed, call light in reach, gait belt for OOB, all needs met  Inc education and restless upon positioning in recliner ; handoff to LSW about DC plan    Assessment / Impression:    Pt demonstrating inc tolerance of EOB activities allowing for therapy assisted transfer. He cont w/ restlessness w/ seated positioning, he is able to demonstrate inc tolerance to EOB and functional mobility. Will cont to assist w/ prep for home going.     Patient's tolerance of treatment:  fair  Barriers to improvement:  cognition, pain  Plan for Next Session:    Now hopeful for home w/ hospice - cont to change his plan for home going  Functional mobility, family education, transfer training, safety education    Time in:  Sanjay  Time out:  1424  Timed treatment minutes:  20  Total treatment time:  32    Previously filed items:  Social/Functional History  Lives With: Family  Type of Home: House  Home Layout: One level  Home Access: Stairs to enter without rails  Entrance Stairs - Number of Steps: 1  Bathroom Shower/Tub: Tub/Shower unit  Bathroom Equipment: Shower chair  Home Equipment: [employer]  Has the patient had two or more falls in the past year or any fall with injury in the past year?: Yes  Receives Help From: Family (looking to get inc assist from home health)  ADL Assistance: Needs assistance  Homemaking Assistance: Needs assistance  Ambulation Assistance: Needs assistance  Transfer Assistance: Needs assistance  Active : No  Mode of Transportation: Family, Other (superior transport for appointments)  Patient Goals   Patient Goals : return home     Short Term Goals  Time Frame for Short Term Goals: 1 week  Short Term Goal 1: pt will complete bed mobility at min A  Short Term Goal 2: pt will complete SPT at mod A  Short Term Goal 3: pt will demonstrate fair + seated balance for attention  Short Term Goal 4: pt will demonstrate seated tolerance for ~1 hour for transportation to/from appointments and progression to inc OOB activities    Electronically signed by:    Virgene Duane, PT, DPT  2/14/2023, 2:29 PM

## 2023-02-14 NOTE — PROGRESS NOTES
Pt was taken to MRI mobile unit for 4 MRI exams. These exams were thoroughly explained to the patient. The time frame of 4 hours or so was also thoroughly explained to the patient so that he understood that this would be a very long test.  After being medicated with 0.5 Dalodid, we was able to get him to the unit on the table and start. The first few images taken were dark and had motion due to patient moaning and moving head and jaw. After being in the scanner for approximately 18 minutes the patient squeezed the call ball wanting out of the unit and was adamant about wanting off the table and back to his bed. Called RN and informed her of what was going on. We had returned the patient to his room, plugged his bed in completely. Tried to contact his RN with no answer and let an RN, Tiffani I believe, know the patient was returned to his room.

## 2023-02-14 NOTE — PROGRESS NOTES
Patient assisted with some passive leg exercises on bed throughout the night. Requested to be transferred to the chair early morning, advised that we are unable to transfer him at the moment since he's high falls risk and someone needs to stay in the room if he is put on a chair. Offered to put the bed in a sitting position but patient declined.

## 2023-02-14 NOTE — PROGRESS NOTES
V2.0  [facility] Hospitalist Progress Note      Name:  Jaye Larson /Age/Sex: 1970  (46 y.o. male)   MRN & CSN:  [id number] & [id number] Encounter Date/Time: 2023 7:32 AM EST    Location:  1111/1111-A PCP: Anup Chi MD       Hospital Day: 15    Assessment and Plan:   Jaye Larson is a 46 y.o. 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 No PCP at this time,  working with them to establish need for [facility] and Dr. Dulce Warner will follow at this time until a PCP can be established in the outpatient setting. He is at this time medically cleared for discharge, however DC on hold d/t need for DME, as hospice will be taking back their equipment, CM and [facility] working to obtain. 2/10/23 @ noon -Patient deciding for SNF, wife at bedside and in agreement, conversation had with CM and referral sent to [facility]    2/10/23 @ 1300 message from [facility], wife requesting evaluation for patient having capacity to make decisions. Psych evaluated patient and determined patient is able to do the decisions and they signed off.    23 discussion again with patient and wife on the phone. Ultimate plan is now to go home with home health care. However hospital bed still not delivered at home. Also needs backup plan for pain med prescriptions with establishment of PCP over next week. 23 discussion again with wife at bedside and patient. They would like to get scans of brain, thoracic cervical and lumbar spine to evaluate for disease burden prior to making decision or trying [facility] as outpatient versus hospice. Explained to him that he will not have the best quality of life while undergoing therapy with [employer]tr[employer] as we would like to monitor for disease response and so he would still be in significant amount of pain.   He reports he would like to get the scans prior to making a decision but leaning more towards focusing on his quality of life. Plan:    #History of metastatic prostate cancer:  --CT chest with diffuse osteoblastic metastatic disease, CT A/P with extensive osteoblastic metastatic disease. Last known Lupron dose was on 4/22, Last known abiraterone prescription 11/22, unclear last time he used it.   --PSA on this admission 233 up from 144 on 12/22/22  --Continue Casodex and tamsulosin  --Heme-onc on board, continue steroid therapy. Plan for MRI brain, cervical thoracic and lumbar spine to evaluate for disease burden. With that information patient and family to make decision of treatment versus hospice. Patient was unable to tolerate the exam.  Will discuss with hematology/oncology about next steps. History of squamous cell carcinoma left lung-diagnosed on 12/13/2021:  --Status post debulking of primary tumor via bronchoscopy at [facility], positive response to therapy with carbo/Alimta/Pembro X1 followed by Talia Stockton.   --Patient goes back and forth between being on hospice and not being on hospice  --HemeOn to attempt treating with Juan M Santana in the outpatient setting once he is feeling better    Concern for sepsis - ruled out  --Presented with fever and tachycardia, unclear source of infection at the time. He was started on broad-spectrum IV antibiotics. -- RVP, MRSA screen, UA and UCX were unremarkable  --Imaging showed no evidence of pneumonia and the CT of abdomen and pelvis was read for possible cystitis however the UA was unremarkable. Patient had recently undergone treatment with meropenem for UTI at home  --ID on board, recommended that antibiotics be discontinued and the patient be monitored with a plan to restart meropenem post blood cultures if the patient spikes a fever.   Beyond the fever on admission, he has remained afebrile but remains tachycardic  --Mild leukocytosis resolved,    #Hyponatremia  - Resolved  --Likely secondary to poor oral intake, Na stable at 137  --Continue to monitor    #Chronic anemia  --H/H stable at 7.5 this AM, patient likely has BM infiltration per hemeonc  --Monitor and transfuse for hgb <7, continue retacrit MWF as long as hgb is <10    #History of urinary retention with chronic indwelling Sampson  --Had prior nephrostomy tube which was dislodged and replaced back  --Sampson remains in place with good output    #Anxiety disorder  --Continue alprazolam    #Depression  --Continue Mirtazapine and duloxetine    #Pain control  --Given change in status from hospice will alter pain regimen  --Oxycontin 20mg BID, with immediate relief percocet (and Dilaudid while in-patient) for breakthrough pain   --To be managed in the outpatient setting by PCP on [place] [address]    Diet ADULT DIET; Regular  ADULT ORAL NUTRITION SUPPLEMENT; Breakfast, Dinner; Standard High Calorie/High Protein Oral Supplement  ADULT ORAL NUTRITION SUPPLEMENT; Dinner; Frozen Oral Supplement   DVT Prophylaxis [x] Lovenox, []  Heparin, [] SCDs, [] Ambulation,  [] Eliquis, [] Xarelto  [] Coumadin   Code Status Full Code   Disposition From: Home  Expected Disposition: [place] [address]  Estimated Date of Discharge: Medically stable for discharge pending determination for SNF. Surrogate Decision Maker/ POA      Subjective:     Chief Complaint: Fall     Patient, unfortunately was unable to tolerate and complete his MRI. I will follow-up to see if we have any images of the use of to help guide decision-making and able to finalize a plan for either continued treatment or to pursue comfort measures. Patient does continue to endorse a great deal of pain and has required extra IV pain medication at this time. Review of Systems:      Review of Systems   Constitutional: Negative. HENT: Negative. Eyes: Negative. Respiratory: Negative. Cardiovascular: Negative. Gastrointestinal: Negative.     Genitourinary:         Chronic sampson Musculoskeletal:         Pain in both legs   Skin: Negative. Neurological: Negative. Psychiatric/Behavioral: Negative. Objective: Intake/Output Summary (Last 24 hours) at 2/14/2023 1322  Last data filed at 2/13/2023 2218  Gross per 24 hour   Intake --   Output 450 ml   Net -450 ml          Vitals:   Vitals:    02/14/23 0633   BP:    Pulse:    Resp: 17   Temp:    SpO2:        Physical Exam:   General: Laying in bed with wife at bedside. Eyes: EOMI  ENT: Moist mucous membrane  Cardiovascular: Regular rate  Respiratory: Clear to auscultation  Gastrointestinal: Soft, non tender  Genitourinary: no suprapubic tenderness, sampson cath in place  Musculoskeletal: No edema  Skin: warm, dry  Neuro: Alert, oriented time place person. .  Strength 4 out of 5 bilateral upper extremities. Unable to sustain lower extremities against gravity. Only passive range of motion. Pain upon passive range of motion bilateral lower extremity. Psych: Mood appropriate.      Medications:   Medications:    oxyCODONE  20 mg Oral 2 times per day    mirtazapine  15 mg Oral Nightly    nicotine  1 patch TransDERmal Daily    epoetin kelsi-epbx  10,000 Units SubCUTAneous Once per day on Mon Wed Fri    bicalutamide  50 mg Oral Daily    DULoxetine  60 mg Oral Daily    gabapentin  600 mg Oral TID    predniSONE  20 mg Oral Daily    tamsulosin  0.4 mg Oral Daily    sodium chloride flush  5-40 mL IntraVENous 2 times per day    enoxaparin  40 mg SubCUTAneous Daily      Infusions:    sodium chloride       PRN Meds: HYDROmorphone, 0.25 mg, Q6H PRN  oxyCODONE-acetaminophen, 1 tablet, Q6H PRN  bisacodyl, 10 mg, Daily PRN  diphenhydrAMINE, 25 mg, Q6H PRN  ALPRAZolam, 0.5 mg, TID PRN  cyclobenzaprine, 10 mg, TID PRN  naloxone, 4 mg, PRN  sodium chloride flush, 5-40 mL, PRN  sodium chloride, , PRN  ondansetron, 4 mg, Q8H PRN   Or  ondansetron, 4 mg, Q6H PRN  polyethylene glycol, 17 g, Daily PRN  acetaminophen, 650 mg, Q6H PRN   Or  acetaminophen, 650 mg, Q6H PRN      Labs      No results found for this or any previous visit (from the past 24 hour(s)). Imaging/Diagnostics Last 24 Hours   No results found.     Electronically signed by Viktor Majano MD on 2/14/2023 at 1:22 PM

## 2023-02-14 NOTE — CARE COORDINATION
Patient and family would now like to do home with Hospice. They want [facility]. Referral made to [facility]. [facility] requesting that H&P, Med list and Demo be sent to their fax at [phone]. Documents sent.

## 2023-02-14 NOTE — PROGRESS NOTES
Spoke with RN multiple times this AM. Pt experiencing a lot of pain when we got him onto the slide board to get onto the MRI cart to go to the trailer. When pt was on the MRI cart and board pt was yelling out. Pt was asking to come off the board and go back to his bed. We returned pt to his bed and contacted RN. RN was able to speak with the doctor to get an order for 0.5 Diladid. We had waited 20 minutes and I re-contacted the RN for the dose so that we can start the patients 4 hour exam..MRI Brain, C, T, and L spines all with and without contrast.  The RN informed me that she was in a pts room with a doctor and she will be over to give the dose as soon as she can.

## 2023-02-14 NOTE — PROGRESS NOTES
Occupational Therapy Treatment Note  Name: Mindy Tejeda MRN: [id number] :   1970   Date:  2023   Admission Date: 2023 Room:  [address] [place] [address]     Primary Problem:    [place]:  The primary encounter diagnosis was Sepsis without acute organ dysfunction, due to unspecified organism ([facility] Diagnoses of Cancer ([facility] and Anemia, unspecified type were also pertinent to this visit. Patient  has a past medical history of CAD (coronary artery disease), Cancer ([facility] [address] History of TMJ disorder, Hypertension, and Trigeminal neuralgia. Patient  has a past surgical history that includes Abdomen surgery; Cardiac catheterization (2016); CT NEEDLE BIOPSY LUNG PERCUTANEOUS (2021); Port Surgery (Right, 2021); CT BIOPSY SUPERFICIAL BONE PERCUTANEOUS (2021); and IR GUIDED NEPHROSTOMY CATH PLACEMENT (2023). Communication with other providers:  handoff to RN, cotx with PT Parag (low tolerance for therapy sessions, high assist levels, safety, pain levels), handoff to CM    Subjective:  Patient states: \"There's a bunch of doctors running around here but there's only one Dr. Rui Cha. I want to go hospice. \"  Pain: denies at rest, endorses inc pain in buttocks/low back with upright positioning. Doesn't rate. Restrictions: general precautions, fall risk, R subclavian port, high pain/assist levels  Spouse and daughter at bedside    Objective:    Observation:  pt was supine in bed upon arrival, agreeable to session. Tele only  Objective Measures:  HR elevated throughout session- 100s at rest, 130s in chair. HR does not come down with prolonged seated RB and cues for relaxation and remains in high 130s. RN present and aware    Treatment, including education:    Therapeutic Activity Training (25 minutes): Therapeutic activity training was instructed today. Cues were given for safety, sequence, UE/LE placement, visual cues, and balance.     Activities performed today included     Discussed therapeutic prognosis, discharge planning with pt and family. Pt agreeable to practicing OOB mobility/transfers as pt cont to plan for home upon d/c, will need to be able to maintain upright positioning for wheelchair transport and pivot for transfers. Rolling x2- min A for rolls to don pants per pt request    LB dressing- dep to don pants     UB dressing- mod A to don gown    Supine to sit- max A    Seated balance- mod-max A, pt able to tolerate ~8 minutes throughout session of upright position    SPT from EOB- max A    Scooting in chair- max Ax2    Grooming- setup/SBA to wash face seated upright in chair    Feeding- max A for sips of juice while upright    Pt positioned for comfort in chair, bolsters placed d/t L lean in chair. Pt tolerating for few minutes before yelling out for therapists to lean chair back into supine position. Pt chair reclined into supine per pt request. Pt states repeatedly he wishes to go home with hospice now, wishes to be taken to CM desk to deliver decision. Pt edu on discharge disposition, told pt that he has changed his mind several times now on discharge plans. Pt adamant that he wishes to go home with hospice. CM updated. Education: Role of OT, OT POC, safety, benefits of EOB/OOB activity, rationale for treatment  Safety Measures: Gait belt used for safety of pt and therapist, Left in recliner, Alarm in place, call light and phone within reach, lines managed    Assessment / Impression:    Pt able to tolerate pivot and ~8 minutes of upright positioning today with support, much improved from eval however pt overall prognosis and therapeutic potential very guarded. Pt with continued severely impaired insight into deficits and confusion. Will continue to monitor closely and assist with d/c planning.     Patient's tolerance of treatment: fair  Adverse Reaction: pain, anxiety, confusion, tachy  Significant change in status and impact:  progressed OOB today  Barriers to improvement: chronic comorbidities, debility, medical stability, pain/tolerance for therapy, cog/safety    Plan for Next Session:    Continue OT POC    Time in:  1353  Time out:  1424  Timed treatment minutes:  25  Total treatment time:  31    Electronically signed by:    Susana Huynh OT, OTR/L  2/14/2023, 2:27 PM

## 2023-02-15 LAB
ALBUMIN SERPL-MCNC: 3.3 GM/DL (ref 3.4–5)
ALP BLD-CCNC: 265 IU/L (ref 40–128)
ALT SERPL-CCNC: 13 U/L (ref 10–40)
ANION GAP SERPL CALCULATED.3IONS-SCNC: 9 MMOL/L (ref 4–16)
AST SERPL-CCNC: 13 IU/L (ref 15–37)
BASOPHILS ABSOLUTE: 0 K/CU MM
BASOPHILS RELATIVE PERCENT: 0.3 % (ref 0–1)
BILIRUB SERPL-MCNC: 0.2 MG/DL (ref 0–1)
BUN SERPL-MCNC: 14 MG/DL (ref 6–23)
CALCIUM SERPL-MCNC: 12.6 MG/DL (ref 8.3–10.6)
CHLORIDE BLD-SCNC: 96 MMOL/L (ref 99–110)
CO2: 28 MMOL/L (ref 21–32)
CREAT SERPL-MCNC: 0.5 MG/DL (ref 0.9–1.3)
DIFFERENTIAL TYPE: ABNORMAL
EOSINOPHILS ABSOLUTE: 0.1 K/CU MM
EOSINOPHILS RELATIVE PERCENT: 0.8 % (ref 0–3)
GFR SERPL CREATININE-BSD FRML MDRD: >60 ML/MIN/1.73M2
GLUCOSE SERPL-MCNC: 100 MG/DL (ref 70–99)
HCT VFR BLD CALC: 28.1 % (ref 42–52)
HEMOGLOBIN: 8.7 GM/DL (ref 13.5–18)
IMMATURE NEUTROPHIL %: 1.1 % (ref 0–0.43)
LYMPHOCYTES ABSOLUTE: 2.9 K/CU MM
LYMPHOCYTES RELATIVE PERCENT: 25.8 % (ref 24–44)
MCH RBC QN AUTO: 23.1 PG (ref 27–31)
MCHC RBC AUTO-ENTMCNC: 31 % (ref 32–36)
MCV RBC AUTO: 74.7 FL (ref 78–100)
MONOCYTES ABSOLUTE: 0.9 K/CU MM
MONOCYTES RELATIVE PERCENT: 8 % (ref 0–4)
NUCLEATED RBC %: 0 %
PDW BLD-RTO: 19.4 % (ref 11.7–14.9)
PLATELET # BLD: 472 K/CU MM (ref 140–440)
PMV BLD AUTO: 8.9 FL (ref 7.5–11.1)
POTASSIUM SERPL-SCNC: 4.3 MMOL/L (ref 3.5–5.1)
RBC # BLD: 3.76 M/CU MM (ref 4.6–6.2)
SEGMENTED NEUTROPHILS ABSOLUTE COUNT: 7.2 K/CU MM
SEGMENTED NEUTROPHILS RELATIVE PERCENT: 64 % (ref 36–66)
SODIUM BLD-SCNC: 133 MMOL/L (ref 135–145)
TOTAL IMMATURE NEUTOROPHIL: 0.12 K/CU MM
TOTAL NUCLEATED RBC: 0 K/CU MM
TOTAL PROTEIN: 7.1 GM/DL (ref 6.4–8.2)
WBC # BLD: 11.3 K/CU MM (ref 4–10.5)

## 2023-02-15 PROCEDURE — 6360000002 HC RX W HCPCS: Performed by: INTERNAL MEDICINE

## 2023-02-15 PROCEDURE — 6370000000 HC RX 637 (ALT 250 FOR IP): Performed by: STUDENT IN AN ORGANIZED HEALTH CARE EDUCATION/TRAINING PROGRAM

## 2023-02-15 PROCEDURE — 6360000002 HC RX W HCPCS: Performed by: STUDENT IN AN ORGANIZED HEALTH CARE EDUCATION/TRAINING PROGRAM

## 2023-02-15 PROCEDURE — 2580000003 HC RX 258: Performed by: INTERNAL MEDICINE

## 2023-02-15 PROCEDURE — 6370000000 HC RX 637 (ALT 250 FOR IP): Performed by: INTERNAL MEDICINE

## 2023-02-15 PROCEDURE — 6360000002 HC RX W HCPCS: Performed by: NURSE PRACTITIONER

## 2023-02-15 PROCEDURE — 6370000000 HC RX 637 (ALT 250 FOR IP): Performed by: NURSE PRACTITIONER

## 2023-02-15 PROCEDURE — 80053 COMPREHEN METABOLIC PANEL: CPT

## 2023-02-15 PROCEDURE — 1200000000 HC SEMI PRIVATE

## 2023-02-15 PROCEDURE — 94761 N-INVAS EAR/PLS OXIMETRY MLT: CPT

## 2023-02-15 PROCEDURE — 85025 COMPLETE CBC W/AUTO DIFF WBC: CPT

## 2023-02-15 RX ADMIN — HYDROMORPHONE HYDROCHLORIDE 0.5 MG: 1 INJECTION, SOLUTION INTRAMUSCULAR; INTRAVENOUS; SUBCUTANEOUS at 15:48

## 2023-02-15 RX ADMIN — ENOXAPARIN SODIUM 40 MG: 100 INJECTION SUBCUTANEOUS at 20:43

## 2023-02-15 RX ADMIN — OXYCODONE AND ACETAMINOPHEN 1 TABLET: 5; 325 TABLET ORAL at 14:11

## 2023-02-15 RX ADMIN — DULOXETINE 60 MG: 30 CAPSULE, DELAYED RELEASE ORAL at 09:01

## 2023-02-15 RX ADMIN — ALPRAZOLAM 0.5 MG: 0.5 TABLET ORAL at 10:44

## 2023-02-15 RX ADMIN — GABAPENTIN 600 MG: 300 CAPSULE ORAL at 14:12

## 2023-02-15 RX ADMIN — OXYCODONE AND ACETAMINOPHEN 1 TABLET: 5; 325 TABLET ORAL at 05:39

## 2023-02-15 RX ADMIN — HYDROMORPHONE HYDROCHLORIDE 0.5 MG: 1 INJECTION, SOLUTION INTRAMUSCULAR; INTRAVENOUS; SUBCUTANEOUS at 11:42

## 2023-02-15 RX ADMIN — TAMSULOSIN HYDROCHLORIDE 0.4 MG: 0.4 CAPSULE ORAL at 09:01

## 2023-02-15 RX ADMIN — GABAPENTIN 600 MG: 300 CAPSULE ORAL at 09:01

## 2023-02-15 RX ADMIN — EPOETIN ALFA-EPBX 10000 UNITS: 10000 INJECTION, SOLUTION INTRAVENOUS; SUBCUTANEOUS at 12:14

## 2023-02-15 RX ADMIN — OXYCODONE HYDROCHLORIDE 20 MG: 20 TABLET, FILM COATED, EXTENDED RELEASE ORAL at 09:01

## 2023-02-15 RX ADMIN — HYDROMORPHONE HYDROCHLORIDE 0.5 MG: 1 INJECTION, SOLUTION INTRAMUSCULAR; INTRAVENOUS; SUBCUTANEOUS at 00:57

## 2023-02-15 RX ADMIN — MIRTAZAPINE 15 MG: 15 TABLET, FILM COATED ORAL at 20:43

## 2023-02-15 RX ADMIN — ALPRAZOLAM 0.5 MG: 0.5 TABLET ORAL at 18:11

## 2023-02-15 RX ADMIN — OXYCODONE HYDROCHLORIDE 20 MG: 20 TABLET, FILM COATED, EXTENDED RELEASE ORAL at 20:43

## 2023-02-15 RX ADMIN — PREDNISONE 20 MG: 20 TABLET ORAL at 09:01

## 2023-02-15 RX ADMIN — GABAPENTIN 600 MG: 300 CAPSULE ORAL at 20:43

## 2023-02-15 RX ADMIN — OXYCODONE AND ACETAMINOPHEN 1 TABLET: 5; 325 TABLET ORAL at 09:53

## 2023-02-15 RX ADMIN — SODIUM CHLORIDE, PRESERVATIVE FREE 10 ML: 5 INJECTION INTRAVENOUS at 20:48

## 2023-02-15 RX ADMIN — OXYCODONE AND ACETAMINOPHEN 1 TABLET: 5; 325 TABLET ORAL at 18:11

## 2023-02-15 RX ADMIN — BICALUTAMIDE 50 MG: 50 TABLET, FILM COATED ORAL at 09:02

## 2023-02-15 RX ADMIN — CYCLOBENZAPRINE 10 MG: 10 TABLET, FILM COATED ORAL at 18:11

## 2023-02-15 RX ADMIN — SODIUM CHLORIDE, PRESERVATIVE FREE 10 ML: 5 INJECTION INTRAVENOUS at 09:55

## 2023-02-15 ASSESSMENT — PAIN DESCRIPTION - LOCATION
LOCATION: BACK;SHOULDER
LOCATION: GENERALIZED
LOCATION: ABDOMEN
LOCATION: BACK
LOCATION: LEG;GENERALIZED
LOCATION: ABDOMEN;GENERALIZED
LOCATION: BACK
LOCATION: GENERALIZED

## 2023-02-15 ASSESSMENT — PAIN DESCRIPTION - DESCRIPTORS
DESCRIPTORS: ACHING
DESCRIPTORS: ACHING;DISCOMFORT
DESCRIPTORS: ACHING;DISCOMFORT;SORE
DESCRIPTORS: ACHING;DISCOMFORT
DESCRIPTORS: HEAVINESS;DULL
DESCRIPTORS: HEAVINESS;DULL

## 2023-02-15 ASSESSMENT — PAIN - FUNCTIONAL ASSESSMENT
PAIN_FUNCTIONAL_ASSESSMENT: INTOLERABLE, UNABLE TO DO ANY ACTIVE OR PASSIVE ACTIVITIES
PAIN_FUNCTIONAL_ASSESSMENT: INTOLERABLE, UNABLE TO DO ANY ACTIVE OR PASSIVE ACTIVITIES
PAIN_FUNCTIONAL_ASSESSMENT: ACTIVITIES ARE NOT PREVENTED
PAIN_FUNCTIONAL_ASSESSMENT: PREVENTS OR INTERFERES SOME ACTIVE ACTIVITIES AND ADLS
PAIN_FUNCTIONAL_ASSESSMENT: PREVENTS OR INTERFERES SOME ACTIVE ACTIVITIES AND ADLS
PAIN_FUNCTIONAL_ASSESSMENT: INTOLERABLE, UNABLE TO DO ANY ACTIVE OR PASSIVE ACTIVITIES
PAIN_FUNCTIONAL_ASSESSMENT: INTOLERABLE, UNABLE TO DO ANY ACTIVE OR PASSIVE ACTIVITIES

## 2023-02-15 ASSESSMENT — PAIN DESCRIPTION - ORIENTATION
ORIENTATION: POSTERIOR;LOWER
ORIENTATION: POSTERIOR;LOWER
ORIENTATION: OTHER (COMMENT)
ORIENTATION: RIGHT;LEFT
ORIENTATION: MID

## 2023-02-15 ASSESSMENT — PAIN SCALES - GENERAL
PAINLEVEL_OUTOF10: 10
PAINLEVEL_OUTOF10: 0
PAINLEVEL_OUTOF10: 10
PAINLEVEL_OUTOF10: 10
PAINLEVEL_OUTOF10: 7
PAINLEVEL_OUTOF10: 10
PAINLEVEL_OUTOF10: 0
PAINLEVEL_OUTOF10: 10

## 2023-02-15 ASSESSMENT — ENCOUNTER SYMPTOMS
WHEEZING: 0
TROUBLE SWALLOWING: 0
SHORTNESS OF BREATH: 0
COUGH: 0
SINUS PAIN: 0
CONSTIPATION: 0
CHEST TIGHTNESS: 0
ABDOMINAL PAIN: 0
DIARRHEA: 0
VOICE CHANGE: 0
BACK PAIN: 1
COLOR CHANGE: 0
SORE THROAT: 0
SINUS PRESSURE: 0
NAUSEA: 0
VOMITING: 0

## 2023-02-15 ASSESSMENT — PAIN DESCRIPTION - FREQUENCY
FREQUENCY: CONTINUOUS

## 2023-02-15 ASSESSMENT — PAIN SCALES - WONG BAKER
WONGBAKER_NUMERICALRESPONSE: 2
WONGBAKER_NUMERICALRESPONSE: 2

## 2023-02-15 ASSESSMENT — PAIN DESCRIPTION - ONSET
ONSET: ON-GOING

## 2023-02-15 ASSESSMENT — PAIN DESCRIPTION - PAIN TYPE
TYPE: CHRONIC PAIN

## 2023-02-15 NOTE — PROGRESS NOTES
Riri Hough is at bedside, face to face completed, patient and patient wife refused to sign code status and wanted patient family to be at bedside. Riri Hough per them patient wife was suppose to have an appointment tomorrow at [address] [place] [address] to sign paperwork but they requested to leave for home tomorrow, then patient changed his mind and wanted to go home tomorrow which is why hospice came out tonight, and then patient refused. No paperwork signed at this time.

## 2023-02-15 NOTE — CONSULTS
Consult completed. Patient's right SC MedPort power flushed. Now port has brisk blood return and flushes with ease. Patient tolerated well. Please consult IV/PICC Team if patient's needs change.

## 2023-02-15 NOTE — PROGRESS NOTES
Patient wife is at bedside, patient is yelling out in pain. Patient requesting medicaiton this nurse explained that there are no medication can be given at this time. Wife is aware.  This nurse messaged the doctor about patients pain

## 2023-02-15 NOTE — CARE COORDINATION
Wife to my desk to advise that former Hospice did not  DME yet and that patient still has equipment at home. PS sent to [address] per pt's wife request advising on above.

## 2023-02-15 NOTE — PROGRESS NOTES
V2.0  [facility] [place]      Name:  Pete Martin /Age/Sex: 1970  (46 y.o. male)   MRN & CSN:  [id number] & [id number] Encounter Date/Time: 2/15/2023 7:32 AM EST    Location:  1111/1111-A PCP: Maddy Johns MD       Hospital Day: 16    Assessment and Plan:   Pete Martin is a 46 y.o. male with pmh of CAD, history of metastatic prostate cancer, and squamous cell carcinoma of the left lung  who presents with Sepsis [facility])    Plan:    #History of metastatic prostate cancer:  --CT chest with diffuse osteoblastic metastatic disease, CT A/P with extensive osteoblastic metastatic disease. Last known Lupron dose was on , Last known abiraterone prescription , unclear last time he used it.   --PSA on this admission 233 up from 144 on 22  --Continue Casodex and tamsulosin  --Heme-onc on board, continue steroid therapy. Plan for MRI brain, cervical thoracic and lumbar spine to evaluate for disease burden. With that information patient and family to make decision of treatment versus hospice. Patient was unable to tolerate the exam.   CT scan of spine showed extensive metastatic disease  - Patient has decided the best course of action for him will be to pursue hospice care. History of squamous cell carcinoma left lung-diagnosed on 2021:  --Status post debulking of primary tumor via bronchoscopy at [address] [place] [address], positive response to therapy with carbo/Alimta/Pembro X1 followed by Luisa Bethea.   --Patient goes back and forth between being on hospice and not being on hospice  [facility] Aron Bajwa in the outpatient setting once he is feeling better    Concern for sepsis - ruled out  --Presented with fever and tachycardia, unclear source of infection at the time. He was started on broad-spectrum IV antibiotics.   -- RVP, MRSA screen, UA and UCX were unremarkable  --Imaging showed no evidence of pneumonia and the CT of abdomen and pelvis was read for possible cystitis however the UA was unremarkable. Patient had recently undergone treatment with meropenem for UTI at home  --ID on board, recommended that antibiotics be discontinued and the patient be monitored with a plan to restart meropenem post blood cultures if the patient spikes a fever. Beyond the fever on admission, he has remained afebrile but remains tachycardic  --Mild leukocytosis resolved,    #Hyponatremia  - Resolved  --Likely secondary to poor oral intake, Na stable at 137  --Continue to monitor    #Chronic anemia  --H/H stable at 7.5 this AM, patient likely has BM infiltration per hemeonc  --Monitor and transfuse for hgb <7, continue retacrit MWF as long as hgb is <10    #History of urinary retention with chronic indwelling Doherty  --Had prior nephrostomy tube which was dislodged and replaced back  --Doherty remains in place with good output    #Anxiety disorder  --Continue alprazolam    #Depression  --Continue Mirtazapine and duloxetine    #Pain control  --Given change in status from hospice will alter pain regimen  --Oxycontin 20mg BID, with immediate relief percocet (and Dilaudid while in-patient) for breakthrough pain   --To be managed in the outpatient setting by PCP on [facility]    Diet ADULT DIET; Regular  ADULT ORAL NUTRITION SUPPLEMENT; Breakfast, Dinner; Standard High Calorie/High Protein Oral Supplement  ADULT ORAL NUTRITION SUPPLEMENT; Dinner; Frozen Oral Supplement   DVT Prophylaxis [x] Lovenox, []  Heparin, [] SCDs, [] Ambulation,  [] Eliquis, [] Xarelto  [] Coumadin   Code Status Full Code   Disposition From: Home  Expected Disposition: Home with Hospice  Estimated Date of Discharge: tomorrow   Surrogate Decision Maker/ POA      Subjective:     Chief Complaint: Fall     Patient with head CT scan done yesterday with extensive metastatic disease and his spine. At this point, the patient has decided that he will like to pursue hospice.   Arrangements are being made and he will likely be able to go home with hospice tomorrow once hospital bed and other supplies are delivered. Review of Systems:      Review of Systems   Constitutional:  Negative for activity change, appetite change, chills, fatigue and fever. HENT:  Negative for congestion, hearing loss, sinus pressure, sinus pain, sore throat, trouble swallowing and voice change. Eyes:  Negative for visual disturbance. Respiratory:  Negative for cough, chest tightness, shortness of breath and wheezing. Cardiovascular:  Negative for chest pain, palpitations and leg swelling. Gastrointestinal:  Negative for abdominal pain, constipation, diarrhea, nausea and vomiting. Endocrine: Negative for cold intolerance, heat intolerance and polyuria. Genitourinary:  Negative for dysuria. Musculoskeletal:  Positive for arthralgias, back pain, gait problem and myalgias. Skin:  Negative for color change. Neurological:  Positive for weakness (lower extremity bilaterally). Negative for dizziness and headaches. Psychiatric/Behavioral:  Negative for agitation and confusion. The patient is not nervous/anxious. Objective: Intake/Output Summary (Last 24 hours) at 2/15/2023 1218  Last data filed at 2/15/2023 0559  Gross per 24 hour   Intake --   Output 1050 ml   Net -1050 ml          Vitals:   Vitals:    02/15/23 1212   BP:    Pulse:    Resp: 17   Temp:    SpO2:        Physical Exam:   General: Patient visibly in pain. Eyes: EOMI  ENT: Moist mucous membrane  Cardiovascular: Regular rate  Respiratory: Clear to auscultation  Gastrointestinal: Soft, non tender  Genitourinary: no suprapubic tenderness, sampson cath in place  Musculoskeletal: No edema  Skin: warm, dry  Neuro: Alert, oriented time place person. .  Strength 4 out of 5 bilateral upper extremities. Unable to sustain lower extremities against gravity. Only passive range of motion. Pain upon passive range of motion bilateral lower extremity. Psych: Mood appropriate.      Medications: Medications:    oxyCODONE  20 mg Oral 2 times per day    mirtazapine  15 mg Oral Nightly    nicotine  1 patch TransDERmal Daily    epoetin kelsi-epbx  10,000 Units SubCUTAneous Once per day on Mon Wed Fri    bicalutamide  50 mg Oral Daily    DULoxetine  60 mg Oral Daily    gabapentin  600 mg Oral TID    predniSONE  20 mg Oral Daily    tamsulosin  0.4 mg Oral Daily    sodium chloride flush  5-40 mL IntraVENous 2 times per day    enoxaparin  40 mg SubCUTAneous Daily      Infusions:    sodium chloride       PRN Meds: HYDROmorphone, 0.5 mg, Q4H PRN  oxyCODONE-acetaminophen, 1 tablet, Q4H PRN  bisacodyl, 10 mg, Daily PRN  diphenhydrAMINE, 25 mg, Q6H PRN  ALPRAZolam, 0.5 mg, TID PRN  cyclobenzaprine, 10 mg, TID PRN  naloxone, 4 mg, PRN  sodium chloride flush, 5-40 mL, PRN  sodium chloride, , PRN  ondansetron, 4 mg, Q8H PRN   Or  ondansetron, 4 mg, Q6H PRN  polyethylene glycol, 17 g, Daily PRN  acetaminophen, 650 mg, Q6H PRN   Or  acetaminophen, 650 mg, Q6H PRN      Labs      Recent Results (from the past 24 hour(s))   EKG 12 Lead    Collection Time: 02/14/23  1:43 PM   Result Value Ref Range    Ventricular Rate 112 BPM    Atrial Rate 112 BPM    P-R Interval 144 ms    QRS Duration 78 ms    Q-T Interval 310 ms    QTc Calculation (Bazett) 423 ms    P Axis 74 degrees    R Axis 50 degrees    T Axis 84 degrees    Diagnosis       Sinus tachycardia  Minimal voltage criteria for LVH, may be normal variant  Nonspecific ST and T wave abnormality  Abnormal ECG  When compared with ECG of 11-OCT-2022 19:22,  Incomplete right bundle branch block is no longer present  Minimal criteria for Septal infarct are no longer present  ST elevation now present in Anterior leads  Nonspecific T wave abnormality no longer evident in Anterior leads  Confirmed by Celena Lara MD ([id number]) on 2/14/2023 7:50:39 PM     CBC with Auto Differential    Collection Time: 02/15/23  6:09 AM   Result Value Ref Range    WBC 11.3 (H) 4.0 - 10.5 K/CU MM    RBC 3.76 (L) 4.6 - 6.2 M/CU MM    Hemoglobin 8.7 (L) 13.5 - 18.0 GM/DL    Hematocrit 28.1 (L) 42 - 52 %    MCV 74.7 (L) 78 - 100 FL    MCH 23.1 (L) 27 - 31 PG    MCHC 31.0 (L) 32.0 - 36.0 %    RDW 19.4 (H) 11.7 - 14.9 %    Platelets 018 (H) 441 - 440 K/CU MM    MPV 8.9 7.5 - 11.1 FL    Differential Type AUTOMATED DIFFERENTIAL     Segs Relative 64.0 36 - 66 %    Lymphocytes % 25.8 24 - 44 %    Monocytes % 8.0 (H) 0 - 4 %    Eosinophils % 0.8 0 - 3 %    Basophils % 0.3 0 - 1 %    Segs Absolute 7.2 K/CU MM    Lymphocytes Absolute 2.9 K/CU MM    Monocytes Absolute 0.9 K/CU MM    Eosinophils Absolute 0.1 K/CU MM    Basophils Absolute 0.0 K/CU MM    Nucleated RBC % 0.0 %    Total Nucleated RBC 0.0 K/CU MM    Total Immature Neutrophil 0.12 K/CU MM    Immature Neutrophil % 1.1 (H) 0 - 0.43 %   Comprehensive Metabolic Panel    Collection Time: 02/15/23  6:09 AM   Result Value Ref Range    Sodium 133 (L) 135 - 145 MMOL/L    Potassium 4.3 3.5 - 5.1 MMOL/L    Chloride 96 (L) 99 - 110 mMol/L    CO2 28 21 - 32 MMOL/L    BUN 14 6 - 23 MG/DL    Creatinine 0.5 (L) 0.9 - 1.3 MG/DL    Est, Glom Filt Rate >60 >60 mL/min/1.73m2    Glucose 100 (H) 70 - 99 MG/DL    Calcium 12.6 (H) 8.3 - 10.6 MG/DL    Albumin 3.3 (L) 3.4 - 5.0 GM/DL    Total Protein 7.1 6.4 - 8.2 GM/DL    Total Bilirubin 0.2 0.0 - 1.0 MG/DL    ALT 13 10 - 40 U/L    AST 13 (L) 15 - 37 IU/L    Alkaline Phosphatase 265 (H) 40 - 128 IU/L    Anion Gap 9 4 - 16            Imaging/Diagnostics Last 24 Hours   No results found.     Electronically signed by Penny Tucker MD on 2/15/2023 at 12:18 PM

## 2023-02-16 VITALS
RESPIRATION RATE: 16 BRPM | HEART RATE: 114 BPM | HEIGHT: 73 IN | OXYGEN SATURATION: 96 % | WEIGHT: 152.6 LBS | BODY MASS INDEX: 20.22 KG/M2 | DIASTOLIC BLOOD PRESSURE: 70 MMHG | TEMPERATURE: 98.1 F | SYSTOLIC BLOOD PRESSURE: 99 MMHG

## 2023-02-16 PROCEDURE — 6370000000 HC RX 637 (ALT 250 FOR IP): Performed by: STUDENT IN AN ORGANIZED HEALTH CARE EDUCATION/TRAINING PROGRAM

## 2023-02-16 PROCEDURE — 6370000000 HC RX 637 (ALT 250 FOR IP): Performed by: NURSE PRACTITIONER

## 2023-02-16 PROCEDURE — 6370000000 HC RX 637 (ALT 250 FOR IP): Performed by: INTERNAL MEDICINE

## 2023-02-16 PROCEDURE — 6360000002 HC RX W HCPCS: Performed by: INTERNAL MEDICINE

## 2023-02-16 PROCEDURE — 2580000003 HC RX 258: Performed by: INTERNAL MEDICINE

## 2023-02-16 PROCEDURE — 6360000002 HC RX W HCPCS: Performed by: STUDENT IN AN ORGANIZED HEALTH CARE EDUCATION/TRAINING PROGRAM

## 2023-02-16 PROCEDURE — 94761 N-INVAS EAR/PLS OXIMETRY MLT: CPT

## 2023-02-16 RX ORDER — HEPARIN SODIUM (PORCINE) LOCK FLUSH IV SOLN 100 UNIT/ML 100 UNIT/ML
500 SOLUTION INTRAVENOUS ONCE AS NEEDED
Status: COMPLETED | OUTPATIENT
Start: 2023-02-16 | End: 2023-02-16

## 2023-02-16 RX ADMIN — HYDROMORPHONE HYDROCHLORIDE 0.5 MG: 1 INJECTION, SOLUTION INTRAMUSCULAR; INTRAVENOUS; SUBCUTANEOUS at 06:46

## 2023-02-16 RX ADMIN — OXYCODONE HYDROCHLORIDE 20 MG: 20 TABLET, FILM COATED, EXTENDED RELEASE ORAL at 08:40

## 2023-02-16 RX ADMIN — SODIUM CHLORIDE, PRESERVATIVE FREE 10 ML: 5 INJECTION INTRAVENOUS at 08:46

## 2023-02-16 RX ADMIN — HYDROMORPHONE HYDROCHLORIDE 0.5 MG: 1 INJECTION, SOLUTION INTRAMUSCULAR; INTRAVENOUS; SUBCUTANEOUS at 00:01

## 2023-02-16 RX ADMIN — GABAPENTIN 600 MG: 300 CAPSULE ORAL at 19:50

## 2023-02-16 RX ADMIN — TAMSULOSIN HYDROCHLORIDE 0.4 MG: 0.4 CAPSULE ORAL at 08:40

## 2023-02-16 RX ADMIN — GABAPENTIN 600 MG: 300 CAPSULE ORAL at 15:00

## 2023-02-16 RX ADMIN — MIRTAZAPINE 15 MG: 15 TABLET, FILM COATED ORAL at 19:50

## 2023-02-16 RX ADMIN — ALPRAZOLAM 0.5 MG: 0.5 TABLET ORAL at 19:50

## 2023-02-16 RX ADMIN — GABAPENTIN 600 MG: 300 CAPSULE ORAL at 08:40

## 2023-02-16 RX ADMIN — OXYCODONE AND ACETAMINOPHEN 1 TABLET: 5; 325 TABLET ORAL at 13:00

## 2023-02-16 RX ADMIN — BISACODYL 10 MG: 10 SUPPOSITORY RECTAL at 02:40

## 2023-02-16 RX ADMIN — ENOXAPARIN SODIUM 40 MG: 100 INJECTION SUBCUTANEOUS at 19:51

## 2023-02-16 RX ADMIN — PREDNISONE 20 MG: 20 TABLET ORAL at 08:40

## 2023-02-16 RX ADMIN — OXYCODONE HYDROCHLORIDE 20 MG: 20 TABLET, FILM COATED, EXTENDED RELEASE ORAL at 19:50

## 2023-02-16 RX ADMIN — BICALUTAMIDE 50 MG: 50 TABLET, FILM COATED ORAL at 08:40

## 2023-02-16 RX ADMIN — SODIUM CHLORIDE, PRESERVATIVE FREE 10 ML: 5 INJECTION INTRAVENOUS at 20:16

## 2023-02-16 RX ADMIN — HYDROMORPHONE HYDROCHLORIDE 0.5 MG: 1 INJECTION, SOLUTION INTRAMUSCULAR; INTRAVENOUS; SUBCUTANEOUS at 15:27

## 2023-02-16 RX ADMIN — OXYCODONE AND ACETAMINOPHEN 1 TABLET: 5; 325 TABLET ORAL at 17:48

## 2023-02-16 RX ADMIN — DULOXETINE 60 MG: 30 CAPSULE, DELAYED RELEASE ORAL at 08:40

## 2023-02-16 RX ADMIN — ALPRAZOLAM 0.5 MG: 0.5 TABLET ORAL at 13:00

## 2023-02-16 RX ADMIN — HEPARIN 500 UNITS: 100 SYRINGE at 20:17

## 2023-02-16 ASSESSMENT — ENCOUNTER SYMPTOMS
ABDOMINAL PAIN: 0
SORE THROAT: 0
TROUBLE SWALLOWING: 0
CHEST TIGHTNESS: 0
BACK PAIN: 1
CONSTIPATION: 0
WHEEZING: 0
NAUSEA: 0
SINUS PRESSURE: 0
COUGH: 0
COLOR CHANGE: 0
VOICE CHANGE: 0
VOMITING: 0
DIARRHEA: 0
SINUS PAIN: 0
SHORTNESS OF BREATH: 0

## 2023-02-16 ASSESSMENT — PAIN SCALES - GENERAL
PAINLEVEL_OUTOF10: 4
PAINLEVEL_OUTOF10: 10
PAINLEVEL_OUTOF10: 4
PAINLEVEL_OUTOF10: 10
PAINLEVEL_OUTOF10: 9
PAINLEVEL_OUTOF10: 7
PAINLEVEL_OUTOF10: 10

## 2023-02-16 ASSESSMENT — PAIN DESCRIPTION - DESCRIPTORS
DESCRIPTORS: ACHING;THROBBING
DESCRIPTORS: BURNING
DESCRIPTORS: ACHING;DISCOMFORT

## 2023-02-16 ASSESSMENT — PAIN DESCRIPTION - ORIENTATION
ORIENTATION: LOWER;MID
ORIENTATION: RIGHT;LEFT
ORIENTATION: POSTERIOR;LOWER
ORIENTATION: LOWER

## 2023-02-16 ASSESSMENT — PAIN SCALES - WONG BAKER
WONGBAKER_NUMERICALRESPONSE: 2
WONGBAKER_NUMERICALRESPONSE: 2
WONGBAKER_NUMERICALRESPONSE: 4

## 2023-02-16 ASSESSMENT — PAIN DESCRIPTION - LOCATION
LOCATION: BACK
LOCATION: LEG
LOCATION: ARM;LEG;BACK
LOCATION: BACK
LOCATION: LEG

## 2023-02-16 ASSESSMENT — PAIN DESCRIPTION - PAIN TYPE
TYPE: CHRONIC PAIN
TYPE: CHRONIC PAIN

## 2023-02-16 ASSESSMENT — PAIN DESCRIPTION - ONSET
ONSET: ON-GOING
ONSET: ON-GOING

## 2023-02-16 ASSESSMENT — PAIN - FUNCTIONAL ASSESSMENT
PAIN_FUNCTIONAL_ASSESSMENT: PREVENTS OR INTERFERES SOME ACTIVE ACTIVITIES AND ADLS
PAIN_FUNCTIONAL_ASSESSMENT: PREVENTS OR INTERFERES SOME ACTIVE ACTIVITIES AND ADLS
PAIN_FUNCTIONAL_ASSESSMENT: PREVENTS OR INTERFERES WITH ALL ACTIVE AND SOME PASSIVE ACTIVITIES
PAIN_FUNCTIONAL_ASSESSMENT: PREVENTS OR INTERFERES WITH MANY ACTIVE NOT PASSIVE ACTIVITIES

## 2023-02-16 ASSESSMENT — PAIN DESCRIPTION - FREQUENCY
FREQUENCY: CONTINUOUS
FREQUENCY: CONTINUOUS

## 2023-02-16 NOTE — PROGRESS NOTES
V2.0  [facility] [place] Hospitalist Progress Note      Name:  Gordy Sparks /Age/Sex: 1970  (46 y.o. male)   MRN & CSN:  [id number] & [id number] Encounter Date/Time: 2023 7:32 AM EST    Location:  1111/1111-A PCP: Rose Ching MD       Hospital Day: 17    Assessment and Plan:   Gordy Sparks is a 46 y.o. male with pmh of CAD, history of metastatic prostate cancer, and squamous cell carcinoma of the left lung  who presents with Sepsis [facility])    Plan:    #History of metastatic prostate cancer:  --CT chest with diffuse osteoblastic metastatic disease, CT A/P with extensive osteoblastic metastatic disease. Last known Lupron dose was on , Last known abiraterone prescription , unclear last time he used it.   --PSA on this admission 233 up from 144 on 22  --Continue Casodex and tamsulosin  --Heme-onc on board, continue steroid therapy. Plan for MRI brain, cervical thoracic and lumbar spine to evaluate for disease burden. With that information patient and family to make decision of treatment versus hospice. CT scan of spine showed extensive metastatic disease  - Patient has decided the best course of action for him will be to pursue hospice care. History of squamous cell carcinoma left lung-diagnosed on 2021:  --Status post debulking of primary tumor via bronchoscopy at [facility], positive response to therapy with carbo/Alimta/Pembro X1 followed by Jeet Belle.   --Patient goes back and forth between being on hospice and not being on hospice  --[facility] to attempt treating with Gatito Lentz in the outpatient setting once he is feeling better    Concern for sepsis - ruled out  --Presented with fever and tachycardia, unclear source of infection at the time. He was started on broad-spectrum IV antibiotics. -- RVP, MRSA screen, UA and UCX were unremarkable  --Imaging showed no evidence of pneumonia and the CT of abdomen and pelvis was read for possible cystitis however the UA was unremarkable. Patient had recently undergone treatment with meropenem for UTI at home  --ID on board, recommended that antibiotics be discontinued and the patient be monitored with a plan to restart meropenem post blood cultures if the patient spikes a fever. Beyond the fever on admission, he has remained afebrile but remains tachycardic  --Mild leukocytosis resolved,    #Hyponatremia  - Resolved  --Likely secondary to poor oral intake, Na stable at 137  --Continue to monitor    #Chronic anemia  --H/H stable at 7.5 this AM, patient likely has BM infiltration per hemeonc  --Monitor and transfuse for hgb <7, continue retacrit MWF as long as hgb is <10    #History of urinary retention with chronic indwelling Doherty  --Had prior nephrostomy tube which was dislodged and replaced back  --Doherty remains in place with good output    #Anxiety disorder  --Continue alprazolam    #Depression  --Continue Mirtazapine and duloxetine    #Pain control  --Given change in status from hospice will alter pain regimen  --Oxycontin 20mg BID, with immediate relief percocet (and Dilaudid while in-patient) for breakthrough pain   --To be managed in the outpatient setting by PCP on [facility]    Diet ADULT DIET; Regular  ADULT ORAL NUTRITION SUPPLEMENT; Breakfast, Dinner; Standard High Calorie/High Protein Oral Supplement  ADULT ORAL NUTRITION SUPPLEMENT; Dinner; Frozen Oral Supplement   DVT Prophylaxis [x] Lovenox, []  Heparin, [] SCDs, [] Ambulation,  [] Eliquis, [] Xarelto  [] Coumadin   Code Status Full Code   Disposition From: Home  Expected Disposition: Home with Hospice  Estimated Date of Discharge: Today or tomorrow   Surrogate Decision Maker/ POA      Subjective:     Chief Complaint: Fall     Patient states changes in his pain medications have improved pain control. He is ready to enter hospice care. Review of Systems:      Review of Systems   Constitutional:  Negative for activity change, appetite change, chills, fatigue and fever.    HENT:  Negative for congestion, hearing loss, sinus pressure, sinus pain, sore throat, trouble swallowing and voice change. Eyes:  Negative for visual disturbance. Respiratory:  Negative for cough, chest tightness, shortness of breath and wheezing. Cardiovascular:  Negative for chest pain, palpitations and leg swelling. Gastrointestinal:  Negative for abdominal pain, constipation, diarrhea, nausea and vomiting. Endocrine: Negative for cold intolerance, heat intolerance and polyuria. Genitourinary:  Negative for dysuria. Musculoskeletal:  Positive for arthralgias, back pain, gait problem and myalgias. Skin:  Negative for color change. Neurological:  Positive for weakness (lower extremity bilaterally). Negative for dizziness and headaches. Psychiatric/Behavioral:  Negative for agitation and confusion. The patient is not nervous/anxious. Objective: Intake/Output Summary (Last 24 hours) at 2/16/2023 1151  Last data filed at 2/16/2023 0548  Gross per 24 hour   Intake 10 ml   Output 2900 ml   Net -2890 ml          Vitals:   Vitals:    02/16/23 0837   BP: 97/79   Pulse: (!) 118   Resp: 14   Temp: 98.1 °F (36.7 °C)   SpO2:        Physical Exam:   General: Patient visibly in pain. Eyes: EOMI  ENT: Moist mucous membrane  Cardiovascular: Regular rate  Respiratory: Clear to auscultation  Gastrointestinal: Soft, non tender  Genitourinary: no suprapubic tenderness, sampson cath in place  Musculoskeletal: No edema  Skin: warm, dry  Neuro: Alert, oriented time place person. .  Strength 4 out of 5 bilateral upper extremities. Unable to sustain lower extremities against gravity. Only passive range of motion. Pain upon passive range of motion bilateral lower extremity. Psych: Mood appropriate.      Medications:   Medications:    oxyCODONE  20 mg Oral 2 times per day    mirtazapine  15 mg Oral Nightly    nicotine  1 patch TransDERmal Daily    epoetin kelsi-epbx  10,000 Units SubCUTAneous Once per day on Mon Wed Fri bicalutamide  50 mg Oral Daily    DULoxetine  60 mg Oral Daily    gabapentin  600 mg Oral TID    predniSONE  20 mg Oral Daily    tamsulosin  0.4 mg Oral Daily    sodium chloride flush  5-40 mL IntraVENous 2 times per day    enoxaparin  40 mg SubCUTAneous Daily      Infusions:    sodium chloride       PRN Meds: HYDROmorphone, 0.5 mg, Q4H PRN  oxyCODONE-acetaminophen, 1 tablet, Q4H PRN  bisacodyl, 10 mg, Daily PRN  diphenhydrAMINE, 25 mg, Q6H PRN  ALPRAZolam, 0.5 mg, TID PRN  cyclobenzaprine, 10 mg, TID PRN  naloxone, 4 mg, PRN  sodium chloride flush, 5-40 mL, PRN  sodium chloride, , PRN  ondansetron, 4 mg, Q8H PRN   Or  ondansetron, 4 mg, Q6H PRN  polyethylene glycol, 17 g, Daily PRN  acetaminophen, 650 mg, Q6H PRN   Or  acetaminophen, 650 mg, Q6H PRN      Labs      No results found for this or any previous visit (from the past 24 hour(s)). Imaging/Diagnostics Last 24 Hours   No results found.     Electronically signed by Britany Chance MD on 2/16/2023 at 11:51 AM

## 2023-02-16 NOTE — PLAN OF CARE
Problem: Skin/Tissue Integrity  Goal: Absence of new skin breakdown  Description: 1. Monitor for areas of redness and/or skin breakdown  2. Assess vascular access sites hourly  3. Every 4-6 hours minimum:  Change oxygen saturation probe site  4. Every 4-6 hours:  If on nasal continuous positive airway pressure, respiratory therapy assess nares and determine need for appliance change or resting period.   Outcome: Progressing     Problem: Pain  Goal: Verbalizes/displays adequate comfort level or baseline comfort level  Outcome: Progressing     Problem: Nutrition Deficit:  Goal: Optimize nutritional status  Outcome: Progressing     Problem: Safety - Adult  Goal: Free from fall injury  Outcome: Progressing     Problem: Discharge Planning  Goal: Discharge to home or other facility with appropriate resources  Outcome: Progressing

## 2023-02-16 NOTE — CARE COORDINATION
This RN case manager to bedside to speak with patient. He states he still wants to go with [facility] and that Hospice is returning today to sign Hospice papers. This RN case manager spoke with Bard Urbano at [facility] and the plan is for the nurse to come in today and sign paperwork with patient. 56 - Wife states she cannot get ahold of Hospice to schedule a time. This RN case manager spoke with Taty Hernandez with Hospice and she states she will take care of getting someone to address the papers getting signed. Wife of patient and nursing staff updated.

## 2023-02-16 NOTE — DISCHARGE SUMMARY
V2.0  Discharge Summary    Name:  Mei Boyd /Age/Sex: 1970 (46 y.o. male)   Admit Date: 2023  Discharge Date: 23    MRN & CSN:  [id number] & [id number] Encounter Date and Time 23 6:03 PM EST    Attending:  Candace Mccall, * Discharging Provider: Candace Mccall MD       Hospital Course:     Brief HPI: Mei Boyd is a 46 y.o. male  with nonischemic cardiomyopathy, history of squamous cell carcinoma of left lung, prostate cancer with bony metastasis, chronic urinary retention presented to ER with complaints of intractable pain. Patient is currently awake, alert, not answering any questions, tossing in bed, in pain. Most of the information was provided by patient's wife at bedside. According to her patient has been in constant pain, crying, has decreased appetite, poor oral intake. She denied patient having any fever, chills, cough, chest pain, denied any abdomen pain, denied any urinary complaints, denied any constipation or diarrhea. Patient has a chronic indwelling Doherty catheter and it was exchanged last Thursday has per wife. Patient was initially started on antibiotics for concern for sepsis secondary to UTI. Seen by infectious disease and this was successfully stopped during admission. Presentation more likely due to extensive metastatic disease. Patient did have a CT scan that showed extensive mets mets to the spine with likely impending cord compression. At this time the patient has made decision to pursue comfort measures and will be under hospice care. Brief Problem Based Course:     History of metastatic prostate cancer:  CT chest with diffuse osteoblastic metastatic disease, CT A/P with extensive osteoblastic metastatic disease. Last known Lupron dose was on , Last known abiraterone prescription , unclear last time he used it. PSA on this admission 233 up from 144 on 22. CT scan of spine showed extensive metastatic disease. Patient has decided the best course of action for him will be to pursue hospice care. History of squamous cell carcinoma left lung-diagnosed on 12/13/2021:  Status post debulking of primary tumor via bronchoscopy at [facility], positive response to therapy with carbo/Alimta/Pembro X1 followed by Courtney Gambino. CT scan of spine showed extensive metastatic disease. Patient has decided the best course of action for him will be to pursue hospice care. Concern for sepsis - ruled out  Presented with fever and tachycardia, unclear source of infection at the time. He was started on broad-spectrum IV antibiotics. RVP, MRSA screen, UA and UCX were unremarkable. Patient was seen and recommendation was to discontinue antibiotics. Remained afebrile. Presentation likely all secondary to metastatic cancer. Hyponatremia  - Resolved  Likely secondary to poor oral intake, Na stable at 137     Chronic anemia  Secondary to metastatic cancer. Hemoglobin has remained stable with no signs of bleeding. History of urinary retention with chronic indwelling Doherty  Had prior nephrostomy tube which was dislodged and replaced back. The patient expressed appropriate understanding of, and agreement with the discharge recommendations, medications, and plan.      Consults this admission:  IP CONSULT TO CASE MANAGEMENT  IP CONSULT TO HOSPICE  PHARMACY TO DOSE VANCOMYCIN  PHARMACY TO DOSE VANCOMYCIN  IP CONSULT TO HEM/ONC  IP CONSULT TO INFECTIOUS DISEASES  IP CONSULT TO RADIATION ONCOLOGY  IP CONSULT TO CASE MANAGEMENT  IP CONSULT TO HOME CARE NEEDS  IP CONSULT TO PSYCHIATRY  IP CONSULT TO IV TEAM    Discharge Diagnosis:     Metastatic cancer     Discharge Instruction:   Follow up appointments: None needed  Primary care physician: Bill Vickers MD within 2 weeks  Diet: regular diet   Activity: activity as tolerated  Disposition: Discharged to:   []Home, []HHC, []SNF, []Acute Rehab, [x]Hospice  Condition on discharge: Stable  Labs and Tests to be Followed up as an outpatient by PCP or Specialist:       Discharge Medications:        Medication List        CONTINUE taking these medications      ALPRAZolam 0.5 MG tablet  Commonly known as: XANAX     cyclobenzaprine 10 MG tablet  Commonly known as: FLEXERIL     DULoxetine 60 MG extended release capsule  Commonly known as: CYMBALTA     gabapentin 600 MG tablet  Commonly known as: NEURONTIN     * naloxone 4 MG/0.1ML Liqd nasal spray  1 spray by Nasal route as needed for Opioid Reversal     * naloxone 2 MG/2ML injection  Commonly known as: NARCAN  4 mLs by Nasal route as needed (if patient is unresponsive or stops breathing)     ondansetron 8 MG tablet  Commonly known as: ZOFRAN     oxyCODONE 20 MG extended release tablet  Commonly known as: OXYCONTIN     oxyCODONE-acetaminophen  MG per tablet  Commonly known as: PERCOCET     Senna 8.6 MG Caps  Take 1 capsule by mouth 2 times daily as needed (constipation)     tamsulosin 0.4 MG capsule  Commonly known as: FLOMAX           * This list has 2 medication(s) that are the same as other medications prescribed for you. Read the directions carefully, and ask your doctor or other care provider to review them with you. STOP taking these medications      cyanocobalamin 1000 MCG tablet  Commonly known as: [employer] VITAMIN B12     fentaNYL 75 MCG/HR  Commonly known as: DURAGESIC     Oyster Shell Calcium/D 500-200 MG-UNIT Tabs     polyethylene glycol 17 g packet  Commonly known as: GLYCOLAX     predniSONE 20 MG tablet  Commonly known as: DELTASONE             Objective Findings at Discharge:   /87   Pulse (!) 128   Temp 98.1 °F (36.7 °C) (Oral)   Resp 16   Ht 6' 1\" (1.854 m)   Wt 152 lb 9.6 oz (69.2 kg)   SpO2 98%   BMI 20.13 kg/m²       Physical Exam:     Physical Exam  Constitutional:       General: He is not in acute distress. Appearance: Normal appearance. He is ill-appearing.    HENT:      Head: Normocephalic and atraumatic. Nose: Nose normal.      Mouth/Throat:      Mouth: Mucous membranes are moist.      Pharynx: Oropharynx is clear. Eyes:      Extraocular Movements: Extraocular movements intact. Conjunctiva/sclera: Conjunctivae normal.      Pupils: Pupils are equal, round, and reactive to light. Cardiovascular:      Rate and Rhythm: Normal rate and regular rhythm. Pulses: Normal pulses. Heart sounds: Normal heart sounds. Pulmonary:      Effort: Pulmonary effort is normal.   Abdominal:      General: Abdomen is flat. Bowel sounds are normal.      Palpations: Abdomen is soft. Musculoskeletal:         General: Normal range of motion. Cervical back: Normal range of motion and neck supple. Skin:     General: Skin is warm and dry. Neurological:      General: No focal deficit present. Mental Status: He is alert and oriented to person, place, and time. Mental status is at baseline. Motor: Weakness present. Gait: Gait abnormal.   Psychiatric:         Mood and Affect: Mood normal.         Behavior: Behavior normal.         Thought Content: Thought content normal.              Labs and Imaging   CT HEAD W CONTRAST    Result Date: 2/14/2023  EXAMINATION: CT OF THE HEAD WITH CONTRAST  2/14/2023 3:42 pm TECHNIQUE: CT of the head/brain was performed with the administration of intravenous contrast. Multiplanar reformatted images are provided for review. Automated exposure control, iterative reconstruction, and/or weight based adjustment of the mA/kV was utilized to reduce the radiation dose to as low as reasonably achievable. COMPARISON: 10/11/2022 HISTORY: ORDERING SYSTEM PROVIDED HISTORY: Looking for mets TECHNOLOGIST PROVIDED HISTORY: Reason for exam:->Looking for mets Reason for Exam: Looking for mets, pain Initial evaluation FINDINGS: BRAIN/VENTRICLES:  The ventricles and cisternal spaces are normal in size, shape, and configuration for the age of the patient.  No abnormal enhancement is identified in the visualized brain parenchyma. No obvious brain parenchymal metastases by CT imaging. MRI is more sensitive for detection of metastatic disease of the brain. ORBITS: The visualized portion of the orbits demonstrate no acute abnormality. SINUSES:  The visualized paranasal sinuses and mastoid air cells are for the most part clear. SOFT TISSUES/SKULL:  There are multiple sclerotic lesions of the skull, skull base and upper cervical spine which raises concern for sclerotic metastatic disease. No obvious abnormal enhancement in the brain parenchyma to suggest brain parenchymal metastasis. MRI is more sensitive for the detection of intracranial metastatic disease if indicated. Multiple skull mets and mets of the skull base and upper cervical spine. CT CERVICAL SPINE W CONTRAST    Result Date: 2/14/2023  EXAMINATION: CT OF THE CERVICAL SPINE WITH CONTRAST 2/14/2023 3:42 pm TECHNIQUE: CT of the cervical was performed with the administration of intravenous contrast. Multiplanar reformatted images are provided for review. Automated exposure control, iterative reconstruction, and/or weight based adjustment of the mA/kV was utilized to reduce the radiation dose to as low as reasonably achievable. COMPARISON: None. HISTORY: ORDERING SYSTEM PROVIDED HISTORY: Looking for mets TECHNOLOGIST PROVIDED HISTORY: Reason for exam:->Looking for mets Reason for Exam: Looking for mets, pain FINDINGS: Sclerotic and lytic lesions are seen throughout the skeleton. There is C7 vertebra plana secondary to an underlying lytic lesion, with extraosseous extension of disease resulting in severe narrowing of the spinal canal. No pathologically enlarged lymph nodes are detected within the neck. Diffuse osseous metastases with severe pathologic compression fracture of C7.  Extraosseous extension of disease at the C7 level resulting in severe narrowing of the spinal canal. RECOMMENDATIONS: Unavailable     CT THORACIC SPINE W CONTRAST    Result Date: 2/14/2023  EXAMINATION: CT OF THE THORACIC SPINE WITH CONTRAST  2/14/2023 3:44 pm: TECHNIQUE: CT of the thoracic spine was performed with the administration of intravenous contrast.  Multiplanar reformatted images are provided for review. Automated exposure control, iterative reconstruction, and/or weight based adjustment of the mA/kV was utilized to reduce the radiation dose to as low as reasonably achievable. COMPARISON: None. HISTORY: ORDERING SYSTEM PROVIDED HISTORY: Looking for mets TECHNOLOGIST PROVIDED HISTORY: Reason for exam:->Looking for mets Reason for Exam: Looking for mets, pain FINDINGS: There is a partially visualized necrotic appearing mass within the left upper lobe with obstruction of the central bronchi. Lytic and sclerotic lesions are seen throughout the axial and appendicular skeleton consistent with metastatic disease. A large lytic lesion centered on the spinous process of T2 extends into and completely fills the spinal canal.  There is also a lytic lesion centered on the right-sided lamina of T10 extending into the spinal canal and resulting in moderate to severe spinal canal narrowing. There is partially visualized retroperitoneal and upper abdominal lymphadenopathy. Diffuse bony metastases with extraosseous extension of disease into the spinal canal at least 2 separate levels. RECOMMENDATIONS: Unavailable     CT LUMBAR SPINE W CONTRAST    Result Date: 2/14/2023  EXAMINATION: CT OF THE LUMBAR SPINE WITH CONTRAST  2/14/2023 3:43 pm TECHNIQUE: CT of the lumbar spine was performed with the administration of intravenous contrast. Multiplanar reformatted images are provided for review. Automated exposure control, iterative reconstruction, and/or weight based adjustment of the mA/kV was utilized to reduce the radiation dose to as low as reasonably achievable.  COMPARISON: None HISTORY: ORDERING SYSTEM PROVIDED HISTORY: Looking for mets TECHNOLOGIST PROVIDED HISTORY: Reason for exam:->Looking for mets Reason for Exam: Looking for mets, pain FINDINGS: There are lytic and sclerotic lesions seen throughout the spine and pelvic bones consistent with metastases. A large lytic lesion within the pelvis results in near complete destruction of the sacrum. There is extraosseous extension of disease resulting in complete effacement of the sacral canal. There is partially visualized retroperitoneal adenopathy. Diffuse bony metastases with extraosseous extension of disease within the pelvis resulting near complete destruction of the sacrum and complete effacement of the sacral canal. RECOMMENDATIONS: Unavailable       CBC:   Recent Labs     02/15/23  0609   WBC 11.3*   HGB 8.7*   *     BMP:    Recent Labs     02/15/23  0609   *   K 4.3   CL 96*   CO2 28   BUN 14   CREATININE 0.5*   GLUCOSE 100*     Hepatic:   Recent Labs     02/15/23  0609   AST 13*   ALT 13   BILITOT 0.2   ALKPHOS 265*     Lipids:   Lab Results   Component Value Date/Time    CHOL 124 12/20/2013 05:22 AM    HDL 53 12/20/2013 05:22 AM    TRIG 52 12/20/2013 05:22 AM     Hemoglobin A1C: No results found for: LABA1C  TSH: No results found for: TSH  Troponin:   Lab Results   Component Value Date/Time    TROPONINT <0.010 08/09/2022 04:53 AM    TROPONINT <0.010 08/09/2022 04:53 AM    TROPONINT <0.010 07/13/2022 03:05 PM     Lactic Acid: No results for input(s): LACTA in the last 72 hours. BNP: No results for input(s): PROBNP in the last 72 hours.   UA:  Lab Results   Component Value Date/Time    NITRU NEGATIVE 01/31/2023 03:09 PM    COLORU YELLOW 01/31/2023 03:09 PM    WBCUA 2 01/31/2023 03:09 PM    RBCUA 5 01/31/2023 03:09 PM    MUCUS RARE 01/04/2023 11:53 PM    TRICHOMONAS NONE SEEN 01/31/2023 03:09 PM    BACTERIA NEGATIVE 01/31/2023 03:09 PM    CLARITYU CLEAR 01/31/2023 03:09 PM    SPECGRAV 1.010 01/31/2023 03:09 PM    LEUKOCYTESUR NEGATIVE 01/31/2023 03:09 PM    UROBILINOGEN 2.0 01/31/2023 03:09 PM    BILIRUBINUR NEGATIVE 01/31/2023 03:09 PM    BLOODU TRACE 01/31/2023 03:09 PM    KETUA TRACE 01/31/2023 03:09 PM    AMORPHOUS RARE 07/21/2021 05:24 AM     Urine Cultures: No results found for: LABURIN  Blood Cultures: No results found for: BC  No results found for: BLOODCULT2  Organism: No results found for: ORG    Comment: Please note this report has been produced using speech recognition software and may contain errors related to that system including errors in grammar, punctuation, and spelling, as well as words and phrases that may be inappropriate. If there are any questions or concerns please feel free to contact the dictating provider for clarification.      Time Spent Discharging patient 35 minutes    Electronically signed by Paul Foster MD on 2/16/2023 at 6:03 PM

## 2023-02-17 NOTE — PROGRESS NOTES
Patient picked up by [facility] at this time. Paperwork given with hospice info as well, patient unable to sign for instructions. Patient taken out via stretcher.

## 2023-03-12 ENCOUNTER — HOSPITAL ENCOUNTER (EMERGENCY)
Age: 53
Discharge: HOME | End: 2023-03-13
Payer: MEDICAID

## 2023-03-12 VITALS
DIASTOLIC BLOOD PRESSURE: 67 MMHG | SYSTOLIC BLOOD PRESSURE: 88 MMHG | OXYGEN SATURATION: 95 % | RESPIRATION RATE: 14 BRPM | HEART RATE: 115 BPM | TEMPERATURE: 98.7 F

## 2023-03-12 DIAGNOSIS — C79.9 METASTATIC MALIGNANT NEOPLASM, UNSPECIFIED SITE (HCC): Primary | ICD-10-CM

## 2023-03-12 DIAGNOSIS — T83.511A URINARY TRACT INFECTION ASSOCIATED WITH INDWELLING URETHRAL CATHETER, INITIAL ENCOUNTER (HCC): ICD-10-CM

## 2023-03-12 DIAGNOSIS — G89.3 CANCER ASSOCIATED PAIN: ICD-10-CM

## 2023-03-12 DIAGNOSIS — E87.1 HYPONATREMIA: ICD-10-CM

## 2023-03-12 DIAGNOSIS — N39.0 URINARY TRACT INFECTION ASSOCIATED WITH INDWELLING URETHRAL CATHETER, INITIAL ENCOUNTER (HCC): ICD-10-CM

## 2023-03-12 LAB
ALBUMIN SERPL-MCNC: 3.1 GM/DL (ref 3.4–5)
ALP BLD-CCNC: 186 IU/L (ref 40–129)
ALT SERPL-CCNC: 6 U/L (ref 10–40)
ANION GAP SERPL CALCULATED.3IONS-SCNC: 10 MMOL/L (ref 4–16)
AST SERPL-CCNC: 9 IU/L (ref 15–37)
BASOPHILS ABSOLUTE: 0 K/CU MM
BASOPHILS RELATIVE PERCENT: 0.2 % (ref 0–1)
BILIRUB SERPL-MCNC: 0.1 MG/DL (ref 0–1)
BUN SERPL-MCNC: 15 MG/DL (ref 6–23)
CALCIUM SERPL-MCNC: 12.5 MG/DL (ref 8.3–10.6)
CHLORIDE BLD-SCNC: 94 MMOL/L (ref 99–110)
CO2: 25 MMOL/L (ref 21–32)
CREAT SERPL-MCNC: 0.6 MG/DL (ref 0.9–1.3)
DIFFERENTIAL TYPE: ABNORMAL
EOSINOPHILS ABSOLUTE: 0 K/CU MM
EOSINOPHILS RELATIVE PERCENT: 0.2 % (ref 0–3)
GFR SERPL CREATININE-BSD FRML MDRD: >60 ML/MIN/1.73M2
GLUCOSE SERPL-MCNC: 133 MG/DL (ref 70–99)
HCT VFR BLD CALC: 26.7 % (ref 42–52)
HEMOGLOBIN: 8.1 GM/DL (ref 13.5–18)
IMMATURE NEUTROPHIL %: 1.2 % (ref 0–0.43)
LYMPHOCYTES ABSOLUTE: 1.2 K/CU MM
LYMPHOCYTES RELATIVE PERCENT: 9.9 % (ref 24–44)
MCH RBC QN AUTO: 23.5 PG (ref 27–31)
MCHC RBC AUTO-ENTMCNC: 30.3 % (ref 32–36)
MCV RBC AUTO: 77.4 FL (ref 78–100)
MONOCYTES ABSOLUTE: 0.6 K/CU MM
MONOCYTES RELATIVE PERCENT: 4.8 % (ref 0–4)
NUCLEATED RBC %: 0 %
PDW BLD-RTO: 20 % (ref 11.7–14.9)
PLATELET # BLD: 359 K/CU MM (ref 140–440)
PMV BLD AUTO: 9.1 FL (ref 7.5–11.1)
POTASSIUM SERPL-SCNC: 4.5 MMOL/L (ref 3.5–5.1)
RBC # BLD: 3.45 M/CU MM (ref 4.6–6.2)
SEGMENTED NEUTROPHILS ABSOLUTE COUNT: 10.1 K/CU MM
SEGMENTED NEUTROPHILS RELATIVE PERCENT: 83.7 % (ref 36–66)
SODIUM BLD-SCNC: 129 MMOL/L (ref 135–145)
TOTAL IMMATURE NEUTOROPHIL: 0.14 K/CU MM
TOTAL NUCLEATED RBC: 0 K/CU MM
TOTAL PROTEIN: 7 GM/DL (ref 6.4–8.2)
WBC # BLD: 12 K/CU MM (ref 4–10.5)

## 2023-03-12 PROCEDURE — 99284 EMERGENCY DEPT VISIT MOD MDM: CPT

## 2023-03-12 PROCEDURE — 80053 COMPREHEN METABOLIC PANEL: CPT

## 2023-03-12 PROCEDURE — 96374 THER/PROPH/DIAG INJ IV PUSH: CPT

## 2023-03-12 PROCEDURE — 96376 TX/PRO/DX INJ SAME DRUG ADON: CPT

## 2023-03-12 PROCEDURE — 85025 COMPLETE CBC W/AUTO DIFF WBC: CPT

## 2023-03-12 PROCEDURE — 6370000000 HC RX 637 (ALT 250 FOR IP): Performed by: PHYSICIAN ASSISTANT

## 2023-03-12 PROCEDURE — 51798 US URINE CAPACITY MEASURE: CPT

## 2023-03-12 PROCEDURE — 51702 INSERT TEMP BLADDER CATH: CPT

## 2023-03-12 RX ORDER — OXYCODONE HYDROCHLORIDE 5 MG/1
10 TABLET ORAL ONCE
Status: COMPLETED | OUTPATIENT
Start: 2023-03-12 | End: 2023-03-12

## 2023-03-12 RX ORDER — 0.9 % SODIUM CHLORIDE 0.9 %
1000 INTRAVENOUS SOLUTION INTRAVENOUS ONCE
Status: COMPLETED | OUTPATIENT
Start: 2023-03-12 | End: 2023-03-13

## 2023-03-12 RX ADMIN — OXYCODONE HYDROCHLORIDE 10 MG: 5 TABLET ORAL at 23:54

## 2023-03-12 ASSESSMENT — PAIN SCALES - GENERAL: PAINLEVEL_OUTOF10: 10

## 2023-03-13 LAB
BACTERIA: ABNORMAL /HPF
BILIRUBIN URINE: NEGATIVE MG/DL
BLOOD, URINE: ABNORMAL
CLARITY: CLEAR
COLOR: YELLOW
GLUCOSE, URINE: NEGATIVE MG/DL
HYALINE CASTS: 2 /LPF
KETONES, URINE: NEGATIVE MG/DL
LEUKOCYTE ESTERASE, URINE: ABNORMAL
MUCUS: ABNORMAL HPF
NITRITE URINE, QUANTITATIVE: POSITIVE
PH, URINE: 6 (ref 5–8)
PROTEIN UA: 30 MG/DL
RBC URINE: 13 /HPF (ref 0–3)
SPECIFIC GRAVITY UA: 1.01 (ref 1–1.03)
TRICHOMONAS: ABNORMAL /HPF
UROBILINOGEN, URINE: 0.2 MG/DL (ref 0.2–1)
WBC CLUMP: ABNORMAL /HPF
WBC UA: 383 /HPF (ref 0–2)

## 2023-03-13 PROCEDURE — 87186 SC STD MICRODIL/AGAR DIL: CPT

## 2023-03-13 PROCEDURE — 2580000003 HC RX 258: Performed by: PHYSICIAN ASSISTANT

## 2023-03-13 PROCEDURE — 6360000002 HC RX W HCPCS: Performed by: PHYSICIAN ASSISTANT

## 2023-03-13 PROCEDURE — 87086 URINE CULTURE/COLONY COUNT: CPT

## 2023-03-13 PROCEDURE — 87077 CULTURE AEROBIC IDENTIFY: CPT

## 2023-03-13 PROCEDURE — 81001 URINALYSIS AUTO W/SCOPE: CPT

## 2023-03-13 PROCEDURE — 87181 SC STD AGAR DILUTION PER AGT: CPT

## 2023-03-13 RX ORDER — CEPHALEXIN 500 MG/1
500 CAPSULE ORAL 4 TIMES DAILY
Qty: 28 CAPSULE | Refills: 0 | Status: SHIPPED | OUTPATIENT
Start: 2023-03-13 | End: 2023-03-15

## 2023-03-13 RX ORDER — HEPARIN SODIUM (PORCINE) LOCK FLUSH IV SOLN 100 UNIT/ML 100 UNIT/ML
100 SOLUTION INTRAVENOUS ONCE
Status: COMPLETED | OUTPATIENT
Start: 2023-03-13 | End: 2023-03-13

## 2023-03-13 RX ORDER — OXYCODONE HYDROCHLORIDE AND ACETAMINOPHEN 5; 325 MG/1; MG/1
1-2 TABLET ORAL EVERY 6 HOURS PRN
Qty: 12 TABLET | Refills: 0 | Status: SHIPPED | OUTPATIENT
Start: 2023-03-13 | End: 2023-03-16

## 2023-03-13 RX ADMIN — HYDROMORPHONE HYDROCHLORIDE 1 MG: 1 INJECTION, SOLUTION INTRAMUSCULAR; INTRAVENOUS; SUBCUTANEOUS at 03:25

## 2023-03-13 RX ADMIN — SODIUM CHLORIDE 1000 ML: 9 INJECTION, SOLUTION INTRAVENOUS at 01:02

## 2023-03-13 RX ADMIN — HYDROMORPHONE HYDROCHLORIDE 1 MG: 1 INJECTION, SOLUTION INTRAMUSCULAR; INTRAVENOUS; SUBCUTANEOUS at 01:03

## 2023-03-13 RX ADMIN — Medication 100 UNITS: at 03:26

## 2023-03-13 ASSESSMENT — PAIN SCALES - GENERAL: PAINLEVEL_OUTOF10: 8

## 2023-03-13 NOTE — CARE COORDINATION
CM received a consult on patient. Patient is a 46year old male with nonischemic cardiomyopathy, history of squamous cell carcinoma of left lung, prostate cancer with bony metastasis, chronic urinary retention presented to ER with complaints of intractable pain. Patient is currently awake, alert, answering some questions, tossing in bed, in pain. Most of the information was provided by patient's wife at bedside. According to her patient has been in constant pain, crying, has decreased appetite, poor oral intake. Also at bedside was [address] [place] [address] nurse explained that patient had been on a \"tier 3\" due to being short on his medication count. One day; patient was one pill short and another day; patient was 2 pills short. Patients' wife explained that patient had been on a much larger dose and when patient went to [address] [place] [address]; they cut his medication down very low. Patient in tremendous pain as reported by patient. Patient then stated that if he is going to be in that much pain and has Hospice then he should be able to take an extra pain pill as needed. CM reviewed this with [facility] and asked Hospice nurse why patient was being monitored with pain medication when he is on \"comfort care. \" Ruth Chavez' Hospice nurse explained that it is because they needed to get patient reassessed and then Hospice physician would have to approve and write prescriptions. Since patient is on a \"Tier 3 warning,\" [address] [place] [address] physician has quit writing any prescriptions for patient including all pain medications and gabapentin. Patients' wife concerned b/c patient is so much pain and since [address] [place] [address] will no longer write prescriptions for patient; then they wanted to sign off of [address] [place] [address] and came to ED for pain management. GT explained that hospital does not normally do pain management, but will get Ashley CLARK to discuss with patient and spouse.  [address] [place] [address] nurse explained that patient could go into a skilled nursing facility where facility could monitor patients' medication and then patient could resign back up with [facility]. Patient and patients' spouse adamantly refused a facility or inpatient Hospice. Spouse reported that they use to use [address] [place] [address]. [address], [place], [address] [facility]   [address]: CM called [facility] and spoke to Commiskey. [facility] reported that [facility] has also terminated patient due to \"misuse of narcotics. \" CM again asked why if patient was on \"comfort care and requested that she explain. \" [employer]key reported that \"the pain medication is being diverted by patients' spouse and it is not patient. Sallyanne Chain Sallyanne Chain \"     [employer] reported that patients' spouse has already called them and requested to resign with them, but [employer] does not think this will happen. They do however have an email into the director and should no for sure tomorrow. GT asked [employer] if they could have someone call case management and let them know if [facility] will take patient back. [employer]iskey agreed. CM reviewed the above with Ashley CLARK. CM in to see patient and patients' spouse and request them to pick out another Hospice to be referred to. Patients' spouse and patient chose [facility] [phone] out of [facility]. CM called [facility] and spoke to Carolina crowe/anthony. Nadeem reported that he will not take patient as he spoke with his director. Patient had tried to sign up with [facility] in past and [facility] became aware that there is a Drug Diversion situation with patient. Nadeem stated that [facility] did not wish to get involved with this family due to misuse and/or missing medications. CM went in to speak to patient and explain that at this time there is no one that will take patient through Hospice. Patient and daughter and spouse at bedside. Ashley CLARK in room with CM.  Spouse very frustrated and does not understand why no Hospice agency will take patient. Ashley CLARK reported that perhaps the patient is just needing to much pain medication to monitor at home. Skilled nursing or Hospice placement was offered, but both choices were turned down by patient and family. Patient was wanting to return home. [facility] was arranged to take patient home at 0330. During conversation; there were family concerns as far as arguing and fighting. GT and Ashley CLARK spoke to spouse and daughter regarding their emotions during this critical time of patients' life. Suggested that family may look at placing patient in a facility for one week in order to give them time to get things ready for patient once he is home and perhaps find a Hospice agency that will accept patient. Spouse reports that patient sees Dr. Susanna Ramos and will make an appointment with him tomorrow in hopes that Dr. Susanna Ramos can give patient his pain medication. [facility] will be by tomorrow to  DME (hospital bed, moshe lift and high back wheel chair.)  Patients' daughter reported that she has a friend that is giving her all needed equipment. Patient to go home. Spouse has list of other Hospice Agencies and will call. Patient to be discharged home with a couple of days of pain management medications.

## 2023-03-13 NOTE — ED PROVIDER NOTES
Emergency [address]GENC[facility]    Patient: Ron Trammell  MRN: [id number]  : 1970  Date of Evaluation: 3/12/2023  ED Provider: Bola Preston PA-C    Chief Complaint       Chief Complaint   Patient presents with    Back Pain     cancer    Other     On hospice for cancer - has increased pain. Hospice told him to come here. Patient wants his catheter changed. Bella Hedrick is a 46 y.o. male who presents to the emergency department for back pain related to metastatic cancer. Patient's wife, at bedside, provides most of the history. She states that patient's hospice nurse is actually on her way to the ED now to have patient sign papers to revoke his hospice care. She states patient was 3 pills short on his pill count this past week so they told him he had to revoke hospice services. She states the nurse told them to meet her here so that we could then manage patient's pain. She is also concerned that he hasn't had much urine output from his sampson catheter today, despite drinking a lot of water. Patient denies any suprapubic pain or pressure. ROS     :  + decreased urine output. BACK:  + back pain.     Past History     Past Medical History:   Diagnosis Date    CAD (coronary artery disease) 2013    cath negative per pt    Cancer ([address] 2021    pt reports he has lung cancer    History of TMJ disorder     Hypertension     Trigeminal neuralgia      Past Surgical History:   Procedure Laterality Date    ABDOMEN SURGERY      CARDIAC CATHETERIZATION  2016    CT BIOPSY PERCUTANEOUS SUPERFICIAL BONE  2021    CT BIOPSY PERCUTANEOUS SUPERFICIAL BONE 2021 [facility] CT SCAN    CT NEEDLE BIOPSY LUNG PERCUTANEOUS  2021    CT NEEDLE BIOPSY LUNG PERCUTANEOUS 2021 [facility] CT SCAN    IR NEPHROSTOMY CATHETER PLACEMENT  2023    IR NEPHROSTOMY CATHETER PLACEMENT 2023 [facility] SPECIAL PROCEDURES PORT SURGERY Right 8/13/2021    MEDIPORT INSERTION performed by Connie Ramos MD at [address] [place] [address] History     Socioeconomic History    Marital status:      Spouse name: None    Number of children: 5    Years of education: None    Highest education level: None   Tobacco Use    Smoking status: Every Day     Packs/day: 2.00     Years: 39.00     Pack years: 78.00     Types: Cigarettes    Smokeless tobacco: Never    Tobacco comments:     smokes 1-2 a day   Vaping Use    Vaping Use: Never used   Substance and Sexual Activity    Alcohol use: Not Currently     Alcohol/week: 6.0 standard drinks     Types: 6 Cans of beer per week     Comment: per week (24 oz beers)     Drug use: Yes     Types: Marijuana [place])     Comment: last 12/1    Sexual activity: Yes     Partners: Female     Social Determinants of Health     Financial Resource Strain: Low Risk     Difficulty of Paying Living Expenses: Not hard at all   Food Insecurity: No Food Insecurity    Worried About Running Out of Food in the Last Year: Never true    Ran Out of Food in the Last Year: Never true   Transportation Needs: No Transportation Needs    Lack of Transportation (Medical): No    Lack of Transportation (Non-Medical): No   Physical Activity: Inactive    Days of Exercise per Week: 0 days    Minutes of Exercise per Session: 0 min   Stress: No Stress Concern Present    Feeling of Stress :  Only a little   Social Connections: Socially Integrated    Frequency of Communication with Friends and Family: Twice a week    Frequency of Social Gatherings with Friends and Family: Twice a week    Attends Sabianism Services: 1 to 4 times per year    Active Member of [employer] or Organizations: No    Attends Club or Organization Meetings: 1 to 4 times per year    Marital Status:    Intimate Partner Violence: Not At Risk    Fear of Current or Ex-Partner: No    Emotionally Abused: No    Physically Abused: No    Sexually Abused: No   Housing Stability: Low Risk Unable to Pay for Housing in the Last Year: No    Number of Places Lived in the Last Year: 1    Unstable Housing in the Last Year: No       Medications/Allergies     Previous Medications    ALPRAZOLAM (XANAX) 0.5 MG TABLET    Take 0.5 mg by mouth 3 times daily as needed for Sleep or Anxiety (Twice Daily PRN). CYCLOBENZAPRINE (FLEXERIL) 10 MG TABLET    Take 10 mg by mouth 3 times daily as needed for Muscle spasms    DULOXETINE (CYMBALTA) 60 MG EXTENDED RELEASE CAPSULE    Take 60 mg by mouth daily    GABAPENTIN (NEURONTIN) 600 MG TABLET    Take 600 mg by mouth 3 times daily. NALOXONE (NARCAN) 2 MG/2ML INJECTION    4 mLs by Nasal route as needed (if patient is unresponsive or stops breathing)    NALOXONE 4 MG/0.1ML LIQD NASAL SPRAY    1 spray by Nasal route as needed for Opioid Reversal    ONDANSETRON (ZOFRAN) 8 MG TABLET    take 1 tablet by mouth every 8 hours if needed for nausea and vomiting    OXYCODONE (OXYCONTIN) 20 MG EXTENDED RELEASE TABLET    Take 20 mg by mouth in the morning and 20 mg in the evening. OXYCODONE-ACETAMINOPHEN (PERCOCET)  MG PER TABLET    Take 2 tablets by mouth every 6 hours as needed for Pain. SENNOSIDES (SENNA) 8.6 MG CAPS    Take 1 capsule by mouth 2 times daily as needed (constipation)    TAMSULOSIN (FLOMAX) 0.4 MG CAPSULE    Take 0.4 mg by mouth daily     Allergies   Allergen Reactions    Tramadol Other (See Comments)     seizures    Morphine Hallucinations    Vicodin [Hydrocodone-Acetaminophen] Hives        Physical Exam       ED Triage Vitals [03/12/23 2133]   BP Temp Temp Source Heart Rate Resp SpO2 Height Weight   88/67 98.7 °F (37.1 °C) Oral (!) 115 14 95 % -- --     GENERAL APPEARANCE:  Well-developed, well-nourished, no acute distress. HEAD:  NC/AT. EYES:  Sclera anicteric. ENT:  Ears, nose, mouth normal.     NECK:  Supple. CARDIO:  RRR. LUNGS:   CTAB. Respirations unlabored. ABDOMEN:  Soft, non-distended, non-tender. BS active.   :  Doherty catheter in place. BACK:  No midline thoracic or lumbar spinal tenderness. EXTREMITIES:  No acute deformities. SKIN:  Warm and dry. NEUROLOGICAL:  Alert and oriented but some confusion. PSYCHIATRIC:  Normal mood.      Diagnostics     Labs:  Results for orders placed or performed during the hospital encounter of 03/12/23   CBC with Auto Differential   Result Value Ref Range    WBC 12.0 (H) 4.0 - 10.5 K/CU MM    RBC 3.45 (L) 4.6 - 6.2 M/CU MM    Hemoglobin 8.1 (L) 13.5 - 18.0 GM/DL    Hematocrit 26.7 (L) 42 - 52 %    MCV 77.4 (L) 78 - 100 FL    MCH 23.5 (L) 27 - 31 PG    MCHC 30.3 (L) 32.0 - 36.0 %    RDW 20.0 (H) 11.7 - 14.9 %    Platelets 390 229 - 600 K/CU MM    MPV 9.1 7.5 - 11.1 FL    Differential Type AUTOMATED DIFFERENTIAL     Segs Relative 83.7 (H) 36 - 66 %    Lymphocytes % 9.9 (L) 24 - 44 %    Monocytes % 4.8 (H) 0 - 4 %    Eosinophils % 0.2 0 - 3 %    Basophils % 0.2 0 - 1 %    Segs Absolute 10.1 K/CU MM    Lymphocytes Absolute 1.2 K/CU MM    Monocytes Absolute 0.6 K/CU MM    Eosinophils Absolute 0.0 K/CU MM    Basophils Absolute 0.0 K/CU MM    Nucleated RBC % 0.0 %    Total Nucleated RBC 0.0 K/CU MM    Total Immature Neutrophil 0.14 K/CU MM    Immature Neutrophil % 1.2 (H) 0 - 0.43 %   Comprehensive Metabolic Panel   Result Value Ref Range    Sodium 129 (L) 135 - 145 MMOL/L    Potassium 4.5 3.5 - 5.1 MMOL/L    Chloride 94 (L) 99 - 110 mMol/L    CO2 25 21 - 32 MMOL/L    BUN 15 6 - 23 MG/DL    Creatinine 0.6 (L) 0.9 - 1.3 MG/DL    Est, Glom Filt Rate >60 >60 mL/min/1.73m2    Glucose 133 (H) 70 - 99 MG/DL    Calcium 12.5 (H) 8.3 - 10.6 MG/DL    Albumin 3.1 (L) 3.4 - 5.0 GM/DL    Total Protein 7.0 6.4 - 8.2 GM/DL    Total Bilirubin 0.1 0.0 - 1.0 MG/DL    ALT 6 (L) 10 - 40 U/L    AST 9 (L) 15 - 37 IU/L    Alkaline Phosphatase 186 (H) 40 - 129 IU/L    Anion Gap 10 4 - 16       ED Course and MDM     Chief Complaint/HPI Summary/Differential Diagnosis:  Patient presents to the ED with chief complaint of cancer related pain and concern for sampson malfunction. Patient seen and evaluated. Triage and nursing notes reviewed and incorporated. History from : Patient and Family (wife)    Limitations to history : None    Patient was given the following medications:  Medications   oxyCODONE (ROXICODONE) immediate release tablet 10 mg (has no administration in time range)   0.9 % sodium chloride bolus (has no administration in time range)       Independent Imaging Interpretation by me:  None    EKG (if obtained):  Please see supervising physician's note for interpretation. Chronic conditions affecting care: Metastatic cancer    Discussion with Other Profesionals : Case Management Kannan Trevizo was consulted upon patient's arrival to the ED. We both met with [employer] nurse who came to the department and Clara Kenyon continued to work on finding patient other Hospice services--please see her extensive documentation for full details. Social Determinants : None    Records Reviewed : Inpatient Notes from most recent admission in January 2023    ED Course/Reassessment/Disposition:  Patient presented to the ED and [employer] nurse came and met with patient and wife and patient revoked their services. Apparently patient had been short on pill counts and was unable to receive any more medication from their service and elected to revoke care. Wife initially stated that she had another company that was going to replace [employer], however, upon investigation, Fabricio Carmichael learned that this wasn't true. Multiple companies refused patient due to wife diverting his medications. Spoke at length with patient, wife, daughter and recommended observation admission and placement for hospice services at either [address] or a SNF but they adamantly refused. Wife plans to attempt to find hospice care on her own. We recommended FU with his oncologist for pain control in the interim.   Patient's sampson was irrigated by nursing and is draining fine.  No retention on bladder scan. Will cover with Keflex and send a urine culture. He was given fluids in the ED due to hyponatremia of 129. Return as needed. Disposition Considerations (tests considered but not done, Shared Decision Making, Patient Expectation of Test or Treatment):   Patient / family declined admission for inpatient placement    I am the Primary Clinician of Record. Supervising physician was Dr. Walter Henley. Patient was seen independently. In light of current events, I did utilize appropriate PPE (including N95 and surgical face mask, safety glasses, and gloves, as recommended by the health facility/national standard best practice, during my bedside interactions with the patient. Final Impression      1. Metastatic malignant neoplasm, unspecified site ([address]    2. Cancer associated pain    3. Hyponatremia    4.  Urinary tract infection associated with indwelling urethral catheter, [facility]            Cheryl 25, PA-C  [address], [place]  03/14/23 2819

## 2023-03-15 ENCOUNTER — TELEPHONE (OUTPATIENT)
Dept: PHARMACY | Age: 53
End: 2023-03-15

## 2023-03-15 LAB
CULTURE: ABNORMAL
CULTURE: ABNORMAL
Lab: ABNORMAL
SPECIMEN: ABNORMAL

## 2023-03-15 RX ORDER — CIPROFLOXACIN 500 MG/1
500 TABLET, FILM COATED ORAL 2 TIMES DAILY
Qty: 14 TABLET | Refills: 0 | Status: SHIPPED | OUTPATIENT
Start: 2023-03-15 | End: 2023-03-22

## 2023-03-15 NOTE — TELEPHONE ENCOUNTER
Pharmacy Note  ED Culture Follow-up    Tolu Pappas is a 46 y.o. male. Allergies: Tramadol, Morphine, and Vicodin [hydrocodone-acetaminophen]     Labs:  Lab Results   Component Value Date    BUN 15 03/12/2023    CREATININE 0.6 (L) 03/12/2023    WBC 12.0 (H) 03/12/2023     CrCl cannot be calculated (Unknown ideal weight.). Current antimicrobials:   Cephalexin 500 mg by mouth four times daily for 7 days     ASSESSMENT:  Micro results:   Urine culture: positive for pseudomonas aeruginosa 75,000 CFU/ml     PLAN:  Need for intervention: Yes  Discussed with: Dr. Lroena Herrera treatment:    Informed patient of urine culture results. Instructed patient to discontinue cephalexin and initiate ciprofloxacin 500 mg by mouth twice daily for 7 days. Patient response:   Called and spoke with patient. Counseled patient on following up with PCP    Called/sent in prescription to: [facility]    Please call with any questions.  Karthikeyan Roland [facility], PharmD 4:21 PM 3/15/2023

## 2023-03-15 NOTE — PROGRESS NOTES
Pharmacy Note  ED Culture Follow-up    Janell Gimenez is a 46 y.o. male. Allergies: Tramadol, Morphine, and Vicodin [hydrocodone-acetaminophen]     Labs:  Lab Results   Component Value Date    BUN 15 03/12/2023    CREATININE 0.6 (L) 03/12/2023    WBC 12.0 (H) 03/12/2023     CrCl cannot be calculated (Unknown ideal weight.). Current antimicrobials:   Cephalexin 500 mg by mouth four times daily for 7 days     ASSESSMENT:  Micro results:   Urine culture: positive for pseudomonas aeruginosa 75,000 CFU/ml     PLAN:  Need for intervention: Yes  Discussed with: Dr. Martha Birmingham treatment:    Informed patient of urine culture results. Instructed patient to discontinue cephalexin and initiate ciprofloxacin 500 mg by mouth twice daily for 7 days. Patient response:   Called and spoke with patient. Counseled patient on following up with PCP    Called/sent in prescription to:  [facility]    Please call with any questions.  BOB Carey [facility], PharmD 4:21 PM 3/15/2023

## 2023-03-16 ENCOUNTER — TELEPHONE (OUTPATIENT)
Dept: ONCOLOGY | Age: 53
End: 2023-03-16

## 2023-03-16 ENCOUNTER — OFFICE VISIT (OUTPATIENT)
Dept: ONCOLOGY | Age: 53
End: 2023-03-16
Payer: MEDICAID

## 2023-03-16 ENCOUNTER — HOSPITAL ENCOUNTER (OUTPATIENT)
Dept: INFUSION THERAPY | Age: 53
Discharge: HOME | End: 2023-03-16
Payer: MEDICAID

## 2023-03-16 VITALS
OXYGEN SATURATION: 93 % | BODY MASS INDEX: 20.39 KG/M2 | HEART RATE: 120 BPM | DIASTOLIC BLOOD PRESSURE: 68 MMHG | HEIGHT: 73 IN | SYSTOLIC BLOOD PRESSURE: 107 MMHG | TEMPERATURE: 96.6 F

## 2023-03-16 DIAGNOSIS — C34.82 MALIGNANT NEOPLASM OF OVERLAPPING SITES OF LEFT LUNG (HCC): Primary | ICD-10-CM

## 2023-03-16 DIAGNOSIS — C61 PROSTATE CANCER (HCC): ICD-10-CM

## 2023-03-16 PROCEDURE — 99403 PREV MED CNSL INDIV APPRX 45: CPT | Performed by: INTERNAL MEDICINE

## 2023-03-16 PROCEDURE — 99211 OFF/OP EST MAY X REQ PHY/QHP: CPT

## 2023-03-16 RX ORDER — OXYCODONE HYDROCHLORIDE 10 MG/1
10 TABLET ORAL EVERY 4 HOURS PRN
Qty: 42 TABLET | Refills: 0 | Status: SHIPPED | OUTPATIENT
Start: 2023-03-16 | End: 2023-03-23

## 2023-03-16 NOTE — PROGRESS NOTES
Palliative Care Assessment    Medical Oncology History:    July 2021 evaluated for both symptoms of flank pain, dysuria as well as back pain. CT chest 5.9 cm left upper lobe mass along with mediastinal and this prominent supraclavicular adenopathy. Also noted circumferential thickening of bladder wall left retroperitoneal adenopathy prostate gland and sclerotic bony lesions.  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 January 2022 started on treatment with carboplatin, Taxol and Keytruda. PSA March 22, 2022 was 976. Subsequently started on treatment with Lupron and Zytiga. Admitted to [facility] in April 2022  Underwent bronchoscopy as well as bone marrow biopsy consistent with metastatic prostatic carcinoma. Also noted to have hydronephrosis and left nephrostomy tube was inserted. Overall he was noted to have advanced lung as well as prostate malignancy. Subsequently he has been tried on different systemic therapy as well as palliative radiation to the hip but his condition had continued to deteriorate and he had been in hospice. Last few weeks and months have been rather difficult as he has been in and out of the hospice for different reasons sometimes him and the family revoking for therapy and other times a question regarding diversion or pain medications. Other Medical Problems:    Stated in general was feeling quite well prior to summer 2021. Issue with hypertension and COPD     Personal History     A. Life Time:    Moved to [facility] [place] [facility] from [place] about 28 years ago. Been  to his present wife for the past 22 years. Between them had 4 children and have a 14 grandchildren. Family is quite close and they get to see the grandchildren repeatedly. He did used to smoke and did drink alcohol though socially. Also in the past has tried marijuana.   Had different type of jobs including being  as well as part-time [employer].     wife is a STNA for the past 30 years works at one of the local nursing homes     B. Family:     As above pretty close    Physical Symptoms:   Visit here on March 16, 2023, significant decline.  Was on a stretcher.  Not been able to walk.  Needed assistance for his day-to-day care.  Also complained of having a significant bony pains.  Had been using long-acting as well as short-acting oxycodone.  Stated that he did not have enough pain medicine at home being discharged from the hospital and in the emergency room couple of days ago was only given short supply of oxycodone.  He just has not been eating that well and is getting significantly weaker.   Psychological and Cognitive Symptoms:   Overall has not noticed any significant cognitive decline  Illness Understanding and Care Preferences:   Appears to have a very good understanding of the disease process.  Existential and Spiritual:   Not a regular Pentecostal person but has strong farrah.  Brother-in-law is a   Social and Economic Resources:   Has good support around him especially with his wife who is very attentive and has other caregivers  Care Coordination:   March 29, 2022 spent time with him and his wife.  We did of course review his personal story as well as medical history.  He currently is being treated with chemotherapy for metastatic squamous cell carcinoma of the lung.  Also appears to have advanced prostatic malignancy for which she is going to have a biopsy and possible start therapy with ADT.  Also issue with anemia relating to bony metastasis necessitating the use of transfusions.  I did discuss with him about pain control need for him to stay on OxyContin 20 mg every 12 and also gave him a prescription for oxycodone 10 mg every 4 hours as needed for breakthrough.  Talked about of course constipation need for him to be little more liberal with the use of stool softeners as well as MiraLAX.  For his inability to sleep did prescribe Ativan  to see if that will be of help at night. Also offered cannabis to see if that will be helpful for other symptoms of discomfort, not been able to rest as well as appetite. Advised him and his wife to call us if there is any other thing needs to be done. Handicap placard was issued    March 16, 2023 his wife accompanied him to the [facility]. Stated that he has been staying with their daughter as there has been some issue with with him getting along with his wife's friend at home. He has been let go from the hospice and they were in a desperate situation. I spent lots of time reviewing their medical history with both of them and agreeing that with advanced nature of both prostatic malignancy as well as lung lung malignancy hospice was a good choice. I discussed with them that the hospice was frustrated with question of diversion. His wife has been a nursing assistant I did encouraged him to work it out that he could stay with his wife and who could be the caregiver. I also stressed with them that will be required that the pain medication could be under lock and key and only his wife could be provider and if there is a continued issue it will be very difficult for him to to receive good care especially at this time when he needs some most.  I did write 1 week prescription for short acting oxycodone 10 mg every 4 hours as needed. Made sure that his wife would call us a week from now and hopefully have hospice involved in the meantime.   I will share this information with his oncology team    Time spent 45 minutes    Lexii Coombs MD

## 2023-03-16 NOTE — TELEPHONE ENCOUNTER
Pharmacist at [facility] aid called and indicated Medicaid is not covering the oxycodone pharmacist states may need to PA. I have not received anything to start a PA as of now. If patient needs medications before PA obtain is appears the cost is about 15.00 with good RX.

## 2023-03-16 NOTE — PROGRESS NOTES
MA Rooming Questions  Patient: Karmen De Los Santos  MRN: [id number]    Date: 3/16/2023        1. Do you have any new issues?   no         2. Do you need any refills on medications?    no    3. Have you had any imaging done since your last visit?   no    4. Have you been hospitalized or seen in the emergency room since your last visit here?   no    5. Did the patient have a depression screening completed today?  No    No data recorded     PHQ-9 Given to (if applicable):               PHQ-9 Score (if applicable):                     [] Positive     []  Negative              Does question #9 need addressed (if applicable)                     [] Yes    []  No               Cinthya Heart, CMA

## 2023-03-28 ENCOUNTER — HOSPITAL ENCOUNTER (EMERGENCY)
Age: 53
Discharge: HOME | End: 2023-03-29
Payer: MEDICAID

## 2023-03-28 DIAGNOSIS — N39.0 URINARY TRACT INFECTION ASSOCIATED WITH INDWELLING URETHRAL CATHETER, INITIAL ENCOUNTER (HCC): Primary | ICD-10-CM

## 2023-03-28 DIAGNOSIS — T83.511A URINARY TRACT INFECTION ASSOCIATED WITH INDWELLING URETHRAL CATHETER, INITIAL ENCOUNTER (HCC): Primary | ICD-10-CM

## 2023-03-28 LAB
BILIRUBIN URINE: NEGATIVE
BLOOD, URINE: NORMAL
CLARITY: NORMAL
COLOR: YELLOW
GLUCOSE, URINE: NEGATIVE MG/DL
KETONES, URINE: NEGATIVE MG/DL
LEUKOCYTE ESTERASE, URINE: NORMAL
NITRITE URINE, QUANTITATIVE: POSITIVE
PH, URINE: 5.5
PROTEIN UA: NEGATIVE MG/DL
SPECIFIC GRAVITY UA: 1.01
UROBILINOGEN, URINE: 0.2 MG/DL

## 2023-03-28 PROCEDURE — 81001 URINALYSIS AUTO W/SCOPE: CPT

## 2023-03-28 PROCEDURE — 99283 EMERGENCY DEPT VISIT LOW MDM: CPT

## 2023-03-28 PROCEDURE — 87186 SC STD MICRODIL/AGAR DIL: CPT

## 2023-03-28 PROCEDURE — 87086 URINE CULTURE/COLONY COUNT: CPT

## 2023-03-28 PROCEDURE — 87077 CULTURE AEROBIC IDENTIFY: CPT

## 2023-03-29 VITALS
HEART RATE: 99 BPM | BODY MASS INDEX: 20.41 KG/M2 | DIASTOLIC BLOOD PRESSURE: 70 MMHG | RESPIRATION RATE: 14 BRPM | SYSTOLIC BLOOD PRESSURE: 88 MMHG | WEIGHT: 154 LBS | HEIGHT: 73 IN | TEMPERATURE: 97.6 F | OXYGEN SATURATION: 100 %

## 2023-03-29 LAB
BACTERIA: NORMAL /HPF
MUCUS: NORMAL HPF
RBC URINE: 18 /HPF
TRICHOMONAS: NORMAL /HPF
WBC CLUMP: NORMAL /HPF
WBC UA: 95 /HPF

## 2023-03-29 PROCEDURE — 6370000000 HC RX 637 (ALT 250 FOR IP): Performed by: PHYSICIAN ASSISTANT

## 2023-03-29 RX ORDER — LEVOFLOXACIN 500 MG/1
750 TABLET, FILM COATED ORAL ONCE
Status: COMPLETED | OUTPATIENT
Start: 2023-03-29 | End: 2023-03-29

## 2023-03-29 RX ORDER — LEVOFLOXACIN 750 MG/1
750 TABLET ORAL DAILY
Qty: 7 TABLET | Refills: 0 | Status: SHIPPED | OUTPATIENT
Start: 2023-03-29 | End: 2023-03-31

## 2023-03-29 RX ADMIN — LEVOFLOXACIN 750 MG: 500 TABLET, FILM COATED ORAL at 01:08

## 2023-03-29 NOTE — ED PROVIDER NOTES
bacteria. Will start Levaquin given his most recent urine culture and send a new culture today. His wife ended up arriving to the ED just as [facility] was taking him home. We discussed results. She informed me that they were able to get re-established with [facility]. She is agreeable with plan for outpatient management. Disposition Considerations (tests considered but not done, Shared Decision Making, Patient Expectation of Test or Treatment):   Appropriate for outpatient management as discussed. I am the Primary Clinician of Record. Supervising physician was  [facility]. Patient was seen independently. In light of current events, I did utilize appropriate PPE (including N95 and surgical face mask, safety glasses, and gloves, as recommended by the health facility/national standard best practice, during my bedside interactions with the patient. Final Impression      1.  Urinary tract infection associated with indwelling urethral catheter, initial encounter [facility])          DISPOSITION Decision To Discharge 03/29/2023 01:41:16 AM      Misha Augustin PA-C  [address] [place] [address]  03/29/23 7836

## 2023-03-29 NOTE — ED NOTES
Patient arrives via EMS from home with complaints of possible UTI. Wife called the [facility] to have pt brought to the ER to be checked for possible UTI. Patient presents with a sampson in place. Patient also is a cancer patient, patient is currently on hospice and a DNR CC per wife but no paperwork was given to EMS for code status. Patient present AOX4, respirations equal and unlabored, skin warm and dry.       Bebeto Cade RN  03/28/23 7682

## 2023-03-31 ENCOUNTER — TELEPHONE (OUTPATIENT)
Dept: PHARMACY | Age: 53
End: 2023-03-31

## 2023-03-31 LAB
CULTURE: ABNORMAL
CULTURE: ABNORMAL
Lab: ABNORMAL
SPECIMEN: ABNORMAL

## 2023-03-31 RX ORDER — GRANULES FOR ORAL 3 G/1
3 POWDER ORAL
Qty: 3 EACH | Refills: 0 | Status: ON HOLD | OUTPATIENT
Start: 2023-03-31 | End: 2023-04-07

## 2023-03-31 NOTE — PROGRESS NOTES
Pharmacy Note  ED Culture Follow-up    Kendra Marti is a 46 y.o. male. Allergies: Tramadol, Morphine, and Vicodin [hydrocodone-acetaminophen]     Labs:  Lab Results   Component Value Date    BUN 15 03/12/2023    CREATININE 0.6 (L) 03/12/2023    WBC 12.0 (H) 03/12/2023     Estimated Creatinine Clearance: 139 mL/min (A) (based on SCr of 0.6 mg/dL (L)). Current antimicrobials:   Levofloxacin 750 mg by mouth once daily     ASSESSMENT:  Micro results:   Urine culture: positive for pseudomonas aeruginosa >100,000 CFU/ml     PLAN:  Need for intervention: Yes  Discussed with: Dr. Olya Vargas treatment:    Spoke with patient's wife. Patient's wife reports patient has not had any improvement in symptoms on the levofloxacin. Informed patient's wife of urine culture results. Instructed wife to discontinue levofloxacin and initiate fosfomycin 3 g by mouth once every 3 days for 3 doses. Informed patient's wife if patient develops any systemic symptoms to return for evaluation of IV antibiotics. Patient response:   Called and spoke with patient's wife. Counseled patient on following up with PCP    Called/sent in prescription to:  [facility]    Please call with any questions.  Natalia Jimenez [facility], PharmD 3:58 PM 3/31/2023

## 2023-04-01 ENCOUNTER — APPOINTMENT (OUTPATIENT)
Dept: GENERAL RADIOLOGY | Age: 53
End: 2023-04-01
Payer: MEDICAID

## 2023-04-01 ENCOUNTER — HOSPITAL ENCOUNTER (INPATIENT)
Age: 53
LOS: 5 days | Discharge: HOME | End: 2023-04-06
Attending: EMERGENCY MEDICINE | Admitting: STUDENT IN AN ORGANIZED HEALTH CARE EDUCATION/TRAINING PROGRAM
Payer: MEDICAID

## 2023-04-01 DIAGNOSIS — A41.9 SEPSIS, DUE TO UNSPECIFIED ORGANISM, UNSPECIFIED WHETHER ACUTE ORGAN DYSFUNCTION PRESENT (HCC): Primary | ICD-10-CM

## 2023-04-01 DIAGNOSIS — N30.00 ACUTE CYSTITIS WITHOUT HEMATURIA: ICD-10-CM

## 2023-04-01 LAB
ALBUMIN SERPL-MCNC: 3.5 GM/DL (ref 3.4–5)
ALP BLD-CCNC: 190 IU/L (ref 40–129)
ALT SERPL-CCNC: <5 U/L (ref 10–40)
ANION GAP SERPL CALCULATED.3IONS-SCNC: 13 MMOL/L (ref 4–16)
AST SERPL-CCNC: 9 IU/L (ref 15–37)
BASE EXCESS MIXED: 4 (ref 0–1.2)
BASOPHILS ABSOLUTE: 0 K/CU MM
BASOPHILS RELATIVE PERCENT: 0.2 % (ref 0–1)
BILIRUB SERPL-MCNC: 0.3 MG/DL (ref 0–1)
BUN SERPL-MCNC: 19 MG/DL (ref 6–23)
CALCIUM SERPL-MCNC: 14 MG/DL (ref 8.3–10.6)
CHLORIDE BLD-SCNC: 98 MMOL/L (ref 99–110)
CO2: 25 MMOL/L (ref 21–32)
COMMENT: ABNORMAL
CREAT SERPL-MCNC: 0.5 MG/DL (ref 0.9–1.3)
DIFFERENTIAL TYPE: ABNORMAL
EKG ATRIAL RATE: 131 BPM
EKG DIAGNOSIS: NORMAL
EKG P AXIS: 82 DEGREES
EKG P-R INTERVAL: 154 MS
EKG Q-T INTERVAL: 296 MS
EKG QRS DURATION: 82 MS
EKG QTC CALCULATION (BAZETT): 437 MS
EKG R AXIS: 69 DEGREES
EKG T AXIS: 112 DEGREES
EKG VENTRICULAR RATE: 131 BPM
EOSINOPHILS ABSOLUTE: 0 K/CU MM
EOSINOPHILS RELATIVE PERCENT: 0.1 % (ref 0–3)
GFR SERPL CREATININE-BSD FRML MDRD: >60 ML/MIN/1.73M2
GLUCOSE SERPL-MCNC: 137 MG/DL (ref 70–99)
HCO3 VENOUS: 29.2 MMOL/L (ref 19–25)
HCT VFR BLD CALC: 29.2 % (ref 42–52)
HEMOGLOBIN: 8.7 GM/DL (ref 13.5–18)
IMMATURE NEUTROPHIL %: 1.2 % (ref 0–0.43)
LACTIC ACID, SEPSIS: 1 MMOL/L (ref 0.5–1.9)
LACTIC ACID, SEPSIS: 1.6 MMOL/L (ref 0.5–1.9)
LYMPHOCYTES ABSOLUTE: 2.1 K/CU MM
LYMPHOCYTES RELATIVE PERCENT: 13.4 % (ref 24–44)
MCH RBC QN AUTO: 23.2 PG (ref 27–31)
MCHC RBC AUTO-ENTMCNC: 29.8 % (ref 32–36)
MCV RBC AUTO: 77.9 FL (ref 78–100)
MONOCYTES ABSOLUTE: 0.8 K/CU MM
MONOCYTES RELATIVE PERCENT: 5.2 % (ref 0–4)
NUCLEATED RBC %: 0.1 %
O2 SAT, VEN: 94 % (ref 50–70)
PCO2, VEN: 45 MMHG (ref 38–52)
PDW BLD-RTO: 19.8 % (ref 11.7–14.9)
PH VENOUS: 7.42 (ref 7.32–7.42)
PLATELET # BLD: 414 K/CU MM (ref 140–440)
PMV BLD AUTO: 8.6 FL (ref 7.5–11.1)
PO2, VEN: 97 MMHG (ref 28–48)
POTASSIUM SERPL-SCNC: 4.4 MMOL/L (ref 3.5–5.1)
RBC # BLD: 3.75 M/CU MM (ref 4.6–6.2)
SEGMENTED NEUTROPHILS ABSOLUTE COUNT: 12.5 K/CU MM
SEGMENTED NEUTROPHILS RELATIVE PERCENT: 79.9 % (ref 36–66)
SODIUM BLD-SCNC: 136 MMOL/L (ref 135–145)
TOTAL IMMATURE NEUTOROPHIL: 0.18 K/CU MM
TOTAL NUCLEATED RBC: 0 K/CU MM
TOTAL PROTEIN: 7.8 GM/DL (ref 6.4–8.2)
WBC # BLD: 15.7 K/CU MM (ref 4–10.5)

## 2023-04-01 PROCEDURE — 93010 ELECTROCARDIOGRAM REPORT: CPT | Performed by: INTERNAL MEDICINE

## 2023-04-01 PROCEDURE — 87040 BLOOD CULTURE FOR BACTERIA: CPT

## 2023-04-01 PROCEDURE — 96367 TX/PROPH/DG ADDL SEQ IV INF: CPT

## 2023-04-01 PROCEDURE — 2580000003 HC RX 258: Performed by: EMERGENCY MEDICINE

## 2023-04-01 PROCEDURE — 85025 COMPLETE CBC W/AUTO DIFF WBC: CPT

## 2023-04-01 PROCEDURE — 6360000002 HC RX W HCPCS: Performed by: NURSE PRACTITIONER

## 2023-04-01 PROCEDURE — 82805 BLOOD GASES W/O2 SATURATION: CPT

## 2023-04-01 PROCEDURE — 2580000003 HC RX 258: Performed by: NURSE PRACTITIONER

## 2023-04-01 PROCEDURE — 2700000000 HC OXYGEN THERAPY PER DAY

## 2023-04-01 PROCEDURE — 93005 ELECTROCARDIOGRAM TRACING: CPT | Performed by: EMERGENCY MEDICINE

## 2023-04-01 PROCEDURE — 94761 N-INVAS EAR/PLS OXIMETRY MLT: CPT

## 2023-04-01 PROCEDURE — 83605 ASSAY OF LACTIC ACID: CPT

## 2023-04-01 PROCEDURE — 96365 THER/PROPH/DIAG IV INF INIT: CPT

## 2023-04-01 PROCEDURE — 80053 COMPREHEN METABOLIC PANEL: CPT

## 2023-04-01 PROCEDURE — 71045 X-RAY EXAM CHEST 1 VIEW: CPT

## 2023-04-01 PROCEDURE — 6360000002 HC RX W HCPCS: Performed by: EMERGENCY MEDICINE

## 2023-04-01 PROCEDURE — 6370000000 HC RX 637 (ALT 250 FOR IP): Performed by: NURSE PRACTITIONER

## 2023-04-01 PROCEDURE — 99285 EMERGENCY DEPT VISIT HI MDM: CPT

## 2023-04-01 PROCEDURE — 36415 COLL VENOUS BLD VENIPUNCTURE: CPT

## 2023-04-01 PROCEDURE — 1200000000 HC SEMI PRIVATE

## 2023-04-01 PROCEDURE — 6370000000 HC RX 637 (ALT 250 FOR IP): Performed by: EMERGENCY MEDICINE

## 2023-04-01 RX ORDER — TAMSULOSIN HYDROCHLORIDE 0.4 MG/1
0.4 CAPSULE ORAL DAILY
Status: DISCONTINUED | OUTPATIENT
Start: 2023-04-01 | End: 2023-04-06 | Stop reason: HOSPADM

## 2023-04-01 RX ORDER — SODIUM CHLORIDE 0.9 % (FLUSH) 0.9 %
5-40 SYRINGE (ML) INJECTION EVERY 12 HOURS SCHEDULED
Status: DISCONTINUED | OUTPATIENT
Start: 2023-04-01 | End: 2023-04-06 | Stop reason: HOSPADM

## 2023-04-01 RX ORDER — SODIUM PHOSPHATE, DIBASIC AND SODIUM PHOSPHATE, MONOBASIC 7; 19 G/133ML; G/133ML
1 ENEMA RECTAL DAILY PRN
Status: DISCONTINUED | OUTPATIENT
Start: 2023-04-01 | End: 2023-04-06 | Stop reason: HOSPADM

## 2023-04-01 RX ORDER — METOCLOPRAMIDE HYDROCHLORIDE 5 MG/ML
10 INJECTION INTRAMUSCULAR; INTRAVENOUS EVERY 4 HOURS PRN
Status: DISCONTINUED | OUTPATIENT
Start: 2023-04-01 | End: 2023-04-06 | Stop reason: HOSPADM

## 2023-04-01 RX ORDER — HALOPERIDOL 1 MG/1
1 TABLET ORAL
Status: DISCONTINUED | OUTPATIENT
Start: 2023-04-01 | End: 2023-04-06 | Stop reason: HOSPADM

## 2023-04-01 RX ORDER — SODIUM CHLORIDE 9 MG/ML
INJECTION, SOLUTION INTRAVENOUS PRN
Status: DISCONTINUED | OUTPATIENT
Start: 2023-04-01 | End: 2023-04-06 | Stop reason: HOSPADM

## 2023-04-01 RX ORDER — SODIUM CHLORIDE, SODIUM LACTATE, POTASSIUM CHLORIDE, AND CALCIUM CHLORIDE .6; .31; .03; .02 G/100ML; G/100ML; G/100ML; G/100ML
30 INJECTION, SOLUTION INTRAVENOUS ONCE
Status: COMPLETED | OUTPATIENT
Start: 2023-04-01 | End: 2023-04-01

## 2023-04-01 RX ORDER — DIPHENHYDRAMINE HYDROCHLORIDE 50 MG/ML
25 INJECTION INTRAMUSCULAR; INTRAVENOUS EVERY 4 HOURS PRN
Status: DISCONTINUED | OUTPATIENT
Start: 2023-04-01 | End: 2023-04-06 | Stop reason: HOSPADM

## 2023-04-01 RX ORDER — SCOLOPAMINE TRANSDERMAL SYSTEM 1 MG/1
1 PATCH, EXTENDED RELEASE TRANSDERMAL
Status: DISCONTINUED | OUTPATIENT
Start: 2023-04-01 | End: 2023-04-06 | Stop reason: HOSPADM

## 2023-04-01 RX ORDER — BISACODYL 10 MG
10 SUPPOSITORY, RECTAL RECTAL DAILY PRN
Status: DISCONTINUED | OUTPATIENT
Start: 2023-04-01 | End: 2023-04-06 | Stop reason: HOSPADM

## 2023-04-01 RX ORDER — GABAPENTIN 300 MG/1
600 CAPSULE ORAL 3 TIMES DAILY
Status: DISCONTINUED | OUTPATIENT
Start: 2023-04-01 | End: 2023-04-06 | Stop reason: HOSPADM

## 2023-04-01 RX ORDER — LORAZEPAM 2 MG/ML
1 INJECTION INTRAMUSCULAR EVERY 4 HOURS PRN
Status: DISCONTINUED | OUTPATIENT
Start: 2023-04-01 | End: 2023-04-06 | Stop reason: HOSPADM

## 2023-04-01 RX ORDER — SODIUM CHLORIDE 0.9 % (FLUSH) 0.9 %
5-40 SYRINGE (ML) INJECTION PRN
Status: DISCONTINUED | OUTPATIENT
Start: 2023-04-01 | End: 2023-04-06 | Stop reason: HOSPADM

## 2023-04-01 RX ORDER — ACETAMINOPHEN 325 MG/1
650 TABLET ORAL EVERY 6 HOURS PRN
Status: DISCONTINUED | OUTPATIENT
Start: 2023-04-01 | End: 2023-04-06 | Stop reason: HOSPADM

## 2023-04-01 RX ORDER — ENOXAPARIN SODIUM 100 MG/ML
40 INJECTION SUBCUTANEOUS DAILY
Status: CANCELLED | OUTPATIENT
Start: 2023-04-01

## 2023-04-01 RX ORDER — POLYVINYL ALCOHOL 14 MG/ML
3 SOLUTION/ DROPS OPHTHALMIC
Status: DISCONTINUED | OUTPATIENT
Start: 2023-04-01 | End: 2023-04-06 | Stop reason: HOSPADM

## 2023-04-01 RX ORDER — ONDANSETRON 2 MG/ML
4 INJECTION INTRAMUSCULAR; INTRAVENOUS EVERY 6 HOURS PRN
Status: DISCONTINUED | OUTPATIENT
Start: 2023-04-01 | End: 2023-04-06 | Stop reason: HOSPADM

## 2023-04-01 RX ORDER — SODIUM CHLORIDE 9 MG/ML
INJECTION, SOLUTION INTRAVENOUS CONTINUOUS
Status: DISCONTINUED | OUTPATIENT
Start: 2023-04-01 | End: 2023-04-03

## 2023-04-01 RX ORDER — OXYCODONE HCL 20 MG/1
20 TABLET, FILM COATED, EXTENDED RELEASE ORAL EVERY 12 HOURS
Status: DISCONTINUED | OUTPATIENT
Start: 2023-04-01 | End: 2023-04-06 | Stop reason: HOSPADM

## 2023-04-01 RX ORDER — FENTANYL CITRATE 50 UG/ML
50 INJECTION, SOLUTION INTRAMUSCULAR; INTRAVENOUS ONCE
Status: DISCONTINUED | OUTPATIENT
Start: 2023-04-01 | End: 2023-04-01 | Stop reason: HOSPADM

## 2023-04-01 RX ORDER — ACETAMINOPHEN 650 MG/1
650 SUPPOSITORY RECTAL ONCE
Status: COMPLETED | OUTPATIENT
Start: 2023-04-01 | End: 2023-04-01

## 2023-04-01 RX ORDER — DULOXETIN HYDROCHLORIDE 30 MG/1
60 CAPSULE, DELAYED RELEASE ORAL DAILY
Status: DISCONTINUED | OUTPATIENT
Start: 2023-04-02 | End: 2023-04-06 | Stop reason: HOSPADM

## 2023-04-01 RX ORDER — SENNA AND DOCUSATE SODIUM 50; 8.6 MG/1; MG/1
1 TABLET, FILM COATED ORAL 2 TIMES DAILY
Status: DISCONTINUED | OUTPATIENT
Start: 2023-04-01 | End: 2023-04-06 | Stop reason: HOSPADM

## 2023-04-01 RX ORDER — DIAPER,BRIEF,INFANT-TODD,DISP
EACH MISCELLANEOUS EVERY 6 HOURS PRN
Status: DISCONTINUED | OUTPATIENT
Start: 2023-04-01 | End: 2023-04-06 | Stop reason: HOSPADM

## 2023-04-01 RX ORDER — SODIUM CHLORIDE, SODIUM LACTATE, POTASSIUM CHLORIDE, AND CALCIUM CHLORIDE .6; .31; .03; .02 G/100ML; G/100ML; G/100ML; G/100ML
1000 INJECTION, SOLUTION INTRAVENOUS ONCE
Status: COMPLETED | OUTPATIENT
Start: 2023-04-01 | End: 2023-04-01

## 2023-04-01 RX ORDER — ACETAMINOPHEN 650 MG/1
650 SUPPOSITORY RECTAL EVERY 6 HOURS PRN
Status: DISCONTINUED | OUTPATIENT
Start: 2023-04-01 | End: 2023-04-06 | Stop reason: HOSPADM

## 2023-04-01 RX ORDER — KETOROLAC TROMETHAMINE 30 MG/ML
30 INJECTION, SOLUTION INTRAMUSCULAR; INTRAVENOUS EVERY 6 HOURS
Status: COMPLETED | OUTPATIENT
Start: 2023-04-01 | End: 2023-04-03

## 2023-04-01 RX ADMIN — SODIUM CHLORIDE: 9 INJECTION, SOLUTION INTRAVENOUS at 17:09

## 2023-04-01 RX ADMIN — SODIUM CHLORIDE, PRESERVATIVE FREE 10 ML: 5 INJECTION INTRAVENOUS at 12:58

## 2023-04-01 RX ADMIN — HYDROMORPHONE HYDROCHLORIDE 0.5 MG: 1 INJECTION, SOLUTION INTRAMUSCULAR; INTRAVENOUS; SUBCUTANEOUS at 12:57

## 2023-04-01 RX ADMIN — KETOROLAC TROMETHAMINE 30 MG: 30 INJECTION, SOLUTION INTRAMUSCULAR; INTRAVENOUS at 17:14

## 2023-04-01 RX ADMIN — VANCOMYCIN HYDROCHLORIDE 1750 MG: 10 INJECTION, POWDER, LYOPHILIZED, FOR SOLUTION INTRAVENOUS at 11:27

## 2023-04-01 RX ADMIN — KETOROLAC TROMETHAMINE 30 MG: 30 INJECTION, SOLUTION INTRAMUSCULAR; INTRAVENOUS at 23:55

## 2023-04-01 RX ADMIN — SODIUM CHLORIDE, POTASSIUM CHLORIDE, SODIUM LACTATE AND CALCIUM CHLORIDE 1000 ML: 600; 310; 30; 20 INJECTION, SOLUTION INTRAVENOUS at 14:53

## 2023-04-01 RX ADMIN — CEFEPIME 2000 MG: 2 INJECTION, POWDER, FOR SOLUTION INTRAVENOUS at 23:59

## 2023-04-01 RX ADMIN — CEFEPIME 2000 MG: 2 INJECTION, POWDER, FOR SOLUTION INTRAVENOUS at 10:27

## 2023-04-01 RX ADMIN — SODIUM CHLORIDE, POTASSIUM CHLORIDE, SODIUM LACTATE AND CALCIUM CHLORIDE 2097 ML: 600; 310; 30; 20 INJECTION, SOLUTION INTRAVENOUS at 10:27

## 2023-04-01 RX ADMIN — ACETAMINOPHEN 650 MG: 650 SUPPOSITORY RECTAL at 11:33

## 2023-04-01 RX ADMIN — LORAZEPAM 1 MG: 2 INJECTION INTRAMUSCULAR; INTRAVENOUS at 15:02

## 2023-04-01 NOTE — Clinical Note
Discharge Plan[de-identified] Other/[facility])   Telemetry/Cardiac Monitoring Required?: No

## 2023-04-01 NOTE — H&P
Unable to Pay for Housing in the Last Year: No    Number of Places Lived in the Last Year: 1    Unstable Housing in the Last Year: No       Medications:   Medications:    sodium chloride flush  5-40 mL IntraVENous 2 times per day    vancomycin  25 mg/kg IntraVENous Once    fentanNYL  50 mcg IntraVENous Once    HYDROmorphone  0.5 mg IntraVENous Once      Infusions:    sodium chloride       PRN Meds: sodium chloride flush, 5-40 mL, PRN  sodium chloride, , PRN        Labs      CBC:   Recent Labs     04/01/23  1012   WBC 15.7*   HGB 8.7*        BMP:    Recent Labs     04/01/23  1012      K 4.4   CL 98*   CO2 25   BUN 19   CREATININE 0.5*   GLUCOSE 137*     Hepatic:   Recent Labs     04/01/23  1012   AST 9*   ALT <5*   BILITOT 0.3   ALKPHOS 190*     Lipids:   Lab Results   Component Value Date/Time    CHOL 124 12/20/2013 05:22 AM    HDL 53 12/20/2013 05:22 AM    TRIG 52 12/20/2013 05:22 AM     Hemoglobin A1C: No results found for: LABA1C  TSH: No results found for: TSH  Troponin:   Lab Results   Component Value Date/Time    TROPONINT <0.010 08/09/2022 04:53 AM    TROPONINT <0.010 08/09/2022 04:53 AM    TROPONINT <0.010 07/13/2022 03:05 PM     Lactic Acid: No results for input(s): LACTA in the last 72 hours. BNP: No results for input(s): PROBNP in the last 72 hours.   UA:  Lab Results   Component Value Date/Time    NITRU POSITIVE 03/28/2023 10:59 PM    COLORU YELLOW 03/28/2023 10:59 PM    WBCUA 95 03/28/2023 10:59 PM    RBCUA 18 03/28/2023 10:59 PM    MUCUS RARE 03/28/2023 10:59 PM    TRICHOMONAS NONE SEEN 03/28/2023 10:59 PM    BACTERIA RARE 03/28/2023 10:59 PM    CLARITYU SLIGHTLY CLOUDY 03/28/2023 10:59 PM    SPECGRAV 1.010 03/28/2023 10:59 PM    LEUKOCYTESUR MODERATE NUMBER OR AMOUNT OBSERVED 03/28/2023 10:59 PM    UROBILINOGEN 0.2 03/28/2023 10:59 PM    BILIRUBINUR NEGATIVE 03/28/2023 10:59 PM    BLOODU MODERATE NUMBER OR AMOUNT OBSERVED 03/28/2023 10:59 PM    KETUA NEGATIVE 03/28/2023 10:59 PM

## 2023-04-01 NOTE — CARE COORDINATION
CM [facility] in [facility]. CM was advised that patient is currently active with hospice services and is in need of hospital admission for sepsis care. CM met with patient and spouse Trip Triana. Trip La states she would like patient to have hospice services rescinded in order to be treated for \"bad infection\". CM was advised patient is active with [facility] hospice. [facility] patient's nurse is Marily White, however she is currently on vacation and Maris Castro is covering. Trip Triana reports she and patient live in a home in [place]. Patient has a PCP and insurance. Discharge plan is for patient to return home with spouse and continue [facility]. 1415 CM placed telephone call to [facility] [phone] to advise of impending admission and need to rescind services while admitted to the hospital. CM spoke with [facility]. [facility] services will need to be restarted upon discharge.

## 2023-04-01 NOTE — ED PROVIDER NOTES
04/01/23 1157 107/85 -- -- -- --   04/01/23 1158 107/85 100 °F (37.8 °C) (!) 125 20 96 %      Recent Labs     04/01/23  1012   WBC 15.7*   CREATININE 0.5*   BILITOT 0.3            Time Septic Shock Identified: On arrival with his hypotension and altered mental status, febrile, tachycardic and known infection from home    Fluid Resuscitation Rational: at least 30mL/kg based on entered actual weight at time of triage    Repeat lactate level: not indicated due to initial lactate < 2    Reassessment Exam:   I have reassessed tissue perfusion and hemodynamic status after fluid bolus at this time: 1200    Total critical care time today provided was 35 minutes. This excludes seperately billable procedure. Critical care time provided for sepsis that required close evaluation and/or intervention with concern for patient decompensation. Clinical Impression:  1. Sepsis, due to unspecified organism, unspecified whether acute organ dysfunction present ([facility] [address]    2. Acute cystitis without hematuria      Disposition referral (if applicable):  No follow-up provider specified. Disposition medications (if applicable):  New Prescriptions    No medications on file     ED Provider Disposition Time  DISPOSITION Admitted 04/01/2023 11:51:18 AM      Comment: Please note this report has been produced using speech recognition software and may contain errors related to that system including errors in grammar, punctuation, and spelling, as well as words and phrases that may be inappropriate. Efforts were made to edit the dictations.         Juan Nick MD  04/01/23 4880

## 2023-04-01 NOTE — ED NOTES
Bed 4109 ready     Sherran Nissen.  Cely Cervantes  04/01/23 1201
Waiting bed     Anila Nona.  Frankie  04/01/23 1155
Venous 29.2 (*)     O2 Sat, Virgilio 94.0 (*)     All other components within normal limits     Critical values: no     Abnormal Assessment Findings: Septic    Background  History:   Past Medical History:   Diagnosis Date    CAD (coronary artery disease) 12/23/2013    cath negative per pt    Cancer ([facility] [address] 06/2021    pt reports he has lung cancer    History of TMJ disorder     Hypertension     Trigeminal neuralgia        Assessment    Vitals/MEWS:    Level of Consciousness: Alert (0)   Vitals:    04/01/23 0939 04/01/23 1016   BP:  106/83   Pulse: (!) 147 (!) 141   Resp: 20 (!) 32   Temp: (!) 100.6 °F (38.1 °C)    TempSrc: Axillary    SpO2: 98% 99%     FiO2 (%): nasal cannula for respiratory distress  O2 Flow Rate:  2    Cardiac Rhythm: NS  Pain Assessment:  [] Verbal [] Reyne Boxer Scale  Pain Scale:    Last documented pain score (0-10 scale)    Last documented pain medication administered:   Mental Status: coherent  NIH Score: NIH     C-SSRS:    Bedside swallow:    Ainsworth Coma Scale (GCS): Active LDA's:    PO Status: Nothing by Mouth  Pertinent or High Risk Medications/Drips: no   If Yes, please provide details:   Pending Blood Product Administration: no     You may also review the ED PT Care Timeline found under the Summary Nursing Index tab. Recommendation    Pending orders   Plan for Discharge (if known):    Additional Comments:    If any further questions, please call Sending RN at [phone]    Electronically signed by: Electronically signed by Amanda Alvarez RN on 4/1/2023 at 11:46 AM      Aliya Diaz RN  04/01/23 7483

## 2023-04-02 LAB
BACTERIA: NEGATIVE /HPF
BASOPHILS ABSOLUTE: 0 K/CU MM
BASOPHILS RELATIVE PERCENT: 0.2 % (ref 0–1)
BILIRUBIN URINE: NORMAL
BLOOD, URINE: NORMAL
CLARITY: CLEAR
COLOR: YELLOW
DIFFERENTIAL TYPE: ABNORMAL
DOSE AMOUNT: NORMAL
DOSE TIME: NORMAL
EOSINOPHILS ABSOLUTE: 0.1 K/CU MM
EOSINOPHILS RELATIVE PERCENT: 0.4 % (ref 0–3)
GLUCOSE, URINE: NEGATIVE MG/DL
HCT VFR BLD CALC: 23.3 % (ref 42–52)
HEMOGLOBIN: 7 GM/DL (ref 13.5–18)
HYALINE CASTS: 2 /LPF
IMMATURE NEUTROPHIL %: 0.6 % (ref 0–0.43)
KETONES, URINE: 15 MG/DL
LEUKOCYTE ESTERASE, URINE: NORMAL
LYMPHOCYTES ABSOLUTE: 1.6 K/CU MM
LYMPHOCYTES RELATIVE PERCENT: 13.7 % (ref 24–44)
MCH RBC QN AUTO: 23 PG (ref 27–31)
MCHC RBC AUTO-ENTMCNC: 30 % (ref 32–36)
MCV RBC AUTO: 76.4 FL (ref 78–100)
MONOCYTES ABSOLUTE: 0.7 K/CU MM
MONOCYTES RELATIVE PERCENT: 5.7 % (ref 0–4)
MUCUS: NORMAL HPF
NITRITE URINE, QUANTITATIVE: NEGATIVE
NUCLEATED RBC %: 0 %
PDW BLD-RTO: 19.7 % (ref 11.7–14.9)
PH, URINE: 5
PLATELET # BLD: 320 K/CU MM (ref 140–440)
PMV BLD AUTO: 9 FL (ref 7.5–11.1)
PROTEIN UA: NEGATIVE MG/DL
RBC # BLD: 3.05 M/CU MM (ref 4.6–6.2)
RBC URINE: 3 /HPF
SEGMENTED NEUTROPHILS ABSOLUTE COUNT: 9.1 K/CU MM
SEGMENTED NEUTROPHILS RELATIVE PERCENT: 79.4 % (ref 36–66)
SPECIFIC GRAVITY UA: 1.02
TOTAL IMMATURE NEUTOROPHIL: 0.07 K/CU MM
TOTAL NUCLEATED RBC: 0 K/CU MM
TRICHOMONAS: NORMAL /HPF
UROBILINOGEN, URINE: 0.2 MG/DL
VANCOMYCIN RANDOM: 6 UG/ML
WBC # BLD: 11.5 K/CU MM (ref 4–10.5)
WBC UA: 18 /HPF

## 2023-04-02 PROCEDURE — 36415 COLL VENOUS BLD VENIPUNCTURE: CPT

## 2023-04-02 PROCEDURE — 2580000003 HC RX 258: Performed by: NURSE PRACTITIONER

## 2023-04-02 PROCEDURE — 87081 CULTURE SCREEN ONLY: CPT

## 2023-04-02 PROCEDURE — 85025 COMPLETE CBC W/AUTO DIFF WBC: CPT

## 2023-04-02 PROCEDURE — 6370000000 HC RX 637 (ALT 250 FOR IP): Performed by: NURSE PRACTITIONER

## 2023-04-02 PROCEDURE — 87899 AGENT NOS ASSAY W/OPTIC: CPT

## 2023-04-02 PROCEDURE — 1200000000 HC SEMI PRIVATE

## 2023-04-02 PROCEDURE — 6360000002 HC RX W HCPCS: Performed by: NURSE PRACTITIONER

## 2023-04-02 PROCEDURE — 80202 ASSAY OF VANCOMYCIN: CPT

## 2023-04-02 PROCEDURE — 87086 URINE CULTURE/COLONY COUNT: CPT

## 2023-04-02 PROCEDURE — 2700000000 HC OXYGEN THERAPY PER DAY

## 2023-04-02 PROCEDURE — 81001 URINALYSIS AUTO W/SCOPE: CPT

## 2023-04-02 PROCEDURE — 94761 N-INVAS EAR/PLS OXIMETRY MLT: CPT

## 2023-04-02 RX ADMIN — KETOROLAC TROMETHAMINE 30 MG: 30 INJECTION, SOLUTION INTRAMUSCULAR; INTRAVENOUS at 06:29

## 2023-04-02 RX ADMIN — LORAZEPAM 1 MG: 2 INJECTION INTRAMUSCULAR; INTRAVENOUS at 20:41

## 2023-04-02 RX ADMIN — CEFEPIME 2000 MG: 2 INJECTION, POWDER, FOR SOLUTION INTRAVENOUS at 22:16

## 2023-04-02 RX ADMIN — KETOROLAC TROMETHAMINE 30 MG: 30 INJECTION, SOLUTION INTRAMUSCULAR; INTRAVENOUS at 18:31

## 2023-04-02 RX ADMIN — CEFEPIME 2000 MG: 2 INJECTION, POWDER, FOR SOLUTION INTRAVENOUS at 16:25

## 2023-04-02 RX ADMIN — HYDROMORPHONE HYDROCHLORIDE 0.5 MG: 1 INJECTION, SOLUTION INTRAMUSCULAR; INTRAVENOUS; SUBCUTANEOUS at 16:22

## 2023-04-02 RX ADMIN — KETOROLAC TROMETHAMINE 30 MG: 30 INJECTION, SOLUTION INTRAMUSCULAR; INTRAVENOUS at 22:40

## 2023-04-02 RX ADMIN — SODIUM CHLORIDE, PRESERVATIVE FREE 10 ML: 5 INJECTION INTRAVENOUS at 20:11

## 2023-04-02 RX ADMIN — SODIUM CHLORIDE, PRESERVATIVE FREE 10 ML: 5 INJECTION INTRAVENOUS at 20:58

## 2023-04-02 RX ADMIN — SODIUM CHLORIDE, PRESERVATIVE FREE 10 ML: 5 INJECTION INTRAVENOUS at 10:35

## 2023-04-02 RX ADMIN — VANCOMYCIN HYDROCHLORIDE 1250 MG: 1.25 INJECTION, POWDER, LYOPHILIZED, FOR SOLUTION INTRAVENOUS at 13:01

## 2023-04-02 RX ADMIN — HYDROMORPHONE HYDROCHLORIDE 0.5 MG: 1 INJECTION, SOLUTION INTRAMUSCULAR; INTRAVENOUS; SUBCUTANEOUS at 01:15

## 2023-04-02 RX ADMIN — KETOROLAC TROMETHAMINE 30 MG: 30 INJECTION, SOLUTION INTRAMUSCULAR; INTRAVENOUS at 11:15

## 2023-04-02 RX ADMIN — SODIUM CHLORIDE: 9 INJECTION, SOLUTION INTRAVENOUS at 08:39

## 2023-04-02 RX ADMIN — HYDROMORPHONE HYDROCHLORIDE 0.5 MG: 1 INJECTION, SOLUTION INTRAMUSCULAR; INTRAVENOUS; SUBCUTANEOUS at 08:34

## 2023-04-02 RX ADMIN — GABAPENTIN 600 MG: 300 CAPSULE ORAL at 20:10

## 2023-04-02 RX ADMIN — CEFEPIME 2000 MG: 2 INJECTION, POWDER, FOR SOLUTION INTRAVENOUS at 08:40

## 2023-04-02 RX ADMIN — HYDROMORPHONE HYDROCHLORIDE 0.5 MG: 1 INJECTION, SOLUTION INTRAMUSCULAR; INTRAVENOUS; SUBCUTANEOUS at 20:42

## 2023-04-02 RX ADMIN — LORAZEPAM 1 MG: 2 INJECTION INTRAMUSCULAR; INTRAVENOUS at 14:53

## 2023-04-02 RX ADMIN — SENNOSIDES AND DOCUSATE SODIUM 1 TABLET: 50; 8.6 TABLET ORAL at 20:10

## 2023-04-02 RX ADMIN — SODIUM CHLORIDE, PRESERVATIVE FREE 10 ML: 5 INJECTION INTRAVENOUS at 08:30

## 2023-04-02 RX ADMIN — HYDROMORPHONE HYDROCHLORIDE 0.5 MG: 1 INJECTION, SOLUTION INTRAMUSCULAR; INTRAVENOUS; SUBCUTANEOUS at 12:46

## 2023-04-02 ASSESSMENT — PAIN SCALES - GENERAL
PAINLEVEL_OUTOF10: 3
PAINLEVEL_OUTOF10: 7
PAINLEVEL_OUTOF10: 2
PAINLEVEL_OUTOF10: 1
PAINLEVEL_OUTOF10: 7
PAINLEVEL_OUTOF10: 0
PAINLEVEL_OUTOF10: 0
PAINLEVEL_OUTOF10: 5
PAINLEVEL_OUTOF10: 6
PAINLEVEL_OUTOF10: 5
PAINLEVEL_OUTOF10: 4
PAINLEVEL_OUTOF10: 6

## 2023-04-02 ASSESSMENT — PAIN DESCRIPTION - LOCATION: LOCATION: OTHER (COMMENT)

## 2023-04-02 ASSESSMENT — PAIN DESCRIPTION - DESCRIPTORS: DESCRIPTORS: OTHER (COMMENT)

## 2023-04-02 ASSESSMENT — PAIN SCALES - WONG BAKER
WONGBAKER_NUMERICALRESPONSE: 0

## 2023-04-02 ASSESSMENT — PAIN DESCRIPTION - ORIENTATION: ORIENTATION: OTHER (COMMENT)

## 2023-04-02 ASSESSMENT — PAIN - FUNCTIONAL ASSESSMENT
PAIN_FUNCTIONAL_ASSESSMENT: PREVENTS OR INTERFERES SOME ACTIVE ACTIVITIES AND ADLS

## 2023-04-03 LAB
ALBUMIN SERPL-MCNC: 2.8 GM/DL (ref 3.4–5)
ALP BLD-CCNC: 150 IU/L (ref 40–129)
ALT SERPL-CCNC: 6 U/L (ref 10–40)
ANION GAP SERPL CALCULATED.3IONS-SCNC: 11 MMOL/L (ref 4–16)
AST SERPL-CCNC: 15 IU/L (ref 15–37)
BASOPHILS ABSOLUTE: 0 K/CU MM
BASOPHILS RELATIVE PERCENT: 0.2 % (ref 0–1)
BILIRUB SERPL-MCNC: 0.2 MG/DL (ref 0–1)
BUN SERPL-MCNC: 21 MG/DL (ref 6–23)
CALCIUM SERPL-MCNC: 12.3 MG/DL (ref 8.3–10.6)
CHLORIDE BLD-SCNC: 113 MMOL/L (ref 99–110)
CO2: 23 MMOL/L (ref 21–32)
CREAT SERPL-MCNC: 0.5 MG/DL (ref 0.9–1.3)
CULTURE: NORMAL
DIFFERENTIAL TYPE: ABNORMAL
EOSINOPHILS ABSOLUTE: 0 K/CU MM
EOSINOPHILS RELATIVE PERCENT: 0.3 % (ref 0–3)
GFR SERPL CREATININE-BSD FRML MDRD: >60 ML/MIN/1.73M2
GLUCOSE SERPL-MCNC: 192 MG/DL (ref 70–99)
HCT VFR BLD CALC: 19.6 % (ref 42–52)
HCT VFR BLD CALC: 20 % (ref 42–52)
HEMOGLOBIN: 5.6 GM/DL (ref 13.5–18)
HEMOGLOBIN: 5.7 GM/DL (ref 13.5–18)
IMMATURE NEUTROPHIL %: 1 % (ref 0–0.43)
LYMPHOCYTES ABSOLUTE: 1.2 K/CU MM
LYMPHOCYTES RELATIVE PERCENT: 10.1 % (ref 24–44)
Lab: NORMAL
MAGNESIUM: 1.6 MG/DL (ref 1.8–2.4)
MCH RBC QN AUTO: 22.7 PG (ref 27–31)
MCHC RBC AUTO-ENTMCNC: 28.5 % (ref 32–36)
MCV RBC AUTO: 79.7 FL (ref 78–100)
MONOCYTES ABSOLUTE: 0.6 K/CU MM
MONOCYTES RELATIVE PERCENT: 5.1 % (ref 0–4)
NUCLEATED RBC %: 0 %
PDW BLD-RTO: 19.9 % (ref 11.7–14.9)
PLATELET # BLD: 286 K/CU MM (ref 140–440)
PMV BLD AUTO: 8.9 FL (ref 7.5–11.1)
POTASSIUM SERPL-SCNC: 3.4 MMOL/L (ref 3.5–5.1)
RBC # BLD: 2.51 M/CU MM (ref 4.6–6.2)
S PNEUM AG CSF QL: NORMAL
SEGMENTED NEUTROPHILS ABSOLUTE COUNT: 10.1 K/CU MM
SEGMENTED NEUTROPHILS RELATIVE PERCENT: 83.3 % (ref 36–66)
SODIUM BLD-SCNC: 147 MMOL/L (ref 135–145)
SPECIMEN: NORMAL
TOTAL IMMATURE NEUTOROPHIL: 0.12 K/CU MM
TOTAL NUCLEATED RBC: 0 K/CU MM
TOTAL PROTEIN: 5.7 GM/DL (ref 6.4–8.2)
WBC # BLD: 12.1 K/CU MM (ref 4–10.5)

## 2023-04-03 PROCEDURE — 36591 DRAW BLOOD OFF VENOUS DEVICE: CPT

## 2023-04-03 PROCEDURE — 2580000003 HC RX 258: Performed by: NURSE PRACTITIONER

## 2023-04-03 PROCEDURE — 80053 COMPREHEN METABOLIC PANEL: CPT

## 2023-04-03 PROCEDURE — 83735 ASSAY OF MAGNESIUM: CPT

## 2023-04-03 PROCEDURE — 2700000000 HC OXYGEN THERAPY PER DAY

## 2023-04-03 PROCEDURE — 85018 HEMOGLOBIN: CPT

## 2023-04-03 PROCEDURE — 6360000002 HC RX W HCPCS: Performed by: STUDENT IN AN ORGANIZED HEALTH CARE EDUCATION/TRAINING PROGRAM

## 2023-04-03 PROCEDURE — C9113 INJ PANTOPRAZOLE SODIUM, VIA: HCPCS | Performed by: STUDENT IN AN ORGANIZED HEALTH CARE EDUCATION/TRAINING PROGRAM

## 2023-04-03 PROCEDURE — 2580000003 HC RX 258: Performed by: STUDENT IN AN ORGANIZED HEALTH CARE EDUCATION/TRAINING PROGRAM

## 2023-04-03 PROCEDURE — 85025 COMPLETE CBC W/AUTO DIFF WBC: CPT

## 2023-04-03 PROCEDURE — 94761 N-INVAS EAR/PLS OXIMETRY MLT: CPT

## 2023-04-03 PROCEDURE — 6370000000 HC RX 637 (ALT 250 FOR IP): Performed by: NURSE PRACTITIONER

## 2023-04-03 PROCEDURE — 85014 HEMATOCRIT: CPT

## 2023-04-03 PROCEDURE — 86850 RBC ANTIBODY SCREEN: CPT

## 2023-04-03 PROCEDURE — 36415 COLL VENOUS BLD VENIPUNCTURE: CPT

## 2023-04-03 PROCEDURE — 51702 INSERT TEMP BLADDER CATH: CPT

## 2023-04-03 PROCEDURE — 86901 BLOOD TYPING SEROLOGIC RH(D): CPT

## 2023-04-03 PROCEDURE — 86900 BLOOD TYPING SEROLOGIC ABO: CPT

## 2023-04-03 PROCEDURE — 6360000002 HC RX W HCPCS: Performed by: NURSE PRACTITIONER

## 2023-04-03 PROCEDURE — 92610 EVALUATE SWALLOWING FUNCTION: CPT | Performed by: SPEECH-LANGUAGE PATHOLOGIST

## 2023-04-03 PROCEDURE — 1200000000 HC SEMI PRIVATE

## 2023-04-03 PROCEDURE — 86922 COMPATIBILITY TEST ANTIGLOB: CPT

## 2023-04-03 RX ORDER — SODIUM CHLORIDE, SODIUM LACTATE, POTASSIUM CHLORIDE, CALCIUM CHLORIDE 600; 310; 30; 20 MG/100ML; MG/100ML; MG/100ML; MG/100ML
INJECTION, SOLUTION INTRAVENOUS CONTINUOUS
Status: DISCONTINUED | OUTPATIENT
Start: 2023-04-03 | End: 2023-04-04

## 2023-04-03 RX ORDER — MAGNESIUM SULFATE IN WATER 40 MG/ML
2000 INJECTION, SOLUTION INTRAVENOUS ONCE
Status: COMPLETED | OUTPATIENT
Start: 2023-04-03 | End: 2023-04-04

## 2023-04-03 RX ORDER — SODIUM CHLORIDE 9 MG/ML
INJECTION, SOLUTION INTRAVENOUS PRN
Status: DISCONTINUED | OUTPATIENT
Start: 2023-04-03 | End: 2023-04-06 | Stop reason: HOSPADM

## 2023-04-03 RX ORDER — PANTOPRAZOLE SODIUM 40 MG/10ML
40 INJECTION, POWDER, LYOPHILIZED, FOR SOLUTION INTRAVENOUS 2 TIMES DAILY
Status: DISCONTINUED | OUTPATIENT
Start: 2023-04-03 | End: 2023-04-06 | Stop reason: HOSPADM

## 2023-04-03 RX ORDER — POTASSIUM CHLORIDE 7.45 MG/ML
10 INJECTION INTRAVENOUS
Status: COMPLETED | OUTPATIENT
Start: 2023-04-03 | End: 2023-04-04

## 2023-04-03 RX ADMIN — MAGNESIUM SULFATE HEPTAHYDRATE 2000 MG: 2 INJECTION, SOLUTION INTRAVENOUS at 22:59

## 2023-04-03 RX ADMIN — KETOROLAC TROMETHAMINE 30 MG: 30 INJECTION, SOLUTION INTRAMUSCULAR; INTRAVENOUS at 12:17

## 2023-04-03 RX ADMIN — OXYCODONE HYDROCHLORIDE 20 MG: 20 TABLET, FILM COATED, EXTENDED RELEASE ORAL at 17:04

## 2023-04-03 RX ADMIN — CEFEPIME 2000 MG: 2 INJECTION, POWDER, FOR SOLUTION INTRAVENOUS at 17:06

## 2023-04-03 RX ADMIN — HYDROMORPHONE HYDROCHLORIDE 0.5 MG: 1 INJECTION, SOLUTION INTRAMUSCULAR; INTRAVENOUS; SUBCUTANEOUS at 10:14

## 2023-04-03 RX ADMIN — SODIUM CHLORIDE: 9 INJECTION, SOLUTION INTRAVENOUS at 01:46

## 2023-04-03 RX ADMIN — SENNOSIDES AND DOCUSATE SODIUM 1 TABLET: 50; 8.6 TABLET ORAL at 20:09

## 2023-04-03 RX ADMIN — DULOXETINE 60 MG: 30 CAPSULE, DELAYED RELEASE ORAL at 12:18

## 2023-04-03 RX ADMIN — SODIUM CHLORIDE, PRESERVATIVE FREE 10 ML: 5 INJECTION INTRAVENOUS at 12:26

## 2023-04-03 RX ADMIN — POTASSIUM CHLORIDE 10 MEQ: 7.46 INJECTION, SOLUTION INTRAVENOUS at 23:01

## 2023-04-03 RX ADMIN — SODIUM CHLORIDE, POTASSIUM CHLORIDE, SODIUM LACTATE AND CALCIUM CHLORIDE: 600; 310; 30; 20 INJECTION, SOLUTION INTRAVENOUS at 22:57

## 2023-04-03 RX ADMIN — PANTOPRAZOLE SODIUM 40 MG: 40 INJECTION, POWDER, FOR SOLUTION INTRAVENOUS at 23:03

## 2023-04-03 RX ADMIN — DIPHENHYDRAMINE HYDROCHLORIDE 25 MG: 50 INJECTION, SOLUTION INTRAMUSCULAR; INTRAVENOUS at 05:11

## 2023-04-03 RX ADMIN — VANCOMYCIN HYDROCHLORIDE 1250 MG: 1.25 INJECTION, POWDER, LYOPHILIZED, FOR SOLUTION INTRAVENOUS at 00:05

## 2023-04-03 RX ADMIN — VANCOMYCIN HYDROCHLORIDE 1250 MG: 1.25 INJECTION, POWDER, LYOPHILIZED, FOR SOLUTION INTRAVENOUS at 12:25

## 2023-04-03 RX ADMIN — CEFEPIME 2000 MG: 2 INJECTION, POWDER, FOR SOLUTION INTRAVENOUS at 10:20

## 2023-04-03 RX ADMIN — TAMSULOSIN HYDROCHLORIDE 0.4 MG: 0.4 CAPSULE ORAL at 12:17

## 2023-04-03 RX ADMIN — GABAPENTIN 600 MG: 300 CAPSULE ORAL at 12:17

## 2023-04-03 RX ADMIN — KETOROLAC TROMETHAMINE 30 MG: 30 INJECTION, SOLUTION INTRAMUSCULAR; INTRAVENOUS at 05:11

## 2023-04-03 RX ADMIN — SODIUM CHLORIDE, PRESERVATIVE FREE 10 ML: 5 INJECTION INTRAVENOUS at 12:17

## 2023-04-03 RX ADMIN — SODIUM CHLORIDE, PRESERVATIVE FREE 10 ML: 5 INJECTION INTRAVENOUS at 10:15

## 2023-04-03 RX ADMIN — SODIUM CHLORIDE, PRESERVATIVE FREE 10 ML: 5 INJECTION INTRAVENOUS at 20:10

## 2023-04-03 RX ADMIN — SENNOSIDES AND DOCUSATE SODIUM 1 TABLET: 50; 8.6 TABLET ORAL at 12:18

## 2023-04-03 RX ADMIN — GABAPENTIN 600 MG: 300 CAPSULE ORAL at 20:09

## 2023-04-03 RX ADMIN — SODIUM CHLORIDE: 9 INJECTION, SOLUTION INTRAVENOUS at 12:19

## 2023-04-03 RX ADMIN — LORAZEPAM 1 MG: 2 INJECTION INTRAMUSCULAR; INTRAVENOUS at 02:19

## 2023-04-03 RX ADMIN — HYDROMORPHONE HYDROCHLORIDE 0.5 MG: 1 INJECTION, SOLUTION INTRAMUSCULAR; INTRAVENOUS; SUBCUTANEOUS at 01:20

## 2023-04-03 ASSESSMENT — PAIN SCALES - GENERAL: PAINLEVEL_OUTOF10: 0

## 2023-04-03 ASSESSMENT — PAIN - FUNCTIONAL ASSESSMENT
PAIN_FUNCTIONAL_ASSESSMENT: PREVENTS OR INTERFERES SOME ACTIVE ACTIVITIES AND ADLS
PAIN_FUNCTIONAL_ASSESSMENT: PREVENTS OR INTERFERES WITH ALL ACTIVE AND SOME PASSIVE ACTIVITIES
PAIN_FUNCTIONAL_ASSESSMENT: PREVENTS OR INTERFERES SOME ACTIVE ACTIVITIES AND ADLS

## 2023-04-03 ASSESSMENT — PAIN SCALES - WONG BAKER
WONGBAKER_NUMERICALRESPONSE: 0

## 2023-04-04 ENCOUNTER — APPOINTMENT (OUTPATIENT)
Dept: CT IMAGING | Age: 53
End: 2023-04-04
Payer: MEDICAID

## 2023-04-04 ENCOUNTER — APPOINTMENT (OUTPATIENT)
Dept: GENERAL RADIOLOGY | Age: 53
End: 2023-04-04
Payer: MEDICAID

## 2023-04-04 LAB
ALBUMIN SERPL-MCNC: 2.7 GM/DL (ref 3.4–5)
ALP BLD-CCNC: 153 IU/L (ref 40–129)
ALT SERPL-CCNC: 6 U/L (ref 10–40)
ANION GAP SERPL CALCULATED.3IONS-SCNC: 9 MMOL/L (ref 4–16)
AST SERPL-CCNC: 13 IU/L (ref 15–37)
BASOPHILS ABSOLUTE: 0 K/CU MM
BASOPHILS RELATIVE PERCENT: 0.1 % (ref 0–1)
BILIRUB SERPL-MCNC: 0.4 MG/DL (ref 0–1)
BUN SERPL-MCNC: 20 MG/DL (ref 6–23)
CALCIUM SERPL-MCNC: 12.2 MG/DL (ref 8.3–10.6)
CHLORIDE BLD-SCNC: 116 MMOL/L (ref 99–110)
CO2: 24 MMOL/L (ref 21–32)
CREAT SERPL-MCNC: 0.5 MG/DL (ref 0.9–1.3)
CULTURE: NORMAL
DIFFERENTIAL TYPE: ABNORMAL
EOSINOPHILS ABSOLUTE: 0.1 K/CU MM
EOSINOPHILS RELATIVE PERCENT: 0.5 % (ref 0–3)
GFR SERPL CREATININE-BSD FRML MDRD: >60 ML/MIN/1.73M2
GLUCOSE SERPL-MCNC: 128 MG/DL (ref 70–99)
HCT VFR BLD CALC: 25.7 % (ref 42–52)
HEMOGLOBIN: 7.9 GM/DL (ref 13.5–18)
IMMATURE NEUTROPHIL %: 0.7 % (ref 0–0.43)
LYMPHOCYTES ABSOLUTE: 1.5 K/CU MM
LYMPHOCYTES RELATIVE PERCENT: 11.1 % (ref 24–44)
Lab: NORMAL
MCH RBC QN AUTO: 25.2 PG (ref 27–31)
MCHC RBC AUTO-ENTMCNC: 30.7 % (ref 32–36)
MCV RBC AUTO: 81.8 FL (ref 78–100)
MONOCYTES ABSOLUTE: 0.7 K/CU MM
MONOCYTES RELATIVE PERCENT: 5.4 % (ref 0–4)
NUCLEATED RBC %: 0.1 %
PDW BLD-RTO: 19 % (ref 11.7–14.9)
PLATELET # BLD: 338 K/CU MM (ref 140–440)
PMV BLD AUTO: 9 FL (ref 7.5–11.1)
POTASSIUM SERPL-SCNC: 4 MMOL/L (ref 3.5–5.1)
RBC # BLD: 3.14 M/CU MM (ref 4.6–6.2)
SEGMENTED NEUTROPHILS ABSOLUTE COUNT: 11.3 K/CU MM
SEGMENTED NEUTROPHILS RELATIVE PERCENT: 82.2 % (ref 36–66)
SODIUM BLD-SCNC: 149 MMOL/L (ref 135–145)
SPECIMEN: NORMAL
TOTAL IMMATURE NEUTOROPHIL: 0.09 K/CU MM
TOTAL NUCLEATED RBC: 0 K/CU MM
TOTAL PROTEIN: 5.8 GM/DL (ref 6.4–8.2)
TOTAL RETICULOCYTE COUNT: 0.04 K/CU MM
WBC # BLD: 13.7 K/CU MM (ref 4–10.5)

## 2023-04-04 PROCEDURE — C9113 INJ PANTOPRAZOLE SODIUM, VIA: HCPCS | Performed by: STUDENT IN AN ORGANIZED HEALTH CARE EDUCATION/TRAINING PROGRAM

## 2023-04-04 PROCEDURE — 2580000003 HC RX 258: Performed by: STUDENT IN AN ORGANIZED HEALTH CARE EDUCATION/TRAINING PROGRAM

## 2023-04-04 PROCEDURE — 6370000000 HC RX 637 (ALT 250 FOR IP): Performed by: NURSE PRACTITIONER

## 2023-04-04 PROCEDURE — 6360000002 HC RX W HCPCS: Performed by: NURSE PRACTITIONER

## 2023-04-04 PROCEDURE — P9016 RBC LEUKOCYTES REDUCED: HCPCS

## 2023-04-04 PROCEDURE — 71250 CT THORAX DX C-: CPT

## 2023-04-04 PROCEDURE — 36430 TRANSFUSION BLD/BLD COMPNT: CPT

## 2023-04-04 PROCEDURE — 80053 COMPREHEN METABOLIC PANEL: CPT

## 2023-04-04 PROCEDURE — 2580000003 HC RX 258: Performed by: NURSE PRACTITIONER

## 2023-04-04 PROCEDURE — 2060000000 HC ICU INTERMEDIATE R&B

## 2023-04-04 PROCEDURE — 85025 COMPLETE CBC W/AUTO DIFF WBC: CPT

## 2023-04-04 PROCEDURE — 6360000002 HC RX W HCPCS: Performed by: STUDENT IN AN ORGANIZED HEALTH CARE EDUCATION/TRAINING PROGRAM

## 2023-04-04 PROCEDURE — 71045 X-RAY EXAM CHEST 1 VIEW: CPT

## 2023-04-04 PROCEDURE — 2700000000 HC OXYGEN THERAPY PER DAY

## 2023-04-04 PROCEDURE — 51702 INSERT TEMP BLADDER CATH: CPT

## 2023-04-04 PROCEDURE — 92526 ORAL FUNCTION THERAPY: CPT | Performed by: SPEECH-LANGUAGE PATHOLOGIST

## 2023-04-04 PROCEDURE — 94761 N-INVAS EAR/PLS OXIMETRY MLT: CPT

## 2023-04-04 RX ORDER — DEXTROSE AND SODIUM CHLORIDE 5; .45 G/100ML; G/100ML
INJECTION, SOLUTION INTRAVENOUS CONTINUOUS
Status: DISPENSED | OUTPATIENT
Start: 2023-04-04 | End: 2023-04-05

## 2023-04-04 RX ADMIN — OXYCODONE HYDROCHLORIDE 20 MG: 20 TABLET, FILM COATED, EXTENDED RELEASE ORAL at 17:38

## 2023-04-04 RX ADMIN — SENNOSIDES AND DOCUSATE SODIUM 1 TABLET: 50; 8.6 TABLET ORAL at 21:25

## 2023-04-04 RX ADMIN — GABAPENTIN 600 MG: 300 CAPSULE ORAL at 15:29

## 2023-04-04 RX ADMIN — SENNOSIDES AND DOCUSATE SODIUM 1 TABLET: 50; 8.6 TABLET ORAL at 09:51

## 2023-04-04 RX ADMIN — GABAPENTIN 600 MG: 300 CAPSULE ORAL at 21:24

## 2023-04-04 RX ADMIN — CEFEPIME 2000 MG: 2 INJECTION, POWDER, FOR SOLUTION INTRAVENOUS at 17:37

## 2023-04-04 RX ADMIN — LORAZEPAM 1 MG: 2 INJECTION INTRAMUSCULAR; INTRAVENOUS at 04:31

## 2023-04-04 RX ADMIN — VANCOMYCIN HYDROCHLORIDE 1250 MG: 1.25 INJECTION, POWDER, LYOPHILIZED, FOR SOLUTION INTRAVENOUS at 12:24

## 2023-04-04 RX ADMIN — CEFEPIME 2000 MG: 2 INJECTION, POWDER, FOR SOLUTION INTRAVENOUS at 10:20

## 2023-04-04 RX ADMIN — PANTOPRAZOLE SODIUM 40 MG: 40 INJECTION, POWDER, FOR SOLUTION INTRAVENOUS at 21:24

## 2023-04-04 RX ADMIN — GABAPENTIN 600 MG: 300 CAPSULE ORAL at 09:53

## 2023-04-04 RX ADMIN — DEXTROSE AND SODIUM CHLORIDE: 5; 450 INJECTION, SOLUTION INTRAVENOUS at 11:54

## 2023-04-04 RX ADMIN — DULOXETINE 60 MG: 30 CAPSULE, DELAYED RELEASE ORAL at 09:54

## 2023-04-04 RX ADMIN — TAMSULOSIN HYDROCHLORIDE 0.4 MG: 0.4 CAPSULE ORAL at 09:52

## 2023-04-04 RX ADMIN — HYDROMORPHONE HYDROCHLORIDE 0.5 MG: 1 INJECTION, SOLUTION INTRAMUSCULAR; INTRAVENOUS; SUBCUTANEOUS at 06:50

## 2023-04-04 RX ADMIN — POTASSIUM CHLORIDE 10 MEQ: 7.46 INJECTION, SOLUTION INTRAVENOUS at 00:02

## 2023-04-04 RX ADMIN — SODIUM CHLORIDE, PRESERVATIVE FREE 10 ML: 5 INJECTION INTRAVENOUS at 21:24

## 2023-04-04 RX ADMIN — CEFEPIME 2000 MG: 2 INJECTION, POWDER, FOR SOLUTION INTRAVENOUS at 03:16

## 2023-04-04 RX ADMIN — PANTOPRAZOLE SODIUM 40 MG: 40 INJECTION, POWDER, FOR SOLUTION INTRAVENOUS at 10:21

## 2023-04-04 RX ADMIN — HYDROMORPHONE HYDROCHLORIDE 0.5 MG: 1 INJECTION, SOLUTION INTRAMUSCULAR; INTRAVENOUS; SUBCUTANEOUS at 12:17

## 2023-04-04 RX ADMIN — VANCOMYCIN HYDROCHLORIDE 1250 MG: 1.25 INJECTION, POWDER, LYOPHILIZED, FOR SOLUTION INTRAVENOUS at 00:08

## 2023-04-04 ASSESSMENT — PAIN SCALES - WONG BAKER
WONGBAKER_NUMERICALRESPONSE: 0
WONGBAKER_NUMERICALRESPONSE: 0

## 2023-04-04 ASSESSMENT — PAIN SCALES - GENERAL: PAINLEVEL_OUTOF10: 0

## 2023-04-04 NOTE — CONSENT
Informed Consent for Blood Component Transfusion Note    I have discussed with the daughter S [facility] the rationale for blood component transfusion; its benefits in treating or preventing fatigue, organ damage, or death; and its risk which includes mild transfusion reactions, rare risk of blood borne infection, or more serious but rare reactions. I have discussed the alternatives to transfusion, including the risk and consequences of not receiving transfusion. The daughter had an opportunity to ask questions and had agreed to proceed with transfusion of blood components.     Electronically signed by GERDA Luna CNP on 4/3/23 at 9:48 PM EDT

## 2023-04-05 ENCOUNTER — APPOINTMENT (OUTPATIENT)
Dept: GENERAL RADIOLOGY | Age: 53
End: 2023-04-05
Payer: MEDICAID

## 2023-04-05 LAB
ABO/RH: NORMAL
ALBUMIN SERPL-MCNC: 2.4 GM/DL (ref 3.4–5)
ALP BLD-CCNC: 155 IU/L (ref 40–128)
ALT SERPL-CCNC: 8 U/L (ref 10–40)
ANION GAP SERPL CALCULATED.3IONS-SCNC: 5 MMOL/L (ref 4–16)
ANTIBODY SCREEN: NEGATIVE
APTT: 34.8 SECONDS (ref 25.1–37.1)
AST SERPL-CCNC: 15 IU/L (ref 15–37)
BASOPHILS ABSOLUTE: 0 K/CU MM
BASOPHILS RELATIVE PERCENT: 0.3 % (ref 0–1)
BILIRUB SERPL-MCNC: 0.3 MG/DL (ref 0–1)
BUN SERPL-MCNC: 15 MG/DL (ref 6–23)
CALCIUM SERPL-MCNC: 11.8 MG/DL (ref 8.3–10.6)
CHLORIDE BLD-SCNC: 114 MMOL/L (ref 99–110)
CO2: 24 MMOL/L (ref 21–32)
COMPONENT: NORMAL
COMPONENT: NORMAL
CREAT SERPL-MCNC: 0.4 MG/DL (ref 0.9–1.3)
CROSSMATCH RESULT: NORMAL
CROSSMATCH RESULT: NORMAL
DIFFERENTIAL TYPE: ABNORMAL
DOSE AMOUNT: NORMAL
DOSE TIME: NORMAL
EOSINOPHILS ABSOLUTE: 0.1 K/CU MM
EOSINOPHILS RELATIVE PERCENT: 1 % (ref 0–3)
GFR SERPL CREATININE-BSD FRML MDRD: >60 ML/MIN/1.73M2
GLUCOSE SERPL-MCNC: 109 MG/DL (ref 70–99)
HCT VFR BLD CALC: 24.3 % (ref 42–52)
HEMOGLOBIN: 7.3 GM/DL (ref 13.5–18)
IMMATURE NEUTROPHIL %: 0.7 % (ref 0–0.43)
INR BLD: 1.28 INDEX
LYMPHOCYTES ABSOLUTE: 1.5 K/CU MM
LYMPHOCYTES RELATIVE PERCENT: 12.7 % (ref 24–44)
MAGNESIUM: 1.6 MG/DL (ref 1.8–2.4)
MCH RBC QN AUTO: 25 PG (ref 27–31)
MCHC RBC AUTO-ENTMCNC: 30 % (ref 32–36)
MCV RBC AUTO: 83.2 FL (ref 78–100)
MONOCYTES ABSOLUTE: 0.6 K/CU MM
MONOCYTES RELATIVE PERCENT: 5.2 % (ref 0–4)
NUCLEATED RBC %: 0 %
PDW BLD-RTO: 19 % (ref 11.7–14.9)
PLATELET # BLD: 310 K/CU MM (ref 140–440)
PMV BLD AUTO: 8.9 FL (ref 7.5–11.1)
POTASSIUM SERPL-SCNC: 3.7 MMOL/L (ref 3.5–5.1)
PROTHROMBIN TIME: 16.3 SECONDS (ref 11.7–14.5)
RBC # BLD: 2.92 M/CU MM (ref 4.6–6.2)
SEGMENTED NEUTROPHILS ABSOLUTE COUNT: 9.2 K/CU MM
SEGMENTED NEUTROPHILS RELATIVE PERCENT: 80.1 % (ref 36–66)
SODIUM BLD-SCNC: 143 MMOL/L (ref 135–145)
STATUS: NORMAL
STATUS: NORMAL
TOTAL IMMATURE NEUTOROPHIL: 0.08 K/CU MM
TOTAL NUCLEATED RBC: 0 K/CU MM
TOTAL PROTEIN: 5.4 GM/DL (ref 6.4–8.2)
TOTAL RETICULOCYTE COUNT: 0.02 K/CU MM
TRANSFUSION STATUS: NORMAL
TRANSFUSION STATUS: NORMAL
UNIT DIVISION: 0
UNIT DIVISION: 0
UNIT NUMBER: NORMAL
UNIT NUMBER: NORMAL
VANCOMYCIN RANDOM: 23.5 UG/ML
WBC # BLD: 11.4 K/CU MM (ref 4–10.5)

## 2023-04-05 PROCEDURE — 6360000002 HC RX W HCPCS: Performed by: STUDENT IN AN ORGANIZED HEALTH CARE EDUCATION/TRAINING PROGRAM

## 2023-04-05 PROCEDURE — 85730 THROMBOPLASTIN TIME PARTIAL: CPT

## 2023-04-05 PROCEDURE — 92526 ORAL FUNCTION THERAPY: CPT | Performed by: SPEECH-LANGUAGE PATHOLOGIST

## 2023-04-05 PROCEDURE — 6370000000 HC RX 637 (ALT 250 FOR IP): Performed by: NURSE PRACTITIONER

## 2023-04-05 PROCEDURE — 85610 PROTHROMBIN TIME: CPT

## 2023-04-05 PROCEDURE — C9113 INJ PANTOPRAZOLE SODIUM, VIA: HCPCS | Performed by: STUDENT IN AN ORGANIZED HEALTH CARE EDUCATION/TRAINING PROGRAM

## 2023-04-05 PROCEDURE — 83735 ASSAY OF MAGNESIUM: CPT

## 2023-04-05 PROCEDURE — 94761 N-INVAS EAR/PLS OXIMETRY MLT: CPT

## 2023-04-05 PROCEDURE — 71045 X-RAY EXAM CHEST 1 VIEW: CPT

## 2023-04-05 PROCEDURE — 2580000003 HC RX 258: Performed by: NURSE PRACTITIONER

## 2023-04-05 PROCEDURE — 6360000002 HC RX W HCPCS: Performed by: NURSE PRACTITIONER

## 2023-04-05 PROCEDURE — 80053 COMPREHEN METABOLIC PANEL: CPT

## 2023-04-05 PROCEDURE — 2060000000 HC ICU INTERMEDIATE R&B

## 2023-04-05 PROCEDURE — 80202 ASSAY OF VANCOMYCIN: CPT

## 2023-04-05 PROCEDURE — 85025 COMPLETE CBC W/AUTO DIFF WBC: CPT

## 2023-04-05 RX ORDER — MAGNESIUM SULFATE IN WATER 40 MG/ML
2000 INJECTION, SOLUTION INTRAVENOUS ONCE
Status: COMPLETED | OUTPATIENT
Start: 2023-04-05 | End: 2023-04-05

## 2023-04-05 RX ADMIN — SODIUM CHLORIDE, PRESERVATIVE FREE 10 ML: 5 INJECTION INTRAVENOUS at 21:08

## 2023-04-05 RX ADMIN — TAMSULOSIN HYDROCHLORIDE 0.4 MG: 0.4 CAPSULE ORAL at 08:05

## 2023-04-05 RX ADMIN — GABAPENTIN 600 MG: 300 CAPSULE ORAL at 21:06

## 2023-04-05 RX ADMIN — VANCOMYCIN HYDROCHLORIDE 1250 MG: 1.25 INJECTION, POWDER, LYOPHILIZED, FOR SOLUTION INTRAVENOUS at 00:43

## 2023-04-05 RX ADMIN — GABAPENTIN 600 MG: 300 CAPSULE ORAL at 13:50

## 2023-04-05 RX ADMIN — SODIUM CHLORIDE, PRESERVATIVE FREE 10 ML: 5 INJECTION INTRAVENOUS at 08:06

## 2023-04-05 RX ADMIN — PANTOPRAZOLE SODIUM 40 MG: 40 INJECTION, POWDER, FOR SOLUTION INTRAVENOUS at 21:07

## 2023-04-05 RX ADMIN — SENNOSIDES AND DOCUSATE SODIUM 1 TABLET: 50; 8.6 TABLET ORAL at 08:05

## 2023-04-05 RX ADMIN — MAGNESIUM SULFATE HEPTAHYDRATE 2000 MG: 40 INJECTION, SOLUTION INTRAVENOUS at 09:08

## 2023-04-05 RX ADMIN — OXYCODONE HYDROCHLORIDE 20 MG: 20 TABLET, FILM COATED, EXTENDED RELEASE ORAL at 03:48

## 2023-04-05 RX ADMIN — PANTOPRAZOLE SODIUM 40 MG: 40 INJECTION, POWDER, FOR SOLUTION INTRAVENOUS at 08:05

## 2023-04-05 RX ADMIN — SODIUM CHLORIDE, PRESERVATIVE FREE 10 ML: 5 INJECTION INTRAVENOUS at 21:07

## 2023-04-05 RX ADMIN — CEFEPIME 2000 MG: 2 INJECTION, POWDER, FOR SOLUTION INTRAVENOUS at 00:03

## 2023-04-05 RX ADMIN — CEFEPIME 2000 MG: 2 INJECTION, POWDER, FOR SOLUTION INTRAVENOUS at 22:56

## 2023-04-05 RX ADMIN — SENNOSIDES AND DOCUSATE SODIUM 1 TABLET: 50; 8.6 TABLET ORAL at 21:07

## 2023-04-05 RX ADMIN — CEFEPIME 2000 MG: 2 INJECTION, POWDER, FOR SOLUTION INTRAVENOUS at 08:16

## 2023-04-05 RX ADMIN — DULOXETINE 60 MG: 30 CAPSULE, DELAYED RELEASE ORAL at 08:05

## 2023-04-05 RX ADMIN — OXYCODONE HYDROCHLORIDE 20 MG: 20 TABLET, FILM COATED, EXTENDED RELEASE ORAL at 16:50

## 2023-04-05 RX ADMIN — GABAPENTIN 600 MG: 300 CAPSULE ORAL at 08:05

## 2023-04-05 RX ADMIN — CEFEPIME 2000 MG: 2 INJECTION, POWDER, FOR SOLUTION INTRAVENOUS at 16:57

## 2023-04-05 ASSESSMENT — PAIN SCALES - WONG BAKER: WONGBAKER_NUMERICALRESPONSE: 0

## 2023-04-05 ASSESSMENT — PAIN SCALES - GENERAL: PAINLEVEL_OUTOF10: 0

## 2023-04-05 NOTE — CARE COORDINATION
CM called [facility] ([phone]) to ask if he will be returning to their services when discharged from the hospital. GT spoke with [facility], , who stated that they are discussing that, due to frequent hospitalizations while under hospice care. [facility] will call CM with their decision. GT called pt's wife x2 and she was unable to speak with CM either time. CM asked if CM can call her later today & she was agreeable.
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
## 2023-04-06 VITALS
WEIGHT: 152.19 LBS | DIASTOLIC BLOOD PRESSURE: 62 MMHG | HEART RATE: 115 BPM | SYSTOLIC BLOOD PRESSURE: 92 MMHG | RESPIRATION RATE: 15 BRPM | HEIGHT: 73 IN | BODY MASS INDEX: 20.17 KG/M2 | TEMPERATURE: 98.6 F | OXYGEN SATURATION: 100 %

## 2023-04-06 LAB
ALBUMIN SERPL-MCNC: 2.4 GM/DL (ref 3.4–5)
ALP BLD-CCNC: 165 IU/L (ref 40–128)
ALT SERPL-CCNC: 8 U/L (ref 10–40)
ANION GAP SERPL CALCULATED.3IONS-SCNC: 6 MMOL/L (ref 4–16)
AST SERPL-CCNC: 11 IU/L (ref 15–37)
BASOPHILS ABSOLUTE: 0 K/CU MM
BASOPHILS RELATIVE PERCENT: 0.2 % (ref 0–1)
BILIRUB SERPL-MCNC: 0.3 MG/DL (ref 0–1)
BUN SERPL-MCNC: 12 MG/DL (ref 6–23)
CALCIUM SERPL-MCNC: 12.3 MG/DL (ref 8.3–10.6)
CHLORIDE BLD-SCNC: 111 MMOL/L (ref 99–110)
CO2: 26 MMOL/L (ref 21–32)
CREAT SERPL-MCNC: 0.4 MG/DL (ref 0.9–1.3)
CULTURE: NORMAL
CULTURE: NORMAL
DIFFERENTIAL TYPE: ABNORMAL
EOSINOPHILS ABSOLUTE: 0.1 K/CU MM
EOSINOPHILS RELATIVE PERCENT: 1.3 % (ref 0–3)
GFR SERPL CREATININE-BSD FRML MDRD: >60 ML/MIN/1.73M2
GLUCOSE SERPL-MCNC: 92 MG/DL (ref 70–99)
HCT VFR BLD CALC: 24.8 % (ref 42–52)
HEMOGLOBIN: 7.4 GM/DL (ref 13.5–18)
IMMATURE NEUTROPHIL %: 1.1 % (ref 0–0.43)
LYMPHOCYTES ABSOLUTE: 1.5 K/CU MM
LYMPHOCYTES RELATIVE PERCENT: 15.5 % (ref 24–44)
Lab: NORMAL
Lab: NORMAL
MAGNESIUM: 1.8 MG/DL (ref 1.8–2.4)
MCH RBC QN AUTO: 24.8 PG (ref 27–31)
MCHC RBC AUTO-ENTMCNC: 29.8 % (ref 32–36)
MCV RBC AUTO: 83.2 FL (ref 78–100)
MONOCYTES ABSOLUTE: 0.6 K/CU MM
MONOCYTES RELATIVE PERCENT: 6.7 % (ref 0–4)
NUCLEATED RBC %: 0 %
PDW BLD-RTO: 19 % (ref 11.7–14.9)
PLATELET # BLD: 234 K/CU MM (ref 140–440)
PMV BLD AUTO: 8.7 FL (ref 7.5–11.1)
POTASSIUM SERPL-SCNC: 4 MMOL/L (ref 3.5–5.1)
RBC # BLD: 2.98 M/CU MM (ref 4.6–6.2)
SEGMENTED NEUTROPHILS ABSOLUTE COUNT: 7.1 K/CU MM
SEGMENTED NEUTROPHILS RELATIVE PERCENT: 75.2 % (ref 36–66)
SODIUM BLD-SCNC: 143 MMOL/L (ref 135–145)
SPECIMEN: NORMAL
SPECIMEN: NORMAL
TOTAL IMMATURE NEUTOROPHIL: 0.1 K/CU MM
TOTAL NUCLEATED RBC: 0 K/CU MM
TOTAL PROTEIN: 5.4 GM/DL (ref 6.4–8.2)
WBC # BLD: 9.4 K/CU MM (ref 4–10.5)

## 2023-04-06 PROCEDURE — 2700000000 HC OXYGEN THERAPY PER DAY

## 2023-04-06 PROCEDURE — 6360000002 HC RX W HCPCS: Performed by: NURSE PRACTITIONER

## 2023-04-06 PROCEDURE — 2580000003 HC RX 258: Performed by: NURSE PRACTITIONER

## 2023-04-06 PROCEDURE — 6370000000 HC RX 637 (ALT 250 FOR IP): Performed by: NURSE PRACTITIONER

## 2023-04-06 PROCEDURE — 94761 N-INVAS EAR/PLS OXIMETRY MLT: CPT

## 2023-04-06 PROCEDURE — 6360000002 HC RX W HCPCS: Performed by: STUDENT IN AN ORGANIZED HEALTH CARE EDUCATION/TRAINING PROGRAM

## 2023-04-06 PROCEDURE — 80053 COMPREHEN METABOLIC PANEL: CPT

## 2023-04-06 PROCEDURE — C9113 INJ PANTOPRAZOLE SODIUM, VIA: HCPCS | Performed by: STUDENT IN AN ORGANIZED HEALTH CARE EDUCATION/TRAINING PROGRAM

## 2023-04-06 PROCEDURE — 85025 COMPLETE CBC W/AUTO DIFF WBC: CPT

## 2023-04-06 PROCEDURE — 83735 ASSAY OF MAGNESIUM: CPT

## 2023-04-06 RX ORDER — CEFDINIR 300 MG/1
300 CAPSULE ORAL 2 TIMES DAILY
Qty: 10 CAPSULE | Refills: 0 | Status: SHIPPED | OUTPATIENT
Start: 2023-04-06 | End: 2023-04-06 | Stop reason: HOSPADM

## 2023-04-06 RX ORDER — SENNA AND DOCUSATE SODIUM 50; 8.6 MG/1; MG/1
1 TABLET, FILM COATED ORAL 2 TIMES DAILY
Qty: 60 TABLET | Refills: 0 | Status: SHIPPED | OUTPATIENT
Start: 2023-04-06 | End: 2023-05-06

## 2023-04-06 RX ORDER — DOCUSATE SODIUM 100 MG/1
100 CAPSULE, LIQUID FILLED ORAL 2 TIMES DAILY
Qty: 60 CAPSULE | Refills: 0 | Status: SHIPPED | OUTPATIENT
Start: 2023-04-06 | End: 2023-05-06

## 2023-04-06 RX ORDER — HEPARIN SODIUM (PORCINE) LOCK FLUSH IV SOLN 100 UNIT/ML 100 UNIT/ML
500 SOLUTION INTRAVENOUS ONCE
Status: COMPLETED | OUTPATIENT
Start: 2023-04-06 | End: 2023-04-06

## 2023-04-06 RX ORDER — CIPROFLOXACIN 500 MG/1
500 TABLET, FILM COATED ORAL 2 TIMES DAILY
Qty: 10 TABLET | Refills: 0 | Status: SHIPPED | OUTPATIENT
Start: 2023-04-06 | End: 2023-04-11

## 2023-04-06 RX ADMIN — HEPARIN 500 UNITS: 100 SYRINGE at 15:55

## 2023-04-06 RX ADMIN — DULOXETINE 60 MG: 30 CAPSULE, DELAYED RELEASE ORAL at 09:11

## 2023-04-06 RX ADMIN — SODIUM CHLORIDE, PRESERVATIVE FREE 10 ML: 5 INJECTION INTRAVENOUS at 09:12

## 2023-04-06 RX ADMIN — HYDROMORPHONE HYDROCHLORIDE 0.5 MG: 1 INJECTION, SOLUTION INTRAMUSCULAR; INTRAVENOUS; SUBCUTANEOUS at 14:35

## 2023-04-06 RX ADMIN — OXYCODONE HYDROCHLORIDE 20 MG: 20 TABLET, FILM COATED, EXTENDED RELEASE ORAL at 06:09

## 2023-04-06 RX ADMIN — CEFEPIME 2000 MG: 2 INJECTION, POWDER, FOR SOLUTION INTRAVENOUS at 09:19

## 2023-04-06 RX ADMIN — PANTOPRAZOLE SODIUM 40 MG: 40 INJECTION, POWDER, FOR SOLUTION INTRAVENOUS at 09:11

## 2023-04-06 RX ADMIN — TAMSULOSIN HYDROCHLORIDE 0.4 MG: 0.4 CAPSULE ORAL at 09:11

## 2023-04-06 RX ADMIN — SENNOSIDES AND DOCUSATE SODIUM 1 TABLET: 50; 8.6 TABLET ORAL at 09:11

## 2023-04-06 RX ADMIN — GABAPENTIN 600 MG: 300 CAPSULE ORAL at 09:11
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
## 2023-04-06 NOTE — DISCHARGE SUMMARY
HISTORY: Reason for exam:->sepsis Reason for Exam: sepsis FINDINGS: Study markedly limited by patient rotation to the left. Persistent areas of increased opacity medial aspect left suprahilar and apical regions consistent with pulmonary consolidation which may reflect mass/neoplasm, atelectasis or persistent pneumonia. Subjective new/increased infiltrative change right mid-upper lung field which may reflect early/mild pneumonia. Chest CT would be helpful for further evaluation. Otherwise, lung fields are clear. No detectable pleural effusion, pneumothorax, pulmonary edema, cardiomegaly. Visualized bony skeleton demonstrates foci of osteoblastic change consistent with diffuse metastatic disease similar to CT study of 01/31/2023. Subjective new/increased infiltrate or nodular density peripheral right suprahilar region consistent with pneumonia. Chest CT would be helpful for further evaluation. Persistent increased soft tissue density medial aspect left suprahilar and apical regions consistent with persistent areas of pulmonary consolidation or pneumonia similar to chest CT 01/31/2023.  Findings consistent with diffuse osteoblastic metastatic disease throughout visualized axial and appendicular skeleton similar to chest CT 01/31/2023       CBC:   Recent Labs     04/04/23  0600 04/05/23  0615 04/06/23  0700   WBC 13.7* 11.4* 9.4   HGB 7.9* 7.3* 7.4*    310 234     BMP:    Recent Labs     04/04/23  0600 04/05/23  0615 04/06/23  0700   * 143 143   K 4.0 3.7 4.0   * 114* 111*   CO2 24 24 26   BUN 20 15 12   CREATININE 0.5* 0.4* 0.4*   GLUCOSE 128* 109* 92     Hepatic:   Recent Labs     04/04/23  0600 04/05/23  0615 04/06/23  0700   AST 13* 15 11*   ALT 6* 8* 8*   BILITOT 0.4 0.3 0.3   ALKPHOS 153* 155* 165*     Lipids:   Lab Results   Component Value Date/Time    CHOL 124 12/20/2013 05:22 AM    HDL 53 12/20/2013 05:22 AM    TRIG 52 12/20/2013 05:22 AM     Hemoglobin A1C: No results found for:

## 2023-04-06 NOTE — DISCHARGE INSTR - COC
Cigarette nicotine dependence without complication H30.294    H/O: facial fractures Z87.81    Mass of left lung R91.8    Disease of prostate N42.9    Malignant neoplasm of overlapping sites of left lung (HCC) C34.82    Elevated PSA R97.20    Secondary malignant neoplasm of bone (HCC) C79.51    Anemia D64.9    Thrombocytopenia (HCC) D69.6    Shortness of breath R06.02    Prostate cancer ([facility] C61    Pneumothorax J93.9    Malignant neoplasm of upper lobe of left lung (HCC) C34.12    Leukocytosis D72.829    Chronic pain of right upper extremity M79.601, G89.29    Nephrostomy tube displaced (HCC) T83.022A    Moderate malnutrition ([address] E44.0    Flank pain R10.9    Urinary tract infection associated with indwelling urethral catheter (HCC) T83.511A, N39.0    Malignant neoplasm of lung (HCC) C34.90    Extended spectrum beta lactamase (ESBL) resistance Z16.12    Fever in adult R50.9    Fever R50.9    Sepsis without acute organ dysfunction ([facility]) A41.9    Sepsis ([address] A41.9       Isolation/Infection:   Isolation            No Isolation          Patient Infection Status       Infection Onset Added Last Indicated Last Indicated By Review Planned Expiration Resolved Resolved By    None active    Resolved    COVID-19 (Rule Out) 01/31/23 01/31/23 02/02/23 Respiratory Panel, Molecular, with COVID-19 (Restricted: peds pts or suitable admitted adults) (Ordered)   02/02/23 Rule-Out Test Resulted    COVID-19 (Rule Out) 01/31/23 01/31/23 01/31/23 COVID-19, Rapid (Ordered)   01/31/23 Rule-Out Test Resulted    COVID-19 (Rule Out) 07/03/22 07/03/22 07/03/22 Respiratory Panel, Molecular, with COVID-19 (Restricted: peds pts or suitable admitted adults) (Ordered)   07/03/22 Rule-Out Test Resulted    COVID-19 (Rule Out) 04/08/22 04/08/22 04/08/22 COVID-19, Rapid (Ordered)   04/08/22 Rule-Out Test Resulted    COVID-19 (Rule Out) 01/04/22 01/04/22 01/04/22 COVID-19, Rapid (Ordered)   01/04/22 Rule-Out Test Resulted    COVID-19 (Rule Out)

## 2023-04-06 NOTE — CARE COORDINATION
GT called Ashley with [facility]. She was in a meeting. CM left call back number. CM received call from [facility] they are able to accept pt back. PS sent to Dr Puja Lozano. A prescription for antibiotics will be e-faxed to [facility] on [address] [facility]. [place] will pick pt up at 16:00. [facility] is paying for the transportation.

## 2023-09-13 NOTE — TELEPHONE ENCOUNTER
Pharmacy Note  ED Culture Follow-up    Eliezer Navarro is a 46 y.o. male. Allergies: Tramadol, Morphine, and Vicodin [hydrocodone-acetaminophen]     Labs:  Lab Results   Component Value Date    BUN 15 03/12/2023    CREATININE 0.6 (L) 03/12/2023    WBC 12.0 (H) 03/12/2023     Estimated Creatinine Clearance: 139 mL/min (A) (based on SCr of 0.6 mg/dL (L)). Current antimicrobials:   Levofloxacin 750 mg by mouth once daily     ASSESSMENT:  Micro results:   Urine culture: positive for pseudomonas aeruginosa >100,000 CFU/ml     PLAN:  Need for intervention: Yes  Discussed with: Dr. Lyssa Rizo treatment:    Spoke with patient's wife. Patient's wife reports patient has not had any improvement in symptoms on the levofloxacin. Informed patient's wife of urine culture results. Instructed wife to discontinue levofloxacin and initiate fosfomycin 3 g by mouth once every 3 days for 3 doses. Informed patient's wife if patient develops any systemic symptoms to return for evaluation of IV antibiotics. Patient response:   Called and spoke with patient's wife. Counseled patient on following up with PCP    Called/sent in prescription to: [facility]    Please call with any questions.  Brian Reyes [facility], PharmD 3:58 PM 3/31/2023 Yes

## (undated) DEVICE — TUBING, SUCTION, 3/16" X 10', STRAIGHT: Brand: MEDLINE

## (undated) DEVICE — CONTAINER,SPECIMEN,OR STERILE,4OZ: Brand: MEDLINE

## (undated) DEVICE — COUNTER NDL 30 COUNT FOAM STRP SGL MAG

## (undated) DEVICE — DECANTER FLD 9IN ST BG FOR ASEP TRNSF OF FLD

## (undated) DEVICE — TUBING, SUCTION, 9/32" X 10', STRAIGHT: Brand: MEDLINE

## (undated) DEVICE — ELECTRODE ES AD CRDLSS PT RET REM POLYHESIVE

## (undated) DEVICE — YANKAUER,FLEXIBLE HANDLE,REGLR CAPACITY: Brand: MEDLINE INDUSTRIES, INC.

## (undated) DEVICE — GLOVE SURG SZ 7 CRM LTX FREE POLYISOPRENE POLYMER BEAD ANTI

## (undated) DEVICE — DECANTER BAG 9": Brand: MEDLINE INDUSTRIES, INC.

## (undated) DEVICE — SOLUTION IRRIG 1000ML 0.9% SOD CHL USP POUR PLAS BTL

## (undated) DEVICE — MARKER SURG SKIN UTIL REGULAR/FINE 2 TIP W/ RUL AND 9 LBL

## (undated) DEVICE — SUTURE PROL SZ 3-0 L36IN NONABSORBABLE BLU L26MM SH 1/2 CIR 8522H

## (undated) DEVICE — INTENDED FOR TISSUE SEPARATION, AND OTHER PROCEDURES THAT REQUIRE A SHARP SURGICAL BLADE TO PUNCTURE OR CUT.: Brand: BARD-PARKER ® STAINLESS STEEL BLADES

## (undated) DEVICE — C-ARM: Brand: UNBRANDED

## (undated) DEVICE — GAUZE,SPONGE,4"X4",16PLY,XRAY,STRL,LF: Brand: MEDLINE

## (undated) DEVICE — PENCIL ES CRD L10FT HND SWCHING ROCK SWCH W/ EDGE COAT BLDE

## (undated) DEVICE — DRAPE SHEET ULTRAGARD: Brand: MEDLINE

## (undated) DEVICE — TOWEL,OR,DSP,ST,BLUE,STD,6/PK,12PK/CS: Brand: MEDLINE

## (undated) DEVICE — ADHESIVE SKIN CLSR 0.7ML TOP DERMBND ADV

## (undated) DEVICE — APPLICATOR MEDICATED 26 CC SOLUTION HI LT ORNG CHLORAPREP